# Patient Record
Sex: FEMALE | Race: WHITE | Employment: OTHER | URBAN - METROPOLITAN AREA
[De-identification: names, ages, dates, MRNs, and addresses within clinical notes are randomized per-mention and may not be internally consistent; named-entity substitution may affect disease eponyms.]

---

## 2017-01-03 ENCOUNTER — OFFICE VISIT (OUTPATIENT)
Dept: FAMILY MEDICINE CLINIC | Age: 62
End: 2017-01-03

## 2017-01-03 VITALS
WEIGHT: 155 LBS | SYSTOLIC BLOOD PRESSURE: 112 MMHG | HEART RATE: 62 BPM | DIASTOLIC BLOOD PRESSURE: 72 MMHG | BODY MASS INDEX: 26.46 KG/M2 | OXYGEN SATURATION: 96 % | HEIGHT: 64 IN

## 2017-01-03 DIAGNOSIS — D72.825 BANDEMIA: Primary | ICD-10-CM

## 2017-01-03 DIAGNOSIS — F51.01 PRIMARY INSOMNIA: ICD-10-CM

## 2017-01-03 DIAGNOSIS — M25.562 ACUTE PAIN OF LEFT KNEE: ICD-10-CM

## 2017-01-03 LAB
BASOPHILS ABSOLUTE: 0.1 K/UL (ref 0–0.2)
BASOPHILS RELATIVE PERCENT: 0.9 %
EOSINOPHILS ABSOLUTE: 0.5 K/UL (ref 0–0.6)
EOSINOPHILS RELATIVE PERCENT: 4.5 %
HCT VFR BLD CALC: 45.6 % (ref 36–48)
HEMOGLOBIN: 15.1 G/DL (ref 12–16)
LYMPHOCYTES ABSOLUTE: 4 K/UL (ref 1–5.1)
LYMPHOCYTES RELATIVE PERCENT: 37.7 %
MCH RBC QN AUTO: 32.3 PG (ref 26–34)
MCHC RBC AUTO-ENTMCNC: 33.1 G/DL (ref 31–36)
MCV RBC AUTO: 97.5 FL (ref 80–100)
MONOCYTES ABSOLUTE: 0.8 K/UL (ref 0–1.3)
MONOCYTES RELATIVE PERCENT: 7.1 %
NEUTROPHILS ABSOLUTE: 5.3 K/UL (ref 1.7–7.7)
NEUTROPHILS RELATIVE PERCENT: 49.8 %
PDW BLD-RTO: 14.9 % (ref 12.4–15.4)
PLATELET # BLD: 487 K/UL (ref 135–450)
PMV BLD AUTO: 8.1 FL (ref 5–10.5)
RBC # BLD: 4.68 M/UL (ref 4–5.2)
WBC # BLD: 10.6 K/UL (ref 4–11)

## 2017-01-03 PROCEDURE — 36415 COLL VENOUS BLD VENIPUNCTURE: CPT | Performed by: INTERNAL MEDICINE

## 2017-01-03 PROCEDURE — 99214 OFFICE O/P EST MOD 30 MIN: CPT | Performed by: INTERNAL MEDICINE

## 2017-01-03 PROCEDURE — 81002 URINALYSIS NONAUTO W/O SCOPE: CPT | Performed by: INTERNAL MEDICINE

## 2017-01-03 PROCEDURE — 90471 IMMUNIZATION ADMIN: CPT | Performed by: INTERNAL MEDICINE

## 2017-01-03 PROCEDURE — 90686 IIV4 VACC NO PRSV 0.5 ML IM: CPT | Performed by: INTERNAL MEDICINE

## 2017-01-03 ASSESSMENT — ENCOUNTER SYMPTOMS
CONSTIPATION: 0
ABDOMINAL PAIN: 0
DIARRHEA: 0

## 2017-01-04 ENCOUNTER — TELEPHONE (OUTPATIENT)
Dept: FAMILY MEDICINE CLINIC | Age: 62
End: 2017-01-04

## 2017-01-04 LAB
BILIRUBIN, POC: NEGATIVE
BLOOD URINE, POC: NEGATIVE
CLARITY, POC: NORMAL
COLOR, POC: NORMAL
GLUCOSE URINE, POC: NEGATIVE
KETONES, POC: NEGATIVE
LEUKOCYTE EST, POC: NORMAL
NITRITE, POC: NEGATIVE
PH, POC: 6
PROTEIN, POC: NEGATIVE
SPECIFIC GRAVITY, POC: <=1.005
UROBILINOGEN, POC: 0.2

## 2017-01-05 RX ORDER — ZOLPIDEM TARTRATE 5 MG/1
5 TABLET ORAL NIGHTLY PRN
Qty: 30 TABLET | Refills: 2 | Status: SHIPPED | OUTPATIENT
Start: 2017-01-05 | End: 2017-04-09 | Stop reason: SDUPTHER

## 2017-01-09 LAB
6-ACETYLMORPHINE: NOT DETECTED
7-AMINOCLONAZEPAM: NOT DETECTED
ALPHA-OH-ALPRAZOLAM: PRESENT
ALPRAZOLAM: NOT DETECTED
AMPHETAMINE: NOT DETECTED
BARBITURATES: NOT DETECTED
BENZOYLECGONINE: NOT DETECTED
BUPRENORPHINE: NOT DETECTED
CARISOPRODOL: NOT DETECTED
CLONAZEPAM: NOT DETECTED
CODEINE: NOT DETECTED
CREATININE URINE: 30.4 MG/DL (ref 20–400)
DIAZEPAM: NOT DETECTED
DRUGS EXPECTED: NORMAL
EER PAIN MGT DRUG PANEL, HIGH RES/EMIT U: NORMAL
ETHYL GLUCURONIDE: NOT DETECTED
FENTANYL: NOT DETECTED
HYDROCODONE: NOT DETECTED
HYDROMORPHONE: NOT DETECTED
LORAZEPAM: NOT DETECTED
MARIJUANA METABOLITE: NOT DETECTED
MDA: NOT DETECTED
MDEA: NOT DETECTED
MDMA URINE: NOT DETECTED
MEPERIDINE: NOT DETECTED
METHADONE: NOT DETECTED
METHAMPHETAMINE: NOT DETECTED
METHYLPHENIDATE: NOT DETECTED
MIDAZOLAM: NOT DETECTED
MORPHINE: NOT DETECTED
NORBUPRENORPHINE, FREE: NOT DETECTED
NORDIAZEPAM: NOT DETECTED
NORFENTANYL: NOT DETECTED
NORHYDROCODONE, URINE: NOT DETECTED
NOROXYCODONE: NOT DETECTED
NOROXYMORPHONE, URINE: NOT DETECTED
OXAZEPAM: NOT DETECTED
OXYCODONE: NOT DETECTED
OXYMORPHONE: NOT DETECTED
PAIN MANAGEMENT DRUG PANEL: NORMAL
PAIN MANAGEMENT DRUG PANEL: NORMAL
PCP: NOT DETECTED
PHENTERMINE: NOT DETECTED
PROPOXYPHENE: NOT DETECTED
TAPENTADOL, URINE: NOT DETECTED
TAPENTADOL-O-SULFATE, URINE: NOT DETECTED
TEMAZEPAM: NOT DETECTED
TRAMADOL: NOT DETECTED
ZOLPIDEM: NOT DETECTED

## 2017-01-10 ENCOUNTER — OFFICE VISIT (OUTPATIENT)
Dept: SOCIAL WORK | Age: 62
End: 2017-01-10

## 2017-01-10 DIAGNOSIS — F41.8 OTHER SPECIFIED ANXIETY DISORDERS: ICD-10-CM

## 2017-01-10 DIAGNOSIS — F33.0 MAJOR DEPRESSIVE DISORDER, RECURRENT EPISODE, MILD (HCC): Primary | ICD-10-CM

## 2017-01-10 PROCEDURE — 90834 PSYTX W PT 45 MINUTES: CPT | Performed by: SOCIAL WORKER

## 2017-02-02 RX ORDER — ATORVASTATIN CALCIUM 20 MG/1
TABLET, FILM COATED ORAL
Qty: 30 TABLET | Refills: 1 | Status: SHIPPED | OUTPATIENT
Start: 2017-02-02 | End: 2017-06-09 | Stop reason: SDUPTHER

## 2017-02-02 RX ORDER — SERTRALINE HYDROCHLORIDE 100 MG/1
TABLET, FILM COATED ORAL
Qty: 30 TABLET | Refills: 1 | Status: SHIPPED | OUTPATIENT
Start: 2017-02-02 | End: 2017-03-14 | Stop reason: SDUPTHER

## 2017-02-02 RX ORDER — AMITRIPTYLINE HYDROCHLORIDE 50 MG/1
TABLET, FILM COATED ORAL
Qty: 90 TABLET | Refills: 1 | Status: SHIPPED | OUTPATIENT
Start: 2017-02-02 | End: 2017-03-14 | Stop reason: SDUPTHER

## 2017-02-02 RX ORDER — ALENDRONATE SODIUM 70 MG/1
TABLET ORAL
Qty: 4 TABLET | Refills: 1 | Status: SHIPPED | OUTPATIENT
Start: 2017-02-02 | End: 2018-03-19 | Stop reason: SDUPTHER

## 2017-02-07 ENCOUNTER — OFFICE VISIT (OUTPATIENT)
Dept: SOCIAL WORK | Age: 62
End: 2017-02-07

## 2017-02-07 DIAGNOSIS — F33.0 MAJOR DEPRESSIVE DISORDER, RECURRENT EPISODE, MILD (HCC): Primary | ICD-10-CM

## 2017-02-07 DIAGNOSIS — F41.8 OTHER SPECIFIED ANXIETY DISORDERS: ICD-10-CM

## 2017-02-07 PROCEDURE — 90834 PSYTX W PT 45 MINUTES: CPT | Performed by: SOCIAL WORKER

## 2017-02-20 RX ORDER — ATENOLOL 50 MG/1
TABLET ORAL
Qty: 30 TABLET | Refills: 2 | Status: SHIPPED | OUTPATIENT
Start: 2017-02-20 | End: 2017-03-14 | Stop reason: SDUPTHER

## 2017-02-21 ENCOUNTER — OFFICE VISIT (OUTPATIENT)
Dept: SOCIAL WORK | Age: 62
End: 2017-02-21

## 2017-02-21 DIAGNOSIS — F41.8 OTHER SPECIFIED ANXIETY DISORDERS: ICD-10-CM

## 2017-02-21 DIAGNOSIS — F33.0 MAJOR DEPRESSIVE DISORDER, RECURRENT EPISODE, MILD (HCC): Primary | ICD-10-CM

## 2017-02-21 PROCEDURE — 90834 PSYTX W PT 45 MINUTES: CPT | Performed by: SOCIAL WORKER

## 2017-02-23 ENCOUNTER — TELEPHONE (OUTPATIENT)
Dept: FAMILY MEDICINE CLINIC | Age: 62
End: 2017-02-23

## 2017-02-23 DIAGNOSIS — Z13.21 ENCOUNTER FOR VITAMIN DEFICIENCY SCREENING: ICD-10-CM

## 2017-02-23 DIAGNOSIS — R53.83 FATIGUE, UNSPECIFIED TYPE: Primary | ICD-10-CM

## 2017-02-24 ENCOUNTER — NURSE ONLY (OUTPATIENT)
Dept: FAMILY MEDICINE CLINIC | Age: 62
End: 2017-02-24

## 2017-02-24 DIAGNOSIS — Z13.21 ENCOUNTER FOR VITAMIN DEFICIENCY SCREENING: ICD-10-CM

## 2017-02-24 DIAGNOSIS — R53.83 FATIGUE, UNSPECIFIED TYPE: ICD-10-CM

## 2017-02-24 LAB
BASOPHILS ABSOLUTE: 0.1 K/UL (ref 0–0.2)
BASOPHILS RELATIVE PERCENT: 1.3 %
EOSINOPHILS ABSOLUTE: 0.3 K/UL (ref 0–0.6)
EOSINOPHILS RELATIVE PERCENT: 3.3 %
HCT VFR BLD CALC: 49.2 % (ref 36–48)
HEMOGLOBIN: 15.9 G/DL (ref 12–16)
LYMPHOCYTES ABSOLUTE: 3.4 K/UL (ref 1–5.1)
LYMPHOCYTES RELATIVE PERCENT: 38 %
MCH RBC QN AUTO: 32.8 PG (ref 26–34)
MCHC RBC AUTO-ENTMCNC: 32.4 G/DL (ref 31–36)
MCV RBC AUTO: 101.2 FL (ref 80–100)
MONOCYTES ABSOLUTE: 0.7 K/UL (ref 0–1.3)
MONOCYTES RELATIVE PERCENT: 8 %
NEUTROPHILS ABSOLUTE: 4.4 K/UL (ref 1.7–7.7)
NEUTROPHILS RELATIVE PERCENT: 49.4 %
PDW BLD-RTO: 15.8 % (ref 12.4–15.4)
PLATELET # BLD: 551 K/UL (ref 135–450)
PMV BLD AUTO: 8.2 FL (ref 5–10.5)
RBC # BLD: 4.86 M/UL (ref 4–5.2)
VITAMIN D 25-HYDROXY: 15.6 NG/ML
WBC # BLD: 8.9 K/UL (ref 4–11)

## 2017-02-24 PROCEDURE — 36415 COLL VENOUS BLD VENIPUNCTURE: CPT | Performed by: INTERNAL MEDICINE

## 2017-02-25 LAB
ALBUMIN SERPL-MCNC: 4.5 G/DL (ref 3.4–5)
ANION GAP SERPL CALCULATED.3IONS-SCNC: 15 MMOL/L (ref 3–16)
BUN BLDV-MCNC: 5 MG/DL (ref 7–20)
CALCIUM SERPL-MCNC: 10.3 MG/DL (ref 8.3–10.6)
CHLORIDE BLD-SCNC: 99 MMOL/L (ref 99–110)
CO2: 23 MMOL/L (ref 21–32)
CREAT SERPL-MCNC: 0.7 MG/DL (ref 0.6–1.2)
GFR AFRICAN AMERICAN: >60
GFR NON-AFRICAN AMERICAN: >60
GLUCOSE BLD-MCNC: 103 MG/DL (ref 70–99)
PHOSPHORUS: 3.8 MG/DL (ref 2.5–4.9)
POTASSIUM SERPL-SCNC: 5.2 MMOL/L (ref 3.5–5.1)
SODIUM BLD-SCNC: 137 MMOL/L (ref 136–145)
TSH REFLEX: 1.87 UIU/ML (ref 0.27–4.2)
VITAMIN B-12: 319 PG/ML (ref 211–911)

## 2017-02-27 RX ORDER — FOLIC ACID 1 MG/1
1 TABLET ORAL DAILY
Qty: 30 TABLET | Refills: 3 | Status: SHIPPED | OUTPATIENT
Start: 2017-02-27 | End: 2017-06-13 | Stop reason: SDUPTHER

## 2017-02-28 RX ORDER — ATORVASTATIN CALCIUM 20 MG/1
TABLET, FILM COATED ORAL
Qty: 30 TABLET | Refills: 1 | Status: SHIPPED | OUTPATIENT
Start: 2017-02-28 | End: 2017-03-14 | Stop reason: SDUPTHER

## 2017-03-07 RX ORDER — ALPRAZOLAM 1 MG/1
TABLET ORAL
Qty: 45 TABLET | Refills: 0 | Status: SHIPPED | OUTPATIENT
Start: 2017-03-07 | End: 2017-04-09 | Stop reason: SDUPTHER

## 2017-03-14 ENCOUNTER — OFFICE VISIT (OUTPATIENT)
Dept: FAMILY MEDICINE CLINIC | Age: 62
End: 2017-03-14

## 2017-03-14 VITALS
BODY MASS INDEX: 26.43 KG/M2 | SYSTOLIC BLOOD PRESSURE: 94 MMHG | TEMPERATURE: 98.2 F | OXYGEN SATURATION: 98 % | HEART RATE: 68 BPM | WEIGHT: 154 LBS | DIASTOLIC BLOOD PRESSURE: 60 MMHG

## 2017-03-14 DIAGNOSIS — J30.1 NON-SEASONAL ALLERGIC RHINITIS DUE TO POLLEN: ICD-10-CM

## 2017-03-14 DIAGNOSIS — F33.1 MAJOR DEPRESSIVE DISORDER, RECURRENT EPISODE, MODERATE (HCC): ICD-10-CM

## 2017-03-14 DIAGNOSIS — G47.00 INSOMNIA, UNSPECIFIED TYPE: Primary | ICD-10-CM

## 2017-03-14 DIAGNOSIS — D86.9 SARCOIDOSIS: ICD-10-CM

## 2017-03-14 PROCEDURE — 99214 OFFICE O/P EST MOD 30 MIN: CPT | Performed by: INTERNAL MEDICINE

## 2017-03-14 RX ORDER — ATENOLOL 50 MG/1
TABLET ORAL
Qty: 30 TABLET | Refills: 0
Start: 2017-03-14 | End: 2017-06-09 | Stop reason: SDUPTHER

## 2017-03-14 RX ORDER — MONTELUKAST SODIUM 10 MG/1
10 TABLET ORAL DAILY
Qty: 30 TABLET | Refills: 3 | Status: SHIPPED | OUTPATIENT
Start: 2017-03-14 | End: 2017-06-13 | Stop reason: SDUPTHER

## 2017-03-14 ASSESSMENT — PATIENT HEALTH QUESTIONNAIRE - PHQ9
SUM OF ALL RESPONSES TO PHQ9 QUESTIONS 1 & 2: 0
1. LITTLE INTEREST OR PLEASURE IN DOING THINGS: 0
SUM OF ALL RESPONSES TO PHQ QUESTIONS 1-9: 0
2. FEELING DOWN, DEPRESSED OR HOPELESS: 0

## 2017-03-28 RX ORDER — AMITRIPTYLINE HYDROCHLORIDE 50 MG/1
TABLET, FILM COATED ORAL
Qty: 45 TABLET | Refills: 0 | Status: SHIPPED | OUTPATIENT
Start: 2017-03-28 | End: 2017-04-28 | Stop reason: SDUPTHER

## 2017-05-02 RX ORDER — AMITRIPTYLINE HYDROCHLORIDE 50 MG/1
TABLET, FILM COATED ORAL
Qty: 45 TABLET | Refills: 0 | Status: SHIPPED | OUTPATIENT
Start: 2017-05-02 | End: 2017-06-05 | Stop reason: SDUPTHER

## 2017-05-03 RX ORDER — ATORVASTATIN CALCIUM 20 MG/1
TABLET, FILM COATED ORAL
Qty: 30 TABLET | Refills: 5 | Status: SHIPPED | OUTPATIENT
Start: 2017-05-03 | End: 2017-08-14 | Stop reason: SDUPTHER

## 2017-05-23 RX ORDER — SERTRALINE HYDROCHLORIDE 100 MG/1
TABLET, FILM COATED ORAL
Qty: 30 TABLET | Refills: 1 | Status: SHIPPED | OUTPATIENT
Start: 2017-05-23 | End: 2017-06-13 | Stop reason: SDUPTHER

## 2017-05-23 RX ORDER — VALACYCLOVIR HYDROCHLORIDE 500 MG/1
TABLET, FILM COATED ORAL
Qty: 30 TABLET | Refills: 5 | Status: SHIPPED | OUTPATIENT
Start: 2017-05-23 | End: 2017-06-13 | Stop reason: SDUPTHER

## 2017-06-06 RX ORDER — AMITRIPTYLINE HYDROCHLORIDE 50 MG/1
TABLET, FILM COATED ORAL
Qty: 45 TABLET | Refills: 0 | Status: SHIPPED | OUTPATIENT
Start: 2017-06-06 | End: 2017-06-13 | Stop reason: SDUPTHER

## 2017-06-06 RX ORDER — ATENOLOL 50 MG/1
TABLET ORAL
Qty: 30 TABLET | Refills: 2 | Status: SHIPPED | OUTPATIENT
Start: 2017-06-06 | End: 2017-06-13 | Stop reason: SDUPTHER

## 2017-06-08 ENCOUNTER — TELEPHONE (OUTPATIENT)
Dept: FAMILY MEDICINE CLINIC | Age: 62
End: 2017-06-08

## 2017-06-09 ENCOUNTER — OFFICE VISIT (OUTPATIENT)
Dept: FAMILY MEDICINE CLINIC | Age: 62
End: 2017-06-09

## 2017-06-09 VITALS — DIASTOLIC BLOOD PRESSURE: 78 MMHG | HEART RATE: 74 BPM | OXYGEN SATURATION: 97 % | SYSTOLIC BLOOD PRESSURE: 122 MMHG

## 2017-06-09 DIAGNOSIS — S93.401A SPRAIN OF RIGHT ANKLE, UNSPECIFIED LIGAMENT, INITIAL ENCOUNTER: ICD-10-CM

## 2017-06-09 DIAGNOSIS — M25.571 ACUTE RIGHT ANKLE PAIN: ICD-10-CM

## 2017-06-09 PROCEDURE — 99212 OFFICE O/P EST SF 10 MIN: CPT | Performed by: NURSE PRACTITIONER

## 2017-06-13 ENCOUNTER — OFFICE VISIT (OUTPATIENT)
Dept: FAMILY MEDICINE CLINIC | Age: 62
End: 2017-06-13

## 2017-06-13 VITALS
HEART RATE: 67 BPM | BODY MASS INDEX: 26.71 KG/M2 | WEIGHT: 155.6 LBS | DIASTOLIC BLOOD PRESSURE: 60 MMHG | SYSTOLIC BLOOD PRESSURE: 90 MMHG | OXYGEN SATURATION: 98 %

## 2017-06-13 DIAGNOSIS — F33.1 MAJOR DEPRESSIVE DISORDER, RECURRENT EPISODE, MODERATE (HCC): ICD-10-CM

## 2017-06-13 DIAGNOSIS — E78.00 HYPERCHOLESTEROLEMIA: Primary | ICD-10-CM

## 2017-06-13 DIAGNOSIS — M25.552 LEFT HIP PAIN: ICD-10-CM

## 2017-06-13 DIAGNOSIS — D86.9 SARCOIDOSIS: ICD-10-CM

## 2017-06-13 LAB
CHOLESTEROL, TOTAL: 127 MG/DL (ref 0–199)
HDLC SERPL-MCNC: 40 MG/DL (ref 40–60)
LDL CHOLESTEROL CALCULATED: 65 MG/DL
TRIGL SERPL-MCNC: 109 MG/DL (ref 0–150)
VLDLC SERPL CALC-MCNC: 22 MG/DL

## 2017-06-13 PROCEDURE — 99214 OFFICE O/P EST MOD 30 MIN: CPT | Performed by: INTERNAL MEDICINE

## 2017-06-13 RX ORDER — FOLIC ACID 1 MG/1
1 TABLET ORAL DAILY
Qty: 90 TABLET | Refills: 1 | Status: SHIPPED | OUTPATIENT
Start: 2017-06-13 | End: 2017-12-19 | Stop reason: SDUPTHER

## 2017-06-13 RX ORDER — ZOLPIDEM TARTRATE 5 MG/1
TABLET ORAL
Qty: 30 TABLET | Refills: 2 | Status: CANCELLED | OUTPATIENT
Start: 2017-06-13

## 2017-06-13 RX ORDER — AMITRIPTYLINE HYDROCHLORIDE 50 MG/1
TABLET, FILM COATED ORAL
Qty: 135 TABLET | Refills: 1 | Status: SHIPPED | OUTPATIENT
Start: 2017-06-13 | End: 2017-12-19 | Stop reason: SDUPTHER

## 2017-06-13 RX ORDER — VALACYCLOVIR HYDROCHLORIDE 500 MG/1
TABLET, FILM COATED ORAL
Qty: 90 TABLET | Refills: 1 | Status: SHIPPED | OUTPATIENT
Start: 2017-06-13 | End: 2018-03-19 | Stop reason: SDUPTHER

## 2017-06-13 RX ORDER — ATENOLOL 25 MG/1
TABLET ORAL
Qty: 90 TABLET | Refills: 1 | Status: SHIPPED | OUTPATIENT
Start: 2017-06-13 | End: 2017-12-19 | Stop reason: SDUPTHER

## 2017-06-13 RX ORDER — ALPRAZOLAM 1 MG/1
TABLET ORAL
Qty: 45 TABLET | Refills: 2 | Status: CANCELLED | OUTPATIENT
Start: 2017-06-13

## 2017-06-13 RX ORDER — SERTRALINE HYDROCHLORIDE 100 MG/1
TABLET, FILM COATED ORAL
Qty: 90 TABLET | Refills: 1 | Status: SHIPPED | OUTPATIENT
Start: 2017-06-13 | End: 2017-12-19 | Stop reason: SDUPTHER

## 2017-06-13 RX ORDER — MONTELUKAST SODIUM 10 MG/1
10 TABLET ORAL DAILY
Qty: 90 TABLET | Refills: 1 | Status: SHIPPED | OUTPATIENT
Start: 2017-06-13 | End: 2017-12-19 | Stop reason: SDUPTHER

## 2017-06-18 ASSESSMENT — ENCOUNTER SYMPTOMS: COLOR CHANGE: 1

## 2017-06-21 ENCOUNTER — OFFICE VISIT (OUTPATIENT)
Dept: ORTHOPEDIC SURGERY | Age: 62
End: 2017-06-21

## 2017-06-21 VITALS
SYSTOLIC BLOOD PRESSURE: 120 MMHG | DIASTOLIC BLOOD PRESSURE: 79 MMHG | BODY MASS INDEX: 26.57 KG/M2 | HEART RATE: 75 BPM | HEIGHT: 64 IN | WEIGHT: 155.65 LBS

## 2017-06-21 DIAGNOSIS — M25.552 LEFT HIP PAIN: Primary | ICD-10-CM

## 2017-06-21 DIAGNOSIS — M70.62 TROCHANTERIC BURSITIS OF LEFT HIP: ICD-10-CM

## 2017-06-21 PROCEDURE — 20610 DRAIN/INJ JOINT/BURSA W/O US: CPT | Performed by: ORTHOPAEDIC SURGERY

## 2017-06-21 PROCEDURE — 99202 OFFICE O/P NEW SF 15 MIN: CPT | Performed by: ORTHOPAEDIC SURGERY

## 2017-06-21 PROCEDURE — 73502 X-RAY EXAM HIP UNI 2-3 VIEWS: CPT | Performed by: ORTHOPAEDIC SURGERY

## 2017-06-23 ENCOUNTER — TELEPHONE (OUTPATIENT)
Dept: ORTHOPEDIC SURGERY | Age: 62
End: 2017-06-23

## 2017-06-27 ENCOUNTER — OFFICE VISIT (OUTPATIENT)
Dept: ORTHOPEDIC SURGERY | Age: 62
End: 2017-06-27

## 2017-06-27 VITALS
HEART RATE: 69 BPM | BODY MASS INDEX: 26.57 KG/M2 | HEIGHT: 64 IN | SYSTOLIC BLOOD PRESSURE: 128 MMHG | WEIGHT: 155.65 LBS | DIASTOLIC BLOOD PRESSURE: 88 MMHG

## 2017-06-27 DIAGNOSIS — M25.571 RIGHT ANKLE PAIN, UNSPECIFIED CHRONICITY: Primary | ICD-10-CM

## 2017-06-27 DIAGNOSIS — M25.371 ANKLE INSTABILITY, RIGHT: ICD-10-CM

## 2017-06-27 PROBLEM — M25.373 ANKLE INSTABILITY: Status: ACTIVE | Noted: 2017-06-27

## 2017-06-27 PROCEDURE — L4361 PNEUMA/VAC WALK BOOT PRE OTS: HCPCS | Performed by: ORTHOPAEDIC SURGERY

## 2017-06-27 PROCEDURE — 99243 OFF/OP CNSLTJ NEW/EST LOW 30: CPT | Performed by: ORTHOPAEDIC SURGERY

## 2017-07-25 ENCOUNTER — OFFICE VISIT (OUTPATIENT)
Dept: ORTHOPEDIC SURGERY | Age: 62
End: 2017-07-25

## 2017-07-25 VITALS — WEIGHT: 155.65 LBS | HEIGHT: 64 IN | BODY MASS INDEX: 26.57 KG/M2

## 2017-07-25 DIAGNOSIS — M25.371 ANKLE INSTABILITY, RIGHT: Primary | ICD-10-CM

## 2017-07-25 PROCEDURE — L1902 AFO ANKLE GAUNTLET PRE OTS: HCPCS | Performed by: ORTHOPAEDIC SURGERY

## 2017-07-25 PROCEDURE — 73610 X-RAY EXAM OF ANKLE: CPT | Performed by: ORTHOPAEDIC SURGERY

## 2017-07-25 PROCEDURE — 99212 OFFICE O/P EST SF 10 MIN: CPT | Performed by: ORTHOPAEDIC SURGERY

## 2017-08-15 RX ORDER — ATORVASTATIN CALCIUM 20 MG/1
TABLET, FILM COATED ORAL
Qty: 30 TABLET | Refills: 0 | Status: SHIPPED | OUTPATIENT
Start: 2017-08-15 | End: 2017-08-16 | Stop reason: SDUPTHER

## 2017-08-16 ENCOUNTER — OFFICE VISIT (OUTPATIENT)
Dept: ORTHOPEDIC SURGERY | Age: 62
End: 2017-08-16

## 2017-08-16 VITALS
WEIGHT: 155.65 LBS | HEIGHT: 64 IN | BODY MASS INDEX: 26.57 KG/M2 | HEART RATE: 70 BPM | SYSTOLIC BLOOD PRESSURE: 111 MMHG | DIASTOLIC BLOOD PRESSURE: 74 MMHG

## 2017-08-16 DIAGNOSIS — M25.371 ANKLE INSTABILITY, RIGHT: ICD-10-CM

## 2017-08-16 DIAGNOSIS — M25.571 RIGHT ANKLE PAIN, UNSPECIFIED CHRONICITY: Primary | ICD-10-CM

## 2017-08-16 PROCEDURE — 99212 OFFICE O/P EST SF 10 MIN: CPT | Performed by: ORTHOPAEDIC SURGERY

## 2017-08-16 PROCEDURE — 73610 X-RAY EXAM OF ANKLE: CPT | Performed by: ORTHOPAEDIC SURGERY

## 2017-08-16 RX ORDER — ATORVASTATIN CALCIUM 20 MG/1
TABLET, FILM COATED ORAL
Qty: 90 TABLET | Refills: 0 | Status: SHIPPED | OUTPATIENT
Start: 2017-08-16 | End: 2017-12-27 | Stop reason: SDUPTHER

## 2017-09-19 ENCOUNTER — TELEPHONE (OUTPATIENT)
Dept: FAMILY MEDICINE CLINIC | Age: 62
End: 2017-09-19

## 2017-09-19 ENCOUNTER — OFFICE VISIT (OUTPATIENT)
Dept: FAMILY MEDICINE CLINIC | Age: 62
End: 2017-09-19

## 2017-09-19 VITALS
HEART RATE: 66 BPM | BODY MASS INDEX: 27.01 KG/M2 | SYSTOLIC BLOOD PRESSURE: 120 MMHG | WEIGHT: 158.2 LBS | DIASTOLIC BLOOD PRESSURE: 70 MMHG | OXYGEN SATURATION: 98 %

## 2017-09-19 DIAGNOSIS — M54.42 CHRONIC LEFT-SIDED LOW BACK PAIN WITH LEFT-SIDED SCIATICA: Primary | ICD-10-CM

## 2017-09-19 DIAGNOSIS — J06.9 VIRAL URI: ICD-10-CM

## 2017-09-19 DIAGNOSIS — G89.29 CHRONIC LEFT-SIDED LOW BACK PAIN WITH LEFT-SIDED SCIATICA: Primary | ICD-10-CM

## 2017-09-19 DIAGNOSIS — L98.9 SKIN LESION OF RIGHT ARM: Primary | ICD-10-CM

## 2017-09-19 DIAGNOSIS — F41.1 GAD (GENERALIZED ANXIETY DISORDER): ICD-10-CM

## 2017-09-19 DIAGNOSIS — G47.00 INSOMNIA, UNSPECIFIED TYPE: ICD-10-CM

## 2017-09-19 PROCEDURE — 90686 IIV4 VACC NO PRSV 0.5 ML IM: CPT | Performed by: INTERNAL MEDICINE

## 2017-09-19 PROCEDURE — 99213 OFFICE O/P EST LOW 20 MIN: CPT | Performed by: INTERNAL MEDICINE

## 2017-09-19 PROCEDURE — 90471 IMMUNIZATION ADMIN: CPT | Performed by: INTERNAL MEDICINE

## 2017-09-19 RX ORDER — CHOLECALCIFEROL (VITAMIN D3) 1250 MCG
1 CAPSULE ORAL
Qty: 12 CAPSULE | Refills: 1 | Status: SHIPPED | OUTPATIENT
Start: 2017-09-19 | End: 2018-02-24 | Stop reason: SDUPTHER

## 2017-09-22 RX ORDER — ZOLPIDEM TARTRATE 5 MG/1
TABLET ORAL
Qty: 30 TABLET | Refills: 2 | Status: SHIPPED | OUTPATIENT
Start: 2017-09-22 | End: 2017-12-21 | Stop reason: SDUPTHER

## 2017-09-22 RX ORDER — ALPRAZOLAM 1 MG/1
TABLET ORAL
Qty: 45 TABLET | Refills: 2 | Status: SHIPPED | OUTPATIENT
Start: 2017-09-22 | End: 2017-12-21 | Stop reason: SDUPTHER

## 2017-10-10 RX ORDER — ATENOLOL 50 MG/1
TABLET ORAL
Qty: 30 TABLET | Refills: 2 | Status: ON HOLD | OUTPATIENT
Start: 2017-10-10 | End: 2017-11-09 | Stop reason: SDUPTHER

## 2017-10-18 ENCOUNTER — HOSPITAL ENCOUNTER (OUTPATIENT)
Dept: ULTRASOUND IMAGING | Age: 62
Discharge: HOME | End: 2017-10-18
Attending: UROLOGY | Admitting: UROLOGY

## 2017-10-18 DIAGNOSIS — N20.0 KIDNEY STONES: ICD-10-CM

## 2017-10-18 DIAGNOSIS — N28.1 ACQUIRED CYST OF KIDNEY: ICD-10-CM

## 2017-11-13 PROBLEM — K92.2 GI BLEED: Status: ACTIVE | Noted: 2017-11-13

## 2017-11-13 PROBLEM — K52.9 COLITIS, ACUTE: Status: ACTIVE | Noted: 2017-11-13

## 2017-11-13 RX ORDER — ATENOLOL 50 MG/1
TABLET ORAL
Qty: 30 TABLET | Refills: 1 | Status: SHIPPED | OUTPATIENT
Start: 2017-11-13 | End: 2017-11-14 | Stop reason: HOSPADM

## 2017-12-11 ENCOUNTER — OFFICE VISIT (OUTPATIENT)
Dept: DERMATOLOGY | Age: 62
End: 2017-12-11

## 2017-12-11 DIAGNOSIS — D48.9 NEOPLASM OF UNCERTAIN BEHAVIOR: Primary | ICD-10-CM

## 2017-12-11 DIAGNOSIS — Z87.891 FORMER SMOKER: ICD-10-CM

## 2017-12-11 DIAGNOSIS — L57.0 ACTINIC KERATOSIS: ICD-10-CM

## 2017-12-11 PROCEDURE — 11100 PR BIOPSY OF SKIN LESION: CPT | Performed by: DERMATOLOGY

## 2017-12-11 PROCEDURE — 17110 DESTRUCTION B9 LES UP TO 14: CPT | Performed by: DERMATOLOGY

## 2017-12-11 NOTE — PATIENT INSTRUCTIONS
Biopsy Wound Care Instructions   Cleanse the wound with mild soapy water daily.  Gently dry the area.  Apply Vaseline or petroleum jelly to the wound using a cotton tipped applicator or Qtip.  Cover with a clean bandage.  Repeat this process until the biopsy site is healed.  If you had stitches placed, continue treating the site until the stitches are removed. Remember to make an appointment to return to have your stitches removed by our staff.  You may shower and bathe as usual.   ** Biopsy results generally take around 7 business days to come back. If you have not heard from us by then, please call the office at [phone] between 8AM and 4PM Monday through Friday. Cryosurgery (Freezing) Wound Care Instructions    AFTER THE PROCEDURE:    You will notice swelling and redness around the site. This is normal.    You may experience a sharp or sore feeling for the next several days. For this discomfort, you may take acetaminophen (Tylenol©).  A blister may develop at the treated area, sometimes as soon as by the end of the day. After several days, the blister will subside and a scab will form.  If the area is bumped or traumatized during the first few days following freezing, you may develop bleeding into the blister, forming a blood blister. This is nothing to be alarmed about.  If the blister is tense, uncomfortable, or much larger than the site that was frozen, you may pop the blister along its edge with a sterile needle (boiled, heated under a flame, or cleaned with alcohol) to allow the fluid to drain out. If the blister does not bother you, no treatment is needed.  Do NOT peel off the top of the blister roof. It will act as a dressing on top of your wound. WOUND CARE:    You may shower or bathe as usual, but avoid scrubbing the areas that have been frozen.  Cleanse the site twice a day with mild soapy water, and then apply a thin film of white petrolatum (Vaseline©).

## 2017-12-11 NOTE — PROGRESS NOTES
above: 1. Right upper lateral arm-0.5 x 0.5 cm well circumscribed bluish to gray to black papule      2.  ill defined irreg shaped gritty keratotic pink macule(s) located to nasal tip    Assessment and Plan     1. Neoplasm of uncertain behavior    2. Actinic keratosis    3. Former smoker        1. Neoplasm of uncertain behavior  DDX: Blue nevus rule out melanoma  -Discussed possible diagnosis. Patient agreeable to biopsy. Verbal consent obtained after risks (infection, bleeding, scar), benefits and alternatives explained. -Area(s) to be biopsied were marked with a surgical pen. Site(s) were cleansed with alcohol. Local anesthesia achieved with 1% lidocaine with epinephrine/sodium bicarbonate. Shave biopsy performed with a razor blade. Hemostasis was achieved with aluminum chloride and electrodessication. The wound(s) were dressed with petrolatum and covered with a bandage. Specimen(s) sent to pathology. Pt educated re: risk of bleeding, infection, scar and wound care instructions.    - ID BIOPSY OF SKIN LESION    2. Actinic keratosis  Actinic keratosis(es)  -Edu re: relationship with chronic cumulative sun exposure, low premalignant potential.   -1 lesion(s) treated w/ liquid nitrogen x 1 cycles, 3 seconds each. Edu re: risk of blister formation, discomfort, scar, hypopigmentation. Discussed wound care. -Reviewed sun protective behavior -- sun avoidance during the peak hours of the day, sun-protective clothing (including hat and sunglasses), sunscreen use (water resistant, broad spectrum, SPF at least 30, need for reapplication every 2 to 3 hours). -Patient to contact office if AK fails to resolve despite treatment, or if patient develops side effect from therapy, such as unbearable crusting, scabbing, redness, or tenderness.    - ID DESTRUCTION BENIGN LESIONS UP TO 14    3.  Former smoker  -continue with tobacco cessation        Return in about 1 year (around 12/11/2018) for TBSE, at patient's convenience.

## 2017-12-11 NOTE — Clinical Note
Dear Dr. Bean Sandy,  I had the pleasure of seeing your patient, Sarah Montiel, in my office recently. Thanks so much for involving me in her care. Please see my note and call me if you have any questions.   Best regards, Miranda Roldan, DO

## 2017-12-15 ENCOUNTER — TELEPHONE (OUTPATIENT)
Dept: DERMATOLOGY | Age: 62
End: 2017-12-15

## 2017-12-19 ENCOUNTER — OFFICE VISIT (OUTPATIENT)
Dept: FAMILY MEDICINE CLINIC | Age: 62
End: 2017-12-19

## 2017-12-19 VITALS
DIASTOLIC BLOOD PRESSURE: 60 MMHG | HEART RATE: 89 BPM | OXYGEN SATURATION: 93 % | BODY MASS INDEX: 26.61 KG/M2 | WEIGHT: 155 LBS | SYSTOLIC BLOOD PRESSURE: 100 MMHG

## 2017-12-19 DIAGNOSIS — F51.01 PRIMARY INSOMNIA: Primary | ICD-10-CM

## 2017-12-19 DIAGNOSIS — Z51.81 MEDICATION MONITORING ENCOUNTER: ICD-10-CM

## 2017-12-19 DIAGNOSIS — F41.9 ANXIETY: ICD-10-CM

## 2017-12-19 PROCEDURE — G8428 CUR MEDS NOT DOCUMENT: HCPCS | Performed by: INTERNAL MEDICINE

## 2017-12-19 PROCEDURE — G8484 FLU IMMUNIZE NO ADMIN: HCPCS | Performed by: INTERNAL MEDICINE

## 2017-12-19 PROCEDURE — G8417 CALC BMI ABV UP PARAM F/U: HCPCS | Performed by: INTERNAL MEDICINE

## 2017-12-19 PROCEDURE — 3014F SCREEN MAMMO DOC REV: CPT | Performed by: INTERNAL MEDICINE

## 2017-12-19 PROCEDURE — 99213 OFFICE O/P EST LOW 20 MIN: CPT | Performed by: INTERNAL MEDICINE

## 2017-12-19 PROCEDURE — 1036F TOBACCO NON-USER: CPT | Performed by: INTERNAL MEDICINE

## 2017-12-19 PROCEDURE — 3017F COLORECTAL CA SCREEN DOC REV: CPT | Performed by: INTERNAL MEDICINE

## 2017-12-19 RX ORDER — AMITRIPTYLINE HYDROCHLORIDE 50 MG/1
TABLET, FILM COATED ORAL
Qty: 135 TABLET | Refills: 1 | Status: SHIPPED | OUTPATIENT
Start: 2017-12-19 | End: 2018-03-19 | Stop reason: ALTCHOICE

## 2017-12-19 RX ORDER — MONTELUKAST SODIUM 10 MG/1
10 TABLET ORAL DAILY
Qty: 90 TABLET | Refills: 1 | Status: SHIPPED | OUTPATIENT
Start: 2017-12-19 | End: 2018-06-01 | Stop reason: SDUPTHER

## 2017-12-19 RX ORDER — SERTRALINE HYDROCHLORIDE 100 MG/1
TABLET, FILM COATED ORAL
Qty: 90 TABLET | Refills: 1 | Status: SHIPPED | OUTPATIENT
Start: 2017-12-19 | End: 2018-06-22 | Stop reason: SDUPTHER

## 2017-12-19 RX ORDER — FOLIC ACID 1 MG/1
1 TABLET ORAL DAILY
Qty: 90 TABLET | Refills: 1 | Status: SHIPPED | OUTPATIENT
Start: 2017-12-19 | End: 2021-01-18

## 2017-12-19 RX ORDER — NORTRIPTYLINE HYDROCHLORIDE 25 MG/1
50 CAPSULE ORAL NIGHTLY
Qty: 180 CAPSULE | Refills: 1 | Status: SHIPPED | OUTPATIENT
Start: 2017-12-19 | End: 2018-06-25 | Stop reason: SDUPTHER

## 2017-12-19 RX ORDER — ATENOLOL 25 MG/1
TABLET ORAL
Qty: 90 TABLET | Refills: 1 | Status: SHIPPED | OUTPATIENT
Start: 2017-12-19 | End: 2018-06-22 | Stop reason: SDUPTHER

## 2017-12-19 NOTE — PROGRESS NOTES
Subjective:      Patient ID: Cipriano Villalobos is a 58 y.o. female. HPI   Follow up insomnia and anxiety stable on current. Anxiety and insomnia controlled, needs to change from amitriptyline. Shooting for 10 pounds weight loss. Patient's medications, allergies, past medical, surgical, social and family histories were reviewed and updated as appropriate. Chief Complaint   Patient presents with    Back Pain     controlled substance monitoring    Insomnia     +hx of sarcoid, on remicade infusion, prednisone, symptoms controlled. Depression, anxiety symptoms controlled, insomnia issues- [place] helps. Scheduling with [address] for counseling, just in case. Increased congestion and PND remaining since sinus, no malaise or pain. Patient's medications, allergies, past medical, surgical, social and family histories were reviewed and updated as appropriate. Treatment: mucinex.      Current Outpatient Prescriptions:     sertraline (ZOLOFT) 100 MG tablet, TAKE 1 TABLET BY MOUTH DAILY, Disp: 90 tablet, Rfl: 1    amitriptyline (ELAVIL) 50 MG tablet, TAKE 1/2 TABLET BY MOUTH IN THE MORNING AND 1 TABLET BY MOUTH AT NIGHT, Disp: 135 tablet, Rfl: 1    atenolol (TENORMIN) 25 MG tablet, TAKE 1 TABLET EVERY DAY, Disp: 90 tablet, Rfl: 1    folic acid (FOLVITE) 1 MG tablet, Take 1 tablet by mouth daily, Disp: 90 tablet, Rfl: 1    montelukast (SINGULAIR) 10 MG tablet, Take 1 tablet by mouth daily, Disp: 90 tablet, Rfl: 1    nortriptyline (PAMELOR) 25 MG capsule, Take 2 capsules by mouth nightly, Disp: 180 capsule, Rfl: 1    valACYclovir (VALTREX) 500 MG tablet, TAKE 1 TABLET BY MOUTH DAILY, Disp: 30 tablet, Rfl: 0    pantoprazole (PROTONIX) 40 MG tablet, Take 1 tablet by mouth daily, Disp: 30 tablet, Rfl: 3    zolpidem (AMBIEN) 5 MG tablet, TAKE 1 TABLET BY MOUTH NIGHTLY AS NEEDED FOR SLEEP, Disp: 30 tablet, Rfl: 2    ALPRAZolam (XANAX) 1 MG tablet, TAKE 1 TABLET TWICE A DAY, Disp: 45 tablet, Rfl: 2    Cholecalciferol (VITAMIN D3) 15202 units CAPS, Take 1 capsule by mouth every 7 days, Disp: 12 capsule, Rfl: 1    atorvastatin (LIPITOR) 20 MG tablet, TAKE 1 TABLET DAILY, Disp: 90 tablet, Rfl: 0    valACYclovir (VALTREX) 500 MG tablet, TAKE 1 TABLET BY MOUTH DAILY, Disp: 90 tablet, Rfl: 1    GABAPENTIN PO, Take by mouth, Disp: , Rfl:     cyclobenzaprine (FLEXERIL) 10 MG tablet, Take 10 mg by mouth 3 times daily as needed for Muscle spasms, Disp: , Rfl:     methotrexate (RHEUMATREX) 2.5 MG chemo tablet, Take 5 mg by mouth every 7 days, Disp: , Rfl:     alendronate (FOSAMAX) 70 MG tablet, TAKE 1 TABLET EVERY 7 DAYS, Disp: 4 tablet, Rfl: 1    spironolactone (ALDACTONE) 50 MG tablet, TAKE 1 TABLET EVERY DAY, Disp: 30 tablet, Rfl: 1    prednisoLONE acetate (PRED FORTE) 1 % ophthalmic suspension, 1 drop 4 times daily, Disp: , Rfl:     fluocinonide (LIDEX) 0.05 % cream, Apply topically 2 times daily Apply topically 2 times daily. , Disp: , Rfl:     predniSONE (DELTASONE) 1 MG tablet, Take 10 mg by mouth daily 40 mg per ophtomologist, Disp: , Rfl:     promethazine (PHENERGAN) 12.5 MG tablet, Take 12.5 mg by mouth every 6 hours as needed for Nausea, Disp: , Rfl:     Diclofenac Sodium (PENNSAID) 1.5 % SOLN, Place 1.5 % onto the skin 4 times daily, Disp: , Rfl:     traMADol (ULTRAM) 50 MG tablet, Take 50 mg by mouth every 6 hours as needed. , Disp: , Rfl:     Misc. Devices (WALKER) MISC, 1 each by Does not apply route daily for 1 day Dispense and Fit for injury to lower extremity and weakness. Right ankle sprain, Disp: 1 each, Rfl: 0    Past medical, surgical, family and social history were reviewed and updated with the patient. Review of Systems   Constitutional: No fatigue or weight loss. Respiratory: No cough or dyspnea. Cardiovascular: No chest pain or edema. Gastrointestinal: No constipation or diarrhea. Psychiatric/Behavioral: Negative for dysphoric mood, self-injury and suicidal ideas.  The patient is not nervous/anxious. Objective:   Physical Exam   Constitutional: She is oriented to person, place, and time. She appears well-developed and well-nourished. No distress. HENT:   Right Ear: Tympanic membrane and ear canal normal.   Left Ear: Tympanic membrane, external ear and ear canal normal.   Nose: clear discharge, no tenderness  Mouth/Throat: Uvula is midline, oropharynx is clear and moist and mucous membranes are normal.   Neck: Neck supple. Cardiovascular: Normal rate, regular rhythm and normal heart sounds. No murmur heard. No edema   Pulmonary/Chest: Effort normal and breath sounds normal. She has no decreased breath sounds. She has no wheezes. She has no rhonchi. She has no rales. Abdominal: Bowel sounds are normal.   Lymphadenopathy:     She has no cervical adenopathy. Neurological: She is alert and oriented to person, place, and time. Psychiatric: She has a normal mood and affect. Her behavior is normal. Thought content normal.   Nursing note and vitals reviewed. Vitals:    12/19/17 1318   BP: 100/60   Site: Left Arm   Position: Sitting   Cuff Size: Medium Adult   Pulse: 89   SpO2: 93%   Weight: 155 lb (70.3 kg)     Assessment:      Toshia Javed was seen today for back pain and insomnia. Diagnoses and all orders for this visit:    Primary insomnia  -     Drug Panel-PM-HI Res-UR Interp-A; Future    Anxiety  -     Drug Panel-PM-HI Res-UR Interp-A; Future    Medication monitoring encounter  -     Drug Panel-PM-HI Res-UR Interp-A; Future    Other orders  -     sertraline (ZOLOFT) 100 MG tablet; TAKE 1 TABLET BY MOUTH DAILY  -     amitriptyline (ELAVIL) 50 MG tablet; TAKE 1/2 TABLET BY MOUTH IN THE MORNING AND 1 TABLET BY MOUTH AT NIGHT  -     atenolol (TENORMIN) 25 MG tablet; TAKE 1 TABLET EVERY DAY  -     folic acid (FOLVITE) 1 MG tablet; Take 1 tablet by mouth daily  -     montelukast (SINGULAIR) 10 MG tablet; Take 1 tablet by mouth daily  -     nortriptyline (PAMELOR) 25 MG capsule;  Take 2 capsules by mouth nightly            Plan:        OV in 3 months.

## 2017-12-24 LAB
6-ACETYLMORPHINE: NOT DETECTED
7-AMINOCLONAZEPAM: NOT DETECTED
ALPHA-OH-ALPRAZOLAM: PRESENT
ALPRAZOLAM: PRESENT
AMPHETAMINE: NOT DETECTED
BARBITURATES: NOT DETECTED
BENZOYLECGONINE: NOT DETECTED
BUPRENORPHINE: NOT DETECTED
CARISOPRODOL: NOT DETECTED
CLONAZEPAM: NOT DETECTED
CODEINE: NOT DETECTED
CREATININE URINE: <20 MG/DL (ref 20–400)
DIAZEPAM: NOT DETECTED
DRUGS EXPECTED: ABNORMAL
EER PAIN MGT DRUG PANEL, HIGH RES/EMIT U: ABNORMAL
ETHYL GLUCURONIDE: NOT DETECTED
FENTANYL: NOT DETECTED
HYDROCODONE: NOT DETECTED
HYDROMORPHONE: NOT DETECTED
LORAZEPAM: NOT DETECTED
MARIJUANA METABOLITE: NOT DETECTED
MDA: NOT DETECTED
MDEA: NOT DETECTED
MDMA URINE: NOT DETECTED
MEPERIDINE: NOT DETECTED
METHADONE: NOT DETECTED
METHAMPHETAMINE: NOT DETECTED
METHYLPHENIDATE: NOT DETECTED
MIDAZOLAM: NOT DETECTED
MORPHINE: NOT DETECTED
NORBUPRENORPHINE, FREE: NOT DETECTED
NORDIAZEPAM: NOT DETECTED
NORFENTANYL: NOT DETECTED
NORHYDROCODONE, URINE: NOT DETECTED
NOROXYCODONE: NOT DETECTED
NOROXYMORPHONE, URINE: NOT DETECTED
OXAZEPAM: NOT DETECTED
OXYCODONE: NOT DETECTED
OXYMORPHONE: NOT DETECTED
PAIN MANAGEMENT DRUG PANEL: ABNORMAL
PAIN MANAGEMENT DRUG PANEL: ABNORMAL
PCP: NOT DETECTED
PHENTERMINE: NOT DETECTED
PROPOXYPHENE: NOT DETECTED
TAPENTADOL, URINE: NOT DETECTED
TAPENTADOL-O-SULFATE, URINE: NOT DETECTED
TEMAZEPAM: NOT DETECTED
TRAMADOL: PRESENT
ZOLPIDEM: NOT DETECTED

## 2017-12-26 RX ORDER — ZOLPIDEM TARTRATE 5 MG/1
TABLET ORAL
Qty: 30 TABLET | Refills: 2 | Status: SHIPPED | OUTPATIENT
Start: 2017-12-26 | End: 2018-04-02 | Stop reason: SDUPTHER

## 2017-12-26 RX ORDER — ALPRAZOLAM 1 MG/1
TABLET ORAL
Qty: 45 TABLET | Refills: 2 | Status: SHIPPED | OUTPATIENT
Start: 2017-12-26 | End: 2018-04-02 | Stop reason: SDUPTHER

## 2017-12-26 NOTE — TELEPHONE ENCOUNTER
Last Seen: 12/19/2017    Last Writen: 9/22/17    Last UDS: 12/19/17    OARRS Run On: 12/19/17    Med Agreement Signed On: 1/4/17    Next Appointment: 3/19/2018    Requested Prescriptions     Pending Prescriptions Disp Refills    ALPRAZolam (XANAX) 1 MG tablet [Pharmacy Med Name: ALPRAZOLAM 1 MG TABLET] 45 tablet 2     Sig: TAKE 1 TABLET TWICE A DAY    zolpidem (AMBIEN) 5 MG tablet [Pharmacy Med Name: ZOLPIDEM TARTRATE 5 MG TABLET] 30 tablet 2     Sig: TAKE 1 TABLET BY MOUTH NIGHTLY AS NEEDED FOR SLEEP

## 2017-12-28 RX ORDER — ATORVASTATIN CALCIUM 20 MG/1
TABLET, FILM COATED ORAL
Qty: 90 TABLET | Refills: 0 | Status: SHIPPED | OUTPATIENT
Start: 2017-12-28 | End: 2018-03-19 | Stop reason: SDUPTHER

## 2018-01-09 ENCOUNTER — TELEPHONE (OUTPATIENT)
Dept: FAMILY MEDICINE CLINIC | Age: 63
End: 2018-01-09

## 2018-01-24 RX ORDER — VALACYCLOVIR HYDROCHLORIDE 500 MG/1
TABLET, FILM COATED ORAL
Qty: 30 TABLET | Refills: 1 | Status: SHIPPED | OUTPATIENT
Start: 2018-01-24 | End: 2018-04-23 | Stop reason: SDUPTHER

## 2018-02-26 ENCOUNTER — TELEPHONE (OUTPATIENT)
Dept: FAMILY MEDICINE CLINIC | Age: 63
End: 2018-02-26

## 2018-02-26 RX ORDER — METHOCARBAMOL 750 MG/1
1 TABLET ORAL
Qty: 12 CAPSULE | Refills: 1 | Status: SHIPPED | OUTPATIENT
Start: 2018-02-26 | End: 2019-01-25

## 2018-02-26 NOTE — TELEPHONE ENCOUNTER
Informed patient that her Vit D has been denied through her insurance. Told her to try and get over the counter. Patient was good with that.

## 2018-03-02 ENCOUNTER — TELEPHONE (OUTPATIENT)
Dept: FAMILY MEDICINE CLINIC | Age: 63
End: 2018-03-02

## 2018-03-12 ENCOUNTER — TELEPHONE (OUTPATIENT)
Dept: FAMILY MEDICINE CLINIC | Age: 63
End: 2018-03-12

## 2018-03-19 ENCOUNTER — OFFICE VISIT (OUTPATIENT)
Dept: FAMILY MEDICINE CLINIC | Age: 63
End: 2018-03-19

## 2018-03-19 VITALS
OXYGEN SATURATION: 97 % | DIASTOLIC BLOOD PRESSURE: 60 MMHG | SYSTOLIC BLOOD PRESSURE: 110 MMHG | HEART RATE: 85 BPM | WEIGHT: 153.6 LBS | BODY MASS INDEX: 26.37 KG/M2

## 2018-03-19 DIAGNOSIS — F33.1 MAJOR DEPRESSIVE DISORDER, RECURRENT EPISODE, MODERATE (HCC): ICD-10-CM

## 2018-03-19 DIAGNOSIS — I10 ESSENTIAL HYPERTENSION: Primary | ICD-10-CM

## 2018-03-19 DIAGNOSIS — F51.01 PRIMARY INSOMNIA: ICD-10-CM

## 2018-03-19 DIAGNOSIS — E55.9 VITAMIN D DEFICIENCY: ICD-10-CM

## 2018-03-19 DIAGNOSIS — R73.01 IMPAIRED FASTING GLUCOSE: ICD-10-CM

## 2018-03-19 DIAGNOSIS — Z11.59 NEED FOR HEPATITIS C SCREENING TEST: ICD-10-CM

## 2018-03-19 DIAGNOSIS — Z11.4 SCREENING FOR HIV (HUMAN IMMUNODEFICIENCY VIRUS): ICD-10-CM

## 2018-03-19 DIAGNOSIS — Z78.0 ASYMPTOMATIC MENOPAUSAL STATE: ICD-10-CM

## 2018-03-19 DIAGNOSIS — M85.89 OSTEOPENIA OF MULTIPLE SITES: ICD-10-CM

## 2018-03-19 LAB
HEPATITIS C ANTIBODY INTERPRETATION: NORMAL
VITAMIN D 25-HYDROXY: 77.9 NG/ML

## 2018-03-19 PROCEDURE — 99214 OFFICE O/P EST MOD 30 MIN: CPT | Performed by: INTERNAL MEDICINE

## 2018-03-19 PROCEDURE — 1036F TOBACCO NON-USER: CPT | Performed by: INTERNAL MEDICINE

## 2018-03-19 PROCEDURE — G8482 FLU IMMUNIZE ORDER/ADMIN: HCPCS | Performed by: INTERNAL MEDICINE

## 2018-03-19 PROCEDURE — G8427 DOCREV CUR MEDS BY ELIG CLIN: HCPCS | Performed by: INTERNAL MEDICINE

## 2018-03-19 PROCEDURE — 3014F SCREEN MAMMO DOC REV: CPT | Performed by: INTERNAL MEDICINE

## 2018-03-19 PROCEDURE — G8417 CALC BMI ABV UP PARAM F/U: HCPCS | Performed by: INTERNAL MEDICINE

## 2018-03-19 PROCEDURE — 3017F COLORECTAL CA SCREEN DOC REV: CPT | Performed by: INTERNAL MEDICINE

## 2018-03-19 RX ORDER — ALENDRONATE SODIUM 70 MG/1
TABLET ORAL
Qty: 12 TABLET | Refills: 1 | Status: SHIPPED | OUTPATIENT
Start: 2018-03-19 | End: 2018-12-16 | Stop reason: SDUPTHER

## 2018-03-19 RX ORDER — ATORVASTATIN CALCIUM 20 MG/1
TABLET, FILM COATED ORAL
Qty: 90 TABLET | Refills: 1 | Status: SHIPPED | OUTPATIENT
Start: 2018-03-19 | End: 2018-09-15 | Stop reason: SDUPTHER

## 2018-03-19 RX ORDER — SULFAMETHOXAZOLE AND TRIMETHOPRIM 800; 160 MG/1; MG/1
1 TABLET ORAL 2 TIMES DAILY
Qty: 20 TABLET | Refills: 0 | Status: SHIPPED | OUTPATIENT
Start: 2018-03-19 | End: 2018-03-29

## 2018-03-19 RX ORDER — PANTOPRAZOLE SODIUM 40 MG/1
40 TABLET, DELAYED RELEASE ORAL DAILY
Qty: 90 TABLET | Refills: 1 | Status: SHIPPED | OUTPATIENT
Start: 2018-03-19 | End: 2018-09-21 | Stop reason: SDUPTHER

## 2018-03-19 ASSESSMENT — PATIENT HEALTH QUESTIONNAIRE - PHQ9
1. LITTLE INTEREST OR PLEASURE IN DOING THINGS: 0
SUM OF ALL RESPONSES TO PHQ9 QUESTIONS 1 & 2: 0
2. FEELING DOWN, DEPRESSED OR HOPELESS: 0
SUM OF ALL RESPONSES TO PHQ QUESTIONS 1-9: 0

## 2018-03-19 NOTE — PROGRESS NOTES
Subjective:      Patient ID: Yonatan Nicolas is a 58 y.o. female. HPI   Follow up insomnia and anxiety stable on current. Low vit D, wonders about effect on sarcoid from specialist. We have looked into this in the past and I am available to discuss with her specialist. Osteoporosis needs DEXA. Having increased migraine lately and sinus pressure not resolving the past 2 weeks with flonase and singulair. Notes loss of short term recall, worsening for the past year. Patient's medications, allergies, past medical, surgical, social and family histories were reviewed and updated as appropriate. Chief Complaint   Patient presents with    Insomnia     +hx of sarcoid, on remicade infusion, prednisone, symptoms controlled. Depression, anxiety symptoms controlled, insomnia issues- [place] helps. Scheduling with [address]  for counseling, just in case. Increased congestion and PND remaining since sinus, no malaise or pain. Patient's medications, allergies, past medical, surgical, social and family histories were reviewed and updated as appropriate. Treatment: mucinex.      Current Outpatient Prescriptions:     D3-50 99606 units CAPS, TAKE 1 CAPSULE BY MOUTH EVERY 7 DAYS, Disp: 12 capsule, Rfl: 1    valACYclovir (VALTREX) 500 MG tablet, TAKE 1 TABLET BY MOUTH DAILY, Disp: 30 tablet, Rfl: 1    atorvastatin (LIPITOR) 20 MG tablet, TAKE 1 TABLET DAILY, Disp: 90 tablet, Rfl: 0    ALPRAZolam (XANAX) 1 MG tablet, TAKE 1 TABLET TWICE A DAY, Disp: 45 tablet, Rfl: 2    zolpidem (AMBIEN) 5 MG tablet, TAKE 1 TABLET BY MOUTH NIGHTLY AS NEEDED FOR SLEEP, Disp: 30 tablet, Rfl: 2    sertraline (ZOLOFT) 100 MG tablet, TAKE 1 TABLET BY MOUTH DAILY, Disp: 90 tablet, Rfl: 1    amitriptyline (ELAVIL) 50 MG tablet, TAKE 1/2 TABLET BY MOUTH IN THE MORNING AND 1 TABLET BY MOUTH AT NIGHT, Disp: 135 tablet, Rfl: 1    atenolol (TENORMIN) 25 MG tablet, TAKE 1 TABLET EVERY DAY, Disp: 90 tablet, Rfl: 1    folic acid (FOLVITE) 1 MG tablet, 1 tablet by mouth 2 times daily for 10 days  -     atorvastatin (LIPITOR) 20 MG tablet; TAKE 1 TABLET DAILY  -     pantoprazole (PROTONIX) 40 MG tablet; Take 1 tablet by mouth daily  -     alendronate (FOSAMAX) 70 MG tablet; TAKE 1 TABLET EVERY 7 DAYS    The current medical regimen is effective;  continue present plan and medications. Plan:        OV in 3 months.

## 2018-03-20 LAB
HIV AG/AB: NORMAL
HIV ANTIGEN: NORMAL
HIV-1 ANTIBODY: NORMAL
HIV-2 AB: NORMAL

## 2018-03-29 ENCOUNTER — TELEPHONE (OUTPATIENT)
Dept: PHARMACY | Facility: CLINIC | Age: 63
End: 2018-03-29

## 2018-03-29 ENCOUNTER — TELEPHONE (OUTPATIENT)
Dept: FAMILY MEDICINE CLINIC | Age: 63
End: 2018-03-29

## 2018-04-02 RX ORDER — ZOLPIDEM TARTRATE 5 MG/1
TABLET ORAL
Qty: 30 TABLET | Refills: 2 | Status: SHIPPED | OUTPATIENT
Start: 2018-04-02 | End: 2018-07-01 | Stop reason: SDUPTHER

## 2018-04-02 RX ORDER — ALPRAZOLAM 1 MG/1
TABLET ORAL
Qty: 45 TABLET | Refills: 2 | Status: SHIPPED | OUTPATIENT
Start: 2018-04-02 | End: 2018-07-25 | Stop reason: SDUPTHER

## 2018-04-04 ENCOUNTER — HOSPITAL ENCOUNTER (OUTPATIENT)
Dept: GENERAL RADIOLOGY | Age: 63
Discharge: HOME | End: 2018-04-04
Attending: INTERNAL MEDICINE | Admitting: INTERNAL MEDICINE

## 2018-04-04 DIAGNOSIS — Z78.0 ASYMPTOMATIC MENOPAUSAL STATE: ICD-10-CM

## 2018-04-25 RX ORDER — VALACYCLOVIR HYDROCHLORIDE 500 MG/1
TABLET, FILM COATED ORAL
Qty: 30 TABLET | Refills: 2 | Status: SHIPPED | OUTPATIENT
Start: 2018-04-25 | End: 2018-07-24 | Stop reason: SDUPTHER

## 2018-06-01 RX ORDER — MONTELUKAST SODIUM 10 MG/1
10 TABLET ORAL DAILY
Qty: 90 TABLET | Refills: 0 | Status: SHIPPED | OUTPATIENT
Start: 2018-06-01 | End: 2018-09-21 | Stop reason: SDUPTHER

## 2018-06-20 ENCOUNTER — OFFICE VISIT (OUTPATIENT)
Dept: ORTHOPEDIC SURGERY | Age: 63
End: 2018-06-20

## 2018-06-20 VITALS — WEIGHT: 153.66 LBS | BODY MASS INDEX: 26.23 KG/M2 | HEIGHT: 64 IN

## 2018-06-20 DIAGNOSIS — M77.12 LATERAL EPICONDYLITIS OF LEFT ELBOW: ICD-10-CM

## 2018-06-20 DIAGNOSIS — M25.522 ELBOW PAIN, LEFT: Primary | ICD-10-CM

## 2018-06-20 PROCEDURE — 99213 OFFICE O/P EST LOW 20 MIN: CPT | Performed by: ORTHOPAEDIC SURGERY

## 2018-06-20 PROCEDURE — 1036F TOBACCO NON-USER: CPT | Performed by: ORTHOPAEDIC SURGERY

## 2018-06-20 PROCEDURE — G8417 CALC BMI ABV UP PARAM F/U: HCPCS | Performed by: ORTHOPAEDIC SURGERY

## 2018-06-20 PROCEDURE — G8427 DOCREV CUR MEDS BY ELIG CLIN: HCPCS | Performed by: ORTHOPAEDIC SURGERY

## 2018-06-20 PROCEDURE — 3017F COLORECTAL CA SCREEN DOC REV: CPT | Performed by: ORTHOPAEDIC SURGERY

## 2018-06-22 RX ORDER — ATENOLOL 25 MG/1
TABLET ORAL
Qty: 90 TABLET | Refills: 0 | Status: SHIPPED | OUTPATIENT
Start: 2018-06-22 | End: 2018-09-21 | Stop reason: SDUPTHER

## 2018-06-22 RX ORDER — SERTRALINE HYDROCHLORIDE 100 MG/1
TABLET, FILM COATED ORAL
Qty: 90 TABLET | Refills: 0 | Status: SHIPPED | OUTPATIENT
Start: 2018-06-22 | End: 2018-09-21 | Stop reason: SDUPTHER

## 2018-06-25 RX ORDER — NORTRIPTYLINE HYDROCHLORIDE 25 MG/1
50 CAPSULE ORAL NIGHTLY
Qty: 60 CAPSULE | Refills: 0 | Status: SHIPPED | OUTPATIENT
Start: 2018-06-25 | End: 2018-07-23 | Stop reason: SDUPTHER

## 2018-06-26 RX ORDER — NORTRIPTYLINE HYDROCHLORIDE 25 MG/1
50 CAPSULE ORAL NIGHTLY
Qty: 180 CAPSULE | Refills: 1 | OUTPATIENT
Start: 2018-06-26

## 2018-06-28 ENCOUNTER — OFFICE VISIT (OUTPATIENT)
Dept: FAMILY MEDICINE CLINIC | Age: 63
End: 2018-06-28

## 2018-06-28 ENCOUNTER — TELEPHONE (OUTPATIENT)
Dept: FAMILY MEDICINE CLINIC | Age: 63
End: 2018-06-28

## 2018-06-28 VITALS
WEIGHT: 144 LBS | SYSTOLIC BLOOD PRESSURE: 118 MMHG | DIASTOLIC BLOOD PRESSURE: 78 MMHG | BODY MASS INDEX: 24.71 KG/M2 | TEMPERATURE: 98.3 F | HEART RATE: 79 BPM | OXYGEN SATURATION: 98 %

## 2018-06-28 DIAGNOSIS — G43.009 MIGRAINE WITHOUT AURA AND WITHOUT STATUS MIGRAINOSUS, NOT INTRACTABLE: ICD-10-CM

## 2018-06-28 DIAGNOSIS — N30.01 ACUTE CYSTITIS WITH HEMATURIA: ICD-10-CM

## 2018-06-28 DIAGNOSIS — F33.1 MAJOR DEPRESSIVE DISORDER, RECURRENT EPISODE, MODERATE (HCC): Primary | ICD-10-CM

## 2018-06-28 DIAGNOSIS — F51.01 PRIMARY INSOMNIA: ICD-10-CM

## 2018-06-28 LAB
AMPHETAMINE SCREEN, URINE: NORMAL
BARBITURATE SCREEN URINE: NORMAL
BENZODIAZEPINE SCREEN, URINE: NORMAL
BILIRUBIN, POC: ABNORMAL
BLOOD URINE, POC: ABNORMAL
CANNABINOID SCREEN URINE: NORMAL
CLARITY, POC: ABNORMAL
COCAINE METABOLITE SCREEN URINE: NORMAL
COLOR, POC: YELLOW
GLUCOSE URINE, POC: ABNORMAL
KETONES, POC: ABNORMAL
LEUKOCYTE EST, POC: ABNORMAL
Lab: NORMAL
METHADONE SCREEN, URINE: NORMAL
NITRITE, POC: ABNORMAL
OPIATE SCREEN URINE: NORMAL
OXYCODONE URINE: NORMAL
PH UA: 6
PH, POC: 6
PHENCYCLIDINE SCREEN URINE: NORMAL
PROPOXYPHENE SCREEN: NORMAL
PROTEIN, POC: ABNORMAL
SPECIFIC GRAVITY, POC: 1.01
UROBILINOGEN, POC: 0.2

## 2018-06-28 PROCEDURE — G8420 CALC BMI NORM PARAMETERS: HCPCS | Performed by: PHYSICIAN ASSISTANT

## 2018-06-28 PROCEDURE — G8427 DOCREV CUR MEDS BY ELIG CLIN: HCPCS | Performed by: PHYSICIAN ASSISTANT

## 2018-06-28 PROCEDURE — 1036F TOBACCO NON-USER: CPT | Performed by: PHYSICIAN ASSISTANT

## 2018-06-28 PROCEDURE — 3017F COLORECTAL CA SCREEN DOC REV: CPT | Performed by: PHYSICIAN ASSISTANT

## 2018-06-28 PROCEDURE — 81002 URINALYSIS NONAUTO W/O SCOPE: CPT | Performed by: PHYSICIAN ASSISTANT

## 2018-06-28 PROCEDURE — 99214 OFFICE O/P EST MOD 30 MIN: CPT | Performed by: PHYSICIAN ASSISTANT

## 2018-06-28 RX ORDER — HYDROCODONE BITARTRATE AND ACETAMINOPHEN 5; 325 MG/1; MG/1
1 TABLET ORAL EVERY 6 HOURS PRN
Status: ON HOLD | COMMUNITY
End: 2021-05-18 | Stop reason: HOSPADM

## 2018-06-28 RX ORDER — NITROFURANTOIN 25; 75 MG/1; MG/1
100 CAPSULE ORAL 2 TIMES DAILY
Qty: 14 CAPSULE | Refills: 0 | Status: SHIPPED | OUTPATIENT
Start: 2018-06-28 | End: 2018-07-05

## 2018-06-28 RX ORDER — RIZATRIPTAN BENZOATE 5 MG/1
5 TABLET ORAL
Qty: 30 TABLET | Refills: 3 | Status: SHIPPED | OUTPATIENT
Start: 2018-06-28 | End: 2019-01-21 | Stop reason: SDUPTHER

## 2018-06-28 ASSESSMENT — ENCOUNTER SYMPTOMS
VOMITING: 0
SORE THROAT: 0
COUGH: 0
RHINORRHEA: 0
CONSTIPATION: 0
SHORTNESS OF BREATH: 0
NAUSEA: 0
DIARRHEA: 0
ABDOMINAL PAIN: 0

## 2018-07-01 DIAGNOSIS — G47.00 INSOMNIA, UNSPECIFIED TYPE: Primary | ICD-10-CM

## 2018-07-01 LAB
ORGANISM: ABNORMAL
URINE CULTURE, ROUTINE: ABNORMAL
URINE CULTURE, ROUTINE: ABNORMAL

## 2018-07-02 NOTE — TELEPHONE ENCOUNTER
Last Seen: 6/28/2018, Sarita Bowen    Last Writen: 4/2/2018, 30 tab, 2 RF    Last UDS: 6/28/18    OARRS Run On: 7/2/2018    Med Agreement Signed On: 1/4/17    Next Appointment: Not yet scheduled. Due to return 9/28/18.     Requested Prescriptions     Pending Prescriptions Disp Refills    zolpidem (AMBIEN) 5 MG tablet [Pharmacy Med Name: ZOLPIDEM TARTRATE 5 MG TABLET] 30 tablet 2     Sig: TAKE 1 TABLET BY MOUTH NIGHTLY AS NEEDED FOR SLEEP

## 2018-07-03 RX ORDER — ZOLPIDEM TARTRATE 5 MG/1
TABLET ORAL
Qty: 30 TABLET | Refills: 0 | Status: SHIPPED | OUTPATIENT
Start: 2018-07-03 | End: 2018-08-05 | Stop reason: SDUPTHER

## 2018-07-24 RX ORDER — VALACYCLOVIR HYDROCHLORIDE 500 MG/1
TABLET, FILM COATED ORAL
Qty: 30 TABLET | Refills: 2 | Status: SHIPPED | OUTPATIENT
Start: 2018-07-24 | End: 2019-01-29 | Stop reason: SDUPTHER

## 2018-07-24 RX ORDER — NORTRIPTYLINE HYDROCHLORIDE 25 MG/1
CAPSULE ORAL
Qty: 60 CAPSULE | Refills: 4 | Status: SHIPPED | OUTPATIENT
Start: 2018-07-24 | End: 2018-11-16 | Stop reason: SDUPTHER

## 2018-07-25 DIAGNOSIS — F41.9 ANXIETY: ICD-10-CM

## 2018-07-26 PROBLEM — F41.9 ANXIETY: Status: ACTIVE | Noted: 2018-07-26

## 2018-07-26 RX ORDER — ALPRAZOLAM 1 MG/1
TABLET ORAL
Qty: 45 TABLET | Refills: 2 | Status: SHIPPED | OUTPATIENT
Start: 2018-07-26 | End: 2018-10-28 | Stop reason: SDUPTHER

## 2018-08-05 DIAGNOSIS — G47.00 INSOMNIA, UNSPECIFIED TYPE: ICD-10-CM

## 2018-08-06 RX ORDER — ZOLPIDEM TARTRATE 5 MG/1
TABLET ORAL
Qty: 30 TABLET | Refills: 1 | Status: SHIPPED | OUTPATIENT
Start: 2018-08-06 | End: 2018-10-13 | Stop reason: SDUPTHER

## 2018-08-06 NOTE — TELEPHONE ENCOUNTER
Last Seen: 6/28/2018    Last Writen: 7/30/18    Last UDS: 6/28/18    OARRS Run On: 7/2/18    Med Agreement Signed On: 1/4/17    Next Appointment: Visit date not found    Requested Prescriptions     Pending Prescriptions Disp Refills    zolpidem (AMBIEN) 5 MG tablet [Pharmacy Med Name: ZOLPIDEM TARTRATE 5 MG TABLET] 30 tablet 0     Sig: TAKE 1 TABLET BY MOUTH NIGHTLY AS NEEDED FOR SLEEP

## 2018-09-17 RX ORDER — ATORVASTATIN CALCIUM 20 MG/1
TABLET, FILM COATED ORAL
Qty: 90 TABLET | Refills: 1 | Status: SHIPPED | OUTPATIENT
Start: 2018-09-17 | End: 2018-12-28 | Stop reason: SDUPTHER

## 2018-09-17 NOTE — TELEPHONE ENCOUNTER
Last office visit 6/28/2018     Last written 03/19/2018, 90-days with 1 refill. Next office visit scheduled No future appt scheduled.     Requested Prescriptions     Pending Prescriptions Disp Refills    atorvastatin (LIPITOR) 20 MG tablet [Pharmacy Med Name: ATORVASTATIN 20 MG TABLET] 90 tablet 1     Sig: TAKE 1 TABLET DAILY

## 2018-09-21 ENCOUNTER — OFFICE VISIT (OUTPATIENT)
Dept: ORTHOPEDIC SURGERY | Age: 63
End: 2018-09-21

## 2018-09-21 VITALS
HEART RATE: 75 BPM | SYSTOLIC BLOOD PRESSURE: 115 MMHG | WEIGHT: 149 LBS | BODY MASS INDEX: 25.44 KG/M2 | HEIGHT: 64 IN | DIASTOLIC BLOOD PRESSURE: 82 MMHG

## 2018-09-21 DIAGNOSIS — M79.672 PAIN OF LEFT HEEL: Primary | ICD-10-CM

## 2018-09-21 DIAGNOSIS — M72.2 PLANTAR FASCIITIS OF LEFT FOOT: ICD-10-CM

## 2018-09-21 PROCEDURE — G8427 DOCREV CUR MEDS BY ELIG CLIN: HCPCS | Performed by: ORTHOPAEDIC SURGERY

## 2018-09-21 PROCEDURE — 1036F TOBACCO NON-USER: CPT | Performed by: ORTHOPAEDIC SURGERY

## 2018-09-21 PROCEDURE — 99213 OFFICE O/P EST LOW 20 MIN: CPT | Performed by: ORTHOPAEDIC SURGERY

## 2018-09-21 PROCEDURE — 3017F COLORECTAL CA SCREEN DOC REV: CPT | Performed by: ORTHOPAEDIC SURGERY

## 2018-09-21 PROCEDURE — G8417 CALC BMI ABV UP PARAM F/U: HCPCS | Performed by: ORTHOPAEDIC SURGERY

## 2018-09-21 RX ORDER — GABAPENTIN 400 MG/1
400 CAPSULE ORAL 3 TIMES DAILY
Qty: 90 CAPSULE | Refills: 3 | Status: SHIPPED | OUTPATIENT
Start: 2018-09-21 | End: 2018-10-25 | Stop reason: SDUPTHER

## 2018-09-21 RX ORDER — ATENOLOL 25 MG/1
TABLET ORAL
Qty: 90 TABLET | Refills: 0 | Status: SHIPPED | OUTPATIENT
Start: 2018-09-21 | End: 2018-12-10 | Stop reason: SDUPTHER

## 2018-09-21 RX ORDER — PANTOPRAZOLE SODIUM 40 MG/1
40 TABLET, DELAYED RELEASE ORAL DAILY
Qty: 90 TABLET | Refills: 1 | Status: SHIPPED | OUTPATIENT
Start: 2018-09-21 | End: 2018-12-28 | Stop reason: SDUPTHER

## 2018-09-21 RX ORDER — MONTELUKAST SODIUM 10 MG/1
TABLET ORAL
Qty: 90 TABLET | Refills: 0 | Status: SHIPPED | OUTPATIENT
Start: 2018-09-21 | End: 2018-12-10 | Stop reason: SDUPTHER

## 2018-09-21 RX ORDER — SERTRALINE HYDROCHLORIDE 100 MG/1
TABLET, FILM COATED ORAL
Qty: 90 TABLET | Refills: 0 | Status: SHIPPED | OUTPATIENT
Start: 2018-09-21 | End: 2018-11-19 | Stop reason: DRUGHIGH

## 2018-10-13 DIAGNOSIS — G47.00 INSOMNIA, UNSPECIFIED TYPE: ICD-10-CM

## 2018-10-17 ENCOUNTER — OFFICE VISIT (OUTPATIENT)
Dept: FAMILY MEDICINE CLINIC | Age: 63
End: 2018-10-17
Payer: MEDICARE

## 2018-10-17 VITALS
WEIGHT: 155.8 LBS | OXYGEN SATURATION: 96 % | HEART RATE: 71 BPM | HEIGHT: 64 IN | SYSTOLIC BLOOD PRESSURE: 124 MMHG | DIASTOLIC BLOOD PRESSURE: 70 MMHG | BODY MASS INDEX: 26.6 KG/M2

## 2018-10-17 DIAGNOSIS — F33.1 MAJOR DEPRESSIVE DISORDER, RECURRENT EPISODE, MODERATE (HCC): Primary | ICD-10-CM

## 2018-10-17 DIAGNOSIS — Z12.39 BREAST CANCER SCREENING: ICD-10-CM

## 2018-10-17 DIAGNOSIS — Z23 NEED FOR INFLUENZA VACCINATION: ICD-10-CM

## 2018-10-17 DIAGNOSIS — I10 ESSENTIAL HYPERTENSION: Chronic | ICD-10-CM

## 2018-10-17 DIAGNOSIS — J01.90 ACUTE BACTERIAL SINUSITIS: ICD-10-CM

## 2018-10-17 DIAGNOSIS — B96.89 ACUTE BACTERIAL SINUSITIS: ICD-10-CM

## 2018-10-17 DIAGNOSIS — E55.9 VITAMIN D DEFICIENCY: ICD-10-CM

## 2018-10-17 DIAGNOSIS — R73.01 IMPAIRED FASTING GLUCOSE: ICD-10-CM

## 2018-10-17 DIAGNOSIS — Z13.220 LIPID SCREENING: ICD-10-CM

## 2018-10-17 PROCEDURE — 1036F TOBACCO NON-USER: CPT | Performed by: PHYSICIAN ASSISTANT

## 2018-10-17 PROCEDURE — G8482 FLU IMMUNIZE ORDER/ADMIN: HCPCS | Performed by: PHYSICIAN ASSISTANT

## 2018-10-17 PROCEDURE — 90686 IIV4 VACC NO PRSV 0.5 ML IM: CPT | Performed by: PHYSICIAN ASSISTANT

## 2018-10-17 PROCEDURE — 3017F COLORECTAL CA SCREEN DOC REV: CPT | Performed by: PHYSICIAN ASSISTANT

## 2018-10-17 PROCEDURE — G0008 ADMIN INFLUENZA VIRUS VAC: HCPCS | Performed by: PHYSICIAN ASSISTANT

## 2018-10-17 PROCEDURE — G8427 DOCREV CUR MEDS BY ELIG CLIN: HCPCS | Performed by: PHYSICIAN ASSISTANT

## 2018-10-17 PROCEDURE — 99214 OFFICE O/P EST MOD 30 MIN: CPT | Performed by: PHYSICIAN ASSISTANT

## 2018-10-17 PROCEDURE — G8417 CALC BMI ABV UP PARAM F/U: HCPCS | Performed by: PHYSICIAN ASSISTANT

## 2018-10-17 RX ORDER — MINOCYCLINE HYDROCHLORIDE 100 MG/1
100 CAPSULE ORAL 2 TIMES DAILY
Qty: 20 CAPSULE | Refills: 0 | Status: SHIPPED | OUTPATIENT
Start: 2018-10-17 | End: 2018-12-10 | Stop reason: ALTCHOICE

## 2018-10-17 RX ORDER — ZOLPIDEM TARTRATE 5 MG/1
TABLET ORAL
Qty: 30 TABLET | Refills: 1 | Status: SHIPPED | OUTPATIENT
Start: 2018-10-17 | End: 2018-11-16

## 2018-10-17 ASSESSMENT — ENCOUNTER SYMPTOMS
RHINORRHEA: 0
COUGH: 0
SHORTNESS OF BREATH: 0
NAUSEA: 0
ABDOMINAL PAIN: 0
SORE THROAT: 0
DIARRHEA: 0
VOMITING: 0
CONSTIPATION: 0

## 2018-10-17 NOTE — PROGRESS NOTES
tablet Take 7.5 mg by mouth every 7 days       spironolactone (ALDACTONE) 50 MG tablet TAKE 1 TABLET EVERY DAY 30 tablet 1    prednisoLONE acetate (PRED FORTE) 1 % ophthalmic suspension 1 drop 4 times daily      fluocinonide (LIDEX) 0.05 % cream Apply topically 2 times daily Apply topically 2 times daily.  predniSONE (DELTASONE) 1 MG tablet Take 10 mg by mouth daily 40 mg per ophtomologist      promethazine (PHENERGAN) 12.5 MG tablet Take 12.5 mg by mouth every 6 hours as needed for Nausea      Diclofenac Sodium (PENNSAID) 1.5 % SOLN Place 1.5 % onto the skin 4 times daily      traMADol (ULTRAM) 50 MG tablet Take 50 mg by mouth every 6 hours as needed.  rizatriptan (MAXALT) 5 MG tablet Take 1 tablet by mouth once as needed for Migraine May repeat in 2 hours if needed 30 tablet 3    Misc. Devices (WALKER) MISC 1 each by Does not apply route daily for 1 day Dispense and Fit for injury to lower extremity and weakness. Right ankle sprain 1 each 0     No current facility-administered medications for this visit. Vitals:    10/17/18 1436   BP: 124/70   Site: Right Upper Arm   Position: Sitting   Cuff Size: Medium Adult   Pulse: 71   SpO2: 96%   Weight: 155 lb 12.8 oz (70.7 kg)   Height: 5' 4\" (1.626 m)     Estimated body mass index is 26.74 kg/m² as calculated from the following:    Height as of this encounter: 5' 4\" (1.626 m). Weight as of this encounter: 155 lb 12.8 oz (70.7 kg). Physical Exam   Constitutional: She is oriented to person, place, and time. She appears well-developed and well-nourished. No distress. HENT:   Head: Normocephalic and atraumatic. Eyes: Pupils are equal, round, and reactive to light. Conjunctivae and EOM are normal.   Neck: Neck supple. Cardiovascular: Normal rate, regular rhythm and normal heart sounds. No murmur heard. Pulmonary/Chest: Effort normal and breath sounds normal. She has no wheezes. Abdominal: Soft.  Bowel sounds are normal. There is no

## 2018-10-18 DIAGNOSIS — R73.01 IMPAIRED FASTING GLUCOSE: ICD-10-CM

## 2018-10-18 DIAGNOSIS — Z13.220 LIPID SCREENING: ICD-10-CM

## 2018-10-18 DIAGNOSIS — E55.9 VITAMIN D DEFICIENCY: ICD-10-CM

## 2018-10-18 DIAGNOSIS — I10 ESSENTIAL HYPERTENSION: Chronic | ICD-10-CM

## 2018-10-18 LAB
A/G RATIO: 1.9 (ref 1.1–2.2)
ALBUMIN SERPL-MCNC: 4.9 G/DL (ref 3.4–5)
ALP BLD-CCNC: 91 U/L (ref 40–129)
ALT SERPL-CCNC: 41 U/L (ref 10–40)
ANION GAP SERPL CALCULATED.3IONS-SCNC: 15 MMOL/L (ref 3–16)
AST SERPL-CCNC: 34 U/L (ref 15–37)
BASOPHILS ABSOLUTE: 0.1 K/UL (ref 0–0.2)
BASOPHILS RELATIVE PERCENT: 0.8 %
BILIRUB SERPL-MCNC: 0.4 MG/DL (ref 0–1)
BUN BLDV-MCNC: 12 MG/DL (ref 7–20)
CALCIUM SERPL-MCNC: 10.2 MG/DL (ref 8.3–10.6)
CHLORIDE BLD-SCNC: 104 MMOL/L (ref 99–110)
CHOLESTEROL, TOTAL: 171 MG/DL (ref 0–199)
CO2: 22 MMOL/L (ref 21–32)
CREAT SERPL-MCNC: 0.8 MG/DL (ref 0.6–1.2)
EOSINOPHILS ABSOLUTE: 0.5 K/UL (ref 0–0.6)
EOSINOPHILS RELATIVE PERCENT: 4.4 %
GFR AFRICAN AMERICAN: >60
GFR NON-AFRICAN AMERICAN: >60
GLOBULIN: 2.6 G/DL
GLUCOSE BLD-MCNC: 99 MG/DL (ref 70–99)
HCT VFR BLD CALC: 48.1 % (ref 36–48)
HDLC SERPL-MCNC: 45 MG/DL (ref 40–60)
HEMOGLOBIN: 15.5 G/DL (ref 12–16)
LDL CHOLESTEROL CALCULATED: 91 MG/DL
LYMPHOCYTES ABSOLUTE: 3.7 K/UL (ref 1–5.1)
LYMPHOCYTES RELATIVE PERCENT: 30.5 %
MCH RBC QN AUTO: 33.2 PG (ref 26–34)
MCHC RBC AUTO-ENTMCNC: 32.3 G/DL (ref 31–36)
MCV RBC AUTO: 102.8 FL (ref 80–100)
MONOCYTES ABSOLUTE: 1 K/UL (ref 0–1.3)
MONOCYTES RELATIVE PERCENT: 8.3 %
NEUTROPHILS ABSOLUTE: 6.9 K/UL (ref 1.7–7.7)
NEUTROPHILS RELATIVE PERCENT: 56 %
PDW BLD-RTO: 15.5 % (ref 12.4–15.4)
PLATELET # BLD: 509 K/UL (ref 135–450)
PMV BLD AUTO: 8 FL (ref 5–10.5)
POTASSIUM SERPL-SCNC: 4.6 MMOL/L (ref 3.5–5.1)
RBC # BLD: 4.68 M/UL (ref 4–5.2)
SODIUM BLD-SCNC: 141 MMOL/L (ref 136–145)
TOTAL PROTEIN: 7.5 G/DL (ref 6.4–8.2)
TRIGL SERPL-MCNC: 174 MG/DL (ref 0–150)
VITAMIN D 25-HYDROXY: 35.9 NG/ML
VLDLC SERPL CALC-MCNC: 35 MG/DL
WBC # BLD: 12.3 K/UL (ref 4–11)

## 2018-10-19 DIAGNOSIS — D75.89 MACROCYTOSIS: Primary | ICD-10-CM

## 2018-10-19 LAB
ESTIMATED AVERAGE GLUCOSE: 122.6 MG/DL
HBA1C MFR BLD: 5.9 %

## 2018-10-25 DIAGNOSIS — M72.2 PLANTAR FASCIITIS OF LEFT FOOT: ICD-10-CM

## 2018-10-25 DIAGNOSIS — D75.89 MACROCYTOSIS: ICD-10-CM

## 2018-10-25 LAB
FOLATE: >20 NG/ML (ref 4.78–24.2)
VITAMIN B-12: 528 PG/ML (ref 211–911)

## 2018-10-25 RX ORDER — GABAPENTIN 400 MG/1
400 CAPSULE ORAL 3 TIMES DAILY
Qty: 270 CAPSULE | Refills: 0 | Status: SHIPPED | OUTPATIENT
Start: 2018-10-25 | End: 2019-10-18

## 2018-10-25 NOTE — TELEPHONE ENCOUNTER
Pt is asking for a 90 day supply instead of the 30 that was originally sent. RX was sent by [facility].

## 2018-10-28 DIAGNOSIS — F41.9 ANXIETY: ICD-10-CM

## 2018-10-30 RX ORDER — ALPRAZOLAM 1 MG/1
1 TABLET ORAL 2 TIMES DAILY PRN
Qty: 45 TABLET | Refills: 2 | Status: SHIPPED | OUTPATIENT
Start: 2018-10-30 | End: 2019-02-27 | Stop reason: SDUPTHER

## 2018-11-19 ENCOUNTER — OFFICE VISIT (OUTPATIENT)
Dept: FAMILY MEDICINE CLINIC | Age: 63
End: 2018-11-19
Payer: MEDICARE

## 2018-11-19 VITALS
OXYGEN SATURATION: 98 % | DIASTOLIC BLOOD PRESSURE: 80 MMHG | WEIGHT: 157 LBS | SYSTOLIC BLOOD PRESSURE: 110 MMHG | BODY MASS INDEX: 26.8 KG/M2 | HEIGHT: 64 IN | HEART RATE: 83 BPM

## 2018-11-19 DIAGNOSIS — F41.9 ANXIETY: ICD-10-CM

## 2018-11-19 DIAGNOSIS — D75.89 MACROCYTOSIS: ICD-10-CM

## 2018-11-19 DIAGNOSIS — G47.00 INSOMNIA, UNSPECIFIED TYPE: ICD-10-CM

## 2018-11-19 DIAGNOSIS — F33.1 MAJOR DEPRESSIVE DISORDER, RECURRENT EPISODE, MODERATE (HCC): Primary | ICD-10-CM

## 2018-11-19 PROCEDURE — 99214 OFFICE O/P EST MOD 30 MIN: CPT | Performed by: PHYSICIAN ASSISTANT

## 2018-11-19 PROCEDURE — 1036F TOBACCO NON-USER: CPT | Performed by: PHYSICIAN ASSISTANT

## 2018-11-19 PROCEDURE — G8427 DOCREV CUR MEDS BY ELIG CLIN: HCPCS | Performed by: PHYSICIAN ASSISTANT

## 2018-11-19 PROCEDURE — G8482 FLU IMMUNIZE ORDER/ADMIN: HCPCS | Performed by: PHYSICIAN ASSISTANT

## 2018-11-19 PROCEDURE — 3017F COLORECTAL CA SCREEN DOC REV: CPT | Performed by: PHYSICIAN ASSISTANT

## 2018-11-19 PROCEDURE — G8417 CALC BMI ABV UP PARAM F/U: HCPCS | Performed by: PHYSICIAN ASSISTANT

## 2018-11-19 RX ORDER — SERTRALINE HYDROCHLORIDE 100 MG/1
150 TABLET, FILM COATED ORAL DAILY
Qty: 135 TABLET | Refills: 1 | Status: SHIPPED | OUTPATIENT
Start: 2018-11-19 | End: 2019-04-15 | Stop reason: SDUPTHER

## 2018-11-19 ASSESSMENT — ENCOUNTER SYMPTOMS
NAUSEA: 0
RHINORRHEA: 0
COUGH: 0
VOMITING: 0
SORE THROAT: 0
CONSTIPATION: 0
SHORTNESS OF BREATH: 0
DIARRHEA: 0
ABDOMINAL PAIN: 0

## 2018-11-19 NOTE — PROGRESS NOTES
from previous encounter. Current Outpatient Prescriptions   Medication Sig Dispense Refill    sertraline (ZOLOFT) 100 MG tablet Take 1.5 tablets by mouth daily 135 tablet 1    nortriptyline (PAMELOR) 25 MG capsule TAKE 2 CAPSULES BY MOUTH EVERY DAY AT NIGHT 60 capsule 0    ALPRAZolam (XANAX) 1 MG tablet Take 1 tablet by mouth 2 times daily as needed for Sleep for up to 90 days. . 45 tablet 2    gabapentin (NEURONTIN) 400 MG capsule Take 1 capsule by mouth 3 times daily for 90 days. . 270 capsule 0    minocycline (MINOCIN;DYNACIN) 100 MG capsule Take 1 capsule by mouth 2 times daily 20 capsule 0    atenolol (TENORMIN) 25 MG tablet TAKE 1 TABLET BY MOUTH EVERY DAY 90 tablet 0    pantoprazole (PROTONIX) 40 MG tablet TAKE 1 TABLET BY MOUTH DAILY 90 tablet 1    montelukast (SINGULAIR) 10 MG tablet TAKE 1 TABLET BY MOUTH EVERY DAY 90 tablet 0    atorvastatin (LIPITOR) 20 MG tablet TAKE 1 TABLET DAILY 90 tablet 1    valACYclovir (VALTREX) 500 MG tablet TAKE 1 TABLET BY MOUTH DAILY 30 tablet 2    fluticasone-salmeterol (ADVAIR) 500-50 MCG/DOSE diskus inhaler Inhale 1 puff into the lungs every 12 hours      Zolpidem Tartrate (AMBIEN PO) Take 1 mg by mouth      HYDROcodone-acetaminophen (NORCO) 5-325 MG per tablet Take 1 tablet by mouth every 6 hours as needed for Pain. Dillan Gails Certolizumab Pegol (CIMZIA SC) Inject into the skin      alendronate (FOSAMAX) 70 MG tablet TAKE 1 TABLET EVERY 7 DAYS 12 tablet 1    D3-50 49316 units CAPS TAKE 1 CAPSULE BY MOUTH EVERY 7 DAYS 12 capsule 1    folic acid (FOLVITE) 1 MG tablet Take 1 tablet by mouth daily 90 tablet 1    GABAPENTIN PO Take by mouth      cyclobenzaprine (FLEXERIL) 10 MG tablet Take 10 mg by mouth 3 times daily as needed for Muscle spasms      methotrexate (RHEUMATREX) 2.5 MG chemo tablet Take 7.5 mg by mouth every 7 days       spironolactone (ALDACTONE) 50 MG tablet TAKE 1 TABLET EVERY DAY 30 tablet 1    prednisoLONE acetate (PRED FORTE) 1 % Neurological: She is alert and oriented to person, place, and time. She has normal reflexes. Skin: Skin is warm and dry. No rash noted. Psychiatric: She has a normal mood and affect. ASSESSMENT and PLAN:  Samy Fay was seen today for depression. Diagnoses and all orders for this visit:    Major depressive disorder, recurrent episode, moderate (HCC)  Anxiety  Insomnia, unspecified type  - Increase    sertraline (ZOLOFT) 100 MG tablet; Take 1.5 tablets by mouth daily  - Encouraged patient to reach out to alternative therapist, psych referral placed at last appointment, Dr Anneliese Israel is known to take her insurance, information included in AVS.   - Continue ambien and nortriptyline for sleep   -OARRS reviewed, no signs of abuse. Macrocytosis  - Folate and B12 WNL, pt denies EtOH intake. Per chart review (CBC dating back to 2011, MCV has fluctuated) will recheck in 6-8 weeks, if worsened, will consider hematology referral.     Return in about 6 weeks (around 12/31/2018) for as scehduled , Anxiety.

## 2018-12-10 ENCOUNTER — OFFICE VISIT (OUTPATIENT)
Dept: FAMILY MEDICINE CLINIC | Age: 63
End: 2018-12-10
Payer: MEDICARE

## 2018-12-10 VITALS
HEIGHT: 64 IN | HEART RATE: 70 BPM | OXYGEN SATURATION: 98 % | BODY MASS INDEX: 26.98 KG/M2 | DIASTOLIC BLOOD PRESSURE: 70 MMHG | SYSTOLIC BLOOD PRESSURE: 100 MMHG | WEIGHT: 158 LBS

## 2018-12-10 DIAGNOSIS — F33.1 MAJOR DEPRESSIVE DISORDER, RECURRENT EPISODE, MODERATE (HCC): Primary | ICD-10-CM

## 2018-12-10 DIAGNOSIS — R05.3 COUGH, PERSISTENT: ICD-10-CM

## 2018-12-10 DIAGNOSIS — R73.01 IMPAIRED FASTING GLUCOSE: ICD-10-CM

## 2018-12-10 DIAGNOSIS — I10 ESSENTIAL HYPERTENSION: ICD-10-CM

## 2018-12-10 DIAGNOSIS — F41.9 ANXIETY: ICD-10-CM

## 2018-12-10 DIAGNOSIS — Z12.31 ENCOUNTER FOR SCREENING MAMMOGRAM FOR BREAST CANCER: ICD-10-CM

## 2018-12-10 DIAGNOSIS — E78.00 HYPERCHOLESTEROLEMIA: ICD-10-CM

## 2018-12-10 DIAGNOSIS — G47.00 INSOMNIA, UNSPECIFIED TYPE: ICD-10-CM

## 2018-12-10 PROCEDURE — G8427 DOCREV CUR MEDS BY ELIG CLIN: HCPCS | Performed by: INTERNAL MEDICINE

## 2018-12-10 PROCEDURE — 3017F COLORECTAL CA SCREEN DOC REV: CPT | Performed by: INTERNAL MEDICINE

## 2018-12-10 PROCEDURE — G8482 FLU IMMUNIZE ORDER/ADMIN: HCPCS | Performed by: INTERNAL MEDICINE

## 2018-12-10 PROCEDURE — 1036F TOBACCO NON-USER: CPT | Performed by: INTERNAL MEDICINE

## 2018-12-10 PROCEDURE — G8417 CALC BMI ABV UP PARAM F/U: HCPCS | Performed by: INTERNAL MEDICINE

## 2018-12-10 PROCEDURE — 99214 OFFICE O/P EST MOD 30 MIN: CPT | Performed by: INTERNAL MEDICINE

## 2018-12-10 RX ORDER — MONTELUKAST SODIUM 10 MG/1
TABLET ORAL
Qty: 90 TABLET | Refills: 1 | Status: SHIPPED | OUTPATIENT
Start: 2018-12-10 | End: 2019-04-15 | Stop reason: SDUPTHER

## 2018-12-10 RX ORDER — ATENOLOL 25 MG/1
TABLET ORAL
Qty: 90 TABLET | Refills: 1 | Status: SHIPPED | OUTPATIENT
Start: 2018-12-10 | End: 2019-04-15 | Stop reason: SDUPTHER

## 2018-12-10 RX ORDER — TRAZODONE HYDROCHLORIDE 50 MG/1
100 TABLET ORAL NIGHTLY
Qty: 180 TABLET | Refills: 0 | Status: SHIPPED | OUTPATIENT
Start: 2018-12-10 | End: 2019-03-05 | Stop reason: SDUPTHER

## 2018-12-10 RX ORDER — CEFUROXIME AXETIL 500 MG/1
500 TABLET ORAL 2 TIMES DAILY
Qty: 20 TABLET | Refills: 0 | Status: SHIPPED | OUTPATIENT
Start: 2018-12-10 | End: 2018-12-20

## 2018-12-10 ASSESSMENT — ENCOUNTER SYMPTOMS
RHINORRHEA: 0
NAUSEA: 0
CONSTIPATION: 0
SORE THROAT: 0
ABDOMINAL PAIN: 0
DIARRHEA: 0
SHORTNESS OF BREATH: 0
VOMITING: 0
COUGH: 0

## 2018-12-10 NOTE — PROGRESS NOTES
12/10/2018  Teryl Payment (: 1955)  61 y.o. HPI  Patient presents for follow up of anxiety and insomnia. Feeling better with increase of medication. Struggling with  who is drinking again. Insomnia, wants to try trazodone and get off ambien. Insurance co is no longer paying for this. URI symptoms, but dry cough persisting 2 months later. No dyspnea, phlegm or fever. Patient's medications, allergies, past medical, surgical, social and family histories were reviewed and updated as appropriate. Review of Systems   Constitutional: Negative for activity change, chills, fatigue and fever. HENT: Negative for congestion, ear pain, rhinorrhea and sore throat. Eyes: Negative for visual disturbance. Respiratory: Negative for cough and shortness of breath. Cardiovascular: Negative for chest pain, palpitations and leg swelling. Gastrointestinal: Negative for abdominal pain, constipation, diarrhea, nausea and vomiting. Genitourinary: Negative for difficulty urinating and dysuria. Musculoskeletal: Negative for arthralgias and myalgias. Skin: Negative for rash. Neurological: Negative for dizziness, weakness, numbness and headaches. Psychiatric/Behavioral: Negative for dysphoric mood and sleep disturbance. The patient is not nervous/anxious. Allergies, past medical history, family history, and social history reviewed and unchanged from previous encounter. Current Outpatient Prescriptions   Medication Sig Dispense Refill    sertraline (ZOLOFT) 100 MG tablet Take 1.5 tablets by mouth daily 135 tablet 1    nortriptyline (PAMELOR) 25 MG capsule TAKE 2 CAPSULES BY MOUTH EVERY DAY AT NIGHT 60 capsule 0    ALPRAZolam (XANAX) 1 MG tablet Take 1 tablet by mouth 2 times daily as needed for Sleep for up to 90 days. . 45 tablet 2    gabapentin (NEURONTIN) 400 MG capsule Take 1 capsule by mouth 3 times daily for 90 days. . 270 capsule 0    atenolol (TENORMIN) 25 MG tablet TAKE 1 TABLET BY MOUTH EVERY DAY 90 tablet 0    pantoprazole (PROTONIX) 40 MG tablet TAKE 1 TABLET BY MOUTH DAILY 90 tablet 1    montelukast (SINGULAIR) 10 MG tablet TAKE 1 TABLET BY MOUTH EVERY DAY 90 tablet 0    atorvastatin (LIPITOR) 20 MG tablet TAKE 1 TABLET DAILY 90 tablet 1    valACYclovir (VALTREX) 500 MG tablet TAKE 1 TABLET BY MOUTH DAILY 30 tablet 2    fluticasone-salmeterol (ADVAIR) 500-50 MCG/DOSE diskus inhaler Inhale 1 puff into the lungs every 12 hours      Zolpidem Tartrate (AMBIEN PO) Take 1 mg by mouth      HYDROcodone-acetaminophen (NORCO) 5-325 MG per tablet Take 1 tablet by mouth every 6 hours as needed for Pain. Chas Mcmillan Certolizumab Pegol (CIMZIA SC) Inject into the skin      alendronate (FOSAMAX) 70 MG tablet TAKE 1 TABLET EVERY 7 DAYS 12 tablet 1    D3-50 37752 units CAPS TAKE 1 CAPSULE BY MOUTH EVERY 7 DAYS 12 capsule 1    folic acid (FOLVITE) 1 MG tablet Take 1 tablet by mouth daily 90 tablet 1    cyclobenzaprine (FLEXERIL) 10 MG tablet Take 10 mg by mouth 3 times daily as needed for Muscle spasms      methotrexate (RHEUMATREX) 2.5 MG chemo tablet Take 7.5 mg by mouth every 7 days       spironolactone (ALDACTONE) 50 MG tablet TAKE 1 TABLET EVERY DAY 30 tablet 1    fluocinonide (LIDEX) 0.05 % cream Apply topically 2 times daily Apply topically 2 times daily.  promethazine (PHENERGAN) 12.5 MG tablet Take 12.5 mg by mouth every 6 hours as needed for Nausea      Diclofenac Sodium (PENNSAID) 1.5 % SOLN Place 1.5 % onto the skin 4 times daily      traMADol (ULTRAM) 50 MG tablet Take 50 mg by mouth every 6 hours as needed.  rizatriptan (MAXALT) 5 MG tablet Take 1 tablet by mouth once as needed for Migraine May repeat in 2 hours if needed 30 tablet 3    Misc. Devices (WALKER) MISC 1 each by Does not apply route daily for 1 day Dispense and Fit for injury to lower extremity and weakness. Right ankle sprain 1 each 0     No current facility-administered medications for this visit. and ceftin  Other orders  -     montelukast (SINGULAIR) 10 MG tablet; TAKE 1 TABLET BY MOUTH EVERY DAY  -     atenolol (TENORMIN) 25 MG tablet; TAKE 1 TABLET BY MOUTH EVERY DAY  -     traZODone (DESYREL) 50 MG tablet; Take 2 tablets by mouth nightly  -     cefUROXime (CEFTIN) 500 MG tablet; Take 1 tablet by mouth 2 times daily for 10 days        No Follow-up on file.

## 2018-12-18 RX ORDER — ALENDRONATE SODIUM 70 MG/1
TABLET ORAL
Qty: 12 TABLET | Refills: 1 | Status: SHIPPED | OUTPATIENT
Start: 2018-12-18 | End: 2018-12-28 | Stop reason: SDUPTHER

## 2018-12-20 RX ORDER — MONTELUKAST SODIUM 10 MG/1
TABLET ORAL
Qty: 90 TABLET | Refills: 0 | OUTPATIENT
Start: 2018-12-20

## 2018-12-20 RX ORDER — ATENOLOL 25 MG/1
TABLET ORAL
Qty: 90 TABLET | Refills: 0 | OUTPATIENT
Start: 2018-12-20

## 2018-12-28 DIAGNOSIS — F41.9 ANXIETY: ICD-10-CM

## 2018-12-28 RX ORDER — ALPRAZOLAM 1 MG/1
1 TABLET ORAL 2 TIMES DAILY PRN
Qty: 45 TABLET | Refills: 2 | OUTPATIENT
Start: 2018-12-28 | End: 2019-03-28

## 2018-12-28 RX ORDER — PANTOPRAZOLE SODIUM 40 MG/1
40 TABLET, DELAYED RELEASE ORAL DAILY
Qty: 90 TABLET | Refills: 1 | Status: SHIPPED | OUTPATIENT
Start: 2018-12-28 | End: 2019-04-15 | Stop reason: SDUPTHER

## 2018-12-28 RX ORDER — ATORVASTATIN CALCIUM 20 MG/1
TABLET, FILM COATED ORAL
Qty: 90 TABLET | Refills: 1 | Status: SHIPPED | OUTPATIENT
Start: 2018-12-28 | End: 2019-01-21 | Stop reason: SDUPTHER

## 2018-12-28 RX ORDER — ALENDRONATE SODIUM 70 MG/1
TABLET ORAL
Qty: 12 TABLET | Refills: 1 | Status: SHIPPED | OUTPATIENT
Start: 2018-12-28 | End: 2019-04-15 | Stop reason: SDUPTHER

## 2019-01-15 ENCOUNTER — HOSPITAL ENCOUNTER (OUTPATIENT)
Dept: MAMMOGRAPHY | Age: 64
Discharge: HOME | End: 2019-01-15
Payer: MEDICARE

## 2019-01-15 DIAGNOSIS — Z12.31 ENCOUNTER FOR SCREENING MAMMOGRAM FOR BREAST CANCER: ICD-10-CM

## 2019-01-15 PROCEDURE — 77063 BREAST TOMOSYNTHESIS BI: CPT

## 2019-01-21 DIAGNOSIS — G43.009 MIGRAINE WITHOUT AURA AND WITHOUT STATUS MIGRAINOSUS, NOT INTRACTABLE: ICD-10-CM

## 2019-01-21 RX ORDER — ATORVASTATIN CALCIUM 20 MG/1
TABLET, FILM COATED ORAL
Qty: 40 TABLET | Refills: 0 | Status: SHIPPED | OUTPATIENT
Start: 2019-01-21 | End: 2019-04-15 | Stop reason: SDUPTHER

## 2019-01-21 RX ORDER — RIZATRIPTAN BENZOATE 5 MG/1
5 TABLET ORAL
Qty: 40 TABLET | Refills: 0 | Status: SHIPPED | OUTPATIENT
Start: 2019-01-21 | End: 2019-04-15 | Stop reason: SDUPTHER

## 2019-01-21 RX ORDER — SPIRONOLACTONE 50 MG/1
TABLET, FILM COATED ORAL
Qty: 40 TABLET | Refills: 0 | Status: SHIPPED | OUTPATIENT
Start: 2019-01-21 | End: 2019-04-15 | Stop reason: SDUPTHER

## 2019-01-25 ENCOUNTER — OFFICE VISIT (OUTPATIENT)
Dept: FAMILY MEDICINE CLINIC | Age: 64
End: 2019-01-25
Payer: MEDICARE

## 2019-01-25 VITALS
SYSTOLIC BLOOD PRESSURE: 122 MMHG | OXYGEN SATURATION: 95 % | BODY MASS INDEX: 28.2 KG/M2 | WEIGHT: 165.2 LBS | HEIGHT: 64 IN | HEART RATE: 69 BPM | DIASTOLIC BLOOD PRESSURE: 72 MMHG

## 2019-01-25 DIAGNOSIS — Z01.818 PRE-OP EXAM: Primary | ICD-10-CM

## 2019-01-25 DIAGNOSIS — Q64.31 URETHRA OR BLADDER NECK ATRESIA OR STENOSIS: ICD-10-CM

## 2019-01-25 PROCEDURE — 3017F COLORECTAL CA SCREEN DOC REV: CPT | Performed by: PHYSICIAN ASSISTANT

## 2019-01-25 PROCEDURE — G8427 DOCREV CUR MEDS BY ELIG CLIN: HCPCS | Performed by: PHYSICIAN ASSISTANT

## 2019-01-25 PROCEDURE — 99213 OFFICE O/P EST LOW 20 MIN: CPT | Performed by: PHYSICIAN ASSISTANT

## 2019-01-25 PROCEDURE — 1036F TOBACCO NON-USER: CPT | Performed by: PHYSICIAN ASSISTANT

## 2019-01-25 PROCEDURE — 93000 ELECTROCARDIOGRAM COMPLETE: CPT | Performed by: PHYSICIAN ASSISTANT

## 2019-01-25 PROCEDURE — G8417 CALC BMI ABV UP PARAM F/U: HCPCS | Performed by: PHYSICIAN ASSISTANT

## 2019-01-25 PROCEDURE — G8482 FLU IMMUNIZE ORDER/ADMIN: HCPCS | Performed by: PHYSICIAN ASSISTANT

## 2019-01-28 ENCOUNTER — HOSPITAL ENCOUNTER (OUTPATIENT)
Dept: GENERAL RADIOLOGY | Age: 64
Discharge: HOME | End: 2019-01-28
Payer: MEDICARE

## 2019-01-28 ENCOUNTER — HOSPITAL ENCOUNTER (OUTPATIENT)
Age: 64
Discharge: HOME | End: 2019-01-28
Payer: MEDICARE

## 2019-01-28 DIAGNOSIS — N20.0 KIDNEY STONE: ICD-10-CM

## 2019-01-28 PROCEDURE — 74018 RADEX ABDOMEN 1 VIEW: CPT

## 2019-01-30 RX ORDER — VALACYCLOVIR HYDROCHLORIDE 500 MG/1
TABLET, FILM COATED ORAL
Qty: 90 TABLET | Refills: 0 | Status: SHIPPED | OUTPATIENT
Start: 2019-01-30 | End: 2019-04-27 | Stop reason: SDUPTHER

## 2019-02-27 DIAGNOSIS — F41.9 ANXIETY: ICD-10-CM

## 2019-03-01 RX ORDER — ALPRAZOLAM 1 MG/1
1 TABLET ORAL 2 TIMES DAILY PRN
Qty: 45 TABLET | Refills: 1 | Status: SHIPPED | OUTPATIENT
Start: 2019-03-01 | End: 2019-04-30

## 2019-03-05 RX ORDER — TRAZODONE HYDROCHLORIDE 50 MG/1
100 TABLET ORAL NIGHTLY
Qty: 180 TABLET | Refills: 0 | Status: SHIPPED | OUTPATIENT
Start: 2019-03-05 | End: 2019-04-15 | Stop reason: SDUPTHER

## 2019-04-15 ENCOUNTER — OFFICE VISIT (OUTPATIENT)
Dept: FAMILY MEDICINE CLINIC | Age: 64
End: 2019-04-15
Payer: MEDICARE

## 2019-04-15 VITALS
BODY MASS INDEX: 28.35 KG/M2 | TEMPERATURE: 98.3 F | OXYGEN SATURATION: 98 % | WEIGHT: 160 LBS | SYSTOLIC BLOOD PRESSURE: 104 MMHG | HEART RATE: 74 BPM | DIASTOLIC BLOOD PRESSURE: 66 MMHG | HEIGHT: 63 IN

## 2019-04-15 DIAGNOSIS — F33.1 MAJOR DEPRESSIVE DISORDER, RECURRENT EPISODE, MODERATE (HCC): ICD-10-CM

## 2019-04-15 DIAGNOSIS — M54.2 CHRONIC NECK PAIN: ICD-10-CM

## 2019-04-15 DIAGNOSIS — G89.29 CHRONIC NECK PAIN: ICD-10-CM

## 2019-04-15 DIAGNOSIS — R73.01 IMPAIRED FASTING GLUCOSE: Primary | ICD-10-CM

## 2019-04-15 DIAGNOSIS — G43.009 MIGRAINE WITHOUT AURA AND WITHOUT STATUS MIGRAINOSUS, NOT INTRACTABLE: ICD-10-CM

## 2019-04-15 LAB — HBA1C MFR BLD: 5.5 %

## 2019-04-15 PROCEDURE — 99214 OFFICE O/P EST MOD 30 MIN: CPT | Performed by: INTERNAL MEDICINE

## 2019-04-15 PROCEDURE — 1036F TOBACCO NON-USER: CPT | Performed by: INTERNAL MEDICINE

## 2019-04-15 PROCEDURE — 3017F COLORECTAL CA SCREEN DOC REV: CPT | Performed by: INTERNAL MEDICINE

## 2019-04-15 PROCEDURE — G8427 DOCREV CUR MEDS BY ELIG CLIN: HCPCS | Performed by: INTERNAL MEDICINE

## 2019-04-15 PROCEDURE — 83036 HEMOGLOBIN GLYCOSYLATED A1C: CPT | Performed by: INTERNAL MEDICINE

## 2019-04-15 PROCEDURE — G8417 CALC BMI ABV UP PARAM F/U: HCPCS | Performed by: INTERNAL MEDICINE

## 2019-04-15 RX ORDER — MONTELUKAST SODIUM 10 MG/1
TABLET ORAL
Qty: 90 TABLET | Refills: 1 | Status: SHIPPED | OUTPATIENT
Start: 2019-04-15 | End: 2019-10-18 | Stop reason: SDUPTHER

## 2019-04-15 RX ORDER — SERTRALINE HYDROCHLORIDE 100 MG/1
150 TABLET, FILM COATED ORAL DAILY
Qty: 135 TABLET | Refills: 1 | Status: SHIPPED | OUTPATIENT
Start: 2019-04-15 | End: 2019-10-18 | Stop reason: SDUPTHER

## 2019-04-15 RX ORDER — SPIRONOLACTONE 50 MG/1
TABLET, FILM COATED ORAL
Qty: 90 TABLET | Refills: 1 | Status: SHIPPED | OUTPATIENT
Start: 2019-04-15 | End: 2019-10-18 | Stop reason: SDUPTHER

## 2019-04-15 RX ORDER — PANTOPRAZOLE SODIUM 40 MG/1
40 TABLET, DELAYED RELEASE ORAL DAILY
Qty: 90 TABLET | Refills: 1 | Status: SHIPPED | OUTPATIENT
Start: 2019-04-15 | End: 2019-10-18 | Stop reason: SDUPTHER

## 2019-04-15 RX ORDER — ATENOLOL 25 MG/1
TABLET ORAL
Qty: 90 TABLET | Refills: 1 | Status: SHIPPED | OUTPATIENT
Start: 2019-04-15 | End: 2019-10-18 | Stop reason: SDUPTHER

## 2019-04-15 RX ORDER — TRAZODONE HYDROCHLORIDE 100 MG/1
TABLET ORAL
Qty: 180 TABLET | Refills: 1 | Status: SHIPPED | OUTPATIENT
Start: 2019-04-15 | End: 2019-10-11 | Stop reason: SDUPTHER

## 2019-04-15 RX ORDER — ATORVASTATIN CALCIUM 20 MG/1
TABLET, FILM COATED ORAL
Qty: 90 TABLET | Refills: 1 | Status: SHIPPED | OUTPATIENT
Start: 2019-04-15 | End: 2019-10-18 | Stop reason: SDUPTHER

## 2019-04-15 RX ORDER — RIZATRIPTAN BENZOATE 5 MG/1
5 TABLET ORAL
Qty: 27 TABLET | Refills: 1 | Status: SHIPPED | OUTPATIENT
Start: 2019-04-15 | End: 2019-04-16 | Stop reason: SDUPTHER

## 2019-04-15 RX ORDER — MIRTAZAPINE 15 MG/1
15 TABLET, FILM COATED ORAL NIGHTLY
Qty: 90 TABLET | Refills: 1 | Status: SHIPPED | OUTPATIENT
Start: 2019-04-15 | End: 2019-10-07 | Stop reason: SDUPTHER

## 2019-04-15 RX ORDER — ALENDRONATE SODIUM 70 MG/1
TABLET ORAL
Qty: 12 TABLET | Refills: 1 | Status: SHIPPED | OUTPATIENT
Start: 2019-04-15 | End: 2019-10-18

## 2019-04-15 RX ORDER — PREDNISONE 20 MG/1
20 TABLET ORAL DAILY
COMMUNITY
End: 2019-10-18

## 2019-04-15 RX ORDER — FOLIC ACID 1 MG/1
800 TABLET ORAL DAILY
Status: CANCELLED | OUTPATIENT
Start: 2019-04-15

## 2019-04-15 ASSESSMENT — ENCOUNTER SYMPTOMS
SHORTNESS OF BREATH: 0
COUGH: 0

## 2019-04-15 NOTE — PROGRESS NOTES
4/15/2019     Yusuf Kim (:  1955) is a 61 y.o. female, here for evaluation of the following medical concerns:    HPI      Diagnosis Orders   1. Impaired fasting glucose  POCT glycosylated hemoglobin (Hb A1C)   2. Major depressive disorder, recurrent episode, moderate (HCC)  sertraline (ZOLOFT) 100 MG tablet   3. Migraine without aura and without status migrainosus, not intractable  rizatriptan (MAXALT) 5 MG tablet   4. Chronic neck pain  OSR PT - [facility]     All stable and controlled per patient. Back on prednisone for sarcoid/fibromyalgia. Struggles with insomnia, wonders about remeron. Depression is well controlled. Neck pain is increasing and did PT years ago with relief. Patient's medications, allergies, past medical, surgical, social and family histories were reviewed and updated as appropriate. Review of Systems   Constitutional: Negative for fatigue. Respiratory: Negative for cough and shortness of breath. Cardiovascular: Negative for chest pain and leg swelling. Neurological: Negative for dizziness and headaches. Prior to Visit Medications    Medication Sig Taking? Authorizing Provider   predniSONE (DELTASONE) 20 MG tablet Take 20 mg by mouth daily Yes Historical Provider, MD   traZODone (DESYREL) 50 MG tablet Take 2 tablets by mouth nightly Yes Jaleesa Santiago MD   ALPRAZolam (XANAX) 1 MG tablet Take 1 tablet by mouth 2 times daily as needed for Sleep for up to 60 days. Faith Mcclure MD   valACYclovir (VALTREX) 500 MG tablet TAKE 1 TABLET BY MOUTH DAILY Yes Jaleesa Santiago MD   spironolactone (ALDACTONE) 50 MG tablet TAKE 1 TABLET EVERY DAY Yes FRANCISCO Meek   atorvastatin (LIPITOR) 20 MG tablet TAKE 1 TABLET DAILY Yes FRANCISCO Meek   alendronate (FOSAMAX) 70 MG tablet TAKE 1 TABLET EVERY 7 DAYS Yes Jaleesa Santiago MD   pantoprazole (PROTONIX) 40 MG tablet Take 1 tablet by mouth daily Yes Jaleesa Santiago MD   montelukast (SINGULAIR) 10 MG tablet TAKE 1 TABLET BY MOUTH EVERY DAY Yes Aaron Burks MD   atenolol (TENORMIN) 25 MG tablet TAKE 1 TABLET BY MOUTH EVERY DAY Yes Aaron Burks MD   sertraline (ZOLOFT) 100 MG tablet Take 1.5 tablets by mouth daily Yes FRANCISCO Cotto   fluticasone-salmeterol (ADVAIR) 500-50 MCG/DOSE diskus inhaler Inhale 1 puff into the lungs every 12 hours Yes Historical Provider, MD   HYDROcodone-acetaminophen (NORCO) 5-325 MG per tablet Take 1 tablet by mouth every 6 hours as needed for Pain. . Yes Historical Provider, MD   Certolizumab Pegol (CIMZIA SC) Inject into the skin Yes Historical Provider, MD   folic acid (FOLVITE) 1 MG tablet Take 1 tablet by mouth daily  Patient taking differently: Take 800 mg by mouth daily  Yes Aaron Burks MD   cyclobenzaprine (FLEXERIL) 10 MG tablet Take 10 mg by mouth 3 times daily as needed for Muscle spasms Yes Historical Provider, MD   methotrexate (RHEUMATREX) 2.5 MG chemo tablet Take 10 mg by mouth every 7 days  Yes Historical Provider, MD   fluocinonide (LIDEX) 0.05 % cream Apply topically 2 times daily Apply topically 2 times daily. Yes Historical Provider, MD   promethazine (PHENERGAN) 12.5 MG tablet Take 12.5 mg by mouth every 6 hours as needed for Nausea Yes Historical Provider, MD   traMADol (ULTRAM) 50 MG tablet Take 50 mg by mouth every 6 hours as needed. Yes Historical Provider, MD   rizatriptan (MAXALT) 5 MG tablet Take 1 tablet by mouth once as needed for Migraine May repeat in 2 hours if needed  FRANCISCO Cotto   gabapentin (NEURONTIN) 400 MG capsule Take 1 capsule by mouth 3 times daily for 90 days. Fiorella Escalera MD        Social History     Tobacco Use    Smoking status: Former Smoker     Packs/day: 1.00     Years: 16.00     Pack years: 16.00     Types: Cigarettes     Last attempt to quit: 10/23/2015     Years since quitting: 3.4    Smokeless tobacco: Never Used   Substance Use Topics    Alcohol use: No     Alcohol/week: 0.0 oz        Vitals: 04/15/19 0939   BP: 104/66   Site: Left Upper Arm   Position: Sitting   Cuff Size: Large Adult   Pulse: 74   Temp: 98.3 °F (36.8 °C)   TempSrc: Oral   SpO2: 98%  Comment: RA   Weight: 160 lb (72.6 kg)   Height: 5' 3.48\" (1.612 m)     Estimated body mass index is 27.91 kg/m² as calculated from the following:    Height as of this encounter: 5' 3.48\" (1.612 m). Weight as of this encounter: 160 lb (72.6 kg). Physical Exam   Constitutional: She is oriented to person, place, and time. HENT:   Mouth/Throat: Oropharynx is clear and moist.   Eyes: Conjunctivae are normal. No scleral icterus. Neck: No JVD present. No thyromegaly present. Cardiovascular: Normal rate, regular rhythm, normal heart sounds and intact distal pulses. Exam reveals no gallop and no friction rub. No murmur heard. Pulmonary/Chest: Effort normal and breath sounds normal. She has no wheezes. She has no rales. Abdominal: Soft. Bowel sounds are normal. She exhibits no distension and no mass. There is no hepatosplenomegaly. There is no tenderness. No hernia. Musculoskeletal: She exhibits no edema. Lymphadenopathy:     She has no cervical adenopathy. Neurological: She is alert and oriented to person, place, and time. Skin: No rash noted. Psychiatric: She has a normal mood and affect. Vitals reviewed. ASSESSMENT/PLAN:  Katarina Clarke was seen today for depression and hyperglycemia. Diagnoses and all orders for this visit:    Impaired fasting glucose  -     POCT glycosylated hemoglobin (Hb A1C)    Major depressive disorder, recurrent episode, moderate (HCC)  -     sertraline (ZOLOFT) 100 MG tablet; Take 1.5 tablets by mouth daily    Migraine without aura and without status migrainosus, not intractable  -     rizatriptan (MAXALT) 5 MG tablet;  Take 1 tablet by mouth once as needed for Migraine May repeat in 2 hours if needed    Chronic neck pain  -     OSR PT - [facility]    Other orders  -     atenolol (TENORMIN) 25 MG

## 2019-04-16 ENCOUNTER — TELEPHONE (OUTPATIENT)
Dept: FAMILY MEDICINE CLINIC | Age: 64
End: 2019-04-16

## 2019-04-16 DIAGNOSIS — G43.009 MIGRAINE WITHOUT AURA AND WITHOUT STATUS MIGRAINOSUS, NOT INTRACTABLE: ICD-10-CM

## 2019-04-16 RX ORDER — RIZATRIPTAN BENZOATE 5 MG/1
5 TABLET ORAL DAILY PRN
Qty: 27 TABLET | Refills: 1 | Status: SHIPPED | OUTPATIENT
Start: 2019-04-16 | End: 2019-10-18 | Stop reason: SDUPTHER

## 2019-04-16 NOTE — TELEPHONE ENCOUNTER
Received PA request for Rizatriptan Benzoate 5MG OR TABS, Key: [id number]. The script in the chart states rizatriptan (MAXALT) 5 MG tablet [[id number]]  ENDED. End Date: 04/15/19 after 1 doses. Please advise. Thank you.

## 2019-04-29 RX ORDER — VALACYCLOVIR HYDROCHLORIDE 500 MG/1
TABLET, FILM COATED ORAL
Qty: 90 TABLET | Refills: 0 | Status: SHIPPED | OUTPATIENT
Start: 2019-04-29 | End: 2019-09-02 | Stop reason: SDUPTHER

## 2019-04-29 NOTE — TELEPHONE ENCOUNTER
.  Last office visit 4/15/2019     Last written 1/30/19 #90 no refills    Next office visit scheduled 10/14/2019    Requested Prescriptions     Pending Prescriptions Disp Refills    valACYclovir (VALTREX) 500 MG tablet [Pharmacy Med Name: VALACYCLOVIR  MG TABLET] 90 tablet 0     Sig: TAKE 1 TABLET BY MOUTH EVERY DAY

## 2019-05-06 DIAGNOSIS — F41.9 ANXIETY: ICD-10-CM

## 2019-05-06 RX ORDER — ALPRAZOLAM 1 MG/1
1 TABLET ORAL 2 TIMES DAILY PRN
Qty: 60 TABLET | Refills: 2 | Status: SHIPPED | OUTPATIENT
Start: 2019-05-06 | End: 2019-09-13 | Stop reason: SDUPTHER

## 2019-05-28 RX ORDER — TRAZODONE HYDROCHLORIDE 50 MG/1
TABLET ORAL
Qty: 180 TABLET | Refills: 0 | Status: SHIPPED | OUTPATIENT
Start: 2019-05-28 | End: 2019-10-11 | Stop reason: SDUPTHER

## 2019-06-05 ENCOUNTER — TELEPHONE (OUTPATIENT)
Dept: FAMILY MEDICINE CLINIC | Age: 64
End: 2019-06-05

## 2019-06-05 NOTE — TELEPHONE ENCOUNTER
I called [facility] pharmacy and informed Paulo Burden a pharmacist that PCP is aware

## 2019-06-05 NOTE — TELEPHONE ENCOUNTER
Teo Prasad from [id number] calling - wanted to let you know that pt is trying to fill her Xanax and wanted to make Dr. Bernice Paez is aware pt is on Tramadol by a Dr. Janice Chong (I show this is in historical med) please let Teo Prasad know this is ok to fill.

## 2019-07-22 ENCOUNTER — TELEPHONE (OUTPATIENT)
Dept: ADMINISTRATIVE | Age: 64
End: 2019-07-22

## 2019-08-27 PROBLEM — Q64.32 CONGENITAL URETHRAL STENOSIS: Status: ACTIVE | Noted: 2019-01-21

## 2019-08-27 PROBLEM — R35.0 URINARY FREQUENCY: Status: ACTIVE | Noted: 2019-01-21

## 2019-09-03 NOTE — TELEPHONE ENCOUNTER
.  Last office visit 4/15/2019     Last written 4/29/19 #90 no refills     Next office visit scheduled 10/18/2019    Requested Prescriptions     Pending Prescriptions Disp Refills    valACYclovir (VALTREX) 500 MG tablet [Pharmacy Med Name: VALACYCLOVIR  MG TABLET] 90 tablet 0     Sig: TAKE 1 TABLET BY MOUTH EVERY DAY

## 2019-09-04 RX ORDER — VALACYCLOVIR HYDROCHLORIDE 500 MG/1
TABLET, FILM COATED ORAL
Qty: 90 TABLET | Refills: 0 | Status: SHIPPED | OUTPATIENT
Start: 2019-09-04 | End: 2019-11-29 | Stop reason: SDUPTHER

## 2019-09-13 DIAGNOSIS — F41.9 ANXIETY: ICD-10-CM

## 2019-09-25 RX ORDER — ALPRAZOLAM 1 MG/1
1 TABLET ORAL 2 TIMES DAILY PRN
Qty: 60 TABLET | Refills: 2 | Status: SHIPPED | OUTPATIENT
Start: 2019-09-25 | End: 2020-03-06

## 2019-10-07 RX ORDER — MIRTAZAPINE 15 MG/1
TABLET, FILM COATED ORAL
Qty: 90 TABLET | Refills: 1 | Status: SHIPPED | OUTPATIENT
Start: 2019-10-07 | End: 2020-03-23

## 2019-10-11 RX ORDER — TRAZODONE HYDROCHLORIDE 100 MG/1
TABLET ORAL
Qty: 180 TABLET | Refills: 1 | Status: SHIPPED | OUTPATIENT
Start: 2019-10-11 | End: 2020-03-26

## 2019-10-17 RX ORDER — GABAPENTIN 400 MG/1
400 CAPSULE ORAL 4 TIMES DAILY
Refills: 1 | COMMUNITY
Start: 2019-09-06

## 2019-10-18 ENCOUNTER — OFFICE VISIT (OUTPATIENT)
Dept: FAMILY MEDICINE CLINIC | Age: 64
End: 2019-10-18
Payer: MEDICARE

## 2019-10-18 VITALS
BODY MASS INDEX: 25.2 KG/M2 | HEART RATE: 78 BPM | OXYGEN SATURATION: 97 % | WEIGHT: 147.6 LBS | RESPIRATION RATE: 14 BRPM | DIASTOLIC BLOOD PRESSURE: 84 MMHG | SYSTOLIC BLOOD PRESSURE: 130 MMHG | TEMPERATURE: 99 F | HEIGHT: 64 IN

## 2019-10-18 DIAGNOSIS — G43.009 MIGRAINE WITHOUT AURA AND WITHOUT STATUS MIGRAINOSUS, NOT INTRACTABLE: ICD-10-CM

## 2019-10-18 DIAGNOSIS — R73.01 IMPAIRED FASTING GLUCOSE: ICD-10-CM

## 2019-10-18 DIAGNOSIS — F33.1 MAJOR DEPRESSIVE DISORDER, RECURRENT EPISODE, MODERATE (HCC): ICD-10-CM

## 2019-10-18 DIAGNOSIS — Z13.220 SCREENING FOR HYPERLIPIDEMIA: ICD-10-CM

## 2019-10-18 DIAGNOSIS — E78.00 HYPERCHOLESTEROLEMIA: Primary | ICD-10-CM

## 2019-10-18 DIAGNOSIS — D86.9 SARCOIDOSIS: ICD-10-CM

## 2019-10-18 DIAGNOSIS — Z23 NEED FOR INFLUENZA VACCINATION: ICD-10-CM

## 2019-10-18 DIAGNOSIS — Z00.00 ROUTINE GENERAL MEDICAL EXAMINATION AT A HEALTH CARE FACILITY: ICD-10-CM

## 2019-10-18 DIAGNOSIS — I10 ESSENTIAL HYPERTENSION: ICD-10-CM

## 2019-10-18 LAB
CHOLESTEROL, FASTING: 163 MG/DL (ref 0–199)
HDLC SERPL-MCNC: 41 MG/DL (ref 40–60)
LDL CHOLESTEROL CALCULATED: 83 MG/DL
TRIGLYCERIDE, FASTING: 193 MG/DL (ref 0–150)
TSH REFLEX: 1.21 UIU/ML (ref 0.27–4.2)
VLDLC SERPL CALC-MCNC: 39 MG/DL

## 2019-10-18 PROCEDURE — G8427 DOCREV CUR MEDS BY ELIG CLIN: HCPCS | Performed by: INTERNAL MEDICINE

## 2019-10-18 PROCEDURE — 3017F COLORECTAL CA SCREEN DOC REV: CPT | Performed by: INTERNAL MEDICINE

## 2019-10-18 PROCEDURE — 4004F PT TOBACCO SCREEN RCVD TLK: CPT | Performed by: INTERNAL MEDICINE

## 2019-10-18 PROCEDURE — 90686 IIV4 VACC NO PRSV 0.5 ML IM: CPT | Performed by: INTERNAL MEDICINE

## 2019-10-18 PROCEDURE — G8417 CALC BMI ABV UP PARAM F/U: HCPCS | Performed by: INTERNAL MEDICINE

## 2019-10-18 PROCEDURE — 99213 OFFICE O/P EST LOW 20 MIN: CPT | Performed by: INTERNAL MEDICINE

## 2019-10-18 PROCEDURE — G8482 FLU IMMUNIZE ORDER/ADMIN: HCPCS | Performed by: INTERNAL MEDICINE

## 2019-10-18 PROCEDURE — G0008 ADMIN INFLUENZA VIRUS VAC: HCPCS | Performed by: INTERNAL MEDICINE

## 2019-10-18 PROCEDURE — G0402 INITIAL PREVENTIVE EXAM: HCPCS | Performed by: INTERNAL MEDICINE

## 2019-10-18 RX ORDER — ATENOLOL 25 MG/1
TABLET ORAL
Qty: 90 TABLET | Refills: 1 | Status: SHIPPED | OUTPATIENT
Start: 2019-10-18 | End: 2020-07-17 | Stop reason: ALTCHOICE

## 2019-10-18 RX ORDER — ALENDRONATE SODIUM 70 MG/1
TABLET ORAL
COMMUNITY
Start: 2015-07-24 | End: 2020-04-06

## 2019-10-18 RX ORDER — DIFLUPREDNATE 0.5 MG/ML
2 EMULSION OPHTHALMIC 2 TIMES DAILY
COMMUNITY
Start: 2018-02-06

## 2019-10-18 RX ORDER — VARENICLINE TARTRATE 1 MG/1
1 TABLET, FILM COATED ORAL 2 TIMES DAILY
Qty: 60 TABLET | Refills: 1 | Status: SHIPPED | OUTPATIENT
Start: 2019-10-18 | End: 2020-01-27

## 2019-10-18 RX ORDER — VARENICLINE TARTRATE 25 MG
KIT ORAL
Qty: 1 BOX | Refills: 0 | Status: SHIPPED | OUTPATIENT
Start: 2019-10-18 | End: 2020-01-27

## 2019-10-18 RX ORDER — RIZATRIPTAN BENZOATE 5 MG/1
5 TABLET ORAL DAILY PRN
Qty: 27 TABLET | Refills: 1 | Status: SHIPPED | OUTPATIENT
Start: 2019-10-18 | End: 2020-09-10 | Stop reason: SDUPTHER

## 2019-10-18 RX ORDER — SPIRONOLACTONE 50 MG/1
TABLET, FILM COATED ORAL
Qty: 90 TABLET | Refills: 1 | Status: SHIPPED | OUTPATIENT
Start: 2019-10-18 | End: 2020-08-23

## 2019-10-18 RX ORDER — SERTRALINE HYDROCHLORIDE 100 MG/1
150 TABLET, FILM COATED ORAL DAILY
Qty: 135 TABLET | Refills: 1 | Status: SHIPPED | OUTPATIENT
Start: 2019-10-18 | End: 2020-06-03

## 2019-10-18 RX ORDER — ATORVASTATIN CALCIUM 20 MG/1
TABLET, FILM COATED ORAL
Qty: 90 TABLET | Refills: 1 | Status: SHIPPED | OUTPATIENT
Start: 2019-10-18 | End: 2020-06-03

## 2019-10-18 RX ORDER — MODAFINIL 100 MG/1
100 TABLET ORAL
COMMUNITY
Start: 2019-07-25 | End: 2020-01-21

## 2019-10-18 RX ORDER — MONTELUKAST SODIUM 10 MG/1
TABLET ORAL
Qty: 90 TABLET | Refills: 1 | Status: SHIPPED | OUTPATIENT
Start: 2019-10-18 | End: 2020-02-05

## 2019-10-18 RX ORDER — PANTOPRAZOLE SODIUM 40 MG/1
40 TABLET, DELAYED RELEASE ORAL DAILY
Qty: 90 TABLET | Refills: 1 | Status: SHIPPED | OUTPATIENT
Start: 2019-10-18 | End: 2020-03-31 | Stop reason: SDUPTHER

## 2019-10-18 ASSESSMENT — ENCOUNTER SYMPTOMS
SHORTNESS OF BREATH: 0
COUGH: 0

## 2019-10-18 ASSESSMENT — PATIENT HEALTH QUESTIONNAIRE - PHQ9
SUM OF ALL RESPONSES TO PHQ QUESTIONS 1-9: 0
SUM OF ALL RESPONSES TO PHQ QUESTIONS 1-9: 0

## 2019-10-18 ASSESSMENT — LIFESTYLE VARIABLES: HOW OFTEN DO YOU HAVE A DRINK CONTAINING ALCOHOL: 0

## 2019-10-19 LAB
ESTIMATED AVERAGE GLUCOSE: 122.6 MG/DL
HBA1C MFR BLD: 5.9 %

## 2019-12-03 RX ORDER — VALACYCLOVIR HYDROCHLORIDE 500 MG/1
TABLET, FILM COATED ORAL
Qty: 90 TABLET | Refills: 1 | Status: SHIPPED | OUTPATIENT
Start: 2019-12-03 | End: 2020-07-08 | Stop reason: SDUPTHER

## 2020-01-22 ENCOUNTER — APPOINTMENT (OUTPATIENT)
Dept: GENERAL RADIOLOGY | Age: 65
End: 2020-01-22
Payer: MEDICARE

## 2020-01-22 ENCOUNTER — HOSPITAL ENCOUNTER (EMERGENCY)
Age: 65
Discharge: HOME | End: 2020-01-22
Payer: MEDICARE

## 2020-01-22 ENCOUNTER — NURSE TRIAGE (OUTPATIENT)
Dept: OTHER | Facility: CLINIC | Age: 65
End: 2020-01-22

## 2020-01-22 VITALS
HEART RATE: 79 BPM | WEIGHT: 145 LBS | DIASTOLIC BLOOD PRESSURE: 79 MMHG | BODY MASS INDEX: 24.75 KG/M2 | RESPIRATION RATE: 16 BRPM | OXYGEN SATURATION: 100 % | SYSTOLIC BLOOD PRESSURE: 103 MMHG | TEMPERATURE: 98 F | HEIGHT: 64 IN

## 2020-01-22 PROCEDURE — 99283 EMERGENCY DEPT VISIT LOW MDM: CPT

## 2020-01-22 PROCEDURE — 6370000000 HC RX 637 (ALT 250 FOR IP): Performed by: EMERGENCY MEDICINE

## 2020-01-22 PROCEDURE — 71101 X-RAY EXAM UNILAT RIBS/CHEST: CPT

## 2020-01-22 RX ORDER — LIDOCAINE 4 G/G
1 PATCH TOPICAL ONCE
Status: DISCONTINUED | OUTPATIENT
Start: 2020-01-22 | End: 2020-01-22 | Stop reason: HOSPADM

## 2020-01-22 RX ORDER — LIDOCAINE 50 MG/G
1 PATCH TOPICAL DAILY
Qty: 30 PATCH | Refills: 0 | Status: SHIPPED | OUTPATIENT
Start: 2020-01-22 | End: 2020-02-21

## 2020-01-22 ASSESSMENT — PAIN SCALES - GENERAL: PAINLEVEL_OUTOF10: 6

## 2020-01-22 ASSESSMENT — PAIN DESCRIPTION - ORIENTATION: ORIENTATION: RIGHT

## 2020-01-22 ASSESSMENT — PAIN DESCRIPTION - LOCATION: LOCATION: RIB CAGE

## 2020-01-22 NOTE — ED PROVIDER NOTES
[address] ED  EMERGENCY DEPARTMENT ENCOUNTER        Pt Name: Ann-Marie Alamo  MRN: [id number]  [place] 1955  Date of evaluation: 1/22/2020  Provider: VERITO Metcalf CNP  PCP: Justin De Souza MD    This patient was not seen and evaluated by the attending physician. CHIEF COMPLAINT       Chief Complaint   Patient presents with    Rib Injury     Patient states that she fell a few days ago and has right rib pain. Patient states that she also hit the affected area on the wooden bed frame today, and heard a \"pop\". HISTORY OF PRESENT ILLNESS   (Location/Symptom, Timing/Onset, Context/Setting, Quality, Duration, Modifying Factors, Severity)  Note limiting factors. Ann-Marie Alamo is a 59 y.o. female who presents for evaluation of right rib pain. Patient states that last Thursday she was walking up her basement stairs, step, slipped, and fell on her right side. States she had right shoulder, right hip, right shin, and right rib pain. States that her pain was getting better, however, last night she was walking to her bed and hit her right shin on her bed and at that time she heard a \"pop \"from her right rib area. States that she is having constant, achy, intermittently sharp pain in her right rib. States \"it hurts to take a deep breath, cough, and sneeze. \"  Patient states she does have a history of chronic pain and took her Norco and muscle relaxer prior to arrival which has eased the pain. Patient denies other injury, chest pain, shortness of breath, fever, chills, nausea, vomiting, and diarrhea. Nursing Notes were all reviewed and agreed with or any disagreements were addressed in the HPI. REVIEW OF SYSTEMS    (2-9 systems for level 4, 10 or more for level 5)     Review of Systems    Positives and Pertinent negatives as per HPI. Except as noted above in the ROS, all other systems were reviewed and negative.        PAST MEDICAL HISTORY     Past Medical History: Diagnosis Date    Chronic diarrhea     Dr. Kushal Simeon    Chronic kidney disease     kidney stones    Clostridium difficile diarrhea 10/23/13    positive by PCR    Fibromyalgia     Hemorrhoid     Hyperlipidemia     Hypertension     Kidney stone     Osteoarthritis     fibromyalgia    Sarcoidosis 2003    sees Dr. Pravin Ellison     Past Surgical History:   Procedure Laterality Date    ABDOMINAL EXPLORATION SURGERY  8/19/12    REDUCTION OF VULVUS; RIGHT COLECTOMY; SMALL BOWEL RESECTION; INSERTION OF ON Q PAIN BUSTER    ANGIOPLASTY  10/2010    CYSTOSCOPY  12/2011    EYE SURGERY  5/2009    cataract, right    HYSTERECTOMY  2000    LITHOTRIPSY  1/19/2012    LITHOTRIPSY      04/2012    OVARIAN CYST REMOVAL  1975    OVARY REMOVAL  1986    right    PARTIAL HYSTERECTOMY      SPLENECTOMY  1983    SPLENECTOMY      1983    TONSILLECTOMY  1968    UPPER GASTROINTESTINAL ENDOSCOPY  2/27/13    UPPER GASTROINTESTINAL ENDOSCOPY  11/14/2017    gastritis    URETHRAL STRICTURE DILATATION  12/2011         CURRENTMEDICATIONS       Previous Medications    ALENDRONATE (FOSAMAX) 70 MG TABLET    Take 1 tablet by mouth every 7 days Kya Reynolds NP,[id number]    ATENOLOL (TENORMIN) 25 MG TABLET    TAKE 1 TABLET BY MOUTH EVERY DAY    ATORVASTATIN (LIPITOR) 20 MG TABLET    TAKE 1 TABLET DAILY    CERTOLIZUMAB PEGOL (CIMZIA SC)    Inject into the skin    CYCLOBENZAPRINE (FLEXERIL) 10 MG TABLET    Take 10 mg by mouth 3 times daily as needed for Muscle spasms    DICLOFENAC SODIUM 1 % GEL    Apply topically    DIFLUPREDNATE (DUREZOL) 0.05 % EMUL    Apply 1 drop to eye    FLUOCINONIDE (LIDEX) 0.05 % CREAM    Apply topically 2 times daily Apply topically 2 times daily.     FLUTICASONE-SALMETEROL (ADVAIR) 500-50 MCG/DOSE DISKUS INHALER    Inhale 1 puff into the lungs every 12 hours    FOLIC ACID (FOLVITE) 1 MG TABLET    Take 1 tablet by mouth daily    GABAPENTIN (NEURONTIN) 400 MG CAPSULE    TAKE ONE CAPSULE BY MOUTH TWICE A DAY    HYDROCODONE-ACETAMINOPHEN (NORCO) 5-325 MG PER TABLET    Take 1 tablet by mouth every 6 hours as needed for Pain. Hema Berkowitz HYDROCODONE-ACETAMINOPHEN 5-500 MG PO TABS, STARTER PACK,    Take 1 tablet by mouth. METHOTREXATE (RHEUMATREX) 2.5 MG CHEMO TABLET    Take 7.5 mg by mouth every 7 days     MIRTAZAPINE (REMERON) 15 MG TABLET    TAKE 1 TABLET BY MOUTH EVERY DAY AT NIGHT    MONTELUKAST (SINGULAIR) 10 MG TABLET    TAKE 1 TABLET BY MOUTH EVERY DAY    PANTOPRAZOLE (PROTONIX) 40 MG TABLET    Take 1 tablet by mouth daily    PROMETHAZINE (PHENERGAN) 12.5 MG TABLET    Take 25 mg by mouth every 6 hours as needed for Nausea     RIZATRIPTAN (MAXALT) 5 MG TABLET    Take 1 tablet by mouth daily as needed for Migraine May repeat in 2 hours if needed    SERTRALINE (ZOLOFT) 100 MG TABLET    Take 1.5 tablets by mouth daily    SPIRONOLACTONE (ALDACTONE) 50 MG TABLET    TAKE 1 TABLET EVERY DAY    TRAMADOL (ULTRAM) 50 MG TABLET    Take 50 mg by mouth every 6 hours as needed. TRAZODONE (DESYREL) 100 MG TABLET    TAKE 1 TO 2 TABS NIGHTLY AS NEEDED FOR SLEEP. VALACYCLOVIR (VALTREX) 500 MG TABLET    TAKE 1 TABLET BY MOUTH EVERY DAY    VARENICLINE (CHANTIX STARTING MONTH PAK) 0.5 MG X 11 & 1 MG X 42 TABLET    By mouth. VARENICLINE (CHANTIX) 1 MG TABLET    Take 1 tablet by mouth 2 times daily         ALLERGIES     Ultrasound cream; Infliximab; Ultrasound gel;  Amoxicillin; Codeine; and Penicillins    FAMILYHISTORY       Family History   Problem Relation Age of Onset    Heart Disease Father     Cancer Father         skin cancer- unknown    Cancer Brother         skin cancer- forehead and nose- unknown          SOCIAL HISTORY       Social History     Tobacco Use    Smoking status: Current Every Day Smoker     Packs/day: 0.50     Years: 16.00     Pack years: 8.00     Types: Cigarettes     Start date: 3/1/2019    Smokeless tobacco: Never Used   Substance Use Topics    Alcohol use: No     Alcohol/week: 0.0 standard drinks    Drug use: No       SCREENINGS             PHYSICAL EXAM    (up to 7 for level 4, 8 or more for level 5)     ED Triage Vitals [01/22/20 1322]   BP Temp Temp src Pulse Resp SpO2 Height Weight   103/79 98 °F (36.7 °C) -- 79 16 100 % 5' 4\" (1.626 m) 145 lb (65.8 kg)       Physical Exam  Vitals signs and nursing note reviewed. Constitutional:       Appearance: She is well-developed. She is not diaphoretic. HENT:      Head: Normocephalic and atraumatic. Nose: Nose normal.   Eyes:      General:         Right eye: No discharge. Left eye: No discharge. Neck:      Musculoskeletal: Normal range of motion and neck supple. Cardiovascular:      Rate and Rhythm: Normal rate and regular rhythm. Pulses:           Radial pulses are 2+ on the right side and 2+ on the left side. Dorsalis pedis pulses are 2+ on the right side and 2+ on the left side. Posterior tibial pulses are 2+ on the right side and 2+ on the left side. Heart sounds: Normal heart sounds. Pulmonary:      Effort: Pulmonary effort is normal. No respiratory distress. Breath sounds: Normal breath sounds. Chest:      Chest wall: Tenderness present. Musculoskeletal: Normal range of motion. Skin:     General: Skin is warm and dry. Neurological:      Mental Status: She is alert and oriented to person, place, and time. Psychiatric:         Behavior: Behavior normal.         DIAGNOSTIC RESULTS   LABS:    Labs Reviewed - No data to display    All other labs were within normal range or not returned as of this dictation. EKG: All EKG's are interpreted by the Emergency Department Physician in the absence of a cardiologist.  Please see their note for interpretation of EKG.       RADIOLOGY:   Non-plain film images such as CT, Ultrasound and MRI are read by the radiologist. Plain radiographic images are visualized and preliminarily interpreted by the  ED Provider with the below findings:        Interpretation per the Radiologist below, if available at the time of this note:    XR RIBS RIGHT INCLUDE CHEST (MIN 3 VIEWS)   Final Result   Asymmetric ground-glass opacification in the right lower lobe suspected to   represent atelectasis or contusion given recent history of injury. No   pneumothorax. No evidence of rib fracture. Xr Ribs Right Include Chest (min 3 Views)    Result Date: 1/22/2020  EXAMINATION: 4 XRAY VIEWS OF THE RIGHT RIBS WITH FRONTAL XRAY VIEW OF THE CHEST 1/22/2020 2:07 pm COMPARISON: 11/24/2014 radiograph HISTORY: ORDERING SYSTEM PROVIDED HISTORY: injury/pain TECHNOLOGIST PROVIDED HISTORY: Reason for exam:->injury/pain Reason for Exam: rib pain, fell today Acuity: Acute Type of Exam: Initial FINDINGS: The heart is mildly enlarged. Mediastinum and pulmonary vascular markings are normal.  Ill-defined ground-glass opacification in the right lower lobe with slight thickening of the major fissure. Remote fracture of the posterior right 2nd rib, stable. No acute rib fracture is detected. Asymmetric ground-glass opacification in the right lower lobe suspected to represent atelectasis or contusion given recent history of injury. No pneumothorax. No evidence of rib fracture. PROCEDURES   Unless otherwise noted below, none     Procedures    CRITICAL CARE TIME   N/A    CONSULTS:  None      EMERGENCY DEPARTMENT COURSE and DIFFERENTIAL DIAGNOSIS/MDM:   Vitals:    Vitals:    01/22/20 1322   BP: 103/79   Pulse: 79   Resp: 16   Temp: 98 °F (36.7 °C)   SpO2: 100%   Weight: 145 lb (65.8 kg)   Height: 5' 4\" (1.626 m)       Patient was given thefollowing medications:  Medications - No data to display    Patient is nontoxic, in no apparent distress, lying in bed. X-ray of right ribs including chest ordered.   Patient states that she took Norco and a muscle relaxer approximately half hour prior to arrival.  X-ray of right ribs positive for ground glass opacification in the right lower lobe possibly atelectasis or contusion. 1445 -patient updated at this time that her chest x-ray is negative for rib fracture. Instructed to continue taking Norco as prescribed, patient given a Lidoderm patch prior to discharge, and a prescription for Lidoderm patches. Patient given an incentive spirometer and teaching completed. We have discussed the diagnosis and risks, and we agree with discharging home to follow-up with their primary doctor. We also discussed returning to the Emergency Department immediately if new or worsening symptoms occur. We have discussed the symptoms which are most concerning (e.g., bloody sputum, fever, worsening pain or shortness of breath, vomiting) that necessitate immediate return. Differential diagnoses include but are not limited to fracture, contusion, pneumonia, bronchitis, pneumothorax, pulmonary edema, and pleural effusion. FINAL IMPRESSION      1. Rib pain on right side    2.  Fall, initial encounter          DISPOSITION/PLAN   DISPOSITION        PATIENT REFERREDTO:  Genesis Zuniga MD  [address] [address]  [phone]    Schedule an appointment as soon as possible for a visit in 1 day      [facility] ED  [address] [facility]  [phone]    If symptoms worsen      DISCHARGE MEDICATIONS:  Discharge Medication List as of 1/22/2020  3:12 PM      START taking these medications    Details   lidocaine (LIDODERM) 5 % Place 1 patch onto the skin daily 12 hours on, 12 hours off., Disp-30 patch, R-0Print             DISCONTINUED MEDICATIONS:  Discharge Medication List as of 1/22/2020  3:12 PM                 (Please note that portions of this note were completed with a voice recognition program.  Efforts were made to edit the dictations but occasionally words are mis-transcribed.)    VERITO Riley - CNP (electronically signed)            VERITO Riley - CNP  01/22/20 1823

## 2020-01-22 NOTE — TELEPHONE ENCOUNTER
Reason for Disposition   SEVERE chest pain    Protocols used: CHEST INJURY-ADULT-[place]    Received call from Toby Nugent in [facility]. Patient tripped and fell up the steps 1/16/20. She had some rib pain at the time and since then. But today she bumped her shin on the bed and fell forward and felt a pop in the ribs at the site of the injury. Her pain is 8/10. It hurts to take a breath. She is not coughing up blood. Denies bruising or swelling. She is not struggling, but can hear her try to avoid taking deep breath. She will proceed to ER.

## 2020-01-27 RX ORDER — VARENICLINE TARTRATE 1 MG/1
TABLET, FILM COATED ORAL
Qty: 60 TABLET | Refills: 1 | Status: SHIPPED | OUTPATIENT
Start: 2020-01-27 | End: 2020-07-17 | Stop reason: ALTCHOICE

## 2020-01-27 NOTE — TELEPHONE ENCOUNTER
Refill Request     Last Seen: 10/18/2019    Last Written: 10/18/19 with 1 refill    Next Appointment:   Future Appointments   Date Time Provider Annmarie Mclean   4/17/2020 10:30 AM MD PREMA Choi           Requested Prescriptions     Pending Prescriptions Disp Refills    CHANTIX 1 MG tablet [Pharmacy Med Name: CHANTIX 1 MG TABLET] 60 tablet 1     Sig: TAKE 1 TABLET BY MOUTH TWICE A DAY

## 2020-02-05 RX ORDER — MONTELUKAST SODIUM 10 MG/1
TABLET ORAL
Qty: 90 TABLET | Refills: 1 | Status: SHIPPED | OUTPATIENT
Start: 2020-02-05 | End: 2020-07-15 | Stop reason: SDUPTHER

## 2020-03-03 ENCOUNTER — OFFICE VISIT (OUTPATIENT)
Dept: FAMILY MEDICINE CLINIC | Age: 65
End: 2020-03-03
Payer: MEDICARE

## 2020-03-03 VITALS
TEMPERATURE: 98.7 F | HEART RATE: 87 BPM | SYSTOLIC BLOOD PRESSURE: 126 MMHG | BODY MASS INDEX: 24.75 KG/M2 | DIASTOLIC BLOOD PRESSURE: 84 MMHG | WEIGHT: 145 LBS | RESPIRATION RATE: 17 BRPM | HEIGHT: 64 IN | OXYGEN SATURATION: 99 %

## 2020-03-03 LAB
A/G RATIO: 1.8 (ref 1.1–2.2)
ALBUMIN SERPL-MCNC: 4.5 G/DL (ref 3.4–5)
ALP BLD-CCNC: 79 U/L (ref 40–129)
ALT SERPL-CCNC: 13 U/L (ref 10–40)
ANION GAP SERPL CALCULATED.3IONS-SCNC: 15 MMOL/L (ref 3–16)
AST SERPL-CCNC: 17 U/L (ref 15–37)
BILIRUB SERPL-MCNC: 0.3 MG/DL (ref 0–1)
BUN BLDV-MCNC: 12 MG/DL (ref 7–20)
CALCIUM SERPL-MCNC: 10.1 MG/DL (ref 8.3–10.6)
CHLORIDE BLD-SCNC: 103 MMOL/L (ref 99–110)
CO2: 23 MMOL/L (ref 21–32)
CREAT SERPL-MCNC: 0.8 MG/DL (ref 0.6–1.2)
GFR AFRICAN AMERICAN: >60
GFR NON-AFRICAN AMERICAN: >60
GLOBULIN: 2.5 G/DL
GLUCOSE BLD-MCNC: 101 MG/DL (ref 70–99)
HCT VFR BLD CALC: 43.6 % (ref 36–48)
HEMOGLOBIN: 14.6 G/DL (ref 12–16)
MCH RBC QN AUTO: 34.5 PG (ref 26–34)
MCHC RBC AUTO-ENTMCNC: 33.4 G/DL (ref 31–36)
MCV RBC AUTO: 103.3 FL (ref 80–100)
PDW BLD-RTO: 14.5 % (ref 12.4–15.4)
PLATELET # BLD: 449 K/UL (ref 135–450)
PMV BLD AUTO: 7.9 FL (ref 5–10.5)
POTASSIUM SERPL-SCNC: 5 MMOL/L (ref 3.5–5.1)
RBC # BLD: 4.22 M/UL (ref 4–5.2)
SODIUM BLD-SCNC: 141 MMOL/L (ref 136–145)
TOTAL PROTEIN: 7 G/DL (ref 6.4–8.2)
WBC # BLD: 10 K/UL (ref 4–11)

## 2020-03-03 PROCEDURE — G8482 FLU IMMUNIZE ORDER/ADMIN: HCPCS | Performed by: INTERNAL MEDICINE

## 2020-03-03 PROCEDURE — 4004F PT TOBACCO SCREEN RCVD TLK: CPT | Performed by: INTERNAL MEDICINE

## 2020-03-03 PROCEDURE — G8420 CALC BMI NORM PARAMETERS: HCPCS | Performed by: INTERNAL MEDICINE

## 2020-03-03 PROCEDURE — 3017F COLORECTAL CA SCREEN DOC REV: CPT | Performed by: INTERNAL MEDICINE

## 2020-03-03 PROCEDURE — 99213 OFFICE O/P EST LOW 20 MIN: CPT | Performed by: INTERNAL MEDICINE

## 2020-03-03 PROCEDURE — G8427 DOCREV CUR MEDS BY ELIG CLIN: HCPCS | Performed by: INTERNAL MEDICINE

## 2020-03-03 RX ORDER — ATENOLOL 25 MG/1
TABLET ORAL
Qty: 90 TABLET | Refills: 1 | Status: CANCELLED | OUTPATIENT
Start: 2020-03-03

## 2020-03-03 RX ORDER — SERTRALINE HYDROCHLORIDE 100 MG/1
150 TABLET, FILM COATED ORAL DAILY
Qty: 135 TABLET | Refills: 1 | Status: CANCELLED | OUTPATIENT
Start: 2020-03-03

## 2020-03-03 RX ORDER — MIRTAZAPINE 15 MG/1
TABLET, FILM COATED ORAL
Qty: 90 TABLET | Refills: 1 | Status: CANCELLED | OUTPATIENT
Start: 2020-03-03

## 2020-03-03 RX ORDER — PANTOPRAZOLE SODIUM 40 MG/1
40 TABLET, DELAYED RELEASE ORAL DAILY
Qty: 90 TABLET | Refills: 1 | Status: CANCELLED | OUTPATIENT
Start: 2020-03-03

## 2020-03-03 RX ORDER — SPIRONOLACTONE 50 MG/1
TABLET, FILM COATED ORAL
Qty: 90 TABLET | Refills: 1 | Status: CANCELLED | OUTPATIENT
Start: 2020-03-03

## 2020-03-03 RX ORDER — ATORVASTATIN CALCIUM 20 MG/1
TABLET, FILM COATED ORAL
Qty: 90 TABLET | Refills: 1 | Status: CANCELLED | OUTPATIENT
Start: 2020-03-03

## 2020-03-03 RX ORDER — MECLIZINE HCL 12.5 MG/1
12.5 TABLET ORAL 3 TIMES DAILY PRN
Qty: 15 TABLET | Refills: 0 | Status: SHIPPED | OUTPATIENT
Start: 2020-03-03 | End: 2020-03-13

## 2020-03-03 NOTE — PROGRESS NOTES
3/3/2020     Dmitry Blunt (:  1955) is a 59 y.o. female, here for evaluation of the following medical concerns:  Chief Complaint   Patient presents with    Abdominal Pain     Right side - last Monday - went to Urology on 2020  Urine tested and Xray [facility]       HPI    Sharp RUQ pain, no change with eating, constant since last Monday, initially thought is was a stone. Xray normal. Diarrhea with salad. More yellow stool and lighter colored stool. Feeling off balance and tripping today. Patient's medications, allergies, past medical, surgical, social and family histories were reviewed and updated as appropriate. Review of Systems   Constitutional: Negative for fatigue and fever. Respiratory: Negative for cough and shortness of breath. Cardiovascular: Negative for chest pain and leg swelling. Gastrointestinal: Positive for abdominal pain and nausea. Negative for blood in stool, constipation and diarrhea. Genitourinary: Negative for dysuria, frequency and vaginal discharge. Prior to Visit Medications    Medication Sig Taking?  Authorizing Provider   montelukast (SINGULAIR) 10 MG tablet TAKE 1 TABLET BY MOUTH EVERY DAY  FRANCISCO Montoya   CHANTIX 1 MG tablet TAKE 1 TABLET BY MOUTH TWICE A DAY  FRANCISCO Montoya   valACYclovir (VALTREX) 500 MG tablet TAKE 1 TABLET BY MOUTH EVERY DAY  Anthony Carlos MD   atorvastatin (LIPITOR) 20 MG tablet TAKE 1 TABLET DAILY  Anthony Carlos MD   pantoprazole (PROTONIX) 40 MG tablet Take 1 tablet by mouth daily  Anthony Carlos MD   rizatriptan (MAXALT) 5 MG tablet Take 1 tablet by mouth daily as needed for Migraine May repeat in 2 hours if needed  Anthony Carlos MD   sertraline (ZOLOFT) 100 MG tablet Take 1.5 tablets by mouth daily  Anthony Carlos MD   spironolactone (ALDACTONE) 50 MG tablet TAKE 1 TABLET EVERY DAY  Mili Robles MD   atenolol (TENORMIN) 25 MG tablet TAKE 1 TABLET BY MOUTH EVERY DAY  Anthony Carlos MD HYDROCODONE-ACETAMINOPHEN 5-500 MG PO TABS, STARTER PACK, Take 1 tablet by mouth. Historical Provider, MD   alendronate (FOSAMAX) 70 MG tablet Take 1 tablet by mouth every 7 days [facility] [facility]  Historical Provider, MD   diclofenac sodium 1 % GEL Apply topically  Historical Provider, MD   difluprednate (DUREZOL) 0.05 % EMUL Apply 1 drop to eye  Historical Provider, MD   gabapentin (NEURONTIN) 400 MG capsule TAKE ONE CAPSULE BY MOUTH TWICE A DAY  Historical Provider, MD   traZODone (DESYREL) 100 MG tablet TAKE 1 TO 2 TABS NIGHTLY AS NEEDED FOR SLEEP. FRANCISCO Ku   mirtazapine (REMERON) 15 MG tablet TAKE 1 TABLET BY MOUTH EVERY DAY AT NIGHT  Genesis Zuniga MD   fluticasone-salmeterol (ADVAIR) 500-50 MCG/DOSE diskus inhaler Inhale 1 puff into the lungs every 12 hours  Historical Provider, MD   HYDROcodone-acetaminophen (NORCO) 5-325 MG per tablet Take 1 tablet by mouth every 6 hours as needed for Pain. Shavon Delgadillo Historical Provider, MD   Certolizumab Pegol (CIMZIA SC) Inject into the skin  Historical Provider, MD   folic acid (FOLVITE) 1 MG tablet Take 1 tablet by mouth daily  Patient taking differently: Take 400 mg by mouth daily   Genesis Zuniga MD   cyclobenzaprine (FLEXERIL) 10 MG tablet Take 10 mg by mouth 3 times daily as needed for Muscle spasms  Historical Provider, MD   methotrexate (RHEUMATREX) 2.5 MG chemo tablet Take 7.5 mg by mouth every 7 days   Historical Provider, MD   fluocinonide (LIDEX) 0.05 % cream Apply topically 2 times daily Apply topically 2 times daily. Historical Provider, MD   promethazine (PHENERGAN) 12.5 MG tablet Take 25 mg by mouth every 6 hours as needed for Nausea   Historical Provider, MD   traMADol (ULTRAM) 50 MG tablet Take 50 mg by mouth every 6 hours as needed.   Historical Provider, MD        Social History     Tobacco Use    Smoking status: Current Every Day Smoker     Packs/day: 0.50     Years: 16.00     Pack years: 8.00     Types: Cigarettes     Start date: 3/1/2019    Smokeless tobacco: Never Used   Substance Use Topics    Alcohol use: No     Alcohol/week: 0.0 standard drinks        Vitals:    03/03/20 1454   BP: 126/84   Site: Left Upper Arm   Position: Sitting   Cuff Size: Small Adult   Pulse: 87   Resp: 17   Temp: 98.7 °F (37.1 °C)   TempSrc: Oral   SpO2: 99%   Weight: 145 lb (65.8 kg)   Height: 5' 4\" (1.626 m)     Estimated body mass index is 24.89 kg/m² as calculated from the following:    Height as of this encounter: 5' 4\" (1.626 m). Weight as of this encounter: 145 lb (65.8 kg). Physical Exam  Constitutional:       General: She is not in acute distress. Appearance: She is well-developed. Eyes:      General: No scleral icterus. Conjunctiva/sclera: Conjunctivae normal.   Neck:      Thyroid: No thyromegaly. Cardiovascular:      Rate and Rhythm: Normal rate and regular rhythm. Heart sounds: Normal heart sounds. No murmur. Pulmonary:      Effort: Pulmonary effort is normal. No respiratory distress. Breath sounds: Normal breath sounds. No wheezing. Abdominal:      General: Bowel sounds are normal. There is no distension. Palpations: Abdomen is soft. There is no mass. Tenderness: There is abdominal tenderness (RUQ). There is no guarding or rebound. Skin:     Findings: No rash. Neurological:      Mental Status: She is alert and oriented to person, place, and time. ASSESSMENT/PLAN:  Leslie Avalos was seen today for abdominal pain. Diagnoses and all orders for this visit:    RUQ pain  -     US Gallbladder Ruq; Future  -     CBC  -     COMPREHENSIVE METABOLIC PANEL    Major depressive disorder, recurrent episode, moderate (HCC)    Other orders  -     meclizine (ANTIVERT) 12.5 MG tablet; Take 1 tablet by mouth 3 times daily as needed for Dizziness    new, worsening, labs per orders, consider HIDA scan      No follow-ups on file. An electronic signature was used to authenticate this note.     --Arun Kimbrough MD on 3/3/2020 at 3:21 PM

## 2020-03-06 ENCOUNTER — HOSPITAL ENCOUNTER (OUTPATIENT)
Dept: ULTRASOUND IMAGING | Age: 65
Discharge: HOME | End: 2020-03-06
Payer: MEDICARE

## 2020-03-06 ENCOUNTER — HOSPITAL ENCOUNTER (OUTPATIENT)
Dept: MAMMOGRAPHY | Age: 65
Discharge: HOME | End: 2020-03-06
Payer: MEDICARE

## 2020-03-06 PROCEDURE — 77063 BREAST TOMOSYNTHESIS BI: CPT

## 2020-03-06 PROCEDURE — 76705 ECHO EXAM OF ABDOMEN: CPT

## 2020-03-06 RX ORDER — ALPRAZOLAM 1 MG/1
1 TABLET ORAL 2 TIMES DAILY PRN
Qty: 60 TABLET | Refills: 2 | Status: SHIPPED | OUTPATIENT
Start: 2020-03-06 | End: 2020-06-04

## 2020-03-09 ENCOUNTER — TELEPHONE (OUTPATIENT)
Dept: MAMMOGRAPHY | Age: 65
End: 2020-03-09

## 2020-03-09 ENCOUNTER — TELEPHONE (OUTPATIENT)
Dept: FAMILY MEDICINE CLINIC | Age: 65
End: 2020-03-09

## 2020-03-16 ENCOUNTER — HOSPITAL ENCOUNTER (OUTPATIENT)
Dept: NUCLEAR MEDICINE | Age: 65
Discharge: HOME | End: 2020-03-16
Payer: MEDICARE

## 2020-03-16 VITALS — WEIGHT: 145 LBS | HEIGHT: 64 IN | BODY MASS INDEX: 24.75 KG/M2

## 2020-03-16 PROCEDURE — 2580000003 HC RX 258: Performed by: INTERNAL MEDICINE

## 2020-03-16 PROCEDURE — 3430000000 HC RX DIAGNOSTIC RADIOPHARMACEUTICAL: Performed by: INTERNAL MEDICINE

## 2020-03-16 PROCEDURE — 6360000004 HC RX CONTRAST MEDICATION: Performed by: INTERNAL MEDICINE

## 2020-03-16 PROCEDURE — 78227 HEPATOBIL SYST IMAGE W/DRUG: CPT

## 2020-03-16 PROCEDURE — A9537 TC99M MEBROFENIN: HCPCS | Performed by: INTERNAL MEDICINE

## 2020-03-16 RX ADMIN — Medication 6 MILLICURIE: at 13:20

## 2020-03-16 RX ADMIN — SODIUM CHLORIDE 1.32 MCG: 9 INJECTION, SOLUTION INTRAVENOUS at 14:30

## 2020-03-17 ENCOUNTER — TELEPHONE (OUTPATIENT)
Dept: FAMILY MEDICINE CLINIC | Age: 65
End: 2020-03-17

## 2020-03-18 RX ORDER — CHOLESTYRAMINE 4 G/9G
1 POWDER, FOR SUSPENSION ORAL 3 TIMES DAILY
Qty: 90 PACKET | Refills: 1 | Status: SHIPPED | OUTPATIENT
Start: 2020-03-18 | End: 2020-04-17 | Stop reason: ALTCHOICE

## 2020-03-18 NOTE — TELEPHONE ENCOUNTER
Spoke with patient. Still with right sided abdominal pain. Not sure if affected by eating as she has had no appetite. Diarrhea is slightly worse, \"looks like bile. \" taking tylenol for pain. No fever, nausea or vomiting. Reports she was seen by urology and told no stone. Recommend trial of questran, if not improved would consider repeat labs, hepatic function, cbc, and lipase and consider CT. Pt to call with fever, chills, melena/hematochezia, nausea and vomiting. Dr. Ezekiel Gee based on your eval of the patient does this seem reasonable?

## 2020-03-20 ENCOUNTER — TELEPHONE (OUTPATIENT)
Dept: FAMILY MEDICINE CLINIC | Age: 65
End: 2020-03-20

## 2020-03-20 NOTE — TELEPHONE ENCOUNTER
Continue bland diet and gatorade. It is encouraging that she is able to tolerate oral intake. If she notices a decrease in urine output over the weekend, she should go to the ER. Otherwise I recommend she be put on the schedule for next Monday with Dr. Wesley Lefort, if she feels better over the weekend, can cancel her apt.

## 2020-03-20 NOTE — TELEPHONE ENCOUNTER
This seems reasonable to me. Thanks for your help. If this doesn't work, I'll probably just send her to the surgeon.

## 2020-03-22 ASSESSMENT — ENCOUNTER SYMPTOMS
ABDOMINAL PAIN: 1
BLOOD IN STOOL: 0
COUGH: 0
CONSTIPATION: 0
DIARRHEA: 0
NAUSEA: 1
SHORTNESS OF BREATH: 0

## 2020-03-23 ENCOUNTER — OFFICE VISIT (OUTPATIENT)
Dept: FAMILY MEDICINE CLINIC | Age: 65
End: 2020-03-23
Payer: MEDICARE

## 2020-03-23 VITALS
OXYGEN SATURATION: 93 % | TEMPERATURE: 98.4 F | DIASTOLIC BLOOD PRESSURE: 64 MMHG | HEIGHT: 64 IN | SYSTOLIC BLOOD PRESSURE: 104 MMHG | HEART RATE: 65 BPM | WEIGHT: 148 LBS | BODY MASS INDEX: 25.27 KG/M2

## 2020-03-23 PROCEDURE — 3017F COLORECTAL CA SCREEN DOC REV: CPT | Performed by: INTERNAL MEDICINE

## 2020-03-23 PROCEDURE — 4004F PT TOBACCO SCREEN RCVD TLK: CPT | Performed by: INTERNAL MEDICINE

## 2020-03-23 PROCEDURE — G8427 DOCREV CUR MEDS BY ELIG CLIN: HCPCS | Performed by: INTERNAL MEDICINE

## 2020-03-23 PROCEDURE — G8482 FLU IMMUNIZE ORDER/ADMIN: HCPCS | Performed by: INTERNAL MEDICINE

## 2020-03-23 PROCEDURE — 99214 OFFICE O/P EST MOD 30 MIN: CPT | Performed by: INTERNAL MEDICINE

## 2020-03-23 PROCEDURE — G8417 CALC BMI ABV UP PARAM F/U: HCPCS | Performed by: INTERNAL MEDICINE

## 2020-03-23 RX ORDER — MIRTAZAPINE 15 MG/1
TABLET, FILM COATED ORAL
Qty: 90 TABLET | Refills: 1 | Status: SHIPPED | OUTPATIENT
Start: 2020-03-23 | End: 2020-10-12 | Stop reason: SDUPTHER

## 2020-03-23 RX ORDER — HYOSCYAMINE SULFATE EXTENDED-RELEASE 0.38 MG/1
375 TABLET ORAL EVERY 12 HOURS PRN
Qty: 60 TABLET | Refills: 3 | Status: SHIPPED | OUTPATIENT
Start: 2020-03-23 | End: 2020-06-28

## 2020-03-23 NOTE — TELEPHONE ENCOUNTER
.  Last office visit 3/3/2020     Last written 10/7/19 #90 1 refill    Next office visit scheduled 3/23/2020    Requested Prescriptions     Pending Prescriptions Disp Refills    mirtazapine (REMERON) 15 MG tablet [Pharmacy Med Name: MIRTAZAPINE 15 MG TABLET] 90 tablet 1     Sig: TAKE 1 TABLET BY MOUTH EVERY DAY AT NIGHT

## 2020-03-26 RX ORDER — TRAZODONE HYDROCHLORIDE 100 MG/1
TABLET ORAL
Qty: 180 TABLET | Refills: 1 | Status: SHIPPED | OUTPATIENT
Start: 2020-03-26 | End: 2020-10-30 | Stop reason: SDUPTHER

## 2020-03-26 NOTE — TELEPHONE ENCOUNTER
.  Last office visit 3/23/2020     Last written 10/11/19 180 with 1      Next office visit scheduled 4/17/2020    Requested Prescriptions     Pending Prescriptions Disp Refills    traZODone (DESYREL) 100 MG tablet [Pharmacy Med Name: TRAZODONE 100 MG TABLET] 180 tablet 1     Sig: TAKE 1 TO 2 TABS NIGHTLY AS NEEDED FOR SLEEP.

## 2020-03-31 ENCOUNTER — VIRTUAL VISIT (OUTPATIENT)
Dept: GASTROENTEROLOGY | Age: 65
End: 2020-03-31
Payer: MEDICARE

## 2020-03-31 VITALS — HEIGHT: 64 IN | WEIGHT: 145 LBS | BODY MASS INDEX: 24.75 KG/M2

## 2020-03-31 PROCEDURE — 99204 OFFICE O/P NEW MOD 45 MIN: CPT | Performed by: INTERNAL MEDICINE

## 2020-03-31 PROCEDURE — G8427 DOCREV CUR MEDS BY ELIG CLIN: HCPCS | Performed by: INTERNAL MEDICINE

## 2020-03-31 PROCEDURE — 3017F COLORECTAL CA SCREEN DOC REV: CPT | Performed by: INTERNAL MEDICINE

## 2020-03-31 RX ORDER — PANTOPRAZOLE SODIUM 40 MG/1
40 TABLET, DELAYED RELEASE ORAL
Qty: 90 TABLET | Refills: 1 | Status: SHIPPED | OUTPATIENT
Start: 2020-03-31 | End: 2020-05-13 | Stop reason: SDUPTHER

## 2020-03-31 RX ORDER — LEVOFLOXACIN 500 MG/1
TABLET, FILM COATED ORAL
COMMUNITY
Start: 2020-03-05 | End: 2020-04-17 | Stop reason: ALTCHOICE

## 2020-03-31 RX ORDER — POLYETHYLENE GLYCOL 3350 17 G/17G
238 POWDER ORAL DAILY
Qty: 255 G | Refills: 0 | Status: SHIPPED | OUTPATIENT
Start: 2020-03-31 | End: 2020-04-17 | Stop reason: ALTCHOICE

## 2020-03-31 NOTE — LETTER
[address] ,  [address] [facility]  Phone: [phone] [facility] [address],  [address] [facility], [address]  Phone: [phone]   CDE:[phone]    03/31/20    Patient:Thea Santacruz  MR Number:<[id number]>  YOB: 1955  Date of Visit:3/31/20    Dear Dr. Xiao Salgado MD    Thank you for the request for consultation for Zev Bai to me for the evaluation of   Chief Complaint   Patient presents with    New Patient     LUQ pain ongoing since 2/24; heating pad does help. 1-10 pain scale can reach up to 10 but currently only at a 3.    . Below are the relevant portions of my assessment and plan of care. FINAL DIAGNOSIS/Assessment   Diagnosis Orders   1. Epigastric pain  CT ABDOMEN PELVIS W IV CONTRAST Additional Contrast? Oral    pantoprazole (PROTONIX) 40 MG tablet    EGD   2. Screening for colon cancer  COLONOSCOPY W/ OR W/O BIOPSY    bisacodyl (DULCOLAX) 5 MG EC tablet    polyethylene glycol (MIRALAX) POWD powder       VISIT ORDERS/Plan  Orders Placed This Encounter   Procedures    COLONOSCOPY W/ OR W/O BIOPSY     Scheduling Instructions:      Schedule with anesthesia provided diprivan. Please provide prep of choice instructions and prescription. General guidelines for holding blood thinners/anticoagulants around endoscopic procedure are but patients are encouraged to check with their prescribing physician. The patient may hold Plavix, Effient, Brilinta 5 days prior to the procedure unless:       A drug eluting stent has been placed within past 12 months. A nondrug eluting stent has been placed within past 1 month. Coumadin may be held 4 days prior to the procedure unless:        Mechanical mitral valve replacement (requires heparin bridge while Coumadin held and is managed by pharmacy)      Pradaxa, Xarelto, Eliquis may be held 2-3 days prior to procedure. that any therapeutic procedure where anything beyond looking is performed, carries higher risks. For this reason without overt bleeding other testing       such as cologuard may be more appropriate. High risk endoscopic procedures that require stopping antiplatelet and anticoagulation therapy include polypectomy, biliary or pancreatic sphincterotomy, pneumatic or bougie dilation, PEG placement, therapeutic balloon-assisted enteroscopy, EUS and FNA, tumor ablation by any technique,       cystogastrostomy,and treatment of varices. Order Specific Question:   Screening or Diagnostic? Answer:   Diagnostic       If you have questions, please do not hesitate to call me. I look forward to following Rajat Song along with you.     Sincerely,        Jules Ross 3/31/20 3:31 PM EDT

## 2020-03-31 NOTE — PROGRESS NOTES
[address] [facility],  [address] [place] [address] Maranda Riddle, [address] [place] [address]  Phone: [phone]    CHIEF COMPLAINT     TELEHEALTH EVALUATION -- Audio/Visual (During [id number] public health emergency)    HPI     Thank you Santosh Pollard MD for asking me to see Caryn Watkins in consultation. She is a  [2] White [1] 59 y.o. Eric Ervin female seen with her  who presents with the following GI complaints:    Caryn Watkins (:  1955) has requested an audio/video evaluation for the following concern(s):  Complains initially of right side pain with radiation to the back. Saw her urologist and states a kidney stone was ruled out. Then began having more epigastric pain under the rib cage with continued radiation to the back. Has multi organ system sarcoid including liver, eye. Has arthritis. On cimzia. Takes fosamax weekly. Has occasional bread dysphagia. Has frequent diarrhea often food dependent. Some meds will constipate her. Got recent scripts for questran and levbid. Previously took imodium. 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 Has had US and HIDA. Has some small cysts noted in the liver. Does not take asa or nsaids. Previously had a lot of n/v that is improved with protonix. Had a hiatal hernia on last CT 2.5 years ago. Has not tried it bid. Not having what she would consider heartburn. No pertinent GI family history.        Lab Results   Component Value Date    ALT 13 2020    ALT 41 (H) 10/18/2018    ALT 22 2017    AST 17 2020    AST 34 10/18/2018    AST 26 2017    ALKPHOS 79 2020    ALKPHOS 91 10/18/2018    ALKPHOS 99 2017    BILITOT 0.3 2020    BILITOT 0.4 10/18/2018    BILITOT 0.4 2017    HGB 14.6 2020    HGB 15.5 10/18/2018    HGB 13.3 2017     2020     (H) 10/18/2018     (H) 2017     Lab Results   Component Value Date     03/03/2020    K 5.0 03/03/2020     03/03/2020    CO2 23 03/03/2020    BUN 12 03/03/2020    CREATININE 0.8 03/03/2020    GLUCOSE 101 (H) 03/03/2020    CALCIUM 10.1 03/03/2020    PROT 7.0 03/03/2020    LABALBU 4.5 03/03/2020     Lab Results   Component Value Date    LIPASE 37.0 11/13/2017       HPI elements: location, severity, timing, modifying factors, quality, duration, context and associated signs/symptoms. Last Encounter Reviewed:  Pertinent PMH, FH, SH is reviewed below. Last EGD: 11/14/2017 h pylori negative gastritis, 2/27/2013 negative small bowel biopsies, chemical gastritis  Last Colonoscopy: 2013, 5/12/2010 negative biopsies for microscopic colitis    Review of available records reveals:   Wt Readings from Last 50 Encounters:   03/31/20 145 lb (65.8 kg)   03/23/20 148 lb (67.1 kg)   03/16/20 145 lb (65.8 kg)   03/03/20 145 lb (65.8 kg)   01/22/20 145 lb (65.8 kg)   10/18/19 147 lb 9.6 oz (67 kg)   04/15/19 160 lb (72.6 kg)   01/25/19 165 lb 3.2 oz (74.9 kg)   12/10/18 158 lb (71.7 kg)   11/19/18 157 lb (71.2 kg)   10/17/18 155 lb 12.8 oz (70.7 kg)   09/21/18 149 lb (67.6 kg)   06/28/18 144 lb (65.3 kg)   06/20/18 153 lb 10.6 oz (69.7 kg)   03/19/18 153 lb 9.6 oz (69.7 kg)   12/19/17 155 lb (70.3 kg)   11/13/17 158 lb 12.8 oz (72 kg)   09/19/17 158 lb 3.2 oz (71.8 kg)   08/16/17 155 lb 10.3 oz (70.6 kg)   07/25/17 155 lb 10.3 oz (70.6 kg)   06/27/17 155 lb 10.3 oz (70.6 kg)   06/21/17 155 lb 10.3 oz (70.6 kg)   06/13/17 155 lb 9.6 oz (70.6 kg)   06/03/17 157 lb (71.2 kg)   03/14/17 154 lb (69.9 kg)   01/03/17 155 lb (70.3 kg)   12/13/16 150 lb (68 kg)   09/13/16 149 lb (67.6 kg)   07/22/16 155 lb (70.3 kg)   05/24/16 150 lb (68 kg)   03/10/16 153 lb (69.4 kg)   02/22/16 156 lb (70.8 kg)   12/22/15 151 lb (68.5 kg)   11/23/15 155 lb (70.3 kg)   11/11/15 157 lb (71.2 kg)   10/23/15 159 lb (72.1 kg)   09/22/15 163 lb (73.9 kg)   08/25/15 161 lb (73 kg)   07/20/15 160 lb (72.6 kg) 06/15/15 169 lb (76.7 kg)   05/13/15 163 lb (73.9 kg)   01/07/14 148 lb (67.1 kg)   12/09/13 146 lb (66.2 kg)   10/21/13 146 lb (66.2 kg)   08/08/13 143 lb (64.9 kg)   02/27/13 138 lb (62.6 kg)   01/09/13 144 lb (65.3 kg)   06/19/12 144 lb (65.3 kg)   04/30/12 139 lb (63 kg)   03/12/12 135 lb (61.2 kg)       No components found for: HGBA1C  BP Readings from Last 3 Encounters:   03/23/20 104/64   03/03/20 126/84   01/22/20 103/79     Health Maintenance   Topic Date Due    Meningococcal (ACWY) vaccine (1 - Risk start before 7 months 4-dose series) 1955    Hib vaccine (1 of 1 - Risk 1-dose series) 09/11/1956    Meningococcal B vaccine (1 of 2 - Increased Risk Bexsero 2-dose series) 06/11/1965    Shingles Vaccine (1 of 2) 06/11/2005    Cervical cancer screen  06/15/2018    DTaP/Tdap/Td vaccine (2 - Td) 02/03/2019    A1C test (Diabetic or Prediabetic)  10/18/2020    Lipid screen  10/18/2020    Annual Wellness Visit (AWV)  10/18/2020    Potassium monitoring  03/03/2021    Creatinine monitoring  03/03/2021    Breast cancer screen  03/06/2022    Colon cancer screen colonoscopy  03/02/2025    Flu vaccine  Completed    Pneumococcal 0-64 years Vaccine  Completed    Hepatitis C screen  Completed    HIV screen  Completed    Hepatitis A vaccine  Aged Out    Hepatitis B vaccine  Aged Out       No components found for: [facility]     PAST MEDICAL HISTORY     Past Medical History:   Diagnosis Date    Chronic diarrhea     Dr. Alexus Lindquist    Chronic kidney disease     kidney stones    Clostridium difficile diarrhea 10/23/13    positive by PCR    Fibromyalgia     Hemorrhoid     Hyperlipidemia     Hypertension     Kidney stone     Osteoarthritis     fibromyalgia    Sarcoidosis 2003    sees Dr. Howell [place] History   Problem Relation Age of Onset    Heart Disease Father     Cancer Father         skin cancer- unknown    Cancer Brother         skin cancer- forehead and REMOVAL  1986    right    PARTIAL HYSTERECTOMY      SPLENECTOMY  1983    SPLENECTOMY      1983    TONSILLECTOMY  1968    UPPER GASTROINTESTINAL ENDOSCOPY  2/27/13    UPPER GASTROINTESTINAL ENDOSCOPY  11/14/2017    gastritis    URETHRAL STRICTURE DILATATION  12/2011     CURRENT MEDICATIONS   (This list may include medications prescribed during this encounter as epic can not insert only the list prior to this encounter.)  Current Outpatient Rx   Medication Sig Dispense Refill    pantoprazole (PROTONIX) 40 MG tablet Take 1 tablet by mouth 2 times daily (before meals) 90 tablet 1    bisacodyl (DULCOLAX) 5 MG EC tablet Take 4 tablets by mouth once for 1 dose Take as directed for colonoscopy. 4 tablet 0    polyethylene glycol (MIRALAX) POWD powder Take 238 g by mouth daily Take as directed for colonoscopy 255 g 0    traZODone (DESYREL) 100 MG tablet TAKE 1 TO 2 TABS NIGHTLY AS NEEDED FOR SLEEP. 180 tablet 1    mirtazapine (REMERON) 15 MG tablet TAKE 1 TABLET BY MOUTH EVERY DAY AT NIGHT 90 tablet 1    hyoscyamine (LEVBID) 375 MCG extended release tablet Take 1 tablet by mouth every 12 hours as needed for Cramping 60 tablet 3    cholestyramine (QUESTRAN) 4 g packet Take 1 packet by mouth 3 times daily 90 packet 1    ALPRAZolam (XANAX) 1 MG tablet Take 1 tablet by mouth 2 times daily as needed for Sleep for up to 90 days.  60 tablet 2    montelukast (SINGULAIR) 10 MG tablet TAKE 1 TABLET BY MOUTH EVERY DAY 90 tablet 1    CHANTIX 1 MG tablet TAKE 1 TABLET BY MOUTH TWICE A DAY 60 tablet 1    valACYclovir (VALTREX) 500 MG tablet TAKE 1 TABLET BY MOUTH EVERY DAY 90 tablet 1    atorvastatin (LIPITOR) 20 MG tablet TAKE 1 TABLET DAILY 90 tablet 1    rizatriptan (MAXALT) 5 MG tablet Take 1 tablet by mouth daily as needed for Migraine May repeat in 2 hours if needed 27 tablet 1    sertraline (ZOLOFT) 100 MG tablet Take 1.5 tablets by mouth daily 135 tablet 1    spironolactone (ALDACTONE) 50 MG tablet TAKE 1 TABLET EVERY DAY 90 tablet 1    atenolol (TENORMIN) 25 MG tablet TAKE 1 TABLET BY MOUTH EVERY DAY 90 tablet 1    alendronate (FOSAMAX) 70 MG tablet Take 1 tablet by mouth every 7 days Kya Reynolds NP,[id number]      diclofenac sodium 1 % GEL Apply topically      difluprednate (DUREZOL) 0.05 % EMUL Apply 1 drop to eye      gabapentin (NEURONTIN) 400 MG capsule TAKE ONE CAPSULE BY MOUTH TWICE A DAY  1    fluticasone-salmeterol (ADVAIR) 500-50 MCG/DOSE diskus inhaler Inhale 1 puff into the lungs every 12 hours      HYDROcodone-acetaminophen (NORCO) 5-325 MG per tablet Take 1 tablet by mouth every 6 hours as needed for Pain. Yelenabrandy Rush Certolizumab Pegol (CIMZIA SC) Inject into the skin      folic acid (FOLVITE) 1 MG tablet Take 1 tablet by mouth daily (Patient taking differently: Take 400 mg by mouth daily ) 90 tablet 1    cyclobenzaprine (FLEXERIL) 10 MG tablet Take 10 mg by mouth 3 times daily as needed for Muscle spasms      methotrexate (RHEUMATREX) 2.5 MG chemo tablet Take 7.5 mg by mouth every 7 days       fluocinonide (LIDEX) 0.05 % cream Apply topically 2 times daily Apply topically 2 times daily.  promethazine (PHENERGAN) 12.5 MG tablet Take 25 mg by mouth every 6 hours as needed for Nausea       traMADol (ULTRAM) 50 MG tablet Take 50 mg by mouth every 6 hours as needed.       levoFLOXacin (LEVAQUIN) 500 MG tablet TAKE 1 TABLET BY MOUTH EVERY DAY       ALLERGIES     Allergies   Allergen Reactions    Ultrasound Cream Rash     Reports severe rash from ultrasound gel     Infliximab      Severe drop in blood pressure    Ultrasound Gel Other (See Comments)     burn    Amoxicillin Rash    Codeine Nausea And Vomiting    Penicillins Rash     IMMUNIZATIONS     Immunization History   Administered Date(s) Administered    Influenza 10/06/2010, 10/21/2013    Influenza Virus Vaccine 10/23/2015    Influenza Whole 10/01/2012    Influenza, Quadv, IM, PF (6 mo and older Fluzone, Flulaval, Fluarix, and 3 without overt bleeding other testing       such as cologuard may be more appropriate. High risk endoscopic procedures that require stopping antiplatelet and anticoagulation therapy include polypectomy, biliary or pancreatic sphincterotomy, pneumatic or bougie dilation, PEG placement, therapeutic balloon-assisted enteroscopy, EUS and FNA, tumor ablation by any technique,       cystogastrostomy,and treatment of varices. Order Specific Question:   Screening or Diagnostic? Answer:   Diagnostic    CT ABDOMEN PELVIS W IV CONTRAST Additional Contrast? Oral     Standing Status:   Future     Standing Expiration Date:   3/31/2021     Order Specific Question:   Additional Contrast?     Answer:   Oral    EGD     Scheduling Instructions:      Schedule with anesthesia provided diprivan sedation. Please provide prep of choice instructions and prescription. General guidelines for holding blood thinners/anticoagulants around endoscopic procedure are but patients are encouraged to check with their prescribing physician. The patient may hold Plavix, Effient, Brilinta 5 days prior to the procedure unless:       A drug eluting stent has been placed within past 12 months. A nondrug eluting stent has been placed within past 1 month. Coumadin may be held 4 days prior to the procedure unless:        Mechanical mitral valve replacement (requires heparin bridge while Coumadin held and is managed by pharmacy)      Pradaxa, Xarelto, Eliquis may be held 2-3 days prior to procedure. According to pharmacokinetics of the drug, package insert, cardiology practice patterns, and T1/2 of theses drugs (12 hrs), Eliquis and Xarelto are held 48hrs prior to any procedure, including major surgical procedures w/o       increased bleeding.  That is usually the standard of care, as coagulation would/should be normalized at 48hrs.       Every attempt should be made to maintain ASA 81mg per day throughout the awa-operative period in patients with diagnosis of ASHD. These recommendations may need to be modified by the provider/ based on risk /benefit analysis of the procedure and the patients history. If anticoagulation can not be held because recent cardiac stent, elective endoscopic procedures should be delayed until they have received the minimum duration of recommended antiplatlet therapy and it can safely be held. Again if unsure, patient should discuss with prescribing physician/service. If anticoagulation can not be stopped, endoscopic procedures can still be performed either diagnostically at a somewhat higher risk. Understand that any therapeutic procedure where anything beyond looking is performed, carries higher risks. For this reason without overt bleeding other testing       such as cologuard may be more appropriate. High risk endoscopic procedures that require stopping antiplatelet and anticoagulation therapy include polypectomy, biliary or pancreatic sphincterotomy, pneumatic or bougie dilation, PEG placement, therapeutic balloon-assisted enteroscopy, EUS and FNA, tumor ablation by any technique,       cystogastrostomy,and treatment of varices. Order Specific Question:   Screening or Diagnostic? Answer:   Neil Anahi was seen today for new patient. Diagnoses and all orders for this visit:    Epigastric pain  -     CT ABDOMEN PELVIS W IV CONTRAST Additional Contrast? Oral; Future  -     pantoprazole (PROTONIX) 40 MG tablet; Take 1 tablet by mouth 2 times daily (before meals)  -     EGD    Screening for colon cancer  -     COLONOSCOPY W/ OR W/O BIOPSY  -     bisacodyl (DULCOLAX) 5 MG EC tablet; Take 4 tablets by mouth once for 1 dose Take as directed for colonoscopy. -     polyethylene glycol (MIRALAX) POWD powder;  Take 238 g by mouth daily Take as directed for colonoscopy        ORDERED FUTURE/PENDING TESTS     Lab Frequency

## 2020-03-31 NOTE — PATIENT INSTRUCTIONS
[address] ,  [address] [facility]  Phone: [phone] [facility] [address], [address] [facility], [facility]  Phone: [phone]    Sedation  Three types of sedation are used for endoscopy and colonoscopy. The standard and most common is called conscious sedation. This is administered by the gastroenterologist and is part of the standard procedure. Common medications used for this are IV forms of a benzodiazepine (most commonly Versed) and a narcotic (most commonly fentanyl). Benadryl and nausea medicines may also be used. The effect of this is to make you comfortable. Most people will actually have amnesia with this and not recall the procedure. Some individuals will have other types of anesthesia provided by an anesthesiologist or nurse anesthetist.  The reason for this is a history of poor sedation, medication use that makes one more resistant to conscious sedation, a medical condition for which conscious sedation is contraindicated or other medical unstable conditions. This usually involves a separate fee from anesthesia. The most common of these is propofol (diprivan sedation) which is a deeper sedative the conscious sedation. In some instances, general anesthesia with intubation (breathing tube) is required. If you need to cancel or reschedule, please do so at least 2 weeks before the procedure, so that we can be considerate to other patients who are waiting to be scheduled.     If you cancel or reschedule less than 7 days before your procedure, you will be placed on a lower priority list.    ENDOSCOPY OVERVIEW  An upper endoscopy, often referred to as endoscopy, EGD, or qgnvlnol-jnzfcq-uwisysctvuef, is a procedure that allows a physician to directly examine the upper part of the gastrointestinal (GI) tract, which includes the esophagus (swallowing tube), the stomach, and the duodenum (the first section of the small intestine)  The physician who performs the procedures, known as an endoscopist, has special training in using an endoscope to examine the upper GI system, looking for inflammation (redness, irritation), bleeding, ulcers, or tumors. REASONS FOR UPPER ENDOSCOPY  The most common reasons for upper endoscopy include:  Unexplained discomfort in the upper abdomen   GERD or gastroesophageal reflux disease, (often called heartburn)   Persistent nausea and vomiting   Upper GI bleeding (vomiting blood or blood found in the stool that originated from the upper part of the gastrointestinal tract). Bleeding can be treated during the endoscopy. Difficulty swallowing; food/liquids getting stuck in the esophagus during swallowing. This may be caused by a narrowing (stricture) or tumor. The stricture may be dilated with special balloons or dilation tubes during the endoscopy. Abnormal or unclear findings on an upper GI x-ray, CT scan or MRI. Removal of a foreign body (a swallowed object). To check healing or progress on previously found polyps (growths), tumors, or ulcers. ENDOSCOPY PREPARATION  You will be given specific instructions regarding how to prepare for the examination before the procedure. These instructions are designed to maximize your safety during and after the examination and to minimize possible complications. It is important to read the instructions ahead of time and follow them carefully. Do not hesitate to call the physician's office or the endoscopy unit if there are questions. Nothing to eat after midnight the day before the test. You may be asked not to eat or drink anything for up to eight hours before the test. It is important for your stomach to be empty to allow the endoscopist to visualize the entire area and to decrease the possibility of food or fluid being vomited into the lungs while under sedation (called aspiration).   You may be asked to adjust the dose of your medications or to stop specific medications (such as aspirin-like drugs) temporarily before the examination. You should discuss your medications with your physician before your appointment for the endoscopy. You should arrange for a friend or family member to escort you home after the examination. Although you will be awake by the time you are discharged, the medications used for sedation cause temporary changes in the reflexes and judgment and interfere with your ability to drive or make decisions (similar to the effects of alcohol). WHAT TO EXPECT DURING ENDOSCOPY  Prior to the endoscopy, the staff will review your medical and surgical history, including current medications. A physician will explain the procedure and ask you to sign a consent. Before signing the consent, you should understand all the benefits and risks of the procedure, and should have all of your questions answered. An intravenous line (a needle inserted into a vein in the hand or arm) will be started to deliver medications. You will be given a combination of a sedative (to help you relax), and a narcotic (to prevent discomfort). Although most patients are sedated for the examination, many tolerate the procedure well without any medication. Your vital signs (blood pressure, heart rate, and blood oxygen level) will be monitored before, during, and after the examination. The monitoring is not painful. Oxygen is often given during the procedure through a small tube that sits under the nose and is fitted around the ears. For safety reasons, dentures should be removed before the procedure. THE ENDOSCOPY PROCEDURE  The procedure typically takes between 10 and 20 minutes to complete. The endoscopy is performed while you lie on your left side. Sometimes the physician will give a medication to numb the throat (either a gargle or a spray). A plastic mouth guard is placed between the teeth to prevent damage to the teeth and scope.   The endoscope (also called a gastroscope) is a flexible tube that is about the size of a finger. The scope has a lens and a light source that allows the endoscopist to look into the scope to see the inner lining of the upper gastrointestinal tract, or to view it on a TV monitor. Most people have no difficulty swallowing the flexible gastroscope as a result of the sedating medications. Many people sleep during the test; others are very relaxed and generally not aware of the examination. An alternative procedure called transnasal endoscopy may be available in some facilities. This involves passing a very thin scope (about the size of a drinking straw) through the nose. You are not sedated but a medication is applied to the nose to prevent discomfort. A full examination can be performed with this instrument. The endoscopist may take tissue samples called biopsies (not painful), or perform specific treatments (such as dilation, removal of polyps, treatment of bleeding), depending upon what is found during the examination. Air is introduced through the scope to open the esophagus, stomach, and intestine, allowing the scope to be passed through these structures and improving the endoscopist's ability to see all of the structures. You may experience a mild discomfort as air is pushed into the intestinal tract. This is not harmful and belching may relieve the sensation. The endoscope does not interfere with breathing. Taking slow, deep breaths during the procedure may help you to relax. ENDOSCOPY RECOVERY  After the endoscopy, you will be observed for one to two hours while the sedative medication wears off. The medicines cause most people to temporarily feel tired or have difficulty concentrating and you should not drive or return to work after the procedure. The most common discomfort after the examination is a feeling of bloating as a result of the air introduced during the examination. This usually resolves quickly.  Some patients also have a healthcare provider is the best source of information for questions and concerns related to your medical problem. The following organizations also provide reliable health information. [employer] of [employer] (www.nlm.nih.gov/medlineplus/healthtopics. html)  The American Society of Gastrointestinal Endoscopy: (www.askasge. org)  [employer] of Diabetes and Digestive and Kidney Diseases (http://digestive. niddk.nih.gov/ddiseases/pubs/upperendoscopy/index. htm)  ______________________________________________________________________________________________________________________________________    COLONOSCOPY OVERVIEW  A colonoscopy is an exam of the lower part of the gastrointestinal tract, which is called the colon or large intestine (bowel). Colonoscopy is a safe procedure that provides information other tests may not be able to give. Patients who require colonoscopy often have questions and concerns about the procedure. Colonoscopy is performed by inserting a device called a colonoscope into the anus and advanced through the entire colon. The procedure generally takes between 20 minutes and one hour. Other tests that are sometimes used to screen for colon cancer, like virtual colonoscopy (also called CT colonography), are discussed separately. More detailed information about colonoscopy is available by subscription.     REASONS FOR COLONOSCOPY   The most common reasons for colonoscopy are to evaluate the following:        As a screening exam for colon cancer      Rectal bleeding      A change in bowel habits, like persistent diarrhea      Iron deficiency anemia (a decrease in blood count due to loss of iron)      A family history of colon cancer      As a follow-up test in people with colon polyps or colon cancer      Chronic, unexplained abdominal or rectal pain      An abnormal X-ray exam, like a barium enema or CT scan    COLONOSCOPY PREPARATION  Before colonoscopy, your colon must be completely clopidogrel/Plavix®. Transportation home - You will be given a sedative (a medicine to help you relax) during the colonoscopy, so you will need someone to take you home after your test. Although you will be awake by the time you go home, the sedative medicines cause changes in reflexes and judgment that can interfere with your ability to make decisions, similar to the effect of alcohol. WHAT TO EXPECT  Before the test, a doctor will review the test, including possible complications, and will ask you to sign a consent form. The nurse will start an IV line in your hand or arm. Your blood pressure and heart rate will be monitored during the test.    THE COLONOSCOPY PROCEDURE  You will be given fluid and medicines through an IV line. Many people sleep during the test, while others are very relaxed, comfortable, and generally not aware. The colonoscope is a flexible tube, approximately the size of the index finger. The scope pumps air into the colon to inflate it and allow the doctor to see the entire lining. You might feel bloating or gas cramps as the air opens the colon. Try not to be embarrassed about passing this gas, and let your doctor know if you are uncomfortable. During the procedure, the doctor might take a biopsy (small pieces of tissue) or remove polyps. Polyps are growths of tissue that can range in size from the tip of a pen to several inches. Most polyps are benign (not cancerous). However, some polyps can become cancerous if allowed to grow for a long time. Having a polyp removed does not hurt. RECOVERY FROM COLONOSCOPY  After the colonoscopy, you will be observed in a recovery area until the effects of the sedative medication wear off. The most common complaint after colonoscopy is a feeling of bloating and gas cramps. You may also feel groggy from the sedation medications. You should not return to work or drive that day.  Most people are able to eat normally after the test. Ask your doctor when it is safe to restart aspirin and other blood-thinning medications. COLONOSCOPY COMPLICATIONS  Colonoscopy is a safe procedure, and complications are rare but can occur:        Bleeding can occur from biopsies or the removal of polyps, but it is usually minimal and can be controlled. The colonoscope can cause a tear or hole (perforation) in the colon. This is a serious problem, but it does not happen commonly. It is possible to have side effects from the sedative medicines. Although colonoscopy is the best test to examine the colon, it is possible for even the most skilled doctors to miss or overlook an abnormal area in the colon. You should call your doctor immediately if you have any of the following:        Severe abdominal pain (not just gas cramps)      A firm, bloated abdomen      Vomiting      Fever      Rectal bleeding (greater than a few tablespoons)    AFTER COLONOSCOPY  Although many people worry about being uncomfortable during a colonoscopy, most people tolerate it very well and feel fine afterward. It is normal to feel tired afterward. Plan to take it easy and relax the rest of the day. Your doctor can describe the results of the colonoscopy as soon as it is over. If s/he took biopsies or polyps, you should call for results within one to two weeks. We will make every attempt to get you your results by phone, mychart or sometimes a follow up visit, however, It is your responsibility to obtain your test results. If you do not get your results within 2 weeks, please call the office. Not all test results are available during your visit. If your test results are not back during the visit and you are still having symptoms, make an appointment with your caregiver to find out the results and any next steps. Do not assume everything is normal if you have not heard from your caregiver or the medical facility. It is important for you to follow up on all of your test results. WHERE TO GET MORE INFORMATION  Your healthcare provider is the best source of information for questions and concerns related to your medical problem. This article will be updated as needed every four months on our Web site ([email or web address]). The following organizations also provide reliable health information. [employer] of [employer] (www.nlm.nih.gov/medlineplus/colonoscopy.html)  [address] for Gastrointestinal Endoscopy  (www.asge.org/PatientInfoIndex. aspx?sr=263)

## 2020-04-06 ENCOUNTER — HOSPITAL ENCOUNTER (OUTPATIENT)
Dept: CT IMAGING | Age: 65
Discharge: HOME | End: 2020-04-06
Payer: MEDICARE

## 2020-04-06 PROCEDURE — 6360000004 HC RX CONTRAST MEDICATION: Performed by: INTERNAL MEDICINE

## 2020-04-06 PROCEDURE — 74177 CT ABD & PELVIS W/CONTRAST: CPT

## 2020-04-06 RX ORDER — ALENDRONATE SODIUM 70 MG/1
TABLET ORAL
Qty: 12 TABLET | Refills: 1 | Status: SHIPPED | OUTPATIENT
Start: 2020-04-06 | End: 2020-11-23

## 2020-04-06 RX ADMIN — IOPAMIDOL 75 ML: 755 INJECTION, SOLUTION INTRAVENOUS at 07:32

## 2020-04-06 RX ADMIN — IOHEXOL 50 ML: 240 INJECTION, SOLUTION INTRATHECAL; INTRAVASCULAR; INTRAVENOUS; ORAL at 07:33

## 2020-04-13 ASSESSMENT — ENCOUNTER SYMPTOMS
BLOOD IN STOOL: 0
ABDOMINAL PAIN: 1
VOMITING: 0
DIARRHEA: 1

## 2020-04-13 NOTE — PROGRESS NOTES
3/23/2020     Walter Hilario (:  1955) is a 59 y.o. female, here for evaluation of the following medical concerns:  Chief Complaint   Patient presents with    Abdominal Pain     Left lower abd- side pain     Diarrhea     since taking cholestyramine (QUESTRAN) 4 g packet - really loose stool not watery     Nicotine Dependence    Nausea       HPI  Acute visit with LL abdominal pain sharp, intermittent and diarrhea that is gradually improving over one month. No weight loss, fever or vomiting. Review of Systems   Constitutional: Negative for fatigue and fever. Gastrointestinal: Positive for abdominal pain and diarrhea. Negative for blood in stool and vomiting. Genitourinary: Negative for dysuria and frequency. Prior to Visit Medications    Medication Sig Taking? Authorizing Provider   hyoscyamine (LEVBID) 375 MCG extended release tablet Take 1 tablet by mouth every 12 hours as needed for Cramping Yes Eunice Castillo MD   alendronate (FOSAMAX) 70 MG tablet TAKE 1 TABLET EVERY 7 DAYS  FRANCISCO Lewis   levoFLOXacin (LEVAQUIN) 500 MG tablet TAKE 1 TABLET BY MOUTH EVERY DAY  Historical Provider, MD   pantoprazole (PROTONIX) 40 MG tablet Take 1 tablet by mouth 2 times daily (before meals)  Lianne Cha MD   polyethylene glycol (MIRALAX) POWD powder Take 238 g by mouth daily Take as directed for colonoscopy  Lianne Cha MD   traZODone (DESYREL) 100 MG tablet TAKE 1 TO 2 TABS NIGHTLY AS NEEDED FOR SLEEP. FRANCISCO Lewis   mirtazapine (REMERON) 15 MG tablet TAKE 1 TABLET BY MOUTH EVERY DAY AT NIGHT  FRANCISCO Lewis   cholestyramine (QUESTRAN) 4 g packet Take 1 packet by mouth 3 times daily  FRANCISCO Lewis   ALPRAZolam Clide Pillow) 1 MG tablet Take 1 tablet by mouth 2 times daily as needed for Sleep for up to 90 days.   Euncie Castillo MD   montelukast (SINGULAIR) 10 MG tablet TAKE 1 TABLET BY MOUTH EVERY DAY  FRANCISCO Lewis   CHANTIX 1 MG tablet TAKE 1 TABLET BY MOUTH TWICE A DAY  FRANCISCO Zambrano   valACYclovir (VALTREX) 500 MG tablet TAKE 1 TABLET BY MOUTH EVERY DAY  Ashley Gilliam MD   atorvastatin (LIPITOR) 20 MG tablet TAKE 1 TABLET DAILY  Ashley Gilliam MD   rizatriptan (MAXALT) 5 MG tablet Take 1 tablet by mouth daily as needed for Migraine May repeat in 2 hours if needed  Ashley Gilliam MD   sertraline (ZOLOFT) 100 MG tablet Take 1.5 tablets by mouth daily  Ashley Gilliam MD   spironolactone (ALDACTONE) 50 MG tablet TAKE 1 TABLET EVERY DAY  Mili Robles MD   atenolol (TENORMIN) 25 MG tablet TAKE 1 TABLET BY MOUTH EVERY DAY  Mili Robles MD   diclofenac sodium 1 % GEL Apply topically  Historical Provider, MD   difluprednate (DUREZOL) 0.05 % EMUL Apply 1 drop to eye  Historical Provider, MD   gabapentin (NEURONTIN) 400 MG capsule TAKE ONE CAPSULE BY MOUTH TWICE A DAY  Historical Provider, MD   fluticasone-salmeterol (ADVAIR) 500-50 MCG/DOSE diskus inhaler Inhale 1 puff into the lungs every 12 hours  Historical Provider, MD   HYDROcodone-acetaminophen (NORCO) 5-325 MG per tablet Take 1 tablet by mouth every 6 hours as needed for Pain. Wing Ca Historical Provider, MD   Certolizumab Pegol (CIMZIA SC) Inject into the skin  Historical Provider, MD   folic acid (FOLVITE) 1 MG tablet Take 1 tablet by mouth daily  Patient taking differently: Take 400 mg by mouth daily   Ashley Gilliam MD   cyclobenzaprine (FLEXERIL) 10 MG tablet Take 10 mg by mouth 3 times daily as needed for Muscle spasms  Historical Provider, MD   methotrexate (RHEUMATREX) 2.5 MG chemo tablet Take 7.5 mg by mouth every 7 days   Historical Provider, MD   fluocinonide (LIDEX) 0.05 % cream Apply topically 2 times daily Apply topically 2 times daily. Historical Provider, MD   promethazine (PHENERGAN) 12.5 MG tablet Take 25 mg by mouth every 6 hours as needed for Nausea   Historical Provider, MD   traMADol (ULTRAM) 50 MG tablet Take 50 mg by mouth every 6 hours as needed.   Historical Provider, MD        Social History     Tobacco Use    Smoking status: Current Every Day Smoker     Packs/day: 0.50     Years: 16.00     Pack years: 8.00     Types: Cigarettes     Start date: 3/1/2019    Smokeless tobacco: Never Used   Substance Use Topics    Alcohol use: No     Alcohol/week: 0.0 standard drinks        Vitals:    03/23/20 1330   BP: 104/64   Site: Left Upper Arm   Position: Sitting   Cuff Size: Small Adult   Pulse: 65   Temp: 98.4 °F (36.9 °C)   TempSrc: Oral   SpO2: 93%   Weight: 148 lb (67.1 kg)   Height: 5' 4\" (1.626 m)     Estimated body mass index is 25.4 kg/m² as calculated from the following:    Height as of this encounter: 5' 4\" (1.626 m). Weight as of this encounter: 148 lb (67.1 kg). Physical Exam  Vitals signs reviewed. Constitutional:       General: She is not in acute distress. Appearance: She is well-developed. Eyes:      General: No scleral icterus. Conjunctiva/sclera: Conjunctivae normal.   Neck:      Thyroid: No thyromegaly. Cardiovascular:      Rate and Rhythm: Normal rate and regular rhythm. Heart sounds: Normal heart sounds. No murmur. Pulmonary:      Effort: Pulmonary effort is normal. No respiratory distress. Breath sounds: Normal breath sounds. No wheezing. Abdominal:      General: Bowel sounds are normal. There is no distension. Palpations: Abdomen is soft. There is no mass. Tenderness: There is abdominal tenderness (left upper quadrant just inferior to rib margion ). There is no guarding or rebound. Skin:     Findings: No rash. Neurological:      Mental Status: She is alert and oriented to person, place, and time. ASSESSMENT/PLAN:  Frank Benavidez was seen today for abdominal pain, diarrhea, nicotine dependence and nausea. Diagnoses and all orders for this visit:    LUQ pain  -     Claire Donovan MD, Gastroenterology, [facility]    Other orders  -     hyoscyamine (LEVBID) 375 MCG extended release tablet;  Take 1 tablet by mouth every 12 hours as needed for Cramping    Possible viral diarrhea and or IBS. If worsening pain or fever, blood in stool or vomiting, patient will follow up. No follow-ups on file. An electronic signature was used to authenticate this note.     --Ky Tomas MD on 4/13/2020 at 6:23 PM

## 2020-04-17 ENCOUNTER — VIRTUAL VISIT (OUTPATIENT)
Dept: FAMILY MEDICINE CLINIC | Age: 65
End: 2020-04-17
Payer: MEDICARE

## 2020-04-17 VITALS — TEMPERATURE: 97.2 F

## 2020-04-17 PROBLEM — R10.11 RIGHT UPPER QUADRANT PAIN: Status: ACTIVE | Noted: 2020-04-17

## 2020-04-17 PROCEDURE — 99214 OFFICE O/P EST MOD 30 MIN: CPT | Performed by: INTERNAL MEDICINE

## 2020-04-17 PROCEDURE — 3017F COLORECTAL CA SCREEN DOC REV: CPT | Performed by: INTERNAL MEDICINE

## 2020-04-17 PROCEDURE — G8427 DOCREV CUR MEDS BY ELIG CLIN: HCPCS | Performed by: INTERNAL MEDICINE

## 2020-04-17 RX ORDER — CHOLESTYRAMINE LIGHT 4 G/5.7G
4 POWDER, FOR SUSPENSION ORAL 2 TIMES DAILY
Qty: 60 PACKET | Refills: 3 | Status: SHIPPED | OUTPATIENT
Start: 2020-04-17 | End: 2020-07-17 | Stop reason: ALTCHOICE

## 2020-04-17 ASSESSMENT — ENCOUNTER SYMPTOMS
SHORTNESS OF BREATH: 0
COUGH: 0

## 2020-04-17 NOTE — PROGRESS NOTES
2020     Ruiz Anguiano (:  1955) is a 59 y.o. female, here for evaluation of the following medical concerns:     Diagnosis Orders   1. Major depressive disorder, recurrent episode, moderate ([address]     2. Anxiety     3. Essential hypertension       Insomnia better on remeron. Diarrhea improving, but comes and goes, questran too gritty. Stomach feeling better with protonix. Has seen GI and scopes are planned. Depression is well controlled. Patient's medications, allergies, past medical, surgical, social and family histories were reviewed and updated as appropriate. Review of Systems   Constitutional: Negative for fatigue. Respiratory: Negative for cough and shortness of breath. Cardiovascular: Negative for chest pain and leg swelling. Neurological: Positive for headaches. Negative for dizziness. Prior to Visit Medications    Medication Sig Taking? Authorizing Provider   alendronate (FOSAMAX) 70 MG tablet TAKE 1 TABLET EVERY 7 DAYS  FRANCISCO Alberto   levoFLOXacin (LEVAQUIN) 500 MG tablet TAKE 1 TABLET BY MOUTH EVERY DAY  Historical Provider, MD   pantoprazole (PROTONIX) 40 MG tablet Take 1 tablet by mouth 2 times daily (before meals)  Simi Wall MD   polyethylene glycol (MIRALAX) POWD powder Take 238 g by mouth daily Take as directed for colonoscopy  Simi Wall MD   traZODone (DESYREL) 100 MG tablet TAKE 1 TO 2 TABS NIGHTLY AS NEEDED FOR SLEEP. FRANCISCO Alberto   mirtazapine (REMERON) 15 MG tablet TAKE 1 TABLET BY MOUTH EVERY DAY AT NIGHT  FRANCISCO Alberto   hyoscyamine (LEVBID) 375 MCG extended release tablet Take 1 tablet by mouth every 12 hours as needed for Cramping  Shahbaz Cortez MD   cholestyramine (QUESTRAN) 4 g packet Take 1 packet by mouth 3 times daily  FRANCISCO Alberto   ALPRAZolam Jobie Shires) 1 MG tablet Take 1 tablet by mouth 2 times daily as needed for Sleep for up to 90 days.   Barbara Robles MD   montelukast (SINGULAIR) 10 MG tablet TAKE 1 TABLET tablet Take 50 mg by mouth every 6 hours as needed. Historical Provider, MD        Social History     Tobacco Use    Smoking status: Former Smoker     Packs/day: 0.50     Years: 16.00     Pack years: 8.00     Types: Cigarettes     Start date: 3/1/2019    Smokeless tobacco: Never Used    Tobacco comment: Per pt she quit  2/2020   Substance Use Topics    Alcohol use: No     Alcohol/week: 0.0 standard drinks        Vitals:    04/17/20 1017   Temp: 97.2 °F (36.2 °C)   TempSrc: Oral     Estimated body mass index is 24.89 kg/m² as calculated from the following:    Height as of 3/31/20: 5' 4\" (1.626 m). Weight as of 3/31/20: 145 lb (65.8 kg). Physical Exam  Vitals signs reviewed. Eyes:      General: No scleral icterus. Conjunctiva/sclera: Conjunctivae normal.   Neck:      Thyroid: No thyromegaly. Vascular: No JVD. Cardiovascular:      Rate and Rhythm: Normal rate and regular rhythm. Heart sounds: Normal heart sounds. No murmur. No friction rub. No gallop. Pulmonary:      Effort: Pulmonary effort is normal.      Breath sounds: Normal breath sounds. No wheezing or rales. Abdominal:      General: Bowel sounds are normal. There is no distension. Palpations: Abdomen is soft. There is no mass. Tenderness: There is no abdominal tenderness. Hernia: No hernia is present. Lymphadenopathy:      Cervical: No cervical adenopathy. Skin:     Findings: No rash. Neurological:      Mental Status: She is alert and oriented to person, place, and time. ASSESSMENT/PLAN:  Oly Basurto was seen today for gastroesophageal reflux, anxiety, nicotine dependence and hyperlipidemia.     Diagnoses and all orders for this visit:    Major depressive disorder, recurrent episode, moderate (HCC)  Controlled, continue current    Anxiety  ,apcon    Essential hypertension  Stable monitor  Chronic diarrhea  Not controlled, try questran lite  IFG (impaired fasting glucose)  -     Hemoglobin A1C; Future  Stable,

## 2020-05-13 RX ORDER — PANTOPRAZOLE SODIUM 40 MG/1
40 TABLET, DELAYED RELEASE ORAL
Qty: 90 TABLET | Refills: 3 | Status: SHIPPED | OUTPATIENT
Start: 2020-05-13 | End: 2020-12-14

## 2020-06-03 RX ORDER — SERTRALINE HYDROCHLORIDE 100 MG/1
TABLET, FILM COATED ORAL
Qty: 135 TABLET | Refills: 1 | Status: SHIPPED | OUTPATIENT
Start: 2020-06-03 | End: 2020-11-23

## 2020-06-03 RX ORDER — ATORVASTATIN CALCIUM 20 MG/1
TABLET, FILM COATED ORAL
Qty: 90 TABLET | Refills: 1 | Status: SHIPPED | OUTPATIENT
Start: 2020-06-03 | End: 2020-12-09

## 2020-06-03 NOTE — TELEPHONE ENCOUNTER
.  Last office visit 3/23/2020     Last written 10-18-19 with 1 refill      Next office visit scheduled 7/17/2020    Requested Prescriptions     Pending Prescriptions Disp Refills    sertraline (ZOLOFT) 100 MG tablet [Pharmacy Med Name: SERTRALINE  MG TABLET] 135 tablet 1     Sig: TAKE 1 AND 1/2 TABLETS BY MOUTH EVERY DAY    atorvastatin (LIPITOR) 20 MG tablet [Pharmacy Med Name: ATORVASTATIN 20 MG TABLET] 90 tablet 1     Sig: TAKE 1 TABLET BY MOUTH EVERY DAY

## 2020-06-10 ENCOUNTER — OFFICE VISIT (OUTPATIENT)
Dept: PRIMARY CARE CLINIC | Age: 65
End: 2020-06-10

## 2020-06-10 NOTE — PROGRESS NOTES
Patient is having a/an endo/colonoscopy with Dr. Naomie Menon on 6/16/2020 and was seen at clinic for pre op covid test. . Patient instructed to self quarantine until the procedure.

## 2020-06-12 LAB
SARS-COV-2: NOT DETECTED
SOURCE: NORMAL

## 2020-06-12 NOTE — RESULT ENCOUNTER NOTE
Please contact patient with their testing results: Your test for COVID-19, also known as novel coronavirus, came back negative. No virus was detected from the sample collected. Until your symptoms are fully resolved, you may still be contagious. We recommend that you remain isolated for 7 days minimum or 72 hours after your symptoms have completely resolved, whichever is longer. Continually monitor symptoms. Contact a medical provider if symptoms are worsening. If you have any additional questions, contact your PCP.     For additional information, please visit the Centers for Disease Control and Prevention   [email or web address]

## 2020-06-16 ENCOUNTER — HOSPITAL ENCOUNTER (OUTPATIENT)
Age: 65
Setting detail: OUTPATIENT SURGERY
Discharge: HOME | End: 2020-06-16
Attending: INTERNAL MEDICINE | Admitting: INTERNAL MEDICINE
Payer: MEDICARE

## 2020-06-16 ENCOUNTER — ANESTHESIA (OUTPATIENT)
Dept: ENDOSCOPY | Age: 65
End: 2020-06-16
Payer: MEDICARE

## 2020-06-16 ENCOUNTER — ANESTHESIA EVENT (OUTPATIENT)
Dept: ENDOSCOPY | Age: 65
End: 2020-06-16
Payer: MEDICARE

## 2020-06-16 VITALS
TEMPERATURE: 97.7 F | OXYGEN SATURATION: 94 % | HEIGHT: 64 IN | SYSTOLIC BLOOD PRESSURE: 124 MMHG | WEIGHT: 150 LBS | BODY MASS INDEX: 25.61 KG/M2 | HEART RATE: 64 BPM | DIASTOLIC BLOOD PRESSURE: 88 MMHG | RESPIRATION RATE: 18 BRPM

## 2020-06-16 VITALS
DIASTOLIC BLOOD PRESSURE: 80 MMHG | RESPIRATION RATE: 23 BRPM | SYSTOLIC BLOOD PRESSURE: 140 MMHG | OXYGEN SATURATION: 96 %

## 2020-06-16 PROCEDURE — 3609017100 HC EGD: Performed by: INTERNAL MEDICINE

## 2020-06-16 PROCEDURE — G0121 COLON CA SCRN NOT HI RSK IND: HCPCS | Performed by: INTERNAL MEDICINE

## 2020-06-16 PROCEDURE — 7100000011 HC PHASE II RECOVERY - ADDTL 15 MIN: Performed by: INTERNAL MEDICINE

## 2020-06-16 PROCEDURE — 3700000000 HC ANESTHESIA ATTENDED CARE: Performed by: INTERNAL MEDICINE

## 2020-06-16 PROCEDURE — 3609015300 HC ESOPHAGEAL DILATION MALONEY: Performed by: INTERNAL MEDICINE

## 2020-06-16 PROCEDURE — 3700000001 HC ADD 15 MINUTES (ANESTHESIA): Performed by: INTERNAL MEDICINE

## 2020-06-16 PROCEDURE — 2580000003 HC RX 258: Performed by: INTERNAL MEDICINE

## 2020-06-16 PROCEDURE — 2709999900 HC NON-CHARGEABLE SUPPLY: Performed by: INTERNAL MEDICINE

## 2020-06-16 PROCEDURE — 7100000010 HC PHASE II RECOVERY - FIRST 15 MIN: Performed by: INTERNAL MEDICINE

## 2020-06-16 PROCEDURE — 6360000002 HC RX W HCPCS: Performed by: NURSE ANESTHETIST, CERTIFIED REGISTERED

## 2020-06-16 PROCEDURE — 43235 EGD DIAGNOSTIC BRUSH WASH: CPT | Performed by: INTERNAL MEDICINE

## 2020-06-16 PROCEDURE — 43450 DILATE ESOPHAGUS 1/MULT PASS: CPT | Performed by: INTERNAL MEDICINE

## 2020-06-16 PROCEDURE — 3609027000 HC COLONOSCOPY: Performed by: INTERNAL MEDICINE

## 2020-06-16 RX ORDER — PROPOFOL 10 MG/ML
INJECTION, EMULSION INTRAVENOUS PRN
Status: DISCONTINUED | OUTPATIENT
Start: 2020-06-16 | End: 2020-06-16 | Stop reason: SDUPTHER

## 2020-06-16 RX ORDER — ALPRAZOLAM 1 MG/1
1 TABLET ORAL NIGHTLY PRN
COMMUNITY
End: 2020-08-24

## 2020-06-16 RX ORDER — LANOLIN ALCOHOL/MO/W.PET/CERES
400 CREAM (GRAM) TOPICAL NIGHTLY
COMMUNITY
End: 2021-05-10

## 2020-06-16 RX ORDER — SODIUM CHLORIDE, SODIUM LACTATE, POTASSIUM CHLORIDE, CALCIUM CHLORIDE 600; 310; 30; 20 MG/100ML; MG/100ML; MG/100ML; MG/100ML
INJECTION, SOLUTION INTRAVENOUS CONTINUOUS
Status: DISCONTINUED | OUTPATIENT
Start: 2020-06-16 | End: 2020-06-16 | Stop reason: HOSPADM

## 2020-06-16 RX ORDER — PREDNISONE 1 MG/1
40 TABLET ORAL DAILY
COMMUNITY
End: 2022-01-03 | Stop reason: DRUGHIGH

## 2020-06-16 RX ADMIN — PROPOFOL 50 MG: 10 INJECTION, EMULSION INTRAVENOUS at 11:29

## 2020-06-16 RX ADMIN — PROPOFOL 50 MG: 10 INJECTION, EMULSION INTRAVENOUS at 11:26

## 2020-06-16 RX ADMIN — SODIUM CHLORIDE, POTASSIUM CHLORIDE, SODIUM LACTATE AND CALCIUM CHLORIDE: 600; 310; 30; 20 INJECTION, SOLUTION INTRAVENOUS at 11:09

## 2020-06-16 RX ADMIN — PROPOFOL 50 MG: 10 INJECTION, EMULSION INTRAVENOUS at 11:23

## 2020-06-16 RX ADMIN — PROPOFOL 50 MG: 10 INJECTION, EMULSION INTRAVENOUS at 11:20

## 2020-06-16 ASSESSMENT — PAIN - FUNCTIONAL ASSESSMENT: PAIN_FUNCTIONAL_ASSESSMENT: 0-10

## 2020-06-16 ASSESSMENT — PAIN SCALES - GENERAL
PAINLEVEL_OUTOF10: 0

## 2020-06-16 NOTE — H&P
03/03/2020      03/03/2020     CO2 23 03/03/2020     BUN 12 03/03/2020     CREATININE 0.8 03/03/2020     GLUCOSE 101 (H) 03/03/2020     CALCIUM 10.1 03/03/2020     PROT 7.0 03/03/2020     LABALBU 4.5 03/03/2020            Lab Results   Component Value Date     LIPASE 37.0 11/13/2017         HPI elements: location, severity, timing, modifying factors, quality, duration, context and associated signs/symptoms.     Last Encounter Reviewed:  Pertinent PMH, FH, SH is reviewed below. Last EGD: 11/14/2017 h pylori negative gastritis, 2/27/2013 negative small bowel biopsies, chemical gastritis  Last Colonoscopy: 2013, 5/12/2010 negative biopsies for microscopic colitis     Review of available records reveals:       Wt Readings from Last 50 Encounters:   03/31/20 145 lb (65.8 kg)   03/23/20 148 lb (67.1 kg)   03/16/20 145 lb (65.8 kg)   03/03/20 145 lb (65.8 kg)   01/22/20 145 lb (65.8 kg)   10/18/19 147 lb 9.6 oz (67 kg)   04/15/19 160 lb (72.6 kg)   01/25/19 165 lb 3.2 oz (74.9 kg)   12/10/18 158 lb (71.7 kg)   11/19/18 157 lb (71.2 kg)   10/17/18 155 lb 12.8 oz (70.7 kg)   09/21/18 149 lb (67.6 kg)   06/28/18 144 lb (65.3 kg)   06/20/18 153 lb 10.6 oz (69.7 kg)   03/19/18 153 lb 9.6 oz (69.7 kg)   12/19/17 155 lb (70.3 kg)   11/13/17 158 lb 12.8 oz (72 kg)   09/19/17 158 lb 3.2 oz (71.8 kg)   08/16/17 155 lb 10.3 oz (70.6 kg)   07/25/17 155 lb 10.3 oz (70.6 kg)   06/27/17 155 lb 10.3 oz (70.6 kg)   06/21/17 155 lb 10.3 oz (70.6 kg)   06/13/17 155 lb 9.6 oz (70.6 kg)   06/03/17 157 lb (71.2 kg)   03/14/17 154 lb (69.9 kg)   01/03/17 155 lb (70.3 kg)   12/13/16 150 lb (68 kg)   09/13/16 149 lb (67.6 kg)   07/22/16 155 lb (70.3 kg)   05/24/16 150 lb (68 kg)   03/10/16 153 lb (69.4 kg)   02/22/16 156 lb (70.8 kg)   12/22/15 151 lb (68.5 kg)   11/23/15 155 lb (70.3 kg)   11/11/15 157 lb (71.2 kg)   10/23/15 159 lb (72.1 kg)   09/22/15 163 lb (73.9 kg)   08/25/15 161 lb (73 kg)   07/20/15 160 lb (72.6 kg)   06/15/15 169 lb patient should discuss with prescribing physician/service.                   If anticoagulation can not be stopped, endoscopic procedures can still be performed either diagnostically at a somewhat higher risk. Understand that any therapeutic procedure where anything beyond looking is performed, carries higher risks. For this reason without overt bleeding other testing          such as cologuard may be more appropriate.                     High risk endoscopic procedures that require stopping antiplatelet and anticoagulation therapy include polypectomy, biliary or pancreatic sphincterotomy, pneumatic or bougie dilation, PEG placement, therapeutic balloon-assisted enteroscopy, EUS and FNA, tumor ablation by any technique,          cystogastrostomy,and treatment of varices.       Order Specific Question:   Screening or Diagnostic?       Answer:   Diagnostic    CT ABDOMEN PELVIS W IV CONTRAST Additional Contrast? Oral       Standing Status:   Future       Standing Expiration Date:   3/31/2021       Order Specific Question:   Additional Contrast?       Answer:   Oral    EGD       Scheduling Instructions:         Schedule with anesthesia provided diprivan sedation.                   Please provide prep of choice instructions and prescription.                     General guidelines for holding blood thinners/anticoagulants around endoscopic procedure are but patients are encouraged to check with their prescribing physician.                   The patient may hold Plavix, Effient, Brilinta 5 days prior to the procedure unless:          A drug eluting stent has been placed within past 12 months.         A nondrug eluting stent has been placed within past 1 month.         Coumadin may be held 4 days prior to the procedure unless:           Mechanical mitral valve replacement (requires heparin bridge while Coumadin held and is managed by pharmacy)         Pradaxa, Xarelto, Eliquis may be held 2-3 days prior to procedure. According to pharmacokinetics of the drug, package insert, cardiology practice patterns, and T1/2 of theses drugs (12 hrs), Eliquis and Xarelto are held 48hrs prior to any procedure, including major surgical procedures w/o          increased bleeding.  That is usually the standard of care, as coagulation would/should be normalized at 48hrs.         Every attempt should be made to maintain ASA 81mg per day throughout the awa-operative period in patients with diagnosis of ASHD.       These recommendations may need to be modified by the provider/ based on risk /benefit analysis of the procedure and the patients history.                   If anticoagulation can not be held because recent cardiac stent, elective endoscopic procedures should be delayed until they have received the minimum duration of recommended antiplatlet therapy and it can safely be held. Again if unsure, patient should discuss with prescribing physician/service.                   If anticoagulation can not be stopped, endoscopic procedures can still be performed either diagnostically at a somewhat higher risk. Understand that any therapeutic procedure where anything beyond looking is performed, carries higher risks.   For this reason without overt bleeding other testing          such as cologuard may be more appropriate.                     High risk endoscopic procedures that require stopping antiplatelet and anticoagulation therapy include polypectomy, biliary or pancreatic sphincterotomy, pneumatic or bougie dilation, PEG placement, therapeutic balloon-assisted enteroscopy, EUS and FNA, tumor ablation by any technique,          cystogastrostomy,and treatment of varices.       Order Specific Question:   Screening or Diagnostic?       Answer:   Diagnostic      Kodi Archer was seen today for new patient.     Diagnoses and all orders for this visit:     Epigastric pain  -     CT ABDOMEN PELVIS W IV CONTRAST Additional Contrast? Oral; Future  - pantoprazole (PROTONIX) 40 MG tablet; Take 1 tablet by mouth 2 times daily (before meals)  -     EGD     Screening for colon cancer  -     COLONOSCOPY W/ OR W/O BIOPSY  -     bisacodyl (DULCOLAX) 5 MG EC tablet; Take 4 tablets by mouth once for 1 dose Take as directed for colonoscopy. -     polyethylene glycol (MIRALAX) POWD powder; Take 238 g by mouth daily Take as directed for colonoscopy           ORDERED FUTURE/PENDING TESTS      Lab Frequency Next Occurrence         FOLLOWUP   The patient was counseled at length about the risks of eloisa Covid-19 during their perioperative period and any recovery window from their procedure. The patient was made aware that eloisa Covid-19  may worsen their prognosis for recovering from their procedure  and lend to a higher morbidity and/or mortality risk. All material risks, benefits, and reasonable alternatives including postponing the procedure were discussed. The patient does wish to proceed with the procedure at this time. Preprocedural COVID-19 throat swab was negative. Return for EGD & Colonoscopy, test results per phone/jellyhart.       Jules Ross 6/16/20 11:09 AM EDT

## 2020-06-16 NOTE — ANESTHESIA PRE PROCEDURE
Department of Anesthesiology  Preprocedure Note       Name:  Nikki Yousif   Age:  72 y.o.  :  1955                                          MRN:  [id number]         Date:  2020      Surgeon: Valeria Rico):  Zoltan Ba MD    Procedure: Procedure(s):  EGD W/ANES. (11:00)  COLON W/ANES. Medications prior to admission:   Prior to Admission medications    Medication Sig Start Date End Date Taking?  Authorizing Provider   sertraline (ZOLOFT) 100 MG tablet TAKE 1 AND 1/2 TABLETS BY MOUTH EVERY DAY 6/3/20   FRANCISCO Cao   atorvastatin (LIPITOR) 20 MG tablet TAKE 1 TABLET BY MOUTH EVERY DAY 6/3/20   FRANCISCO Cao   pantoprazole (PROTONIX) 40 MG tablet Take 1 tablet by mouth 2 times daily (before meals) 20  Zoltan Ba MD   Carboxymethylcellulose Sodium (EYE DROPS OP) Apply to eye 2 times daily Alanis -    Steven Richard MD   cholestyramine light 4 g packet Take 1 packet by mouth 2 times daily 20   John Wang MD   alendronate (FOSAMAX) 70 MG tablet TAKE 1 TABLET EVERY 7 DAYS 20   FRANCISCO Cao   traZODone (DESYREL) 100 MG tablet TAKE 1 TO 2 TABS NIGHTLY AS NEEDED FOR SLEEP. 3/26/20   FRANCISCO Cao   mirtazapine (REMERON) 15 MG tablet TAKE 1 TABLET BY MOUTH EVERY DAY AT NIGHT  Patient taking differently: 15 mg nightly  3/23/20   FRANCISCO Cao   hyoscyamine (LEVBID) 375 MCG extended release tablet Take 1 tablet by mouth every 12 hours as needed for Cramping 3/23/20   Kimberly Robles MD   montelukast (SINGULAIR) 10 MG tablet TAKE 1 TABLET BY MOUTH EVERY DAY 20   FRANCISCO Cao   CHANTIX 1 MG tablet TAKE 1 TABLET BY MOUTH TWICE A DAY  Patient not taking: Reported on 2020   FRANCISCO Cao   valACYclovir (VALTREX) 500 MG tablet TAKE 1 TABLET BY MOUTH EVERY DAY 12/3/19   John Wang MD   rizatriptan (MAXALT) 5 MG tablet Take 1 tablet by mouth daily as needed for Migraine May repeat in 2 hours if needed 10/18/19   Kimberly Pacheco Lisa Mohan MD   spironolactone (ALDACTONE) 50 MG tablet TAKE 1 TABLET EVERY DAY 10/18/19   Jason Rodríguez MD   atenolol (TENORMIN) 25 MG tablet TAKE 1 TABLET BY MOUTH EVERY DAY 10/18/19   Jason Rodríguez MD   diclofenac sodium 1 % GEL Apply topically Indications: Arthisits  prescribes     Calin Sierra MD   difluprednate (DUREZOL) 0.05 % EMUL Apply 1 drop to eye 2/6/18   Historical Provider, MD   gabapentin (NEURONTIN) 400 MG capsule 400 mg 3 times daily. Indications: Prescribed by Arthritis   9/6/19   Calin Sierra MD   fluticasone-salmeterol (ADVAIR) 500-50 MCG/DOSE diskus inhaler Inhale 1 puff into the lungs every 12 hours    Historical Provider, MD   HYDROcodone-acetaminophen (NORCO) 5-325 MG per tablet Take 1 tablet by mouth every 6 hours as needed for Pain. Indications: Pulmonary Specialist        Certolizumab Pegol (CIMZIA SC) Inject into the skin Indications: 1 shot every 6 weeks     Historical Provider, MD   folic acid (FOLVITE) 1 MG tablet Take 1 tablet by mouth daily  Patient taking differently: Take 400 mg by mouth daily  12/19/17   Jason Rodríguez MD   cyclobenzaprine (FLEXERIL) 10 MG tablet Take 10 mg by mouth 3 times daily as needed for Muscle spasms    Historical Provider, MD   methotrexate (RHEUMATREX) 2.5 MG chemo tablet Take 7.5 mg by mouth every 7 days     Calin Sierra MD   fluocinonide (LIDEX) 0.05 % cream Apply topically 2 times daily Apply topically 2 times daily. Historical Provider, MD   promethazine (PHENERGAN) 12.5 MG tablet Take 25 mg by mouth every 6 hours as needed for Nausea Indications: Dr. Cardona Rash Provider, MD   traMADol (ULTRAM) 50 MG tablet Take 50 mg by mouth every 6 hours as needed.     Historical Provider, MD       Current medications:    Current Facility-Administered Medications   Medication Dose Route Frequency Provider Last Rate Last Dose    lactated ringers infusion   Intravenous Continuous Yelitza Cr MD           Allergies: Allergies   Allergen Reactions    Ultrasound Cream Rash     Reports severe rash from ultrasound gel     Infliximab      Severe drop in blood pressure    Ultrasound Gel Other (See Comments)     burn    Amoxicillin Rash    Codeine Nausea And Vomiting    Penicillins Rash       Problem List:    Patient Active Problem List   Diagnosis Code    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, 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    Essential hypertension I10    Diverticulosis K57.90    Nausea & vomiting R11.2    S/P splenectomy Z90.81    Osteopenia M85.80    Sarcoidosis D86.9    Vitamin D deficiency E55.9    Impaired fasting glucose R73.01    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    Insomnia G47.00    Hypercholesterolemia E78.00    Disturbance of skin sensation R20.9    Acute, but ill-defined, cerebrovascular disease I67.89    Sarcoid D86.9    Cerebral vasculitis I67.7    Lesion of right ulnar nerve G56.21    Major depressive disorder, recurrent episode, moderate (HCC) F33.1    Pain medication agreement signed Z02.89    Trochanteric bursitis of left hip M70.62    Ankle instability M25.373    GI bleed K92.2    Colitis, acute K52.9    Anxiety F41.9    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 Danielle Damian    Chronic kidney disease     kidney stones    Clostridium difficile diarrhea 10/23/13    positive by PCR    Fibromyalgia     Hemorrhoid     Hyperlipidemia     Hypertension     Kidney stone     Osteoarthritis     fibromyalgia    Sarcoidosis 2003    sees Dr. Inderjit Gudino       Past Surgical History:        Procedure Laterality Date    ABDOMINAL EXPLORATION SURGERY  8/19/12    REDUCTION OF VULVUS; RIGHT COLECTOMY; SMALL BOWEL RESECTION; INSERTION OF ON Q PAIN BUSTER    ANGIOPLASTY  10/2010    COLONOSCOPY  2010    normal    CYSTOSCOPY  12/2011    EYE SURGERY  5/2009    cataract, right   

## 2020-06-16 NOTE — OP NOTE
advanced under direct vision to the anastomosis. The right colon was examined twice as this increases polyp detection especially if other right colon polyps, older age, male, or carey syndrome. When segments could not be distended with CO2 or air, it was filled/distended with water. The quality of the colonic preparation was good. A careful inspection was made as the colonoscope was withdrawn, including a retroflexed view of the rectum; findings and interventions are described below. Appropriate photodocumentation Was Obtained. If photos taken, they were ordered to be scanned into the medical record. Findings:   -normal esophagus, stomach, and duodenum  -Small (< 3 cm) mixed sliding with probable small paraesophageal component hiatal hernia  -The esophagus was dilated with a 54F Belle with mild resistance and no heme.  -diverticulosis, marked in degree, involving the sigmoid with resultant stricture. Had to switch to a pediatric scope to get through and this was still difficult.  -evidence of previous surgery with ileocolonic anastomosis in the right colon  - PREP: miralax  - Overall difficulty: moderate in degree  - Abdominal pressure: no  - Change in position: no  - Anesthesia issues: no  - Medivator use: no    Specimens: Was Obtained    Complications:   None; patient tolerated the procedure well. Disposition:   PACU - hemodynamically stable. Estimated Blood loss:  none    Withdrawal Time:  NA minutes    Impression:   -See post-procedure diagnoses. Recommendations:  -Continue acid suppression twice a day. -Monitor response to esophageal dilation. Follow up with persistent or recurrent symptoms.  -Cautious use of medication for diarrhea like levbid, questran or lomotil with sigmoid stricture.   -For colon cancer screening in this average-risk patient, colonoscopy may be repeated in 10 years.  -Follow up with me with continued symptoms in the office or yearly for medication

## 2020-06-16 NOTE — PROGRESS NOTES
Patient and spouse given discharge instructions verbally and written. Verbalized understanding back. IV removed from her right forearm. Tolerated well.

## 2020-06-26 NOTE — TELEPHONE ENCOUNTER
.  Last office visit 3/23/2020     Last written 3/23/20 #60 3 refills    Next office visit scheduled 7/17/2020    Requested Prescriptions     Pending Prescriptions Disp Refills    hyoscyamine (LEVBID) 375 MCG extended release tablet [Pharmacy Med Name: HYOSCYAMINE ER 0.375 MG TAB] 180 tablet 1     Sig: TAKE 1 TABLET BY MOUTH EVERY 12 HOURS AS NEEDED FOR CRAMPING

## 2020-06-28 RX ORDER — HYOSCYAMINE SULFATE EXTENDED-RELEASE 0.38 MG/1
375 TABLET ORAL EVERY 12 HOURS PRN
Qty: 180 TABLET | Refills: 1 | Status: SHIPPED | OUTPATIENT
Start: 2020-06-28 | End: 2020-07-17 | Stop reason: ALTCHOICE

## 2020-07-08 NOTE — TELEPHONE ENCOUNTER
Last office visit 3/23/2020     Last written 12-3-2019 #90 x 1 refill    Next office visit scheduled 7/17/2020    Requested Prescriptions     Pending Prescriptions Disp Refills    valACYclovir (VALTREX) 500 MG tablet 90 tablet 1     Sig: Take One Tablet By Mouth Every Day

## 2020-07-10 RX ORDER — VALACYCLOVIR HYDROCHLORIDE 500 MG/1
TABLET, FILM COATED ORAL
Qty: 90 TABLET | Refills: 1 | Status: SHIPPED | OUTPATIENT
Start: 2020-07-10 | End: 2021-04-01 | Stop reason: SDUPTHER

## 2020-07-15 RX ORDER — LEVOFLOXACIN 500 MG/1
TABLET, FILM COATED ORAL
COMMUNITY
Start: 2020-06-03 | End: 2020-07-17 | Stop reason: ALTCHOICE

## 2020-07-15 RX ORDER — MODAFINIL 100 MG/1
100 TABLET ORAL EVERY MORNING
COMMUNITY
Start: 2020-06-04 | End: 2020-12-01

## 2020-07-17 ENCOUNTER — OFFICE VISIT (OUTPATIENT)
Dept: FAMILY MEDICINE CLINIC | Age: 65
End: 2020-07-17
Payer: MEDICARE

## 2020-07-17 VITALS
TEMPERATURE: 97.9 F | SYSTOLIC BLOOD PRESSURE: 110 MMHG | DIASTOLIC BLOOD PRESSURE: 80 MMHG | HEART RATE: 71 BPM | WEIGHT: 152 LBS | OXYGEN SATURATION: 96 % | BODY MASS INDEX: 25.95 KG/M2 | HEIGHT: 64 IN

## 2020-07-17 LAB — HBA1C MFR BLD: 5.6 %

## 2020-07-17 PROCEDURE — G8417 CALC BMI ABV UP PARAM F/U: HCPCS | Performed by: INTERNAL MEDICINE

## 2020-07-17 PROCEDURE — 1123F ACP DISCUSS/DSCN MKR DOCD: CPT | Performed by: INTERNAL MEDICINE

## 2020-07-17 PROCEDURE — G8428 CUR MEDS NOT DOCUMENT: HCPCS | Performed by: INTERNAL MEDICINE

## 2020-07-17 PROCEDURE — 4004F PT TOBACCO SCREEN RCVD TLK: CPT | Performed by: INTERNAL MEDICINE

## 2020-07-17 PROCEDURE — 1090F PRES/ABSN URINE INCON ASSESS: CPT | Performed by: INTERNAL MEDICINE

## 2020-07-17 PROCEDURE — 4040F PNEUMOC VAC/ADMIN/RCVD: CPT | Performed by: INTERNAL MEDICINE

## 2020-07-17 PROCEDURE — 3017F COLORECTAL CA SCREEN DOC REV: CPT | Performed by: INTERNAL MEDICINE

## 2020-07-17 PROCEDURE — 83036 HEMOGLOBIN GLYCOSYLATED A1C: CPT | Performed by: INTERNAL MEDICINE

## 2020-07-17 PROCEDURE — 99214 OFFICE O/P EST MOD 30 MIN: CPT | Performed by: INTERNAL MEDICINE

## 2020-07-17 PROCEDURE — G8399 PT W/DXA RESULTS DOCUMENT: HCPCS | Performed by: INTERNAL MEDICINE

## 2020-07-17 RX ORDER — MONTELUKAST SODIUM 10 MG/1
TABLET ORAL
Qty: 90 TABLET | Refills: 1 | Status: SHIPPED | OUTPATIENT
Start: 2020-07-17 | End: 2021-03-11

## 2020-07-17 ASSESSMENT — ENCOUNTER SYMPTOMS
COUGH: 0
SHORTNESS OF BREATH: 0

## 2020-07-17 NOTE — PROGRESS NOTES
2020     Geovanna Wagner (:  1955) is a 72 y.o. female, here for evaluation of the following medical concerns:     Diagnosis Orders   1. IFG (impaired fasting glucose)  POCT glycosylated hemoglobin (Hb A1C)   2. Need for Td vaccine     3. Need for pneumococcal vaccination  Pneumococcal polysaccharide vaccine 23-valent greater than or equal to 1yo subcutaneous/IM   4. Major depressive disorder, recurrent episode, moderate (HCC)     5. Hypercholesterolemia     6. Essential hypertension     7. At high risk for falls       Insomnia better on remeron. Depression is well controlled. Migraine improving with current treatment. Patient's medications, allergies, past medical, surgical, social and family histories were reviewed and updated as appropriate. Review of Systems   Constitutional: Negative for fatigue. Respiratory: Negative for cough and shortness of breath. Cardiovascular: Negative for chest pain and leg swelling. Neurological: Positive for headaches. Negative for dizziness. Prior to Visit Medications    Medication Sig Taking? Authorizing Provider   modafinil (PROVIGIL) 100 MG tablet Take 100 mg by mouth every morning. Yes Historical Provider, MD   methotrexate (RHEUMATREX) 2.5 MG chemo tablet 3 TABLETS EVERY WEEK  Historical Provider, MD   levoFLOXacin (LEVAQUIN) 500 MG tablet TAKE 1 TABLET BY MOUTH EVERY DAY  Historical Provider, MD   valACYclovir (VALTREX) 500 MG tablet Take One Tablet By Mouth Every Day  Stephanie Reaves MD   hyoscyamine (LEVBID) 375 MCG extended release tablet TAKE 1 TABLET BY MOUTH EVERY 12 HOURS AS NEEDED FOR CRAMPING  Eldonna Slight MD Travis   folic acid (FOLVITE) 607 MCG tablet Take 400 mcg by mouth nightly 2 tablets nightly  Historical Provider, MD   ALPRAZolam (XANAX) 1 MG tablet Take 1 mg by mouth nightly as needed for Sleep.  1/2 to 1 1/2 as needed  Historical Provider, MD   predniSONE (DELTASONE) 5 MG tablet Take 5 mg by mouth every other day  Historical Provider, MD   LEVOFLOXACIN PO Take 1 tablet by mouth No dose or frequency on pt list  Historical Provider, MD   sertraline (ZOLOFT) 100 MG tablet TAKE 1 AND 1/2 TABLETS BY MOUTH EVERY DAY  Patient taking differently: Take 150 mg by mouth nightly Takes 1 1/2 tablets nightly  FRANCISCO Stewart   atorvastatin (LIPITOR) 20 MG tablet TAKE 1 TABLET BY MOUTH EVERY DAY  FRANCISCO Stewart   pantoprazole (PROTONIX) 40 MG tablet Take 1 tablet by mouth 2 times daily (before meals)  Ramana Tamayo MD   Carboxymethylcellulose Sodium (EYE DROPS OP) Apply to eye 2 times daily Durazol -  Historical Provider, MD   cholestyramine light 4 g packet Take 1 packet by mouth 2 times daily  Mikayla Macdonald MD   alendronate (FOSAMAX) 70 MG tablet TAKE 1 TABLET EVERY 7 DAYS  FRANCISCO Stewart   traZODone (DESYREL) 100 MG tablet TAKE 1 TO 2 TABS NIGHTLY AS NEEDED FOR SLEEP. FRANCISCO Stewart   mirtazapine (REMERON) 15 MG tablet TAKE 1 TABLET BY MOUTH EVERY DAY AT NIGHT  Patient taking differently: 15 mg nightly   FRANCISCO Stewart   montelukast (SINGULAIR) 10 MG tablet TAKE 1 TABLET BY MOUTH EVERY DAY  FRANCISCO Stewart   CHANTIX 1 MG tablet TAKE 1 TABLET BY MOUTH TWICE A DAY  Patient not taking: Reported on 4/17/2020  FRANCISCO Stewart   rizatriptan (MAXALT) 5 MG tablet Take 1 tablet by mouth daily as needed for Migraine May repeat in 2 hours if needed  Mikayla Macdonald MD   spironolactone (ALDACTONE) 50 MG tablet TAKE 1 TABLET EVERY DAY  Mikayla Macodnald MD   atenolol (TENORMIN) 25 MG tablet TAKE 1 TABLET BY MOUTH EVERY DAY  Mili Robles MD   diclofenac sodium 1 % GEL Apply topically Indications: Arthis[facility]  prescribes   Nadia Parra MD   difluprednate (DUREZOL) 0.05 % EMUL Apply 1 drop to eye  Historical Provider, MD   gabapentin (NEURONTIN) 400 MG capsule 400 mg 2 times daily.  Indications: Prescribed by [facility] Dr. Nadia Parra MD   fluticasone-salmeterol (ADVAIR) 500-50 MCG/DOSE diskus inhaler Inhale 1 puff into the lungs every 12 hours  Historical Provider, MD   HYDROcodone-acetaminophen (NORCO) 5-325 MG per tablet Take 1 tablet by mouth every 6 hours as needed for Pain. Indications: Pulmonary Specialist 8/144DR     Certolizumab Pegol (CIMZIA SC) Inject into the skin Indications: 1 shot every 6 weeks   Historical Provider, MD   folic acid (FOLVITE) 1 MG tablet Take 1 tablet by mouth daily  Patient taking differently: Take 400 mg by mouth daily   Gage Rolle MD   cyclobenzaprine (FLEXERIL) 10 MG tablet Take 10 mg by mouth 3 times daily as needed for Muscle spasms  Historical Provider, MD   methotrexate (RHEUMATREX) 2.5 MG chemo tablet Take 7.5 mg by mouth every 7 days   Barby Wakefield MD   fluocinonide (LIDEX) 0.05 % cream Apply topically 2 times daily Apply topically 2 times daily. Historical Provider, MD   promethazine (PHENERGAN) 12.5 MG tablet Take 25 mg by mouth every 6 hours as needed for Nausea Indications: Dr. Simon López Provider, MD   traMADol (ULTRAM) 50 MG tablet Take 50 mg by mouth every 6 hours as needed. Historical Provider, MD        Social History     Tobacco Use    Smoking status: Current Some Day Smoker     Packs/day: 0.25     Years: 16.00     Pack years: 4.00     Types: Cigarettes     Start date: 3/1/2019    Smokeless tobacco: Never Used   Substance Use Topics    Alcohol use: No     Alcohol/week: 0.0 standard drinks        Vitals:    07/17/20 1144   BP: 110/80   Site: Right Upper Arm   Position: Sitting   Cuff Size: Small Adult   Pulse: 71   Temp: 97.9 °F (36.6 °C)   SpO2: 96%   Weight: 152 lb (68.9 kg)   Height: 5' 4\" (1.626 m)     Estimated body mass index is 26.09 kg/m² as calculated from the following:    Height as of this encounter: 5' 4\" (1.626 m). Weight as of this encounter: 152 lb (68.9 kg). Physical Exam  Vitals signs reviewed. Eyes:      General: No scleral icterus. Conjunctiva/sclera: Conjunctivae normal.   Neck:      Thyroid: No thyromegaly. Vascular: No JVD. Cardiovascular:      Rate and Rhythm: Normal rate and regular rhythm. Heart sounds: Normal heart sounds. No murmur. No friction rub. No gallop. Pulmonary:      Effort: Pulmonary effort is normal.      Breath sounds: Normal breath sounds. No wheezing or rales. Abdominal:      General: Bowel sounds are normal. There is no distension. Palpations: Abdomen is soft. There is no mass. Tenderness: There is no abdominal tenderness. Hernia: No hernia is present. Lymphadenopathy:      Cervical: No cervical adenopathy. Skin:     Findings: No rash. Neurological:      Mental Status: She is alert and oriented to person, place, and time. ASSESSMENT/PLAN:  Ever Louis was seen today for hypertension, insomnia and blood sugar problem. Diagnoses and all orders for this visit:    IFG (impaired fasting glucose)  -     POCT glycosylated hemoglobin (Hb A1C)    Need for Td vaccine    Need for pneumococcal vaccination  -     Pneumococcal polysaccharide vaccine 23-valent greater than or equal to 1yo subcutaneous/IM    Major depressive disorder, recurrent episode, moderate (HCC)    Hypercholesterolemia    Essential hypertension    At high risk for falls    Other orders  -     montelukast (SINGULAIR) 10 MG tablet; TAKE 1 TABLET BY MOUTH EVERY DAY    The current medical regimen is effective;  continue present plan and medications. --Petrina Severe, MD on 7/17/2020 at 12:08 PM    On the basis of positive falls risk screening, assessment and plan is as follows: in-office gait and balance testing performed using The Timed Up and Go Test was negative for increased falls risk- no further intervention is currently indicated.

## 2020-08-17 ENCOUNTER — HOSPITAL ENCOUNTER (OUTPATIENT)
Age: 65
Discharge: HOME | End: 2020-08-17
Payer: MEDICARE

## 2020-08-17 ENCOUNTER — OFFICE VISIT (OUTPATIENT)
Dept: GASTROENTEROLOGY | Age: 65
End: 2020-08-17
Payer: MEDICARE

## 2020-08-17 ENCOUNTER — HOSPITAL ENCOUNTER (OUTPATIENT)
Dept: GENERAL RADIOLOGY | Age: 65
Discharge: HOME | End: 2020-08-17
Payer: MEDICARE

## 2020-08-17 VITALS
WEIGHT: 155 LBS | BODY MASS INDEX: 26.46 KG/M2 | DIASTOLIC BLOOD PRESSURE: 60 MMHG | SYSTOLIC BLOOD PRESSURE: 108 MMHG | HEIGHT: 64 IN

## 2020-08-17 PROCEDURE — G8427 DOCREV CUR MEDS BY ELIG CLIN: HCPCS | Performed by: INTERNAL MEDICINE

## 2020-08-17 PROCEDURE — 4004F PT TOBACCO SCREEN RCVD TLK: CPT | Performed by: INTERNAL MEDICINE

## 2020-08-17 PROCEDURE — G8417 CALC BMI ABV UP PARAM F/U: HCPCS | Performed by: INTERNAL MEDICINE

## 2020-08-17 PROCEDURE — 4040F PNEUMOC VAC/ADMIN/RCVD: CPT | Performed by: INTERNAL MEDICINE

## 2020-08-17 PROCEDURE — 1123F ACP DISCUSS/DSCN MKR DOCD: CPT | Performed by: INTERNAL MEDICINE

## 2020-08-17 PROCEDURE — 74018 RADEX ABDOMEN 1 VIEW: CPT

## 2020-08-17 PROCEDURE — 99214 OFFICE O/P EST MOD 30 MIN: CPT | Performed by: INTERNAL MEDICINE

## 2020-08-17 PROCEDURE — 1090F PRES/ABSN URINE INCON ASSESS: CPT | Performed by: INTERNAL MEDICINE

## 2020-08-17 PROCEDURE — 3017F COLORECTAL CA SCREEN DOC REV: CPT | Performed by: INTERNAL MEDICINE

## 2020-08-17 PROCEDURE — G8399 PT W/DXA RESULTS DOCUMENT: HCPCS | Performed by: INTERNAL MEDICINE

## 2020-08-17 NOTE — PROGRESS NOTES
[address] [facility],  [address] [place] [address] Ave  [place], [facility]  Phone: [phone]   [facility]     Chief Complaint   Patient presents with    Follow-up     diarrhea, vomiting, constipation f/u - not feeling any better       HPI     Thank you Gage Rolle MD for asking me to see George Dolan in consultation. She is a  [2] White [1] 72 y.o. [facility] female seen independently  who presents with the following GI complaints:    Clearance Curet  Continues to have bowel irregularity. Was found to have diverticular related stricture on colonoscopy 2 months ago. She has either diarrhea or does not go. Oatmeal made her constipated. She took miralax which caused further diarrhea. She has not tried miralax daily. CT earlier this year was consistent with constipation. Wants to know what she can eat. Has associated bloating, nausea, and vomiting. Last TSH 1.21 a year ago. CT ABDOMEN PELVIS W IV CONTRAST Additional Contrast? Oral  Narrative: EXAMINATION:  CT OF THE ABDOMEN AND PELVIS WITH CONTRAST 4/6/2020 7:40 am    TECHNIQUE:  CT of the abdomen and pelvis was performed with the administration of  intravenous contrast. Multiplanar reformatted images are provided for review. Dose modulation, iterative reconstruction, and/or weight based adjustment of  the mA/kV was utilized to reduce the radiation dose to as low as reasonably  achievable. COMPARISON:  11/13/2017    HISTORY:  ORDERING SYSTEM PROVIDED HISTORY: Epigastric pain  TECHNOLOGIST PROVIDED HISTORY:  Additional Contrast?->Oral  Reason for Exam: epigastric pain    FINDINGS:  Lower Chest: Linear areas of suspected atelectasis are noted at the bilateral  lung bases, similar to prior study, but more pronounced currently. Organs: Multiple bilateral renal cortical cysts are noted. Nonobstructing  stone at the inferior pole of the left kidney measures 1.3 cm.   A prominent  left renal pelvis is noted, similar to prior Encounter Reviewed: 3/31/2020  Brice Rodríguez (:  1955) has requested an audio/video evaluation for the following concern(s):  Complains initially of right side pain with radiation to the back. Saw her urologist and states a kidney stone was ruled out. Then began having more epigastric pain under the rib cage with continued radiation to the back. Has multi organ system sarcoid including liver, eye. Has arthritis. On cimzia. Takes fosamax weekly. Has occasional bread dysphagia. Has frequent diarrhea often food dependent. Some meds will constipate her. Got recent scripts for questran and levbid. Previously took imodium. 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 Has had US and HIDA. Has some small cysts noted in the liver. Does not take asa or nsaids. Previously had a lot of n/v that is improved with protonix. Had a hiatal hernia on last CT 2.5 years ago. Has not tried it bid. Not having what she would consider heartburn.   No pertinent GI family history.               Lab Results   Component Value Date     ALT 13 2020     ALT 41 (H) 10/18/2018     ALT 22 2017     AST 17 2020     AST 34 10/18/2018     AST 26 2017     ALKPHOS 79 2020     ALKPHOS 91 10/18/2018     ALKPHOS 99 2017     BILITOT 0.3 2020     BILITOT 0.4 10/18/2018     BILITOT 0.4 2017     HGB 14.6 2020     HGB 15.5 10/18/2018     HGB 13.3 2017      2020      (H) 10/18/2018      (H) 2017           Lab Results   Component Value Date      2020     K 5.0 2020      2020     CO2 23 2020     BUN 12 2020     CREATININE 0.8 2020     GLUCOSE 101 (H) 2020     CALCIUM 10.1 2020     PROT 7.0 2020     LABALBU 4.5 2020           Lab Results   Component Value Date     LIPASE 37.0 2017     Last Encounter Reviewed:  Pertinent PMH, FH, SH is reviewed below. Last EGD: 6/16/2020 small paraesophageal hiatal hernia, dilated esophagus with 54F Ettie Piter 11/14/2017 h pylori negative gastritis, 2/27/2013 negative small bowel biopsies, chemical gastritis  Last Colonoscopy: 6/16/2020 severe left sided diverticulosis with stricture, ileocolonic anastomosis. 2013, 5/12/2010 negative biopsies for microscopic colitis    Review of available records reveals:   Wt Readings from Last 50 Encounters:   08/17/20 155 lb (70.3 kg)   07/17/20 152 lb (68.9 kg)   06/16/20 150 lb (68 kg)   03/31/20 145 lb (65.8 kg)   03/23/20 148 lb (67.1 kg)   03/16/20 145 lb (65.8 kg)   03/03/20 145 lb (65.8 kg)   01/22/20 145 lb (65.8 kg)   10/18/19 147 lb 9.6 oz (67 kg)   04/15/19 160 lb (72.6 kg)   01/25/19 165 lb 3.2 oz (74.9 kg)   12/10/18 158 lb (71.7 kg)   11/19/18 157 lb (71.2 kg)   10/17/18 155 lb 12.8 oz (70.7 kg)   09/21/18 149 lb (67.6 kg)   06/28/18 144 lb (65.3 kg)   06/20/18 153 lb 10.6 oz (69.7 kg)   03/19/18 153 lb 9.6 oz (69.7 kg)   12/19/17 155 lb (70.3 kg)   11/13/17 158 lb 12.8 oz (72 kg)   09/19/17 158 lb 3.2 oz (71.8 kg)   08/16/17 155 lb 10.3 oz (70.6 kg)   07/25/17 155 lb 10.3 oz (70.6 kg)   06/27/17 155 lb 10.3 oz (70.6 kg)   06/21/17 155 lb 10.3 oz (70.6 kg)   06/13/17 155 lb 9.6 oz (70.6 kg)   06/03/17 157 lb (71.2 kg)   03/14/17 154 lb (69.9 kg)   01/03/17 155 lb (70.3 kg)   12/13/16 150 lb (68 kg)   09/13/16 149 lb (67.6 kg)   07/22/16 155 lb (70.3 kg)   05/24/16 150 lb (68 kg)   03/10/16 153 lb (69.4 kg)   02/22/16 156 lb (70.8 kg)   12/22/15 151 lb (68.5 kg)   11/23/15 155 lb (70.3 kg)   11/11/15 157 lb (71.2 kg)   10/23/15 159 lb (72.1 kg)   09/22/15 163 lb (73.9 kg)   08/25/15 161 lb (73 kg)   07/20/15 160 lb (72.6 kg)   06/15/15 169 lb (76.7 kg)   05/13/15 163 lb (73.9 kg)   01/07/14 148 lb (67.1 kg)   12/09/13 146 lb (66.2 kg)   10/21/13 146 lb (66.2 kg)   08/08/13 143 lb (64.9 kg)   02/27/13 138 lb (62.6 kg)   01/09/13 144 lb (65.3 kg) file   Occupational History    Occupation: disabilty    Social Needs    Financial resource strain: Not on file    Food insecurity     Worry: Not on file     Inability: Not on file   [employer] needs     Medical: Not on file     Non-medical: Not on file   Tobacco Use    Smoking status: Current Some Day Smoker     Packs/day: 0.25     Years: 16.00     Pack years: 4.00     Types: Cigarettes     Start date: 3/1/2019    Smokeless tobacco: Never Used   Substance and Sexual Activity    Alcohol use: No     Alcohol/week: 0.0 standard drinks    Drug use: No    Sexual activity: Not on file   Lifestyle    Physical activity     Days per week: Not on file     Minutes per session: Not on file    Stress: Not on file   Relationships    Social connections     Talks on phone: Not on file     Gets together: Not on file     Attends Jewish service: Not on file     Active member of club or organization: Not on file     Attends meetings of clubs or organizations: Not on file     Relationship status: Not on file    Intimate partner violence     Fear of current or ex partner: Not on file     Emotionally abused: Not on file     Physically abused: Not on file     Forced sexual activity: Not on file   Other Topics Concern    Not on file   Social History Narrative    Not on file     SURGICAL HISTORY     Past Surgical History:   Procedure Laterality Date    ABDOMINAL EXPLORATION SURGERY  8/19/12    REDUCTION OF VULVUS; RIGHT COLECTOMY; SMALL BOWEL RESECTION; INSERTION OF ON Q PAIN BUSTER    ANGIOPLASTY  10/2010    COLONOSCOPY  2010    normal    COLONOSCOPY N/A 6/16/2020    COLON W/ANES.  performed by Soraya Geller MD at [facility]  12/2011    ESOPHAGEAL DILATATION  6/16/2020    ESOPHAGEAL DILATION Ardine Chin performed by Soraya Geller MD at [facility]  5/2009    cataract, right    HYSTERECTOMY  2000    LITHOTRIPSY  1/19/2012    LITHOTRIPSY      04/2012    OVARIAN CYST REMOVAL  1975    OVARY REMOVAL  1986    right    PARTIAL HYSTERECTOMY      SPLENECTOMY  1983    SPLENECTOMY      1983    TONSILLECTOMY  1968    UPPER GASTROINTESTINAL ENDOSCOPY  2/27/13    UPPER GASTROINTESTINAL ENDOSCOPY  11/14/2017    gastritis    UPPER GASTROINTESTINAL ENDOSCOPY N/A 6/16/2020    EGD W/FELICITY. (11:00) performed by Maya Cintron MD at [place] [address]  12/2011     CURRENT MEDICATIONS   (This list may include medications prescribed during this encounter as epic can not insert only the list prior to this encounter.)  Current Outpatient Rx   Medication Sig Dispense Refill    montelukast (SINGULAIR) 10 MG tablet TAKE 1 TABLET BY MOUTH EVERY DAY 90 tablet 1    methotrexate (RHEUMATREX) 2.5 MG chemo tablet 3 TABLETS EVERY WEEK      modafinil (PROVIGIL) 100 MG tablet Take 100 mg by mouth every morning.  valACYclovir (VALTREX) 500 MG tablet Take One Tablet By Mouth Every Day 90 tablet 1    folic acid (FOLVITE) 875 MCG tablet Take 400 mcg by mouth nightly 2 tablets nightly      ALPRAZolam (XANAX) 1 MG tablet Take 1 mg by mouth nightly as needed for Sleep.  1/2 to 1 1/2 as needed      predniSONE (DELTASONE) 5 MG tablet Take 5 mg by mouth every other day      sertraline (ZOLOFT) 100 MG tablet TAKE 1 AND 1/2 TABLETS BY MOUTH EVERY DAY (Patient taking differently: Take 150 mg by mouth nightly Takes 1 1/2 tablets nightly) 135 tablet 1    atorvastatin (LIPITOR) 20 MG tablet TAKE 1 TABLET BY MOUTH EVERY DAY 90 tablet 1    pantoprazole (PROTONIX) 40 MG tablet Take 1 tablet by mouth 2 times daily (before meals) 90 tablet 3    Carboxymethylcellulose Sodium (EYE DROPS OP) Apply to eye 2 times daily Durazol -      alendronate (FOSAMAX) 70 MG tablet TAKE 1 TABLET EVERY 7 DAYS 12 tablet 1    traZODone (DESYREL) 100 MG tablet TAKE 1 TO 2 TABS NIGHTLY AS NEEDED FOR SLEEP. 180 tablet 1    mirtazapine (REMERON) 15 MG tablet TAKE 1 TABLET BY MOUTH EVERY DAY AT NIGHT (Patient taking differently: 15 mg nightly ) 90 tablet 1    rizatriptan (MAXALT) 5 MG tablet Take 1 tablet by mouth daily as needed for Migraine May repeat in 2 hours if needed 27 tablet 1    spironolactone (ALDACTONE) 50 MG tablet TAKE 1 TABLET EVERY DAY 90 tablet 1    diclofenac sodium 1 % GEL Apply topically Indications: Arthisits  prescribes       difluprednate (DUREZOL) 0.05 % EMUL Apply 1 drop to eye      gabapentin (NEURONTIN) 400 MG capsule 400 mg 2 times daily. Indications: Prescribed by [facility]    3    fluticasone-salmeterol (ADVAIR) 500-50 MCG/DOSE diskus inhaler Inhale 1 puff into the lungs every 12 hours      HYDROcodone-acetaminophen (NORCO) 5-325 MG per tablet Take 1 tablet by mouth every 6 hours as needed for Pain. Indications: Pulmonary Specialist 1/996GB      Certolizumab Pegol (CIMZIA SC) Inject into the skin Indications: 1 shot every 6 weeks       folic acid (FOLVITE) 1 MG tablet Take 1 tablet by mouth daily (Patient taking differently: Take 400 mg by mouth daily ) 90 tablet 1    cyclobenzaprine (FLEXERIL) 10 MG tablet Take 10 mg by mouth 3 times daily as needed for Muscle spasms      methotrexate (RHEUMATREX) 2.5 MG chemo tablet Take 7.5 mg by mouth every 7 days       fluocinonide (LIDEX) 0.05 % cream Apply topically 2 times daily Apply topically 2 times daily.  promethazine (PHENERGAN) 12.5 MG tablet Take 25 mg by mouth every 6 hours as needed for Nausea Indications: Dr. Pierce Ambrosio       traMADol (ULTRAM) 50 MG tablet Take 50 mg by mouth every 6 hours as needed.        ALLERGIES     Allergies   Allergen Reactions    Ultrasound Cream Rash     Reports severe rash from ultrasound gel     Infliximab      Severe drop in blood pressure    Ultrasound Gel Other (See Comments)     burn    Amoxicillin Rash    Codeine Nausea And Vomiting    Penicillins Rash     IMMUNIZATIONS     Immunization History   Administered Date(s) Administered    Influenza 10/06/2010, 10/21/2013    Influenza Virus Vaccine 10/23/2015    Influenza Whole 10/01/2012    Influenza, Quadv, IM, PF (6 mo and older Fluzone, Flulaval, Fluarix, and 3 yrs and older Afluria) 01/03/2017, 09/19/2017, 10/17/2018, 10/18/2019    Pneumococcal Conjugate 13-valent ([id number]) 10/23/2015    Pneumococcal Conjugate 7-valent (Prevnar7) 10/01/2012    Pneumococcal Polysaccharide ([id number]) 02/03/2009, 10/06/2010, 10/21/2013    Tdap (Boostrix, Adacel) 02/03/2009     REVIEW OF SYSTEMS   See HPI for further details and pertinent postiives. Negative for the following:  Constitutional: Negative for weight change. Negative for appetite change and fatigue. HENT: Negative for nosebleeds, sore throat, mouth sores, and voice change. Respiratory: Negative for cough, choking and chest tightness. Cardiovascular: Negative for chest pain   Gastrointestinal: See HPI  Musculoskeletal: Negative for arthralgias. Skin: Negative for pallor. Neurological: Negative for weakness and light-headedness. Hematological: Negative for adenopathy. Does not bruise/bleed easily. Psychiatric/Behavioral: Negative for suicidal ideas. PHYSICAL EXAM   VITAL SIGNS: /60 (Site: Left Upper Arm, Position: Sitting, Cuff Size: Large Adult)   Ht 5' 4\" (1.626 m)   Wt 155 lb (70.3 kg)   BMI 26.61 kg/m²   Wt Readings from Last 3 Encounters:   08/17/20 155 lb (70.3 kg)   07/17/20 152 lb (68.9 kg)   06/16/20 150 lb (68 kg)     Constitutional: Well developed, Well nourished, No acute distress, Non-toxic appearance. HENT: Normocephalic, Atraumatic, Bilateral external ears normal, Oropharynx moist, No oral exudates, Nose normal.   Eyes: Conjunctiva normal, No discharge. Neck: Normal range of motion, No tenderness, Supple, No stridor. Lymphatic: No cervical, subclavian, or axillary lymphadenopathy. Cardiovascular: Normal heart rate, Normal rhythm, No murmurs, No rubs, No gallops.    Thorax & Lungs: Normal breath sounds, No respiratory distress, No wheezing, No chest tenderness. No gynecomastia. Abdomen: scars consistent with stated surgeries, no hernias, no HSM, soft NTND   Rectal:  Deferred. Skin: Warm, Dry, No erythema, No rash. No bruising. No spider hemangiomas. Back: No tenderness, No CVA tenderness. Lower Extremities: Intact distal pulses, No edema, No tenderness, No cyanosis, No clubbing. Neurologic: Alert & oriented x 3, Normal motor function, Normal sensory function, No focal deficits noted. No asterixis. RADIOLOGY/PROCEDURES         FINAL IMPRESSION     Orders Placed This Encounter   Procedures    XR ABDOMEN (KUB) (SINGLE AP VIEW)     Standing Status:   Future     Number of Occurrences:   1     Standing Expiration Date:   8/17/2021     Gayla Garcia was seen today for follow-up. Diagnoses and all orders for this visit:    Change in bowel habits  -     XR ABDOMEN (KUB) (SINGLE AP VIEW); Future    Bilious vomiting with nausea  -     XR ABDOMEN (KUB) (SINGLE AP VIEW); Future    suspect her sigmoid stricture is contributing to her symptoms for which I have recommended daily miralax. If not helping she will be referred for potential sigmoid resection. ORDERED FUTURE/PENDING TESTS     Lab Frequency Next Occurrence   Hemoglobin A1C Once 06/17/2020       FOLLOWUP   Return for after ordered tests.           Jules Ross 8/17/20 2:10 PM EDT    CC:  Babar Meek MD

## 2020-08-18 NOTE — RESULT ENCOUNTER NOTE
Please call patient with normal results. Remind about ability to get results, make appointments, and communicate with us through [employer] since not signed up. As per her office note, I recommend a trial of miralax daily. May need barium enema with continued symptoms.

## 2020-08-23 RX ORDER — SPIRONOLACTONE 50 MG/1
TABLET, FILM COATED ORAL
Qty: 90 TABLET | Refills: 1 | Status: SHIPPED | OUTPATIENT
Start: 2020-08-23 | End: 2021-01-18 | Stop reason: SDUPTHER

## 2020-08-24 RX ORDER — ALPRAZOLAM 1 MG/1
TABLET ORAL
Qty: 60 TABLET | Refills: 0 | Status: SHIPPED | OUTPATIENT
Start: 2020-08-24 | End: 2020-10-09

## 2020-08-24 NOTE — TELEPHONE ENCOUNTER
Refill Request     Last Seen: 07/17/2020     Last Written: 03/06/2020 #60 with 2 refills    Drug screen - 6/28/18    Medication agreement - I don't see one on file     [place]- 7/2/2018        Next Appointment:   Future Appointments   Date Time Provider Annmarie Mclean   1/18/2021  1:00 PM MD PREMA [facility]             Requested Prescriptions     Pending Prescriptions Disp Refills    ALPRAZolam (XANAX) 1 MG tablet [Pharmacy Med Name: ALPRAZOLAM 1 MG TABLET] 60 tablet      Sig: TAKE 1 TABLET BY MOUTH 2 TIMES DAILY AS NEEDED FOR SLEEP FOR UP TO 90 DAYS.

## 2020-08-24 NOTE — RESULT ENCOUNTER NOTE
Please call patient with normal results. Remind about ability to get results, make appointments, and communicate with us through [employer] since not signed up.

## 2020-08-26 RX ORDER — ATENOLOL 25 MG/1
TABLET ORAL
Qty: 90 TABLET | Refills: 1 | Status: SHIPPED | OUTPATIENT
Start: 2020-08-26 | End: 2021-04-01

## 2020-08-26 NOTE — TELEPHONE ENCOUNTER
Refill Request     Last Seen: 7/17/2020    Last Written:     Next Appointment:   Future Appointments   Date Time Provider Annmarie Mclean   1/18/2021  1:00 PM MD PREMA Gipson [facility]           Requested Prescriptions     Pending Prescriptions Disp Refills    atenolol (TENORMIN) 25 MG tablet [Pharmacy Med Name: ATENOLOL 25 MG TABLET] 90 tablet 1     Sig: TAKE 1 TABLET BY MOUTH EVERY DAY

## 2020-09-10 RX ORDER — RIZATRIPTAN BENZOATE 5 MG/1
5 TABLET ORAL DAILY PRN
Qty: 27 TABLET | Refills: 1 | Status: SHIPPED | OUTPATIENT
Start: 2020-09-10 | End: 2022-04-04

## 2020-09-10 NOTE — TELEPHONE ENCOUNTER
Last office visit 7/17/2020     Last written 10/18/2019, 27 tablets with 1 refill.       Next office visit scheduled 1/18/2021    Requested Prescriptions     Pending Prescriptions Disp Refills    rizatriptan (MAXALT) 5 MG tablet 27 tablet 1     Sig: Take 1 tablet by mouth daily as needed for Migraine May repeat in 2 hours if needed

## 2020-10-08 NOTE — TELEPHONE ENCOUNTER
Refill Request - Controlled Substance    Last Seen: 6/28/2018       Last Written: 8/24/2020    Last UDS: 6/28/2018    Med Agreement Signed On: 1/4/2017    Next Appointment: Visit date not found        Requested Prescriptions     Pending Prescriptions Disp Refills    ALPRAZolam (XANAX) 1 MG tablet [Pharmacy Med Name: ALPRAZOLAM 1 MG TABLET] 60 tablet 0     Sig: TAKE 1 TABLET BY MOUTH 2 TIMES DAILY AS NEEDED FOR SLEEP FOR UP TO 90 DAYS.

## 2020-10-09 RX ORDER — ALPRAZOLAM 1 MG/1
TABLET ORAL
Qty: 60 TABLET | Refills: 0 | Status: SHIPPED | OUTPATIENT
Start: 2020-10-09 | End: 2020-11-17

## 2020-10-12 RX ORDER — MIRTAZAPINE 15 MG/1
TABLET, FILM COATED ORAL
Qty: 90 TABLET | Refills: 1 | Status: SHIPPED | OUTPATIENT
Start: 2020-10-12 | End: 2021-04-05 | Stop reason: SDUPTHER

## 2020-10-12 NOTE — TELEPHONE ENCOUNTER
Last office visit 7/17/2020     Next office visit scheduled 1/18/2021    Requested Prescriptions     Pending Prescriptions Disp Refills    mirtazapine (REMERON) 15 MG tablet 90 tablet 1

## 2020-10-30 RX ORDER — TRAZODONE HYDROCHLORIDE 100 MG/1
TABLET ORAL
Qty: 180 TABLET | Refills: 1 | Status: SHIPPED | OUTPATIENT
Start: 2020-10-30 | End: 2021-01-18

## 2020-11-13 NOTE — TELEPHONE ENCOUNTER
S 6-28-18  1-4-17 Med contract     Last office visit 7-    Last written 10-9-2020 60 with 0      Next office visit scheduled 1/18/2021    Requested Prescriptions     Pending Prescriptions Disp Refills    ALPRAZolam (XANAX) 1 MG tablet [Pharmacy Med Name: ALPRAZOLAM 1 MG TABLET] 60 tablet 0     Sig: TAKE 1 TABLET BY MOUTH 2 TIMES DAILY AS NEEDED FOR SLEEP FOR UP TO 90 DAYS.

## 2020-11-17 RX ORDER — ALPRAZOLAM 1 MG/1
TABLET ORAL
Qty: 60 TABLET | Refills: 0 | Status: SHIPPED | OUTPATIENT
Start: 2020-11-17 | End: 2020-12-21 | Stop reason: SDUPTHER

## 2020-12-09 RX ORDER — ATORVASTATIN CALCIUM 20 MG/1
TABLET, FILM COATED ORAL
Qty: 90 TABLET | Refills: 1 | Status: SHIPPED | OUTPATIENT
Start: 2020-12-09 | End: 2021-06-25 | Stop reason: SDUPTHER

## 2020-12-09 NOTE — TELEPHONE ENCOUNTER
Refill Request     Last Seen: 7/17/2020    Last Written: 6/3/20 90 tablet 1 refill    Next Appointment: 1/18/21   Future Appointments   Date Time Provider Annmarie Mclean   1/18/2021  1:00 PM [facility]       Appointment scheduled      Requested Prescriptions     Pending Prescriptions Disp Refills    atorvastatin (LIPITOR) 20 MG tablet [Pharmacy Med Name: ATORVASTATIN 20 MG TABLET] 90 tablet 1     Sig: TAKE 1 TABLET BY MOUTH EVERY DAY

## 2020-12-14 RX ORDER — PANTOPRAZOLE SODIUM 40 MG/1
TABLET, DELAYED RELEASE ORAL
Qty: 180 TABLET | Refills: 1 | Status: SHIPPED | OUTPATIENT
Start: 2020-12-14 | End: 2021-09-17 | Stop reason: SDUPTHER

## 2020-12-21 NOTE — TELEPHONE ENCOUNTER
Last office visit 7/17/2020     Last written 11-    Next office visit scheduled 1/18/2021    Requested Prescriptions     Pending Prescriptions Disp Refills    ALPRAZolam (XANAX) 1 MG tablet 60 tablet 0     Sig: TAKE 1 TABLET BY MOUTH 2 TIMES DAILY AS NEEDED FOR SLEEP FOR UP TO 90 DAYS.

## 2020-12-22 RX ORDER — ALPRAZOLAM 1 MG/1
TABLET ORAL
Qty: 60 TABLET | Refills: 0 | Status: SHIPPED | OUTPATIENT
Start: 2020-12-22 | End: 2021-04-01 | Stop reason: SDUPTHER

## 2021-01-07 LAB
A/G RATIO: NORMAL
ALBUMIN SERPL-MCNC: 4.6 G/DL
ALBUMIN SERPL-MCNC: 4.6 G/DL
ALP BLD-CCNC: 69 U/L
ALT SERPL-CCNC: 15 U/L
AST SERPL-CCNC: 21 U/L
BILIRUB SERPL-MCNC: 0.4 MG/DL (ref 0.1–1.4)
BILIRUBIN DIRECT: NORMAL
BILIRUBIN, INDIRECT: NORMAL
BUN / CREAT RATIO: NORMAL
BUN BLDV-MCNC: 7 MG/DL
CALCIUM SERPL-MCNC: 10.2 MG/DL
CHLORIDE BLD-SCNC: 104 MMOL/L
CO2: 24 MMOL/L
CREAT SERPL-MCNC: 1.08 MG/DL
GLOBULIN: NORMAL
GLUCOSE: 116
PHOSPHORUS: NORMAL
POTASSIUM SERPL-SCNC: 4.9 MMOL/L
PROTEIN TOTAL: 7.6 G/DL
SODIUM BLD-SCNC: 136 MMOL/L

## 2021-01-18 ENCOUNTER — OFFICE VISIT (OUTPATIENT)
Dept: FAMILY MEDICINE CLINIC | Age: 66
End: 2021-01-18
Payer: MEDICARE

## 2021-01-18 VITALS
DIASTOLIC BLOOD PRESSURE: 78 MMHG | WEIGHT: 159 LBS | SYSTOLIC BLOOD PRESSURE: 112 MMHG | HEIGHT: 63 IN | OXYGEN SATURATION: 96 % | HEART RATE: 72 BPM | TEMPERATURE: 96.8 F | BODY MASS INDEX: 28.17 KG/M2

## 2021-01-18 DIAGNOSIS — M31.6 GIANT CELL ARTERITIS (HCC): ICD-10-CM

## 2021-01-18 DIAGNOSIS — G43.009 MIGRAINE WITHOUT AURA AND WITHOUT STATUS MIGRAINOSUS, NOT INTRACTABLE: Primary | ICD-10-CM

## 2021-01-18 DIAGNOSIS — Z23 NEED FOR PNEUMOCOCCAL VACCINATION: ICD-10-CM

## 2021-01-18 DIAGNOSIS — R73.01 IMPAIRED FASTING GLUCOSE: ICD-10-CM

## 2021-01-18 DIAGNOSIS — I10 ESSENTIAL HYPERTENSION: Chronic | ICD-10-CM

## 2021-01-18 DIAGNOSIS — D86.9 SARCOIDOSIS: ICD-10-CM

## 2021-01-18 DIAGNOSIS — E78.00 HYPERCHOLESTEROLEMIA: ICD-10-CM

## 2021-01-18 DIAGNOSIS — F33.1 MAJOR DEPRESSIVE DISORDER, RECURRENT EPISODE, MODERATE (HCC): ICD-10-CM

## 2021-01-18 DIAGNOSIS — Z23 NEED FOR INFLUENZA VACCINATION: ICD-10-CM

## 2021-01-18 DIAGNOSIS — F41.9 ANXIETY: ICD-10-CM

## 2021-01-18 PROCEDURE — 90732 PPSV23 VACC 2 YRS+ SUBQ/IM: CPT | Performed by: PHYSICIAN ASSISTANT

## 2021-01-18 PROCEDURE — 1036F TOBACCO NON-USER: CPT | Performed by: PHYSICIAN ASSISTANT

## 2021-01-18 PROCEDURE — 99214 OFFICE O/P EST MOD 30 MIN: CPT | Performed by: PHYSICIAN ASSISTANT

## 2021-01-18 PROCEDURE — G0009 ADMIN PNEUMOCOCCAL VACCINE: HCPCS | Performed by: PHYSICIAN ASSISTANT

## 2021-01-18 PROCEDURE — 90694 VACC AIIV4 NO PRSRV 0.5ML IM: CPT | Performed by: PHYSICIAN ASSISTANT

## 2021-01-18 PROCEDURE — G0008 ADMIN INFLUENZA VIRUS VAC: HCPCS | Performed by: PHYSICIAN ASSISTANT

## 2021-01-18 PROCEDURE — G8417 CALC BMI ABV UP PARAM F/U: HCPCS | Performed by: PHYSICIAN ASSISTANT

## 2021-01-18 PROCEDURE — G8399 PT W/DXA RESULTS DOCUMENT: HCPCS | Performed by: PHYSICIAN ASSISTANT

## 2021-01-18 PROCEDURE — 1090F PRES/ABSN URINE INCON ASSESS: CPT | Performed by: PHYSICIAN ASSISTANT

## 2021-01-18 PROCEDURE — 3017F COLORECTAL CA SCREEN DOC REV: CPT | Performed by: PHYSICIAN ASSISTANT

## 2021-01-18 PROCEDURE — G8484 FLU IMMUNIZE NO ADMIN: HCPCS | Performed by: PHYSICIAN ASSISTANT

## 2021-01-18 PROCEDURE — 1123F ACP DISCUSS/DSCN MKR DOCD: CPT | Performed by: PHYSICIAN ASSISTANT

## 2021-01-18 PROCEDURE — G8427 DOCREV CUR MEDS BY ELIG CLIN: HCPCS | Performed by: PHYSICIAN ASSISTANT

## 2021-01-18 PROCEDURE — 4040F PNEUMOC VAC/ADMIN/RCVD: CPT | Performed by: PHYSICIAN ASSISTANT

## 2021-01-18 RX ORDER — SPIRONOLACTONE 50 MG/1
TABLET, FILM COATED ORAL
Qty: 90 TABLET | Refills: 1 | Status: SHIPPED | OUTPATIENT
Start: 2021-01-18 | End: 2021-03-11

## 2021-01-18 RX ORDER — AMITRIPTYLINE HYDROCHLORIDE 25 MG/1
25 TABLET, FILM COATED ORAL NIGHTLY
Qty: 90 TABLET | Refills: 1 | Status: SHIPPED | OUTPATIENT
Start: 2021-01-18 | End: 2021-04-01

## 2021-01-18 RX ORDER — ALBUTEROL SULFATE 90 UG/1
AEROSOL, METERED RESPIRATORY (INHALATION)
COMMUNITY
Start: 2021-01-15 | End: 2022-09-29 | Stop reason: SDUPTHER

## 2021-01-18 RX ORDER — MODAFINIL 100 MG/1
100 TABLET ORAL EVERY MORNING
COMMUNITY
Start: 2021-01-07 | End: 2021-05-07

## 2021-01-18 SDOH — ECONOMIC STABILITY: TRANSPORTATION INSECURITY
IN THE PAST 12 MONTHS, HAS THE LACK OF TRANSPORTATION KEPT YOU FROM MEDICAL APPOINTMENTS OR FROM GETTING MEDICATIONS?: NO

## 2021-01-18 SDOH — ECONOMIC STABILITY: FOOD INSECURITY: WITHIN THE PAST 12 MONTHS, YOU WORRIED THAT YOUR FOOD WOULD RUN OUT BEFORE YOU GOT MONEY TO BUY MORE.: NEVER TRUE

## 2021-01-18 SDOH — ECONOMIC STABILITY: FOOD INSECURITY: WITHIN THE PAST 12 MONTHS, THE FOOD YOU BOUGHT JUST DIDN'T LAST AND YOU DIDN'T HAVE MONEY TO GET MORE.: NEVER TRUE

## 2021-01-18 ASSESSMENT — ENCOUNTER SYMPTOMS
NAUSEA: 0
RHINORRHEA: 0
CONSTIPATION: 0
COUGH: 0
VOMITING: 0
SORE THROAT: 0
SHORTNESS OF BREATH: 0
DIARRHEA: 0
ABDOMINAL PAIN: 0

## 2021-01-18 NOTE — PROGRESS NOTES
2021  Maria Guadalupe Almaguer (: 1955)  72 y.o.      HPI    HTN: on atenolol 25 mg daily and spironolactone 25 mg daily, tolerating well. Denies side effects. BP normal in office today. She does not check at home. Denies cp, soa, le swelling. HLD: on lipitor 20 mg daily. NO current diet or exercise regimen. Was doing water aerobics at the gym, but unable now 2/2 to covid. Anxiety/Depression:  Pt currently on zoloft 150mg and xanax 1 mg BID. H/o insomnia Sleeping is ok with trazodone. Denies si/hi.      Macrocytosis: patient with MCV of 102.8. Has been as high as 101.8 in the past but always returns to baseline. Folate and b12 were normal. Patient adamantly denies alcohol use. Osteoporosis: on fosamax, last dexa 2018 showed osteopenia. Due for repeat later this year. Sarcoid: continues to follow with pulm and rheum. Continues on cimzia and methotrexate. Acute complaint of daily migraines for the last couple of weeks. Rizatriptan not helping like it usually does. Unknown trigger. Review of Systems   Constitutional: Negative for activity change, chills and fever. HENT: Negative for congestion, ear pain, rhinorrhea and sore throat. Eyes: Negative for visual disturbance. Respiratory: Negative for cough and shortness of breath. Cardiovascular: Negative for chest pain and palpitations. Gastrointestinal: Negative for abdominal pain, constipation, diarrhea, nausea and vomiting. Genitourinary: Negative for difficulty urinating and dysuria. Musculoskeletal: Positive for arthralgias and myalgias. Skin: Negative for rash. Neurological: Positive for headaches. Negative for dizziness, weakness and numbness. Psychiatric/Behavioral: Positive for dysphoric mood. Negative for sleep disturbance. The patient is nervous/anxious. Allergies, past medical history, family history, and social history reviewed and unchanged from previous encounter.      Current Outpatient Medications Medication Sig Dispense Refill    fluticasone-salmeterol (ADVAIR) 250-50 MCG/DOSE AEPB Inhale into the lungs 2 times daily      albuterol sulfate  (90 Base) MCG/ACT inhaler 2 puffs q 4 prn      modafinil (PROVIGIL) 100 MG tablet Take 100 mg by mouth every morning.  amitriptyline (ELAVIL) 25 MG tablet Take 1 tablet by mouth nightly 90 tablet 1    spironolactone (ALDACTONE) 50 MG tablet TAKE 1 TABLET BY MOUTH EVERY DAY 90 tablet 1    ALPRAZolam (XANAX) 1 MG tablet TAKE 1 TABLET BY MOUTH 2 TIMES DAILY AS NEEDED FOR anxiety 60 tablet 0    pantoprazole (PROTONIX) 40 MG tablet TAKE 1 TABLET BY MOUTH TWICE A DAY BEFORE MEALS 180 tablet 1    atorvastatin (LIPITOR) 20 MG tablet TAKE 1 TABLET BY MOUTH EVERY DAY 90 tablet 1    alendronate (FOSAMAX) 70 MG tablet TAKE 1 TABLET BY MOUTH EVERY 7 DAYS 12 tablet 1    sertraline (ZOLOFT) 100 MG tablet TAKE 1.5 TABLETS BY MOUTH EVERY  tablet 0    mirtazapine (REMERON) 15 MG tablet TAKE 1 TABLET BY MOUTH EVERY DAY AT NIGHT 90 tablet 1    rizatriptan (MAXALT) 5 MG tablet Take 1 tablet by mouth daily as needed for Migraine May repeat in 2 hours if needed 27 tablet 1    atenolol (TENORMIN) 25 MG tablet TAKE 1 TABLET BY MOUTH EVERY DAY 90 tablet 1    montelukast (SINGULAIR) 10 MG tablet TAKE 1 TABLET BY MOUTH EVERY DAY 90 tablet 1    methotrexate (RHEUMATREX) 2.5 MG chemo tablet 3 TABLETS EVERY WEEK      valACYclovir (VALTREX) 500 MG tablet Take One Tablet By Mouth Every Day 90 tablet 1    folic acid (FOLVITE) 065 MCG tablet Take 400 mcg by mouth nightly 2 tablets nightly      predniSONE (DELTASONE) 5 MG tablet Take 5 mg by mouth every other day      Carboxymethylcellulose Sodium (EYE DROPS OP) Apply to eye 2 times daily Durazol -      diclofenac sodium 1 % GEL Apply topically Indications: Carmen Meza prescribes       difluprednate (DUREZOL) 0.05 % EMUL Apply 1 drop to eye      gabapentin (NEURONTIN) 400 MG capsule 400 mg 3 times daily. Indications: Prescribed by Arthritis Dr.   3    HYDROcodone-acetaminophen (NORCO) 5-325 MG per tablet Take 1 tablet by mouth every 6 hours as needed for Pain. Indications: Pulmonary Specialist 1/938GL      Certolizumab Pegol (CIMZIA SC) Inject into the skin Indications: 1 shot every 6 weeks       cyclobenzaprine (FLEXERIL) 10 MG tablet Take 10 mg by mouth 3 times daily as needed for Muscle spasms      methotrexate (RHEUMATREX) 2.5 MG chemo tablet Take 7.5 mg by mouth every 7 days       fluocinonide (LIDEX) 0.05 % cream Apply topically 2 times daily Apply topically 2 times daily.  promethazine (PHENERGAN) 12.5 MG tablet Take 25 mg by mouth every 6 hours as needed for Nausea Indications: Dr. Ervin Alcantara       traMADol (ULTRAM) 50 MG tablet Take 50 mg by mouth every 6 hours as needed. No current facility-administered medications for this visit. Vitals:    01/18/21 1247   BP: 112/78   Site: Right Upper Arm   Position: Sitting   Cuff Size: Small Adult   Pulse: 72   Temp: 96.8 °F (36 °C)   TempSrc: Temporal   SpO2: 96%  Comment: RA   Weight: 159 lb (72.1 kg)   Height: 5' 2.8\" (1.595 m)     Estimated body mass index is 28.35 kg/m² as calculated from the following:    Height as of this encounter: 5' 2.8\" (1.595 m). Weight as of this encounter: 159 lb (72.1 kg). Physical Exam  Constitutional:       General: She is not in acute distress. Appearance: She is well-developed. HENT:      Head: Normocephalic and atraumatic. Eyes:      Conjunctiva/sclera: Conjunctivae normal.      Pupils: Pupils are equal, round, and reactive to light. Neck:      Musculoskeletal: Neck supple. Cardiovascular:      Rate and Rhythm: Normal rate and regular rhythm. Heart sounds: Normal heart sounds. No murmur. Pulmonary:      Effort: Pulmonary effort is normal.      Breath sounds: Normal breath sounds. No wheezing. Abdominal:      General: Bowel sounds are normal.      Palpations: Abdomen is soft. Tenderness: There is no abdominal tenderness. Lymphadenopathy:      Cervical: No cervical adenopathy. Skin:     General: Skin is warm and dry. Findings: No rash. Neurological:      Mental Status: She is alert and oriented to person, place, and time. Deep Tendon Reflexes: Reflexes are normal and symmetric. Comments: +light sensitivity         ASSESSMENT and PLAN:  Lamont Graves was seen today for gynecologic exam.    Diagnoses and all orders for this visit:    Migraine without aura and without status migrainosus, not intractable  -     amitriptyline (ELAVIL) 25 MG tablet; Take 1 tablet by mouth nightly  - stop trazodone, start elavil. May help with sleep and migraine ppx. Hypercholesterolemia  -     LIPID PANEL; Future    Essential hypertension  - stable continue current regimen     Anxiety  - stable, continue current regimen     Major depressive disorder, recurrent episode, moderate (HCC)  - stable, continue current regimen     Sarcoidosis  - continue follow up with pulm and rheum    Impaired fasting glucose  -     Hemoglobin A1C; Future    Giant cell arteritis (HCC)    Need for influenza vaccination  -     Cancel: INFLUENZA, QUADV, 3 YRS AND OLDER, IM PF, PREFILL SYR OR SDV, 0.5ML (AFLURIA QUADV, PF)  -     INFLUENZA, QUADV, ADJUVANTED, 72 YRS =, IM, PF, PREFILL SYR, 0.5ML (FLUAD)    Need for pneumococcal vaccination  -     Pneumococcal polysaccharide vaccine 23-valent greater than or equal to 3yo subcutaneous/IM    Other orders  -     spironolactone (ALDACTONE) 50 MG tablet; TAKE 1 TABLET BY MOUTH EVERY DAY    Start elavil for migraine ppx, otherwise continue current medication regimen. Pt just had cbc, cmp, tsh done through . Due for lipid panel, will come fasting to next appt. Return in about 3 months (around 4/18/2021) for migraine.

## 2021-01-19 LAB
CHOLESTEROL, TOTAL: 192 MG/DL (ref 0–199)
ESTIMATED AVERAGE GLUCOSE: 119.8 MG/DL
HBA1C MFR BLD: 5.8 %
HDLC SERPL-MCNC: 37 MG/DL (ref 40–60)
LDL CHOLESTEROL CALCULATED: 107 MG/DL
TRIGL SERPL-MCNC: 242 MG/DL (ref 0–150)
VLDLC SERPL CALC-MCNC: 48 MG/DL

## 2021-02-24 DIAGNOSIS — F33.1 MAJOR DEPRESSIVE DISORDER, RECURRENT EPISODE, MODERATE (HCC): ICD-10-CM

## 2021-02-24 RX ORDER — SERTRALINE HYDROCHLORIDE 100 MG/1
TABLET, FILM COATED ORAL
Qty: 135 TABLET | Refills: 0 | Status: SHIPPED | OUTPATIENT
Start: 2021-02-24 | End: 2021-04-01 | Stop reason: SDUPTHER

## 2021-02-24 NOTE — TELEPHONE ENCOUNTER
Refill Request     Last Seen: 1/18/2021    Last Written: 11/23/2020    Next Appointment:   Future Appointments   Date Time Provider nAnmarie Mclean   2/26/2021  1:00 PM Sri De La Fuente MD AND YVAN WILSON   4/19/2021 11:00 AM FRANCISCO [facility]       Future appointment scheduled      Requested Prescriptions     Pending Prescriptions Disp Refills    sertraline (ZOLOFT) 100 MG tablet [Pharmacy Med Name: SERTRALINE  MG TABLET] 135 tablet 0     Sig: TAKE 1 AND 1/2 TABLET BY MOUTH DAILY

## 2021-02-26 ENCOUNTER — OFFICE VISIT (OUTPATIENT)
Dept: GASTROENTEROLOGY | Age: 66
End: 2021-02-26
Payer: MEDICARE

## 2021-02-26 VITALS
TEMPERATURE: 97 F | HEIGHT: 63 IN | DIASTOLIC BLOOD PRESSURE: 70 MMHG | WEIGHT: 160 LBS | SYSTOLIC BLOOD PRESSURE: 133 MMHG | HEART RATE: 69 BPM | BODY MASS INDEX: 28.35 KG/M2

## 2021-02-26 DIAGNOSIS — K56.699 SIGMOID STRICTURE (HCC): ICD-10-CM

## 2021-02-26 DIAGNOSIS — R10.30 LOWER ABDOMINAL PAIN: Primary | ICD-10-CM

## 2021-02-26 DIAGNOSIS — R10.11 RIGHT UPPER QUADRANT ABDOMINAL PAIN: ICD-10-CM

## 2021-02-26 DIAGNOSIS — K57.30 COLON, DIVERTICULOSIS: ICD-10-CM

## 2021-02-26 PROCEDURE — G8399 PT W/DXA RESULTS DOCUMENT: HCPCS | Performed by: INTERNAL MEDICINE

## 2021-02-26 PROCEDURE — 1090F PRES/ABSN URINE INCON ASSESS: CPT | Performed by: INTERNAL MEDICINE

## 2021-02-26 PROCEDURE — 1123F ACP DISCUSS/DSCN MKR DOCD: CPT | Performed by: INTERNAL MEDICINE

## 2021-02-26 PROCEDURE — 4040F PNEUMOC VAC/ADMIN/RCVD: CPT | Performed by: INTERNAL MEDICINE

## 2021-02-26 PROCEDURE — G8427 DOCREV CUR MEDS BY ELIG CLIN: HCPCS | Performed by: INTERNAL MEDICINE

## 2021-02-26 PROCEDURE — 1036F TOBACCO NON-USER: CPT | Performed by: INTERNAL MEDICINE

## 2021-02-26 PROCEDURE — 99214 OFFICE O/P EST MOD 30 MIN: CPT | Performed by: INTERNAL MEDICINE

## 2021-02-26 PROCEDURE — G8484 FLU IMMUNIZE NO ADMIN: HCPCS | Performed by: INTERNAL MEDICINE

## 2021-02-26 PROCEDURE — G8417 CALC BMI ABV UP PARAM F/U: HCPCS | Performed by: INTERNAL MEDICINE

## 2021-02-26 PROCEDURE — 3017F COLORECTAL CA SCREEN DOC REV: CPT | Performed by: INTERNAL MEDICINE

## 2021-02-26 NOTE — PROGRESS NOTES
[address] [place] [address]    [address] [facility] ,  [address] [facility]  Phone: [phone]   [facility]     Chief Complaint   Patient presents with    Abdominal Pain        HPI     Hubert Davis is a 72 y.o. female who presents for abdominal pain. Patient of Dr Lavonne Xavier formerly in our practice now here to establish with me. Longstanding history of GI symptoms, Dr Hannah Flores notes reviewed. She says she has continued to have significant issues with abdominal pain - severe, present over the right UQ and the lower abdominal, worsens intermittently with more intense sharp 'razor like' pains. If she eats 'anything more than rice' she has a lot more pain. She does not have blood in the stools. History of prior right colon resection for cecal volvulus. She says she gets Tramadol for joint pain and takes this. She is here with her  who was present throughout the visit. She says she has alternating constipation and diarrhea and this is a longstanding complaint. EGD and colonoscopy 6/16/2020 with Dr Lavonne Xavier normal esophagus, stomach, and duodenum  -Small (< 3 cm) mixed sliding with probable small paraesophageal component hiatal hernia. The esophagus was dilated with a 54F Belle with mild resistance and no heme. Colonoscopy - diverticulosis, marked in degree, involving the sigmoid with resultant stricture.   Had to switch to a pediatric scope to get through and this was still difficult.  -evidence of previous surgery with ileocolonic anastomosis in the right colon    Prior visit with Dr Lavonne Xavier - Has multi organ system sarcoid including liver, eye.  Has arthritis. [place] Sitter fosamax weekly.  Has occasional bread dysphagia.  Has frequent diarrhea often food dependent.  Some meds will constipate her.  Got recent scripts for questran and levbid.  Previously took imodium.  Had diarrhea even before having cecal volvulus resulting in cecal and small bowel resection in 2012Niranjan Griffith had US and HIDA.  Has some small cysts noted in the liver.  Does not take asa or nsaids.  Previously had a lot of n/v that is improved with protonix. Lex Staley a hiatal hernia on last CT 2.5 years ago. Jannette Dickson not tried it bid.  Not having what she would consider heartburn.  No pertinent GI family history.     PAST MEDICAL HISTORY     Past Medical History:   Diagnosis Date    Chronic diarrhea     Dr. Soniya Whitney    Chronic kidney disease     kidney stones    Clostridium difficile diarrhea 10/23/13    positive by PCR    Fibromyalgia     Hemorrhoid     Hyperlipidemia     Hypertension     Kidney stone     Osteoarthritis     fibromyalgia    Sarcoidosis     sees Dr. Joshua Esteban History   Problem Relation Age of Onset    Heart Disease Father     Cancer Father         skin cancer- unknown    Cancer Brother         skin cancer- forehead and nose- unknown     SOCIAL HISTORY     Social History     Socioeconomic History    Marital status:      Spouse name: Ahsan Willard Number of children: 3    Years of education: Not on file    Highest education level: Not on file   Occupational History    Occupation: disabilty    Social Needs    Financial resource strain: Not hard at all   [place] insecurity     Worry: Never true     Inability: Never true    Transportation needs     Medical: No     Non-medical: No   Tobacco Use    Smoking status: Former Smoker     Packs/day: 0.50     Years: 20.00     Pack years: 10.00     Types: Cigarettes     Start date: 3/1/2019     Quit date: 2020     Years since quittin.4    Smokeless tobacco: Never Used   Substance and Sexual Activity    Alcohol use: No     Alcohol/week: 0.0 standard drinks    Drug use: No    Sexual activity: Not on file   Lifestyle    Physical activity     Days per week: Not on file     Minutes per session: Not on file    Stress: Not on file   Relationships    Social connections     Talks on phone: Not on file Gets together: Not on file     Attends Rastafarian service: Not on file     Active member of club or organization: Not on file     Attends meetings of clubs or organizations: Not on file     Relationship status: Not on file    Intimate partner violence     Fear of current or ex partner: Not on file     Emotionally abused: Not on file     Physically abused: Not on file     Forced sexual activity: Not on file   Other Topics Concern    Not on file   Social History Narrative    Not on file     SURGICAL HISTORY     Past Surgical History:   Procedure Laterality Date    ABDOMINAL EXPLORATION SURGERY  8/19/12    REDUCTION OF VULVUS; RIGHT COLECTOMY; SMALL BOWEL RESECTION; INSERTION OF ON Q PAIN BUSTER    ANGIOPLASTY  10/2010    COLONOSCOPY  2010    normal    COLONOSCOPY N/A 6/16/2020    COLON W/ANES. performed by Tj Fisher MD at [address] [facility]  12/2011    ESOPHAGEAL DILATATION  6/16/2020    ESOPHAGEAL DILATION Donny Houston performed by Tj Fisher MD at [address] [facility]  5/2009    cataract, right    HYSTERECTOMY  2000    LITHOTRIPSY  1/19/2012    LITHOTRIPSY      04/2012    OVARIAN CYST REMOVAL  1975    OVARY REMOVAL  1986    right    PARTIAL HYSTERECTOMY      SPLENECTOMY  1983    SPLENECTOMY      1983    TONSILLECTOMY  1968    UPPER GASTROINTESTINAL ENDOSCOPY  2/27/13    UPPER GASTROINTESTINAL ENDOSCOPY  11/14/2017    gastritis    UPPER GASTROINTESTINAL ENDOSCOPY N/A 6/16/2020    EGD W/ANES.  (11:00) performed by Tj Fisher MD at [place] [address]  12/2011     CURRENT MEDICATIONS   (This list may include medications prescribed during this encounter as epic can not insert only the list prior to this encounter.)  Current Outpatient Rx   Medication Sig Dispense Refill    sertraline (ZOLOFT) 100 MG tablet TAKE 1 AND 1/2 TABLET BY MOUTH DAILY 135 tablet 0    fluticasone-salmeterol (ADVAIR) 250-50 MCG/DOSE AEPB Inhale into the lungs 2 times daily      albuterol sulfate  (90 Base) MCG/ACT inhaler 2 puffs q 4 prn      modafinil (PROVIGIL) 100 MG tablet Take 100 mg by mouth every morning.  amitriptyline (ELAVIL) 25 MG tablet Take 1 tablet by mouth nightly 90 tablet 1    spironolactone (ALDACTONE) 50 MG tablet TAKE 1 TABLET BY MOUTH EVERY DAY 90 tablet 1    pantoprazole (PROTONIX) 40 MG tablet TAKE 1 TABLET BY MOUTH TWICE A DAY BEFORE MEALS 180 tablet 1    atorvastatin (LIPITOR) 20 MG tablet TAKE 1 TABLET BY MOUTH EVERY DAY 90 tablet 1    alendronate (FOSAMAX) 70 MG tablet TAKE 1 TABLET BY MOUTH EVERY 7 DAYS 12 tablet 1    mirtazapine (REMERON) 15 MG tablet TAKE 1 TABLET BY MOUTH EVERY DAY AT NIGHT 90 tablet 1    rizatriptan (MAXALT) 5 MG tablet Take 1 tablet by mouth daily as needed for Migraine May repeat in 2 hours if needed 27 tablet 1    atenolol (TENORMIN) 25 MG tablet TAKE 1 TABLET BY MOUTH EVERY DAY 90 tablet 1    montelukast (SINGULAIR) 10 MG tablet TAKE 1 TABLET BY MOUTH EVERY DAY 90 tablet 1    methotrexate (RHEUMATREX) 2.5 MG chemo tablet 3 TABLETS EVERY WEEK      valACYclovir (VALTREX) 500 MG tablet Take One Tablet By Mouth Every Day 90 tablet 1    folic acid (FOLVITE) 361 MCG tablet Take 400 mcg by mouth nightly 2 tablets nightly      predniSONE (DELTASONE) 5 MG tablet Take 5 mg by mouth every other day      Carboxymethylcellulose Sodium (EYE DROPS OP) Apply to eye 2 times daily Durazol -      diclofenac sodium 1 % GEL Apply topically Indications: [place]  prescribes       difluprednate (DUREZOL) 0.05 % EMUL Apply 1 drop to eye      gabapentin (NEURONTIN) 400 MG capsule 400 mg 3 times daily. Indications: Prescribed by Arth[facility]    3    HYDROcodone-acetaminophen (NORCO) 5-325 MG per tablet Take 1 tablet by mouth every 6 hours as needed for Pain.  Indications: Pulmonary Specialist 1/923DM      Certolizumab Pegol (CIMZIA SC) Inject into the skin Indications: 1 shot every 6 weeks       cyclobenzaprine (FLEXERIL) 10 MG tablet Take 10 mg by mouth 3 times daily as needed for Muscle spasms      methotrexate (RHEUMATREX) 2.5 MG chemo tablet Take 7.5 mg by mouth every 7 days       fluocinonide (LIDEX) 0.05 % cream Apply topically 2 times daily Apply topically 2 times daily.  promethazine (PHENERGAN) 12.5 MG tablet Take 25 mg by mouth every 6 hours as needed for Nausea Indications: Dr. Linda Miramontes       traMADol (ULTRAM) 50 MG tablet Take 50 mg by mouth every 6 hours as needed. ALLERGIES     Allergies   Allergen Reactions    Ultrasound Cream Rash     Reports severe rash from ultrasound gel     Infliximab      Severe drop in blood pressure    Ultrasound Gel Other (See Comments)     burn    Amoxicillin Rash    Codeine Nausea And Vomiting    Penicillins Rash       IMMUNIZATIONS     Immunization History   Administered Date(s) Administered    Influenza 10/06/2010, 10/21/2013    Influenza Virus Vaccine 10/23/2015    Influenza Whole 10/01/2012    Influenza, Quadv, IM, PF (6 mo and older Fluzone, Flulaval, Fluarix, and 3 yrs and older Afluria) 01/03/2017, 09/19/2017, 10/17/2018, 10/18/2019    Influenza, Quadv, adjuvanted, 65 yrs +, IM, PF (Fluad) 01/18/2021    Pneumococcal Conjugate 13-valent ([id number]) 10/23/2015    Pneumococcal Conjugate 7-valent (Prevnar7) 10/01/2012    Pneumococcal Polysaccharide ([id number]) 02/03/2009, 10/06/2010, 10/21/2013, 01/18/2021    Tdap (Boostrix, Adacel) 02/03/2009       REVIEW OF SYSTEMS     Constitutional: denies fever and unexpected weight change. HENT: Negative for ear pain, hearing loss and nosebleeds. Eyes: Negative for pain and visual disturbance. Respiratory: Negative for cough, shortness of breath and wheezing. Cardiovascular: Negative for chest pain, palpitations and leg swelling. Gastrointestinal: see HPI for details. Endocrine: Negative for polydipsia, polyphagia and polyuria.    Genitourinary: Negative for difficulty urinating, dysuria, hematuria and urgency. Musculoskeletal: Positive for arthralgias and back pain. Skin: Negative for pallor and rash. Allergic/Immunologic: Negative for environmental allergies and immunocompromised state. Neurological: Negative for seizures, syncope. Hematological: Negative for adenopathy. Does not bruise/bleed easily. Psychiatric/Behavioral: Negative for agitation, confusion, hallucinations. PHYSICAL EXAM   VITAL SIGNS: /70 (Site: Right Wrist, Position: Sitting, Cuff Size: Medium Adult)   Pulse 69   Temp 97 °F (36.1 °C) (Temporal)   Ht 5' 3\" (1.6 m)   Wt 160 lb (72.6 kg)   BMI 28.34 kg/m²   Wt Readings from Last 3 Encounters:   02/26/21 160 lb (72.6 kg)   01/18/21 159 lb (72.1 kg)   08/17/20 155 lb (70.3 kg)     Gen: AAO3, NAD  HEENT: no pallor or icterus  Neck: supple, no adenopathy  RS: clear to auscultation bilaterally  CVS: S1S2 RRR, no murmurs  GI: soft, nontender, not distended, BS+, no hepatosplenomegaly  Ext: no edema or clubbing    XR ABDOMEN (KUB) (SINGLE AP VIEW)  Narrative: EXAMINATION:  ONE SUPINE XRAY VIEW(S) OF THE ABDOMEN    8/17/2020 3:19 pm    COMPARISON:  04/06/2020, 01/28/2019    HISTORY:  ORDERING SYSTEM PROVIDED HISTORY: Change in bowel habits    FINDINGS:  Supine views of the abdomen and pelvis were performed. There is no evidence  of free air. There is stool and gas interspersed throughout the colon, with  a normal fecal load present. There is a mild amount of gas within the  stomach. There is a generalized paucity of small bowel gas. The bowel gas  pattern is favored to be nonobstructive. No abnormal bowel wall thickening  is evident. The lung bases are grossly clear. Multiple stable left-sided  renal calculi are again noted, the largest measuring approximately 16 mm. Calcifications within the pelvis are felt to represent phleboliths. No  additional abnormal radiopaque calculi are identified.   There is surgical  suture material within the bilateral lower quadrants. There is degenerative  change within the lumbar spine. No osteolytic or osteoblastic lesion is seen. Impression: 1. Nonobstructive bowel gas pattern, without evidence of free air. 2. Normal fecal load. 3. Left nephrolithiasis. FINAL IMPRESSION     Abdominal pain - right UQ and lower abdomen with alternating constipation and diarrhea. Prior history of cecal volvulus with a right hemicolectomy  Recent colonoscopy with Dr Nubia Ray 6/2020 showing marked sigmoid diverticulosis with possible sigmoid stricture    The exact etiology of her symptoms is not clear, partial obstruction/intermittent issues related to obstruction from the sigmoid stricture is a possibility. Other issues addressed intra-abdominal adhesions may also be contributing.   Will get labs CBC, CMP  CT scan of the abdomen and pelvis with IV and oral contrast to assess for any obstruction or transition point  Consider barium enema to define the sigmoid stricture better and she may need a surgical referral if this appears to be the problem on imaging  Low residue diet, detailed handout given  Consider ED if symptoms worsen or significant vomiting occurs

## 2021-03-06 ENCOUNTER — HOSPITAL ENCOUNTER (OUTPATIENT)
Age: 66
Discharge: HOME | End: 2021-03-06
Payer: MEDICARE

## 2021-03-06 ENCOUNTER — HOSPITAL ENCOUNTER (OUTPATIENT)
Dept: CT IMAGING | Age: 66
Discharge: HOME | End: 2021-03-06
Payer: MEDICARE

## 2021-03-06 DIAGNOSIS — R10.11 RIGHT UPPER QUADRANT ABDOMINAL PAIN: ICD-10-CM

## 2021-03-06 DIAGNOSIS — K56.699 SIGMOID STRICTURE (HCC): ICD-10-CM

## 2021-03-06 DIAGNOSIS — R10.30 LOWER ABDOMINAL PAIN: ICD-10-CM

## 2021-03-06 LAB
A/G RATIO: 1.6 (ref 1.1–2.2)
ALBUMIN SERPL-MCNC: 4.5 G/DL (ref 3.4–5)
ALP BLD-CCNC: 69 U/L (ref 40–129)
ALT SERPL-CCNC: 19 U/L (ref 10–40)
ANION GAP SERPL CALCULATED.3IONS-SCNC: 6 MMOL/L (ref 3–16)
AST SERPL-CCNC: 21 U/L (ref 15–37)
BILIRUB SERPL-MCNC: 0.3 MG/DL (ref 0–1)
BUN BLDV-MCNC: 6 MG/DL (ref 7–20)
CALCIUM SERPL-MCNC: 9.9 MG/DL (ref 8.3–10.6)
CHLORIDE BLD-SCNC: 104 MMOL/L (ref 99–110)
CO2: 26 MMOL/L (ref 21–32)
CREAT SERPL-MCNC: 1 MG/DL (ref 0.6–1.2)
GFR AFRICAN AMERICAN: >60
GFR NON-AFRICAN AMERICAN: 56
GLOBULIN: 2.8 G/DL
GLUCOSE BLD-MCNC: 105 MG/DL (ref 70–99)
HCT VFR BLD CALC: 45.8 % (ref 36–48)
HEMOGLOBIN: 15.3 G/DL (ref 12–16)
LIPASE: 37 U/L (ref 13–60)
MCH RBC QN AUTO: 35 PG (ref 26–34)
MCHC RBC AUTO-ENTMCNC: 33.5 G/DL (ref 31–36)
MCV RBC AUTO: 104.5 FL (ref 80–100)
PDW BLD-RTO: 15.3 % (ref 12.4–15.4)
PLATELET # BLD: 437 K/UL (ref 135–450)
PLATELET SLIDE REVIEW: ADEQUATE
PMV BLD AUTO: 7.7 FL (ref 5–10.5)
POTASSIUM SERPL-SCNC: 5.1 MMOL/L (ref 3.5–5.1)
RBC # BLD: 4.38 M/UL (ref 4–5.2)
SLIDE REVIEW: ABNORMAL
SODIUM BLD-SCNC: 136 MMOL/L (ref 136–145)
TOTAL PROTEIN: 7.3 G/DL (ref 6.4–8.2)
WBC # BLD: 8.7 K/UL (ref 4–11)

## 2021-03-06 PROCEDURE — 80053 COMPREHEN METABOLIC PANEL: CPT

## 2021-03-06 PROCEDURE — 85027 COMPLETE CBC AUTOMATED: CPT

## 2021-03-06 PROCEDURE — 6360000004 HC RX CONTRAST MEDICATION: Performed by: INTERNAL MEDICINE

## 2021-03-06 PROCEDURE — 74177 CT ABD & PELVIS W/CONTRAST: CPT

## 2021-03-06 PROCEDURE — 83690 ASSAY OF LIPASE: CPT

## 2021-03-06 PROCEDURE — 36415 COLL VENOUS BLD VENIPUNCTURE: CPT

## 2021-03-06 RX ADMIN — IOPAMIDOL 75 ML: 755 INJECTION, SOLUTION INTRAVENOUS at 13:06

## 2021-03-06 RX ADMIN — IOHEXOL 50 ML: 240 INJECTION, SOLUTION INTRATHECAL; INTRAVASCULAR; INTRAVENOUS; ORAL at 13:06

## 2021-03-08 DIAGNOSIS — R19.4 BOWEL HABIT CHANGES: ICD-10-CM

## 2021-03-08 DIAGNOSIS — R10.84 GENERALIZED ABDOMINAL PAIN: Primary | ICD-10-CM

## 2021-03-08 DIAGNOSIS — K56.699 SIGMOID STRICTURE (HCC): ICD-10-CM

## 2021-03-11 RX ORDER — MONTELUKAST SODIUM 10 MG/1
TABLET ORAL
Qty: 90 TABLET | Refills: 1 | Status: SHIPPED | OUTPATIENT
Start: 2021-03-11 | End: 2021-09-17 | Stop reason: SDUPTHER

## 2021-03-11 RX ORDER — SPIRONOLACTONE 50 MG/1
TABLET, FILM COATED ORAL
Qty: 90 TABLET | Refills: 1 | Status: SHIPPED | OUTPATIENT
Start: 2021-03-11 | End: 2022-02-24

## 2021-03-11 NOTE — TELEPHONE ENCOUNTER
Refill Request     Last Seen: 7/17/2020    Last Written: Spironolactone was just filled on 01/18/2021 #90 with 1 refill and Montelukast 07/17/2020 #90 with 1 refill     Next Appointment:   Future Appointments   Date Time Provider Annmarie Mclean   3/15/2021  9:30 AM [facility]   4/19/2021 11:00 AM FRANCISCO [facility]       Future appointment scheduled      Requested Prescriptions     Pending Prescriptions Disp Refills    montelukast (SINGULAIR) 10 MG tablet [Pharmacy Med Name: MONTELUKAST SOD 10 MG TABLET] 90 tablet 1     Sig: TAKE 1 TABLET BY MOUTH EVERY DAY    spironolactone (ALDACTONE) 50 MG tablet [Pharmacy Med Name: SPIRONOLACTONE 50 MG TABLET] 90 tablet 1     Sig: TAKE 1 TABLET BY MOUTH EVERY DAY

## 2021-03-15 ENCOUNTER — HOSPITAL ENCOUNTER (OUTPATIENT)
Dept: GENERAL RADIOLOGY | Age: 66
Discharge: HOME | End: 2021-03-15
Payer: MEDICARE

## 2021-03-15 DIAGNOSIS — R19.4 BOWEL HABIT CHANGES: ICD-10-CM

## 2021-03-15 DIAGNOSIS — K56.699 SIGMOID STRICTURE (HCC): ICD-10-CM

## 2021-03-15 DIAGNOSIS — R10.84 GENERALIZED ABDOMINAL PAIN: ICD-10-CM

## 2021-03-15 PROCEDURE — 6360000004 HC RX CONTRAST MEDICATION: Performed by: INTERNAL MEDICINE

## 2021-03-15 PROCEDURE — 74280 X-RAY XM COLON 2CNTRST STD: CPT

## 2021-03-15 PROCEDURE — 6360000004 HC RX CONTRAST MEDICATION

## 2021-03-15 RX ADMIN — IOHEXOL 750 ML: 240 INJECTION, SOLUTION INTRATHECAL; INTRAVASCULAR; INTRAVENOUS; ORAL at 10:00

## 2021-03-15 NOTE — RESULT ENCOUNTER NOTE
Spoke with pt re: results and recommendations  Will do a fleet enema today and then start on Linzess 145mcg tomorrow-put samples up front at the [facility] office; patient will call with an update in 2 weeks, or sooner with problems.

## 2021-03-15 NOTE — RESULT ENCOUNTER NOTE
Spoke with pt re: results    Patient stated that the BE prep was not done correctly, because she did not take the laxative (patient states was not told) prior to testing. States that she did mention this to the tech, but they went ahead and proceeded. Thinks that this is the reason that there is a stool abundance.

## 2021-03-29 ENCOUNTER — TELEPHONE (OUTPATIENT)
Dept: GASTROENTEROLOGY | Age: 66
End: 2021-03-29

## 2021-03-29 NOTE — TELEPHONE ENCOUNTER
Pt called and wanted to give Dr. Guillaume Lovett and update. Pt states the linzess has worked ok if eating rice. If pt tries to add in protein she states the [address] doesn't work. Pt stated she tried to use it in soy and again it helped a little. If she uses plant based protein the lizness will work somewhat, but if she uses meat protein, it doesn't help at all.

## 2021-03-31 ENCOUNTER — HOSPITAL ENCOUNTER (OUTPATIENT)
Dept: MAMMOGRAPHY | Age: 66
Discharge: HOME | End: 2021-03-31
Payer: MEDICARE

## 2021-03-31 ENCOUNTER — TELEPHONE (OUTPATIENT)
Dept: FAMILY MEDICINE CLINIC | Age: 66
End: 2021-03-31

## 2021-03-31 DIAGNOSIS — Z12.31 SCREENING MAMMOGRAM, ENCOUNTER FOR: ICD-10-CM

## 2021-03-31 DIAGNOSIS — F33.1 MAJOR DEPRESSIVE DISORDER, RECURRENT EPISODE, MODERATE (HCC): ICD-10-CM

## 2021-03-31 PROCEDURE — 77063 BREAST TOMOSYNTHESIS BI: CPT

## 2021-03-31 NOTE — TELEPHONE ENCOUNTER
Pt called back and states \"pain goes from nothing to everything\" and she hasn't any bowel movements in 3 days.

## 2021-03-31 NOTE — TELEPHONE ENCOUNTER
Please give her samples of Linzess to try in the interim - given 12 samples of 145 mcg daily for Linzess, she can pick these up

## 2021-03-31 NOTE — TELEPHONE ENCOUNTER
Would add Miralax 17 grams once or twice daily to the [address] [place] [address] for the constipation, if this does not help after trying for couple of days can try a Fleets enema OTC.  If persistent needs an office visit to discuss

## 2021-03-31 NOTE — TELEPHONE ENCOUNTER
[address] calling with drug interactions between Zoloft and Amitriptyline. Can cause Serotonin Syndrome and increase Amitriptyline effects. ? Continue?

## 2021-04-01 ENCOUNTER — TELEPHONE (OUTPATIENT)
Dept: FAMILY MEDICINE CLINIC | Age: 66
End: 2021-04-01

## 2021-04-01 DIAGNOSIS — F41.9 ANXIETY DISORDER, UNSPECIFIED: ICD-10-CM

## 2021-04-01 RX ORDER — VALACYCLOVIR HYDROCHLORIDE 500 MG/1
TABLET, FILM COATED ORAL
Qty: 90 TABLET | Refills: 1 | Status: SHIPPED | OUTPATIENT
Start: 2021-04-01 | End: 2021-10-08

## 2021-04-01 RX ORDER — TRAZODONE HYDROCHLORIDE 100 MG/1
100 TABLET ORAL NIGHTLY PRN
Qty: 90 TABLET | Refills: 1 | Status: SHIPPED | OUTPATIENT
Start: 2021-04-01 | End: 2021-09-17 | Stop reason: SDUPTHER

## 2021-04-01 RX ORDER — ALPRAZOLAM 1 MG/1
TABLET ORAL
Qty: 60 TABLET | Refills: 0 | Status: SHIPPED | OUTPATIENT
Start: 2021-04-01 | End: 2021-05-01

## 2021-04-01 RX ORDER — SERTRALINE HYDROCHLORIDE 100 MG/1
TABLET, FILM COATED ORAL
Qty: 135 TABLET | Refills: 1 | Status: SHIPPED | OUTPATIENT
Start: 2021-04-01 | End: 2021-11-29

## 2021-04-01 RX ORDER — PROPRANOLOL HCL 60 MG
60 CAPSULE, EXTENDED RELEASE 24HR ORAL DAILY
Qty: 90 CAPSULE | Refills: 1 | Status: SHIPPED | OUTPATIENT
Start: 2021-04-01 | End: 2021-04-19

## 2021-04-01 NOTE — TELEPHONE ENCOUNTER
Pankaj Wright from pharmacy is calling because of potential drug interaction between amitriptyline and sertraline. Please advise.

## 2021-04-01 NOTE — TELEPHONE ENCOUNTER
Spoke with patient. Amitriptyline improved migraines but patient is having difficulty sleeping. Would like to go back on her trazodone. Stop amitriptyline and restart trazodone  Continue zoloft   Will send in propranolol for migraine. Stop atenolol. Monitor BP will see her on the 17th in office.

## 2021-04-01 NOTE — TELEPHONE ENCOUNTER
Refill Request     Last Seen: 7/17/2020    Last Written: 7/10/2020    Next Appointment:   Future Appointments   Date Time Provider Annmarie Mclean   4/19/2021 11:00 AM FRANCISCO Mcelroy [facility]   4/21/2021  2:45 PM Jesús Cherry MD AND [facility]       Future appointment scheduled      Requested Prescriptions     Pending Prescriptions Disp Refills    valACYclovir (VALTREX) 500 MG tablet 90 tablet 1     Sig: Take One Tablet By Mouth Every Day

## 2021-04-01 NOTE — TELEPHONE ENCOUNTER
Pt calling to have her Xanax and Valtrex to the [facility] in [facility]

## 2021-04-02 ENCOUNTER — TELEPHONE (OUTPATIENT)
Dept: FAMILY MEDICINE CLINIC | Age: 66
End: 2021-04-02

## 2021-04-02 ENCOUNTER — TELEPHONE (OUTPATIENT)
Dept: MAMMOGRAPHY | Age: 66
End: 2021-04-02

## 2021-04-02 DIAGNOSIS — R92.8 ABNORMAL MAMMOGRAM: Primary | ICD-10-CM

## 2021-04-02 NOTE — TELEPHONE ENCOUNTER
Please let patient know that there is a spot in the right breast that they want to get a closer look at. I have placed the orders, please have her call to schedule.

## 2021-04-05 RX ORDER — MIRTAZAPINE 15 MG/1
TABLET, FILM COATED ORAL
Qty: 90 TABLET | Refills: 1 | Status: SHIPPED | OUTPATIENT
Start: 2021-04-05 | End: 2021-10-07

## 2021-04-05 NOTE — TELEPHONE ENCOUNTER
Called and the  Viet Preciado answered (not on HIPAA) I told him to have patient call us back when she is available

## 2021-04-05 NOTE — TELEPHONE ENCOUNTER
Last office visit 1/18/2021     Last written 10-1-2020 x 1 refills    Next office visit scheduled 4/19/2021    Requested Prescriptions     Pending Prescriptions Disp Refills    mirtazapine (REMERON) 15 MG tablet 90 tablet 1     Sig: TAKE 1 TABLET BY MOUTH EVERY DAY AT NIGHT

## 2021-04-13 ENCOUNTER — HOSPITAL ENCOUNTER (OUTPATIENT)
Dept: WOMENS IMAGING | Age: 66
Discharge: HOME | End: 2021-04-13
Payer: MEDICARE

## 2021-04-13 DIAGNOSIS — R92.8 ABNORMAL MAMMOGRAM: ICD-10-CM

## 2021-04-13 PROCEDURE — 77065 DX MAMMO INCL CAD UNI: CPT

## 2021-04-13 PROCEDURE — 76642 ULTRASOUND BREAST LIMITED: CPT

## 2021-04-13 NOTE — PROGRESS NOTES
[address]   [address] [place] [address], [address] [place] [address]  [address]   Phone: [phone]         ULTRASOUND BIOPSY EDUCATION    NAME:  Lajune Hashimoto OF BIRTH:  1955   MEDICAL RECORD NUMBER:  [id number]   TODAY'S DATE:  4/13/2021    Referring Physician: Dr. Pool Jacobsen    Procedure: Ultrasound biopsy    Right breast    Date of biopsy: 4/15/21    Patient taking blood thinners: no    Medicine allergies: yes - see chart    Special Instructions: n/a    Biopsy order form faxed to referring MD.          What is an Ultrasound Guided Breast Biopsy? Ultrasound guided breast biopsy is a test that uses ultrasound to find an area of your breast where a tissue sample will be taken. The sample is then looked at under a microscope to check for signs of breast cancer. Why is it done? An Ultrasound biopsy is usually done to check for cancer in a lump or cyst found during a mammogram or ultrasound. Preparing for the test?     * Take your medications as prescribed    You may eat and drink fluids before the test    Take a shower the evening or morning before the biopsy. What happens before the test?  Images are taken to find the exact site to be biopsied.   Your skin is washed with an alcohol prep.   You will be given an injection of medication to numb your breast.   What happens during the test?     Once your breast is numb, a small cut (incision) is made.   Using the imaging, the doctor will guide the needle into the biopsy area.   A sample of breast tissue is taken through the needle.   A small \"Clip\" or Marker is inserted into your breast to santos the biopsy site.   The needle is removed and pressure put on the needle site to stop any bleeding.   A bandage will be placed over the site.   A post mammogram picture will be taken to document the clip placement. How long does the test take? Approximately 60 minutes.   Most of the time is spent finding the area for the biopsy. What are the risks?   Bleeding: You may have some bleeding which can cause bruising, swelling,    or a bleeding under your skin.   Infection:  Signs of infection are redness, swelling, heat, or increasing pain    at the biopsy Site.   Sample size not adequate: This would require repeating the biopsy. What happens after the test?    The nurse will review your post biopsy instructions which include:         Placing an ice pack in your bra.    Wearing a firm fitting bra.    You may use Tylenol (Acetaminiphen) for discomfort. Lab results take about 2-3 business days. The Nurse Navigator or your doctor will call you with the results. Ultrasound Breast Biopsy:     (Please arrive 15 minutes early)                                                 [x] Blood thinner history reviewed with patient    [x] Take all your other medications on your normal routine schedule. [x] You may eat and drink as normal before your biopsy. [x] You may drive yourself. [x] No heavy lifting or exercise for 48 hours after the biopsy. [x] Printed Pre Ultrasound Biopsy Instructions were provided, reviewed with the patient and all questions were answered. The Breast Center's Information:   Should you experience any significant changes in your health or have questions about your care, please contact:  Josefa Viveros or Beverly Zambrano, Breast Navigator's at [facility], [facility]:  [phone]  Monday-Friday. If you need help with your care outside these hours and cannot wait until we are again available, contact your Physician or go to the hospital emergency room. [x] Patient/POA/Caregiver verbalized understanding of instructions.        Electronically signed by Josefa Viveros RN on 4/13/2021 at 2:29 PM

## 2021-04-14 ENCOUNTER — TELEPHONE (OUTPATIENT)
Dept: FAMILY MEDICINE CLINIC | Age: 66
End: 2021-04-14

## 2021-04-14 DIAGNOSIS — R92.8 ABNORMAL MAMMOGRAM: Primary | ICD-10-CM

## 2021-04-14 DIAGNOSIS — N63.10 BREAST MASS, RIGHT: ICD-10-CM

## 2021-04-14 NOTE — TELEPHONE ENCOUNTER
[facility] calling asking if an order can be put in for pts apt for tomorrow.  Right Breast US Guided Biopsy ( [id number] [id number]) pt has an apt tomorrow morning at 1130am. Please Advise

## 2021-04-15 ENCOUNTER — HOSPITAL ENCOUNTER (OUTPATIENT)
Dept: WOMENS IMAGING | Age: 66
Discharge: HOME | End: 2021-04-15
Payer: MEDICARE

## 2021-04-15 DIAGNOSIS — R92.8 ABNORMAL MAMMOGRAM: ICD-10-CM

## 2021-04-15 DIAGNOSIS — N63.10 BREAST MASS, RIGHT: ICD-10-CM

## 2021-04-15 PROCEDURE — 88342 IMHCHEM/IMCYTCHM 1ST ANTB: CPT

## 2021-04-15 PROCEDURE — 88341 IMHCHEM/IMCYTCHM EA ADD ANTB: CPT

## 2021-04-15 PROCEDURE — G0279 TOMOSYNTHESIS, MAMMO: HCPCS

## 2021-04-15 PROCEDURE — 2709999900 US BREAST BIOPSY W LOC DEVICE 1ST LESION RIGHT

## 2021-04-15 PROCEDURE — 88360 TUMOR IMMUNOHISTOCHEM/MANUAL: CPT

## 2021-04-15 PROCEDURE — 88305 TISSUE EXAM BY PATHOLOGIST: CPT

## 2021-04-15 NOTE — PROGRESS NOTES
Your referring physician or the Nurse Navigator will call you with results. The Breast Center Information:   Should you experience any significant changes in your health or have questions about your care, please contact:  Heidi Lutz or Kendy Trevino, Breast Navigators with [facility]  [phone]  Monday-Friday. If you need help with your care outside these hours and cannot wait until we are again available, contact your Physician or go to the hospital emergency room.         Electronically signed by Heidi Lutz RN on 4/15/2021 at 12:37 PM

## 2021-04-19 ENCOUNTER — TELEPHONE (OUTPATIENT)
Dept: WOMENS IMAGING | Age: 66
End: 2021-04-19

## 2021-04-19 ENCOUNTER — OFFICE VISIT (OUTPATIENT)
Dept: FAMILY MEDICINE CLINIC | Age: 66
End: 2021-04-19
Payer: MEDICARE

## 2021-04-19 VITALS
DIASTOLIC BLOOD PRESSURE: 70 MMHG | HEIGHT: 64 IN | BODY MASS INDEX: 25.61 KG/M2 | TEMPERATURE: 98.6 F | OXYGEN SATURATION: 93 % | WEIGHT: 150 LBS | HEART RATE: 92 BPM | SYSTOLIC BLOOD PRESSURE: 90 MMHG

## 2021-04-19 DIAGNOSIS — I10 ESSENTIAL HYPERTENSION: Chronic | ICD-10-CM

## 2021-04-19 DIAGNOSIS — C50.311 MALIGNANT NEOPLASM OF LOWER-INNER QUADRANT OF RIGHT BREAST OF FEMALE, ESTROGEN RECEPTOR POSITIVE (HCC): Primary | ICD-10-CM

## 2021-04-19 DIAGNOSIS — G43.109 MIGRAINE WITH AURA AND WITHOUT STATUS MIGRAINOSUS, NOT INTRACTABLE: ICD-10-CM

## 2021-04-19 DIAGNOSIS — Z17.0 MALIGNANT NEOPLASM OF LOWER-INNER QUADRANT OF RIGHT BREAST OF FEMALE, ESTROGEN RECEPTOR POSITIVE (HCC): Primary | ICD-10-CM

## 2021-04-19 PROCEDURE — 1090F PRES/ABSN URINE INCON ASSESS: CPT | Performed by: PHYSICIAN ASSISTANT

## 2021-04-19 PROCEDURE — 4040F PNEUMOC VAC/ADMIN/RCVD: CPT | Performed by: PHYSICIAN ASSISTANT

## 2021-04-19 PROCEDURE — 1036F TOBACCO NON-USER: CPT | Performed by: PHYSICIAN ASSISTANT

## 2021-04-19 PROCEDURE — 99213 OFFICE O/P EST LOW 20 MIN: CPT | Performed by: PHYSICIAN ASSISTANT

## 2021-04-19 PROCEDURE — 3017F COLORECTAL CA SCREEN DOC REV: CPT | Performed by: PHYSICIAN ASSISTANT

## 2021-04-19 PROCEDURE — 1123F ACP DISCUSS/DSCN MKR DOCD: CPT | Performed by: PHYSICIAN ASSISTANT

## 2021-04-19 PROCEDURE — G8399 PT W/DXA RESULTS DOCUMENT: HCPCS | Performed by: PHYSICIAN ASSISTANT

## 2021-04-19 PROCEDURE — G8427 DOCREV CUR MEDS BY ELIG CLIN: HCPCS | Performed by: PHYSICIAN ASSISTANT

## 2021-04-19 PROCEDURE — G8417 CALC BMI ABV UP PARAM F/U: HCPCS | Performed by: PHYSICIAN ASSISTANT

## 2021-04-19 RX ORDER — KETOROLAC TROMETHAMINE 5 MG/ML
1 SOLUTION OPHTHALMIC 2 TIMES DAILY
COMMUNITY
Start: 2021-04-08

## 2021-04-19 RX ORDER — BENZONATATE 100 MG/1
100 CAPSULE ORAL 3 TIMES DAILY PRN
Qty: 30 CAPSULE | Refills: 0 | Status: SHIPPED | OUTPATIENT
Start: 2021-04-19 | End: 2021-04-26

## 2021-04-19 RX ORDER — PROPRANOLOL HYDROCHLORIDE 40 MG/1
40 TABLET ORAL DAILY
Qty: 90 TABLET | Refills: 1 | Status: SHIPPED | OUTPATIENT
Start: 2021-04-19 | End: 2021-05-07 | Stop reason: SINTOL

## 2021-04-19 NOTE — TELEPHONE ENCOUNTER
Pathology for breast biopsy complete, radiologist confirms concordance. Reviewed breast biopsy results with patient and answered all questions. The radiologist is recommending that the patient see a breast surgeon regarding biopsy results. Per patient request, referral made to [facility] Surgeon, Dr. Bryant Duggan. Path report faxed to Liset Sparrow MD.  Breast Navigator will remain available for any questions. Pt verbalized understanding.       Mei Novak RN

## 2021-04-19 NOTE — PROGRESS NOTES
Take 1 tablet by mouth every 6 hours as needed for Pain. Indications: Pulmonary Specialist 1/984HK      Certolizumab Pegol (CIMZIA SC) Inject into the skin Indications: 1 shot every 6 weeks       cyclobenzaprine (FLEXERIL) 10 MG tablet Take 10 mg by mouth 3 times daily as needed for Muscle spasms      methotrexate (RHEUMATREX) 2.5 MG chemo tablet Take 7.5 mg by mouth every 7 days       fluocinonide (LIDEX) 0.05 % cream Apply topically 2 times daily Apply topically 2 times daily.  promethazine (PHENERGAN) 12.5 MG tablet Take 25 mg by mouth every 6 hours as needed for Nausea Indications: Dr. Nina Wills       traMADol (ULTRAM) 50 MG tablet Take 50 mg by mouth every 6 hours as needed.  linaclotide (LINZESS) 290 MCG CAPS capsule Take 1 capsule by mouth every morning (before breakfast) 30 capsule 1     No current facility-administered medications for this visit. Vitals:    04/19/21 1057   BP: 90/70   Site: Right Upper Arm   Position: Sitting   Cuff Size: Small Adult   Pulse: 92   Temp: 98.6 °F (37 °C)   TempSrc: Oral   SpO2: 93%  Comment: RA   Weight: 150 lb (68 kg)  Comment: Pt would not stand on scale, reported this wt from this am   Height: 5' 3.5\" (1.613 m)     Estimated body mass index is 26.15 kg/m² as calculated from the following:    Height as of this encounter: 5' 3.5\" (1.613 m). Weight as of this encounter: 150 lb (68 kg). Physical Exam  Constitutional:       General: She is not in acute distress. Appearance: She is well-developed. HENT:      Head: Normocephalic and atraumatic. Eyes:      Conjunctiva/sclera: Conjunctivae normal.      Pupils: Pupils are equal, round, and reactive to light. Neck:      Musculoskeletal: Neck supple. Cardiovascular:      Rate and Rhythm: Normal rate and regular rhythm. Heart sounds: Normal heart sounds. No murmur. Pulmonary:      Effort: Pulmonary effort is normal.      Breath sounds: Normal breath sounds. No wheezing.    Abdominal: General: Bowel sounds are normal.      Palpations: Abdomen is soft. Tenderness: There is no abdominal tenderness. Lymphadenopathy:      Cervical: No cervical adenopathy. Skin:     General: Skin is warm and dry. Findings: No rash. Neurological:      Mental Status: She is alert and oriented to person, place, and time. Deep Tendon Reflexes: Reflexes are normal and symmetric. ASSESSMENT and PLAN:  Scott Al was seen today for hypertension and anxiety. Diagnoses and all orders for this visit:    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 breast of female, estrogen receptor positive ([facility] [address]  - reviewed imaging with patient, she has received referral for onc and breast, has upcoming appt scheduled. Essential hypertension  - low in office today. - stop atenolol, trial low dose propranolol for migraine ppx and htn. Migraine with aura and without status migrainosus, not intractable  -     propranolol (INDERAL) 40 MG tablet; Take 1 tablet by mouth daily    Other orders  -     benzonatate (TESSALON) 100 MG capsule; Take 1 capsule by mouth 3 times daily as needed for Cough      Return in about 3 months (around 7/19/2021) for HTN.

## 2021-04-21 ENCOUNTER — OFFICE VISIT (OUTPATIENT)
Dept: GASTROENTEROLOGY | Age: 66
End: 2021-04-21
Payer: MEDICARE

## 2021-04-21 VITALS
HEIGHT: 64 IN | DIASTOLIC BLOOD PRESSURE: 95 MMHG | BODY MASS INDEX: 26.15 KG/M2 | HEART RATE: 100 BPM | SYSTOLIC BLOOD PRESSURE: 132 MMHG

## 2021-04-21 DIAGNOSIS — K58.1 IRRITABLE BOWEL SYNDROME WITH CONSTIPATION: ICD-10-CM

## 2021-04-21 DIAGNOSIS — R10.9 RIGHT SIDED ABDOMINAL PAIN: Primary | ICD-10-CM

## 2021-04-21 PROCEDURE — 1090F PRES/ABSN URINE INCON ASSESS: CPT | Performed by: INTERNAL MEDICINE

## 2021-04-21 PROCEDURE — G8427 DOCREV CUR MEDS BY ELIG CLIN: HCPCS | Performed by: INTERNAL MEDICINE

## 2021-04-21 PROCEDURE — 3017F COLORECTAL CA SCREEN DOC REV: CPT | Performed by: INTERNAL MEDICINE

## 2021-04-21 PROCEDURE — 99213 OFFICE O/P EST LOW 20 MIN: CPT | Performed by: INTERNAL MEDICINE

## 2021-04-21 PROCEDURE — G8399 PT W/DXA RESULTS DOCUMENT: HCPCS | Performed by: INTERNAL MEDICINE

## 2021-04-21 PROCEDURE — 1036F TOBACCO NON-USER: CPT | Performed by: INTERNAL MEDICINE

## 2021-04-21 PROCEDURE — 4040F PNEUMOC VAC/ADMIN/RCVD: CPT | Performed by: INTERNAL MEDICINE

## 2021-04-21 PROCEDURE — 1123F ACP DISCUSS/DSCN MKR DOCD: CPT | Performed by: INTERNAL MEDICINE

## 2021-04-21 PROCEDURE — G8417 CALC BMI ABV UP PARAM F/U: HCPCS | Performed by: INTERNAL MEDICINE

## 2021-04-21 NOTE — PROGRESS NOTES
Gynecologic History  Menarche at age 14    She delivered her first child at age 25, and did breastfeed  Postmenopausal at age 39  Hysterectomy with BSO- Left ovary removed in , right ovary removed at time of histerectomy  Oral contraceptive use: yes for 10 years and is not currently taking  Hormone use: yes for 8 years and stopped greater than 5 years ago    Bra Size: 36 DD

## 2021-04-21 NOTE — PROGRESS NOTES
[address] ,  [address] [facility]  Phone: [phone]    CHIEF COMPLAINT     Chief Complaint   Patient presents with    Constipation        HPI     Deb Martin is a 72 y.o. female who presents for followup for constipation. Former patient of Dr Ariane Vega, seen once before by me. Linzess helped her constipation while she was on this. Was having bms almost every day with it on but finished the samples given 4 days ago and has not had a BM since then. She has bms every 3-4 days without taking Linzess and when constipated has a lot of more right sided abdominal pain and bloating. Pain is persistent and constant and mostly over the entire right side, constipation makes it worse but pain does not resolve with bms but may get better. No blood in the stools. No emesis. Has a new dx of right breast cancer for which she will be seeing a surgeon and Oncology now. Since last visit a CT was done 3/6/21  Nonobstructing left renal calculus.       Bilateral renal cyst.       Status post splenectomy surgery of the small bowel.  No acute abnormality   noted.       The colon appears grossly unremarkable in appearance with evidence of   diverticulosis and no evidence of diverticulitis.  No definite persistent   obstruction is identified.  Relative decompression of the descending colon   limits evaluation for stricture.             Barium enema done 3/15/21 to rule out sigmoid stricture  Hypaque was infused into the rectum in retrograde fashion via gravity.  There   are a few scattered diverticula within the sigmoid region. Micah Duster is no   evidence of stricture. Micah Duster is extensive retained stool throughout the   entirety of the colon precluding assessment of polyps.  Free reflux of   contrast is demonstrated through the ileocecal valve. Prior visit:  Deb Martin is a 72 y.o. female who presents for abdominal pain.  Patient of Dr Ariane Vega formerly in our practice now here to establish with me. Longstanding history of GI symptoms, Dr Yaneli Blanco notes reviewed. She says she has continued to have significant issues with abdominal pain - severe, present over the right UQ and the lower abdominal, worsens intermittently with more intense sharp 'razor like' pains. If she eats 'anything more than rice' she has a lot more pain. She does not have blood in the stools. History of prior right colon resection for cecal volvulus. She says she gets Tramadol for joint pain and takes this. She is here with her  who was present throughout the visit. She says she has alternating constipation and diarrhea and this is a longstanding complaint.     EGD and colonoscopy 6/16/2020 with Dr Elizabeth Rodriges normal esophagus, stomach, and duodenum  -Small (< 3 cm) mixed sliding with probable small paraesophageal component hiatal hernia. The esophagus was dilated with a 54F [place] NORTH with mild resistance and no heme.   Colonoscopy - diverticulosis, marked in degree, involving the sigmoid with resultant stricture.  Had to switch to a pediatric scope to get through and this was still difficult.  -evidence of previous surgery with ileocolonic anastomosis in the right colon     Prior visit with Dr Elizabeth Rodriges - Has multi organ system sarcoid including liver, eye.  Has arthritis. Allyne [place] fosamax weekly.  Has occasional bread dysphagia.  Has frequent diarrhea often food dependent.  Some meds will constipate her.  Got recent scripts for questran and levbid.  Previously took imodium.  Had diarrhea even before having cecal volvulus resulting in cecal and small bowel resection in 8/2012.  Has had US and HIDA.  Has some small cysts noted in the liver.  Does not take asa or nsaids.  Previously had a lot of n/v that is improved with protonix. Antoine Esau a hiatal hernia on last CT 2.5 years ago. Sherrine Meigs not tried it bid.  Not having what she would consider heartburn.  No pertinent GI family history.     PAST MEDICAL HISTORY     Past Medical History:   Diagnosis Date    Chronic diarrhea     Dr. Rosemary Grande    Chronic kidney disease     kidney stones    Clostridium difficile diarrhea 10/23/13    positive by PCR    Fibromyalgia     Hemorrhoid     Hyperlipidemia     Hypertension     Kidney stone     Osteoarthritis     fibromyalgia    Sarcoidosis 2003    sees Dr. Jackie Carvalho History   Problem Relation Age of Onset    Heart Disease Father     Cancer Father         skin cancer- unknown    Cancer Brother         skin cancer- forehead and nose- unknown     SOCIAL HISTORY     Social History     Socioeconomic History    Marital status:      Spouse name: Lynda Sanz Number of children: 3    Years of education: Not on file    Highest education level: Not on file   Occupational History    Occupation: disabilty    Social Needs    Financial resource strain: Not hard at all   [employer] insecurity     Worry: Never true     Inability: Never true   [employer] needs     Medical: No     Non-medical: No   Tobacco Use    Smoking status: Former Smoker     Packs/day: 0.50     Years: 20.00     Pack years: 10.00     Types: Cigarettes     Start date: 3/1/2019     Quit date: 2020     Years since quittin.6    Smokeless tobacco: Never Used   Substance and Sexual Activity    Alcohol use: No     Alcohol/week: 0.0 standard drinks    Drug use: No    Sexual activity: Not on file   Lifestyle    Physical activity     Days per week: Not on file     Minutes per session: Not on file    Stress: Not on file   Relationships    Social connections     Talks on phone: Not on file     Gets together: Not on file     Attends Congregation service: Not on file     Active member of club or organization: Not on file     Attends meetings of clubs or organizations: Not on file     Relationship status: Not on file    Intimate partner violence     Fear of current or ex partner: Not on file     Emotionally abused: Not on file     Physically abused: Not on file     Forced sexual activity: Not on file   Other Topics Concern    Not on file   Social History Narrative    Not on file     SURGICAL HISTORY     Past Surgical History:   Procedure Laterality Date    ABDOMINAL EXPLORATION SURGERY  8/19/12    REDUCTION OF VULVUS; RIGHT COLECTOMY; SMALL BOWEL RESECTION; INSERTION OF ON Q PAIN BUSTER    ANGIOPLASTY  10/2010    BREAST BIOPSY      COLONOSCOPY  2010    normal    COLONOSCOPY N/A 6/16/2020    COLON W/ANES. performed by Kaleb Espino MD at [address]  12/2011    ESOPHAGEAL DILATATION  6/16/2020    ESOPHAGEAL DILATION Oliver Chew performed by Kaleb Espino MD at [facility] [place]  5/2009    cataract, right    HYSTERECTOMY  2000    LITHOTRIPSY  1/19/2012    LITHOTRIPSY      04/2012    OVARIAN CYST REMOVAL  1975    OVARY REMOVAL  1986    right    PARTIAL HYSTERECTOMY      SPLENECTOMY  1983    SPLENECTOMY      1983    TONSILLECTOMY  1968    UPPER GASTROINTESTINAL ENDOSCOPY  2/27/13    UPPER GASTROINTESTINAL ENDOSCOPY  11/14/2017    gastritis    UPPER GASTROINTESTINAL ENDOSCOPY N/A 6/16/2020    EGD W/ANES.  (11:00) performed by Kaleb Espino MD at [place] [address]  12/2011    US BREAST NEEDLE BIOPSY RIGHT Right 4/15/2021    US BREAST NEEDLE BIOPSY RIGHT 4/15/2021 Tabatha Loza MD [facility] [address]   (This list may include medications prescribed during this encounter as epic can not insert only the list prior to this encounter.)  Current Outpatient Rx   Medication Sig Dispense Refill    ketorolac (ACULAR) 0.5 % ophthalmic solution       fluticasone-salmeterol (WIXELA INHUB) 250-50 MCG/DOSE AEPB Inhale 1 puff into the lungs      propranolol (INDERAL) 40 MG tablet Take 1 tablet by mouth daily 90 tablet 1    benzonatate (TESSALON) 100 MG capsule Take 1 capsule by mouth 3 times daily as needed for Cough 30 capsule 0    mirtazapine (REMERON) 15 MG tablet TAKE 1 TABLET BY MOUTH EVERY DAY AT NIGHT 90 tablet 1    valACYclovir (VALTREX) 500 MG tablet Take One Tablet By Mouth Every Day 90 tablet 1    ALPRAZolam (XANAX) 1 MG tablet TAKE 1 TABLET BY MOUTH 2 TIMES DAILY AS NEEDED FOR anxiety 60 tablet 0    traZODone (DESYREL) 100 MG tablet Take 1 tablet by mouth nightly as needed for Sleep 90 tablet 1    sertraline (ZOLOFT) 100 MG tablet TAKE 1 AND 1/2 TABLET BY MOUTH DAILY 135 tablet 1    montelukast (SINGULAIR) 10 MG tablet TAKE 1 TABLET BY MOUTH EVERY DAY 90 tablet 1    spironolactone (ALDACTONE) 50 MG tablet TAKE 1 TABLET BY MOUTH EVERY DAY 90 tablet 1    fluticasone-salmeterol (ADVAIR) 250-50 MCG/DOSE AEPB Inhale into the lungs 2 times daily      albuterol sulfate  (90 Base) MCG/ACT inhaler 2 puffs q 4 prn      modafinil (PROVIGIL) 100 MG tablet Take 100 mg by mouth every morning.  pantoprazole (PROTONIX) 40 MG tablet TAKE 1 TABLET BY MOUTH TWICE A DAY BEFORE MEALS 180 tablet 1    atorvastatin (LIPITOR) 20 MG tablet TAKE 1 TABLET BY MOUTH EVERY DAY 90 tablet 1    alendronate (FOSAMAX) 70 MG tablet TAKE 1 TABLET BY MOUTH EVERY 7 DAYS 12 tablet 1    rizatriptan (MAXALT) 5 MG tablet Take 1 tablet by mouth daily as needed for Migraine May repeat in 2 hours if needed 27 tablet 1    methotrexate (RHEUMATREX) 2.5 MG chemo tablet 3 TABLETS EVERY WEEK      folic acid (FOLVITE) 109 MCG tablet Take 400 mcg by mouth nightly 2 tablets nightly      predniSONE (DELTASONE) 5 MG tablet Take 5 mg by mouth every other day      Carboxymethylcellulose Sodium (EYE DROPS OP) Apply to eye 2 times daily Durazol -      diclofenac sodium 1 % GEL Apply topically Indications: [facility]  prescribes       difluprednate (DUREZOL) 0.05 % EMUL Apply 1 drop to eye      gabapentin (NEURONTIN) 400 MG capsule 400 mg 3 times daily.  Indications: Prescribed by Arthritis    3    HYDROcodone-acetaminophen (Randine Ped) 5-325 MG per tablet Take 1 tablet by mouth every 6 hours as needed for Pain. Indications: Pulmonary Specialist 5/961PC      Certolizumab Pegol (CIMZIA SC) Inject into the skin Indications: 1 shot every 6 weeks       cyclobenzaprine (FLEXERIL) 10 MG tablet Take 10 mg by mouth 3 times daily as needed for Muscle spasms      methotrexate (RHEUMATREX) 2.5 MG chemo tablet Take 7.5 mg by mouth every 7 days       fluocinonide (LIDEX) 0.05 % cream Apply topically 2 times daily Apply topically 2 times daily.  promethazine (PHENERGAN) 12.5 MG tablet Take 25 mg by mouth every 6 hours as needed for Nausea Indications: Dr. Rylee Eller       traMADol (ULTRAM) 50 MG tablet Take 50 mg by mouth every 6 hours as needed. ALLERGIES     Allergies   Allergen Reactions    Ultrasound Cream Rash     Reports severe rash from ultrasound gel     Infliximab      Severe drop in blood pressure    Ultrasound Gel Other (See Comments)     burn    Amoxicillin Rash    Codeine Nausea And Vomiting    Penicillins Rash       IMMUNIZATIONS     Immunization History   Administered Date(s) Administered    Influenza 10/06/2010, 10/21/2013    Influenza Virus Vaccine 10/23/2015    Influenza Whole 10/01/2012    Influenza, Quadv, IM, PF (6 mo and older Fluzone, Flulaval, Fluarix, and 3 yrs and older Afluria) 01/03/2017, 09/19/2017, 10/17/2018, 10/18/2019    Influenza, Quadv, adjuvanted, 65 yrs +, IM, PF (Fluad) 01/18/2021    Pneumococcal Conjugate 13-valent (Rhodyyf18) 10/23/2015    Pneumococcal Conjugate 7-valent (Prevnar7) 10/01/2012    Pneumococcal Polysaccharide ([id number]) 02/03/2009, 10/06/2010, 10/21/2013, 01/18/2021    Tdap (Boostrix, Adacel) 02/03/2009       REVIEW OF SYSTEMS     Constitutional: denies fever, weight change if present has been documented in HPI   HENT: Negative for ear pain, hearing loss and nosebleeds. Eyes: Negative for pain and visual disturbance. Respiratory: Negative for cough, shortness of breath and wheezing. Cardiovascular: Negative for chest pain, palpitations and leg swelling. Gastrointestinal: see HPI for details. Musculoskeletal: Positive for arthralgias and back pain. Skin: Negative for pallor and rash. Hematological: Negative for adenopathy. Does not bruise/bleed easily. Psychiatric/Behavioral: Negative for agitation, confusion, hallucinations. PHYSICAL EXAM   VITAL SIGNS: BP (!) 132/95 (Site: Left Wrist, Position: Sitting, Cuff Size: Medium Adult)   Pulse 100   Ht 5' 3.5\" (1.613 m)   BMI 26.15 kg/m²   Wt Readings from Last 3 Encounters:   04/19/21 150 lb (68 kg)   02/26/21 160 lb (72.6 kg)   01/18/21 159 lb (72.1 kg)     Gen: AAO3, NAD  HEENT: no pallor or icterus  RS: clear to auscultation bilaterally  CVS: S1S2 RRR, no murmurs  GI: ttp right side, no rebound, BS+, no hepatosplenomegaly  Ext: no edema or clubbing       FINAL IMPRESSION     1. Right sided abdominal pain, chronic, etiology not clear, no clear explanation on CT. Has had negative US and HIDA scan last year. Colonoscopy with Dr Floyd Lilly suggested some sigmoid narrowing but barium enema and CT do not see obstruction. Barium enema showed large amount of stool throughout the colon so perhaps some contribution from the constipation.   Has IBS-constipation - Linzess helped, will write a script 290 mcg daily  Can try IB Guard over the counter  Follow back 2-3 months

## 2021-04-22 ENCOUNTER — OFFICE VISIT (OUTPATIENT)
Dept: SURGERY | Age: 66
End: 2021-04-22
Payer: MEDICARE

## 2021-04-22 VITALS
HEART RATE: 71 BPM | RESPIRATION RATE: 18 BRPM | DIASTOLIC BLOOD PRESSURE: 70 MMHG | BODY MASS INDEX: 26.63 KG/M2 | HEIGHT: 64 IN | OXYGEN SATURATION: 97 % | SYSTOLIC BLOOD PRESSURE: 94 MMHG | WEIGHT: 156 LBS

## 2021-04-22 DIAGNOSIS — Z17.0 MALIGNANT NEOPLASM OF LOWER-INNER QUADRANT OF RIGHT BREAST OF FEMALE, ESTROGEN RECEPTOR POSITIVE (HCC): Primary | ICD-10-CM

## 2021-04-22 DIAGNOSIS — C50.311 MALIGNANT NEOPLASM OF LOWER-INNER QUADRANT OF RIGHT BREAST OF FEMALE, ESTROGEN RECEPTOR POSITIVE (HCC): Primary | ICD-10-CM

## 2021-04-22 PROCEDURE — G8417 CALC BMI ABV UP PARAM F/U: HCPCS | Performed by: SURGERY

## 2021-04-22 PROCEDURE — 99205 OFFICE O/P NEW HI 60 MIN: CPT | Performed by: SURGERY

## 2021-04-22 PROCEDURE — 1123F ACP DISCUSS/DSCN MKR DOCD: CPT | Performed by: SURGERY

## 2021-04-22 PROCEDURE — G8427 DOCREV CUR MEDS BY ELIG CLIN: HCPCS | Performed by: SURGERY

## 2021-04-22 PROCEDURE — 4040F PNEUMOC VAC/ADMIN/RCVD: CPT | Performed by: SURGERY

## 2021-04-22 PROCEDURE — G8399 PT W/DXA RESULTS DOCUMENT: HCPCS | Performed by: SURGERY

## 2021-04-22 PROCEDURE — 1036F TOBACCO NON-USER: CPT | Performed by: SURGERY

## 2021-04-22 PROCEDURE — 1090F PRES/ABSN URINE INCON ASSESS: CPT | Performed by: SURGERY

## 2021-04-22 PROCEDURE — 3017F COLORECTAL CA SCREEN DOC REV: CPT | Performed by: SURGERY

## 2021-04-22 ASSESSMENT — ENCOUNTER SYMPTOMS
COUGH: 0
ABDOMINAL PAIN: 1
SHORTNESS OF BREATH: 1

## 2021-04-22 NOTE — Clinical Note
I spoke with patient last night and she is expecting a call from our office today. Please call patient and review pre-op instructions, schedule covid test, and postop visit.

## 2021-04-22 NOTE — LETTER
Surgery Scheduling Form:    DEMOGRAPHICS:                                                                                                         .  Patient Name:  Abdi Dia  Patient :  1955   Patient SS#:  [id number]    Patient Phone:  [phone] (home)  Alt. Patient Phone:                     Patient Address:  [facility] [employer]  [address] [address]    PCP:  Kristyn Shah MD  Insurance:  Payor: MEDICARE / Plan: MEDICARE PART A AND B / Product Type: *No Product type* /   Insurance ID Number:    Payor/Plan Subscr  Sex Relation Sub. Ins. ID Effective Group Num   1. MEDICARE - MEVelva Sox A 1955 Female Self [id number] 1/1/15 [id number]                                   [address] 1955 Female Self [id number] 1/1/15 [id number]                                   [address] [address]     Interpretor Needed:  no         LATEX ALLERGY:  no  Defibulator or Pacemaker:  no    DIAGNOSIS & PROCEDURE:                                                                                       .  Diagnosis Description:   right breast cancer Diagnosis Code:  C50.311  Operation Description:  bilateral simple mastectomy, right sentinel lymph node biopsy  Location: [address] [place], [place] [id number]  Surgeon:  Mk Leal MD    [facility] INFORMATION:                                                                                    .  Surgeon's Scheduling Instruction:  elective  Requested Date: 2021   OR Time: 0730  Patient Arrival Time: 0600  OR Time Required: 4 hours Anesthesia:  general  Equipment:  Neoprobe         SA Required:  YES    Status:  Outpatient      Standard C-Arm:  No  PAT Required: YES    Pt. Requested to see PCP for Pre-op H & P: Yes  Cardiac Clearance Requested: Per PCP  Special Comments:   Does not need to go to [facility]. and Please notify Nuclear Medicine to be available for injection in the OR.             Zahra Romero MD    [birth year]   PRE-CERTIFICATION INFORMATION:                                                                           .  Procedure:       CPT Code Modifier  bilateral simple mastectomy, right sentinel lymph node biopsy [id number], [id number]

## 2021-04-22 NOTE — PROGRESS NOTES
Review of Systems   Constitutional: Negative for unexpected weight change. Eyes: Negative for visual disturbance. Respiratory: Positive for shortness of breath (hx of chronic bronchitis). Negative for cough. Cardiovascular: Negative for chest pain and palpitations. Gastrointestinal: Positive for abdominal pain (hx of colon resection). Musculoskeletal: Negative for arthralgias and myalgias. Neurological: Positive for headaches (hx of migraines). Hematological: Negative for adenopathy. Does not bruise/bleed easily. Psychiatric/Behavioral: Negative for dysphoric mood. The patient is nervous/anxious (hx of anxiety).

## 2021-04-22 NOTE — PROGRESS NOTES
21    CHIEF COMPLAINT:  New diagnosis of right breast cancer. HISTORY OF PRESENT ILLNESS:  Mariel Gustafson is a 72 y.o. woman who requested that I evaluate her for her new diagnosis of right breast cancer. The patient states that she was undergoing her routine mammogram on 3/31/2021 when she was alerted to an abnormality in the right breast.  She underwent additional imaging and ultimately a core biopsy, which confirmed an invasive breast cancer. She presents today to discuss further management. The patient states that she herself has not noticed any abnormal masses in either breast.  She denies any change in the appearance of her breasts or the skin of her breasts. She denies bilateral nipple discharge. She has no other systemic complaints and otherwise feels well today. Her primary care office recently changed her blood pressure medicine as she has had low blood pressure recently. She follows with pulmonology and rheumatology for sarcoidosis.     Pertinent Family History: No family history of breast or ovarian cancer    GYNECOLOGIC HISTORY:  Menarche at age 14    She delivered her first child at age 25, and did breastfeed  Postmenopausal at age 39  Hysterectomy with BSO- Left ovary removed in , right ovary removed at time of histerectomy  Oral contraceptive use: yes for 10 years and is not currently taking  Hormone use: yes for 8 years and stopped greater than 5 years ago    Past Medical History:   Diagnosis Date    Chronic diarrhea     Dr. Lynette Quezada    Chronic kidney disease     kidney stones    Clostridium difficile diarrhea 10/23/13    positive by PCR    Fibromyalgia     Hemorrhoid     Hyperlipidemia     Hypertension     Kidney stone     Osteoarthritis     fibromyalgia    Sarcoidosis     sees Dr. Natalya Zamarripa     Past Surgical History:   Procedure Laterality Date    ABDOMINAL EXPLORATION SURGERY  12    REDUCTION OF VULVUS; RIGHT COLECTOMY; SMALL BOWEL RESECTION; INSERTION OF ON Q PAIN BUSTER    ANGIOPLASTY  10/2010    BREAST BIOPSY      COLONOSCOPY  2010    normal    COLONOSCOPY N/A 6/16/2020    COLON W/ANES. performed by Elaine Bain MD at [address]  12/2011    ESOPHAGEAL DILATATION  6/16/2020    ESOPHAGEAL DILATION Padmini Gutiérrez performed by Elaine Bain MD at [address]  5/2009    cataract, right    HYSTERECTOMY  2000    LITHOTRIPSY  1/19/2012    LITHOTRIPSY      04/2012    OVARIAN CYST REMOVAL  1975    OVARY REMOVAL  1986    right    PARTIAL HYSTERECTOMY      SPLENECTOMY  1983    SPLENECTOMY      1983    TONSILLECTOMY  1968    UPPER GASTROINTESTINAL ENDOSCOPY  2/27/13    UPPER GASTROINTESTINAL ENDOSCOPY  11/14/2017    gastritis    UPPER GASTROINTESTINAL ENDOSCOPY N/A 6/16/2020    EGD W/ANES.  (11:00) performed by Elaine Bain MD at [place] [address]  12/2011    US BREAST NEEDLE BIOPSY RIGHT Right 4/15/2021     BREAST NEEDLE BIOPSY RIGHT 4/15/2021 Johnny Spivey MD [facility]     Current Outpatient Medications   Medication Sig Dispense Refill    linaclotide (LINZESS) 290 MCG CAPS capsule Take 1 capsule by mouth every morning (before breakfast) 30 capsule 1    ketorolac (ACULAR) 0.5 % ophthalmic solution       fluticasone-salmeterol (WIXELA INHUB) 250-50 MCG/DOSE AEPB Inhale 1 puff into the lungs      propranolol (INDERAL) 40 MG tablet Take 1 tablet by mouth daily 90 tablet 1    benzonatate (TESSALON) 100 MG capsule Take 1 capsule by mouth 3 times daily as needed for Cough 30 capsule 0    mirtazapine (REMERON) 15 MG tablet TAKE 1 TABLET BY MOUTH EVERY DAY AT NIGHT 90 tablet 1    valACYclovir (VALTREX) 500 MG tablet Take One Tablet By Mouth Every Day 90 tablet 1    ALPRAZolam (XANAX) 1 MG tablet TAKE 1 TABLET BY MOUTH 2 TIMES DAILY AS NEEDED FOR anxiety 60 tablet 0    traZODone (DESYREL) 100 MG tablet Take 1 tablet by mouth nightly as needed for Sleep 90 tablet 1    sertraline (ZOLOFT) 100 MG tablet TAKE 1 AND 1/2 TABLET BY MOUTH DAILY 135 tablet 1    montelukast (SINGULAIR) 10 MG tablet TAKE 1 TABLET BY MOUTH EVERY DAY 90 tablet 1    spironolactone (ALDACTONE) 50 MG tablet TAKE 1 TABLET BY MOUTH EVERY DAY 90 tablet 1    fluticasone-salmeterol (ADVAIR) 250-50 MCG/DOSE AEPB Inhale into the lungs 2 times daily      albuterol sulfate  (90 Base) MCG/ACT inhaler 2 puffs q 4 prn      modafinil (PROVIGIL) 100 MG tablet Take 100 mg by mouth every morning.  pantoprazole (PROTONIX) 40 MG tablet TAKE 1 TABLET BY MOUTH TWICE A DAY BEFORE MEALS 180 tablet 1    atorvastatin (LIPITOR) 20 MG tablet TAKE 1 TABLET BY MOUTH EVERY DAY 90 tablet 1    alendronate (FOSAMAX) 70 MG tablet TAKE 1 TABLET BY MOUTH EVERY 7 DAYS 12 tablet 1    rizatriptan (MAXALT) 5 MG tablet Take 1 tablet by mouth daily as needed for Migraine May repeat in 2 hours if needed 27 tablet 1    methotrexate (RHEUMATREX) 2.5 MG chemo tablet 3 TABLETS EVERY WEEK      folic acid (FOLVITE) 927 MCG tablet Take 400 mcg by mouth nightly 2 tablets nightly      predniSONE (DELTASONE) 5 MG tablet Take 5 mg by mouth every other day      Carboxymethylcellulose Sodium (EYE DROPS OP) Apply to eye 2 times daily Durazol -      diclofenac sodium 1 % GEL Apply topically Indications: [facility]  prescribes       difluprednate (DUREZOL) 0.05 % EMUL Apply 1 drop to eye      gabapentin (NEURONTIN) 400 MG capsule 400 mg 3 times daily. Indications: Prescribed by [facility]    3    HYDROcodone-acetaminophen (NORCO) 5-325 MG per tablet Take 1 tablet by mouth every 6 hours as needed for Pain.  Indications: Pulmonary Specialist [id number]/235VW      Certolizumab Pegol (CIMZIA SC) Inject into the skin Indications: 1 shot every 6 weeks       cyclobenzaprine (FLEXERIL) 10 MG tablet Take 10 mg by mouth 3 times daily as needed for Muscle spasms      methotrexate (RHEUMATREX) 2.5 MG chemo tablet Take 7.5 mg by mouth Fear of current or ex partner: Not on file     Emotionally abused: Not on file     Physically abused: Not on file     Forced sexual activity: Not on file   Other Topics Concern    Not on file   Social History Narrative    Not on file     Family History   Problem Relation Age of Onset    Heart Disease Father     Cancer Father         skin cancer- unknown    Cancer Brother         skin cancer- forehead and nose- unknown     REVIEW OF SYSTEMS:   Constitutional: Negative for unexpected weight change. Eyes: Negative for visual disturbance. Respiratory: Positive for shortness of breath (hx of chronic bronchitis). Negative for cough. Cardiovascular: Negative for chest pain and palpitations. Gastrointestinal: Positive for abdominal pain (hx of colon resection). Musculoskeletal: Negative for arthralgias and myalgias. Neurological: Positive for headaches (hx of migraines). Hematological: Negative for adenopathy. Does not bruise/bleed easily. Psychiatric/Behavioral: Negative for dysphoric mood. The patient is nervous/anxious (hx of anxiety). I personally reviewed and agree with the above ROS as documented by my medical assistant. PHYSICAL EXAMINATION:   Vitals: BP 94/70 (Site: Right Upper Arm, Position: Sitting, Cuff Size: Medium Adult)   Pulse 71   Resp 18   Ht 5' 3.5\" (1.613 m)   Wt 156 lb (70.8 kg)   SpO2 97%   BMI 27.20 kg/m²   General: Well-developed, well-nourished, in no apparent distress. Eyes:  Conjunctivae appear normal. Pupils are equal and reactive. Extraocular movements are intact. The sclerae are not injected and show no jaundice. Nose, Mouth and Throat:  Patient is wearing a mask. Neck: Supple, without thyromegaly or adenopathy. Respiratory: Normal respiratory effort. Cardiovascular: Regular rate and rhythm. No lower extremity edema. Gastrointestinal: Soft, nontender, nondistended, without obvious masses or hernias.   Musculoskeletal: Normal gait and range of motion in all 4 extremities. Psychiatric: Alert and oriented x 3. Appropriate affect and behavior for today's visit. Skin: No concerning rashes, lesions, nodules or other skin changes. Lymphatic System:  No concerning cervical, supraclavicular or axillary lymphadenopathy. Breast Exam:  The breasts are normal in contour. Bra size 36DD. Right Breast: Examination of the right breast in the upright and supine positions reveal no obvious masses, skin changes, dimpling, or retraction. The nipple and areola are without erosion, edema or ulceration. There is no obvious nipple discharge. There is no concerning axillary adenopathy. Left Breast: Examination of the left breast in the upright and supine positions reveal no obvious masses, skin changes, dimpling, or retraction. The nipple and areola are without erosion, edema or ulceration. There is no obvious nipple discharge. There is no concerning axillary adenopathy. IMAGING: I personally reviewed the breast imaging performed from March and April 2021 and discussed this with the patient. There is an asymmetry within the lower inner right breast at mid depth on mammography. Right breast ultrasound demonstrates a 7 mm mass at 5:00 4 CFN. She was given a BI-RADS 5. Breast density is described as fibroglandular densities. PATHOLOGY:  I reviewed the right breast biopsy pathology from 4/15/2021 with her today as well. This demonstrated an invasive carcinoma with ductal and lobular features, grade 2, ER positive, UT positive, HER2 negative.   There is also associated DCIS and LCIS    IMPRESSION/RECOMMENDATION:  Cancer Staging  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 breast of female, estrogen receptor positive ([facility] [address]  Staging form: Breast, AJCC 8th Edition  - Clinical: Stage IA (cT1b, cN0, cM0, G2, ER+, UT+, HER2-) - Signed by Asim Carroll MD on 4/22/2021    Sharmaine Kumar is a 72 y.o. woman who presents today for a consultation regarding her new diagnosis perioperative period and any recovery window from their procedure. The patient was made aware that eloisa Covid-19  may worsen their prognosis for recovering from their procedure  and lend to a higher morbidity and/or mortality risk. All material risks, benefits, and reasonable alternatives including postponing the procedure were discussed. The patient does wish to proceed with the procedure at this time. We also discussed adjuvant radiation therapy. I explained to her that radiation therapy is recommended in patients undergoing breast conservation surgery and at times following mastectomy to reduce the risk of local recurrence. I will make arrangements for her to meet with a radiation oncologist post-operatively. Systemic therapy including chemotherapy and endocrine therapy were reviewed. She will certainly be a candidate for endocrine therapy. Finally, we also discussed her personal and family history, the risk of a hereditary cancer syndrome, and the role of genetic counseling and testing. Based on her risk, we elected not to perform genetic testing at this time. I answered all of her and her family's questions thoroughly, and they do seem pleased with this plan of approach. I encouraged her to contact me in the interim if any new questions or concerns arise. Summary:  - referral to medical oncology   - patient to follow up with pulmonology and rheumatology  - patient is in close contact with her primary care office regarding recent blood pressure medication. She was instructed that she will need a pre-operative physical within 30 days of surgery  - patient was instructed on Covid testing preoperatively  - I will also present her case at our next multi-disciplinary tumor board    Nori Vaughan MD     Addendum:    Patient met with medical oncology and pulmonology and tells me that she prefers bilateral mastectomy over breast conservation.   She prefers to avoid radiation and imaging with mammography. I also expressed my concerns about her recovery from a larger surgery (total mastectomy) compared to partial mastectomy. She is at a higher risk of wound complications with a mastectomy compared to a partial mastectomy, espcially given her chronic steroid use. The ACS risk calculator estimates this risk to be a 3 fold increase (12% vs 4% for any complication). This is of course even higher with bilateral mastectomy compared with unilateral mastectomy. We have discussed all of this in the office and over the phone on two occasions. She understands all of the above and wishes to proceed with bilateral mastectomy without reconstruction. I will therefore make arrangements to schedule this in the operating room as soon as possible. Planned surgery date is 5/18/2020.        Tom Wilkes MD 5/4/2021 8:59 AM

## 2021-05-01 ASSESSMENT — ENCOUNTER SYMPTOMS
VOMITING: 0
CONSTIPATION: 0
ABDOMINAL PAIN: 0
COUGH: 0
NAUSEA: 0
RHINORRHEA: 0
SORE THROAT: 0
DIARRHEA: 0
SHORTNESS OF BREATH: 0

## 2021-05-04 ENCOUNTER — TELEPHONE (OUTPATIENT)
Dept: SURGERY | Age: 66
End: 2021-05-04

## 2021-05-04 NOTE — PROGRESS NOTES
Paul Eben    Age 72 y.o.    female    1955    MRN [id number]    5/18/2021  Arrival Time_____________  OR Time____________265 Min     Procedure(s):  BILATERAL SIMPLE MASTECTOMY, RIGHT SENTINEL LYMPH NODE BIOPSY                      Monitor Anesthesia Care     Surgeon(s):  Kanwal Fernández, MD      DAY ADMIT ___  SDS/OP ___  OUTPT IN BED ___         Phone [phone] (home)    PCP _____________________ Phone_________________ Epic ( ) Epic CE ( ) Appt ________    ADDITIONAL INFO __________________________________ Cardio/Consult _____________    NOTES _____________________________________________________________________    ____________________________________________________________________________    PAT APPT DATE:________ TIME: ________  FAXED QAD: _______  (__) H&P w/ hospitalist  ____________________________________________________________________________    COVID TEST: Date/Location______________        NURSING HISTORY COMPLETE: _______  (__) CBC       (__) W/ DIFF ___________  (__)  ECHO    __________  (__) Hgb A1C    ___________  (__) CHEST X RAY   __________  (__) LIPID PROFILE  ___________  (__) EKG   __________  (__) PT/PTT   ___________  (__) PFT's   __________  (__) BMP   ___________  (__) CAROTIDS  __________  (__) CMP   ___________  (__) VEIN MAPPING  __________  (__) U/A   ___________  (__) HISTORY & PHYSICAL __________  (__) URINE C & S  ___________  (__) CARDIAC CLEARANCE __________  (__) U/A W/ FLEX  ___________  (__) PULM.  CLEARANCE __________  (__) SERUM PREGNANCY ___________  (__) Check Epic DOS orders __________  (__) TYPE & SCREEN ________ repeat ( ) (__)  __________________ __________  (__) ALBUMIN   ___________  (__)  __________________ __________  (__) TRANSFERRIN  ___________  (__)  __________________ __________  (__) LIVER PROFILE  ___________  (__)  __________________ __________  (__) CARBOXY HGB  ___________  (__) URINE PREG DOS __________  (__) NICOTINE & MET.  ___________  (__) BLOOD SUGAR DOS __________  (__) PREALBUMIN  ___________    (__) MRSA NASAL SWAB ___________  (__) BLOOD THINNERS __________  (__) ACE/ ARBS: _____________________    (__) BETABLOCKERS ___________________

## 2021-05-04 NOTE — TELEPHONE ENCOUNTER
Spoke with pt and  regarding surgery date and time. Also scheduled Covid testing and post op visits. Pt. And  voiced understanding.

## 2021-05-06 ENCOUNTER — PREP FOR PROCEDURE (OUTPATIENT)
Dept: SURGERY | Age: 66
End: 2021-05-06

## 2021-05-06 RX ORDER — SODIUM CHLORIDE 9 MG/ML
25 INJECTION, SOLUTION INTRAVENOUS PRN
Status: CANCELLED | OUTPATIENT
Start: 2021-05-06

## 2021-05-06 RX ORDER — SODIUM CHLORIDE 0.9 % (FLUSH) 0.9 %
10 SYRINGE (ML) INJECTION EVERY 12 HOURS SCHEDULED
Status: CANCELLED | OUTPATIENT
Start: 2021-05-06

## 2021-05-06 RX ORDER — SODIUM CHLORIDE 0.9 % (FLUSH) 0.9 %
10 SYRINGE (ML) INJECTION PRN
Status: CANCELLED | OUTPATIENT
Start: 2021-05-06

## 2021-05-07 ENCOUNTER — OFFICE VISIT (OUTPATIENT)
Dept: FAMILY MEDICINE CLINIC | Age: 66
End: 2021-05-07
Payer: MEDICARE

## 2021-05-07 VITALS
TEMPERATURE: 98.2 F | BODY MASS INDEX: 27.11 KG/M2 | HEIGHT: 63 IN | SYSTOLIC BLOOD PRESSURE: 88 MMHG | HEART RATE: 94 BPM | OXYGEN SATURATION: 97 % | DIASTOLIC BLOOD PRESSURE: 72 MMHG | WEIGHT: 153 LBS

## 2021-05-07 DIAGNOSIS — Z01.818 PRE-OP EXAM: ICD-10-CM

## 2021-05-07 DIAGNOSIS — C50.311 MALIGNANT NEOPLASM OF LOWER-INNER QUADRANT OF RIGHT BREAST OF FEMALE, ESTROGEN RECEPTOR POSITIVE (HCC): ICD-10-CM

## 2021-05-07 DIAGNOSIS — Z17.0 MALIGNANT NEOPLASM OF LOWER-INNER QUADRANT OF RIGHT BREAST OF FEMALE, ESTROGEN RECEPTOR POSITIVE (HCC): ICD-10-CM

## 2021-05-07 DIAGNOSIS — F41.9 ANXIETY: ICD-10-CM

## 2021-05-07 DIAGNOSIS — Z01.818 PRE-OP EXAM: Primary | ICD-10-CM

## 2021-05-07 PROCEDURE — G8427 DOCREV CUR MEDS BY ELIG CLIN: HCPCS | Performed by: PHYSICIAN ASSISTANT

## 2021-05-07 PROCEDURE — 93000 ELECTROCARDIOGRAM COMPLETE: CPT | Performed by: PHYSICIAN ASSISTANT

## 2021-05-07 PROCEDURE — 99214 OFFICE O/P EST MOD 30 MIN: CPT | Performed by: PHYSICIAN ASSISTANT

## 2021-05-07 PROCEDURE — 1090F PRES/ABSN URINE INCON ASSESS: CPT | Performed by: PHYSICIAN ASSISTANT

## 2021-05-07 PROCEDURE — 1123F ACP DISCUSS/DSCN MKR DOCD: CPT | Performed by: PHYSICIAN ASSISTANT

## 2021-05-07 PROCEDURE — 1036F TOBACCO NON-USER: CPT | Performed by: PHYSICIAN ASSISTANT

## 2021-05-07 PROCEDURE — 3017F COLORECTAL CA SCREEN DOC REV: CPT | Performed by: PHYSICIAN ASSISTANT

## 2021-05-07 PROCEDURE — G8417 CALC BMI ABV UP PARAM F/U: HCPCS | Performed by: PHYSICIAN ASSISTANT

## 2021-05-07 PROCEDURE — 4040F PNEUMOC VAC/ADMIN/RCVD: CPT | Performed by: PHYSICIAN ASSISTANT

## 2021-05-07 PROCEDURE — G8399 PT W/DXA RESULTS DOCUMENT: HCPCS | Performed by: PHYSICIAN ASSISTANT

## 2021-05-07 RX ORDER — ALPRAZOLAM 1 MG/1
1 TABLET ORAL 2 TIMES DAILY PRN
Qty: 60 TABLET | Refills: 2 | Status: SHIPPED | OUTPATIENT
Start: 2021-05-07 | End: 2021-08-30

## 2021-05-07 NOTE — PROGRESS NOTES
Preoperative Consultation      Castillo Dixon  YOB: 1955    Date of Service:  5/7/2021    Vitals:    05/07/21 1427 05/07/21 1434   BP: 82/72 88/72   Site: Right Upper Arm Left Upper Arm   Position: Sitting Sitting   Cuff Size: Small Adult Small Adult   Pulse: 94    Temp: 98.2 °F (36.8 °C)    TempSrc: Oral    SpO2: 97%    Weight: 153 lb (69.4 kg)    Height: 5' 2.6\" (1.59 m)       Wt Readings from Last 2 Encounters:   05/07/21 153 lb (69.4 kg)   04/22/21 156 lb (70.8 kg)     BP Readings from Last 3 Encounters:   05/07/21 88/72   04/22/21 94/70   04/21/21 (!) 132/95        Chief Complaint   Patient presents with   [facility] Pre-op Exam     Pre-Op for bilateral simple mastectomy on 5/18/21 with Dr. Salter [facility]   Allergen Reactions    Ultrasound Cream Rash     Reports severe rash from ultrasound gel     Infliximab      Severe drop in blood pressure    Ultrasound Gel Other (See Comments)     burn    Amoxicillin Rash    Codeine Nausea And Vomiting    Penicillins Rash     Outpatient Medications Marked as Taking for the 5/7/21 encounter (Office Visit) with FRANCISCO Arguello   Medication Sig Dispense Refill    linaclotide (LINZESS) 290 MCG CAPS capsule Take 1 capsule by mouth every morning (before breakfast) 30 capsule 1    ketorolac (ACULAR) 0.5 % ophthalmic solution       fluticasone-salmeterol (WIXELA INHUB) 250-50 MCG/DOSE AEPB Inhale 1 puff into the lungs      mirtazapine (REMERON) 15 MG tablet TAKE 1 TABLET BY MOUTH EVERY DAY AT NIGHT 90 tablet 1    valACYclovir (VALTREX) 500 MG tablet Take One Tablet By Mouth Every Day 90 tablet 1    traZODone (DESYREL) 100 MG tablet Take 1 tablet by mouth nightly as needed for Sleep 90 tablet 1    sertraline (ZOLOFT) 100 MG tablet TAKE 1 AND 1/2 TABLET BY MOUTH DAILY 135 tablet 1    montelukast (SINGULAIR) 10 MG tablet TAKE 1 TABLET BY MOUTH EVERY DAY 90 tablet 1    spironolactone (ALDACTONE) 50 MG tablet TAKE 1 TABLET BY MOUTH EVERY DAY 90 tablet 1    albuterol sulfate  (90 Base) MCG/ACT inhaler 2 puffs q 4 prn      modafinil (PROVIGIL) 100 MG tablet Take 100 mg by mouth every morning.  pantoprazole (PROTONIX) 40 MG tablet TAKE 1 TABLET BY MOUTH TWICE A DAY BEFORE MEALS 180 tablet 1    atorvastatin (LIPITOR) 20 MG tablet TAKE 1 TABLET BY MOUTH EVERY DAY 90 tablet 1    alendronate (FOSAMAX) 70 MG tablet TAKE 1 TABLET BY MOUTH EVERY 7 DAYS 12 tablet 1    rizatriptan (MAXALT) 5 MG tablet Take 1 tablet by mouth daily as needed for Migraine May repeat in 2 hours if needed 27 tablet 1    methotrexate (RHEUMATREX) 2.5 MG chemo tablet 3 TABLETS EVERY WEEK      folic acid (FOLVITE) 148 MCG tablet Take 400 mcg by mouth nightly 2 tablets nightly      predniSONE (DELTASONE) 5 MG tablet Take 5 mg by mouth every other day      Carboxymethylcellulose Sodium (EYE DROPS OP) Apply to eye 2 times daily Durazol -      diclofenac sodium 1 % GEL Apply topically Indications: [facility]  prescribes       difluprednate (DUREZOL) 0.05 % EMUL Apply 1 drop to eye      gabapentin (NEURONTIN) 400 MG capsule 400 mg 3 times daily. Indications: Prescribed by [facility]    3    HYDROcodone-acetaminophen (NORCO) 5-325 MG per tablet Take 1 tablet by mouth every 6 hours as needed for Pain. Indications: Pulmonary Specialist 5/500mg      cyclobenzaprine (FLEXERIL) 10 MG tablet Take 10 mg by mouth 3 times daily as needed for Muscle spasms      methotrexate (RHEUMATREX) 2.5 MG chemo tablet Take 7.5 mg by mouth every 7 days       fluocinonide (LIDEX) 0.05 % cream Apply topically 2 times daily Apply topically 2 times daily.  promethazine (PHENERGAN) 12.5 MG tablet Take 25 mg by mouth every 6 hours as needed for Nausea Indications: Dr. Garay Heading       traMADol (ULTRAM) 50 MG tablet Take 50 mg by mouth every 6 hours as needed.          This patient presents to the office today for a preoperative consultation at the request of surgeon, Dr. Mikki Cool, who plans on performing Bilateral simple mastectomy on May 17 at [facility].  The current problem began 2 months ago, and symptoms have been unchanged with time.   Conservative therapy: No.    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, both eyes    Essential hypertension    Diverticulosis    Nausea & vomiting    S/P splenectomy    Osteopenia    Sarcoidosis    Vitamin D deficiency    Impaired fasting glucose    Cecal volvulus (HCC)    Insomnia    Hypercholesterolemia    Disturbance of skin sensation    Acute, but ill-defined, cerebrovascular disease    Sarcoid    Cerebral vasculitis    Lesion of right ulnar nerve    Major depressive disorder, recurrent episode, moderate (HCC)    Pain medication agreement signed    Trochanteric bursitis of left hip    Ankle instability    GI bleed    Colitis, acute    Anxiety    Congenital urethral stenosis    Urinary frequency    Right upper quadrant pain    Epigastric pain    Esophageal dysphagia    Colon, diverticulosis    Giant cell arteritis ([address]    Lower abdominal pain    Right upper quadrant abdominal pain    Sigmoid stricture ([address]    Right sided abdominal pain    Irritable bowel syndrome with constipation    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 breast of female, estrogen receptor positive ([address]       Past Medical History:   Diagnosis Date    Chronic diarrhea     Dr. Madie Brand    Chronic kidney disease     kidney stones    Clostridium difficile diarrhea 10/23/13    positive by PCR    Fibromyalgia     Hemorrhoid     Hyperlipidemia     Hypertension     Kidney stone     Osteoarthritis     fibromyalgia    Sarcoidosis 2003    sees Dr. Aundrea Epps     Past Surgical History:   Procedure Laterality Date    ABDOMINAL EXPLORATION SURGERY  8/19/12    REDUCTION OF VULVUS; RIGHT COLECTOMY; SMALL BOWEL RESECTION; INSERTION OF ON Q PAIN BUSTER    ANGIOPLASTY  10/2010    BREAST BIOPSY      COLONOSCOPY  2010    normal    COLONOSCOPY N/A 6/16/2020    COLON W/ANES. performed by Bessie Moreno MD at [facility]  12/2011    ESOPHAGEAL DILATATION  6/16/2020    ESOPHAGEAL DILATION Hugo Moya performed by Bessie Moreno MD at [facility]  5/2009    cataract, right    HYSTERECTOMY  2000    LITHOTRIPSY  1/19/2012    LITHOTRIPSY      04/2012    OVARIAN CYST REMOVAL  1975    OVARY REMOVAL  1986    right    PARTIAL HYSTERECTOMY      SPLENECTOMY  1983    SPLENECTOMY      1983    TONSILLECTOMY  1968    UPPER GASTROINTESTINAL ENDOSCOPY  2/27/13    UPPER GASTROINTESTINAL ENDOSCOPY  11/14/2017    gastritis    UPPER GASTROINTESTINAL ENDOSCOPY N/A 6/16/2020    EGD W/ANES.  (11:00) performed by Bessie Moreno MD at [place] [id number]  12/2011    US BREAST NEEDLE BIOPSY RIGHT Right 4/15/2021    US BREAST NEEDLE BIOPSY RIGHT 4/15/2021 Steve Brand MD [facility]     Family History   Problem Relation Age of Onset    Heart Disease Father     Cancer Father         skin cancer- unknown    Cancer Brother         skin cancer- forehead and nose- unknown     Social History     Socioeconomic History    Marital status:      Spouse name: Neo Carlson Number of children: 3    Years of education: Not on file    Highest education level: Not on file   Occupational History    Occupation: disabilty    Social Needs    Financial resource strain: Not hard at all   Maryanne-Ed insecurity     Worry: Never true     Inability: Never true   [employer] needs     Medical: No     Non-medical: No   Tobacco Use    Smoking status: Former Smoker     Packs/day: 0.50     Years: 20.00     Pack years: 10.00 Types: Cigarettes     Start date: 3/1/2019     Quit date: 2020     Years since quittin.6    Smokeless tobacco: Never Used   Substance and Sexual Activity    Alcohol use: No     Alcohol/week: 0.0 standard drinks    Drug use: No    Sexual activity: Not on file   Lifestyle    Physical activity     Days per week: Not on file     Minutes per session: Not on file    Stress: Not on file   Relationships    Social connections     Talks on phone: Not on file     Gets together: Not on file     Attends Che[employer] service: Not on file     Active member of club or organization: Not on file     Attends meetings of clubs or organizations: Not on file     Relationship status: Not on file    Intimate partner violence     Fear of current or ex partner: Not on file     Emotionally abused: Not on file     Physically abused: Not on file     Forced sexual activity: Not on file   Other Topics Concern    Not on file   Social History Narrative    Not on file       Review of Systems  A comprehensive review of systems was negative except for what was noted in the HPI. Physical Exam   Constitutional: She is oriented to person, place, and time. She appears well-developed and well-nourished. No distress. HENT:   Head: Normocephalic and atraumatic. Mouth/Throat: Uvula is midline, oropharynx is clear and moist and mucous membranes are normal.   Eyes: Conjunctivae and EOM are normal. Pupils are equal, round, and reactive to light. Neck: Trachea normal and normal range of motion. Neck supple. No JVD present. Carotid bruit is not present. No mass and no thyromegaly present. Cardiovascular: Normal rate, regular rhythm, normal heart sounds and intact distal pulses. Exam reveals no gallop and no friction rub. No murmur heard. Pulmonary/Chest: Effort normal and breath sounds normal. No respiratory distress. She has no wheezes. She has no rales. Abdominal: Soft.  Normal aorta and bowel sounds are normal. She exhibits no distension and no mass. There is no hepatosplenomegaly. No tenderness. Musculoskeletal: She exhibits no edema and no tenderness. Neurological: She is alert and oriented to person, place, and time. She has normal strength. No cranial nerve deficit or sensory deficit. Coordination and gait normal.   Skin: Skin is warm and dry. No rash noted. No erythema. Psychiatric: She has a normal mood and affect. Her behavior is normal.     EKG Interpretation:  normal EKG, normal sinus rhythm, unchanged from previous tracings.     Lab Review   Hospital Outpatient Visit on 03/06/2021   Component Date Value    Lipase 03/06/2021 37.0     Sodium 03/06/2021 136     Potassium 03/06/2021 5.1     Chloride 03/06/2021 104     CO2 03/06/2021 26     Anion Gap 03/06/2021 6     Glucose 03/06/2021 105*    BUN 03/06/2021 6*    CREATININE 03/06/2021 1.0     GFR Non- 03/06/2021 56*    GFR  03/06/2021 >60     Calcium 03/06/2021 9.9     Total Protein 03/06/2021 7.3     Albumin 03/06/2021 4.5     Albumin/Globulin Ratio 03/06/2021 1.6     Total Bilirubin 03/06/2021 0.3     Alkaline Phosphatase 03/06/2021 69     ALT 03/06/2021 19     AST 03/06/2021 21     Globulin 03/06/2021 2.8     WBC 03/06/2021 8.7     RBC 03/06/2021 4.38     Hemoglobin 03/06/2021 15.3     Hematocrit 03/06/2021 45.8     MCV 03/06/2021 104.5*    MCH 03/06/2021 35.0*    MCHC 03/06/2021 33.5     RDW 03/06/2021 15.3     Platelets 75/52/1148 437     MPV 03/06/2021 7.7     PLATELET SLIDE REVIEW 03/06/2021 Adequate     SLIDE REVIEW 03/06/2021 see below    Abstract on 01/21/2021   Component Date Value    AST 01/07/2021 21     ALT 01/07/2021 15     Albumin 01/07/2021 4.6     Total Protein 01/07/2021 7.6     Total Bilirubin 01/07/2021 0.4     Alkaline Phosphatase 01/07/2021 69     Glucose 01/07/2021 116     BUN 01/07/2021 7     CREATININE 01/07/2021 1.08     Sodium 01/07/2021 136     Potassium 01/07/2021 4.9     Chloride 01/07/2021 104     CO2 01/07/2021 24     Calcium 01/07/2021 10.2     Albumin 01/07/2021 4.6    Orders Only on 01/18/2021   Component Date Value    Hemoglobin A1C 01/18/2021 5.8     eAG 01/18/2021 119.8     Cholesterol, Total 01/18/2021 192     Triglycerides 01/18/2021 242*    HDL 01/18/2021 37*    LDL Calculated 01/18/2021 107*    VLDL Cholesterol Calcula* 01/18/2021 48            Assessment:       72 y.o. patient with planned surgery as above. Known risk factors for perioperative complications: None  Current medications which may produce withdrawal symptoms if withheld perioperatively: alprazolam      Borderline hypotension- recommend d/c propranolol. Pt to taper over the next week and monitor BP. If elevated, will restart atenolol. No cardiac history that would warrant BB. Plan:     1. Preoperative workup as follows: hemoglobin, hematocrit, electrolytes, creatinine, glucose  2. Change in medication regimen before surgery: Take alprazolam on morning of surgery with sip of water, and hold all other medications until after surgery  3. Prophylaxis for cardiac events with perioperative beta-blockers: Currently taking  propranolol  ACC/AHA indications for pre-operative beta-blocker use:    · Vascular surgery with history of postitive stress test  · Intermediate or high risk surgery with history of CAD   · Intermediate or high risk surgery with multiple clinical predictors of CAD- 2 of the following: history of compensated or prior heart failure, history of cerebrovascular disease, DM, or renal insufficiency    Routine administration of higher-dose, long-acting metoprolol in beta-blockernaïve patients on the day of surgery, and in the absence of dose titration is associated with an overall increase in mortality. Beta-blockers should be started days to weeks prior to surgery and titrated to pulse < 70.  4. Deep vein thrombosis prophylaxis: regimen to be chosen by surgical team  5.  No contraindications to planned surgery

## 2021-05-08 LAB
ANION GAP SERPL CALCULATED.3IONS-SCNC: 12 MMOL/L (ref 3–16)
BUN BLDV-MCNC: 6 MG/DL (ref 7–20)
CALCIUM SERPL-MCNC: 9.5 MG/DL (ref 8.3–10.6)
CHLORIDE BLD-SCNC: 101 MMOL/L (ref 99–110)
CO2: 25 MMOL/L (ref 21–32)
CREAT SERPL-MCNC: 0.9 MG/DL (ref 0.6–1.2)
GFR AFRICAN AMERICAN: >60
GFR NON-AFRICAN AMERICAN: >60
GLUCOSE BLD-MCNC: 83 MG/DL (ref 70–99)
HCT VFR BLD CALC: 41.4 % (ref 36–48)
HEMOGLOBIN: 14 G/DL (ref 12–16)
MCH RBC QN AUTO: 35.1 PG (ref 26–34)
MCHC RBC AUTO-ENTMCNC: 33.8 G/DL (ref 31–36)
MCV RBC AUTO: 104 FL (ref 80–100)
PDW BLD-RTO: 15.9 % (ref 12.4–15.4)
PLATELET # BLD: 429 K/UL (ref 135–450)
PMV BLD AUTO: 7.6 FL (ref 5–10.5)
POTASSIUM SERPL-SCNC: 4.9 MMOL/L (ref 3.5–5.1)
RBC # BLD: 3.99 M/UL (ref 4–5.2)
SODIUM BLD-SCNC: 138 MMOL/L (ref 136–145)
WBC # BLD: 11 K/UL (ref 4–11)

## 2021-05-12 DIAGNOSIS — Z17.0 MALIGNANT NEOPLASM OF LOWER-INNER QUADRANT OF RIGHT BREAST OF FEMALE, ESTROGEN RECEPTOR POSITIVE (HCC): Primary | ICD-10-CM

## 2021-05-12 DIAGNOSIS — C50.311 MALIGNANT NEOPLASM OF LOWER-INNER QUADRANT OF RIGHT BREAST OF FEMALE, ESTROGEN RECEPTOR POSITIVE (HCC): Primary | ICD-10-CM

## 2021-05-13 ENCOUNTER — OFFICE VISIT (OUTPATIENT)
Dept: PRIMARY CARE CLINIC | Age: 66
End: 2021-05-13
Payer: MEDICARE

## 2021-05-13 DIAGNOSIS — Z01.818 PREOP TESTING: Primary | ICD-10-CM

## 2021-05-13 PROCEDURE — G8417 CALC BMI ABV UP PARAM F/U: HCPCS | Performed by: NURSE PRACTITIONER

## 2021-05-13 PROCEDURE — G8428 CUR MEDS NOT DOCUMENT: HCPCS | Performed by: NURSE PRACTITIONER

## 2021-05-13 PROCEDURE — 99211 OFF/OP EST MAY X REQ PHY/QHP: CPT | Performed by: NURSE PRACTITIONER

## 2021-05-13 NOTE — PROGRESS NOTES
Allegra Session received a viral test for COVID-19. They were educated on isolation and quarantine as appropriate. For any symptoms, they were directed to seek care from their PCP, given contact information to establish with a doctor, directed to an urgent care or the emergency room.

## 2021-05-14 ENCOUNTER — ANESTHESIA EVENT (OUTPATIENT)
Dept: OPERATING ROOM | Age: 66
End: 2021-05-14
Payer: MEDICARE

## 2021-05-14 LAB — SARS-COV-2: NOT DETECTED

## 2021-05-17 ENCOUNTER — TELEPHONE (OUTPATIENT)
Dept: FAMILY MEDICINE CLINIC | Age: 66
End: 2021-05-17

## 2021-05-18 ENCOUNTER — HOSPITAL ENCOUNTER (OUTPATIENT)
Dept: NUCLEAR MEDICINE | Age: 66
Discharge: HOME | End: 2021-05-18
Attending: SURGERY
Payer: MEDICARE

## 2021-05-18 ENCOUNTER — ANESTHESIA (OUTPATIENT)
Dept: OPERATING ROOM | Age: 66
End: 2021-05-18
Payer: MEDICARE

## 2021-05-18 ENCOUNTER — HOSPITAL ENCOUNTER (OUTPATIENT)
Age: 66
Setting detail: OUTPATIENT SURGERY
Discharge: HOME | End: 2021-05-18
Attending: SURGERY | Admitting: SURGERY
Payer: MEDICARE

## 2021-05-18 VITALS
RESPIRATION RATE: 3 BRPM | TEMPERATURE: 98.2 F | SYSTOLIC BLOOD PRESSURE: 95 MMHG | OXYGEN SATURATION: 93 % | DIASTOLIC BLOOD PRESSURE: 59 MMHG

## 2021-05-18 VITALS
OXYGEN SATURATION: 96 % | WEIGHT: 154 LBS | DIASTOLIC BLOOD PRESSURE: 93 MMHG | TEMPERATURE: 97.7 F | HEART RATE: 102 BPM | HEIGHT: 62 IN | SYSTOLIC BLOOD PRESSURE: 136 MMHG | RESPIRATION RATE: 16 BRPM | BODY MASS INDEX: 28.34 KG/M2

## 2021-05-18 DIAGNOSIS — C50.311 MALIGNANT NEOPLASM OF LOWER-INNER QUADRANT OF RIGHT BREAST OF FEMALE, ESTROGEN RECEPTOR POSITIVE (HCC): Primary | ICD-10-CM

## 2021-05-18 DIAGNOSIS — Z17.0 MALIGNANT NEOPLASM OF LOWER-INNER QUADRANT OF RIGHT BREAST OF FEMALE, ESTROGEN RECEPTOR POSITIVE (HCC): Primary | ICD-10-CM

## 2021-05-18 DIAGNOSIS — Z17.0 MALIGNANT NEOPLASM OF LOWER-INNER QUADRANT OF RIGHT BREAST OF FEMALE, ESTROGEN RECEPTOR POSITIVE (HCC): ICD-10-CM

## 2021-05-18 DIAGNOSIS — C50.311 MALIGNANT NEOPLASM OF LOWER-INNER QUADRANT OF RIGHT BREAST OF FEMALE, ESTROGEN RECEPTOR POSITIVE (HCC): ICD-10-CM

## 2021-05-18 DIAGNOSIS — C50.311 MALIGNANT NEOPLASM OF LOWER-INNER QUADRANT OF RIGHT FEMALE BREAST, UNSPECIFIED ESTROGEN RECEPTOR STATUS (HCC): ICD-10-CM

## 2021-05-18 PROCEDURE — C9290 INJ, BUPIVACAINE LIPOSOME: HCPCS | Performed by: SURGERY

## 2021-05-18 PROCEDURE — 2500000003 HC RX 250 WO HCPCS: Performed by: SURGERY

## 2021-05-18 PROCEDURE — 6360000002 HC RX W HCPCS: Performed by: ANESTHESIOLOGY

## 2021-05-18 PROCEDURE — 3600000004 HC SURGERY LEVEL 4 BASE: Performed by: SURGERY

## 2021-05-18 PROCEDURE — 6360000002 HC RX W HCPCS: Performed by: NURSE ANESTHETIST, CERTIFIED REGISTERED

## 2021-05-18 PROCEDURE — 88309 TISSUE EXAM BY PATHOLOGIST: CPT

## 2021-05-18 PROCEDURE — 2500000003 HC RX 250 WO HCPCS: Performed by: NURSE ANESTHETIST, CERTIFIED REGISTERED

## 2021-05-18 PROCEDURE — 2580000003 HC RX 258: Performed by: SURGERY

## 2021-05-18 PROCEDURE — 2500000003 HC RX 250 WO HCPCS: Performed by: ANESTHESIOLOGY

## 2021-05-18 PROCEDURE — 3430000000 HC RX DIAGNOSTIC RADIOPHARMACEUTICAL: Performed by: SURGERY

## 2021-05-18 PROCEDURE — 3600000014 HC SURGERY LEVEL 4 ADDTL 15MIN: Performed by: SURGERY

## 2021-05-18 PROCEDURE — 38900 IO MAP OF SENT LYMPH NODE: CPT | Performed by: SURGERY

## 2021-05-18 PROCEDURE — 88341 IMHCHEM/IMCYTCHM EA ADD ANTB: CPT

## 2021-05-18 PROCEDURE — 3700000001 HC ADD 15 MINUTES (ANESTHESIA): Performed by: SURGERY

## 2021-05-18 PROCEDURE — 2709999900 HC NON-CHARGEABLE SUPPLY: Performed by: SURGERY

## 2021-05-18 PROCEDURE — 88342 IMHCHEM/IMCYTCHM 1ST ANTB: CPT

## 2021-05-18 PROCEDURE — 6360000002 HC RX W HCPCS: Performed by: SURGERY

## 2021-05-18 PROCEDURE — 7100000010 HC PHASE II RECOVERY - FIRST 15 MIN: Performed by: SURGERY

## 2021-05-18 PROCEDURE — 7100000001 HC PACU RECOVERY - ADDTL 15 MIN: Performed by: SURGERY

## 2021-05-18 PROCEDURE — 7100000011 HC PHASE II RECOVERY - ADDTL 15 MIN: Performed by: SURGERY

## 2021-05-18 PROCEDURE — 38792 RA TRACER ID OF SENTINL NODE: CPT

## 2021-05-18 PROCEDURE — A9541 TC99M SULFUR COLLOID: HCPCS | Performed by: SURGERY

## 2021-05-18 PROCEDURE — 7100000000 HC PACU RECOVERY - FIRST 15 MIN: Performed by: SURGERY

## 2021-05-18 PROCEDURE — 3700000000 HC ANESTHESIA ATTENDED CARE: Performed by: SURGERY

## 2021-05-18 PROCEDURE — 88307 TISSUE EXAM BY PATHOLOGIST: CPT

## 2021-05-18 PROCEDURE — 38525 BIOPSY/REMOVAL LYMPH NODES: CPT | Performed by: SURGERY

## 2021-05-18 PROCEDURE — 2580000003 HC RX 258: Performed by: NURSE ANESTHETIST, CERTIFIED REGISTERED

## 2021-05-18 PROCEDURE — 19303 MAST SIMPLE COMPLETE: CPT | Performed by: SURGERY

## 2021-05-18 PROCEDURE — 14001 TIS TRNFR TRUNK 10.1-30SQCM: CPT | Performed by: SURGERY

## 2021-05-18 RX ORDER — OXYCODONE HYDROCHLORIDE AND ACETAMINOPHEN 5; 325 MG/1; MG/1
2 TABLET ORAL PRN
Status: DISCONTINUED | OUTPATIENT
Start: 2021-05-18 | End: 2021-05-18 | Stop reason: HOSPADM

## 2021-05-18 RX ORDER — ISOSULFAN BLUE 50 MG/5ML
INJECTION, SOLUTION SUBCUTANEOUS PRN
Status: DISCONTINUED | OUTPATIENT
Start: 2021-05-18 | End: 2021-05-18 | Stop reason: ALTCHOICE

## 2021-05-18 RX ORDER — MIDAZOLAM HYDROCHLORIDE 1 MG/ML
INJECTION INTRAMUSCULAR; INTRAVENOUS PRN
Status: DISCONTINUED | OUTPATIENT
Start: 2021-05-18 | End: 2021-05-18 | Stop reason: SDUPTHER

## 2021-05-18 RX ORDER — SODIUM CHLORIDE, SODIUM LACTATE, POTASSIUM CHLORIDE, CALCIUM CHLORIDE 600; 310; 30; 20 MG/100ML; MG/100ML; MG/100ML; MG/100ML
INJECTION, SOLUTION INTRAVENOUS CONTINUOUS PRN
Status: DISCONTINUED | OUTPATIENT
Start: 2021-05-18 | End: 2021-05-18 | Stop reason: SDUPTHER

## 2021-05-18 RX ORDER — ONDANSETRON 2 MG/ML
4 INJECTION INTRAMUSCULAR; INTRAVENOUS
Status: DISCONTINUED | OUTPATIENT
Start: 2021-05-18 | End: 2021-05-18 | Stop reason: HOSPADM

## 2021-05-18 RX ORDER — MORPHINE SULFATE 2 MG/ML
2 INJECTION, SOLUTION INTRAMUSCULAR; INTRAVENOUS EVERY 5 MIN PRN
Status: DISCONTINUED | OUTPATIENT
Start: 2021-05-18 | End: 2021-05-18 | Stop reason: HOSPADM

## 2021-05-18 RX ORDER — ONDANSETRON 2 MG/ML
INJECTION INTRAMUSCULAR; INTRAVENOUS PRN
Status: DISCONTINUED | OUTPATIENT
Start: 2021-05-18 | End: 2021-05-18 | Stop reason: SDUPTHER

## 2021-05-18 RX ORDER — LIDOCAINE HYDROCHLORIDE 10 MG/ML
1 INJECTION, SOLUTION EPIDURAL; INFILTRATION; INTRACAUDAL; PERINEURAL
Status: DISCONTINUED | OUTPATIENT
Start: 2021-05-18 | End: 2021-05-18 | Stop reason: HOSPADM

## 2021-05-18 RX ORDER — DEXAMETHASONE SODIUM PHOSPHATE 4 MG/ML
8 INJECTION, SOLUTION INTRA-ARTICULAR; INTRALESIONAL; INTRAMUSCULAR; INTRAVENOUS; SOFT TISSUE ONCE
Status: DISCONTINUED | OUTPATIENT
Start: 2021-05-18 | End: 2021-05-18 | Stop reason: HOSPADM

## 2021-05-18 RX ORDER — SODIUM CHLORIDE 0.9 % (FLUSH) 0.9 %
10 SYRINGE (ML) INJECTION PRN
Status: DISCONTINUED | OUTPATIENT
Start: 2021-05-18 | End: 2021-05-18 | Stop reason: HOSPADM

## 2021-05-18 RX ORDER — MORPHINE SULFATE 2 MG/ML
1 INJECTION, SOLUTION INTRAMUSCULAR; INTRAVENOUS EVERY 5 MIN PRN
Status: DISCONTINUED | OUTPATIENT
Start: 2021-05-18 | End: 2021-05-18 | Stop reason: HOSPADM

## 2021-05-18 RX ORDER — OXYCODONE HYDROCHLORIDE 5 MG/1
5 TABLET ORAL EVERY 6 HOURS PRN
Qty: 20 TABLET | Refills: 0 | Status: SHIPPED | OUTPATIENT
Start: 2021-05-18 | End: 2021-05-23

## 2021-05-18 RX ORDER — SODIUM CHLORIDE 9 MG/ML
25 INJECTION, SOLUTION INTRAVENOUS PRN
Status: DISCONTINUED | OUTPATIENT
Start: 2021-05-18 | End: 2021-05-18 | Stop reason: HOSPADM

## 2021-05-18 RX ORDER — MEPERIDINE HYDROCHLORIDE 50 MG/ML
12.5 INJECTION INTRAMUSCULAR; INTRAVENOUS; SUBCUTANEOUS EVERY 5 MIN PRN
Status: DISCONTINUED | OUTPATIENT
Start: 2021-05-18 | End: 2021-05-18 | Stop reason: HOSPADM

## 2021-05-18 RX ORDER — FENTANYL CITRATE 50 UG/ML
INJECTION, SOLUTION INTRAMUSCULAR; INTRAVENOUS PRN
Status: DISCONTINUED | OUTPATIENT
Start: 2021-05-18 | End: 2021-05-18 | Stop reason: SDUPTHER

## 2021-05-18 RX ORDER — SODIUM CHLORIDE 0.9 % (FLUSH) 0.9 %
10 SYRINGE (ML) INJECTION EVERY 12 HOURS SCHEDULED
Status: DISCONTINUED | OUTPATIENT
Start: 2021-05-18 | End: 2021-05-18 | Stop reason: HOSPADM

## 2021-05-18 RX ORDER — MAGNESIUM HYDROXIDE 1200 MG/15ML
LIQUID ORAL CONTINUOUS PRN
Status: COMPLETED | OUTPATIENT
Start: 2021-05-18 | End: 2021-05-18

## 2021-05-18 RX ORDER — PROPOFOL 10 MG/ML
INJECTION, EMULSION INTRAVENOUS PRN
Status: DISCONTINUED | OUTPATIENT
Start: 2021-05-18 | End: 2021-05-18 | Stop reason: SDUPTHER

## 2021-05-18 RX ORDER — LIDOCAINE HYDROCHLORIDE 20 MG/ML
INJECTION, SOLUTION INFILTRATION; PERINEURAL PRN
Status: DISCONTINUED | OUTPATIENT
Start: 2021-05-18 | End: 2021-05-18 | Stop reason: SDUPTHER

## 2021-05-18 RX ORDER — ROCURONIUM BROMIDE 10 MG/ML
INJECTION, SOLUTION INTRAVENOUS PRN
Status: DISCONTINUED | OUTPATIENT
Start: 2021-05-18 | End: 2021-05-18 | Stop reason: SDUPTHER

## 2021-05-18 RX ORDER — OXYCODONE HYDROCHLORIDE AND ACETAMINOPHEN 5; 325 MG/1; MG/1
1 TABLET ORAL PRN
Status: DISCONTINUED | OUTPATIENT
Start: 2021-05-18 | End: 2021-05-18 | Stop reason: HOSPADM

## 2021-05-18 RX ORDER — SODIUM CHLORIDE, SODIUM LACTATE, POTASSIUM CHLORIDE, CALCIUM CHLORIDE 600; 310; 30; 20 MG/100ML; MG/100ML; MG/100ML; MG/100ML
INJECTION, SOLUTION INTRAVENOUS CONTINUOUS
Status: DISCONTINUED | OUTPATIENT
Start: 2021-05-18 | End: 2021-05-18 | Stop reason: HOSPADM

## 2021-05-18 RX ORDER — DEXAMETHASONE SODIUM PHOSPHATE 4 MG/ML
INJECTION, SOLUTION INTRA-ARTICULAR; INTRALESIONAL; INTRAMUSCULAR; INTRAVENOUS; SOFT TISSUE PRN
Status: DISCONTINUED | OUTPATIENT
Start: 2021-05-18 | End: 2021-05-18 | Stop reason: SDUPTHER

## 2021-05-18 RX ORDER — APREPITANT 40 MG/1
40 CAPSULE ORAL ONCE
Status: COMPLETED | OUTPATIENT
Start: 2021-05-18 | End: 2021-05-18

## 2021-05-18 RX ADMIN — HYDROMORPHONE HYDROCHLORIDE 0.5 MG: 1 INJECTION, SOLUTION INTRAMUSCULAR; INTRAVENOUS; SUBCUTANEOUS at 11:54

## 2021-05-18 RX ADMIN — SUGAMMADEX 200 MG: 100 INJECTION, SOLUTION INTRAVENOUS at 11:21

## 2021-05-18 RX ADMIN — ONDANSETRON 4 MG: 2 INJECTION INTRAMUSCULAR; INTRAVENOUS at 11:15

## 2021-05-18 RX ADMIN — HYDROMORPHONE HYDROCHLORIDE 0.5 MG: 1 INJECTION, SOLUTION INTRAMUSCULAR; INTRAVENOUS; SUBCUTANEOUS at 13:04

## 2021-05-18 RX ADMIN — MIDAZOLAM HYDROCHLORIDE 2 MG: 2 INJECTION, SOLUTION INTRAMUSCULAR; INTRAVENOUS at 07:30

## 2021-05-18 RX ADMIN — APREPITANT 40 MG: 40 CAPSULE ORAL at 07:14

## 2021-05-18 RX ADMIN — ROCURONIUM BROMIDE 40 MG: 10 SOLUTION INTRAVENOUS at 07:35

## 2021-05-18 RX ADMIN — PROPOFOL 100 MG: 10 INJECTION, EMULSION INTRAVENOUS at 07:35

## 2021-05-18 RX ADMIN — FENTANYL CITRATE 50 MCG: 50 INJECTION, SOLUTION INTRAMUSCULAR; INTRAVENOUS at 09:58

## 2021-05-18 RX ADMIN — Medication 1136 MICRO CURIE: at 08:04

## 2021-05-18 RX ADMIN — SODIUM CHLORIDE, SODIUM LACTATE, POTASSIUM CHLORIDE, AND CALCIUM CHLORIDE: .6; .31; .03; .02 INJECTION, SOLUTION INTRAVENOUS at 07:30

## 2021-05-18 RX ADMIN — SODIUM CHLORIDE, SODIUM LACTATE, POTASSIUM CHLORIDE, AND CALCIUM CHLORIDE: .6; .31; .03; .02 INJECTION, SOLUTION INTRAVENOUS at 08:27

## 2021-05-18 RX ADMIN — FAMOTIDINE 20 MG: 10 INJECTION, SOLUTION INTRAVENOUS at 07:14

## 2021-05-18 RX ADMIN — FENTANYL CITRATE 50 MCG: 50 INJECTION, SOLUTION INTRAMUSCULAR; INTRAVENOUS at 09:18

## 2021-05-18 RX ADMIN — LIDOCAINE HYDROCHLORIDE 60 MG: 20 INJECTION, SOLUTION INFILTRATION; PERINEURAL at 07:35

## 2021-05-18 RX ADMIN — PROPOFOL 25 MG: 10 INJECTION, EMULSION INTRAVENOUS at 11:21

## 2021-05-18 RX ADMIN — PROPOFOL 25 MG: 10 INJECTION, EMULSION INTRAVENOUS at 11:18

## 2021-05-18 RX ADMIN — FENTANYL CITRATE 50 MCG: 50 INJECTION, SOLUTION INTRAMUSCULAR; INTRAVENOUS at 07:35

## 2021-05-18 RX ADMIN — Medication 900 MG: at 07:30

## 2021-05-18 RX ADMIN — HYDROCORTISONE SODIUM SUCCINATE 100 MG: 100 INJECTION, POWDER, FOR SOLUTION INTRAMUSCULAR; INTRAVENOUS at 07:13

## 2021-05-18 RX ADMIN — DEXAMETHASONE SODIUM PHOSPHATE 8 MG: 4 INJECTION, SOLUTION INTRAMUSCULAR; INTRAVENOUS at 08:00

## 2021-05-18 ASSESSMENT — PULMONARY FUNCTION TESTS
PIF_VALUE: 16
PIF_VALUE: 21
PIF_VALUE: 17
PIF_VALUE: 20
PIF_VALUE: 21
PIF_VALUE: 19
PIF_VALUE: 21
PIF_VALUE: 17
PIF_VALUE: 19
PIF_VALUE: 19
PIF_VALUE: 22
PIF_VALUE: 20
PIF_VALUE: 21
PIF_VALUE: 19
PIF_VALUE: 20
PIF_VALUE: 22
PIF_VALUE: 2
PIF_VALUE: 21
PIF_VALUE: 21
PIF_VALUE: 20
PIF_VALUE: 21
PIF_VALUE: 20
PIF_VALUE: 10
PIF_VALUE: 20
PIF_VALUE: 10
PIF_VALUE: 21
PIF_VALUE: 19
PIF_VALUE: 21
PIF_VALUE: 15
PIF_VALUE: 20
PIF_VALUE: 19
PIF_VALUE: 22
PIF_VALUE: 19
PIF_VALUE: 20
PIF_VALUE: 19
PIF_VALUE: 18
PIF_VALUE: 20
PIF_VALUE: 19
PIF_VALUE: 20
PIF_VALUE: 20
PIF_VALUE: 19
PIF_VALUE: 20
PIF_VALUE: 20
PIF_VALUE: 19
PIF_VALUE: 20
PIF_VALUE: 20
PIF_VALUE: 19
PIF_VALUE: 20
PIF_VALUE: 22
PIF_VALUE: 19
PIF_VALUE: 22
PIF_VALUE: 19
PIF_VALUE: 19
PIF_VALUE: 20
PIF_VALUE: 20
PIF_VALUE: 19
PIF_VALUE: 21
PIF_VALUE: 21
PIF_VALUE: 19
PIF_VALUE: 22
PIF_VALUE: 20
PIF_VALUE: 21
PIF_VALUE: 15
PIF_VALUE: 20
PIF_VALUE: 21
PIF_VALUE: 20
PIF_VALUE: 21
PIF_VALUE: 20
PIF_VALUE: 21
PIF_VALUE: 20
PIF_VALUE: 20
PIF_VALUE: 19
PIF_VALUE: 21
PIF_VALUE: 20
PIF_VALUE: 17
PIF_VALUE: 20
PIF_VALUE: 22
PIF_VALUE: 22
PIF_VALUE: 21
PIF_VALUE: 20
PIF_VALUE: 22
PIF_VALUE: 22
PIF_VALUE: 20
PIF_VALUE: 20
PIF_VALUE: 21
PIF_VALUE: 20
PIF_VALUE: 22
PIF_VALUE: 21
PIF_VALUE: 21
PIF_VALUE: 19
PIF_VALUE: 21
PIF_VALUE: 21
PIF_VALUE: 1
PIF_VALUE: 21
PIF_VALUE: 21
PIF_VALUE: 20
PIF_VALUE: 21
PIF_VALUE: 21
PIF_VALUE: 19
PIF_VALUE: 21
PIF_VALUE: 15
PIF_VALUE: 17
PIF_VALUE: 22
PIF_VALUE: 20
PIF_VALUE: 19

## 2021-05-18 ASSESSMENT — PAIN SCALES - GENERAL
PAINLEVEL_OUTOF10: 0
PAINLEVEL_OUTOF10: 0
PAINLEVEL_OUTOF10: 7
PAINLEVEL_OUTOF10: 2
PAINLEVEL_OUTOF10: 7

## 2021-05-18 ASSESSMENT — PAIN DESCRIPTION - FREQUENCY
FREQUENCY: CONTINUOUS
FREQUENCY: CONTINUOUS

## 2021-05-18 ASSESSMENT — PAIN - FUNCTIONAL ASSESSMENT
PAIN_FUNCTIONAL_ASSESSMENT: ACTIVITIES ARE NOT PREVENTED
PAIN_FUNCTIONAL_ASSESSMENT: PREVENTS OR INTERFERES SOME ACTIVE ACTIVITIES AND ADLS

## 2021-05-18 ASSESSMENT — PAIN DESCRIPTION - LOCATION
LOCATION: BREAST
LOCATION: BREAST

## 2021-05-18 ASSESSMENT — PAIN DESCRIPTION - PAIN TYPE
TYPE: SURGICAL PAIN
TYPE: SURGICAL PAIN

## 2021-05-18 ASSESSMENT — PAIN DESCRIPTION - ORIENTATION
ORIENTATION: RIGHT;LEFT
ORIENTATION: RIGHT;LEFT

## 2021-05-18 ASSESSMENT — ENCOUNTER SYMPTOMS: SHORTNESS OF BREATH: 0

## 2021-05-18 ASSESSMENT — LIFESTYLE VARIABLES: SMOKING_STATUS: 0

## 2021-05-18 ASSESSMENT — PAIN DESCRIPTION - DESCRIPTORS
DESCRIPTORS: DULL
DESCRIPTORS: ACHING

## 2021-05-18 ASSESSMENT — PAIN DESCRIPTION - ONSET: ONSET: ON-GOING

## 2021-05-18 NOTE — ANESTHESIA PRE PROCEDURE
Department of Anesthesiology  Preprocedure Note       Name:  Darnell Sam   Age:  72 y.o.  :  1955                                          MRN:  [id number]         Date:  2021      Surgeon: Prosper Thibodeaux):  Phil Gupta MD    Procedure: Procedure(s):  BILATERAL SIMPLE MASTECTOMY, RIGHT SENTINEL LYMPH NODE BIOPSY    Medications prior to admission:   Prior to Admission medications    Medication Sig Start Date End Date Taking? Authorizing Provider   ALPRAZolam Aretta Herd) 1 MG tablet Take 1 tablet by mouth 2 times daily as needed for Sleep for up to 30 days.  21  FRANCISCO Green   linaclotide [facility]) 290 MCG CAPS capsule Take 1 capsule by mouth every morning (before breakfast) 21   Harmony Shah MD   ketorolac (ACULAR) 0.5 % ophthalmic solution Place 1 drop into the left eye 2 times daily  21   Historical Provider, MD   mirtazapine (REMERON) 15 MG tablet TAKE 1 TABLET BY MOUTH EVERY DAY AT NIGHT 21   Bhavik Cartagena DO   valACYclovir (VALTREX) 500 MG tablet Take One Tablet By Mouth Every Day 21   VERITO Hroowitz CNP   traZODone (DESYREL) 100 MG tablet Take 1 tablet by mouth nightly as needed for Sleep 21   FRANCISCO Green   sertraline (ZOLOFT) 100 MG tablet TAKE 1 AND 1/2 TABLET BY MOUTH DAILY 21   FRANCISCO Green   montelukast (SINGULAIR) 10 MG tablet TAKE 1 TABLET BY MOUTH EVERY DAY  Patient taking differently: Take 10 mg by mouth nightly TAKE 1 TABLET BY MOUTH EVERY DAY 3/11/21   Bhavik Cartagena DO   spironolactone (ALDACTONE) 50 MG tablet TAKE 1 TABLET BY MOUTH EVERY DAY 3/11/21   Bhavik Cartagena DO   fluticasone-salmeterol (ADVAIR) 250-50 MCG/DOSE AEPB Inhale into the lungs 2 times daily    Historical Provider, MD   albuterol sulfate  (90 Base) MCG/ACT inhaler 2 puffs q 4 prn 1/15/21   Historical Provider, MD   pantoprazole (PROTONIX) 40 MG tablet TAKE 1 TABLET BY MOUTH TWICE A DAY BEFORE MEALS 20   Mandie Gold MD atorvastatin (LIPITOR) 20 MG tablet TAKE 1 TABLET BY MOUTH EVERY DAY  Patient taking differently: Take 20 mg by mouth nightly TAKE 1 TABLET BY MOUTH EVERY DAY 12/9/20   FRANCISCO Rudd   alendronate (FOSAMAX) 70 MG tablet TAKE 1 TABLET BY MOUTH EVERY 7 DAYS 11/23/20   Lucius Lira MD   rizatriptan (MAXALT) 5 MG tablet Take 1 tablet by mouth daily as needed for Migraine May repeat in 2 hours if needed 9/10/20   FRANCISCO Rudd   predniSONE (DELTASONE) 5 MG tablet Take 40 mg by mouth daily     Historical Provider, MD   diclofenac sodium 1 % GEL Apply topically Indications: [place]  prescribes     Martha Blanc MD   difluprednate (DUREZOL) 0.05 % EMUL Apply 2 drops to eye 2 times daily BOTH EYES 2/6/18   Historical Provider, MD   gabapentin (NEURONTIN) 400 MG capsule 400 mg 3 times daily. Indications: Prescribed by Arthritis   9/6/19   Martha Blanc MD   HYDROcodone-acetaminophen (NORCO) 5-325 MG per tablet Take 1 tablet by mouth every 6 hours as needed for Pain. Indications: Pulmonary Specialist 5/500mg       cyclobenzaprine (FLEXERIL) 10 MG tablet Take 10 mg by mouth 3 times daily as needed for Muscle spasms    Historical Provider, MD   methotrexate (RHEUMATREX) 2.5 MG chemo tablet Take 7.5 mg by mouth every 7 days     Martha Blanc MD   fluocinonide (LIDEX) 0.05 % cream Apply topically 2 times daily Apply topically 2 times daily. Historical Provider, MD   promethazine (PHENERGAN) 12.5 MG tablet Take 25 mg by mouth every 6 hours as needed for Nausea Indications: Dr. Andreea Miller Provider, MD   traMADol (ULTRAM) 50 MG tablet Take 50 mg by mouth every 6 hours as needed.     Historical Provider, MD       Current medications:    Current Facility-Administered Medications   Medication Dose Route Frequency Provider Last Rate Last Admin    lactated ringers infusion   Intravenous Continuous Farheen Walker MD        sodium chloride flush 0.9 % injection 10 mL  10 mL Intravenous 2 times per day Faraz Grande MD        sodium chloride flush 0.9 % injection 10 mL  10 mL Intravenous PRN Faraz Grande MD        0.9 % sodium chloride infusion  25 mL Intravenous PRN Faraz Grande MD        lidocaine PF 1 % injection 1 mL  1 mL Intradermal Once PRN Faraz Grande MD        aprepitant (EMEND) capsule 40 mg  40 mg Oral Once Faraz Grande MD        0.9 % sodium chloride infusion  25 mL Intravenous PRN Brock Chandler MD        clindamycin (CLEOCIN) 900 mg in dextrose 5% 50 mL IVPB  900 mg Intravenous On Call to 1709 MD Jammie Lockhart Dexamethasone Sodium Phosphate injection 8 mg  8 mg Intravenous Once Brock Chandler MD        sodium chloride flush 0.9 % injection 10 mL  10 mL Intravenous 2 times per day Brock Chandler MD        sodium chloride flush 0.9 % injection 10 mL  10 mL Intravenous PRN Brock Chandler MD           Allergies:     Allergies   Allergen Reactions    Infliximab      Severe drop in blood pressure    Ultrasound Gel Other (See Comments)     burn    Codeine Nausea And Vomiting    Penicillins Rash       Problem List:    Patient Active Problem List   Diagnosis Code    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, 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    Essential hypertension I10    Diverticulosis K57.90    Nausea & vomiting R11.2    S/P splenectomy Z90.81    Osteopenia M85.80    Sarcoidosis D86.9    Vitamin D deficiency E55.9    Impaired fasting glucose R73.01    Cecal volvulus ([address] K56.2    Insomnia G47.00    Hypercholesterolemia E78.00    Disturbance of skin sensation R20.9    Acute, but ill-defined, cerebrovascular disease I67.89    Sarcoid D86.9    Cerebral vasculitis I67.7    Lesion of right ulnar nerve G56.21    Major depressive disorder, recurrent episode, moderate (HCC) F33.1    Pain medication agreement signed Z02.89    Trochanteric bursitis of left hip M70.62    Ankle instability M25.373    GI bleed K92.2    Colitis, acute K52.9    Anxiety F41.9    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    Urinary frequency R35.0    Right upper quadrant pain R10.11    Epigastric pain R10.13    Esophageal dysphagia R13.10    Colon, diverticulosis K57.30    Giant cell arteritis (HCC) M31.6    Lower abdominal pain R10.30    Right upper quadrant abdominal pain R10.11    Sigmoid stricture ([facility] [address] K56.699    Right sided abdominal pain R10.9    Irritable bowel syndrome with constipation K58.1    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 breast of female, estrogen receptor positive ([facility] [address] C50.311, Z17.0       Past Medical History:        Diagnosis Date    Asthma     CAD (coronary artery disease)     Cancer (HCC)     RIGHT BREAST    Chronic diarrhea     Dr. Los Castellano    Chronic kidney disease     kidney stones    Clostridium difficile diarrhea 10/23/13    positive by PCR    Fibromyalgia     Hemorrhoid     Hyperlipidemia     Hypertension     Kidney stone     Osteoarthritis     fibromyalgia    Sarcoidosis 2003    sees Dr. Courtney Merritt       Past Surgical History:        Procedure Laterality Date    ABDOMINAL EXPLORATION SURGERY  8/19/12    REDUCTION OF VULVUS; RIGHT COLECTOMY; SMALL BOWEL RESECTION; INSERTION OF ON Q PAIN BUSTER    BREAST BIOPSY      CARDIAC CATHETERIZATION  2010    CLEAN    COLONOSCOPY  2010    normal    COLONOSCOPY N/A 6/16/2020    COLON W/ANES.  performed by Gonzalo Rodriguez MD at [facility]  12/2011    ESOPHAGEAL DILATATION  6/16/2020    ESOPHAGEAL DILATION Geovany Flowers performed by Gonzalo Rodriguez MD at [address] [facility]  5/2009    cataract, right    HYSTERECTOMY  2000    LITHOTRIPSY  1/19/2012    LITHOTRIPSY      04/2012    OVARIAN CYST REMOVAL  1975    OVARY REMOVAL  1986    right    PARTIAL HYSTERECTOMY      SPLENECTOMY  1983    TONSILLECTOMY  1968    UPPER GASTROINTESTINAL ENDOSCOPY  2/27/13    UPPER GASTROINTESTINAL ENDOSCOPY  11/14/2017    gastritis  UPPER GASTROINTESTINAL ENDOSCOPY N/A 2020    EGD W/ANES. (11:00) performed by Zahra Chowdhury MD at [place] [address]  2011    US BREAST NEEDLE BIOPSY RIGHT Right 4/15/2021    US BREAST NEEDLE BIOPSY RIGHT 4/15/2021 Tristen Delgadillo MD [facility]       Social History:    Social History     Tobacco Use    Smoking status: Former Smoker     Packs/day: 0.50     Years: 20.00     Pack years: 10.00     Types: Cigarettes     Start date: 3/1/2019     Quit date: 2020     Years since quittin.7    Smokeless tobacco: Never Used   Substance Use Topics    Alcohol use: No     Alcohol/week: 0.0 standard drinks                                Counseling given: Not Answered      Vital Signs (Current):   Vitals:    05/10/21 1520   Weight: 152 lb (68.9 kg)   Height: 5' 2.25\" (1.581 m)                                              BP Readings from Last 3 Encounters:   21 88/72   21 94/70   21 (!) 132/95       NPO Status:  mn+, see mar for am meds                                                                               BMI:   Wt Readings from Last 3 Encounters:   05/10/21 152 lb (68.9 kg)   21 153 lb (69.4 kg)   21 156 lb (70.8 kg)     Body mass index is 27.58 kg/m².     CBC:   Lab Results   Component Value Date    WBC 11.0 2021    RBC 3.99 2021    HGB 14.0 2021    HCT 41.4 2021    .0 2021    RDW 15.9 2021     2021       CMP:   Lab Results   Component Value Date     2021    K 4.9 2021     2021    CO2 25 2021    BUN 6 2021    CREATININE 0.9 2021    GFRAA >60 2021    GFRAA >60 2013    AGRATIO 1.6 2021    LABGLOM >60 2021    GLUCOSE 83 2021    GLUCOSE 116 2021    PROT 7.3 2021    PROT 7.6 2021    CALCIUM 9.5 2021    BILITOT 0.3 2021    ALKPHOS 69 2021    AST 21 03/06/2021    ALT 19 03/06/2021       POC Tests: No results for input(s): POCGLU, POCNA, POCK, POCCL, POCBUN, POCHEMO, POCHCT in the last 72 hours. Coags:   Lab Results   Component Value Date    PROTIME 12.3 11/14/2017    INR 1.09 11/14/2017    APTT 30.0 11/14/2017       HCG (If Applicable): No results found for: PREGTESTUR, PREGSERUM, HCG, HCGQUANT     ABGs: No results found for: PHART, PO2ART, ROH6WSK, SLV9ISS, BEART, W2UIUOFL     Type & Screen (If Applicable):  No results found for: LABABO, LABRH    Drug/Infectious Status (If Applicable):  No results found for: HIV, HEPCAB    COVID-19 Screening (If Applicable):   Lab Results   Component Value Date    COVID19 Not Detected 05/13/2021           Anesthesia Evaluation  Patient summary reviewed no history of anesthetic complications:   Airway: Mallampati: II  TM distance: >3 FB   Neck ROM: full  Mouth opening: > = 3 FB Dental:          Pulmonary: breath sounds clear to auscultation  (+) asthma (prn inhaler / no o2 req. / (sarcoid)):     (-) COPD, shortness of breath, recent URI, sleep apnea and not a current smoker          Patient did not smoke on day of surgery. Cardiovascular:    (+) hypertension:,     (-) pacemaker, past MI, CAD, CABG/stent, dysrhythmias,  angina and  CHF    ECG reviewed  Rhythm: regular  Rate: normal           Beta Blocker:  Not on Beta Blocker         Neuro/Psych:   (+) neuromuscular disease (fibromy / lue neuropathy(sarcoid)):, depression/anxiety  (hx dep.)   (-) seizures, TIA and CVA           GI/Hepatic/Renal:   (+) GERD: well controlled, liver disease (sarcoid):, renal disease: kidney stones,      (-) bowel prep and no morbid obesity       Endo/Other:    (+) : arthritis:., malignancy/cancer (r breast). (-) diabetes mellitus, hypothyroidism, hyperthyroidism, blood dyscrasia               Abdominal:           Vascular: negative vascular ROS.                                        Anesthesia Plan      general     ASA 3 (Pt req. To wear sunglasses into OR, SARCOID IN NERVOUS SYSTEM / pt also req no O2 mask against face as induction)  Induction: intravenous. MIPS: Postoperative opioids intended and Prophylactic antiemetics administered. Anesthetic plan and risks discussed with patient. Plan discussed with CRNA. This pre-anesthesia assessment may be used as a history and physical.    DOS STAFF ADDENDUM:    Pt seen and examined, chart reviewed (including anesthesia, drug and allergy history). No interval changes to history and physical examination. Anesthetic plan, risks, benefits, alternatives, and personnel involved discussed with patient. Patient verbalized an understanding and agrees to proceed.       Raeann Andino MD  May 18, 2021  6:51 AM      Raeann Andino MD   5/18/2021

## 2021-05-18 NOTE — BRIEF OP NOTE
Brief Postoperative Note      Patient: Brittany Dixon  YOB: 1955  MRN: [id number]    Date of Procedure: 5/18/2021    Pre-Op Diagnosis: RIGHT BREAST CANCER  C50.311    Post-Op Diagnosis: Same       Procedure(s):  BILATERAL SIMPLE MASTECTOMY, RIGHT SENTINEL LYMPH NODE BIOPSY    Surgeon(s):  Ronaldo Mansfield MD    Assistant:  Surgical Assistant: Pool Melton    Anesthesia: Monitor Anesthesia Care    Estimated Blood Loss (mL): 731 ml    Complications: None    Specimens:   ID Type Source Tests Collected by Time Destination   A : RIGHT BREAST TISSUE LONG STITCH LATERAL, SHORT STITCH SUPERIOR Tissue Breast SURGICAL PATHOLOGY Ronaldo Mansfield MD 5/18/2021 9362    B : RIGHT AXILLARY SENTINEL LYMPH NODE Tissue Breast SURGICAL PATHOLOGY Ronaldo Mansfield MD 5/18/2021 0909    C : LEFT BREAST TISSUE, SHORT STITCH SUPERIOR, LONG STITCH LATERAL Tissue Breast SURGICAL PATHOLOGY Ronaldo Mansfield MD 5/18/2021 1022        Implants:  * No implants in log *      Drains:   Closed/Suction Drain Right Breast Bulb 15 [place] (Active)   Dressing Status Clean;Dry; Intact 05/18/21 1052       Closed/Suction Drain Left Breast Bulb 15 [place] (Active)   Dressing Status Clean;Dry; Intact 05/18/21 1053       Findings: both breasts removed without difficulty and grossly normal. Lymph nodes in right axilla grossly normal.    Electronically signed by Crispin Campos MD on 5/18/2021 at 11:28 AM

## 2021-05-18 NOTE — H&P
21    CHIEF COMPLAINT:  New diagnosis of right breast cancer. HISTORY OF PRESENT ILLNESS:  Koko Khan is a 72 y.o. woman who requested that I evaluate her for her new diagnosis of right breast cancer. The patient states that she was undergoing her routine mammogram on 3/31/2021 when she was alerted to an abnormality in the right breast.  She underwent additional imaging and ultimately a core biopsy, which confirmed an invasive breast cancer. The patient states that she herself has not noticed any abnormal masses in either breast.  She denies any change in the appearance of her breasts or the skin of her breasts. She denies bilateral nipple discharge. She has no other systemic complaints and otherwise feels well today. She follows with pulmonology and rheumatology for sarcoidosis.     Pertinent Family History: No family history of breast or ovarian cancer    GYNECOLOGIC HISTORY:  Menarche at age 14    She delivered her first child at age 25, and did breastfeed  Postmenopausal at age 39  Hysterectomy with BSO- Left ovary removed in , right ovary removed at time of histerectomy  Oral contraceptive use: yes for 10 years and is not currently taking  Hormone use: yes for 8 years and stopped greater than 5 years ago    Past Medical History:   Diagnosis Date    Asthma     CAD (coronary artery disease)     Cancer ([facility] [address]     RIGHT BREAST    Chronic diarrhea     Dr. Jahaira Lai Chronic kidney disease     kidney stones    Clostridium difficile diarrhea 10/23/13    positive by PCR    Fibromyalgia     Hemorrhoid     Hyperlipidemia     Hypertension     Kidney stone     Osteoarthritis     fibromyalgia    Sarcoidosis     sees Dr. Chaz Clarke     Past Surgical History:   Procedure Laterality Date    ABDOMINAL EXPLORATION SURGERY  12    REDUCTION OF VULVUS; RIGHT COLECTOMY; SMALL BOWEL RESECTION; INSERTION OF ON Q PAIN BUSTER    BREAST BIOPSY      CARDIAC CATHETERIZATION      CLEAN  COLONOSCOPY  2010    normal    COLONOSCOPY N/A 6/16/2020    COLON W/ANES. performed by Juanita Agosto MD at [facility]  12/2011    ESOPHAGEAL DILATATION  6/16/2020    ESOPHAGEAL DILATION Vinnie Dose performed by Juanita Agosto MD at [facility]  5/2009    cataract, right    HYSTERECTOMY  2000    LITHOTRIPSY  1/19/2012    LITHOTRIPSY      04/2012    OVARIAN CYST REMOVAL  1975    OVARY REMOVAL  1986    right    PARTIAL HYSTERECTOMY      SPLENECTOMY  1983    TONSILLECTOMY  1968    UPPER GASTROINTESTINAL ENDOSCOPY  2/27/13    UPPER GASTROINTESTINAL ENDOSCOPY  11/14/2017    gastritis    UPPER GASTROINTESTINAL ENDOSCOPY N/A 6/16/2020    EGD W/ANES.  (11:00) performed by Juanita Agosto MD at [place] [address]  12/2011    US BREAST NEEDLE BIOPSY RIGHT Right 4/15/2021    US BREAST NEEDLE BIOPSY RIGHT 4/15/2021 Leonor Barnes MD [facility]     Current Facility-Administered Medications   Medication Dose Route Frequency Provider Last Rate Last Admin    lactated ringers infusion   Intravenous Continuous Rebeca Samayoa MD        sodium chloride flush 0.9 % injection 10 mL  10 mL Intravenous 2 times per day Rebeca Samayoa MD        sodium chloride flush 0.9 % injection 10 mL  10 mL Intravenous PRN Rebeca Samayoa MD        0.9 % sodium chloride infusion  25 mL Intravenous PRN Rebeca Samayoa MD        lidocaine PF 1 % injection 1 mL  1 mL Intradermal Once PRN Rebeca Saamyoa MD        aprepitant (EMEND) capsule 40 mg  40 mg Oral Once Rebeca Samayoa MD        0.9 % sodium chloride infusion  25 mL Intravenous PRN Kanwal Fernández MD        clindamycin (CLEOCIN) 900 mg in dextrose 5% 50 mL IVPB  900 mg Intravenous On Call to 1709 Blake Valladares MD        Dexamethasone Sodium Phosphate injection 8 mg  8 mg Intravenous Once Kanwal Fernández MD        sodium chloride flush 0.9 % injection 10 mL  10 mL Intravenous 2 times per day Jose Melchor MD        sodium chloride flush 0.9 % injection 10 mL  10 mL Intravenous PRN Jose Melchor MD        famotidine (PEPCID) injection 20 mg  20 mg Intravenous Once Devorah Santiago MD        hydrocortisone sodium succinate PF (SOLU-CORTEF) injection 100 mg  100 mg Intravenous Once Devorah Santiago MD        meperidine (DEMEROL) injection 12.5 mg  12.5 mg Intravenous Q5 Min PRN Devorah Santiago MD        HYDROmorphone (DILAUDID) injection 0.25 mg  0.25 mg Intravenous Q5 Min PRN Devorah Santiago MD        HYDROmorphone (DILAUDID) injection 0.5 mg  0.5 mg Intravenous Q5 Min PRN Devorah Santiago MD        morphine (PF) injection 1 mg  1 mg Intravenous Q5 Min PRN Devorah Santiago MD        morphine (PF) injection 2 mg  2 mg Intravenous Q5 Min PRN Devorah Santiago MD        oxyCODONE-acetaminophen (PERCOCET) 5-325 MG per tablet 1 tablet  1 tablet Oral PRN Devorah Santiago MD        Or    oxyCODONE-acetaminophen (PERCOCET) 5-325 MG per tablet 2 tablet  2 tablet Oral PRN Devorah Santiago MD        ondansetron [facility]) injection 4 mg  4 mg Intravenous Once PRN Devorah Santiago MD         Allergies   Allergen Reactions    Infliximab      Severe drop in blood pressure    Ultrasound Gel Other (See Comments)     burn    Codeine Nausea And Vomiting    Penicillins Rash     Social History     Socioeconomic History    Marital status:      Spouse name: Sunny Pa Number of children: 3    Years of education: Not on file    Highest education level: Not on file   Occupational History    Occupation: disabilty    Tobacco Use    Smoking status: Former Smoker     Packs/day: 0.50     Years: 20.00     Pack years: 10.00     Types: Cigarettes     Start date: 3/1/2019     Quit date: 2020     Years since quittin.7    Smokeless tobacco: Never Used   Vaping Use    Vaping Use: Never used   Substance and Sexual Activity    Alcohol use: No     Alcohol/week: 0.0 standard drinks    Drug use: No    Sexual activity: Not on file   Other Topics Concern    Not on file   Social History Narrative    Not on file     Social Determinants of Health     Financial Resource Strain: Low Risk     Difficulty of Paying Living Expenses: Not hard at all   Food Insecurity: No Food Insecurity    Worried About Running Out of Food in the Last Year: Never true    Juan Jose of Food in the Last Year: Never true   Transportation Needs: No Transportation Needs    Lack of Transportation (Medical): No    Lack of Transportation (Non-Medical): No   Physical Activity:     Days of Exercise per Week:     Minutes of Exercise per Session:    Stress:     Feeling of Stress :    Social Connections:     Frequency of Communication with Friends and Family:     Frequency of Social Gatherings with Friends and Family:     Attends Faith Services:     Active Member of Clubs or Organizations:     Attends Club or Organization Meetings:     Marital Status:    Intimate Partner Violence:     Fear of Current or Ex-Partner:     Emotionally Abused:     Physically Abused:     Sexually Abused:      Family History   Problem Relation Age of Onset    Heart Disease Father     Cancer Father         skin cancer- unknown    Cancer Brother         skin cancer- forehead and nose- unknown     REVIEW OF SYSTEMS:   Constitutional: Negative for unexpected weight change. Eyes: Negative for visual disturbance. Respiratory: Positive for shortness of breath (hx of chronic bronchitis). Negative for cough. Cardiovascular: Negative for chest pain and palpitations. Gastrointestinal: Positive for abdominal pain (hx of colon resection). Musculoskeletal: Negative for arthralgias and myalgias. Neurological: Positive for headaches (hx of migraines). Hematological: Negative for adenopathy. Does not bruise/bleed easily. Psychiatric/Behavioral: Negative for dysphoric mood. The patient is nervous/anxious (hx of anxiety). PHYSICAL EXAMINATION:   Vitals: /84   Pulse 74   Temp 97.5 °F (36.4 °C) (Temporal)   Resp 16   Ht 5' 2\" (1.575 m)   Wt 154 lb (69.9 kg)   SpO2 97%   BMI 28.17 kg/m²   General: Well-developed, well-nourished, in no apparent distress. Eyes:  Conjunctivae appear normal. Pupils are equal and reactive. Extraocular movements are intact. The sclerae are not injected and show no jaundice. Nose, Mouth and Throat:  Patient is wearing a mask. Neck: Supple, without thyromegaly or adenopathy. Respiratory: Normal respiratory effort. Cardiovascular: Regular rate and rhythm. No lower extremity edema. Gastrointestinal: Soft, nontender, nondistended, without obvious masses or hernias. Musculoskeletal: Normal gait and range of motion in all 4 extremities. Psychiatric: Alert and oriented x 3. Appropriate affect and behavior for today's visit. Skin: No concerning rashes, lesions, nodules or other skin changes. Lymphatic System:  No concerning cervical, supraclavicular or axillary lymphadenopathy. Breast Exam:  The breasts are normal in contour. Bra size 36DD. Right Breast: Examination of the right breast in the upright and supine positions reveal no obvious masses, skin changes, dimpling, or retraction. The nipple and areola are without erosion, edema or ulceration. There is no obvious nipple discharge. There is no concerning axillary adenopathy. Left Breast: Examination of the left breast in the upright and supine positions reveal no obvious masses, skin changes, dimpling, or retraction. The nipple and areola are without erosion, edema or ulceration. There is no obvious nipple discharge. There is no concerning axillary adenopathy. IMAGING: I personally reviewed the breast imaging performed from March and April 2021 and discussed this with the patient.   There is an asymmetry within the lower inner right breast at mid depth on mammography. Right breast ultrasound demonstrates a 7 mm mass at 5:00 4 CFN. She was given a BI-RADS 5. Breast density is described as fibroglandular densities. PATHOLOGY:  I reviewed the right breast biopsy pathology from 4/15/2021 with her today as well. This demonstrated an invasive carcinoma with ductal and lobular features, grade 2, ER positive, [place] positive, HER2 negative. There is also associated DCIS and LCIS    IMPRESSION/RECOMMENDATION:  Cancer Staging  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 breast of female, estrogen receptor positive ([facility] [address]  Staging form: Breast, AJCC 8th Edition  - Clinical: Stage IA (cT1b, cN0, cM0, G2, ER+, NJ+, HER2-) - Signed by Steve Vázquez MD on 4/22/2021    - bilateral simple mastectomy, right sentinel lymph node biopsy    The patient was counseled at length about the risks of eloisa Covid-19 during their perioperative period and any recovery window from their procedure. The patient was made aware that eloisa Covid-19  may worsen their prognosis for recovering from their procedure  and lend to a higher morbidity and/or mortality risk. All material risks, benefits, and reasonable alternatives including postponing the procedure were discussed. The patient does wish to proceed with the procedure at this time.     Brandie Rodriguez MD 5/18/2021 7:03 AM

## 2021-05-18 NOTE — OP NOTE
Operative Note    Estela Kebede  YOB: 1955  MRN: [id number]    PREOPERATIVE DIAGNOSIS  right breast cancer     POSTOPERATIVE DIAGNOSIS  right breast cancer    PROCEDURE  1. Injection of isosulfan blue dye to the right breast.  2. Right sentinel lymph node biopsy. 3. Right simple mastectomy and right tissue rearrangement procedure for an area of 16 square centimeters  4. Left simple prophylactic mastectomy and left tissue rearrangement procedure for an area of 16 square centimeters    ANESTHESIA  General anesthesia. SURGEON  Flora Fuller MD     ASSISTANT  Rod Moss    ESTIMATED BLOOD LOSS  776 ml    COMPLICATIONS  None apparent by completion of procedure    DRAINS  15F ASHLEY drain under each mastectomy flap (2 total)    SPECIMENS REMOVED  1. The right breast which was marked with a short stitch superiorly and a long stitch laterally  2. The right sentinel lymph nodes. 3. The left breast which was marked with a short stitch superiorly and a long stitch laterally    FINDINGS  The right sentinel lymph node procedure identified at least 3 lymph nodes which were grossly normal.  The bilateral mastectomies were performed without difficulty. All of the tissue appeared grossly normal.      POST-OP CONDITION  Stable    DISPOSITION  To recovery room    INDICATION FOR PROCEDURE  Estela Kebede is a 72 y.o. woman who was found to have a right breast cancer. Due to her desire to avoid radiation and have the lowest possible risk of recurrence, she presents to have a bilateral simple mastectomy. I did recommend a right sentinel lymph node biopsy for axillary staging. She presents today for that purpose.  The indications for the planned procedure, along with the potential benefits and risks which include but are not limited to the risk of anesthesia, bleeding, infection, seroma formation, skin necrosis, possible need for additional surgery pending final pathologic assessment, lymphedema, sensation changes, scars, and unappealing cosmetics were reviewed. All questions were answered and she agrees to proceed. DESCRIPTION OF PROCEDURE   Milad Decker was brought to the operating room and placed supine on the operating room table with her arms extended on boards. She was appropriately positioned and padded. Bilateral sequential compression devices were applied to both lower extremities and a single dose of antibiotics was administered intravenously within 60 minutes prior to the incision time. Breast imaging was available in the room. After induction of anesthesia, a timeout procedure was performed. Radioactive colloid was injected into the right breast by the nuclear medicine technician. I then injected 5 ml of isosulfan blue dye in the right retroareolar and upper outer breast and a 5 minute massage was performed. The patient was prepped and draped in the standard surgical fashion. We directed our attention to the right breast.  An elliptical incision was made with a scalpel and carried down through the dermis with electrocautery. A superior flap was created up to the level of the clavicle and was taken down onto the chest wall. An inferior flap was created down to the inframammary fold which was taken down onto the rectus abdominis and serratus anterior muscles. Care was taken not to enter the fascia of either of these two muscles. A medial flap was created down to the sternal border and a lateral flap was created down to the latissimus dorsi. Once all four flaps were created, the breast was removed from the posterior attachments using electrocautery. Care was taken to include the pectoralis major muscle fascia en bloc with the breast.  The breast was then marked with a short stitch superiorly and a long stitch laterally and was passed off the field. Next, the clavipectoral fascia was then incised along the pectoralis muscle.  Upon entering the axilla, the gamma probe and blue dye were utilized to guide localization of the sentinel nodes. 3 blue and radioactive lymph nodes were identified and excised. Ex vivo counts were 5683, 1963, and 543. These were then passed off the field. The residual gamma probe activity within the axillary region was minimal.  The axilla was then assessed for other blue channels/nodes and palpably abnormal lymph nodes. All blue nodes, non colored nodes extending from colored lymphatics, radioactive and palpably suspicious nodes were removed. Attention was then turned to the wound. Aggressive hemostasis was obtained with electrocautery. The wound was irrigated with copious amounts of sterile saline. In order to obtain the best cosmesis and reduce the amount of redundant skin laterally while closing the surgical cavity, I performed a tissue rearrangement. A separate tissue incision was made superiorly and laterally and a flap was advanced for a total area of 16 square cm. A 15 Italian round Tom-Vicente drain was inserted under the flap and sutured into place. Hemostasis was again confirmed. The deep dermal layer was then reapproximated with a running 2-0 barbed Stratfix suture which nicely reapproximated the skin. Surgical glue dressing was applied. We then directed our attention to the left breast. An elliptical incision was made with a scalpel and carried down through the dermis with electrocautery. A superior flap was created up to the level of the clavicle and was taken down onto the chest wall. An inferior flap was created down to the inframammary fold which was taken down onto the rectus abdominis and serratus anterior muscles. Care was taken not to enter the fascia of either of these two muscles. A medial flap was created down to the sternal border and a lateral flap was created down to the latissimus dorsi. Once all four flaps were created, the breast was removed from the posterior attachments using electrocautery.   Care was taken to include the

## 2021-05-18 NOTE — ANESTHESIA POSTPROCEDURE EVALUATION
Department of Anesthesiology  Postprocedure Note    Patient: Anjum Lopez  MRN: [id number]  YOB: 1955  Date of evaluation: 5/18/2021  Time:  12:45 PM     Procedure Summary     Date: 05/18/21 Room / Location: [address] [place] [address]  / [place]    Anesthesia Start: 0730 Anesthesia Stop: 1314    Procedure: BILATERAL SIMPLE MASTECTOMY, RIGHT SENTINEL LYMPH NODE BIOPSY (Bilateral ) Diagnosis:       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 female breast, unspecified estrogen receptor status ([facility] [address]      (RIGHT BREAST CANCER  C50.311)    Surgeons: Alexa Bailey MD Responsible Provider: Marino Bravo MD    Anesthesia Type: general ASA Status: 3          Anesthesia Type: general    Nav Phase I: Nav Score: 10    Nav Phase II:      Last vitals: Reviewed and per EMR flowsheets.    Vitals:    05/10/21 1520 05/18/21 0630 05/18/21 1133   BP:  124/84 (!) 148/92   Pulse:  74 88   Resp:  16 13   Temp:  97.5 °F (36.4 °C) 97.6 °F (36.4 °C)   TempSrc:  Temporal Temporal   SpO2:  97% 94%   Weight: 152 lb (68.9 kg) 154 lb (69.9 kg)    Height: 5' 2.25\" (1.581 m) 5' 2\" (1.575 m)        Anesthesia Post Evaluation    Patient location during evaluation: bedside  Patient participation: complete - patient participated  Level of consciousness: awake and alert  Airway patency: patent  Nausea & Vomiting: no nausea  Complications: no  Cardiovascular status: hemodynamically stable  Respiratory status: acceptable  Hydration status: euvolemic

## 2021-05-18 NOTE — PROGRESS NOTES
Pt tolerating room air. Transferred to [facility] for d/c. Report given to [facility].

## 2021-05-18 NOTE — PROGRESS NOTES
D: denies nausea after eating crackers ans drinking coffee, ambulated to the bathroom and voided without difficulty.

## 2021-05-19 ENCOUNTER — TELEPHONE (OUTPATIENT)
Dept: SURGERY | Age: 66
End: 2021-05-19

## 2021-05-19 NOTE — TELEPHONE ENCOUNTER
Called patient to check in POD1 from bilateral simple mastectomy, right sentinel lymph node biopsy. Patient doing well. Pain well controlled (3/10, has taken 1 oxycodone and 2 tylenol since last night). Managing drains well on own. No concerns for hematoma. Patient knows to call with any questions or concerns.

## 2021-05-27 ENCOUNTER — OFFICE VISIT (OUTPATIENT)
Dept: SURGERY | Age: 66
End: 2021-05-27

## 2021-05-27 VITALS — HEIGHT: 62 IN | BODY MASS INDEX: 28.34 KG/M2 | WEIGHT: 154 LBS

## 2021-05-27 DIAGNOSIS — Z17.0 MALIGNANT NEOPLASM OF LOWER-INNER QUADRANT OF RIGHT BREAST OF FEMALE, ESTROGEN RECEPTOR POSITIVE (HCC): Primary | ICD-10-CM

## 2021-05-27 DIAGNOSIS — C50.311 MALIGNANT NEOPLASM OF LOWER-INNER QUADRANT OF RIGHT BREAST OF FEMALE, ESTROGEN RECEPTOR POSITIVE (HCC): Primary | ICD-10-CM

## 2021-05-27 PROCEDURE — 99024 POSTOP FOLLOW-UP VISIT: CPT | Performed by: SURGERY

## 2021-05-27 NOTE — PROGRESS NOTES
05/27/21    CHIEF COMPLAINT:  Post-operative visit . HISTORY OF PRESENT ILLNESS:  John Benavides is a 72 y.o. woman who initially presented to my office for evaluation of her new diagnosis of right breast cancer. The patient was undergoing her routine mammogram on 3/31/2021 when she was alerted to an abnormality. She underwent additional imaging and ultimately a core biopsy, which confirmed an invasive breast cancer. We discussed all of her options and she was quite motivated for bilateral mastectomy, including a left prophylactic. I also recommended a right sentinel lymph node biopsy for axillary staging. She underwent these procedures on 5/18/2021 and returns today for her post-operative visit. The patient states that she has done well post-operatively. She denies any problems with the incisions. She denies any redness around the incisions or drainage from the wounds. Both Tom-Vicente drains are producing less than 30 ml over the last 2-3 days. She has no other systemic complaints and has not had any fevers. PHYSICAL EXAMINATION:    BREAST EXAMINATION:  On examination, the incisions are clean, dry, and intact and healing well. There is no sign of any infection or drainage from the wounds. The mastectomy flaps are viable and there is absolutely no skin loss. The drains have a small amount of serous fluid within them.          PATHOLOGY:    5/18/2021 right simple mastectomy and sentinel lymph node biopsy: invasive carcinoma with mixed ductal and lobular features and associated DCIS and LCIS, 14 mm, grade 2, negative margins, ER positive, [place] positive, HER2 negative, 0/6 lymph nodes with metastatic involvement      5/18/2021 left simple mastectomy: Pleomorphic lobular carcinoma in situ and background breast tissue with focal atypical lobular hyperplasia     IMPRESSION/RECOMMENDATION:    Cancer Staging  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 breast of female, estrogen receptor positive [facility])  Staging form: Breast, AJCC 8th Edition  - Clinical: Stage IA (cT1b, cN0, cM0, G2, ER+, NH+, HER2-) - Signed by Alexandra Schneider MD on 4/22/2021  - Pathologic stage from 5/27/2021: Stage IA (pT1c, pN0(sn), cM0, G2, ER+, NH+, HER2-) - Signed by Alexandra Schneider MD on 5/27/2021    S/p bilateral simple mastectomy and right sentinel lymph node biopsy    Stacey Stephenson is a 72 y.o. woman who is now status-post bilateral simple mastectomies and right sentinel lymph node biopsy for a right-sided 1.4 cm invasive mammary cancer. I explained that I do consider her surgical therapy to be complete, as we were able to achieve widely free margins and her sentinel lymph nodes were negative. She was given a copy of her pathology report. Both surgical drains were removed in the office today. Bilateral drain sutures were clipped and each drain removed. A dry sterile dressing was applied. Instructed to continue this daily until opening closes over. She has a post mastectomy garment/sportsbra that she will wear with soft form for slight pressure to mastectomy site. She was instructed as to signs/symptoms of seroma formation. Instructed to call office if her mastectomy site begins to enlarge or she feels like fluid is accumulating. Verbalizes understanding. Questions answered. We also discussed adjuvant therapy. We reviewed that radiation therapy is not indicated at this time. Systemic therapy including chemotherapy and endocrine therapy were reviewed. She will certainly be a candidate for endocrine therapy. She will follow up with medical oncology consultation to discuss this further. I would like to see her back in six months, to repeat her clinical examination. In the interim, I encouraged her to resume self examinations on a monthly basis and to alert me of any changes. I answered all of her questions thoroughly, and she does seem pleased with this plan of approach.   I encouraged her to contact me in the interim if any new questions or concerns arise.     Summary:  - she will schedule follow up with medical oncology and genetics  - f/u in 6 months for clinical examination    Zahra Bruno MD, MD

## 2021-05-27 NOTE — Clinical Note
Kade Veloz is recovering well after bilateral mastectomies. She will be seeing medical oncology at [facility] for endocrine therapy.     Marcia

## 2021-06-03 ENCOUNTER — PROCEDURE VISIT (OUTPATIENT)
Dept: SURGERY | Age: 66
End: 2021-06-03

## 2021-06-03 VITALS — WEIGHT: 154 LBS | BODY MASS INDEX: 28.34 KG/M2 | HEIGHT: 62 IN

## 2021-06-03 DIAGNOSIS — Z17.0 MALIGNANT NEOPLASM OF LOWER-INNER QUADRANT OF RIGHT BREAST OF FEMALE, ESTROGEN RECEPTOR POSITIVE (HCC): Primary | ICD-10-CM

## 2021-06-03 DIAGNOSIS — C50.311 MALIGNANT NEOPLASM OF LOWER-INNER QUADRANT OF RIGHT BREAST OF FEMALE, ESTROGEN RECEPTOR POSITIVE (HCC): Primary | ICD-10-CM

## 2021-06-03 PROCEDURE — 99024 POSTOP FOLLOW-UP VISIT: CPT | Performed by: SURGERY

## 2021-06-03 NOTE — PROGRESS NOTES
06/03/21    CHIEF COMPLAINT:  Post-operative swelling     HISTORY OF PRESENT ILLNESS:  Sharmaine Kumar is a 72 y.o. woman who initially presented to my office for evaluation of her new diagnosis of right breast cancer. The patient was undergoing her routine mammogram on 3/31/2021 when she was alerted to an abnormality. She underwent additional imaging and ultimately a core biopsy, which confirmed an invasive breast cancer. We discussed all of her options and she was quite motivated for bilateral mastectomy, including a left prophylactic. I also recommended a right sentinel lymph node biopsy for axillary staging. She underwent these procedures on 5/18/2021 and has done well post-operatively. Her bilateral ASHLEY drains were removed on 5/27/2021. She reports noticing increased swelling over the last couple of days and wanted this evaluated in case a seroma was developing. She denies any problems with the incisions. She denies any redness around the incisions or drainage from the wounds. She has no other systemic complaints and has not had any fevers. PHYSICAL EXAMINATION:    BREAST EXAMINATION:  On examination, the incisions are clean, dry, and intact and healing well. There is no sign of any infection or drainage from the wounds. The mastectomy flaps are viable and there is absolutely no skin loss.          PATHOLOGY:    5/18/2021 right simple mastectomy and sentinel lymph node biopsy: invasive carcinoma with mixed ductal and lobular features and associated DCIS and LCIS, 14 mm, grade 2, negative margins, ER positive, WA positive, HER2 negative, 0/6 lymph nodes with metastatic involvement      5/18/2021 left simple mastectomy: Pleomorphic lobular carcinoma in situ and background breast tissue with focal atypical lobular hyperplasia     IMPRESSION/RECOMMENDATION:    Cancer Staging  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 breast of female, estrogen receptor positive ([facility] [address]  Staging form: Breast, AJCC

## 2021-06-25 DIAGNOSIS — R10.13 EPIGASTRIC PAIN: ICD-10-CM

## 2021-06-25 RX ORDER — PANTOPRAZOLE SODIUM 40 MG/1
TABLET, DELAYED RELEASE ORAL
Qty: 60 TABLET | Refills: 0 | OUTPATIENT
Start: 2021-06-25

## 2021-06-25 RX ORDER — ATORVASTATIN CALCIUM 20 MG/1
20 TABLET, FILM COATED ORAL NIGHTLY
Qty: 90 TABLET | Refills: 1 | Status: SHIPPED | OUTPATIENT
Start: 2021-06-25 | End: 2022-07-12 | Stop reason: SDUPTHER

## 2021-06-25 NOTE — TELEPHONE ENCOUNTER
Last office visit 5/7/2021     Last written 12-9-2020 x refill    Next office visit scheduled Not scheduled    Requested Prescriptions     Pending Prescriptions Disp Refills    atorvastatin (LIPITOR) 20 MG tablet 90 tablet 1     Sig: Take 1 tablet by mouth nightly TAKE 1 TABLET BY MOUTH EVERY DAY

## 2021-08-12 ENCOUNTER — HOSPITAL ENCOUNTER (OUTPATIENT)
Dept: GENERAL RADIOLOGY | Age: 66
Discharge: HOME | End: 2021-08-12
Payer: MEDICARE

## 2021-08-12 DIAGNOSIS — Z78.0 POSTMENOPAUSAL STATUS (AGE-RELATED) (NATURAL): ICD-10-CM

## 2021-08-12 PROCEDURE — 77080 DXA BONE DENSITY AXIAL: CPT

## 2021-09-17 ENCOUNTER — OFFICE VISIT (OUTPATIENT)
Dept: FAMILY MEDICINE CLINIC | Age: 66
End: 2021-09-17
Payer: MEDICARE

## 2021-09-17 VITALS
OXYGEN SATURATION: 98 % | HEART RATE: 60 BPM | HEIGHT: 62 IN | WEIGHT: 155 LBS | SYSTOLIC BLOOD PRESSURE: 120 MMHG | BODY MASS INDEX: 28.52 KG/M2 | DIASTOLIC BLOOD PRESSURE: 80 MMHG | RESPIRATION RATE: 16 BRPM

## 2021-09-17 DIAGNOSIS — R10.13 EPIGASTRIC PAIN: Primary | ICD-10-CM

## 2021-09-17 DIAGNOSIS — F41.9 ANXIETY: ICD-10-CM

## 2021-09-17 DIAGNOSIS — F33.1 MAJOR DEPRESSIVE DISORDER, RECURRENT EPISODE, MODERATE (HCC): ICD-10-CM

## 2021-09-17 DIAGNOSIS — I10 ESSENTIAL HYPERTENSION: ICD-10-CM

## 2021-09-17 PROCEDURE — 99213 OFFICE O/P EST LOW 20 MIN: CPT | Performed by: NURSE PRACTITIONER

## 2021-09-17 RX ORDER — PANTOPRAZOLE SODIUM 40 MG/1
TABLET, DELAYED RELEASE ORAL
Qty: 180 TABLET | Refills: 1 | Status: SHIPPED | OUTPATIENT
Start: 2021-09-17 | End: 2022-03-31

## 2021-09-17 RX ORDER — ALENDRONATE SODIUM 70 MG/1
TABLET ORAL
Qty: 12 TABLET | Refills: 1 | Status: SHIPPED | OUTPATIENT
Start: 2021-09-17 | End: 2022-02-25

## 2021-09-17 RX ORDER — TRAZODONE HYDROCHLORIDE 100 MG/1
100 TABLET ORAL NIGHTLY PRN
Qty: 90 TABLET | Refills: 1 | Status: SHIPPED | OUTPATIENT
Start: 2021-09-17 | End: 2022-04-04

## 2021-09-17 RX ORDER — MONTELUKAST SODIUM 10 MG/1
10 TABLET ORAL NIGHTLY
Qty: 90 TABLET | Refills: 1 | Status: SHIPPED | OUTPATIENT
Start: 2021-09-17 | End: 2022-03-31

## 2021-09-17 NOTE — PROGRESS NOTES
2021  Sam Guerin (: 1955)  77 y.o.    ASSESSMENT and PLAN:  Natalie Covarrubias was seen today for gi problem and referral - general.    Diagnoses and all orders for this visit:    Epigastric pain  -Stable, controlled with current regimen    Essential hypertension  Controlled with Propranolol 60 mg daily which helps with Migraine control as well. Also taking spironolactone 50 mg daily  -Recent blood-work done at [facility] on 21-hepatic panel WNL, Platelets elevated, consistent with previous results. Bun 9, Cr 1.0, K 5.0; Repeat bloodwork at next visit. Anxiety  -Stable, controlled   -Xanax as needed    Major depressive disorder, recurrent episode, moderate (HCC)  -Stable, controlled on Zoloft 150 mg daily, Xanax as needed. Trazodone nightly. Return in about 3 months (around 2021) for With Dr. Brendan Gallagher. HPI  Breast Cancer: Follows with Dr. Corina Meeks at [facility] -Had double mastectomy May, 2021. Is feeling good since procedure. States incisions have healed nicely. Was Newly diagnosed with breast cancer . Diag ely with 1.7 mm right breast mass 21, biopsy with invasive carcinoma - Clinical: Stage IA (cT1b, cN0, cM0, G2, ER+, PA+, HER2-). HTN: on Propranolol 60  mg daily and spironolactone 50 mg daily, tolerating well. Denies side effects. She does not check at home. Denies dizziness, light headedness,  cp, soa, le swelling. Denies high or low bps. Migraines: Propranolol-well controlled     Depression/anxiety under control. No si, hi. Appetite is good, and has strong support system. Zoloft 150 mg daily. Xanax - sleeping poorly but at baseline. Review of Systems   Constitutional: Negative for activity change, appetite change, fatigue, fever and unexpected weight change. Respiratory: Negative for cough, chest tightness, shortness of breath and wheezing. Cardiovascular: Negative for chest pain, palpitations and leg swelling.    Gastrointestinal: Negative for constipation, diarrhea, nausea Normal range of motion and neck supple. Skin:     General: Skin is warm and dry. Capillary Refill: Capillary refill takes less than 2 seconds. Neurological:      General: No focal deficit present. Mental Status: She is alert and oriented to person, place, and time. Mental status is at baseline. Psychiatric:         Mood and Affect: Mood normal.         Behavior: Behavior normal.         Thought Content:  Thought content normal.         Judgment: Judgment normal.

## 2021-09-18 ASSESSMENT — ENCOUNTER SYMPTOMS
CHEST TIGHTNESS: 0
VOMITING: 0
DIARRHEA: 0
NAUSEA: 0
COUGH: 0
SHORTNESS OF BREATH: 0
WHEEZING: 0
CONSTIPATION: 0

## 2021-09-20 ENCOUNTER — OFFICE VISIT (OUTPATIENT)
Dept: ORTHOPEDIC SURGERY | Age: 66
End: 2021-09-20
Payer: MEDICARE

## 2021-09-20 VITALS — WEIGHT: 155 LBS | HEIGHT: 64 IN | BODY MASS INDEX: 26.46 KG/M2

## 2021-09-20 DIAGNOSIS — M25.531 WRIST PAIN, ACUTE, RIGHT: ICD-10-CM

## 2021-09-20 DIAGNOSIS — M25.532 LEFT WRIST PAIN: ICD-10-CM

## 2021-09-20 DIAGNOSIS — M65.9 SYNOVITIS OF WRIST: Primary | ICD-10-CM

## 2021-09-20 PROCEDURE — G8427 DOCREV CUR MEDS BY ELIG CLIN: HCPCS | Performed by: ORTHOPAEDIC SURGERY

## 2021-09-20 PROCEDURE — L3908 WHO COCK-UP NONMOLDE PRE OTS: HCPCS | Performed by: ORTHOPAEDIC SURGERY

## 2021-09-20 PROCEDURE — 1036F TOBACCO NON-USER: CPT | Performed by: ORTHOPAEDIC SURGERY

## 2021-09-20 PROCEDURE — G8417 CALC BMI ABV UP PARAM F/U: HCPCS | Performed by: ORTHOPAEDIC SURGERY

## 2021-09-20 PROCEDURE — 4040F PNEUMOC VAC/ADMIN/RCVD: CPT | Performed by: ORTHOPAEDIC SURGERY

## 2021-09-20 PROCEDURE — G8399 PT W/DXA RESULTS DOCUMENT: HCPCS | Performed by: ORTHOPAEDIC SURGERY

## 2021-09-20 PROCEDURE — 1123F ACP DISCUSS/DSCN MKR DOCD: CPT | Performed by: ORTHOPAEDIC SURGERY

## 2021-09-20 PROCEDURE — 1090F PRES/ABSN URINE INCON ASSESS: CPT | Performed by: ORTHOPAEDIC SURGERY

## 2021-09-20 PROCEDURE — 3017F COLORECTAL CA SCREEN DOC REV: CPT | Performed by: ORTHOPAEDIC SURGERY

## 2021-09-20 PROCEDURE — 99203 OFFICE O/P NEW LOW 30 MIN: CPT | Performed by: ORTHOPAEDIC SURGERY

## 2021-09-20 NOTE — PROGRESS NOTES
ORTHOPAEDIC SURGERY H&P / CONSULTATION NOTE    Chief complaint:   Chief Complaint   Patient presents with    Wrist Pain     right wrist pain        History of present illness: The patient is a 77 y.o. female right hand dominant with subjective symptoms of left wrist pain. The chief complaint is located at global wrist pain. Duration of symptoms has been for 1 day. The severity of symptoms is rated at 5/10 pain on intake form. Patient states that she fell this morning and caught herself having lost her balance and used her hand to brace herself against a wall. She has sharp pain and discomfort along the ulna. She has pain with pronation and supination. She has been taking narcotics per her report. She denies therapy. She denies brace use, she denies anti-inflammatories. She takes prednisone for baseline rheumatologic condition. She also takes Neurontin. She denies significant swelling. Dull throbbing achy pain, occasional sharp with attempted wrist extension flexion and rotations. She is accompanied by her     The patient has tried the below listed items prior to today's consultation for above listed chief complaint.     -   Over-the-counter anti-inflammatories/prescription medication anti-inflammatory.      -   Physical therapy / guided home exercise program -     -   Previous corticosteroid injections    Past medical history:    Past Medical History:   Diagnosis Date    Asthma     CAD (coronary artery disease)     Cancer (HCC)     RIGHT BREAST    Chronic diarrhea     Dr. Kerry Hall    Chronic kidney disease     kidney stones    Clostridium difficile diarrhea 10/23/13    positive by PCR    Fibromyalgia     Hemorrhoid     Hyperlipidemia     Hypertension     Kidney stone     Osteoarthritis     fibromyalgia    Sarcoidosis 2003    sees Dr. Jose Snow        Past surgical history:    Past Surgical History:   Procedure Laterality Date    ABDOMINAL EXPLORATION SURGERY  8/19/12    REDUCTION OF VULVUS; RIGHT COLECTOMY; SMALL BOWEL RESECTION; INSERTION OF ON Q PAIN BUSTER    BREAST BIOPSY      CARDIAC CATHETERIZATION  2010    CLEAN    COLONOSCOPY  2010    normal    COLONOSCOPY N/A 6/16/2020    COLON W/ANES. performed by Michael Brar MD at [address]  12/2011    ESOPHAGEAL DILATATION  6/16/2020    ESOPHAGEAL DILATION Louis Dasen performed by Michael Brar MD at [address]  5/2009    cataract, right    HYSTERECTOMY  2000    LITHOTRIPSY  1/19/2012    LITHOTRIPSY      04/2012    MASTECTOMY Bilateral 5/18/2021    BILATERAL SIMPLE MASTECTOMY, RIGHT SENTINEL LYMPH NODE BIOPSY performed by Benjamin Keys MD at [facility]    right    PARTIAL HYSTERECTOMY     [address]    UPPER GASTROINTESTINAL ENDOSCOPY  2/27/13    UPPER GASTROINTESTINAL ENDOSCOPY  11/14/2017    gastritis    UPPER GASTROINTESTINAL ENDOSCOPY N/A 6/16/2020    EGD W/ANES. (11:00) performed by Michael Brar MD at [place] [address]  12/2011    US BREAST NEEDLE BIOPSY RIGHT Right 4/15/2021    US BREAST NEEDLE BIOPSY RIGHT 4/15/2021 Radha Allred MD [address] [facility]        Allergies:     Allergies   Allergen Reactions    Infliximab      Severe drop in blood pressure    Ultrasound Gel Other (See Comments)     burn    Codeine Nausea And Vomiting    Penicillins Rash         Medications:   Current Outpatient Medications:     pantoprazole (PROTONIX) 40 MG tablet, TAKE 1 TABLET BY MOUTH TWICE A DAY BEFORE MEALS, Disp: 180 tablet, Rfl: 1    alendronate (FOSAMAX) 70 MG tablet, TAKE 1 TABLET BY MOUTH EVERY 7 DAYS, Disp: 12 tablet, Rfl: 1    montelukast (SINGULAIR) 10 MG tablet, Take 1 tablet by mouth nightly TAKE 1 TABLET BY MOUTH EVERY DAY, Disp: 90 tablet, Rfl: 1    traZODone (DESYREL) 100 MG tablet, Take 1 tablet by mouth nightly as needed for Sleep, Abused:     Physically Abused:     Sexually Abused: Tobacco use. Social History     Tobacco Use   Smoking Status Former Smoker    Packs/day: 0.50    Years: 20.00    Pack years: 10.00    Types: Cigarettes    Start date: 3/1/2019   [employer] Quit date: 2020    Years since quittin.0   Smokeless Tobacco Never Used     [place]    [place]    Review of systems: Patient denies any fevers chills chest pain shortness of breath nausea vomiting significant weight loss any change in voiding or bowel movements. Patient denies any significant numbness or tingling at baseline as it relates to this presenting symptom/chief complaint. The patient denies any significant problems with skin or any significant allergies. Physical examination:  Body mass index is 26.61 kg/m². AAOx3, NCAT  EOMI  MMM  RR  Unlabored breathing, no wheezing  Skin intact BUE and BLE, warm and moist  Left wrist:No erythema. Positive tenderness to palpation globally about the wrist.  No gross swelling. She has pain both along the radial and ulnar aspect of the wrist.  She has pain with attempted active range of motion where she is able to do 60 degrees of flexion extension. Prone of supination she has pain with attempts beyond 20 to 30 degrees each way. No significant pain or tenderness along the snuffbox. No significant pain or tenderness along the TFCC/ulnar styloid  Skin intact throughout  5/5 D B T G IO EPL  SILT Ax, R, U, M  +2 radial pulse    Diagnostic imaging:  MY READ:  3V L wrist 21: Negative fracture. Very slight radiocarpal arthrosis albeit limited. No gross fracture of the ulnar styloid. Slight CMC arthrosis    Pertinent lab work:  None       Diagnosis Orders   1. Synovitis of wrist     2. Left wrist pain     3.  Wrist pain, acute, right  Marnie Parikh Titan Wrist Short Brace       Assessment and plan: 77 y.o. female with current subjective symptoms and physical exam findings with diagnostic imaging correlating to right wrist sprain/synovitis. -Time of 16 minutes was spent coordinating and discussing the clinical findings, reviewing diagnostic imaging as indicated, coordinating care with prior notes review and current clinical encounter documentation as it pertains to the patient's presenting subjective symptoms and diagnoses. -I reviewed with the patient the imaging findings as well as clinical exam and  how it correlates to subjective symptoms.  -I had a pleasant discussion with the patient and her . I reviewed with her that currently her clinical examination and radiographs are well-appearing with regard to overall presentation. She does have pain with pro no supination. My suspicion is this is a subtle sprain to the wrist given she caught her body weight against a wall however this was not full on ground-level fall with catching her body weight on the ground. This is encouraging. This did just happen earlier this morning.  -I recommended she continue over-the-counter Tylenol per bottle parent discomfort. Given she is on baseline prednisone I Did ask for her to follow-up with her primary care doctor who is managing this to see if she can tolerate or recommend for OTC Aleve per bottle parent discomfort given gastric mucosal and long-term use of prednisone/steroid. This is for her rheumatologic condition. In the setting of rheumatologic condition, she could also have inflamed the wrist with regard to baseline synovitis.  -We will provide her a cock-up wrist brace to be utilized for the next 5 days or so. From then she will start to wean out of it with ADLs but utilize it at nighttime for another week or so and hopefully she weans out of this over the next 2 to 3 weeks.   With attempts to return to ADLs should she have any significant discomfort or pain or significant pain with continued prone of supination, then we will look to obtain a MRI to review and look at the TFCC to see if that is localized to where her pain is.  -Continue activity modification to include low impact at this time.  -Should she wish to attend occupational hand therapy with attempts at weaning out of brace but with still some discomfort, then she will contact the office and we will place a referral for this. Otherwise as listed per the above should she be unable to return to ADLs and with continued pain with associated prone of supination then consideration for MRI per the above. She will contact the office at that time as needed and a authorization will be submitted to insurance for approval.  -All questions answered to the patient's satisfaction and the patient expressed understanding and agreement with the above listed treatment plan  -Follow up 3 to 4 weeks as needed. -Thank you for the clinical consultation and allowing me to participate in the patient's care. Electronically signed by Georgia Hernández MD on 9/20/21 at 1:06 PM DILLAN Hernández MD       Orthopaedic Surgery-Sports Medicine        Disclaimer: This note was dictated with voice recognition software. Though review and correction are routinely performed, please contact the office/medical records for any errors requiring correction.

## 2021-10-07 RX ORDER — MIRTAZAPINE 15 MG/1
TABLET, FILM COATED ORAL
Qty: 90 TABLET | Refills: 1 | Status: SHIPPED | OUTPATIENT
Start: 2021-10-07 | End: 2022-04-04 | Stop reason: SDUPTHER

## 2021-10-07 NOTE — TELEPHONE ENCOUNTER
Refill Request     Last Seen: Last Seen Department: 9/17/2021  Last Seen by PCP: Visit date not found    Last Written: 4/5/2021    Next Appointment:   Future Appointments   Date Time Provider Annmarie Mclean   11/19/2021  1:45 PM Amy Champion MD [facility]   1/3/2022  1:00 PM Brock Barthel, MD [facility] [place] [facility]       Future appointment scheduled      Requested Prescriptions     Pending Prescriptions Disp Refills    mirtazapine (REMERON) 15 MG tablet [Pharmacy Med Name: Mirtazapine 15 MG Oral Tablet] 90 tablet 1     Sig: TAKE 1 TABLET BY MOUTH ONCE DAILY AT NIGHT

## 2021-10-08 RX ORDER — VALACYCLOVIR HYDROCHLORIDE 500 MG/1
TABLET, FILM COATED ORAL
Qty: 90 TABLET | Refills: 0 | Status: SHIPPED | OUTPATIENT
Start: 2021-10-08 | End: 2022-01-28

## 2021-10-08 NOTE — TELEPHONE ENCOUNTER
.  Refill Request     Last Seen: Last Seen Department: 9/17/2021  Last Seen by PCP: Visit date not found    Last Written: 4/1/21 90 with 1     Next Appointment:   Future Appointments   Date Time Provider Annmarie Caridad   11/19/2021  1:45 PM Erick Jeffers MD [facility]   1/3/2022  1:00 PM David Tapia MD [facility] [place] [facility]         Requested Prescriptions     Pending Prescriptions Disp Refills    valACYclovir (VALTREX) 500 MG tablet [Pharmacy Med Name: valACYclovir HCl 500 MG Oral Tablet] 90 tablet 0     Sig: Take 1 tablet by mouth once daily

## 2021-10-28 ENCOUNTER — OFFICE VISIT (OUTPATIENT)
Dept: SURGERY | Age: 66
End: 2021-10-28
Payer: MEDICARE

## 2021-10-28 VITALS
TEMPERATURE: 97.8 F | RESPIRATION RATE: 16 BRPM | HEIGHT: 64 IN | SYSTOLIC BLOOD PRESSURE: 111 MMHG | OXYGEN SATURATION: 96 % | BODY MASS INDEX: 26.46 KG/M2 | HEART RATE: 101 BPM | DIASTOLIC BLOOD PRESSURE: 87 MMHG | WEIGHT: 155 LBS

## 2021-10-28 DIAGNOSIS — Z85.3 ENCOUNTER FOR FOLLOW-UP SURVEILLANCE OF BREAST CANCER: ICD-10-CM

## 2021-10-28 DIAGNOSIS — Z85.3 PERSONAL HISTORY OF BREAST CANCER: ICD-10-CM

## 2021-10-28 DIAGNOSIS — G89.18 POST-MASTECTOMY PAIN: Primary | ICD-10-CM

## 2021-10-28 DIAGNOSIS — Z08 ENCOUNTER FOR FOLLOW-UP SURVEILLANCE OF BREAST CANCER: ICD-10-CM

## 2021-10-28 PROCEDURE — 1090F PRES/ABSN URINE INCON ASSESS: CPT | Performed by: SURGERY

## 2021-10-28 PROCEDURE — 1123F ACP DISCUSS/DSCN MKR DOCD: CPT | Performed by: SURGERY

## 2021-10-28 PROCEDURE — 99213 OFFICE O/P EST LOW 20 MIN: CPT | Performed by: SURGERY

## 2021-10-28 PROCEDURE — G8484 FLU IMMUNIZE NO ADMIN: HCPCS | Performed by: SURGERY

## 2021-10-28 PROCEDURE — 1036F TOBACCO NON-USER: CPT | Performed by: SURGERY

## 2021-10-28 PROCEDURE — G8417 CALC BMI ABV UP PARAM F/U: HCPCS | Performed by: SURGERY

## 2021-10-28 PROCEDURE — G8427 DOCREV CUR MEDS BY ELIG CLIN: HCPCS | Performed by: SURGERY

## 2021-10-28 PROCEDURE — 3017F COLORECTAL CA SCREEN DOC REV: CPT | Performed by: SURGERY

## 2021-10-28 PROCEDURE — 4040F PNEUMOC VAC/ADMIN/RCVD: CPT | Performed by: SURGERY

## 2021-10-28 PROCEDURE — G8399 PT W/DXA RESULTS DOCUMENT: HCPCS | Performed by: SURGERY

## 2021-10-28 RX ORDER — LETROZOLE 2.5 MG/1
2.5 TABLET, FILM COATED ORAL DAILY
COMMUNITY
Start: 2021-08-03

## 2021-10-28 ASSESSMENT — ENCOUNTER SYMPTOMS
SHORTNESS OF BREATH: 0
ABDOMINAL PAIN: 1
COUGH: 0

## 2021-10-28 NOTE — PROGRESS NOTES
CHIEF COMPLAINT:  Follow-up for right breast cancer    PCP: Diaz Romero MD  Radiation Oncology: Ericka Rowley MD  Medical Oncology: Lili Langley MD    Breast Cancer Treatment Summary:  Pathology: 1.4 cm right grade 2, ER+ ME+ HER2- invasive carcinoma with mixed ductal and lobular features and associated DCIS and LCIS, 0 of 6 lymph nodes with evidence of metastasis (Left breast with pleomorphic LCIS and ALH)  Surgery: bilateral simple mastectomy and right sentinel lymph node biopsy on 5/18/2021  Radiation: N/A  Systemic Therapy: Letrozole initiated in August 2021 (plan to continue for a total of 5 years)    Genetic Testing:  Not indicated    Pertinent Family History: No family history of breast or ovarian cancer    HISTORY OF PRESENT ILLNESS:  Griselda Sara is a 77 y.o. woman who is now 6 months status-post bilateral simple mastectomy and right sentinel lymph node biopsy for a right breast cancer. Following her surgical therapy, she was treated with adjuvant endocrine therapy with Letrozole which she initiated in August 2021. She seems to be tolerating endocrine therapy well. She returns today for a follow up visit because she has began experiencing sensitivity of the right chest and arm over the last week. She also reports 1 week of swelling of the right chest, axilla, and upper arm. She is concerned that she may be developing lymphedema. She denies feelings of tightness and heaviness of the right arm. She denies any masses along either chest wall. She denies any masses in either axilla. She denies unintentional weight loss, bone pain, headaches and has no other systemic complaints. Please note that her past medical history, surgical history, medications, allergies, social history, and family history were all reviewed with her again today. REVIEW OF SYSTEMS:   Constitutional: Negative for unexpected weight change. Eyes: Negative for visual disturbance.    Respiratory: Negative for cough and (cT1b, cN0, cM0, G2, ER+, HI+, HER2-) - Signed by Clay Godfrey MD on 4/22/2021  - Pathologic stage from 5/27/2021: Stage IA (pT1c, pN0(sn), cM0, G2, ER+, HI+, HER2-) - Signed by Clay Godfrey MD on 5/27/2021    She also had lobular carcinoma in situ and atypical lobular hyperplasia    Edmundo Bain is a 77 y.o. woman who is now 6 months status-post bilateral simple mastectomy and right sentinel lymph node biopsy for a right breast cancer. Following her surgical therapy, she was treated with adjuvant endocrine therapy with Letrozole which she initiated in August 2021. I reassured her that based on her clinical examination that there is no evidence of any recurrent disease or new abnormalities. We reviewed the importance of continued endocrine therapy. Signs and symptoms of recurrence were reviewed. She verbalizes understanding that she should notify our office if she identifies any abnormalities on self examination as it may require further workup. The patient is experiencing new onset hypersensitivity of the lower aspect of the chest wall, worse on the right. Her limb measurements are symmetric bilaterally. We discussed referring her to physical therapy due to the sensitivity she is felling and for more in depth lymphedema education, which she would like. I would like to see her back in 6 months at which time we will repeat her clinical exam.  She no longer requires routine mammograms. . In the interim, I encouraged her to resume self examinations on a monthly basis and to alert me of any changes. I also encouraged her to eat healthy, stay active, and limit alcohol intake for risk reduction purposes. I answered all of her questions thoroughly, and she does seem pleased with this plan of approach. I encouraged her to contact me in the interim if any new questions or concerns arise.     Summary:  - f/u in 6 months for clinical exam  - continue endocrine therapy per medical oncology recommendations  - referral to PT/lymphedema therapy and post-mastectomy pain    Rashida Marina MD

## 2021-11-05 NOTE — TELEPHONE ENCOUNTER
Refill Request     Last Seen: Last Seen Department: 9/17/2021  Last Seen by PCP: 5/7/2021    Last Written: 4/19/2021    Next Appointment:   Future Appointments   Date Time Provider Annmarie Zhangi   1/3/2022  1:00 PM MD PREMA Prieto  [place] [facility]   4/28/2022  1:30 PM Edie Chamberlain MD [facility]       Future appointment scheduled      Requested Prescriptions     Pending Prescriptions Disp Refills    propranolol (INDERAL LA) 60 MG extended release capsule [Pharmacy Med Name: Propranolol HCl ER 60 MG Oral Capsule Extended Release 24 Hour] 90 capsule 0     Sig: Take 1 capsule by mouth once daily

## 2021-11-08 RX ORDER — PROPRANOLOL HCL 60 MG
CAPSULE, EXTENDED RELEASE 24HR ORAL
Qty: 90 CAPSULE | Refills: 0 | Status: SHIPPED | OUTPATIENT
Start: 2021-11-08 | End: 2022-05-17

## 2021-11-11 ENCOUNTER — HOSPITAL ENCOUNTER (OUTPATIENT)
Dept: PHYSICAL THERAPY | Age: 66
Setting detail: THERAPIES SERIES
Discharge: HOME | End: 2021-11-11
Payer: MEDICARE

## 2021-11-11 PROCEDURE — 97161 PT EVAL LOW COMPLEX 20 MIN: CPT

## 2021-11-11 PROCEDURE — 97110 THERAPEUTIC EXERCISES: CPT

## 2021-11-11 NOTE — PROGRESS NOTES
tor    The following patient has been evaluated for physical therapy services. Please review the attached evaluation and/or summary of the patient's plan of care, and verify that you agree by co-signing. Thank you for this referral.    Physician signature_______________________ Date________________    Fax to:   [facility] phone: [phone] Fax: [phone]      LYMPHEDEMA EVALUATION  Evaluation Date:  11/11/2021         Patient Name:  Zakia Smith       YOB: 1955       Medical Diagnosis:  Post mastectomy pain         ICD 10:  G89.18     Treatment Diagnosis: Right breast pain       Onset Date: 5/2021     Referral Date:  10/28/2021     Referring Physician: Pat David MD        Visits Allowed/Insurance/Certification Information: [employer] no authorization needed. Restrictions/Precautions: Pt had shingles after surgery    Pt Occupation/Job Duties:  Pt did not indicate    Social support/Environment:  Pt did not indicate    Health History reviewed with pt:   []   Yes   []   No      HTN under control with meds   SUBJECTIVE FINDINGS        History of Present Illness:   Pt had bilateral mastectomy 5/18/2021. Pt reports 3 falls in last 2-3 months. Pt reports that she had shingles 2-3 months after. Pain       Patient describes pain to be right nerve; and bilateral fluid retention. Patient reports pain is  4/10 pain at present and  8/10 pain at its worst.  Worsened by touching it leaning on it. Improved by heat. Current Functional Limitations:   [x]   Yes   []   No  Functional Complaints: sleeping, overhead movemens     PLOF:   []   No functional limitations   []   Pt with previous functional limitations  Comment:  Pt's sleep is affected?    [x]   Yes   []   No    Chemotherapy:  [] Yes [x] No [] Current  [] Completed Date:_________    Radiation:  [] Yes [x] No [] Current  [] Completed Date:_________        Patient goal for therapy:  \"help relieve excess UEFI  17/80    ASSESSMENT  Pt does not demonstrate lymphedema in UE. However, pt demonstrates tenderness with palpation in right chest region as well as c/o fluid in axillary region. Pt demonstrates discomfort with end range of right shoulder motion. Some tightness in tissue right compared to left chest region. Possible nerve irritation from prior shingles. Problems          []   Decreased cervical ROM  []   Decreased UE/LE ROM  []   Increased pain  [x]   Decreased flexibility  []   Abnormality of gait  []   Increased swelling  []   Poor posture/alignment  []   Decreased functional status     Rehabilitation Potential:  Good for goals listed below. Strengths for achieving goals include:  [x]   Pt motivated   []   PLOF   []   Family support   Limitations for achieving goals include:  []   None   [x]   Severity of condition     []   Cognitive   []   Lack of family support   []   Lack of resources  []   Co-morbidities     []   Other:         Prognosis:   [x]   Good   [x]   Fair   []   Poor    GOALS    Short Term Goals:  2   weeks Long Term Goals: 4    weeks   1). Establish HEP 1). Pt independent with HEP   2). Pain  5/10 or less at worst 2). Pain  2-3/10 or less at worst   3). 3). Pt states minimal to no noted fluid in axillary region. 4). 4). Pt able to perform right UE shoulder AROM with no noted discomfort. 5). 5).   6). 6). PLAN OF CARE     To see patient 1-2  x/week for 3-4  weeks for the following treatment interventions:    [x]   Therapeutic Exercise  []   Modalities of Choice:   []   Vasopneumatic pump    [] Lymphatouch     [x]   Manual Therapy MLD to axillary region and tissue mobilization to chest region  []   Neuromuscular Re-Education  []   Gait Training   []   Therapeutic Activity  [x]   Lymphedema precautions and prevention  []   Use of compression garment  []   Other     Treatment Performed this visit :   0 minutes    Education on lymphatic system, lymphedema and treatment.  Education

## 2021-11-19 ENCOUNTER — TELEPHONE (OUTPATIENT)
Dept: FAMILY MEDICINE CLINIC | Age: 66
End: 2021-11-19

## 2021-11-19 NOTE — TELEPHONE ENCOUNTER
Spoke with pt and she stated that she is going to pass on having this completed at this time advised to call the office if she changes her mind

## 2021-11-23 ENCOUNTER — HOSPITAL ENCOUNTER (OUTPATIENT)
Dept: PHYSICAL THERAPY | Age: 66
Setting detail: THERAPIES SERIES
Discharge: HOME | End: 2021-11-23
Payer: MEDICARE

## 2021-11-23 PROCEDURE — 97140 MANUAL THERAPY 1/> REGIONS: CPT

## 2021-11-23 PROCEDURE — 97110 THERAPEUTIC EXERCISES: CPT

## 2021-11-23 NOTE — FLOWSHEET NOTE
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, Right                     Insurance/Certification information:  [employer] no authorization      Physician Information:  Jorge Villanueva MD     Plan of care signed :  []  Yes  [x] No  [x]  Cosign []  Fax    Date of Patient follow up with Physician:     Is this a Progress Report:     []  Yes  [x]  No      If Yes:  Date Range for reporting period:  Beginning 2021  Ending    Progress report will be due (10 Rx or 30 days whichever is less): 3581      Recertification will be due (POC Duration  / 90 days whichever is less):       Visit # Insurance Allowable Auth Required   2/3-8   []  Yes [x]  No        Functional Scale: UEFI      Date 2021  Score 17/80    Latex Allergy:  [x]NO      []YES  Preferred Language for Healthcare:   [x]English       []other:    RESTRICTIONS/PRECAUTIONS:      SUBJECTIVE:  Pt reports that she feels that it is doing better. Pain level: 1-2/10 mostly right side    Plan Moving Forward/ For next visit:   · Tissue mobilization  · exercise    OBJECTIVE: See eval    Exercises/Interventions:     Exercises in bold performed in department today. Items not bolded are carried forward from prior visits for continuity of the record. Exercise/Equipment Resistance/Repetitions HEP Other comments   finger flexion/extension, wrist flexion/extension, ceiling punches and chicken wing.       1x10 []     prayer stretch   1x10 []    Cane flexion   1x10 []        []        []        []        []        []        []          []         []        []        []        []        []        []        []        []      Therapeutic Exercise/Home Exercise Program:  17  minutes  See above   Access code [email or web address]  PROM bilateral UE    Therapeutic Activity:  0 minutes     Gait: 0 minutes    Neuromuscular Re-Education:  0 minutes      Canalith Repositioning Procedure:      Manual Therapy: 25 minutes  Tissue mobilization bilateral thoracic intercostals, pec sweep and traction of skin in bilateral axillary region. Glenohumeral joint mobilization. Modalities: 0 minutes    ASSESSMENT:  Pt demonstrates increased flexibility compared to first visit. Goals:   GOALS    Short Term Goals:  2   weeks Long Term Goals: 4    weeks   1). Establish HEP 1). Pt independent with HEP   2). Pain  5/10 or less at worst 2). Pain  2-3/10 or less at worst   3). 3). Pt states minimal to no noted fluid in axillary region. 4). 4). Pt able to perform right UE shoulder AROM with no noted discomfort. 5). 5).   6). 6). Overall Progression Towards Functional goals/ Treatment Progress Update:  [] Patient is progressing as expected towards functional goals listed. [] Progression is slowed due to complexities/Impairments listed. [] Progression has been slowed due to co-morbidities.   [x] Plan just implemented, too soon to assess goals progression <30days   [] Goals require adjustment due to lack of progress  [] Patient is not progressing as expected and requires additional follow up with physician  [] Other    Prognosis for POC: [x] Good [] Fair  [] Poor    Patient requires continued skilled intervention: [x] Yes  [] No    Treatment/Activity Tolerance:  [x] Patient able to complete treatment  [] Patient limited by fatigue  [] Patient limited by pain    [] Patient limited by other medical complications  [] Other:       PLAN: See eval  [x] Continue per plan of care [] Alter current plan (see comments above)  [] Plan of care initiated [] Hold pending MD visit [] Discharge    Therapeutic Exercise and NMR EXR  [] (66551) Provided verbal/tactile cueing for activities related to strengthening, flexibility, endurance, ROM  for improvements in proximal strength and core control with self care, mobility, lifting and ambulation.  [] (66505) Provided verbal/tactile cueing for activities related to improving balance, coordination, kinesthetic sense, posture, motor skill, proprioception  to assist with core control in self care, mobility, lifting, and ambulation. Therapeutic Activities and Gait:    [] (34055 or 22431) Provided verbal/tactile cueing for activities related to improving balance, coordination, kinesthetic sense, posture, motor skill, proprioception and motor activation to allow for proper function  with self care and ADLs  [] (88780) Provided training and instruction to the patient for proper core and proximal hip recruitment and positioning with ambulation re-education     Home Exercise Program:    [x] (84449) Reviewed/Progressed HEP activities related to strengthening, flexibility, endurance, ROM of core, proximal hip and LE for functional self-care, mobility, lifting and ambulation   [] (80793) Reviewed/Progressed HEP activities related to improving balance, coordination, kinesthetic sense, posture, motor skill, proprioception of core, proximal hip and LE for self care, mobility, lifting, and ambulation      Manual Treatments:  PROM / STM / Oscillations-Mobs:  G-I, II, III, IV (PA's, Inf., Post.)  [x] (17701) Provided manual therapy to mobilize proximal hip and LS spine soft tissue/joints for the purpose of modulating pain, promoting relaxation,  increasing ROM, reducing/eliminating soft tissue swelling/inflammation/restriction, improving soft tissue extensibility and allowing for proper ROM for normal function with self care, mobility, lifting and ambulation.      []CRP:  Canalith Repositioning procedure for the assessment, treatment and education of BPPV    Modalities:       Charges:  Timed Code Treatment Minutes: 42   Total Treatment Minutes: 42     Medicare Cap total YTD:        [x]N/A  Workers Comp Time Stamp  (Per CPT and Total Treatment) [x]N/A   Time In:   Time Out:       [] EVAL    [] Dry Needling  [x] UC(70806)   x  1   [] EStim Unattended 43956  [] NMR (60869)  x     [] Estim Attended  43075  [x] Manual (19747)  x  2    [] Mechanical Txn 08332  [] TA    x     [] Ultrasound  [] Gait   x  [] Vaso  [] CRP    [] Ionto           [] Other:        Electronically signed by: Duyen Hendrickson, PT, [id number]      Note: If patient does not return for scheduled/ recommended follow up visits, this note will serve as a discharge from care along with most recent update on progress.

## 2021-11-26 DIAGNOSIS — F33.1 MAJOR DEPRESSIVE DISORDER, RECURRENT EPISODE, MODERATE (HCC): ICD-10-CM

## 2021-11-28 NOTE — TELEPHONE ENCOUNTER
.  Refill Request     Last Seen: Last Seen Department: 9/17/2021  Last Seen by PCP: 5/7/2021    Last Written: 4/1/21 135 with 1     Next Appointment:   Future Appointments   Date Time Provider Annmarie Caridad   11/30/2021 12:00 PM [facility]   12/2/2021 12:00 PM Diya Cage, PT [facility]   12/7/2021  1:30 PM Diya Cage, PT [facility] [place] [facility]   12/9/2021  1:30 PM Nayely Mosley, PT [facility]   1/3/2022  1:00 PM Diaz Romero MD [facility] [place] [facility]   4/28/2022  1:30 PM Raymond Pena MD [facility]         Requested Prescriptions     Pending Prescriptions Disp Refills    sertraline (ZOLOFT) 100 MG tablet [Pharmacy Med Name: Sertraline HCl 100 MG Oral Tablet] 135 tablet 0     Sig: TAKE 1 & 1/2 (ONE & ONE-HALF) TABLETS BY MOUTH ONCE DAILY

## 2021-11-29 RX ORDER — SERTRALINE HYDROCHLORIDE 100 MG/1
TABLET, FILM COATED ORAL
Qty: 135 TABLET | Refills: 0 | Status: SHIPPED | OUTPATIENT
Start: 2021-11-29 | End: 2022-02-24

## 2021-11-30 ENCOUNTER — HOSPITAL ENCOUNTER (OUTPATIENT)
Dept: PHYSICAL THERAPY | Age: 66
Setting detail: THERAPIES SERIES
Discharge: HOME | End: 2021-11-30
Payer: MEDICARE

## 2021-11-30 PROCEDURE — 97110 THERAPEUTIC EXERCISES: CPT

## 2021-11-30 PROCEDURE — 97140 MANUAL THERAPY 1/> REGIONS: CPT

## 2021-11-30 NOTE — FLOWSHEET NOTE
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, Right                     Insurance/Certification information:  Humana no authorization      Physician Information:  Mac Ruiz MD     Plan of care signed :  []  Yes  [x] No  [x]  Cosign []  Fax    Date of Patient follow up with Physician:     Is this a Progress Report:     []  Yes  [x]  No      If Yes:  Date Range for reporting period:  Beginning 2021  Ending    Progress report will be due (10 Rx or 30 days whichever is less):       Recertification will be due (POC Duration  / 90 days whichever is less):       Visit # Insurance Allowable Auth Required   3/3-8   []  Yes [x]  No        Functional Scale: UEFI      Date 2021  Score 17/80    Latex Allergy:  [x]NO      []YES  Preferred Language for Healthcare:   [x]English       []other:    RESTRICTIONS/PRECAUTIONS:      SUBJECTIVE:  Pt reports with Thanksgiving 2-3/10 most of time some 10. Pt reports that her sensitivity is not as severe. Pain level: 2-3/10 mostly right side    Plan Moving Forward/ For next visit:   · Tissue mobilization  · exercise    OBJECTIVE: See eval    Exercises/Interventions:     Exercises in bold performed in department today. Items not bolded are carried forward from prior visits for continuity of the record. Exercise/Equipment Resistance/Repetitions HEP Other comments   finger flexion/extension, wrist flexion/extension, ceiling punches and chicken wing.       1x10 []     prayer stretch   1x10 []    Cane flexion   1x10 hold 5 -10 seconds []        []        []        []        []        []        []          []         []        []        []        []        []        []        []        []      Therapeutic Exercise/Home Exercise Program:  15 minutes  See above   Access code [email or web address]  PROM bilateral UE  Educated pt on Desensitization techniques to bilateral breast region. Therapeutic Activity:  0 minutes     Gait: 0 minutes    Neuromuscular Re-Education:  0 minutes      Canalith Repositioning Procedure:      Manual Therapy: 22 minutes  Tissue mobilization bilateral thoracic intercostals, pec sweep and traction of skin in bilateral axillary region. Glenohumeral joint mobilization. Modalities: 0 minutes    ASSESSMENT:  Pt demonstrates increase in AROM bilateral with less irritation. Goals:   GOALS    Short Term Goals:  2   weeks Long Term Goals: 4    weeks   1). Establish HEP 1). Pt independent with HEP   2). Pain  5/10 or less at worst 2). Pain  2-3/10 or less at worst   3). 3). Pt states minimal to no noted fluid in axillary region. 4). 4). Pt able to perform right UE shoulder AROM with no noted discomfort. 5). 5).   6). 6). Overall Progression Towards Functional goals/ Treatment Progress Update:  [] Patient is progressing as expected towards functional goals listed. [] Progression is slowed due to complexities/Impairments listed. [] Progression has been slowed due to co-morbidities.   [x] Plan just implemented, too soon to assess goals progression <30days   [] Goals require adjustment due to lack of progress  [] Patient is not progressing as expected and requires additional follow up with physician  [] Other    Prognosis for POC: [x] Good [] Fair  [] Poor    Patient requires continued skilled intervention: [x] Yes  [] No    Treatment/Activity Tolerance:  [x] Patient able to complete treatment  [] Patient limited by fatigue  [] Patient limited by pain    [] Patient limited by other medical complications  [] Other:       PLAN: See eval  [x] Continue per plan of care [] Alter current plan (see comments above)  [] Plan of care initiated [] Hold pending MD visit [] Discharge    Therapeutic Exercise and NMR EXR  [x] (80282) Provided verbal/tactile cueing for activities related to strengthening, flexibility, endurance, ROM  for improvements in proximal strength and core control with self care, mobility, lifting and ambulation.  [] (06555) Provided verbal/tactile cueing for activities related to improving balance, coordination, kinesthetic sense, posture, motor skill, proprioception  to assist with core control in self care, mobility, lifting, and ambulation. Therapeutic Activities and Gait:    [] (24785 or 41797) Provided verbal/tactile cueing for activities related to improving balance, coordination, kinesthetic sense, posture, motor skill, proprioception and motor activation to allow for proper function  with self care and ADLs  [] (54847) Provided training and instruction to the patient for proper core and proximal hip recruitment and positioning with ambulation re-education     Home Exercise Program:    [x] (41895) Reviewed/Progressed HEP activities related to strengthening, flexibility, endurance, ROM of core, proximal hip and LE for functional self-care, mobility, lifting and ambulation   [] (82462) Reviewed/Progressed HEP activities related to improving balance, coordination, kinesthetic sense, posture, motor skill, proprioception of core, proximal hip and LE for self care, mobility, lifting, and ambulation      Manual Treatments:  PROM / STM / Oscillations-Mobs:  G-I, II, III, IV (PA's, Inf., Post.)  [x] (71265) Provided manual therapy to mobilize proximal hip and LS spine soft tissue/joints for the purpose of modulating pain, promoting relaxation,  increasing ROM, reducing/eliminating soft tissue swelling/inflammation/restriction, improving soft tissue extensibility and allowing for proper ROM for normal function with self care, mobility, lifting and ambulation.      []CRP:  Canalith Repositioning procedure for the assessment, treatment and education of BPPV    Modalities:       Charges:  Timed Code Treatment Minutes: 37   Total Treatment Minutes: 37     Medicare Cap total YTD:        [x]N/A  Workers Comp Time Stamp  (Per CPT and Total Treatment) [x]N/A   Time In:   Time Out:       [] EVAL    [] Dry Needling  [x] LC(74108)   x  1   [] EStim Unattended 45017  [] NMR (66145)  x     [] Estim Attended  76441  [x] Manual (14641)  x  1    [] Mechanical Txn 33944  [] TA    x     [] Ultrasound  [] Gait   x  [] Vaso  [] CRP    [] Ionto           [] Other:        Electronically signed by: Mahesh Menchaca PT, [id number]      Note: If patient does not return for scheduled/ recommended follow up visits, this note will serve as a discharge from care along with most recent update on progress.

## 2021-12-02 ENCOUNTER — HOSPITAL ENCOUNTER (OUTPATIENT)
Dept: PHYSICAL THERAPY | Age: 66
Setting detail: THERAPIES SERIES
Discharge: HOME | End: 2021-12-02
Payer: MEDICARE

## 2021-12-02 PROCEDURE — 97110 THERAPEUTIC EXERCISES: CPT

## 2021-12-02 PROCEDURE — 97140 MANUAL THERAPY 1/> REGIONS: CPT

## 2021-12-07 ENCOUNTER — HOSPITAL ENCOUNTER (OUTPATIENT)
Dept: PHYSICAL THERAPY | Age: 66
Setting detail: THERAPIES SERIES
Discharge: HOME | End: 2021-12-07
Payer: MEDICARE

## 2021-12-07 PROCEDURE — 97140 MANUAL THERAPY 1/> REGIONS: CPT

## 2021-12-07 PROCEDURE — 97110 THERAPEUTIC EXERCISES: CPT

## 2021-12-07 NOTE — FLOWSHEET NOTE
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, Right                     Insurance/Certification information:  Humana no authorization      Physician Information:  Mallory Zafar MD     Plan of care signed :  []  Yes  [x] No  [x]  Cosign []  Fax    Date of Patient follow up with Physician:     Is this a Progress Report:     [x]  Yes  []  No      If Yes:  Date Range for reporting period:  Beginning 2021  Ending 2021    Progress report will be due (10 Rx or 30 days whichever is less):      Recertification will be due (POC Duration  / 90 days whichever is less):       Visit # Insurance Allowable Auth Required   5/3-   []  Yes [x]  No        Functional Scale: UEFI      Date 2021  Score 63/80    Latex Allergy:  [x]NO      []YES  Preferred Language for Healthcare:   [x]English       []other:    RESTRICTIONS/PRECAUTIONS:      SUBJECTIVE: Pt reports that her Sarcoidosis is flaring up today. Pt reports that she did a lot over the weekend. Pt reports that she feels more fullness on left side. Pain level: 0/10 B; worst 3/10    Plan Moving Forward/ For next visit:   · Tissue mobilization  · exercise    OBJECTIVE: See eval  O2 Sat 95%, Pulse 83, /86     Exercises/Interventions:     Exercises in bold performed in department today. Items not bolded are carried forward from prior visits for continuity of the record. Exercise/Equipment Resistance/Repetitions HEP Other comments   finger flexion/extension, wrist flexion/extension, ceiling punches and chicken wing.       1x10 []     prayer stretch   1x10 []    Cane flexion   1x10 hold 5 -10 seconds []        []        []        []        []        []        []          []         []        []        []        [] []        []        []        []      Therapeutic Exercise/Home Exercise Program:10   minutes  See above   Access code [email or web address]  PROM bilateral UE     Therapeutic Activity:  0 minutes     Gait: 0 minutes    Neuromuscular Re-Education:  0 minutes      Canalith Repositioning Procedure:      Manual Therapy: 28 minutes  Tissue mobilization bilateral thoracic intercostals, pec sweep and traction of skin in bilateral axillary region. Clearing nodes and MLD     Modalities: 0 minutes    ASSESSMENT:  Pt demonstrates increase in AROM bilateral with less irritation. Pt tolerated MLD with no noted difficulty. Goals:   GOALS    Short Term Goals:  2   weeks Long Term Goals: 4    weeks   1). Establish HEP-MET 1). Pt independent with HEP   2). Pain  5/10 or less at worst-MET 2). Pain  2-3/10 or less at worst   3). 3). Pt states minimal to no noted fluid in axillary region. -MET   4). 4). Pt able to perform right UE shoulder AROM with no noted discomfort. 5). 5).   6). 6). Overall Progression Towards Functional goals/ Treatment Progress Update:  [x] Patient is progressing as expected towards functional goals listed. [] Progression is slowed due to complexities/Impairments listed. [] Progression has been slowed due to co-morbidities.   [] Plan just implemented, too soon to assess goals progression <30days   [] Goals require adjustment due to lack of progress  [] Patient is not progressing as expected and requires additional follow up with physician  [] Other    Prognosis for POC: [x] Good [] Fair  [] Poor    Patient requires continued skilled intervention: [x] Yes  [] No    Treatment/Activity Tolerance:  [x] Patient able to complete treatment  [] Patient limited by fatigue  [] Patient limited by pain    [] Patient limited by other medical complications  [] Other:       PLAN: See eval  [x] Continue per plan of care [] Alter current plan (see comments above)  [] Plan of care initiated [] Hold pending MD visit [] Discharge  Increase tissue mobilization in left chest region. Will continue 1 more treatment next week and reassess. Therapeutic Exercise and NMR EXR  [x] (63383) Provided verbal/tactile cueing for activities related to strengthening, flexibility, endurance, ROM  for improvements in proximal strength and core control with self care, mobility, lifting and ambulation.  [] (85015) Provided verbal/tactile cueing for activities related to improving balance, coordination, kinesthetic sense, posture, motor skill, proprioception  to assist with core control in self care, mobility, lifting, and ambulation.      Therapeutic Activities and Gait:    [] (90430 or 59760) Provided verbal/tactile cueing for activities related to improving balance, coordination, kinesthetic sense, posture, motor skill, proprioception and motor activation to allow for proper function  with self care and ADLs  [] (90940) Provided training and instruction to the patient for proper core and proximal hip recruitment and positioning with ambulation re-education     Home Exercise Program:    [x] (24962) Reviewed/Progressed HEP activities related to strengthening, flexibility, endurance, ROM of core, proximal hip and LE for functional self-care, mobility, lifting and ambulation   [] (98811) Reviewed/Progressed HEP activities related to improving balance, coordination, kinesthetic sense, posture, motor skill, proprioception of core, proximal hip and LE for self care, mobility, lifting, and ambulation      Manual Treatments:  PROM / STM / Oscillations-Mobs:  G-I, II, III, IV (PA's, Inf., Post.)  [x] (89716) Provided manual therapy to mobilize proximal hip and LS spine soft tissue/joints for the purpose of modulating pain, promoting relaxation,  increasing ROM, reducing/eliminating soft tissue swelling/inflammation/restriction, improving soft tissue extensibility and allowing for proper ROM for normal function with self care, mobility, lifting and ambulation. []CRP:  Canalith Repositioning procedure for the assessment, treatment and education of BPPV    Modalities:       Charges:  Timed Code Treatment Minutes: 38   Total Treatment Minutes: 38     Medicare Cap total YTD:        [x]N/A  Workers Comp Time Stamp  (Per CPT and Total Treatment) [x]N/A   Time In:   Time Out:       [] EVAL    [] Dry Needling  [x] BC(82066)   x  1   [] EStim Unattended 23820  [] NMR (29378)  x     [] Estim Attended  64741  [x] Manual (30698)  x  2    [] Mechanical Txn 75730  [] TA    x     [] Ultrasound  [] Gait   x  [] Vaso  [] CRP    [] Ionto           [] Other:        Electronically signed by: Trisha Salazar PT, [id number]      Note: If patient does not return for scheduled/ recommended follow up visits, this note will serve as a discharge from care along with most recent update on progress.

## 2021-12-09 ENCOUNTER — HOSPITAL ENCOUNTER (OUTPATIENT)
Dept: PHYSICAL THERAPY | Age: 66
Setting detail: THERAPIES SERIES
Discharge: HOME | End: 2021-12-09
Payer: MEDICARE

## 2021-12-09 DIAGNOSIS — F41.9 ANXIETY: ICD-10-CM

## 2021-12-09 PROCEDURE — 97110 THERAPEUTIC EXERCISES: CPT

## 2021-12-09 PROCEDURE — 97140 MANUAL THERAPY 1/> REGIONS: CPT

## 2021-12-09 NOTE — FLOWSHEET NOTE
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, Right                     Insurance/Certification information:  [employer] no authorization      Physician Information:  Mac Ruiz MD     Plan of care signed :  []  Yes  [x] No  [x]  Cosign []  Fax    Date of Patient follow up with Physician:     Is this a Progress Report:     [x]  Yes  []  No      If Yes:  Date Range for reporting period:  Beginning 2021  Ending 2021    Progress report will be due (10 Rx or 30 days whichever is less):      Recertification will be due (POC Duration  / 90 days whichever is less):       Visit # Insurance Allowable Auth Required   6/3-   []  Yes [x]  No        Functional Scale: UEFI      Date 2021  Score 63/80    Latex Allergy:  [x]NO      []YES  Preferred Language for Healthcare:   [x]English       []other:    RESTRICTIONS/PRECAUTIONS:      SUBJECTIVE: Pt reports that she is between 75-80% back to normal. Pt reports that she is having some difficulty with reaching behind secondary to wrist.      Pain level: 0/10 B; 0/10 E; worst 0/10    Plan Moving Forward/ For next visit:   · Tissue mobilization  · exercise    OBJECTIVE:   O2 Sat 95%, Pulse 78, /80     Exercises/Interventions:     Exercises in bold performed in department today. Items not bolded are carried forward from prior visits for continuity of the record. Exercise/Equipment Resistance/Repetitions HEP Other comments   finger flexion/extension, wrist flexion/extension, ceiling punches and chicken wing.       1x10 []     prayer stretch   1x10 []    Cane flexion   1x10 hold 5 -10 seconds []        []        []        []        []        []        []          []         []        []        []        []        [] Discharge  Increase tissue mobilization in left chest region as well as increased AROM. Discussed with pt to see 1 time per week for 2 more weeks and then discharge to [facility]. Therapeutic Exercise and NMR EXR  [x] (44075) Provided verbal/tactile cueing for activities related to strengthening, flexibility, endurance, ROM  for improvements in proximal strength and core control with self care, mobility, lifting and ambulation.  [] (78990) Provided verbal/tactile cueing for activities related to improving balance, coordination, kinesthetic sense, posture, motor skill, proprioception  to assist with core control in self care, mobility, lifting, and ambulation.      Therapeutic Activities and Gait:    [] (87610 or 73547) Provided verbal/tactile cueing for activities related to improving balance, coordination, kinesthetic sense, posture, motor skill, proprioception and motor activation to allow for proper function  with self care and ADLs  [] (45269) Provided training and instruction to the patient for proper core and proximal hip recruitment and positioning with ambulation re-education     Home Exercise Program:    [x] (30542) Reviewed/Progressed HEP activities related to strengthening, flexibility, endurance, ROM of core, proximal hip and LE for functional self-care, mobility, lifting and ambulation   [] (24753) Reviewed/Progressed HEP activities related to improving balance, coordination, kinesthetic sense, posture, motor skill, proprioception of core, proximal hip and LE for self care, mobility, lifting, and ambulation      Manual Treatments:  PROM / STM / Oscillations-Mobs:  G-I, II, III, IV (PA's, Inf., Post.)  [x] (08830) Provided manual therapy to mobilize proximal hip and LS spine soft tissue/joints for the purpose of modulating pain, promoting relaxation,  increasing ROM, reducing/eliminating soft tissue swelling/inflammation/restriction, improving soft tissue extensibility and allowing for proper ROM for normal function with self care, mobility, lifting and ambulation. []CRP:  Canalith Repositioning procedure for the assessment, treatment and education of BPPV    Modalities:       Charges:  Timed Code Treatment Minutes: 37   Total Treatment Minutes: 37     Medicare Cap total YTD:        [x]N/A  Workers Comp Time Stamp  (Per CPT and Total Treatment) [x]N/A   Time In:   Time Out:       [] EVAL    [] Dry Needling  [x] EG(29896)   x  1   [] EStim Unattended 47176  [] NMR (73329)  x     [] Estim Attended  21373  [x] Manual (48659)  x  1    [] Mechanical Txn 19064  [] TA    x     [] Ultrasound  [] Gait   x  [] Vaso  [] CRP    [] Ionto           [] Other:        Electronically signed by: Billy Tinsley PT, [id number]      Note: If patient does not return for scheduled/ recommended follow up visits, this note will serve as a discharge from care along with most recent update on progress.

## 2021-12-09 NOTE — TELEPHONE ENCOUNTER
Refill Request - Controlled Substance    Last Seen Department: 9/17/2021  Last Seen by PCP: 7/17/2020    Last Written: 8/30/2021    Last UDS: 6/28/2018    Med Agreement Signed On: 1/4/2017    Next Appointment: 1/3/2022    Future appointment scheduled    Requested Prescriptions     Pending Prescriptions Disp Refills    ALPRAZolam (XANAX) 1 MG tablet [Pharmacy Med Name: ALPRAZolam 1 MG Oral Tablet] 60 tablet 2     Sig: TAKE 1 TABLET BY MOUTH TWICE DAILY AS NEEDED FOR SLEEP OR ANXIETY

## 2021-12-14 RX ORDER — ALPRAZOLAM 1 MG/1
TABLET ORAL
Qty: 60 TABLET | Refills: 2 | Status: SHIPPED | OUTPATIENT
Start: 2021-12-14 | End: 2022-03-14

## 2021-12-16 ENCOUNTER — HOSPITAL ENCOUNTER (OUTPATIENT)
Dept: PHYSICAL THERAPY | Age: 66
Setting detail: THERAPIES SERIES
Discharge: HOME | End: 2021-12-16
Payer: MEDICARE

## 2021-12-16 PROCEDURE — 97110 THERAPEUTIC EXERCISES: CPT

## 2021-12-16 PROCEDURE — 97140 MANUAL THERAPY 1/> REGIONS: CPT

## 2021-12-16 NOTE — FLOWSHEET NOTE
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, Right                     Insurance/Certification information:  Hum[employer] no authorization      Physician Information:  Raleigh Cortez MD     Plan of care signed :  []  Yes  [x] No  [x]  Cosign []  Fax    Date of Patient follow up with Physician:     Is this a Progress Report:     [x]  Yes  []  No      If Yes:  Date Range for reporting period:  Beginning 2021  Ending 2021    Progress report will be due (10 Rx or 30 days whichever is less):   2021 [place] secondary to last on POC  Recertification will be due (POC Duration  / 90 days whichever is less):       Visit # Insurance Allowable Auth Required   7/3-   []  Yes [x]  No        Functional Scale: UEFI      Date 2021  Score 63/80    Latex Allergy:  [x]NO      []YES  Preferred Language for Healthcare:   [x]English       []other:    RESTRICTIONS/PRECAUTIONS:      SUBJECTIVE: Pt reports that she is between 95% back to normal. 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 Pain level: 0/10 B; 0/10 E; worst 0/10; B hands 4/10    Plan Moving Forward/ For next visit:   · Tissue mobilization  · exercise    OBJECTIVE:   O2 Sat 95%, Pulse 78, /80     Exercises/Interventions:     Exercises in bold performed in department today. Items not bolded are carried forward from prior visits for continuity of the record. Exercise/Equipment Resistance/Repetitions HEP Other comments   finger flexion/extension, wrist flexion/extension, ceiling punches and chicken wing.       1x10 []     prayer stretch   1x10 []    Cane flexion   1x10 hold 5 -10 seconds []        []        []        []        []        []        []          []         []        [] []        []        []        []        []        []      Therapeutic Exercise/Home Exercise Program: 10 minutes  See above   Access code [email or web address]  PROM bilateral UE     Therapeutic Activity:  0 minutes     Gait: 0 minutes    Neuromuscular Re-Education:  0 minutes      Canalith Repositioning Procedure:      Manual Therapy: 16 minutes  Tissue mobilization bilateral thoracic intercostals, pec sweep and traction of skin in bilateral axillary region. Modalities: 0 minutes    ASSESSMENT:  Pt demonstrates increase in AROM bilateral with less irritation. Pt tolerated MLD with no noted difficulty. Goals:   GOALS    Short Term Goals:  2   weeks Long Term Goals: 4    weeks   1). Establish HEP-MET 1). Pt independent with HEP   2). Pain  5/10 or less at worst-MET 2). Pain  2-3/10 or less at worst   3). 3). Pt states minimal to no noted fluid in axillary region. -MET   4). 4). Pt able to perform right UE shoulder AROM with no noted discomfort. 5). 5).   6). 6). Overall Progression Towards Functional goals/ Treatment Progress Update:  [x] Patient is progressing as expected towards functional goals listed. [] Progression is slowed due to complexities/Impairments listed. [] Progression has been slowed due to co-morbidities.   [] Plan just implemented, too soon to assess goals progression <30days   [] Goals require adjustment due to lack of progress  [] Patient is not progressing as expected and requires additional follow up with physician  [] Other    Prognosis for POC: [x] Good [] Fair  [] Poor    Patient requires continued skilled intervention: [x] Yes  [] No    Treatment/Activity Tolerance:  [x] Patient able to complete treatment  [] Patient limited by fatigue  [] Patient limited by pain    [] Patient limited by other medical complications  [] Other:       PLAN: See eval  [x] Continue per plan of care [] Alter current plan (see comments above)  [] Plan of care initiated [] Hold pending MD visit [] Discharge  Will see pt for one more visit and re-assess. Therapeutic Exercise and NMR EXR  [x] (27808) Provided verbal/tactile cueing for activities related to strengthening, flexibility, endurance, ROM  for improvements in proximal strength and core control with self care, mobility, lifting and ambulation.  [] (26780) Provided verbal/tactile cueing for activities related to improving balance, coordination, kinesthetic sense, posture, motor skill, proprioception  to assist with core control in self care, mobility, lifting, and ambulation. Therapeutic Activities and Gait:    [] (79024 or 85587) Provided verbal/tactile cueing for activities related to improving balance, coordination, kinesthetic sense, posture, motor skill, proprioception and motor activation to allow for proper function  with self care and ADLs  [] (71866) Provided training and instruction to the patient for proper core and proximal hip recruitment and positioning with ambulation re-education     Home Exercise Program:    [x] (15422) Reviewed/Progressed HEP activities related to strengthening, flexibility, endurance, ROM of core, proximal hip and LE for functional self-care, mobility, lifting and ambulation   [] (94716) Reviewed/Progressed HEP activities related to improving balance, coordination, kinesthetic sense, posture, motor skill, proprioception of core, proximal hip and LE for self care, mobility, lifting, and ambulation      Manual Treatments:  PROM / STM / Oscillations-Mobs:  G-I, II, III, IV (PA's, Inf., Post.)  [x] (84384) Provided manual therapy to mobilize proximal hip and LS spine soft tissue/joints for the purpose of modulating pain, promoting relaxation,  increasing ROM, reducing/eliminating soft tissue swelling/inflammation/restriction, improving soft tissue extensibility and allowing for proper ROM for normal function with self care, mobility, lifting and ambulation.      []CRP:  Canalith Repositioning procedure for the assessment, treatment and education of BPPV    Modalities:       Charges:  Timed Code Treatment Minutes: 26   Total Treatment Minutes: 26     Medicare Cap total YTD:        [x]N/A  Workers Comp Time Stamp  (Per CPT and Total Treatment) [x]N/A   Time In:   Time Out:       [] EVAL    [] Dry Needling  [x] IK(50574)   x  1   [] EStim Unattended 27711  [] NMR (84897)  x     [] Estim Attended  69440  [x] Manual (37624)  x  1    [] Mechanical Txn 31538  [] TA    x     [] Ultrasound  [] Gait   x  [] Vaso  [] CRP    [] Ionto           [] Other:        Electronically signed by: Karo Odell, PT, [id number]      Note: If patient does not return for scheduled/ recommended follow up visits, this note will serve as a discharge from care along with most recent update on progress.

## 2021-12-23 ENCOUNTER — HOSPITAL ENCOUNTER (OUTPATIENT)
Dept: PHYSICAL THERAPY | Age: 66
Setting detail: THERAPIES SERIES
Discharge: HOME | End: 2021-12-23
Payer: MEDICARE

## 2021-12-23 PROCEDURE — 97110 THERAPEUTIC EXERCISES: CPT

## 2021-12-23 PROCEDURE — 97140 MANUAL THERAPY 1/> REGIONS: CPT

## 2021-12-23 NOTE — PROGRESS NOTES
Dr. Brittney Man,   Pt has been seen for 8 visits. Pt is doing well overall. Recommend to see pt in 2 weeks for 1-2 visits to assess the effectiveness of HEP. Thank you for your referral.    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, Right                     Insurance/Certification information:  [employer] no authorization      Physician Information:  Erick Jeffers MD     Plan of care signed :  []  Yes  [x] No  [x]  Cosign []  Fax    Date of Patient follow up with Physician:     Is this a Progress Report:     [x]  Yes  []  No      If Yes:  Date Range for reporting period:  Beginning 2021  Ending 2021    Progress report will be due (10 Rx or 30 days whichever is less):    Recertification will be due (POC Duration  / 90 days whichever is less):       Visit # Insurance Allowable Auth Required   8/3-   []  Yes [x]  No        Functional Scale: UEFI      Date 2021  Score 61/80 the difficulty is due to hand and not shoulders     Latex Allergy:  [x]NO      []YES  Preferred Language for Healthcare:   [x]English       []other:    RESTRICTIONS/PRECAUTIONS:      SUBJECTIVE: Pt reports went to oncologist on Tuesday. Pt stated that MD thought pt doing better. Pt reports that fullness in gone in chest and axillary region. Pain level: 0/10 B shoulder, neck 2/10 slept wrong; 0/10 E; worst 0/10; B hands 4/10    Plan Moving Forward/ For next visit:   · Tissue mobilization  · exercise    OBJECTIVE:      Exercises/Interventions:     Exercises in bold performed in department today. Items not bolded are carried forward from prior visits for continuity of the record.   Exercise/Equipment Resistance/Repetitions HEP Other comments   finger flexion/extension, wrist flexion/extension, ceiling punches and chicken wing. 1x10 []     prayer stretch   1x10 []    Cane flexion   1x10 hold 5 -10 seconds []        []        []        []        []        []        []          []         []        []        []        []        []        []        []      UE bike  Seat 8 L1 3 min forward and 3 min backward [] Last 3 visits     Therapeutic Exercise/Home Exercise Program: 30 minutes  See above   Access code [email or web address]  PROM bilateral UE  AROM: flexR 170, L 178; abd B 180; IR B scapular angle; ER B T4  PROM: flex R179, L175; abd R 179, L179;ER R 90 @90, L 85@ 90; IR R 90 @85, L 85@ 85  Strength is 4+ to 5/5 overall UE B    Therapeutic Activity:  0 minutes     Gait: 0 minutes    Neuromuscular Re-Education:  0 minutes      Canalith Repositioning Procedure:      Manual Therapy: 18 minutes  Tissue mobilization bilateral thoracic intercostals, pec sweep and traction of skin in bilateral axillary region. Modalities: 0 minutes    ASSESSMENT:  Pt demonstrates increase in AROM bilateral with less irritation. Pt tolerated MLD with no noted difficulty. Goals:   GOALS    Short Term Goals:  2   weeks Long Term Goals: 4    weeks   1). Establish HEP-MET 1). Pt independent with HEP   2). Pain  5/10 or less at worst-MET 2). Pain  2-3/10 or less at worst   3). 3). Pt states minimal to no noted fluid in axillary region. -MET   4). 4). Pt able to perform right UE shoulder AROM with no noted discomfort. 5). 5).   6). 6). Overall Progression Towards Functional goals/ Treatment Progress Update:  [x] Patient is progressing as expected towards functional goals listed. [] Progression is slowed due to complexities/Impairments listed. [] Progression has been slowed due to co-morbidities.   [] Plan just implemented, too soon to assess goals progression <30days   [] Goals require adjustment due to lack of progress  [] Patient is not progressing as expected and requires additional follow up with physician  [] Other    Prognosis for POC: [x] Good [] Fair  [] Poor    Patient requires continued skilled intervention: [x] Yes  [] No    Treatment/Activity Tolerance:  [x] Patient able to complete treatment  [] Patient limited by fatigue  [] Patient limited by pain    [] Patient limited by other medical complications  [] Other:       PLAN: See eval  [x] Continue per plan of care [] Alter current plan (see comments above)  [] Plan of care initiated [] Hold pending MD visit [] Discharge  Will see pt for one to two more visits to assess the effectiveness of HEP. Therapeutic Exercise and NMR EXR  [x] (84452) Provided verbal/tactile cueing for activities related to strengthening, flexibility, endurance, ROM  for improvements in proximal strength and core control with self care, mobility, lifting and ambulation.  [] (12053) Provided verbal/tactile cueing for activities related to improving balance, coordination, kinesthetic sense, posture, motor skill, proprioception  to assist with core control in self care, mobility, lifting, and ambulation.      Therapeutic Activities and Gait:    [] (99721 or 59049) Provided verbal/tactile cueing for activities related to improving balance, coordination, kinesthetic sense, posture, motor skill, proprioception and motor activation to allow for proper function  with self care and ADLs  [] (80606) Provided training and instruction to the patient for proper core and proximal hip recruitment and positioning with ambulation re-education     Home Exercise Program:    [x] (99043) Reviewed/Progressed HEP activities related to strengthening, flexibility, endurance, ROM of core, proximal hip and LE for functional self-care, mobility, lifting and ambulation   [] (61761) Reviewed/Progressed HEP activities related to improving balance, coordination, kinesthetic sense, posture, motor skill, proprioception of core, proximal hip and LE for self care, mobility, lifting, and ambulation      Manual Treatments:  PROM / STM / Oscillations-Mobs:  G-I, II, III, IV (PA's, Inf., Post.)  [x] (84590) Provided manual therapy to mobilize proximal hip and LS spine soft tissue/joints for the purpose of modulating pain, promoting relaxation,  increasing ROM, reducing/eliminating soft tissue swelling/inflammation/restriction, improving soft tissue extensibility and allowing for proper ROM for normal function with self care, mobility, lifting and ambulation. []CRP:  Canalith Repositioning procedure for the assessment, treatment and education of BPPV    Modalities:       Charges:  Timed Code Treatment Minutes: 48   Total Treatment Minutes: 47     Medicare Cap total YTD:        [x]N/A  Workers Comp Time Stamp  (Per CPT and Total Treatment) [x]N/A   Time In:   Time Out:       [] EVAL    [] Dry Needling  [x] HH(43456)   x  2  [] EStim Unattended 93417  [] NMR (47145)  x     [] Estim Attended  83968  [x] Manual (38709)  x  1    [] Mechanical Txn 62750  [] TA    x     [] Ultrasound  [] Gait   x  [] Vaso  [] CRP    [] Ionto           [] Other:        Electronically signed by: Becky Snyder, PT, [id number]      Note: If patient does not return for scheduled/ recommended follow up visits, this note will serve as a discharge from care along with most recent update on progress.

## 2022-01-03 ENCOUNTER — OFFICE VISIT (OUTPATIENT)
Dept: FAMILY MEDICINE CLINIC | Age: 67
End: 2022-01-03
Payer: MEDICARE

## 2022-01-03 VITALS
HEIGHT: 64 IN | BODY MASS INDEX: 27.49 KG/M2 | OXYGEN SATURATION: 100 % | DIASTOLIC BLOOD PRESSURE: 84 MMHG | HEART RATE: 84 BPM | SYSTOLIC BLOOD PRESSURE: 130 MMHG | WEIGHT: 161 LBS

## 2022-01-03 DIAGNOSIS — M31.6 GIANT CELL ARTERITIS (HCC): ICD-10-CM

## 2022-01-03 DIAGNOSIS — Z17.0 MALIGNANT NEOPLASM OF LOWER-INNER QUADRANT OF RIGHT BREAST OF FEMALE, ESTROGEN RECEPTOR POSITIVE (HCC): ICD-10-CM

## 2022-01-03 DIAGNOSIS — R42 DIZZINESS: ICD-10-CM

## 2022-01-03 DIAGNOSIS — G62.9 NEUROPATHY: ICD-10-CM

## 2022-01-03 DIAGNOSIS — R26.89 BALANCE DISORDER: ICD-10-CM

## 2022-01-03 DIAGNOSIS — Z00.00 ROUTINE GENERAL MEDICAL EXAMINATION AT A HEALTH CARE FACILITY: Primary | ICD-10-CM

## 2022-01-03 DIAGNOSIS — C50.311 MALIGNANT NEOPLASM OF LOWER-INNER QUADRANT OF RIGHT BREAST OF FEMALE, ESTROGEN RECEPTOR POSITIVE (HCC): ICD-10-CM

## 2022-01-03 DIAGNOSIS — F33.1 MAJOR DEPRESSIVE DISORDER, RECURRENT EPISODE, MODERATE (HCC): ICD-10-CM

## 2022-01-03 PROCEDURE — 1036F TOBACCO NON-USER: CPT | Performed by: INTERNAL MEDICINE

## 2022-01-03 PROCEDURE — 99213 OFFICE O/P EST LOW 20 MIN: CPT | Performed by: INTERNAL MEDICINE

## 2022-01-03 PROCEDURE — 90471 IMMUNIZATION ADMIN: CPT | Performed by: INTERNAL MEDICINE

## 2022-01-03 PROCEDURE — 1090F PRES/ABSN URINE INCON ASSESS: CPT | Performed by: INTERNAL MEDICINE

## 2022-01-03 PROCEDURE — G8417 CALC BMI ABV UP PARAM F/U: HCPCS | Performed by: INTERNAL MEDICINE

## 2022-01-03 PROCEDURE — G0438 PPPS, INITIAL VISIT: HCPCS | Performed by: INTERNAL MEDICINE

## 2022-01-03 PROCEDURE — 1123F ACP DISCUSS/DSCN MKR DOCD: CPT | Performed by: INTERNAL MEDICINE

## 2022-01-03 PROCEDURE — 4040F PNEUMOC VAC/ADMIN/RCVD: CPT | Performed by: INTERNAL MEDICINE

## 2022-01-03 PROCEDURE — G8427 DOCREV CUR MEDS BY ELIG CLIN: HCPCS | Performed by: INTERNAL MEDICINE

## 2022-01-03 PROCEDURE — G8399 PT W/DXA RESULTS DOCUMENT: HCPCS | Performed by: INTERNAL MEDICINE

## 2022-01-03 PROCEDURE — G8484 FLU IMMUNIZE NO ADMIN: HCPCS | Performed by: INTERNAL MEDICINE

## 2022-01-03 PROCEDURE — 90734 MENACWYD/MENACWYCRM VACC IM: CPT | Performed by: INTERNAL MEDICINE

## 2022-01-03 PROCEDURE — 3017F COLORECTAL CA SCREEN DOC REV: CPT | Performed by: INTERNAL MEDICINE

## 2022-01-03 RX ORDER — PREDNISONE 1 MG/1
5 TABLET ORAL EVERY OTHER DAY
Qty: 1 TABLET | Refills: 0 | Status: SHIPPED
Start: 2022-01-03 | End: 2022-04-04

## 2022-01-03 ASSESSMENT — PATIENT HEALTH QUESTIONNAIRE - PHQ9
SUM OF ALL RESPONSES TO PHQ QUESTIONS 1-9: 0
2. FEELING DOWN, DEPRESSED OR HOPELESS: 0
1. LITTLE INTEREST OR PLEASURE IN DOING THINGS: 0
SUM OF ALL RESPONSES TO PHQ QUESTIONS 1-9: 0
SUM OF ALL RESPONSES TO PHQ9 QUESTIONS 1 & 2: 0

## 2022-01-03 ASSESSMENT — LIFESTYLE VARIABLES
HOW OFTEN DO YOU HAVE A DRINK CONTAINING ALCOHOL: 0
AUDIT-C TOTAL SCORE: INCOMPLETE
HOW OFTEN DO YOU HAVE A DRINK CONTAINING ALCOHOL: NEVER
AUDIT TOTAL SCORE: INCOMPLETE

## 2022-01-03 NOTE — PATIENT INSTRUCTIONS
Personalized Preventive Plan for Nelida Arita - 1/3/2022  Medicare offers a range of preventive health benefits. Some of the tests and screenings are paid in full while other may be subject to a deductible, co-insurance, and/or copay. Some of these benefits include a comprehensive review of your medical history including lifestyle, illnesses that may run in your family, and various assessments and screenings as appropriate. After reviewing your medical record and screening and assessments performed today your provider may have ordered immunizations, labs, imaging, and/or referrals for you. A list of these orders (if applicable) as well as your Preventive Care list are included within your After Visit Summary for your review. Other Preventive Recommendations:    · A preventive eye exam performed by an eye specialist is recommended every 1-2 years to screen for glaucoma; cataracts, macular degeneration, and other eye disorders. · A preventive dental visit is recommended every 6 months. · Try to get at least 150 minutes of exercise per week or 10,000 steps per day on a pedometer . · Order or download the FREE \"Exercise & Physical Activity: Your Everyday Guide\" from [employer]. Call 8-596.519.2994 or search [employer] online. · You need 1936-2454 mg of calcium and 8078-7231 IU of vitamin D per day. It is possible to meet your calcium requirement with diet alone, but a vitamin D supplement is usually necessary to meet this goal.  · When exposed to the sun, use a sunscreen that protects against both UVA and UVB radiation with an SPF of 30 or greater. Reapply every 2 to 3 hours or after sweating, drying off with a towel, or swimming. · Always wear a seat belt when traveling in a car. Always wear a helmet when riding a bicycle or motorcycle.

## 2022-01-03 NOTE — PROGRESS NOTES
Medicare Annual Wellness Visit  Name: Nanda Yousif Date: 1/3/2022   MRN: <[id number]> Sex: Female   Age: 77 y.o. Ethnicity: Non- / Non    : 1955 Race: White (non-)      Sharmaine Kumar is here for Medicare AWV    Screenings for behavioral, psychosocial and functional/safety risks, and cognitive dysfunction are all negative except as indicated below. These results, as well as other patient data from the [address] form, are documented in Flowsheets linked to this Encounter. Allergies   Allergen Reactions    Infliximab      Severe drop in blood pressure    Ultrasound Gel Other (See Comments)     burn    Codeine Nausea And Vomiting    Penicillins Rash       Prior to Visit Medications    Medication Sig Taking?  Authorizing Provider   ALPRAZolam (XANAX) 1 MG tablet TAKE 1 TABLET BY MOUTH TWICE DAILY AS NEEDED FOR SLEEP OR ANXIETY Yes Brooklynn Min MD   sertraline (ZOLOFT) 100 MG tablet TAKE 1 & 1/2 (ONE & ONE-HALF) TABLETS BY MOUTH ONCE DAILY Yes VERITO Barnes CNP   propranolol (INDERAL LA) 60 MG extended release capsule Take 1 capsule by mouth once daily Yes Brooklynn Min MD   letrozole ([address] [facility]) 2.5 MG tablet TAKE 1 TABLET BY MOUTH ONCE DAILY Yes Historical Provider, MD   valACYclovir (VALTREX) 500 MG tablet Take 1 tablet by mouth once daily Yes Brooklynn Min MD   mirtazapine (REMERON) 15 MG tablet TAKE 1 TABLET BY MOUTH ONCE DAILY AT NIGHT Yes VERITO Adam CNP   pantoprazole (PROTONIX) 40 MG tablet TAKE 1 TABLET BY MOUTH TWICE A DAY BEFORE MEALS Yes VERITO Barnes CNP   alendronate (FOSAMAX) 70 MG tablet TAKE 1 TABLET BY MOUTH EVERY 7 DAYS Yes VERITO Bartholomew CNP   montelukast (SINGULAIR) 10 MG tablet Take 1 tablet by mouth nightly TAKE 1 TABLET BY MOUTH EVERY DAY Yes VERITO Barnes CNP   traZODone (DESYREL) 100 MG tablet Take 1 tablet by mouth nightly as needed for Sleep Yes Corey Haynes VIRGINIE CuellarN - CNP   atorvastatin (LIPITOR) 20 MG tablet Take 1 tablet by mouth nightly TAKE 1 TABLET BY MOUTH EVERY DAY Yes Luis Alberto Hernandez MD   linaclotide (LINZESS) 290 MCG CAPS capsule Take 1 capsule by mouth every morning (before breakfast) Yes Ryland Zhao MD   ketorolac (ACULAR) 0.5 % ophthalmic solution Place 1 drop into the left eye 2 times daily  Yes Historical Provider, MD   spironolactone (ALDACTONE) 50 MG tablet TAKE 1 TABLET BY MOUTH EVERY DAY Yes Bhavik Cartagena DO   fluticasone-salmeterol (ADVAIR) 250-50 MCG/DOSE AEPB Inhale into the lungs 2 times daily Yes Historical Provider, MD   albuterol sulfate  (90 Base) MCG/ACT inhaler 2 puffs q 4 prn Yes Historical Provider, MD   rizatriptan (MAXALT) 5 MG tablet Take 1 tablet by mouth daily as needed for Migraine May repeat in 2 hours if needed Yes FRANCISCO Herring   predniSONE (DELTASONE) 5 MG tablet Take 40 mg by mouth daily  Yes Historical Provider, MD   diclofenac sodium 1 % GEL Apply topically Indications: [place]  prescribes  Yes Idania Rivera MD   difluprednate (DUREZOL) 0.05 % EMUL Apply 2 drops to eye 2 times daily BOTH EYES Yes Historical Provider, MD   gabapentin (NEURONTIN) 400 MG capsule 400 mg 4 times daily. Indications: Prescribed by [facility] Dr. Mayco Alvarado MD   cyclobenzaprine (FLEXERIL) 10 MG tablet Take 10 mg by mouth 3 times daily as needed for Muscle spasms Yes Historical Provider, MD   methotrexate (RHEUMATREX) 2.5 MG chemo tablet Take 7.5 mg by mouth every 7 days  Yes Idania Rivera MD   fluocinonide (LIDEX) 0.05 % cream Apply topically 2 times daily Apply topically 2 times daily.  Yes Historical Provider, MD   promethazine (PHENERGAN) 12.5 MG tablet Take 25 mg by mouth every 6 hours as needed for Nausea Indications: Dr. Ryland Horvath  Yes Historical Provider, MD       Past Medical History:   Diagnosis Date    Asthma     CAD (coronary artery disease)     Cancer ([facility] [address]     RIGHT BREAST    Chronic diarrhea     Dr. Binta King Chronic kidney disease     kidney stones    Clostridium difficile diarrhea 10/23/13    positive by PCR    Fibromyalgia     Hemorrhoid     Hyperlipidemia     Hypertension     Kidney stone     Osteoarthritis     fibromyalgia    Sarcoidosis 2003    sees Dr. Austen Dale       Past Surgical History:   Procedure Laterality Date    ABDOMINAL EXPLORATION SURGERY  8/19/12    REDUCTION OF VULVUS; RIGHT COLECTOMY; SMALL BOWEL RESECTION; INSERTION OF ON Q PAIN BUSTER    BREAST BIOPSY      CARDIAC CATHETERIZATION  2010    CLEAN    COLONOSCOPY  2010    normal    COLONOSCOPY N/A 6/16/2020    COLON W/ANES. performed by Yris Araiza MD at [address]  12/2011    ESOPHAGEAL DILATATION  6/16/2020    ESOPHAGEAL DILATION [place] performed by Yris Araiza MD at [address] [facility]  5/2009    cataract, right    HYSTERECTOMY  2000    LITHOTRIPSY  1/19/2012    LITHOTRIPSY      04/2012    MASTECTOMY Bilateral 5/18/2021    BILATERAL SIMPLE MASTECTOMY, RIGHT SENTINEL LYMPH NODE BIOPSY performed by Isma Elder MD at [address] [facility]    right    PARTIAL HYSTERECTOMY     [address]    UPPER GASTROINTESTINAL ENDOSCOPY  2/27/13    UPPER GASTROINTESTINAL ENDOSCOPY  11/14/2017    gastritis    UPPER GASTROINTESTINAL ENDOSCOPY N/A 6/16/2020    EGD W/ANES.  (11:00) performed by Yris Araiza MD at [place] [address]  12/2011    US BREAST NEEDLE BIOPSY RIGHT Right 4/15/2021    US BREAST NEEDLE BIOPSY RIGHT 4/15/2021 Matteo Smith MD [address] [facility]       Family History   Problem Relation Age of Onset    Heart Disease Father     Cancer Father         skin cancer- unknown    Cancer Brother         skin cancer- forehead and nose- unknown       CareTeam (Including outside providers/suppliers regularly involved in providing care):   Patient Care Team:  Adelina Sotelo MD as PCP - Riya Saenz MD as PCP - [facility]  Veronica Ledezma MD as Consulting Physician (Orthopedic Surgery)  Chuy Garces MD as Consulting Physician (Ophthalmology)  Lila Beard MD (Ophthalmology)  Heri Ahmadi MD as Consulting Physician (Colon and Rectal Surgery)  Kitty Sierra as Consulting Physician (Urology)  Amber Cadena MD as Consulting Physician (Rheumatology)  Zahra Chowdhury MD as Consulting Physician (Gastroenterology)    Wt Readings from Last 3 Encounters:   01/03/22 161 lb (73 kg)   10/28/21 155 lb (70.3 kg)   09/20/21 155 lb (70.3 kg)     Vitals:    01/03/22 1255   BP: 130/84   Site: Right Upper Arm   Position: Sitting   Cuff Size: Medium Adult   Pulse: 84   SpO2: 100%   Weight: 161 lb (73 kg)   Height: 5' 4\" (1.626 m)     Body mass index is 27.64 kg/m². Based upon direct observation of the patient, evaluation of cognition reveals recent and remote memory intact.     General Appearance: alert and oriented to person, place and time, well developed and well- nourished, in no acute distress  Skin: warm and dry, no rash or erythema  Head: normocephalic and atraumatic  Eyes: pupils equal, round, and reactive to light, extraocular eye movements intact, conjunctivae normal  ENT: tympanic membrane, external ear and ear canal normal bilaterally, nose without deformity, nasal mucosa and turbinates normal without polyps  Neck: supple and non-tender without mass, no thyromegaly or thyroid nodules, no cervical lymphadenopathy  Pulmonary/Chest: clear to auscultation bilaterally- no wheezes, rales or rhonchi, normal air movement, no respiratory distress  Cardiovascular: normal rate, regular rhythm, normal S1 and S2, no murmurs, rubs, clicks, or gallops, distal pulses intact, no carotid bruits  Abdomen: soft, non-tender, non-distended, normal bowel sounds, no masses or organomegaly  Extremities: no cyanosis, clubbing or edema  Musculoskeletal: normal range of motion, no joint swelling, deformity or tenderness  Neurologic: reflexes normal and symmetric, no cranial nerve deficit, gait, coordination and speech normal    Patient's complete Health Risk Assessment and screening values have been reviewed and are found in Flowsheets. The following problems were reviewed today and where indicated follow up appointments were made and/or referrals ordered. Positive Risk Factor Screenings with Interventions:     Fall Risk:  2 or more falls in past year?: (!) yes  Fall with injury in past year?: (!) yes  Fall Risk Interventions:    · Home safety tips provided          General Health and ACP:  General  In general, how would you say your health is?: Good  In the past 7 days, have you experienced any of the following?  New or Increased Pain, New or Increased Fatigue, Loneliness, Social Isolation, Stress or Anger?: None of These  Do you get the social and emotional support that you need?: Yes  Do you have a Living Will?: (!) No  Advance Directives     Power of [address] [place] [address] Will ACP-Advance Directive ACP-Power of     Not on File Not on File Not on File Not on File      General Health Risk Interventions:  · No Living Will: Patient declines ACP discussion/assistance    Health Habits/Nutrition:  Health Habits/Nutrition  Do you exercise for at least 20 minutes 2-3 times per week?: (!) No  Have you lost any weight without trying in the past 3 months?: No  Do you eat only one meal per day?: No  Have you seen the dentist within the past year?: Yes  Body mass index: (!) 27.63  Health Habits/Nutrition Interventions:  · Inadequate physical activity:  patient is not ready to increase his/her physical activity level at this time         Personalized Preventive Plan   Current Health Maintenance Status  Immunization History   Administered Date(s) Administered    COVID-19, Lexis Gill, Primary or Immunocompromised, PF, 100mcg/0.5mL 04/16/2021, 06/15/2021    Influenza 10/06/2010, 10/21/2013    Influenza Virus Vaccine 10/23/2015    Influenza Whole 10/01/2012    Influenza, High-dose, Quadv, 65 yrs +, IM (Fluzone) 09/17/2021    Influenza, Quadv, IM, PF (6 mo and older Fluzone, Flulaval, Fluarix, and 3 yrs and older Afluria) 01/03/2017, 09/19/2017, 10/17/2018, 10/18/2019    Influenza, Quadv, adjuvanted, 65 yrs +, IM, PF (Fluad) 01/18/2021    Pneumococcal Conjugate 13-valent ([id number]) 10/23/2015    Pneumococcal Conjugate 7-valent (Prevnar7) 10/01/2012    Pneumococcal Polysaccharide ([id number]) 02/03/2009, 10/06/2010, 10/21/2013, 01/18/2021    Tdap (Boostrix, Adacel) 02/03/2009, 09/17/2021        Health Maintenance   Topic Date Due    Meningococcal (ACWY) vaccine (1 - Risk start 2-23 months series) Never done    Hib vaccine (1 of 1 - Risk 1-dose series) Never done    Meningococcal B vaccine (1 of 4 - Increased Risk Bexsero 2-dose series) Never done    Depression Monitoring  Never done    Shingles Vaccine (1 of 2) Never done   [employer] Visit (AWV)  10/18/2020    COVID-19 Vaccine (3 - Booster for Moderna series) 12/15/2021    A1C test (Diabetic or Prediabetic)  01/18/2022    Lipid screen  01/18/2022    Potassium monitoring  05/07/2022    Creatinine monitoring  05/07/2022    Colon cancer screen colonoscopy  06/16/2030    DTaP/Tdap/Td vaccine (3 - Td or Tdap) 09/17/2031    DEXA (modify frequency per FRAX score)  Completed    Flu vaccine  Completed    Pneumococcal 65+ yrs at Risk Vaccine  Completed    Hepatitis C screen  Completed    Hepatitis A vaccine  Aged Out    Hepatitis B vaccine  Aged Out     Recommendations for [address] Due: see orders and patient instructions/AVS.  . Recommended screening schedule for the next 5-10 years is provided to the patient in written form: see Patient Instructions/AVS.    Westley Cordova was seen today for medicare awv.     Diagnoses and all orders for this visit:    Routine general medical examination at a health care facility    [address] [place] [address], Constantine Alejandra DO, Otolaryngology, [facility]    Balance disorder  -     [address], Constantine Alejandra DO, Otolaryngology, [facility]  Dizziness, off balance and falling, recommended to see ENT by her oncologist.   Giant cell arteritis ([address]    Major depressive disorder, recurrent episode, moderate ([address]  Controlled, continue current    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 breast of female, estrogen receptor positive ([address]  Stable, continue current  Neuropathy  -     AFL - Traci Valentin DO, Neurology, [facility]    2 to 3 weeks arm numbness and hand pain. Had EMG in the past and had nerve damage at elbow. Wants to see neurology.

## 2022-01-04 ENCOUNTER — HOSPITAL ENCOUNTER (OUTPATIENT)
Dept: PHYSICAL THERAPY | Age: 67
Setting detail: THERAPIES SERIES
Discharge: HOME | End: 2022-01-04
Payer: MEDICARE

## 2022-01-04 NOTE — PROGRESS NOTES
Physical Therapy  Called and left a voicemail to cancel todays appointment due to therapist being ill reminded of next appointment.

## 2022-01-05 NOTE — PROGRESS NOTES
Physical Therapy  Called and spoke with patients spouse and informed them that due to therapist being ill we will be cancelling appointment for 1/6/22, confirmed next appointment.

## 2022-01-06 ENCOUNTER — HOSPITAL ENCOUNTER (OUTPATIENT)
Dept: PHYSICAL THERAPY | Age: 67
Setting detail: THERAPIES SERIES
Discharge: HOME | End: 2022-01-06
Payer: MEDICARE

## 2022-01-11 ENCOUNTER — HOSPITAL ENCOUNTER (OUTPATIENT)
Dept: PHYSICAL THERAPY | Age: 67
Setting detail: THERAPIES SERIES
Discharge: HOME | End: 2022-01-11
Payer: MEDICARE

## 2022-01-11 PROCEDURE — 97110 THERAPEUTIC EXERCISES: CPT

## 2022-01-11 NOTE — PROGRESS NOTES
Dr. Khushboo Pineda,   Pt has been seen for 9 visits. Pt is doing well overall. Recommend discharge to [facility]. Thank you for your referral.    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, Right                     Insurance/Certification information:  [employer] no authorization      Physician Information:  Mk Leal MD     Plan of care signed :  [x]  Yes  [] No  [x]  Cosign []  Fax    Date of Patient follow up with Physician:     Is this a Progress Report:     [x]  Yes  []  No      If Yes:  Date Range for reporting period:  Beginning 2021  Ending 2022    Progress report will be due (10 Rx or 30 days whichever is less):  Discharge   Recertification will be due (POC Duration  / 90 days whichever is less):       Visit # Insurance Allowable Auth Required      []  Yes [x]  No        Functional Scale: UEFI      Date 2022  Score 78/80     Latex Allergy:  [x]NO      []YES  Preferred Language for Healthcare:   [x]English       []other:    RESTRICTIONS/PRECAUTIONS:      SUBJECTIVE: Pt reports that she is not having any problems at this point. Pain level: 0/10 B shoulder, neck 0/10; 0/10 E; worst 0/10; B hands neuropathy 4/10    Plan Moving Forward/ For next visit:   · Tissue mobilization  · exercise    OBJECTIVE:      Exercises/Interventions:     Exercises in bold performed in department today. Items not bolded are carried forward from prior visits for continuity of the record. Exercise/Equipment Resistance/Repetitions HEP Other comments   finger flexion/extension, wrist flexion/extension, ceiling punches and chicken wing.       1x10 []     prayer stretch   1x10 []    Cane flexion   1x10 hold 5 -10 seconds []        []        []        []        []        []        [] []         []        []        []        []        []        []        []      UE bike  Seat 8 L1 3 min forward and 3 min backward []      Therapeutic Exercise/Home Exercise Program: 30 minutes  See above   Access code [email or web address]  PROM bilateral UE  AROM: flex R 175, L 178; abd B 180; IR B scapular angle; ER B T4  PROM: flex R 180, L175; abd R 180, L178;ER R 90 @90, L 90@ 90; IR R 90 @85, L 85@ 85  Strength is 5/5 overall UE B; ER 4+/5 on R. Therapeutic Activity:  0 minutes     Gait: 0 minutes    Neuromuscular Re-Education:  0 minutes      Canalith Repositioning Procedure:      Manual Therapy: 0 minutes      Modalities: 0 minutes    ASSESSMENT:  Pt demonstrates increase in AROM bilateral with less irritation. Pt demonstrates no noted functional difficulty. Goals:   GOALS    Short Term Goals:  2   weeks Long Term Goals: 4    weeks   1). Establish HEP-MET 1). Pt independent with HEP-MET   2). Pain  5/10 or less at worst-MET 2). Pain  2-3/10 or less at worst- MET   3). 3). Pt states minimal to no noted fluid in axillary region. -MET   4). 4). Pt able to perform right UE shoulder AROM with no noted discomfort.- MET   5). 5).   6). 6). Overall Progression Towards Functional goals/ Treatment Progress Update:  [x] Patient is progressing as expected towards functional goals listed. [] Progression is slowed due to complexities/Impairments listed. [] Progression has been slowed due to co-morbidities.   [] Plan just implemented, too soon to assess goals progression <30days   [] Goals require adjustment due to lack of progress  [] Patient is not progressing as expected and requires additional follow up with physician  [] Other    Prognosis for POC: [x] Good [] Fair  [] Poor    Patient requires continued skilled intervention: [x] Yes  [] No    Treatment/Activity Tolerance:  [x] Patient able to complete treatment  [] Patient limited by fatigue  [] Patient limited by pain    [] Patient limited by other medical complications  [] Other:       PLAN: See eval  [] Continue per plan of care [] Alter current plan (see comments above)  [] Plan of care initiated [x] Hold pending MD visit [x] Discharge  Discharged to [facility]. Therapeutic Exercise and NMR EXR  [x] (47578) Provided verbal/tactile cueing for activities related to strengthening, flexibility, endurance, ROM  for improvements in proximal strength and core control with self care, mobility, lifting and ambulation.  [] (97154) Provided verbal/tactile cueing for activities related to improving balance, coordination, kinesthetic sense, posture, motor skill, proprioception  to assist with core control in self care, mobility, lifting, and ambulation.      Therapeutic Activities and Gait:    [] (00943 or 96614) Provided verbal/tactile cueing for activities related to improving balance, coordination, kinesthetic sense, posture, motor skill, proprioception and motor activation to allow for proper function  with self care and ADLs  [] (29016) Provided training and instruction to the patient for proper core and proximal hip recruitment and positioning with ambulation re-education     Home Exercise Program:    [x] (93676) Reviewed/Progressed HEP activities related to strengthening, flexibility, endurance, ROM of core, proximal hip and LE for functional self-care, mobility, lifting and ambulation   [] (09105) Reviewed/Progressed HEP activities related to improving balance, coordination, kinesthetic sense, posture, motor skill, proprioception of core, proximal hip and LE for self care, mobility, lifting, and ambulation      Manual Treatments:  PROM / STM / Oscillations-Mobs:  G-I, II, III, IV (PA's, Inf., Post.)  [] (11011) Provided manual therapy to mobilize proximal hip and LS spine soft tissue/joints for the purpose of modulating pain, promoting relaxation,  increasing ROM, reducing/eliminating soft tissue swelling/inflammation/restriction, improving soft tissue extensibility and allowing for proper ROM for normal function with self care, mobility, lifting and ambulation. []CRP:  Canalith Repositioning procedure for the assessment, treatment and education of BPPV    Modalities:       Charges:  Timed Code Treatment Minutes: 30   Total Treatment Minutes: 36     Medicare Cap total YTD:        [x]N/A  Workers Comp Time Stamp  (Per CPT and Total Treatment) [x]N/A   Time In:   Time Out:       [] EVAL    [] Dry Needling  [x] GC(60813)   x  2  [] EStim Unattended 06027  [] NMR (30540)  x     [] Estim Attended  78761  [] Manual (46387)  x     [place] [address]  [] TA    x     [] Ultrasound  [] Gait   x  [] Vaso  [] CRP    [] Ionto           [] Other:        Electronically signed by: Nusrat Rodríguez PT, [id number]      Note: If patient does not return for scheduled/ recommended follow up visits, this note will serve as a discharge from care along with most recent update on progress.

## 2022-01-13 ENCOUNTER — APPOINTMENT (OUTPATIENT)
Dept: PHYSICAL THERAPY | Age: 67
End: 2022-01-13
Payer: MEDICARE

## 2022-01-17 NOTE — PROGRESS NOTES
Nelida Arita   1955, 77 y.o. female   <[id number]>       Referring Provider: Cristian Tejeda DO  Referral Type: In an order in [place]    Reason for Visit: Evaluation of the cause of disorders of hearing, tinnitus, or balance. ADULT AUDIOLOGIC EVALUATION      Nelida Arita is a 77 y.o. female seen today, 1/20/2022 , for an initial audiologic evaluation. Patient was seen by Cristian Tejeda DO following today's evaluation. Patient was alone. AUDIOLOGIC AND OTHER PERTINENT MEDICAL HISTORY:      Nelida Arita noted imbalance and history of falls. Patient reports constantly falling and tripping since she was diagnosed with sarcoiditis. She has not been referred for vestibular therapy. No other significant medical history was reported. Nelida Arita denied otalgia, aural fullness, tinnitus, history of occupational/recreational noise exposure, history of head trauma, history of ear surgery and family history of hearing loss. Date: 1/20/2022     IMPRESSIONS:      Normal middle ear pressure and compliance, bilaterally. Abnormal asymmetric hearing sensitivity, right worse than left, which can affect every day listening needs. Word understanding was excellent presented at elevated sensation levels, bilaterally. Follow medical recommendations of Cristian Tejeda DO.     ASSESSMENT AND FINDINGS:     Otoscopy revealed: Clear ear canals bilaterally    RIGHT EAR:  Hearing Sensitivity:Mild sloping to a moderately severe sensorineural hearing loss from 250-8000 Hz  Speech Recognition Threshold: 30 dB HL  Word Recognition:Excellent (100%), based on NU-6 25-word list at 70 dBHL using recorded speech stimuli. Tympanometry: Normal peak pressure and compliance, Type A tympanogram, consistent with normal middle ear function.       LEFT EAR:  Hearing Sensitivity:Normal hearing sensitivity from 250-1500 Hz sloping to a mild to moderate sensorineural hearing loss from 2660-2398 Hz  Speech Recognition Threshold: 20 dB HL  Word Recognition: Excellent (92%), based on NU-6 25-word list at 60 dBHL using recorded speech stimuli. Tympanometry: Normal peak pressure and compliance, Type A tympanogram, consistent with normal middle ear function. Reliability: Good   Transducer: Inserts (rechecked with supraural headphones)    See scanned audiogram dated 1/20/2022  for results. PATIENT EDUCATION:       The following items were discussed with the patient:   - Good Communication Strategies  - Hearing Loss and Hearing Aids  - Dizziness    Educational information was shared in the After Visit Summary. RECOMMENDATIONS:                                                                                                                                                                                                                                                            The following items are recommended based on patient report and results from today's appointment:   - Continue medical follow-up with Jes Delgado DO.   - Retest hearing as medically indicated and/or sooner if a change in hearing is noted. - If desired, schedule a Hearing Aid Evaluation (HAE) appointment to discuss hearing aid options after medical clearance is given. - Utilize \"Good Communication Strategies\" as discussed to assist in speech understanding with communication partners.        Marvel Ames  Audiologist    Chart CC'd to: Jes Delgado DO      Degree of   Hearing Sensitivity dB Range   Within Normal Limits (WNL) 0 - 20   Mild 20 - 40   Moderate 40 - 55   Moderately-Severe 55 - 70   Severe 70 - 90   Profound 90 +

## 2022-01-20 ENCOUNTER — PROCEDURE VISIT (OUTPATIENT)
Dept: AUDIOLOGY | Age: 67
End: 2022-01-20
Payer: MEDICARE

## 2022-01-20 ENCOUNTER — OFFICE VISIT (OUTPATIENT)
Dept: ENT CLINIC | Age: 67
End: 2022-01-20
Payer: MEDICARE

## 2022-01-20 VITALS
TEMPERATURE: 97.7 F | DIASTOLIC BLOOD PRESSURE: 76 MMHG | HEIGHT: 64 IN | SYSTOLIC BLOOD PRESSURE: 109 MMHG | HEART RATE: 106 BPM | BODY MASS INDEX: 28.41 KG/M2 | WEIGHT: 166.4 LBS

## 2022-01-20 DIAGNOSIS — R42 DIZZINESS AND GIDDINESS: ICD-10-CM

## 2022-01-20 DIAGNOSIS — H90.3 SENSORINEURAL HEARING LOSS (SNHL) OF BOTH EARS: ICD-10-CM

## 2022-01-20 DIAGNOSIS — R26.89 IMBALANCE: Primary | ICD-10-CM

## 2022-01-20 DIAGNOSIS — H90.3 SENSORINEURAL HEARING LOSS, BILATERAL: Primary | ICD-10-CM

## 2022-01-20 PROCEDURE — 3017F COLORECTAL CA SCREEN DOC REV: CPT | Performed by: OTOLARYNGOLOGY

## 2022-01-20 PROCEDURE — 1036F TOBACCO NON-USER: CPT | Performed by: OTOLARYNGOLOGY

## 2022-01-20 PROCEDURE — G8484 FLU IMMUNIZE NO ADMIN: HCPCS | Performed by: OTOLARYNGOLOGY

## 2022-01-20 PROCEDURE — G8399 PT W/DXA RESULTS DOCUMENT: HCPCS | Performed by: OTOLARYNGOLOGY

## 2022-01-20 PROCEDURE — 1090F PRES/ABSN URINE INCON ASSESS: CPT | Performed by: OTOLARYNGOLOGY

## 2022-01-20 PROCEDURE — G8417 CALC BMI ABV UP PARAM F/U: HCPCS | Performed by: OTOLARYNGOLOGY

## 2022-01-20 PROCEDURE — G8427 DOCREV CUR MEDS BY ELIG CLIN: HCPCS | Performed by: OTOLARYNGOLOGY

## 2022-01-20 PROCEDURE — 92567 TYMPANOMETRY: CPT | Performed by: AUDIOLOGIST

## 2022-01-20 PROCEDURE — 4040F PNEUMOC VAC/ADMIN/RCVD: CPT | Performed by: OTOLARYNGOLOGY

## 2022-01-20 PROCEDURE — 92557 COMPREHENSIVE HEARING TEST: CPT | Performed by: AUDIOLOGIST

## 2022-01-20 PROCEDURE — 99203 OFFICE O/P NEW LOW 30 MIN: CPT | Performed by: OTOLARYNGOLOGY

## 2022-01-20 PROCEDURE — 1123F ACP DISCUSS/DSCN MKR DOCD: CPT | Performed by: OTOLARYNGOLOGY

## 2022-01-20 ASSESSMENT — ENCOUNTER SYMPTOMS
SORE THROAT: 0
FACIAL SWELLING: 0
COUGH: 0
APNEA: 0
SINUS PRESSURE: 0
SHORTNESS OF BREATH: 0
EYE ITCHING: 0
TROUBLE SWALLOWING: 0
VOICE CHANGE: 0

## 2022-01-20 NOTE — PATIENT INSTRUCTIONS
Good Communication Strategies    Communication can be challenging for anyone, but can be especially difficult for those with some degree of hearing loss. While we may not be able to control every factor that may lead to difficulty with communication, there are Good Communication Strategies that we can all use in our day-to-day lives. Communication takes both parties working together for it to be successful. Tips as a Listener:   1. Control your environment. It is important to limit the amount of background noise in the room when possible. You should also consider having a good light source in the room to best see the other person. 2. Ask for clarification. Instead of saying \"What?\", you can use parts of what you heard to make a new question. For example, if you heard the word \"Thursday\" but not the rest of the week, you may ask \"What was that about Thursday? \" or \"What did you want to do Thursday? \". This shows the person talking that you are listening and will help them better explain what they are saying. 3. Be an advocate for yourself. If you are hearing but not understanding, tell the other person \"I can hear you, but I need you to slow down when you speak. \"  Or if someone is facing the other direction, say \"I cannot hear you when you are not looking at me when we talk. \"       Tips as a Talker:   - Sit or stand 3 to 6 feet away to maximize audibility         -- It is unrealistic to believe someone else will fully hear your message if you are speaking from across the room or in a different room in the house   - Stay at eye level to help with visual cues   - Make sure you have the persons attention before speaking   - Use facial expressions and gestures to accentuate your message   - Raise your voice slightly (do not scream)   - Speak slowly and distinctly   - Use short, simple sentences   - Rephrase your words if the person is having a hard time understanding you    - To avoid distortion, dont speak directly into a persons ear      Some additional items that may be helpful:   - Remain patient - this is important for both parties   - Write down items that still cannot be heard/understood. You may write with pen/paper or consider typing/texting on a cell phone or smart device. - If background noise is unavoidable, try to keep yourself in a good position in the room. By sitting at a pulido on the side of the restaurant (preferably a corner), it will be easier to communicate than if you were sitting at a table in the middle with background noise surrounding you. Keep yourself positioned away from music speakers or heavy foot traffic. Hearing Loss: Care Instructions  Your Care Instructions      Hearing loss is a sudden or slow decrease in how well you hear. It can range from mild to profound. Permanent hearing loss can occur with aging, and it can happen when you are exposed long-term to loud noise. Examples include listening to loud music, riding motorcycles, or being around other loud machines. Hearing loss can affect your work and home life. It can make you feel lonely or depressed. You may feel that you have lost your independence. But hearing aids and other devices can help you hear better and feel connected to others. Follow-up care is a key part of your treatment and safety. Be sure to make and go to all appointments, and call your doctor if you are having problems. It's also a good idea to know your test results and keep a list of the medicines you take. How can you care for yourself at home? · Avoid loud noises whenever possible. This helps keep your hearing from getting worse. Always wear hearing protection around loud noises. · If appropriate, wear hearing aid(s) as directed. It is recommended that hearing aids are worn during all waking hours to keep your brain active and give it access to the sounds it is missing.       · If you are beginning your process with hearing aid(s), schedule a \"Hearing Aid Evaluation\" with an audiologist to discuss your lifestyle, features of hearing aid technology, and styles of hearing aids available. It is recommended that you contact your insurance company to determine if you have a hearing aid benefit, as this may dictate who you can see for these services. · Have hearing tests as your doctor suggests. They can show whether your hearing has changed. Your hearing aid may need to be adjusted. · Use other assistive devices as needed. These may include:  ? Telephone amplifiers and hearing aids that can connect to a television, stereo, radio, or microphone. ? Devices that use lights or vibrations. These alert you to the doorbell, a ringing telephone, or a baby monitor. ? Television closed-captioning. This shows the words at the bottom of the screen. Most new TVs can do this. ? TTY (text telephone). This lets you type messages back and forth on the telephone instead of talking or listening. These devices are also called TDD. When messages are typed on the keyboard, they are sent over the phone line to a receiving TTY. The message is shown on a monitor. · Use pagers, fax machines, text, and email if it is hard for you to communicate by telephone. · Try to learn a listening technique called speech-reading. It is not lip-reading. You pay attention to people's gestures, expressions, posture, and tone of voice. These clues can help you understand what a person is saying. Face the person you are talking to, and have him or her face you. Make sure the lighting is good. You need to see the other person's face clearly. · Think about counseling if you need help to adjust to your hearing loss. When should you call for help? Watch closely for changes in your health, and be sure to contact your doctor if:    · You think your hearing is getting worse. · You have new symptoms, such as dizziness or nausea.            Dizziness: Care Instructions  Your Care Instructions  Dizziness is the feeling of unsteadiness or fuzziness in your head. It is different than having vertigo, which is a feeling that the room is spinning or that you are moving or falling. It is also different from lightheadedness, which is the feeling that you are about to faint. It can be hard to know what causes dizziness. Some people feel dizzy when they have migraine headaches. Sometimes bouts of flu can make you feel dizzy. Some medical conditions, such as heart problems or high blood pressure, can make you feel dizzy. Many medicines can cause dizziness, including medicines for high blood pressure, pain, or anxiety. If a medicine causes your symptoms, your doctor may recommend that you stop or change the medicine. If it is a problem with your heart, you may need medicine to help your heart work better. If there is no clear reason for your symptoms, your doctor may suggest watching and waiting for a while to see if the dizziness goes away on its own. Follow-up care is a key part of your treatment and safety. Be sure to make and go to all appointments, and call your doctor if you are having problems. It's also a good idea to know your test results and keep a list of the medicines you take. How can you care for yourself at home? · If your doctor recommends or prescribes medicine, take it exactly as directed. Call your doctor if you think you are having a problem with your medicine. · Do not drive while you feel dizzy. · Try to prevent falls. Steps you can take include:  ? Using nonskid mats, adding grab bars near the tub, and using night-lights. ? Clearing your home so that walkways are free of anything you might trip on.  ? Letting family and friends know that you have been feeling dizzy. This will help them know how to help you. When should you call for help? Call 911 anytime you think you may need emergency care.  For example, call if:    · You passed out (lost consciousness). · You have dizziness along with symptoms of a heart attack. These may include:  ? Chest pain or pressure, or a strange feeling in the chest.  ? Sweating. ? Shortness of breath. ? Nausea or vomiting. ? Pain, pressure, or a strange feeling in the back, neck, jaw, or upper belly or in one or both shoulders or arms. ? Lightheadedness or sudden weakness. ? A fast or irregular heartbeat. · You have symptoms of a stroke. These may include:  ? Sudden numbness, tingling, weakness, or loss of movement in your face, arm, or leg, especially on only one side of your body. ? Sudden vision changes. ? Sudden trouble speaking. ? Sudden confusion or trouble understanding simple statements. ? Sudden problems with walking or balance. ? A sudden, severe headache that is different from past headaches. Call your doctor now or seek immediate medical care if:    · You feel dizzy and have a fever, headache, or ringing in your ears. · You have new or increased nausea and vomiting. · Your dizziness does not go away or comes back. Watch closely for changes in your health, and be sure to contact your doctor if:    · You do not get better as expected. Where can you learn more? Go to https://[employer]. org and sign in to your [email or web address] account. Enter G535 in the Linio box to learn more about \"Dizziness: Care Instructions. \"     If you do not have an account, please click on the \"Sign Up Now\" link. Current as of: September 23, 2018  Content Version: 11.9  © 3379-7638 [employer]. Care instructions adapted under license by [facility] ([facility]). If you have questions about a medical condition or this instruction, always ask your healthcare professional. [place] [address] any warranty or liability for your use of this information.

## 2022-01-20 NOTE — PROGRESS NOTES
[facility], [address], [address], [address] [place] [address]  P: [phone]       Patient     Konrad Marino  1955    ChiefComplaint     Chief Complaint   Patient presents with    Dizziness     Got worse, then got better, now is getting worse again. Used a cane at one point. Has been going on over all for 15 years. History of Present Illness     Westley Cordova is a 68-year-old female here today for evaluation of chronic imbalance. 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 Since then symptoms have fluctuated with worsening in the last several months. Does use cane occasionally for balance. Has had multiple falls. Denies otalgia, otorrhea, tinnitus. Did have one episode of vertigo several years ago but no other room spinning. Symptoms present only with motion. Is currently finding a new neurologist but does believe she does have sarcoidosis lesions in the brain. Past Medical History     Past Medical History:   Diagnosis Date    Asthma     CAD (coronary artery disease)     Cancer (HCC)     RIGHT BREAST    Chronic diarrhea     Dr. Gogo Miller Chronic kidney disease     kidney stones    Clostridium difficile diarrhea 10/23/13    positive by PCR    Fibromyalgia     Hemorrhoid     Hyperlipidemia     Hypertension     Kidney stone     Osteoarthritis     fibromyalgia    Sarcoidosis 2003    sees Dr. Justo Cross       Past Surgical History     Past Surgical History:   Procedure Laterality Date    ABDOMINAL EXPLORATION SURGERY  8/19/12    REDUCTION OF VULVUS; RIGHT COLECTOMY; SMALL BOWEL RESECTION; INSERTION OF ON Q PAIN BUSTER    BREAST BIOPSY      CARDIAC CATHETERIZATION  2010    CLEAN    COLONOSCOPY  2010    normal    COLONOSCOPY N/A 6/16/2020    COLON W/ANES.  performed by Dominic Bajwa MD at [facility]  12/2011    ESOPHAGEAL DILATATION  2020    ESOPHAGEAL DILATION PERDOMO performed by Alon Fan MD at [facility]  2009    cataract, right    HYSTERECTOMY  2000    LITHOTRIPSY  2012    LITHOTRIPSY      2012    MASTECTOMY Bilateral 2021    BILATERAL SIMPLE MASTECTOMY, RIGHT SENTINEL LYMPH NODE BIOPSY performed by Asim Carroll MD at [facility]    right    PARTIAL HYSTERECTOMY     [address]    UPPER GASTROINTESTINAL ENDOSCOPY  13    UPPER GASTROINTESTINAL ENDOSCOPY  2017    gastritis    UPPER GASTROINTESTINAL ENDOSCOPY N/A 2020    EGD W/ANES.  (11:00) performed by [facility]on Fan [facility] at [facility] 20  2011    US BREAST NEEDLE BIOPSY RIGHT Right 4/15/2021    US BREAST NEEDLE BIOPSY RIGHT 4/15/2021 Heide Garza MD [facility]       Family History     Family History   Problem Relation Age of Onset    Heart Disease Father     Cancer Father         skin cancer- unknown    Cancer Brother         skin cancer- forehead and nose- unknown       Social History     Social History     Tobacco Use    Smoking status: Former Smoker     Packs/day: 0.50     Years: 20.00     Pack years: 10.00     Types: Cigarettes     Start date: 3/1/2019     Quit date: 2020     Years since quittin.3    Smokeless tobacco: Never Used   Vaping Use    Vaping Use: Never used   Substance Use Topics    Alcohol use: No     Alcohol/week: 0.0 standard drinks    Drug use: No        Allergies     Allergies   Allergen Reactions    Infliximab      Severe drop in blood pressure    Ultrasound Gel Other (See Comments)     burn    Codeine Nausea And Vomiting    Penicillins Rash       Medications     Current Outpatient Medications   Medication Sig Dispense Refill    predniSONE (DELTASONE) 5 MG tablet Take 1 tablet by mouth every other day (Patient taking differently: Take 5 mg by mouth daily ) 1 tablet 0    ALPRAZolam (XANAX) 1 MG tablet TAKE 1 TABLET BY MOUTH TWICE DAILY AS NEEDED FOR SLEEP OR ANXIETY 60 tablet 2    sertraline (ZOLOFT) 100 MG tablet TAKE 1 & 1/2 (ONE & ONE-HALF) TABLETS BY MOUTH ONCE DAILY 135 tablet 0    propranolol (INDERAL LA) 60 MG extended release capsule Take 1 capsule by mouth once daily 90 capsule 0    letrozole (FEMARA) 2.5 MG tablet TAKE 1 TABLET BY MOUTH ONCE DAILY      valACYclovir (VALTREX) 500 MG tablet Take 1 tablet by mouth once daily 90 tablet 0    mirtazapine (REMERON) 15 MG tablet TAKE 1 TABLET BY MOUTH ONCE DAILY AT NIGHT 90 tablet 1    pantoprazole (PROTONIX) 40 MG tablet TAKE 1 TABLET BY MOUTH TWICE A DAY BEFORE MEALS 180 tablet 1    alendronate (FOSAMAX) 70 MG tablet TAKE 1 TABLET BY MOUTH EVERY 7 DAYS 12 tablet 1    montelukast (SINGULAIR) 10 MG tablet Take 1 tablet by mouth nightly TAKE 1 TABLET BY MOUTH EVERY DAY 90 tablet 1    traZODone (DESYREL) 100 MG tablet Take 1 tablet by mouth nightly as needed for Sleep 90 tablet 1    atorvastatin (LIPITOR) 20 MG tablet Take 1 tablet by mouth nightly TAKE 1 TABLET BY MOUTH EVERY DAY 90 tablet 1    linaclotide (LINZESS) 290 MCG CAPS capsule Take 1 capsule by mouth every morning (before breakfast) 30 capsule 1    ketorolac (ACULAR) 0.5 % ophthalmic solution Place 1 drop into the left eye 2 times daily       spironolactone (ALDACTONE) 50 MG tablet TAKE 1 TABLET BY MOUTH EVERY DAY 90 tablet 1    fluticasone-salmeterol (ADVAIR) 250-50 MCG/DOSE AEPB Inhale into the lungs 2 times daily      albuterol sulfate  (90 Base) MCG/ACT inhaler 2 puffs q 4 prn      rizatriptan (MAXALT) 5 MG tablet Take 1 tablet by mouth daily as needed for Migraine May repeat in 2 hours if needed 27 tablet 1    diclofenac sodium 1 % GEL Apply topically Indications: Carmen Meza prescribes       difluprednate (DUREZOL) 0.05 % EMUL Apply 2 drops to eye 2 times daily BOTH EYES      gabapentin (NEURONTIN) 400 MG capsule 400 mg 4 times daily. Indications: Prescribed by [facility] Dr. Brady Martinez    cyclobenzaprine (FLEXERIL) 10 MG tablet Take 10 mg by mouth 3 times daily as needed for Muscle spasms      methotrexate (RHEUMATREX) 2.5 MG chemo tablet Take 7.5 mg by mouth every 7 days       fluocinonide (LIDEX) 0.05 % cream Apply topically 2 times daily Apply topically 2 times daily.  promethazine (PHENERGAN) 12.5 MG tablet Take 25 mg by mouth every 6 hours as needed for Nausea Indications: Dr. Teddy Meade        No current facility-administered medications for this visit. Review of Systems     Review of Systems   Constitutional: Negative for appetite change, chills, fatigue, fever and unexpected weight change. HENT: Negative for congestion, ear discharge, ear pain, facial swelling, hearing loss, nosebleeds, postnasal drip, sinus pressure, sneezing, sore throat, tinnitus, trouble swallowing and voice change. Eyes: Positive for visual disturbance. Negative for itching. Respiratory: Negative for apnea, cough and shortness of breath. Endocrine: Negative for cold intolerance and heat intolerance. Musculoskeletal: Negative for myalgias and neck pain. Skin: Negative for rash. Allergic/Immunologic: Negative for environmental allergies. Neurological: Negative for dizziness and headaches.        +imbalance   Psychiatric/Behavioral: Negative for confusion, decreased concentration and sleep disturbance. PhysicalExam     Vitals:    01/20/22 1332   BP: 109/76   Site: Left Upper Arm   Position: Sitting   Cuff Size: Medium Adult   Pulse: 106   Temp: 97.7 °F (36.5 °C)   TempSrc: Infrared   Weight: 166 lb 6.4 oz (75.5 kg)   Height: 5' 4\" (1.626 m)       Physical Exam  Constitutional:       General: She is not in acute distress. Appearance: She is well-developed. HENT:      Head: Normocephalic and atraumatic.       Right Ear: Tympanic membrane, ear canal and external ear normal. No drainage. No middle ear effusion. Tympanic membrane is not bulging. Tympanic membrane has normal mobility. Left Ear: Tympanic membrane, ear canal and external ear normal. No drainage. No middle ear effusion. Tympanic membrane is not bulging. Tympanic membrane has normal mobility. Nose: No mucosal edema or rhinorrhea. Mouth/Throat:      Lips: Pink. Mouth: Mucous membranes are moist.      Tongue: No lesions. Palate: No mass. Pharynx: Uvula midline. Eyes:      Pupils: Pupils are equal, round, and reactive to light. Neck:      Thyroid: No thyroid mass or thyromegaly. Trachea: Trachea and phonation normal.   Cardiovascular:      Pulses: Normal pulses. Pulmonary:      Effort: Pulmonary effort is normal. No accessory muscle usage or respiratory distress. Breath sounds: No stridor. Musculoskeletal:      Cervical back: Full passive range of motion without pain. Lymphadenopathy:      Head:      Right side of head: No submental or submandibular adenopathy. Left side of head: No submental or submandibular adenopathy. Cervical: No cervical adenopathy. Right cervical: No superficial, deep or posterior cervical adenopathy. Left cervical: No superficial, deep or posterior cervical adenopathy. Skin:     General: Skin is warm and dry. Neurological:      Mental Status: She is alert and oriented to person, place, and time. Cranial Nerves: No cranial nerve deficit. Coordination: Coordination normal.      Gait: Gait normal.   Psychiatric:         Thought Content: Thought content normal.               Assessment and Plan     1. 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

## 2022-01-28 RX ORDER — VALACYCLOVIR HYDROCHLORIDE 500 MG/1
TABLET, FILM COATED ORAL
Qty: 90 TABLET | Refills: 0 | Status: SHIPPED | OUTPATIENT
Start: 2022-01-28 | End: 2022-05-24

## 2022-02-24 DIAGNOSIS — F33.1 MAJOR DEPRESSIVE DISORDER, RECURRENT EPISODE, MODERATE (HCC): ICD-10-CM

## 2022-02-24 RX ORDER — SERTRALINE HYDROCHLORIDE 100 MG/1
TABLET, FILM COATED ORAL
Qty: 135 TABLET | Refills: 0 | Status: SHIPPED | OUTPATIENT
Start: 2022-02-24 | End: 2022-05-31

## 2022-02-24 RX ORDER — SPIRONOLACTONE 50 MG/1
TABLET, FILM COATED ORAL
Qty: 90 TABLET | Refills: 0 | Status: SHIPPED | OUTPATIENT
Start: 2022-02-24 | End: 2022-07-05

## 2022-02-24 NOTE — TELEPHONE ENCOUNTER
.  Refill Request     Last Seen: Last Seen Department: 1/3/2022  Last Seen by PCP: 5/7/2021    Last Written: 3-11-21 90 with 1     Next Appointment:   Future Appointments   Date Time Provider Annmarie Caridad   4/4/2022  1:00 PM [facility] [place]   4/28/2022  1:30 PM Isma Elder MD [facility]   7/11/2022  1:00 PM [facility]gan [facility]   1/9/2023  3:30 PM [facility]       Future appointment scheduled      Requested Prescriptions     Pending Prescriptions Disp Refills    spironolactone (ALDACTONE) 50 MG tablet [Pharmacy Med Name: Spironolactone 50 MG Oral Tablet] 90 tablet 0     Sig: Take 1 tablet by mouth once daily

## 2022-02-24 NOTE — TELEPHONE ENCOUNTER
.  Refill Request     Last Seen: Last Seen Department: 1/3/2022  Last Seen by PCP: 9/17/2021    Last Written: 11-29-21 135 with 0     Next Appointment:   Future Appointments   Date Time Provider Annmarie Caridad   4/4/2022  1:00 PM FRANCISCO Pat  [facility]   4/28/2022  1:30 PM Yunior Figueroa MD [facility]   7/11/2022  1:00 PM Raine Hernandez MD [facility] - [facility]   1/9/2023  3:30 PM Raine Hernandez MD [facility]       Future appointment scheduled      Requested Prescriptions     Pending Prescriptions Disp Refills    sertraline (ZOLOFT) 100 MG tablet [Pharmacy Med Name: Sertraline HCl 100 MG Oral Tablet] 135 tablet 0     Sig: TAKE 1 & 1/2 (ONE & ONE-HALF) TABLETS BY MOUTH ONCE DAILY

## 2022-02-25 RX ORDER — ALENDRONATE SODIUM 70 MG/1
TABLET ORAL
Qty: 12 TABLET | Refills: 0 | Status: SHIPPED | OUTPATIENT
Start: 2022-02-25 | End: 2022-05-24

## 2022-02-25 NOTE — TELEPHONE ENCOUNTER
Refill Request     Last Seen: Last Seen Department: 1/3/2022  Last Seen by PCP: 1/3/22     Last Written: 9/17/21 12 tablet 1 refill     Next Appointment: 4/4/22     Future Appointments   Date Time Provider Annmarie Caridad   4/4/2022  1:00 PM FRANCISCO Paz  [facility] [place]   4/28/2022  1:30 PM Comfort Ayers MD [facility]   7/11/2022  1:00 PM MD PREMA Bagley  [facility] [place]   1/9/2023  3:30 [facility] [place]       Future appointment scheduled      Requested Prescriptions     Pending Prescriptions Disp Refills    alendronate (FOSAMAX) 70 MG tablet [Pharmacy Med Name: Alendronate Sodium 70 MG Oral Tablet] 12 tablet 0     Sig: Take 1 tablet by mouth once a week

## 2022-03-29 ENCOUNTER — INITIAL CONSULT (OUTPATIENT)
Dept: SURGERY | Age: 67
End: 2022-03-29
Payer: MEDICARE

## 2022-03-29 VITALS
DIASTOLIC BLOOD PRESSURE: 88 MMHG | WEIGHT: 169.4 LBS | TEMPERATURE: 97.7 F | HEIGHT: 64 IN | BODY MASS INDEX: 28.92 KG/M2 | HEART RATE: 89 BPM | SYSTOLIC BLOOD PRESSURE: 120 MMHG

## 2022-03-29 DIAGNOSIS — K56.699: Primary | ICD-10-CM

## 2022-03-29 DIAGNOSIS — Z01.812 PRE-PROCEDURE LAB EXAM: ICD-10-CM

## 2022-03-29 PROCEDURE — G8417 CALC BMI ABV UP PARAM F/U: HCPCS | Performed by: SURGERY

## 2022-03-29 PROCEDURE — G8427 DOCREV CUR MEDS BY ELIG CLIN: HCPCS | Performed by: SURGERY

## 2022-03-29 PROCEDURE — G8484 FLU IMMUNIZE NO ADMIN: HCPCS | Performed by: SURGERY

## 2022-03-29 PROCEDURE — 99214 OFFICE O/P EST MOD 30 MIN: CPT | Performed by: SURGERY

## 2022-03-29 PROCEDURE — 1090F PRES/ABSN URINE INCON ASSESS: CPT | Performed by: SURGERY

## 2022-03-29 NOTE — PROGRESS NOTES
Department of General Surgery Consult    PATIENT NAME: Gianfranco Escobedo OF BIRTH: 1955       TODAY'S DATE: 3/29/2022    Reason for Consult:  Narrowing of descending colon    Chief Complaint: Right lower quadrant pain, chronic loose stool and watery diarrhea    Requesting Physician:  Dr. Sagrario Yu:  Merna Favre 77 y.o.  female with past medical history of sarcoidosis and volvulus about 10 years ago presents to the office complaining of right lower quadrant pain, chronic loose stool, watery diarrhea and nausea. 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 She also had a splenectomy in 1983. Patient also had bilateral mastectomies in 2021. Patient symptoms has been worsening in the last 3 months. Colonoscopy on 3/2022 showed tight smooth narrowing in the descending colon could possibly due to fibrosis to previous diverticulitis. Patient denies any fever or chills or other review of systems symptoms. Past Medical History:        Diagnosis Date    Asthma     CAD (coronary artery disease)     Cancer ([facility] [address]     RIGHT BREAST    Chronic diarrhea     Dr. Emmy Salazar Chronic kidney disease     kidney stones    Clostridium difficile diarrhea 10/23/13    positive by PCR    Fibromyalgia     Hemorrhoid     Hyperlipidemia     Hypertension     Kidney stone     Osteoarthritis     fibromyalgia    Sarcoidosis 2003    sees Dr. Roselyn Fuchs       Past Surgical History:        Procedure Laterality Date    ABDOMINAL EXPLORATION SURGERY  8/19/12    REDUCTION OF VULVUS; RIGHT COLECTOMY; SMALL BOWEL RESECTION; INSERTION OF ON Q PAIN BUSTER    BREAST BIOPSY      CARDIAC CATHETERIZATION  2010    CLEAN    COLONOSCOPY  2010    normal    COLONOSCOPY N/A 6/16/2020    COLON W/ANES.  performed by Lisbet Merlos MD at [address]  12/2011    ESOPHAGEAL DILATATION  6/16/2020    ESOPHAGEAL DILATION [place] performed by Jose Barajas MD Christine at [address] [facility] [place]  5/2009    cataract, right    HYSTERECTOMY  2000    LITHOTRIPSY  1/19/2012    LITHOTRIPSY      04/2012    MASTECTOMY Bilateral 5/18/2021    BILATERAL SIMPLE MASTECTOMY, RIGHT SENTINEL LYMPH NODE BIOPSY performed by Niurka Blanchard MD at [address] [facility]    right    PARTIAL HYSTERECTOMY      [address]    UPPER GASTROINTESTINAL ENDOSCOPY  2/27/13    UPPER GASTROINTESTINAL ENDOSCOPY  11/14/2017    gastritis    UPPER GASTROINTESTINAL ENDOSCOPY N/A 6/16/2020    EGD W/ANES. (11:00) performed by Jermain Black MD at [place] [address]  12/2011    US BREAST NEEDLE BIOPSY RIGHT Right 4/15/2021    US BREAST NEEDLE BIOPSY RIGHT 4/15/2021 Anand Baltazar MD [address] [facility]       Current Medications:   No current facility-administered medications for this visit. Prior to Admission medications    Medication Sig Start Date End Date Taking?  Authorizing Provider   alendronate (FOSAMAX) 70 MG tablet Take 1 tablet by mouth once a week 2/25/22  Yes VERITO Gutierrez CNP   spironolactone (ALDACTONE) 50 MG tablet Take 1 tablet by mouth once daily 2/24/22  Yes VERITO Gutierrez CNP   sertraline (ZOLOFT) 100 MG tablet TAKE 1 & 1/2 (ONE & ONE-HALF) TABLETS BY MOUTH ONCE DAILY 2/24/22  Yes VERITO Gutierrez CNP   valACYclovir (VALTREX) 500 MG tablet Take 1 tablet by mouth once daily 1/28/22  Yes VERITO Fernandes CNP   predniSONE (DELTASONE) 5 MG tablet Take 1 tablet by mouth every other day  Patient taking differently: Take 5 mg by mouth daily  1/3/22  Yes Vicki Pantoja MD   propranolol (INDERAL LA) 60 MG extended release capsule Take 1 capsule by mouth once daily 11/8/21  Yes Vicki Pantoja MD   mirtazapine (REMERON) 15 MG tablet TAKE 1 TABLET BY MOUTH ONCE DAILY AT NIGHT 10/7/21  Yes VERITO Gutierrez CNP   pantoprazole (PROTONIX) 40 MG tablet TAKE 1 TABLET BY MOUTH TWICE A DAY BEFORE MEALS 9/17/21  Yes VERITO Conklin CNP   montelukast (SINGULAIR) 10 MG tablet Take 1 tablet by mouth nightly TAKE 1 TABLET BY MOUTH EVERY DAY 9/17/21  Yes VERITO Conklin CNP   traZODone (DESYREL) 100 MG tablet Take 1 tablet by mouth nightly as needed for Sleep 9/17/21  Yes VERITO Conklin CNP   atorvastatin (LIPITOR) 20 MG tablet Take 1 tablet by mouth nightly TAKE 1 TABLET BY MOUTH EVERY DAY 6/25/21  Yes Ilan Francis MD   ketorolac (ACULAR) 0.5 % ophthalmic solution Place 1 drop into the left eye 2 times daily  4/8/21  Yes Historical Provider, MD   fluticasone-salmeterol (ADVAIR) 250-50 MCG/DOSE AEPB Inhale into the lungs 2 times daily   Yes Historical Provider, MD   albuterol sulfate  (90 Base) MCG/ACT inhaler 2 puffs q 4 prn 1/15/21  Yes Historical Provider, MD   diclofenac sodium 1 % GEL Apply topically Indications: [place]  prescribes    Yes Samina Elliott MD   difluprednate (DUREZOL) 0.05 % EMUL Apply 2 drops to eye 2 times daily BOTH EYES 2/6/18  Yes Historical Provider, MD   gabapentin (NEURONTIN) 400 MG capsule 400 mg 4 times daily. Indications: Prescribed by Arthritis   9/6/19  Yes Samina Elliott MD   cyclobenzaprine (FLEXERIL) 10 MG tablet Take 10 mg by mouth 3 times daily as needed for Muscle spasms   Yes Historical Provider, MD   methotrexate (RHEUMATREX) 2.5 MG chemo tablet Take 7.5 mg by mouth every 7 days    Yes Samina Elliott MD   fluocinonide (LIDEX) 0.05 % cream Apply topically 2 times daily Apply topically 2 times daily.    Yes Historical Provider, MD   promethazine (PHENERGAN) 12.5 MG tablet Take 25 mg by mouth every 6 hours as needed for Nausea Indications: Dr. Beryle Model    Yes Historical Provider, MD   letrozole [facility]) 2.5 MG tablet TAKE 1 TABLET BY MOUTH ONCE DAILY  Patient not taking: Reported on 3/29/2022 8/3/21   Historical Provider, MD   linaclmarino Sotelo) 290 MCG CAPS capsule Take 1 capsule by mouth every morning (before breakfast)  Patient not taking: Reported on 3/29/2022 4/21/21   Kelli Whitney MD   rizatriptan (MAXALT) 5 MG tablet Take 1 tablet by mouth daily as needed for Migraine May repeat in 2 hours if needed  Patient not taking: Reported on 3/29/2022 9/10/20   FRANCISCO Singh        Allergies:  Infliximab, Ultrasound gel, Codeine, and Penicillins    Social History:   TOBACCO:  Former smoker   ETOH: denies alcohol use   DRUGS:  Denies drug use   Patient currently lives with family     Family History:        Problem Relation Age of Onset    Heart Disease Father     Cancer Father         skin cancer- unknown    Cancer Brother         skin cancer- forehead and nose- unknown       REVIEW OF SYSTEMS:  Review of Systems   Constitutional: Negative for activity change, appetite change, chills, fatigue and fever. HENT: Negative for congestion, drooling, ear pain, hearing loss, sinus pressure, sore throat and trouble swallowing. Eyes: Negative for pain, discharge, redness, itching and visual disturbance. Respiratory: Negative for apnea, choking, chest tightness, shortness of breath and wheezing. Cardiovascular: Negative for chest pain, palpitations and leg swelling. Gastrointestinal: + for RLQ pain, nausea, chronic nonbloody diarrhea. Negative for abdominal distention,, blood in stool, constipation,and vomiting. Endocrine: Negative for cold intolerance, heat intolerance and polydipsia. Genitourinary: Negative for dysuria, flank pain, frequency, genital sores, hematuria. Musculoskeletal: Negative for back pain, gait problem, joint swelling, neck pain and neck stiffness. Skin: Negative for color change, rash and wound. Neurological: Negative for dizziness, syncope, light-headedness, numbness and headaches.            PHYSICAL EXAM:  VITALS:  /88 (Site: Left Wrist, Position: Sitting, Cuff Size: Medium Adult)   Pulse 89   Temp 97.7 °F (36.5 °C)   Ht 5' 4.02\" (1.626 m)   Wt 169 lb 6.4 oz (76.8 kg)   BMI 29.06 kg/m²     Constitutional:       General:  Not in acute distress. Appearance: Not ill-appearing, toxic-appearing or diaphoretic. HENT:      Head: Normocephalic and atraumatic. Right Ear: Tympanic membrane, ear canal and external ear normal. There is no impacted cerumen. Left Ear: Tympanic membrane, ear canal and external ear normal. There is no impacted cerumen. Nose: Nose normal. No congestion or rhinorrhea. Mouth/Throat:      Mouth: Mucous membranes are moist.      Pharynx: Oropharynx is clear. No oropharyngeal exudate or posterior oropharyngeal erythema. Eyes:      General: No scleral icterus. Right eye: No discharge. Left eye: No discharge. Extraocular Movements: Extraocular movements intact. Conjunctiva/sclera: Conjunctivae normal.      Pupils: Pupils are equal, round, and reactive to light. Neck:      Vascular: No carotid bruit. Cardiovascular:      Rate and Rhythm: Normal rate and regular rhythm. Pulses: Normal pulses. Heart sounds: Normal heart sounds. No murmur heard. No friction rub. No gallop. Pulmonary:      Effort: Pulmonary effort is normal. No respiratory distress. Breath sounds: No stridor. No wheezing, rhonchi or rales. Chest:      Chest wall: No tenderness. Abdominal:   Inspection: 1 midlines vertical scar present from previous surgeries. General: Abdomen is distended. Bowel sounds are normal..      Palpations: Abdomen is soft. Tenderness: There is no abdominal tenderness. There is no right CVA tenderness, left CVA tenderness, guarding or rebound. Hernia: No hernia is present. Musculoskeletal:         General: No swelling, tenderness, deformity or signs of injury. Normal range of motion. Cervical back: Normal range of motion. No rigidity or tenderness. Left lower leg: No edema.    Lymphadenopathy: Cervical: No cervical adenopathy. Skin:     General: Skin is warm. Coloration: Skin is not jaundiced or pale. Findings: No bruising, erythema, lesion or rash. DATA:    CBC: No results for input(s): WBC, HGB, HCT, PLT in the last 72 hours. BMP:  No results for input(s): NA, K, CL, CO2, BUN, CREATININE, GLUCOSE in the last 72 hours. Hepatic: No results for input(s): AST, ALT, ALB, BILITOT, ALKPHOS in the last 72 hours. Mag:    No results for input(s): MG in the last 72 hours. Phos:   No results for input(s): PHOS in the last 72 hours. INR: No results for input(s): INR in the last 72 hours. Radiology Review: Images personally reviewed by me. Reviewed colonoscopy report from GI note      IMPRESSION/RECOMMENDATIONS:  Nelida Arita 77 y.o.  female with past medical history of sarcoidosis and volvulus about 10 years ago presents to the office complaining of right lower quadrant pain, chronic loose stool, watery diarrhea and nausea. Stricture of descending colon  - Will order CT abdomen scan for further evaluation.  - Will consider referral to GI again for colonoscopy to tattoo stricture site if not shown on CT abdomen.   - Patient would benefit from partial resection of descending colon with possible colostomy bag insertion.  - Discussed benefits and risks of procedure and patient had the opportunity to ask questions. - Consider scheduling appointment with colostomy nursing care prior to procedure. - Advised patient to proceed to ED if patient experience complete obstruction. Electronically signed by Yehuda Yuen DO   3/29/2022    [address] [facility]gery Staff    I have examined this patient, and read and agree with the note by Yehuda Yuen DO    from today; more than half of the total time was spent by me on the encounter.      I suspect she has an increasingly high-grade left colonic stricture (appears benign on endoscopy), which only allowing gas and liquid stool to pass at this point. Increasing abdominal bloating/distention and R-sided pain would suggest the proximal colon is becoming more back-filled with stool. As listed above, will get CT to better assess for site of stricture and degree of proximal distention. Anticipate proceeding with surgical partial left colectomy to resect the strictured area. Unfortunately, with unprepped proximal colon, will likely not be safe to perform an immediate anastomosis. Anticipate will need temporary colostomy. Discussed these issues in detail with patient and her . Technical aspects, risks and complications of colon surgery were reviewed. They appear to understand, ask appropriate questions, and agree with the plan.     Lelia Marin MD

## 2022-03-31 ENCOUNTER — TELEPHONE (OUTPATIENT)
Dept: SURGERY | Age: 67
End: 2022-03-31

## 2022-03-31 DIAGNOSIS — R10.13 EPIGASTRIC PAIN: ICD-10-CM

## 2022-03-31 RX ORDER — MONTELUKAST SODIUM 10 MG/1
TABLET ORAL
Qty: 90 TABLET | Refills: 0 | Status: SHIPPED | OUTPATIENT
Start: 2022-03-31

## 2022-03-31 RX ORDER — PANTOPRAZOLE SODIUM 40 MG/1
TABLET, DELAYED RELEASE ORAL
Qty: 180 TABLET | Refills: 0 | Status: SHIPPED | OUTPATIENT
Start: 2022-03-31 | End: 2022-06-28

## 2022-03-31 RX ORDER — MIRTAZAPINE 15 MG/1
TABLET, FILM COATED ORAL
Qty: 90 TABLET | Refills: 0 | OUTPATIENT
Start: 2022-03-31

## 2022-03-31 NOTE — TELEPHONE ENCOUNTER
Refill Request     Last Seen: Last Seen Department: 1/3/2022  Last Seen by PCP: Visit date not found    Last Written: 10/07/2021 90 Tablet 1 Refill     Next Appointment:   Future Appointments   Date Time Provider Annmarie Mclean   4/4/2022  1:00 PM FRANCISCO James [facility] [place]   7/11/2022  1:00 PM MD PREMA Joshua [facility] JAS   1/9/2023  3:30 PM Bradley Arango MD [facility]       Future appointment scheduled      Requested Prescriptions     Pending Prescriptions Disp Refills    mirtazapine (REMERON) 15 MG tablet [Pharmacy Med Name: Mirtazapine 15 MG Oral Tablet] 90 tablet 0     Sig: TAKE 1 TABLET BY MOUTH ONCE DAILY AT NIGHT

## 2022-03-31 NOTE — TELEPHONE ENCOUNTER
Can you please have patient schedule an appointment to discuss her anxiety/insomnia? She is taking trazodone, mirtazipine, xanax, and zoloft which have significant interactions including elevating serotonin too much and sedation. It is likely that the benefit she is getting from all of this could be found in just one or two of the medications. Thank you!

## 2022-03-31 NOTE — TELEPHONE ENCOUNTER
Refill Request     Last Seen: Last Seen Department: 1/3/2022  Last Seen by PCP: 9/17/2021    Last Written:   Montelukast: 09/17/2021 90 Tablet 1 Refill  Pantoprazole: 09/17/2021 180 Tablet 1 Refill    Next Appointment:   Future Appointments   Date Time Provider Annmarie Caridad   4/4/2022  1:00 PM [facility]   7/11/2022  1:00 PM [facility]   1/9/2023  3:30 PM [facility]       Future appointment scheduled      Requested Prescriptions     Pending Prescriptions Disp Refills    montelukast (SINGULAIR) 10 MG tablet [Pharmacy Med Name: Montelukast Sodium 10 MG Oral Tablet] 90 tablet 0     Sig: TAKE 1 TABLET BY MOUTH ONCE DAILY NIGHTLY    pantoprazole (PROTONIX) 40 MG tablet [Pharmacy Med Name: Pantoprazole Sodium 40 MG Oral Tablet Delayed Release] 180 tablet 0     Sig: TAKE 1 TABLET BY MOUTH TWICE DAILY BEFORE MEAL(S)

## 2022-03-31 NOTE — TELEPHONE ENCOUNTER
Pt saw Dr Ankit Glynn in the office 3/29/22 and was given surgery instructions for a Robotic, Possible Open Left Colectomy (General Anes) scheduled 4/27/22 @ 9:45am arrival 7:45am [facility] - Pt given bowel prep instructions at [address] - Dr Yared Schuster to complete pre-op H&P - Pt will also need to be marked for surgery w/ Colostomy Nurse Hardin Brittle (spoke w/ Hardin Brittle on 3/31/22 @ 11:29am and she will call pt to set up appt for marking) - Pt understood and agreed w/ above noted

## 2022-03-31 NOTE — PROGRESS NOTES
Josi Peaks    Age 77 y.o.    female    1955    MRN [id number]    4/27/2022  Arrival Time_____________  OR Time____________205 Min     Procedure(s):  ROBOTIC, POSSIBLE OPEN LEFT COLECTOMY                      General     Surgeon(s):  Gustavo Au, MD      DAY ADMIT ___  SDS/OP ___  OUTPT IN BED ___      Phone [phone] (home)     PCP _____________________ Phone_________________ Epic ( ) Epic CE ( ) Appt ________    ADDITIONAL INFO __________________________________ Cardio/Consult _____________    NOTES _____________________________________________________________________    ____________________________________________________________________________    PAT APPT DATE:________ TIME: ________  FAXED QAD: _______  (__) H&P w/ Hospitalist  ____________________________________________________________________________  NURSING HISTORY COMPLETE: ___________  (__) CBC       (__) W/ DIFF ___________  (__) ECHO   __________     (__) Hgb A1C    ___________  (__) CHEST X RAY   __________  (__) LIPID PROFILE  ___________  (__) EKG   __________  (__) PT/PTT   ___________  (__) PFT's   __________  (__) BMP   ___________  (__) CAROTIDS  __________  (__) CMP   ___________  (__) VEIN MAPPING  __________  (__) U/A   ___________  (__) HISTORY & PHYSICAL __________  (__) URINE C & S  ___________  (__) CARDIAC CLEARANCE __________  (__) U/A W/ FLEX  ___________  (__) PULM.  CLEARANCE __________  (__) SERUM PREGNANCY ___________  (__) Check Epic DOS orders __________  (__) TYPE & SCREEN ________ repeat ( ) (__)  __________________ __________  (__) ALBUMIN Simeon Dura ___________  (__)  __________________ __________  (__) TRANSFERRIN  ___________  (__)  __________________ __________  (__) LIVER PROFILE  ___________  (__)  __________________ __________  (__) MRSA NASAL SWAB ___________  (__) URINE PREG DOS __________  (__) SED RATE  ___________  (__) BLOOD SUGAR DOS __________  (__) C-REACTIVE PROTEIN ___________    (__) VITAMIN D HYDROXY ___________  (__) BLOOD THINNERS __________    (__) ACE/ ARBS: _____________________    (__) BETABLOCKERS ___________________

## 2022-04-01 NOTE — PROGRESS NOTES
[facility] Jaleel    Age 77 y.o.    female    1955    MRN [id number]    4/27/2022  Arrival Time_____________  OR Time____________205 Min     Procedure(s):  ROBOTIC, POSSIBLE OPEN LEFT COLECTOMY                      General     Surgeon(s):  Johanne Taylorris, MD      DAY ADMIT ___  SDS/OP ___  OUTPT IN BED ___      Phone [phone] (home)     PCP _____________________ Phone_________________ Epic ( ) Epic CE ( ) Appt ________    ADDITIONAL INFO __________________________________ Cardio/Consult _____________    NOTES _____________________________________________________________________    ____________________________________________________________________________    PAT APPT DATE:________ TIME: ________  FAXED QAD: _______  (__) H&P w/ Hospitalist  ____________________________________________________________________________  Preop nurse phone screen complete: ________  (__) CBC     (__) W/ DIFF ___________  (__) ECHO   __________    (__) Hgb A1C    ___________  (__) CHEST X RAY   __________  (__) LIPID PROFILE  ___________  (__) EKG   __________  (__) PT/PTT   ___________  (__) PFT's   __________  (__) BMP   ___________  (__) CAROTIDS  __________  (__) CMP   ___________  (__) VEIN MAPPING  __________  (__) U/A   ___________  (__) HISTORY & PHYSICAL __________  (__) URINE C & S  ___________  (__) CARDIAC CLEARANCE __________  (__) U/A W/ FLEX  ___________  (__) PULM.  CLEARANCE __________  (__) SERUM PREGNANCY ___________  (__) Check Epic DOS orders __________  (__) TYPE & SCREEN ________ repeat ( ) (__)  __________________ __________  (__) ALBUMIN Becca Flash ___________  (__)  __________________ __________  (__) TRANSFERRIN  ___________  (__)  __________________ __________  (__) LIVER PROFILE  ___________  (__)  __________________ __________  (__) MRSA NASAL SWAB ___________  (__) URINE PREG DOS __________  (__) SED RATE  ___________  (__) BLOOD SUGAR DOS __________  (__) C-REACTIVE PROTEIN ___________    (__) VITAMIN D HYDROXY ___________  (__) BLOOD THINNERS __________  (__) ACE/ ARBS: _____________________             (__) BETABLOCKERS _______________

## 2022-04-04 ENCOUNTER — TELEPHONE (OUTPATIENT)
Dept: SURGERY | Age: 67
End: 2022-04-04

## 2022-04-04 ENCOUNTER — OFFICE VISIT (OUTPATIENT)
Dept: FAMILY MEDICINE CLINIC | Age: 67
End: 2022-04-04
Payer: MEDICARE

## 2022-04-04 VITALS
OXYGEN SATURATION: 94 % | TEMPERATURE: 98.2 F | HEART RATE: 103 BPM | HEIGHT: 63 IN | BODY MASS INDEX: 30.12 KG/M2 | DIASTOLIC BLOOD PRESSURE: 86 MMHG | WEIGHT: 170 LBS | SYSTOLIC BLOOD PRESSURE: 124 MMHG

## 2022-04-04 DIAGNOSIS — D09.8 CARCINOMA IN SITU OF OTHER SPECIFIED SITES: ICD-10-CM

## 2022-04-04 DIAGNOSIS — E55.9 VITAMIN D DEFICIENCY: ICD-10-CM

## 2022-04-04 DIAGNOSIS — Z01.811 PRE-OP CHEST EXAM: ICD-10-CM

## 2022-04-04 DIAGNOSIS — R53.83 FATIGUE, UNSPECIFIED TYPE: ICD-10-CM

## 2022-04-04 DIAGNOSIS — Z91.81 AT HIGH RISK FOR FALLS: ICD-10-CM

## 2022-04-04 DIAGNOSIS — Z01.811 PRE-OP CHEST EXAM: Primary | ICD-10-CM

## 2022-04-04 DIAGNOSIS — K56.699: ICD-10-CM

## 2022-04-04 PROCEDURE — G8399 PT W/DXA RESULTS DOCUMENT: HCPCS | Performed by: PHYSICIAN ASSISTANT

## 2022-04-04 PROCEDURE — 1036F TOBACCO NON-USER: CPT | Performed by: PHYSICIAN ASSISTANT

## 2022-04-04 PROCEDURE — 1090F PRES/ABSN URINE INCON ASSESS: CPT | Performed by: PHYSICIAN ASSISTANT

## 2022-04-04 PROCEDURE — 1123F ACP DISCUSS/DSCN MKR DOCD: CPT | Performed by: PHYSICIAN ASSISTANT

## 2022-04-04 PROCEDURE — G8417 CALC BMI ABV UP PARAM F/U: HCPCS | Performed by: PHYSICIAN ASSISTANT

## 2022-04-04 PROCEDURE — G8427 DOCREV CUR MEDS BY ELIG CLIN: HCPCS | Performed by: PHYSICIAN ASSISTANT

## 2022-04-04 PROCEDURE — 4040F PNEUMOC VAC/ADMIN/RCVD: CPT | Performed by: PHYSICIAN ASSISTANT

## 2022-04-04 PROCEDURE — 3017F COLORECTAL CA SCREEN DOC REV: CPT | Performed by: PHYSICIAN ASSISTANT

## 2022-04-04 PROCEDURE — 93000 ELECTROCARDIOGRAM COMPLETE: CPT | Performed by: PHYSICIAN ASSISTANT

## 2022-04-04 PROCEDURE — 99214 OFFICE O/P EST MOD 30 MIN: CPT | Performed by: PHYSICIAN ASSISTANT

## 2022-04-04 RX ORDER — MIRTAZAPINE 15 MG/1
TABLET, FILM COATED ORAL
Qty: 90 TABLET | Refills: 1 | Status: SHIPPED | OUTPATIENT
Start: 2022-04-04 | End: 2022-09-30

## 2022-04-04 SDOH — ECONOMIC STABILITY: INCOME INSECURITY: IN THE LAST 12 MONTHS, WAS THERE A TIME WHEN YOU WERE NOT ABLE TO PAY THE MORTGAGE OR RENT ON TIME?: NO

## 2022-04-04 SDOH — ECONOMIC STABILITY: FOOD INSECURITY: WITHIN THE PAST 12 MONTHS, THE FOOD YOU BOUGHT JUST DIDN'T LAST AND YOU DIDN'T HAVE MONEY TO GET MORE.: NEVER TRUE

## 2022-04-04 SDOH — ECONOMIC STABILITY: FOOD INSECURITY: WITHIN THE PAST 12 MONTHS, YOU WORRIED THAT YOUR FOOD WOULD RUN OUT BEFORE YOU GOT MONEY TO BUY MORE.: NEVER TRUE

## 2022-04-04 SDOH — ECONOMIC STABILITY: HOUSING INSECURITY: IN THE LAST 12 MONTHS, HOW MANY PLACES HAVE YOU LIVED?: 1

## 2022-04-04 SDOH — ECONOMIC STABILITY: TRANSPORTATION INSECURITY
IN THE PAST 12 MONTHS, HAS LACK OF TRANSPORTATION KEPT YOU FROM MEETINGS, WORK, OR FROM GETTING THINGS NEEDED FOR DAILY LIVING?: NO

## 2022-04-04 SDOH — ECONOMIC STABILITY: HOUSING INSECURITY
IN THE LAST 12 MONTHS, WAS THERE A TIME WHEN YOU DID NOT HAVE A STEADY PLACE TO SLEEP OR SLEPT IN A SHELTER (INCLUDING NOW)?: NO

## 2022-04-04 ASSESSMENT — SOCIAL DETERMINANTS OF HEALTH (SDOH): HOW HARD IS IT FOR YOU TO PAY FOR THE VERY BASICS LIKE FOOD, HOUSING, MEDICAL CARE, AND HEATING?: NOT HARD AT ALL

## 2022-04-04 NOTE — TELEPHONE ENCOUNTER
Pt called from PCP's office - she is there currently getting her pre-op physical and wanted to know if Dr Juan Rogers needed certain lab work - I explained this is up to the [address] () relayed mess to pt

## 2022-04-04 NOTE — PROGRESS NOTES
Preoperative Consultation      Kavita Hernandez  YOB: 1955    Date of Service:  4/4/2022    Vitals:    04/04/22 1303   BP: 124/86   Site: Left Upper Arm   Position: Sitting   Cuff Size: Medium Adult   Pulse: 103   Temp: 98.2 °F (36.8 °C)   TempSrc: Oral   SpO2: 94%   Weight: 170 lb (77.1 kg)   Height: 5' 3\" (1.6 m)      Wt Readings from Last 2 Encounters:   04/04/22 170 lb (77.1 kg)   03/29/22 169 lb 6.4 oz (76.8 kg)     BP Readings from Last 3 Encounters:   04/04/22 124/86   03/29/22 120/88   01/20/22 109/76        Chief Complaint   Patient presents with    Discuss Medications    Pre-op Exam     DV vs Open L Colectomy - Dr. Richelle Alfaro - DEEP 4/27/2022     Allergies   Allergen Reactions    Infliximab      Severe drop in blood pressure    Ultrasound Gel Other (See Comments)     burn    Codeine Nausea And Vomiting    Penicillins Rash     Outpatient Medications Marked as Taking for the 4/4/22 encounter (Office Visit) with FRANCISCO Heaton   Medication Sig Dispense Refill    mirtazapine (REMERON) 15 MG tablet TAKE 1 TABLET BY MOUTH ONCE DAILY AT NIGHT 90 tablet 1    montelukast (SINGULAIR) 10 MG tablet TAKE 1 TABLET BY MOUTH ONCE DAILY NIGHTLY 90 tablet 0    pantoprazole (PROTONIX) 40 MG tablet TAKE 1 TABLET BY MOUTH TWICE DAILY BEFORE MEAL(S) 180 tablet 0    alendronate (FOSAMAX) 70 MG tablet Take 1 tablet by mouth once a week 12 tablet 0    spironolactone (ALDACTONE) 50 MG tablet Take 1 tablet by mouth once daily 90 tablet 0    sertraline (ZOLOFT) 100 MG tablet TAKE 1 & 1/2 (ONE & ONE-HALF) TABLETS BY MOUTH ONCE DAILY 135 tablet 0    valACYclovir (VALTREX) 500 MG tablet Take 1 tablet by mouth once daily 90 tablet 0    propranolol (INDERAL LA) 60 MG extended release capsule Take 1 capsule by mouth once daily 90 capsule 0    letrozole (FEMARA) 2.5 MG tablet TAKE 1 TABLET BY MOUTH ONCE DAILY      atorvastatin (LIPITOR) 20 MG tablet Take 1 tablet by mouth nightly TAKE 1 TABLET BY MOUTH EVERY DAY 90 tablet 1    ketorolac (ACULAR) 0.5 % ophthalmic solution Place 1 drop into the left eye 2 times daily       fluticasone-salmeterol (ADVAIR) 250-50 MCG/DOSE AEPB Inhale into the lungs 2 times daily      albuterol sulfate  (90 Base) MCG/ACT inhaler 2 puffs q 4 prn      diclofenac sodium 1 % GEL Apply topically Indications: [facility]  prescribes       difluprednate (DUREZOL) 0.05 % EMUL Apply 2 drops to eye 2 times daily BOTH EYES      gabapentin (NEURONTIN) 400 MG capsule 400 mg 4 times daily. Indications: Prescribed by [facility]    3    cyclobenzaprine (FLEXERIL) 10 MG tablet Take 10 mg by mouth 3 times daily as needed for Muscle spasms      methotrexate (RHEUMATREX) 2.5 MG chemo tablet Take 7.5 mg by mouth every 7 days       fluocinonide (LIDEX) 0.05 % cream Apply topically 2 times daily Apply topically 2 times daily.  promethazine (PHENERGAN) 12.5 MG tablet Take 25 mg by mouth every 6 hours as needed for Nausea Indications: Dr. Venessa Baltazar          This patient presents to the office today for a preoperative consultation at the request of surgeon, Dr. Herb Short, who plans on performing Robotic, Possible Open Left Colectomy  on April 27 at [address] [place] [address].  The current problem began 2 years ago, and symptoms have been worsening with time. Conservative therapy: Yes: oral medication, which has been not very effective. .    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, both eyes    Essential hypertension    Diverticulosis    Nausea & vomiting    S/P splenectomy    Osteopenia    Sarcoidosis    Vitamin D deficiency    Impaired fasting glucose    Cecal volvulus (HCC)    Insomnia    Hypercholesterolemia    Disturbance of skin sensation    Acute, but ill-defined, cerebrovascular disease    Sarcoid    Cerebral vasculitis    Lesion of right ulnar nerve    Major depressive disorder, recurrent episode, moderate (HCC)    Pain medication agreement signed    Trochanteric bursitis of left hip    Ankle instability    GI bleed    Colitis, acute    Anxiety    Congenital urethral stenosis    Urinary frequency    Right upper quadrant pain    Epigastric pain    Esophageal dysphagia    Colon, diverticulosis    Giant cell arteritis ([address]    Lower abdominal pain    Right upper quadrant abdominal pain    Sigmoid stricture ([address]    Right sided abdominal pain    Irritable bowel syndrome with constipation    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 breast of female, estrogen receptor positive ([address]       Past Medical History:   Diagnosis Date    Asthma     CAD (coronary artery disease)     Cancer ([address]     RIGHT BREAST    Chronic diarrhea     Dr. Severo Loser    Chronic kidney disease     kidney stones    Clostridium difficile diarrhea 10/23/13    positive by PCR    Fibromyalgia     Hemorrhoid     Hyperlipidemia     Hypertension     Kidney stone     Osteoarthritis     fibromyalgia    Sarcoidosis 2003    sees Dr. Violetta French     Past Surgical History:   Procedure Laterality Date    ABDOMINAL EXPLORATION SURGERY  8/19/12    REDUCTION OF VULVUS; RIGHT COLECTOMY; SMALL BOWEL RESECTION; INSERTION OF ON Q PAIN BUSTER    BREAST BIOPSY      CARDIAC CATHETERIZATION  2010    CLEAN    COLONOSCOPY  2010    normal    COLONOSCOPY N/A 6/16/2020    COLON W/ANES.  performed by Juanita Agosto MD at [facility]  12/2011    ESOPHAGEAL DILATATION  6/16/2020    ESOPHAGEAL DILATION Vinnie Dose performed by Juanita Agosto MD at [facility]  5/2009    cataract, right    HYSTERECTOMY  2000    LITHOTRIPSY  1/19/2012    LITHOTRIPSY      04/2012    MASTECTOMY Bilateral Transportation Needs    Lack of Transportation (Medical): No    Lack of Transportation (Non-Medical): No   Physical Activity:     Days of Exercise per Week: Not on file    Minutes of Exercise per Session: Not on file   Stress:     Feeling of Stress : Not on file   Social Connections:     Frequency of Communication with Friends and Family: Not on file    Frequency of Social Gatherings with Friends and Family: Not on file    Attends [employer] Services: Not on file    Active Member of [address] [place] [address] or Organizations: Not on file    Attends Club or Organization Meetings: Not on file    Marital Status: Not on file   Intimate Partner Violence:     Fear of Current or Ex-Partner: Not on file    Emotionally Abused: Not on file    Physically Abused: Not on file    Sexually Abused: Not on file   Housing Stability: [address] Unable to Pay for Housing in the Last Year: No    Number of [place] in the Last Year: 1    Unstable Housing in the Last Year: No       Review of Systems  A comprehensive review of systems was negative except for what was noted in the HPI. Physical Exam   Constitutional: She is oriented to person, place, and time. She appears well-developed and well-nourished. No distress. HENT:   Head: Normocephalic and atraumatic. Mouth/Throat: Uvula is midline, oropharynx is clear and moist and mucous membranes are normal.   Eyes: Conjunctivae and EOM are normal. Pupils are equal, round, and reactive to light. Neck: Trachea normal and normal range of motion. Neck supple. No JVD present. Carotid bruit is not present. No mass and no thyromegaly present. Cardiovascular: Normal rate, regular rhythm, normal heart sounds and intact distal pulses. Exam reveals no gallop and no friction rub. No murmur heard. Pulmonary/Chest: Effort normal and breath sounds normal. No respiratory distress. She has no wheezes. She has no rales. Abdominal: Soft.  Normal aorta and bowel sounds are normal. She exhibits no distension and no mass. There is no hepatosplenomegaly. No tenderness. Musculoskeletal: She exhibits no edema and no tenderness. Neurological: She is alert and oriented to person, place, and time. She has normal strength. No cranial nerve deficit or sensory deficit. Coordination and gait normal.   Skin: Skin is warm and dry. No rash noted. No erythema. Psychiatric: She has a normal mood and affect. Her behavior is normal.     EKG Interpretation:  Sinus  Rhythm -  Negative T-waves  -May be normal -possible Anteroseptal ischemia. BORDERLINE, unchanged from previous tracings. Lab Review   No visits with results within 6 Month(s) from this visit. Latest known visit with results is:   Office Visit on 05/13/2021   Component Date Value    SARS-CoV-2 05/13/2021 Not Detected            Assessment:       77 y.o. patient with planned surgery as above. Known risk factors for perioperative complications: Hypertension, sarcoidosis, breast cancer  Current medications which may produce withdrawal symptoms if withheld perioperatively: propranolol        Plan:     1. Preoperative workup as follows: ECG, hemoglobin, hematocrit, electrolytes, creatinine, glucose  2. Change in medication regimen before surgery: Take propranolol on morning of surgery with sip of water, and hold all other medications until after surgery  3.  Prophylaxis for cardiac events with perioperative beta-blockers: Currently taking  propranolol  ACC/AHA indications for pre-operative beta-blocker use:    · Vascular surgery with history of postitive stress test  · Intermediate or high risk surgery with history of CAD   · Intermediate or high risk surgery with multiple clinical predictors of CAD- 2 of the following: history of compensated or prior heart failure, history of cerebrovascular disease, DM, or renal insufficiency    Routine administration of higher-dose, long-acting metoprolol in beta-blocker-naïve patients on the day of surgery, and in the absence of dose titration is associated with an overall increase in mortality. Beta-blockers should be started days to weeks prior to surgery and titrated to pulse < 70.  4. Deep vein thrombosis prophylaxis: regimen to be chosen by surgical team  5. Unable to clear for surgery until patient repeats K level, patient has been notified. On the basis of positive falls risk screening, assessment and plan is as follows: home safety tips provided.

## 2022-04-05 LAB
A/G RATIO: 1.5 (ref 1.1–2.2)
ALBUMIN SERPL-MCNC: 4.4 G/DL (ref 3.4–5)
ALP BLD-CCNC: 100 U/L (ref 40–129)
ALT SERPL-CCNC: 23 U/L (ref 10–40)
ANION GAP SERPL CALCULATED.3IONS-SCNC: 16 MMOL/L (ref 3–16)
AST SERPL-CCNC: 24 U/L (ref 15–37)
BASOPHILS ABSOLUTE: 0.1 K/UL (ref 0–0.2)
BASOPHILS RELATIVE PERCENT: 1.1 %
BILIRUB SERPL-MCNC: <0.2 MG/DL (ref 0–1)
BUN BLDV-MCNC: 7 MG/DL (ref 7–20)
CALCIUM SERPL-MCNC: 10.3 MG/DL (ref 8.3–10.6)
CHLORIDE BLD-SCNC: 105 MMOL/L (ref 99–110)
CO2: 19 MMOL/L (ref 21–32)
CREAT SERPL-MCNC: 0.9 MG/DL (ref 0.6–1.2)
EOSINOPHILS ABSOLUTE: 0.4 K/UL (ref 0–0.6)
EOSINOPHILS RELATIVE PERCENT: 3.7 %
GFR AFRICAN AMERICAN: >60
GFR NON-AFRICAN AMERICAN: >60
GLUCOSE BLD-MCNC: 109 MG/DL (ref 70–99)
HCT VFR BLD CALC: 42.8 % (ref 36–48)
HEMOGLOBIN: 14.2 G/DL (ref 12–16)
LYMPHOCYTES ABSOLUTE: 3.8 K/UL (ref 1–5.1)
LYMPHOCYTES RELATIVE PERCENT: 37.3 %
MCH RBC QN AUTO: 32.6 PG (ref 26–34)
MCHC RBC AUTO-ENTMCNC: 33.2 G/DL (ref 31–36)
MCV RBC AUTO: 98.3 FL (ref 80–100)
MONOCYTES ABSOLUTE: 0.9 K/UL (ref 0–1.3)
MONOCYTES RELATIVE PERCENT: 8.6 %
NEUTROPHILS ABSOLUTE: 5 K/UL (ref 1.7–7.7)
NEUTROPHILS RELATIVE PERCENT: 49.3 %
PDW BLD-RTO: 18.1 % (ref 12.4–15.4)
PLATELET # BLD: 579 K/UL (ref 135–450)
PMV BLD AUTO: 7.4 FL (ref 5–10.5)
POTASSIUM SERPL-SCNC: 5.5 MMOL/L (ref 3.5–5.1)
RBC # BLD: 4.36 M/UL (ref 4–5.2)
SODIUM BLD-SCNC: 140 MMOL/L (ref 136–145)
TOTAL PROTEIN: 7.3 G/DL (ref 6.4–8.2)
TSH REFLEX: 1.79 UIU/ML (ref 0.27–4.2)
VITAMIN B-12: 346 PG/ML (ref 211–911)
VITAMIN D 25-HYDROXY: 12.7 NG/ML
WBC # BLD: 10.2 K/UL (ref 4–11)

## 2022-04-06 ENCOUNTER — HOSPITAL ENCOUNTER (OUTPATIENT)
Dept: CT IMAGING | Age: 67
Discharge: HOME | End: 2022-04-06
Payer: MEDICARE

## 2022-04-06 DIAGNOSIS — E87.5 HYPERKALEMIA: Primary | ICD-10-CM

## 2022-04-06 DIAGNOSIS — E55.9 VITAMIN D DEFICIENCY: ICD-10-CM

## 2022-04-06 DIAGNOSIS — D75.839 THROMBOCYTOSIS: ICD-10-CM

## 2022-04-06 DIAGNOSIS — K56.699: ICD-10-CM

## 2022-04-06 PROCEDURE — 74177 CT ABD & PELVIS W/CONTRAST: CPT

## 2022-04-06 PROCEDURE — 6360000004 HC RX CONTRAST MEDICATION: Performed by: SURGERY

## 2022-04-06 RX ORDER — ERGOCALCIFEROL 1.25 MG/1
50000 CAPSULE ORAL WEEKLY
Qty: 12 CAPSULE | Refills: 1 | Status: SHIPPED | OUTPATIENT
Start: 2022-04-06 | End: 2022-10-11

## 2022-04-06 RX ADMIN — IOPAMIDOL 75 ML: 755 INJECTION, SOLUTION INTRAVENOUS at 07:09

## 2022-04-12 ENCOUNTER — TELEPHONE (OUTPATIENT)
Dept: SURGERY | Age: 67
End: 2022-04-12

## 2022-04-13 NOTE — TELEPHONE ENCOUNTER
Called and spoke w/ pts  who wanted to know if wife will need another colonoscopy prior to her planned surgery scheduled 4/27/22  Will ask Dr Nayely Pradhan today and will call pt back

## 2022-04-13 NOTE — TELEPHONE ENCOUNTER
Called and spoke w/ pt - informed that Dr Adam Shah did not see any sign of a stricture on her CT and that he needed to discuss more w/ pt.    Will sched phone visit w/ Dr Adam Shah next [facility]

## 2022-04-19 ENCOUNTER — SCHEDULED TELEPHONE ENCOUNTER (OUTPATIENT)
Dept: SURGERY | Age: 67
End: 2022-04-19
Payer: MEDICARE

## 2022-04-19 DIAGNOSIS — K56.699: Primary | ICD-10-CM

## 2022-04-19 PROCEDURE — 99212 OFFICE O/P EST SF 10 MIN: CPT | Performed by: SURGERY

## 2022-04-19 NOTE — PATIENT INSTRUCTIONS
[address] [address]  Phone: [phone]  Fax: [phone] will be scheduled for surgery with Dr. Juan Rogers. · The office will call you with the date and time that surgery is scheduled. · Please take note of these instructions for surgery:  · You should have nothing by mouth after midnight the night before your surgery - this includes no food or water. · Your surgery will be cancelled if you have taken anything by mouth after midnight, NO exceptions. · You will need to have a history and physical prior to your surgery. This will need to be completed up to 30 days before your surgery. This H/P can be completed by your family doctor or the hospital.   · IF you take coumadin (warfarin), please stop taking this medication 5 days prior to your surgery. · IF you take plavix, please stop taking this 7 days prior to your surgery. · Please contact our office if you have a pacemaker or defibrillator. · IF you are allergic to latex, please tell our office prior to your surgery. This is important in know before scheduling your surgery. · IF you are having an out patient surgery, you will need someone available to drive you home after your surgery, and to also stay with you for the rest of the day. · IF you are having a surgery requiring an inpatient stay in the hospital, you will need someone to drive you home upon discharge from the hospital.  · Please contact Dr. Burr Cogan assistant Garth Go if you have any questions or concerns. · Please call the office with any changes in your symptoms or further questions/concerns.  [phone]

## 2022-04-19 NOTE — PROGRESS NOTES
2022    TELEHEALTH EVALUATION -- Audio/Visual (During MLWSX-03 public health emergency)    HPI:    Michael Buckley (:  1955) has requested an audio/video evaluation for the following concern(s):    She called to discuss her upcoming colon surgery for chronic left-sided stricture. Since last visit, she had a CT which did not identify a clear transition point to localize the stricture, nor did it show significant proximal colonic distention. However she continues to note loose/liquid stools only, and still w/ right-sided abdominal pains. Denies bloating, nausea. Had flex sig today to provide tattoo to the site of the stricture. Review of Systems    Prior to Visit Medications    Medication Sig Taking?  Authorizing Provider   vitamin D (ERGOCALCIFEROL) 1.25 MG (84087 UT) CAPS capsule Take 1 capsule by mouth once a week Yes FRANCISCO Pizarro   mirtazapine (REMERON) 15 MG tablet TAKE 1 TABLET BY MOUTH ONCE DAILY AT NIGHT Yes FRANCISCO Pizarro   montelukast (SINGULAIR) 10 MG tablet TAKE 1 TABLET BY MOUTH ONCE DAILY NIGHTLY Yes Dalila AcheVERITO - CNP   pantoprazole (PROTONIX) 40 MG tablet TAKE 1 TABLET BY MOUTH TWICE DAILY BEFORE MEAL(S) Yes Leannedia AcheVERITO CNP   alendronate (FOSAMAX) 70 MG tablet Take 1 tablet by mouth once a week Yes VERITO Jenkins CNP   spironolactone (ALDACTONE) 50 MG tablet Take 1 tablet by mouth once daily Yes VERITO Jenkins CNP   sertraline (ZOLOFT) 100 MG tablet TAKE 1 & 1/2 (ONE & ONE-HALF) TABLETS BY MOUTH ONCE DAILY Yes VERITO Jenkins CNP   valACYclovir (VALTREX) 500 MG tablet Take 1 tablet by mouth once daily Yes Leannediwilmar AcheVERITO CNP   propranolol (INDERAL LA) 60 MG extended release capsule Take 1 capsule by mouth once daily Yes Ashely Bobby MD   letrozole ([address] [facility]) 2.5 MG tablet TAKE 1 TABLET BY MOUTH ONCE DAILY Yes Historical Provider, MD   atorvastatin (LIPITOR) 20 MG tablet Take 1 tablet by mouth nightly TAKE 1 TABLET BY MOUTH EVERY DAY Yes John Péerz MD   ketorolac (ACULAR) 0.5 % ophthalmic solution Place 1 drop into the left eye 2 times daily  Yes Historical Provider, MD   fluticasone-salmeterol (ADVAIR) 250-50 MCG/DOSE AEPB Inhale into the lungs 2 times daily Yes Historical Provider, MD   albuterol sulfate  (90 Base) MCG/ACT inhaler 2 puffs q 4 prn Yes Historical Provider, MD   diclofenac sodium 1 % GEL Apply topically Indications: [facility]  prescribes  Yes Jevon Stockton MD   difluprednate (DUREZOL) 0.05 % EMUL Apply 2 drops to eye 2 times daily BOTH EYES Yes Historical Provider, MD   gabapentin (NEURONTIN) 400 MG capsule 400 mg 4 times daily. Indications: Prescribed by [facility] Dr. Greg Grigsby MD   cyclobenzaprine (FLEXERIL) 10 MG tablet Take 10 mg by mouth 3 times daily as needed for Muscle spasms Yes Historical Provider, MD   methotrexate (RHEUMATREX) 2.5 MG chemo tablet Take 7.5 mg by mouth every 7 days  Yes Jevon Stockton MD   fluocinonide (LIDEX) 0.05 % cream Apply topically 2 times daily Apply topically 2 times daily.  Yes Historical Provider, MD   promethazine (PHENERGAN) 12.5 MG tablet Take 25 mg by mouth every 6 hours as needed for Nausea Indications: Dr. Zulma Crump  Yes Historical Provider, MD       Social History     Tobacco Use    Smoking status: Former Smoker     Packs/day: 0.50     Years: 20.00     Pack years: 10.00     Types: Cigarettes     Start date: 3/1/2019     Quit date: 2020     Years since quittin.6    Smokeless tobacco: Never Used   Vaping Use    Vaping Use: Never used   Substance Use Topics    Alcohol use: No     Alcohol/week: 0.0 standard drinks    Drug use: No        Past Surgical History:   Procedure Laterality Date    ABDOMINAL EXPLORATION SURGERY  12    REDUCTION OF VULVUS; RIGHT COLECTOMY; SMALL BOWEL RESECTION; INSERTION OF ON Q PAIN BUSTER    BREAST BIOPSY      CARDIAC CATHETERIZATION      CLEAN    COLONOSCOPY      normal    COLONOSCOPY N/A 6/16/2020    COLON W/ANES. performed by Miracle Shields MD at [address]  12/2011    ESOPHAGEAL DILATATION  6/16/2020    ESOPHAGEAL DILATION [place] performed by Miracle Shields MD at [facility]  5/2009    cataract, right    HYSTERECTOMY  2000    LITHOTRIPSY  1/19/2012    LITHOTRIPSY      04/2012    MASTECTOMY Bilateral 5/18/2021    BILATERAL SIMPLE MASTECTOMY, RIGHT SENTINEL LYMPH NODE BIOPSY performed by Kassandra Cheung MD at [address]    right    PARTIAL HYSTERECTOMY     [address]    UPPER GASTROINTESTINAL ENDOSCOPY  2/27/13    UPPER GASTROINTESTINAL ENDOSCOPY  11/14/2017    gastritis    UPPER GASTROINTESTINAL ENDOSCOPY N/A 6/16/2020    EGD W/ANES. (11:00) performed by Miracle Shields MD at [facility]  12/2011    US BREAST NEEDLE BIOPSY RIGHT Right 4/15/2021    US BREAST NEEDLE BIOPSY RIGHT 4/15/2021 Alise Byrnes MD [address] [facility]              ASSESSMENT/PLAN:  Stricture of descending colon    We discussed the details of the surgical plan - ideally a minimally-invasive approach to identify the stricture site and perform resection/anastomosis. If anastomosis is not feasible, then she is prepared to have a temporary colostomy. If unable to accomplish a minimally-invasive approach, then converting to open may be necessary. Patient and  asked appropriate questions, appear to understand and agree to proceed. Return if symptoms worsen or fail to improve. Omar Beckham, was evaluated through a synchronous (real-time) audio-video encounter. The patient (or guardian if applicable) is aware that this is a billable service, which includes applicable co-pays. This Virtual Visit was conducted with patient's (and/or legal guardian's) consent.  The visit was conducted pursuant to the emergency declaration under the [facility], [facility] waiver authority and the [employer] General Act. Patient identification was verified, and a caregiver was present when appropriate. The patient was located at home in a state where the provider was licensed to provide care. Total time spent on this encounter: Not billed by time    --Vida Coronado MD on 4/19/2022 at 7:22 PM    An electronic signature was used to authenticate this note.

## 2022-04-21 ENCOUNTER — HOSPITAL ENCOUNTER (OUTPATIENT)
Dept: PREADMISSION TESTING | Age: 67
Discharge: HOME | End: 2022-04-25
Payer: MEDICARE

## 2022-04-21 DIAGNOSIS — E55.9 VITAMIN D DEFICIENCY: ICD-10-CM

## 2022-04-21 DIAGNOSIS — E87.5 HYPERKALEMIA: ICD-10-CM

## 2022-04-21 DIAGNOSIS — D75.839 THROMBOCYTOSIS: ICD-10-CM

## 2022-04-21 LAB
ABO/RH: NORMAL
ANION GAP SERPL CALCULATED.3IONS-SCNC: 12 MMOL/L (ref 3–16)
ANTIBODY SCREEN: NORMAL
BASOPHILS ABSOLUTE: 0.1 K/UL (ref 0–0.2)
BASOPHILS RELATIVE PERCENT: 0.8 %
BUN BLDV-MCNC: 5 MG/DL (ref 7–20)
CALCIUM SERPL-MCNC: 9.5 MG/DL (ref 8.3–10.6)
CHLORIDE BLD-SCNC: 104 MMOL/L (ref 99–110)
CO2: 23 MMOL/L (ref 21–32)
CREAT SERPL-MCNC: 0.8 MG/DL (ref 0.6–1.2)
EOSINOPHILS ABSOLUTE: 0.4 K/UL (ref 0–0.6)
EOSINOPHILS RELATIVE PERCENT: 3.8 %
GFR AFRICAN AMERICAN: >60
GFR NON-AFRICAN AMERICAN: >60
GLUCOSE BLD-MCNC: 95 MG/DL (ref 70–99)
HCT VFR BLD CALC: 42.4 % (ref 36–48)
HEMOGLOBIN: 13.8 G/DL (ref 12–16)
LYMPHOCYTES ABSOLUTE: 4.3 K/UL (ref 1–5.1)
LYMPHOCYTES RELATIVE PERCENT: 44.8 %
MCH RBC QN AUTO: 32.1 PG (ref 26–34)
MCHC RBC AUTO-ENTMCNC: 32.6 G/DL (ref 31–36)
MCV RBC AUTO: 98.5 FL (ref 80–100)
MONOCYTES ABSOLUTE: 0.8 K/UL (ref 0–1.3)
MONOCYTES RELATIVE PERCENT: 8.9 %
NEUTROPHILS ABSOLUTE: 4 K/UL (ref 1.7–7.7)
NEUTROPHILS RELATIVE PERCENT: 41.7 %
PDW BLD-RTO: 18.7 % (ref 12.4–15.4)
PLATELET # BLD: 571 K/UL (ref 135–450)
PMV BLD AUTO: 7.6 FL (ref 5–10.5)
POTASSIUM SERPL-SCNC: 5 MMOL/L (ref 3.5–5.1)
RBC # BLD: 4.31 M/UL (ref 4–5.2)
SODIUM BLD-SCNC: 139 MMOL/L (ref 136–145)
VITAMIN D 25-HYDROXY: 19.9 NG/ML
WBC # BLD: 9.5 K/UL (ref 4–11)

## 2022-04-21 PROCEDURE — 80048 BASIC METABOLIC PNL TOTAL CA: CPT

## 2022-04-21 PROCEDURE — 82306 VITAMIN D 25 HYDROXY: CPT

## 2022-04-21 PROCEDURE — 36415 COLL VENOUS BLD VENIPUNCTURE: CPT

## 2022-04-21 PROCEDURE — 86901 BLOOD TYPING SEROLOGIC RH(D): CPT

## 2022-04-21 PROCEDURE — 86900 BLOOD TYPING SEROLOGIC ABO: CPT

## 2022-04-21 PROCEDURE — 85025 COMPLETE CBC W/AUTO DIFF WBC: CPT

## 2022-04-21 PROCEDURE — 86850 RBC ANTIBODY SCREEN: CPT

## 2022-04-21 RX ORDER — HYDROCODONE BITARTRATE AND ACETAMINOPHEN 5; 300 MG/1; MG/1
1 TABLET ORAL EVERY 4 HOURS PRN
Status: ON HOLD | COMMUNITY
End: 2022-05-02 | Stop reason: HOSPADM

## 2022-04-21 RX ORDER — PREDNISONE 1 MG/1
5 TABLET ORAL DAILY
Status: ON HOLD | COMMUNITY
End: 2022-05-02 | Stop reason: HOSPADM

## 2022-04-21 RX ORDER — TRAZODONE HYDROCHLORIDE 100 MG/1
100 TABLET ORAL NIGHTLY
COMMUNITY

## 2022-04-21 RX ORDER — CYANOCOBALAMIN (VITAMIN B-12) 500 MCG
1 TABLET ORAL DAILY
COMMUNITY

## 2022-04-21 RX ORDER — ALPRAZOLAM 1 MG/1
1 TABLET ORAL 2 TIMES DAILY PRN
COMMUNITY
End: 2022-05-10

## 2022-04-21 RX ORDER — TRAMADOL HYDROCHLORIDE 50 MG/1
50 TABLET ORAL 2 TIMES DAILY PRN
COMMUNITY

## 2022-04-21 NOTE — PROGRESS NOTES
1. Do not eat or drink anything after 12 midnight prior to surgery. This includes no water, chewing gum mints, or ice chips. You may brush your teeth and gargle the day of surgery but DO NOT SWALLOW THE WATER. 2. Please see your family doctor/pediatrician for a history and physical and/or concerning medications. Bring any test results/reports from your physician's office. If you are under the care of a heart doctor or specialist please be aware that you may be asked to see him or her for clearance. 3. You may be asked to stop blood thinners such as Coumadin, Plavix, Fragmin, and Lovenox or Anti-inflammatories such as Aspirin, Ibuprofen, Advil, and Naproxen prior to your surgery. Please check with your doctor before stopping these or any other medications. 4. Do not smoke, and do not drink any alcoholic beverages 24 hours prior to surgery. 5. You MUST make arrangements for a responsible adult to take you home after your surgery. For your safety, you will not be allowed to leave alone or drive yourself home. Your surgery will be cancelled if you do not have a ride home. Also for your safety, it is strongly suggested someone stay with you the first 24 hrs after your surgery. 6. A parent/legal guardian must accompany a child scheduled for surgery and plan to stay at the hospital until the child is discharged. Please do not bring other children with you. 7. For your comfort,please wear simple, loose fitting clothing to the hospital.  Please do not bring valuables (money, credit cards, checkbooks, etc.) Do not wear any makeup (including no eye makeup) or nail polish on your fingers or toes. 8. For your safety, please DO NOT wear any jewelry or piercings on day of surgery. All body piercing jewelry must be removed. 9. If you have dentures, they will be removed before going to the OR; for your convenience we will provide you with a container.   If you wear contact lenses or glasses, they will be removed, they will be removed, please bring a case for them. 10. If appicable,Please see your family doctor/pediatrician for a history & physical and/or concerning medications. Bring any test results/reports from your physician's office. 11. Remember to bring Blood Bank bracelet to the hospital on the day of surgery. 12. If you have a Living Will and Durable Power of  for Healthcare, please bring in a copy. 15. Notify your Surgeon if you develop any illness between now and surgery  time, cough, cold, fever, sore throat, nausea, vomiting, etc.  Please notify your surgeon if you experience dizziness, shortness of breath or blurred vision between now & the time of your surgery   14. DO NOT shave your operative site 96 hours prior to surgery. For face & neck surgery, men may use an electric razor 48 hours prior to surgery. 15. Shower the night before surgery with _x__Antibacterial soap _x__Hibiclens   16. To provide excellent care visitors will be limited to one in the room at any given time. 17.  Please bring picture ID and insurance card. 18.  Visit our web site for additional information:  [facility]/surgery.        Instructed to take Propranolol DOS per anesthesia request  Clear liquids only 4/26/22 and follow bowel preop instructions

## 2022-04-21 NOTE — PROGRESS NOTES
Colon SSI Bundle - Pre-Admission Testing Pathway Completed:    Smoking Cessation Education - N/A    Chlorhexidine scrub/wash Education    Mobility instructions for DOS/first post-op day    Blood Glucose Monitoring Education    Initiate Lab Orders for preop

## 2022-04-21 NOTE — CONSULTS
Consult for Colostomy marking  Consult from Dr Colletta Clap for chronic left-sided colon stricture. Scheduled for  minimally-invasive colon resection with possible open and  temporary colostomy. Surgery scheduled for 4/27/22. Met with patient and spouse Christinacallie Mckenna). Reviewed and instructed on basic GI anatomy and physiology. Reviewed pouching system and basic maintenance of colostomy. Patient and spouse demonstrated understanding by opening and closing pouch. \"Understanding you Colostomy\" and booklet given as well as DVD regarding colostomy care. Abdomin assessed supine, sitting, bending and standing. Large creases noted at level of umbilicus. Patient reports abdomin is currently tender and she has pain on right lower quadrant. Her stools have been liquid and she has not been able to eat much lately. Abdomin obese. Skin folds at level of umbilicus and lower abdomin near groins. Rectus muscle identified by having patient cough and slight sit up when lying flat. Patient wears pants mid abdomin at level of umbilicus. Two ostomy marks placed by permanent marker on left lower and mid quadrant. These sites were examined thoroughly for placement of colostomy bag and wafer and will not interfere with clothing. Patient is able to see marks on abdomin. Permanent marker left with patient and instructed to not wash these areas off. Questions answered. Verbalized understanding of today's session. Plan to follow post op if new colostomy created.     Sanjay Ibrahim RN, MSN, CCRN CWOCN

## 2022-04-22 ENCOUNTER — ANESTHESIA EVENT (OUTPATIENT)
Dept: OPERATING ROOM | Age: 67
DRG: 329 | End: 2022-04-22
Payer: MEDICARE

## 2022-04-27 ENCOUNTER — APPOINTMENT (OUTPATIENT)
Dept: GENERAL RADIOLOGY | Age: 67
DRG: 329 | End: 2022-04-27
Attending: SURGERY
Payer: MEDICARE

## 2022-04-27 ENCOUNTER — ANESTHESIA (OUTPATIENT)
Dept: OPERATING ROOM | Age: 67
DRG: 329 | End: 2022-04-27
Payer: MEDICARE

## 2022-04-27 ENCOUNTER — HOSPITAL ENCOUNTER (INPATIENT)
Age: 67
LOS: 5 days | Discharge: HOME | DRG: 329 | End: 2022-05-02
Attending: SURGERY | Admitting: SURGERY
Payer: MEDICARE

## 2022-04-27 VITALS
RESPIRATION RATE: 15 BRPM | TEMPERATURE: 99 F | SYSTOLIC BLOOD PRESSURE: 108 MMHG | OXYGEN SATURATION: 95 % | DIASTOLIC BLOOD PRESSURE: 65 MMHG

## 2022-04-27 DIAGNOSIS — G89.18 POSTOPERATIVE PAIN: Primary | ICD-10-CM

## 2022-04-27 DIAGNOSIS — K56.699 COLONIC STRICTURE (HCC): ICD-10-CM

## 2022-04-27 PROBLEM — Z90.49 S/P LAPAROSCOPIC-ASSISTED SIGMOIDECTOMY: Status: ACTIVE | Noted: 2022-04-27

## 2022-04-27 LAB
ABO/RH: NORMAL
ANION GAP SERPL CALCULATED.3IONS-SCNC: 15 MMOL/L (ref 3–16)
ANTIBODY SCREEN: NORMAL
BUN BLDV-MCNC: 13 MG/DL (ref 7–20)
CALCIUM SERPL-MCNC: 10.3 MG/DL (ref 8.3–10.6)
CHLORIDE BLD-SCNC: 102 MMOL/L (ref 99–110)
CO2: 20 MMOL/L (ref 21–32)
CREAT SERPL-MCNC: 1.3 MG/DL (ref 0.6–1.2)
GFR AFRICAN AMERICAN: 49
GFR NON-AFRICAN AMERICAN: 41
GLUCOSE BLD-MCNC: 114 MG/DL (ref 70–99)
GLUCOSE BLD-MCNC: 119 MG/DL (ref 70–99)
GLUCOSE BLD-MCNC: 132 MG/DL (ref 70–99)
GLUCOSE BLD-MCNC: 133 MG/DL (ref 70–99)
GLUCOSE BLD-MCNC: 141 MG/DL (ref 70–99)
GLUCOSE BLD-MCNC: 152 MG/DL (ref 70–99)
GLUCOSE BLD-MCNC: 152 MG/DL (ref 70–99)
GLUCOSE BLD-MCNC: 157 MG/DL (ref 70–99)
PERFORMED ON: ABNORMAL
POTASSIUM REFLEX MAGNESIUM: 4 MMOL/L (ref 3.5–5.1)
SODIUM BLD-SCNC: 137 MMOL/L (ref 136–145)

## 2022-04-27 PROCEDURE — 7100000001 HC PACU RECOVERY - ADDTL 15 MIN: Performed by: SURGERY

## 2022-04-27 PROCEDURE — 74018 RADEX ABDOMEN 1 VIEW: CPT

## 2022-04-27 PROCEDURE — 2720000010 HC SURG SUPPLY STERILE: Performed by: SURGERY

## 2022-04-27 PROCEDURE — 6360000002 HC RX W HCPCS: Performed by: NURSE ANESTHETIST, CERTIFIED REGISTERED

## 2022-04-27 PROCEDURE — 3600000019 HC SURGERY ROBOT ADDTL 15MIN: Performed by: SURGERY

## 2022-04-27 PROCEDURE — 2500000003 HC RX 250 WO HCPCS: Performed by: NURSE ANESTHETIST, CERTIFIED REGISTERED

## 2022-04-27 PROCEDURE — 0D1N4ZP BYPASS SIGMOID COLON TO RECTUM, PERCUTANEOUS ENDOSCOPIC APPROACH: ICD-10-PCS | Performed by: SURGERY

## 2022-04-27 PROCEDURE — 44207 L COLECTOMY/COLOPROCTOSTOMY: CPT | Performed by: SURGERY

## 2022-04-27 PROCEDURE — C1758 CATHETER, URETERAL: HCPCS | Performed by: SURGERY

## 2022-04-27 PROCEDURE — 2500000003 HC RX 250 WO HCPCS: Performed by: SURGERY

## 2022-04-27 PROCEDURE — S2900 ROBOTIC SURGICAL SYSTEM: HCPCS | Performed by: SURGERY

## 2022-04-27 PROCEDURE — 6370000000 HC RX 637 (ALT 250 FOR IP): Performed by: SURGERY

## 2022-04-27 PROCEDURE — 2580000003 HC RX 258: Performed by: NURSE ANESTHETIST, CERTIFIED REGISTERED

## 2022-04-27 PROCEDURE — 86901 BLOOD TYPING SEROLOGIC RH(D): CPT

## 2022-04-27 PROCEDURE — 88307 TISSUE EXAM BY PATHOLOGIST: CPT

## 2022-04-27 PROCEDURE — 6360000002 HC RX W HCPCS: Performed by: SURGERY

## 2022-04-27 PROCEDURE — 2500000003 HC RX 250 WO HCPCS: Performed by: ANESTHESIOLOGY

## 2022-04-27 PROCEDURE — 6370000000 HC RX 637 (ALT 250 FOR IP): Performed by: NURSE PRACTITIONER

## 2022-04-27 PROCEDURE — 2580000003 HC RX 258: Performed by: SURGERY

## 2022-04-27 PROCEDURE — 3700000000 HC ANESTHESIA ATTENDED CARE: Performed by: SURGERY

## 2022-04-27 PROCEDURE — 1200000000 HC SEMI PRIVATE

## 2022-04-27 PROCEDURE — 6370000000 HC RX 637 (ALT 250 FOR IP): Performed by: ANESTHESIOLOGY

## 2022-04-27 PROCEDURE — 3700000001 HC ADD 15 MINUTES (ANESTHESIA): Performed by: SURGERY

## 2022-04-27 PROCEDURE — 3209999900 FLUORO FOR SURGICAL PROCEDURES

## 2022-04-27 PROCEDURE — 8E0W4CZ ROBOTIC ASSISTED PROCEDURE OF TRUNK REGION, PERCUTANEOUS ENDOSCOPIC APPROACH: ICD-10-PCS | Performed by: SURGERY

## 2022-04-27 PROCEDURE — 86850 RBC ANTIBODY SCREEN: CPT

## 2022-04-27 PROCEDURE — 3600000009 HC SURGERY ROBOT BASE: Performed by: SURGERY

## 2022-04-27 PROCEDURE — 80048 BASIC METABOLIC PNL TOTAL CA: CPT

## 2022-04-27 PROCEDURE — C1769 GUIDE WIRE: HCPCS | Performed by: SURGERY

## 2022-04-27 PROCEDURE — 86900 BLOOD TYPING SEROLOGIC ABO: CPT

## 2022-04-27 PROCEDURE — 36415 COLL VENOUS BLD VENIPUNCTURE: CPT

## 2022-04-27 PROCEDURE — 6360000004 HC RX CONTRAST MEDICATION: Performed by: SURGERY

## 2022-04-27 PROCEDURE — 2709999900 HC NON-CHARGEABLE SUPPLY: Performed by: SURGERY

## 2022-04-27 PROCEDURE — BT141ZZ FLUOROSCOPY OF KIDNEYS, URETERS AND BLADDER USING LOW OSMOLAR CONTRAST: ICD-10-PCS | Performed by: SURGERY

## 2022-04-27 PROCEDURE — 7100000000 HC PACU RECOVERY - FIRST 15 MIN: Performed by: SURGERY

## 2022-04-27 PROCEDURE — 6370000000 HC RX 637 (ALT 250 FOR IP): Performed by: NURSE ANESTHETIST, CERTIFIED REGISTERED

## 2022-04-27 PROCEDURE — 6360000002 HC RX W HCPCS: Performed by: ANESTHESIOLOGY

## 2022-04-27 PROCEDURE — 0DTN4ZZ RESECTION OF SIGMOID COLON, PERCUTANEOUS ENDOSCOPIC APPROACH: ICD-10-PCS | Performed by: SURGERY

## 2022-04-27 RX ORDER — SODIUM CHLORIDE, SODIUM LACTATE, POTASSIUM CHLORIDE, CALCIUM CHLORIDE 600; 310; 30; 20 MG/100ML; MG/100ML; MG/100ML; MG/100ML
INJECTION, SOLUTION INTRAVENOUS CONTINUOUS PRN
Status: DISCONTINUED | OUTPATIENT
Start: 2022-04-27 | End: 2022-04-27 | Stop reason: SDUPTHER

## 2022-04-27 RX ORDER — GABAPENTIN 400 MG/1
400 CAPSULE ORAL 4 TIMES DAILY
Status: DISCONTINUED | OUTPATIENT
Start: 2022-04-27 | End: 2022-05-02 | Stop reason: HOSPADM

## 2022-04-27 RX ORDER — SODIUM CHLORIDE 0.9 % (FLUSH) 0.9 %
5-40 SYRINGE (ML) INJECTION EVERY 12 HOURS SCHEDULED
Status: DISCONTINUED | OUTPATIENT
Start: 2022-04-27 | End: 2022-05-02 | Stop reason: HOSPADM

## 2022-04-27 RX ORDER — SODIUM CHLORIDE 9 MG/ML
INJECTION, SOLUTION INTRAVENOUS PRN
Status: DISCONTINUED | OUTPATIENT
Start: 2022-04-27 | End: 2022-05-02 | Stop reason: HOSPADM

## 2022-04-27 RX ORDER — PROPRANOLOL HCL 60 MG
60 CAPSULE, EXTENDED RELEASE 24HR ORAL DAILY
Status: DISCONTINUED | OUTPATIENT
Start: 2022-04-28 | End: 2022-05-02 | Stop reason: HOSPADM

## 2022-04-27 RX ORDER — INDOCYANINE GREEN AND WATER 25 MG
KIT INJECTION PRN
Status: DISCONTINUED | OUTPATIENT
Start: 2022-04-27 | End: 2022-04-27 | Stop reason: SDUPTHER

## 2022-04-27 RX ORDER — KETOROLAC TROMETHAMINE 5 MG/ML
1 SOLUTION OPHTHALMIC 2 TIMES DAILY
Status: DISCONTINUED | OUTPATIENT
Start: 2022-04-27 | End: 2022-04-29

## 2022-04-27 RX ORDER — ONDANSETRON 2 MG/ML
4 INJECTION INTRAMUSCULAR; INTRAVENOUS EVERY 6 HOURS PRN
Status: DISCONTINUED | OUTPATIENT
Start: 2022-04-27 | End: 2022-05-02 | Stop reason: HOSPADM

## 2022-04-27 RX ORDER — SODIUM CHLORIDE 0.9 % (FLUSH) 0.9 %
5-40 SYRINGE (ML) INJECTION EVERY 12 HOURS SCHEDULED
Status: DISCONTINUED | OUTPATIENT
Start: 2022-04-27 | End: 2022-04-27 | Stop reason: HOSPADM

## 2022-04-27 RX ORDER — ENOXAPARIN SODIUM 100 MG/ML
40 INJECTION SUBCUTANEOUS ONCE
Status: COMPLETED | OUTPATIENT
Start: 2022-04-27 | End: 2022-04-27

## 2022-04-27 RX ORDER — OXYCODONE HYDROCHLORIDE 5 MG/1
10 TABLET ORAL PRN
Status: COMPLETED | OUTPATIENT
Start: 2022-04-27 | End: 2022-04-27

## 2022-04-27 RX ORDER — ACETAMINOPHEN 325 MG/1
650 TABLET ORAL EVERY 4 HOURS PRN
Status: DISCONTINUED | OUTPATIENT
Start: 2022-04-27 | End: 2022-05-02 | Stop reason: HOSPADM

## 2022-04-27 RX ORDER — LETROZOLE 2.5 MG/1
2.5 TABLET, FILM COATED ORAL DAILY
Status: DISCONTINUED | OUTPATIENT
Start: 2022-04-28 | End: 2022-05-02 | Stop reason: HOSPADM

## 2022-04-27 RX ORDER — PROPOFOL 10 MG/ML
INJECTION, EMULSION INTRAVENOUS PRN
Status: DISCONTINUED | OUTPATIENT
Start: 2022-04-27 | End: 2022-04-27 | Stop reason: SDUPTHER

## 2022-04-27 RX ORDER — SODIUM CHLORIDE 0.9 % (FLUSH) 0.9 %
5-40 SYRINGE (ML) INJECTION PRN
Status: DISCONTINUED | OUTPATIENT
Start: 2022-04-27 | End: 2022-04-27 | Stop reason: HOSPADM

## 2022-04-27 RX ORDER — HYDROMORPHONE HCL 110MG/55ML
0.5 PATIENT CONTROLLED ANALGESIA SYRINGE INTRAVENOUS
Status: DISCONTINUED | OUTPATIENT
Start: 2022-04-27 | End: 2022-05-02 | Stop reason: HOSPADM

## 2022-04-27 RX ORDER — SODIUM CHLORIDE 0.9 % (FLUSH) 0.9 %
5-40 SYRINGE (ML) INJECTION PRN
Status: DISCONTINUED | OUTPATIENT
Start: 2022-04-27 | End: 2022-05-02 | Stop reason: HOSPADM

## 2022-04-27 RX ORDER — FAMOTIDINE 10 MG/ML
20 INJECTION, SOLUTION INTRAVENOUS ONCE
Status: COMPLETED | OUTPATIENT
Start: 2022-04-27 | End: 2022-04-27

## 2022-04-27 RX ORDER — MEPERIDINE HYDROCHLORIDE 50 MG/ML
12.5 INJECTION INTRAMUSCULAR; INTRAVENOUS; SUBCUTANEOUS EVERY 5 MIN PRN
Status: DISCONTINUED | OUTPATIENT
Start: 2022-04-27 | End: 2022-04-27 | Stop reason: HOSPADM

## 2022-04-27 RX ORDER — DEXAMETHASONE SODIUM PHOSPHATE 4 MG/ML
INJECTION, SOLUTION INTRA-ARTICULAR; INTRALESIONAL; INTRAMUSCULAR; INTRAVENOUS; SOFT TISSUE PRN
Status: DISCONTINUED | OUTPATIENT
Start: 2022-04-27 | End: 2022-04-27 | Stop reason: SDUPTHER

## 2022-04-27 RX ORDER — ONDANSETRON 4 MG/1
4 TABLET, ORALLY DISINTEGRATING ORAL EVERY 8 HOURS PRN
Status: DISCONTINUED | OUTPATIENT
Start: 2022-04-27 | End: 2022-05-02 | Stop reason: HOSPADM

## 2022-04-27 RX ORDER — SODIUM CHLORIDE 9 MG/ML
INJECTION, SOLUTION INTRAVENOUS PRN
Status: DISCONTINUED | OUTPATIENT
Start: 2022-04-27 | End: 2022-04-27 | Stop reason: HOSPADM

## 2022-04-27 RX ORDER — FENTANYL CITRATE 50 UG/ML
INJECTION, SOLUTION INTRAMUSCULAR; INTRAVENOUS PRN
Status: DISCONTINUED | OUTPATIENT
Start: 2022-04-27 | End: 2022-04-27 | Stop reason: SDUPTHER

## 2022-04-27 RX ORDER — BUPIVACAINE HYDROCHLORIDE 5 MG/ML
INJECTION, SOLUTION EPIDURAL; INTRACAUDAL PRN
Status: DISCONTINUED | OUTPATIENT
Start: 2022-04-27 | End: 2022-04-27 | Stop reason: HOSPADM

## 2022-04-27 RX ORDER — SODIUM CHLORIDE 9 MG/ML
25 INJECTION, SOLUTION INTRAVENOUS PRN
Status: DISCONTINUED | OUTPATIENT
Start: 2022-04-27 | End: 2022-04-27 | Stop reason: HOSPADM

## 2022-04-27 RX ORDER — DIFLUPREDNATE 0.5 MG/ML
2 EMULSION OPHTHALMIC 2 TIMES DAILY
Status: DISCONTINUED | OUTPATIENT
Start: 2022-04-27 | End: 2022-04-29

## 2022-04-27 RX ORDER — ENOXAPARIN SODIUM 100 MG/ML
40 INJECTION SUBCUTANEOUS DAILY
Status: DISCONTINUED | OUTPATIENT
Start: 2022-04-28 | End: 2022-05-02 | Stop reason: HOSPADM

## 2022-04-27 RX ORDER — SODIUM CHLORIDE 9 MG/ML
INJECTION, SOLUTION INTRAVENOUS CONTINUOUS
Status: DISCONTINUED | OUTPATIENT
Start: 2022-04-27 | End: 2022-05-01

## 2022-04-27 RX ORDER — PHENYLEPHRINE HCL IN 0.9% NACL 1 MG/10 ML
SYRINGE (ML) INTRAVENOUS PRN
Status: DISCONTINUED | OUTPATIENT
Start: 2022-04-27 | End: 2022-04-27 | Stop reason: SDUPTHER

## 2022-04-27 RX ORDER — OXYCODONE HYDROCHLORIDE 5 MG/1
5 TABLET ORAL PRN
Status: COMPLETED | OUTPATIENT
Start: 2022-04-27 | End: 2022-04-27

## 2022-04-27 RX ORDER — MIDAZOLAM HYDROCHLORIDE 1 MG/ML
INJECTION INTRAMUSCULAR; INTRAVENOUS PRN
Status: DISCONTINUED | OUTPATIENT
Start: 2022-04-27 | End: 2022-04-27 | Stop reason: SDUPTHER

## 2022-04-27 RX ORDER — PANTOPRAZOLE SODIUM 40 MG/1
40 TABLET, DELAYED RELEASE ORAL
Status: DISCONTINUED | OUTPATIENT
Start: 2022-04-28 | End: 2022-05-02 | Stop reason: HOSPADM

## 2022-04-27 RX ORDER — MIRTAZAPINE 15 MG/1
15 TABLET, FILM COATED ORAL NIGHTLY
Status: DISCONTINUED | OUTPATIENT
Start: 2022-04-27 | End: 2022-05-02 | Stop reason: HOSPADM

## 2022-04-27 RX ORDER — SODIUM CHLORIDE, SODIUM LACTATE, POTASSIUM CHLORIDE, CALCIUM CHLORIDE 600; 310; 30; 20 MG/100ML; MG/100ML; MG/100ML; MG/100ML
INJECTION, SOLUTION INTRAVENOUS CONTINUOUS
Status: DISCONTINUED | OUTPATIENT
Start: 2022-04-27 | End: 2022-04-27 | Stop reason: HOSPADM

## 2022-04-27 RX ORDER — ALBUTEROL SULFATE 90 UG/1
2 AEROSOL, METERED RESPIRATORY (INHALATION) EVERY 6 HOURS PRN
Status: DISCONTINUED | OUTPATIENT
Start: 2022-04-27 | End: 2022-04-28

## 2022-04-27 RX ORDER — ONDANSETRON 2 MG/ML
INJECTION INTRAMUSCULAR; INTRAVENOUS PRN
Status: DISCONTINUED | OUTPATIENT
Start: 2022-04-27 | End: 2022-04-27 | Stop reason: SDUPTHER

## 2022-04-27 RX ORDER — ATORVASTATIN CALCIUM 10 MG/1
20 TABLET, FILM COATED ORAL NIGHTLY
Status: DISCONTINUED | OUTPATIENT
Start: 2022-04-27 | End: 2022-05-02 | Stop reason: HOSPADM

## 2022-04-27 RX ORDER — ALPRAZOLAM 1 MG/1
1 TABLET ORAL 2 TIMES DAILY PRN
Status: DISCONTINUED | OUTPATIENT
Start: 2022-04-27 | End: 2022-05-02 | Stop reason: HOSPADM

## 2022-04-27 RX ORDER — SODIUM CHLORIDE, SODIUM LACTATE, POTASSIUM CHLORIDE, AND CALCIUM CHLORIDE .6; .31; .03; .02 G/100ML; G/100ML; G/100ML; G/100ML
IRRIGANT IRRIGATION PRN
Status: DISCONTINUED | OUTPATIENT
Start: 2022-04-27 | End: 2022-04-27 | Stop reason: HOSPADM

## 2022-04-27 RX ORDER — ONDANSETRON 2 MG/ML
4 INJECTION INTRAMUSCULAR; INTRAVENOUS
Status: DISCONTINUED | OUTPATIENT
Start: 2022-04-27 | End: 2022-04-27 | Stop reason: HOSPADM

## 2022-04-27 RX ORDER — ROCURONIUM BROMIDE 10 MG/ML
INJECTION, SOLUTION INTRAVENOUS PRN
Status: DISCONTINUED | OUTPATIENT
Start: 2022-04-27 | End: 2022-04-27 | Stop reason: SDUPTHER

## 2022-04-27 RX ORDER — TRAMADOL HYDROCHLORIDE 50 MG/1
50 TABLET ORAL 2 TIMES DAILY PRN
Status: DISCONTINUED | OUTPATIENT
Start: 2022-04-27 | End: 2022-05-02 | Stop reason: HOSPADM

## 2022-04-27 RX ORDER — HYDROMORPHONE HCL 110MG/55ML
1 PATIENT CONTROLLED ANALGESIA SYRINGE INTRAVENOUS
Status: DISCONTINUED | OUTPATIENT
Start: 2022-04-27 | End: 2022-05-02 | Stop reason: HOSPADM

## 2022-04-27 RX ORDER — MONTELUKAST SODIUM 10 MG/1
10 TABLET ORAL NIGHTLY
Status: DISCONTINUED | OUTPATIENT
Start: 2022-04-27 | End: 2022-05-02 | Stop reason: HOSPADM

## 2022-04-27 RX ORDER — METRONIDAZOLE 500 MG/100ML
500 INJECTION, SOLUTION INTRAVENOUS
Status: COMPLETED | OUTPATIENT
Start: 2022-04-27 | End: 2022-04-27

## 2022-04-27 RX ADMIN — INDOCYANINE GREEN AND WATER 5 MG: KIT at 15:34

## 2022-04-27 RX ADMIN — FENTANYL CITRATE 50 MCG: 50 INJECTION, SOLUTION INTRAMUSCULAR; INTRAVENOUS at 14:29

## 2022-04-27 RX ADMIN — MIRTAZAPINE 15 MG: 15 TABLET, FILM COATED ORAL at 22:36

## 2022-04-27 RX ADMIN — FENTANYL CITRATE 50 MCG: 50 INJECTION, SOLUTION INTRAMUSCULAR; INTRAVENOUS at 12:34

## 2022-04-27 RX ADMIN — ONDANSETRON 4 MG: 2 INJECTION INTRAMUSCULAR; INTRAVENOUS at 11:22

## 2022-04-27 RX ADMIN — ENOXAPARIN SODIUM 40 MG: 40 INJECTION SUBCUTANEOUS at 10:21

## 2022-04-27 RX ADMIN — DEXAMETHASONE SODIUM PHOSPHATE 4 MG: 4 INJECTION, SOLUTION INTRAMUSCULAR; INTRAVENOUS at 11:22

## 2022-04-27 RX ADMIN — MONTELUKAST SODIUM 10 MG: 10 TABLET ORAL at 22:34

## 2022-04-27 RX ADMIN — HYDROMORPHONE HYDROCHLORIDE 0.5 MG: 1 INJECTION, SOLUTION INTRAMUSCULAR; INTRAVENOUS; SUBCUTANEOUS at 18:30

## 2022-04-27 RX ADMIN — Medication 100 MCG: at 11:00

## 2022-04-27 RX ADMIN — ROCURONIUM BROMIDE 10 MG: 10 SOLUTION INTRAVENOUS at 15:01

## 2022-04-27 RX ADMIN — ATORVASTATIN CALCIUM 20 MG: 10 TABLET, FILM COATED ORAL at 22:34

## 2022-04-27 RX ADMIN — SUGAMMADEX 200 MG: 100 INJECTION, SOLUTION INTRAVENOUS at 17:24

## 2022-04-27 RX ADMIN — INSULIN HUMAN 2 UNITS: 100 INJECTION, SOLUTION PARENTERAL at 13:07

## 2022-04-27 RX ADMIN — METRONIDAZOLE 500 MG: 500 INJECTION, SOLUTION INTRAVENOUS at 10:59

## 2022-04-27 RX ADMIN — FENTANYL CITRATE 50 MCG: 50 INJECTION, SOLUTION INTRAMUSCULAR; INTRAVENOUS at 15:43

## 2022-04-27 RX ADMIN — HYDROMORPHONE HYDROCHLORIDE 0.5 MG: 1 INJECTION, SOLUTION INTRAMUSCULAR; INTRAVENOUS; SUBCUTANEOUS at 16:30

## 2022-04-27 RX ADMIN — CEFAZOLIN 2 G: 10 INJECTION, POWDER, FOR SOLUTION INTRAVENOUS at 14:44

## 2022-04-27 RX ADMIN — ROCURONIUM BROMIDE 20 MG: 10 SOLUTION INTRAVENOUS at 14:01

## 2022-04-27 RX ADMIN — MIDAZOLAM HYDROCHLORIDE 2 MG: 2 INJECTION, SOLUTION INTRAMUSCULAR; INTRAVENOUS at 10:46

## 2022-04-27 RX ADMIN — INDOCYANINE GREEN AND WATER 5 MG: KIT at 13:17

## 2022-04-27 RX ADMIN — HYDROMORPHONE HYDROCHLORIDE 0.5 MG: 1 INJECTION, SOLUTION INTRAMUSCULAR; INTRAVENOUS; SUBCUTANEOUS at 18:25

## 2022-04-27 RX ADMIN — Medication 100 MCG: at 16:33

## 2022-04-27 RX ADMIN — GABAPENTIN 400 MG: 400 CAPSULE ORAL at 22:35

## 2022-04-27 RX ADMIN — SODIUM CHLORIDE, SODIUM LACTATE, POTASSIUM CHLORIDE, AND CALCIUM CHLORIDE: .6; .31; .03; .02 INJECTION, SOLUTION INTRAVENOUS at 10:48

## 2022-04-27 RX ADMIN — CEFAZOLIN 2 G: 10 INJECTION, POWDER, FOR SOLUTION INTRAVENOUS at 10:47

## 2022-04-27 RX ADMIN — ROCURONIUM BROMIDE 10 MG: 10 SOLUTION INTRAVENOUS at 16:33

## 2022-04-27 RX ADMIN — HYDROMORPHONE HYDROCHLORIDE 0.5 MG: 1 INJECTION, SOLUTION INTRAMUSCULAR; INTRAVENOUS; SUBCUTANEOUS at 17:59

## 2022-04-27 RX ADMIN — ROCURONIUM BROMIDE 20 MG: 10 SOLUTION INTRAVENOUS at 15:44

## 2022-04-27 RX ADMIN — SERTRALINE 50 MG: 50 TABLET, FILM COATED ORAL at 22:35

## 2022-04-27 RX ADMIN — FENTANYL CITRATE 50 MCG: 50 INJECTION, SOLUTION INTRAMUSCULAR; INTRAVENOUS at 12:20

## 2022-04-27 RX ADMIN — FAMOTIDINE 20 MG: 10 INJECTION, SOLUTION INTRAVENOUS at 08:56

## 2022-04-27 RX ADMIN — SODIUM CHLORIDE, PRESERVATIVE FREE 10 ML: 5 INJECTION INTRAVENOUS at 21:43

## 2022-04-27 RX ADMIN — FENTANYL CITRATE 50 MCG: 50 INJECTION, SOLUTION INTRAMUSCULAR; INTRAVENOUS at 10:53

## 2022-04-27 RX ADMIN — SODIUM CHLORIDE: 9 INJECTION, SOLUTION INTRAVENOUS at 21:43

## 2022-04-27 RX ADMIN — OXYCODONE 10 MG: 5 TABLET ORAL at 20:25

## 2022-04-27 RX ADMIN — PROPOFOL 150 MG: 10 INJECTION, EMULSION INTRAVENOUS at 10:53

## 2022-04-27 RX ADMIN — ROCURONIUM BROMIDE 20 MG: 10 SOLUTION INTRAVENOUS at 12:19

## 2022-04-27 RX ADMIN — SODIUM CHLORIDE 1 UNITS/HR: 9 INJECTION, SOLUTION INTRAVENOUS at 13:08

## 2022-04-27 RX ADMIN — Medication 100 MCG: at 11:28

## 2022-04-27 RX ADMIN — Medication 100 MCG: at 16:51

## 2022-04-27 RX ADMIN — SODIUM CHLORIDE, SODIUM LACTATE, POTASSIUM CHLORIDE, AND CALCIUM CHLORIDE: .6; .31; .03; .02 INJECTION, SOLUTION INTRAVENOUS at 13:21

## 2022-04-27 RX ADMIN — INDOCYANINE GREEN AND WATER 5 MG: KIT at 13:21

## 2022-04-27 RX ADMIN — ROCURONIUM BROMIDE 50 MG: 10 SOLUTION INTRAVENOUS at 10:53

## 2022-04-27 ASSESSMENT — PULMONARY FUNCTION TESTS
PIF_VALUE: 22
PIF_VALUE: 20
PIF_VALUE: 33
PIF_VALUE: 32
PIF_VALUE: 32
PIF_VALUE: 34
PIF_VALUE: 32
PIF_VALUE: 28
PIF_VALUE: 32
PIF_VALUE: 28
PIF_VALUE: 20
PIF_VALUE: 31
PIF_VALUE: 32
PIF_VALUE: 20
PIF_VALUE: 21
PIF_VALUE: 20
PIF_VALUE: 21
PIF_VALUE: 22
PIF_VALUE: 21
PIF_VALUE: 27
PIF_VALUE: 31
PIF_VALUE: 23
PIF_VALUE: 27
PIF_VALUE: 5
PIF_VALUE: 26
PIF_VALUE: 20
PIF_VALUE: 25
PIF_VALUE: 30
PIF_VALUE: 33
PIF_VALUE: 32
PIF_VALUE: 32
PIF_VALUE: 33
PIF_VALUE: 24
PIF_VALUE: 27
PIF_VALUE: 32
PIF_VALUE: 20
PIF_VALUE: 27
PIF_VALUE: 22
PIF_VALUE: 33
PIF_VALUE: 33
PIF_VALUE: 27
PIF_VALUE: 21
PIF_VALUE: 3
PIF_VALUE: 32
PIF_VALUE: 20
PIF_VALUE: 23
PIF_VALUE: 32
PIF_VALUE: 20
PIF_VALUE: 21
PIF_VALUE: 29
PIF_VALUE: 32
PIF_VALUE: 32
PIF_VALUE: 2
PIF_VALUE: 32
PIF_VALUE: 4
PIF_VALUE: 20
PIF_VALUE: 29
PIF_VALUE: 20
PIF_VALUE: 27
PIF_VALUE: 32
PIF_VALUE: 32
PIF_VALUE: 33
PIF_VALUE: 21
PIF_VALUE: 20
PIF_VALUE: 20
PIF_VALUE: 32
PIF_VALUE: 20
PIF_VALUE: 21
PIF_VALUE: 32
PIF_VALUE: 28
PIF_VALUE: 32
PIF_VALUE: 22
PIF_VALUE: 22
PIF_VALUE: 27
PIF_VALUE: 31
PIF_VALUE: 26
PIF_VALUE: 30
PIF_VALUE: 32
PIF_VALUE: 26
PIF_VALUE: 24
PIF_VALUE: 32
PIF_VALUE: 20
PIF_VALUE: 18
PIF_VALUE: 30
PIF_VALUE: 20
PIF_VALUE: 22
PIF_VALUE: 14
PIF_VALUE: 29
PIF_VALUE: 20
PIF_VALUE: 23
PIF_VALUE: 20
PIF_VALUE: 29
PIF_VALUE: 33
PIF_VALUE: 32
PIF_VALUE: 30
PIF_VALUE: 23
PIF_VALUE: 32
PIF_VALUE: 19
PIF_VALUE: 21
PIF_VALUE: 31
PIF_VALUE: 29
PIF_VALUE: 27
PIF_VALUE: 32
PIF_VALUE: 32
PIF_VALUE: 26
PIF_VALUE: 30
PIF_VALUE: 22
PIF_VALUE: 31
PIF_VALUE: 32
PIF_VALUE: 33
PIF_VALUE: 24
PIF_VALUE: 28
PIF_VALUE: 0
PIF_VALUE: 32
PIF_VALUE: 32
PIF_VALUE: 34
PIF_VALUE: 31
PIF_VALUE: 21
PIF_VALUE: 15
PIF_VALUE: 32
PIF_VALUE: 33
PIF_VALUE: 26
PIF_VALUE: 34
PIF_VALUE: 33
PIF_VALUE: 20
PIF_VALUE: 32
PIF_VALUE: 27
PIF_VALUE: 31
PIF_VALUE: 33
PIF_VALUE: 24
PIF_VALUE: 22
PIF_VALUE: 34
PIF_VALUE: 33
PIF_VALUE: 20
PIF_VALUE: 21
PIF_VALUE: 20
PIF_VALUE: 28
PIF_VALUE: 27
PIF_VALUE: 32
PIF_VALUE: 34
PIF_VALUE: 21
PIF_VALUE: 21
PIF_VALUE: 31
PIF_VALUE: 14
PIF_VALUE: 31
PIF_VALUE: 21
PIF_VALUE: 22
PIF_VALUE: 35
PIF_VALUE: 33
PIF_VALUE: 22
PIF_VALUE: 20
PIF_VALUE: 33
PIF_VALUE: 32
PIF_VALUE: 31
PIF_VALUE: 35
PIF_VALUE: 21
PIF_VALUE: 15
PIF_VALUE: 32
PIF_VALUE: 21
PIF_VALUE: 33
PIF_VALUE: 32
PIF_VALUE: 20
PIF_VALUE: 32
PIF_VALUE: 32
PIF_VALUE: 30
PIF_VALUE: 30
PIF_VALUE: 21
PIF_VALUE: 21
PIF_VALUE: 30
PIF_VALUE: 20
PIF_VALUE: 30
PIF_VALUE: 30
PIF_VALUE: 22
PIF_VALUE: 33
PIF_VALUE: 20
PIF_VALUE: 23
PIF_VALUE: 20
PIF_VALUE: 22
PIF_VALUE: 30
PIF_VALUE: 20
PIF_VALUE: 20
PIF_VALUE: 33
PIF_VALUE: 32
PIF_VALUE: 22
PIF_VALUE: 22
PIF_VALUE: 32
PIF_VALUE: 26
PIF_VALUE: 21
PIF_VALUE: 32
PIF_VALUE: 32
PIF_VALUE: 26
PIF_VALUE: 20
PIF_VALUE: 20
PIF_VALUE: 35
PIF_VALUE: 9
PIF_VALUE: 20
PIF_VALUE: 32
PIF_VALUE: 20
PIF_VALUE: 29
PIF_VALUE: 22
PIF_VALUE: 31
PIF_VALUE: 18
PIF_VALUE: 28
PIF_VALUE: 21
PIF_VALUE: 20
PIF_VALUE: 33
PIF_VALUE: 20
PIF_VALUE: 29
PIF_VALUE: 19
PIF_VALUE: 20
PIF_VALUE: 29
PIF_VALUE: 1
PIF_VALUE: 21
PIF_VALUE: 2
PIF_VALUE: 20
PIF_VALUE: 20
PIF_VALUE: 33
PIF_VALUE: 26
PIF_VALUE: 20
PIF_VALUE: 21
PIF_VALUE: 34
PIF_VALUE: 0
PIF_VALUE: 32
PIF_VALUE: 33
PIF_VALUE: 2
PIF_VALUE: 31
PIF_VALUE: 32
PIF_VALUE: 20
PIF_VALUE: 34
PIF_VALUE: 22
PIF_VALUE: 34
PIF_VALUE: 32
PIF_VALUE: 20
PIF_VALUE: 23
PIF_VALUE: 28
PIF_VALUE: 20
PIF_VALUE: 32
PIF_VALUE: 18
PIF_VALUE: 31
PIF_VALUE: 30
PIF_VALUE: 20
PIF_VALUE: 20
PIF_VALUE: 22
PIF_VALUE: 21
PIF_VALUE: 22
PIF_VALUE: 28
PIF_VALUE: 20
PIF_VALUE: 33
PIF_VALUE: 25
PIF_VALUE: 20
PIF_VALUE: 29
PIF_VALUE: 23
PIF_VALUE: 21
PIF_VALUE: 21
PIF_VALUE: 32
PIF_VALUE: 34
PIF_VALUE: 28
PIF_VALUE: 20
PIF_VALUE: 29
PIF_VALUE: 26
PIF_VALUE: 34
PIF_VALUE: 20
PIF_VALUE: 34
PIF_VALUE: 30
PIF_VALUE: 24
PIF_VALUE: 30
PIF_VALUE: 33
PIF_VALUE: 23
PIF_VALUE: 32
PIF_VALUE: 26
PIF_VALUE: 32
PIF_VALUE: 25
PIF_VALUE: 26
PIF_VALUE: 32
PIF_VALUE: 30
PIF_VALUE: 32
PIF_VALUE: 31
PIF_VALUE: 20
PIF_VALUE: 20
PIF_VALUE: 34
PIF_VALUE: 28
PIF_VALUE: 32
PIF_VALUE: 17
PIF_VALUE: 34
PIF_VALUE: 32
PIF_VALUE: 20
PIF_VALUE: 32
PIF_VALUE: 31
PIF_VALUE: 30
PIF_VALUE: 27
PIF_VALUE: 34
PIF_VALUE: 20
PIF_VALUE: 21
PIF_VALUE: 33
PIF_VALUE: 20
PIF_VALUE: 20
PIF_VALUE: 30
PIF_VALUE: 27
PIF_VALUE: 19
PIF_VALUE: 30
PIF_VALUE: 18
PIF_VALUE: 21
PIF_VALUE: 29
PIF_VALUE: 33
PIF_VALUE: 15
PIF_VALUE: 27
PIF_VALUE: 26
PIF_VALUE: 21
PIF_VALUE: 31
PIF_VALUE: 29
PIF_VALUE: 1
PIF_VALUE: 32
PIF_VALUE: 30
PIF_VALUE: 30
PIF_VALUE: 27
PIF_VALUE: 21
PIF_VALUE: 20
PIF_VALUE: 32
PIF_VALUE: 34
PIF_VALUE: 31
PIF_VALUE: 20
PIF_VALUE: 32
PIF_VALUE: 20
PIF_VALUE: 32
PIF_VALUE: 32
PIF_VALUE: 21
PIF_VALUE: 32
PIF_VALUE: 22
PIF_VALUE: 33
PIF_VALUE: 34
PIF_VALUE: 33
PIF_VALUE: 32
PIF_VALUE: 28
PIF_VALUE: 29
PIF_VALUE: 32
PIF_VALUE: 32
PIF_VALUE: 21
PIF_VALUE: 20
PIF_VALUE: 32
PIF_VALUE: 33
PIF_VALUE: 20
PIF_VALUE: 32
PIF_VALUE: 28
PIF_VALUE: 23
PIF_VALUE: 20
PIF_VALUE: 21
PIF_VALUE: 20
PIF_VALUE: 20
PIF_VALUE: 32
PIF_VALUE: 28
PIF_VALUE: 21
PIF_VALUE: 32
PIF_VALUE: 20
PIF_VALUE: 1
PIF_VALUE: 33
PIF_VALUE: 31
PIF_VALUE: 34
PIF_VALUE: 32
PIF_VALUE: 17
PIF_VALUE: 20
PIF_VALUE: 34
PIF_VALUE: 20
PIF_VALUE: 31
PIF_VALUE: 32
PIF_VALUE: 32
PIF_VALUE: 20
PIF_VALUE: 32
PIF_VALUE: 31
PIF_VALUE: 32
PIF_VALUE: 32
PIF_VALUE: 28
PIF_VALUE: 22
PIF_VALUE: 30
PIF_VALUE: 20
PIF_VALUE: 20
PIF_VALUE: 30
PIF_VALUE: 18
PIF_VALUE: 20
PIF_VALUE: 35
PIF_VALUE: 32
PIF_VALUE: 20
PIF_VALUE: 29
PIF_VALUE: 0
PIF_VALUE: 20
PIF_VALUE: 23
PIF_VALUE: 29
PIF_VALUE: 28
PIF_VALUE: 26
PIF_VALUE: 32
PIF_VALUE: 21
PIF_VALUE: 21
PIF_VALUE: 32
PIF_VALUE: 18
PIF_VALUE: 31
PIF_VALUE: 2
PIF_VALUE: 28
PIF_VALUE: 29
PIF_VALUE: 33
PIF_VALUE: 28
PIF_VALUE: 25
PIF_VALUE: 21
PIF_VALUE: 20
PIF_VALUE: 29
PIF_VALUE: 18
PIF_VALUE: 20
PIF_VALUE: 33
PIF_VALUE: 32
PIF_VALUE: 30
PIF_VALUE: 20
PIF_VALUE: 20

## 2022-04-27 ASSESSMENT — PAIN SCALES - GENERAL
PAINLEVEL_OUTOF10: 8
PAINLEVEL_OUTOF10: 9
PAINLEVEL_OUTOF10: 6

## 2022-04-27 ASSESSMENT — ENCOUNTER SYMPTOMS: SHORTNESS OF BREATH: 0

## 2022-04-27 ASSESSMENT — PAIN DESCRIPTION - LOCATION: LOCATION: ABDOMEN

## 2022-04-27 ASSESSMENT — PAIN - FUNCTIONAL ASSESSMENT
PAIN_FUNCTIONAL_ASSESSMENT: 0-10
PAIN_FUNCTIONAL_ASSESSMENT: PREVENTS OR INTERFERES WITH MANY ACTIVE NOT PASSIVE ACTIVITIES

## 2022-04-27 ASSESSMENT — PAIN DESCRIPTION - DESCRIPTORS
DESCRIPTORS: CRAMPING
DESCRIPTORS: ACHING

## 2022-04-27 ASSESSMENT — PAIN DESCRIPTION - ORIENTATION: ORIENTATION: MID

## 2022-04-27 ASSESSMENT — LIFESTYLE VARIABLES: SMOKING_STATUS: 0

## 2022-04-27 NOTE — BRIEF OP NOTE
Brief Postoperative Note      Patient: Estela Kebede  YOB: 1955  MRN: [id number]    Date of Procedure: 4/27/2022    Pre-Op Diagnosis: SIGMOID STRICTURE    Post-Op Diagnosis: Same       Procedure(s):  ROBOTIC, SIGMOIDCOLECTOMY WITH REVISION OF COLORECTAL ANASTOMOSIS  CYSTOSCOPY, BILATERAL RETROGRADE PYELOGRAM    Surgeon(s):  MD Arabella Rodriguez MD Genice Lai, MD Lyell Pate, MD    Assistant:  Surgical Assistant: Heraclio Zuleta RN; Beatrice Shannon; Ole Pito    Anesthesia: General    Estimated Blood Loss (mL): Minimal    Complications: None    Specimens:   ID Type Source Tests Collected by Time Destination   A : COLON AND ANASTOMOSIS RINGS Tissue Colon SURGICAL PATHOLOGY Emeli Hernández MD 4/27/2022 1459        Implants:  * No implants in log *      Drains:   [REMOVED] Closed/Suction Drain Right Breast Bulb 13 Macedonian (Removed)       [REMOVED] Closed/Suction Drain Left Breast Bulb 13 Macedonian (Removed)       Findings: cystoscopy and b/l RGP done - no evidence of left ureteral injury, no contrast extravasation noted, no evidence of right ureteral injury, mild erythema at posterior bladder from kasper but no evidence of bladder injury - decision made to forego stent placement, kasper replaced     Electronically signed by Mayra Monet MD on 4/27/2022 at 5:22 PM

## 2022-04-27 NOTE — BRIEF OP NOTE
Brief Postoperative Note      Patient: Ai De Souza  YOB: 1955  MRN: [id number]    Date of Procedure: 4/27/2022    Pre-Op Diagnosis: SIGMOID STRICTURE    Post-Op Diagnosis: Same       Procedure(s):  ROBOTIC, SIGMOIDCOLECTOMY WITH REVISION OF COLORECTAL ANASTOMOSIS  CYSTOSCOPY, BILATERAL RETROGRADE PYELOGRAM    Surgeon(s):  MD Harleen Velázquez MD Althia Grams, MD Althia Grams, MD    Assistant:  Surgical Assistant: Chas Javier RN; [employer]; [employer]    Anesthesia: General    Estimated Blood Loss (mL): less than 599     Complications: Other: iatrogenic injury to colorectal anastomosis requiring revision, new colorectal anastomosis    Specimens:   ID Type Source Tests Collected by Time Destination   A : COLON AND ANASTOMOSIS RINGS Tissue Colon SURGICAL PATHOLOGY Harleen Moyer MD 4/27/2022 1459        Implants:  * No implants in log *      Drains:   Urinary Catheter (Active)       [REMOVED] Closed/Suction Drain Right Breast Bulb 13 Iranian (Removed)       [REMOVED] Closed/Suction Drain Left Breast Bulb 13 Iranian (Removed)       Findings: 1) densely adherent mid-sigmoid inflammatory process at the pelvic inlet, with tethering of the left ureter into the area of chronic inflammation, no overt ureteral injury noted during dissection       2) intracorporeal sigmoid resection w/ end-end proximal sigmoid-proximal rectal stapled 29mm EEA anastomosis       3) inadvertent extraction of the new anastomosis through suprapubic specimen extraction site causing injury to anastomosis        4) combined open/robotic resection of anastomosis w/ creation of new 29mm EEA colorectal anastomosis, no leak w/ insufflation.            5) cystogram to ensure no L ureteral injury - to be dictated by Urology    Job#: [id number]    Electronically signed by Nnamdi Song MD on 4/28/2022 at 11:32 PM

## 2022-04-27 NOTE — H&P
I have reviewed the history and physical by Ramses Rodriguez, Arin Peter Low and examined the patient. I find no relevant changes. I have reviewed with the patient and/or family members, during the preoperative office visit the risks, benefits, and alternatives to the procedure.     Dash Lanier MD

## 2022-04-27 NOTE — ANESTHESIA PRE PROCEDURE
Department of Anesthesiology  Preprocedure Note       Name:  Krystina Flowers   Age:  77 y.o.  :  1955                                          MRN:  [id number]         Date:  2022      Surgeon: Len Luis):  Deisi Renteria MD    Procedure: Procedure(s):  ROBOTIC, POSSIBLE OPEN LEFT COLECTOMY    Medications prior to admission:   Prior to Admission medications    Medication Sig Start Date End Date Taking? Authorizing Provider   ALPRAZolam Aldon Nones) 1 MG tablet Take 1 mg by mouth 2 times daily as needed for Anxiety. Yes Historical Provider, MD   HYDROcodone-acetaminophen 5-300 MG TABS Take 1 tablet by mouth every 4 hours as needed for Pain. Yes Historical Provider, MD   Folic Acid 0.8 MG CAPS Take 1 capsule by mouth daily   Yes Historical Provider, MD   traZODone (DESYREL) 100 MG tablet Take 100 mg by mouth nightly   Yes Historical Provider, MD   traMADol (ULTRAM) 50 MG tablet Take 50 mg by mouth 2 times daily as needed for Pain.    Yes Historical Provider, MD   predniSONE (DELTASONE) 5 MG tablet Take 5 mg by mouth daily     Historical Provider, MD   vitamin D (ERGOCALCIFEROL) 1.25 MG (12995 UT) CAPS capsule Take 1 capsule by mouth once a week 22   FRANCISCO Hooks   mirtazapine (REMERON) 15 MG tablet TAKE 1 TABLET BY MOUTH ONCE DAILY AT NIGHT 22   FRANCISCO Hooks   montelukast (SINGULAIR) 10 MG tablet TAKE 1 TABLET BY MOUTH ONCE DAILY NIGHTLY 3/31/22   VERITO Quintana CNP   pantoprazole (PROTONIX) 40 MG tablet TAKE 1 TABLET BY MOUTH TWICE DAILY BEFORE MEAL(S) 3/31/22   VERITO Quintana CNP   alendronate (FOSAMAX) 70 MG tablet Take 1 tablet by mouth once a week  Patient taking differently: Take 70 mg by mouth every 7 days Take 1 tablet by mouth once a week 22   VERITO Brewer CNP   spironolactone (ALDACTONE) 50 MG tablet Take 1 tablet by mouth once daily  Patient taking differently: Take 50 mg by mouth daily Take 1 tablet by mouth once daily 22   Daryl Tejada APRN - CNP   sertraline (ZOLOFT) 100 MG tablet TAKE 1 & 1/2 (ONE & ONE-HALF) TABLETS BY MOUTH ONCE DAILY  Patient taking differently: Take 50 mg by mouth at bedtime TAKE 1 & 1/2 (ONE & ONE-HALF) TABLETS BY MOUTH ONCE DAILY 2/24/22   Basilio Sanchez, APRN - CNP   valACYclovir (VALTREX) 500 MG tablet Take 1 tablet by mouth once daily  Patient taking differently: Take 500 mg by mouth daily Take 1 tablet by mouth once daily 1/28/22   Johnnie Spine, APRN - CNP   propranolol (INDERAL LA) 60 MG extended release capsule Take 1 capsule by mouth once daily  Patient taking differently: Take 60 mg by mouth daily  11/8/21   Cora Perez MD   letrozole [facility]) 2.5 MG tablet Take 2.5 mg by mouth daily  8/3/21   Historical Provider, MD   atorvastatin (LIPITOR) 20 MG tablet Take 1 tablet by mouth nightly TAKE 1 TABLET BY MOUTH EVERY DAY 6/25/21   Cora Perez MD   ketorolac (ACULAR) 0.5 % ophthalmic solution Place 1 drop into both eyes 2 times daily  4/8/21   Historical Provider, MD   fluticasone-salmeterol (ADVAIR) 250-50 MCG/DOSE AEPB Inhale into the lungs 2 times daily [facility] Provider, MD   albuterol sulfate  (90 Base) MCG/ACT inhaler 2 puffs q 4 prn 1/15/21   Historical Provider, MD   diclofenac sodium 1 % GEL Apply topically 4 times daily Indications: [place]  prescribes     Marquis Logan MD   difluprednate (DUREZOL) 0.05 % EMUL Apply 2 drops to eye 2 times daily BOTH EYES 2/6/18   Historical Provider, MD   gabapentin (NEURONTIN) 400 MG capsule 400 mg 4 times daily. Indications: Prescribed by Arthritis   9/6/19   Marquis Logan MD   cyclobenzaprine (FLEXERIL) 10 MG tablet Take 10 mg by mouth 3 times daily as needed for Muscle spasms    Historical Provider, MD   methotrexate (RHEUMATREX) 2.5 MG chemo tablet Take 7.5 mg by mouth every 7 days     Marquis Logan MD   fluocinonide (LIDEX) 0.05 % cream Apply topically 2 times daily Apply topically 2 times daily.     Historical Provider, MD promethazine (PHENERGAN) 12.5 MG tablet Take 25 mg by mouth every 6 hours as needed for Nausea Indications: Dr. Pedro Richard MD       Current medications:    Current Facility-Administered Medications   Medication Dose Route Frequency Provider Last Rate Last Admin    famotidine (PEPCID) injection 20 mg  20 mg IntraVENous Once Steve Dai MD        lactated ringers infusion   IntraVENous Continuous Steve Dai MD        sodium chloride flush 0.9 % injection 5-40 mL  5-40 mL IntraVENous 2 times per day Steve Dai MD        sodium chloride flush 0.9 % injection 5-40 mL  5-40 mL IntraVENous PRN Steve Dai MD        0.9 % sodium chloride infusion   IntraVENous PRN Steve Dai MD           Allergies:     Allergies   Allergen Reactions    Infliximab      Severe drop in blood pressure    Ultrasound Gel Other (See Comments)     burn    Codeine Nausea And Vomiting    Penicillins Rash       Problem List:    Patient Active Problem List   Diagnosis Code    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, 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    Essential hypertension I10    Diverticulosis K57.90    Nausea & vomiting R11.2    S/P splenectomy Z90.81    Osteopenia M85.80    Sarcoidosis D86.9    Vitamin D deficiency E55.9    Impaired fasting glucose R73.01    Cecal volvulus ([address] K56.2    Insomnia G47.00    Hypercholesterolemia E78.00    Disturbance of skin sensation R20.9    Acute, but ill-defined, cerebrovascular disease I67.89    Sarcoid D86.9    Cerebral vasculitis I67.7    Lesion of right ulnar nerve G56.21    Major depressive disorder, recurrent episode, moderate (HCC) F33.1    Pain medication agreement signed Z02.89    Trochanteric bursitis of left hip M70.62    Ankle instability M25.373    GI bleed K92.2    Colitis, acute K52.9    Anxiety F41.9    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    Urinary frequency R35.0    Right upper quadrant pain R10.11    Epigastric pain R10.13    Esophageal dysphagia R13.19    Colon, diverticulosis K57.30    Giant cell arteritis (HCC) M31.6    Lower abdominal pain R10.30    Right upper quadrant abdominal pain R10.11    Sigmoid stricture ([address] K56.699    Right sided abdominal pain R10.9    Irritable bowel syndrome with constipation K58.1    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 breast of female, estrogen receptor positive ([address] C50.311, Z17.0       Past Medical History:        Diagnosis Date    Asthma     CAD (coronary artery disease)     Cancer (HCC)     RIGHT BREAST    Chronic diarrhea     Dr. Gemma Escalera    Chronic kidney disease     kidney stones    Clostridium difficile diarrhea 10/23/13    positive by PCR    Fibromyalgia     Hemorrhoid     Hyperlipidemia     Hypertension     Kidney stone     Migraines     Osteoarthritis     fibromyalgia    Sarcoidosis 2003    sees Dr. Candy Jones       Past Surgical History:        Procedure Laterality Date    ABDOMINAL EXPLORATION SURGERY  8/19/12    REDUCTION OF VULVUS; RIGHT COLECTOMY; SMALL BOWEL RESECTION; INSERTION OF ON Q PAIN BUSTER    BREAST BIOPSY      CARDIAC CATHETERIZATION  2010    CLEAN    COLONOSCOPY  2010    normal    COLONOSCOPY N/A 6/16/2020    COLON W/ANES.  performed by [place] MD at [facility]  12/2011    ESOPHAGEAL DILATATION  6/16/2020    ESOPHAGEAL DILATION [place] performed by [place] MD at [address] [facility]  5/2009    cataract, right    HYSTERECTOMY  2000    LITHOTRIPSY  1/19/2012    LITHOTRIPSY      04/2012    MASTECTOMY Bilateral 5/18/2021    BILATERAL SIMPLE MASTECTOMY, RIGHT SENTINEL LYMPH NODE BIOPSY performed by Derrick Toscano MD at [address]    right   [facility]    UPPER GASTROINTESTINAL ENDOSCOPY  2/27/13    UPPER GASTROINTESTINAL ENDOSCOPY  2017    gastritis    UPPER GASTROINTESTINAL ENDOSCOPY N/A 2020    EGD W/ANES. (11:00) performed by Светлана Jara MD at [place] [address]  2011    US BREAST NEEDLE BIOPSY RIGHT Right 4/15/2021    US BREAST NEEDLE BIOPSY RIGHT 4/15/2021 Ranjit Velazquez MD [facility]       Social History:    Social History     Tobacco Use    Smoking status: Former Smoker     Packs/day: 0.50     Years: 20.00     Pack years: 10.00     Types: Cigarettes     Start date: 3/1/2019     Quit date: 2020     Years since quittin.6    Smokeless tobacco: Never Used   Substance Use Topics    Alcohol use: No     Alcohol/week: 0.0 standard drinks                                Counseling given: Not Answered      Vital Signs (Current):   Vitals:    22 1309 22 0808   BP:  114/74   Pulse:  98   Resp:  16   Temp:  98.5 °F (36.9 °C)   TempSrc:  Temporal   SpO2:  94%   Weight: 170 lb (77.1 kg) 169 lb 7 oz (76.9 kg)   Height: 5' 3\" (1.6 m)                                               BP Readings from Last 3 Encounters:   22 114/74   22 124/86   22 120/88       NPO Status: Time of last liquid consumption:                         Time of last solid consumption:                         Date of last liquid consumption: 22                        Date of last solid food consumption: 22    BMI:   Wt Readings from Last 3 Encounters:   22 169 lb 7 oz (76.9 kg)   22 170 lb (77.1 kg)   22 169 lb 6.4 oz (76.8 kg)     Body mass index is 30.01 kg/m².     CBC:   Lab Results   Component Value Date    WBC 9.5 2022    RBC 4.31 2022    HGB 13.8 2022    HCT 42.4 2022    MCV 98.5 2022    RDW 18.7 2022     2022       CMP:   Lab Results   Component Value Date     2022    K 5.0 2022     2022    CO2 23 2022    BUN 5 2022    CREATININE 0.8 04/21/2022    GFRAA >60 04/21/2022    GFRAA >60 02/12/2013    AGRATIO 1.5 04/04/2022    LABGLOM >60 04/21/2022    GLUCOSE 95 04/21/2022    GLUCOSE 116 01/07/2021    PROT 7.3 04/04/2022    PROT 7.6 01/07/2021    CALCIUM 9.5 04/21/2022    BILITOT <0.2 04/04/2022    ALKPHOS 100 04/04/2022    AST 24 04/04/2022    ALT 23 04/04/2022       POC Tests: No results for input(s): POCGLU, POCNA, POCK, POCCL, POCBUN, POCHEMO, POCHCT in the last 72 hours. Coags:   Lab Results   Component Value Date    PROTIME 12.3 11/14/2017    INR 1.09 11/14/2017    APTT 30.0 11/14/2017       HCG (If Applicable): No results found for: PREGTESTUR, PREGSERUM, HCG, HCGQUANT     ABGs: No results found for: PHART, PO2ART, GTK6MEY, GJA6WVX, BEART, Z0XEBACH     Type & Screen (If Applicable):  No results found for: LABABO, LABRH    Drug/Infectious Status (If Applicable):  No results found for: HIV, HEPCAB    COVID-19 Screening (If Applicable):   Lab Results   Component Value Date    COVID19 Not Detected 05/13/2021           Anesthesia Evaluation  Patient summary reviewed no history of anesthetic complications:   Airway: Mallampati: II  TM distance: >3 FB   Neck ROM: full  Mouth opening: > = 3 FB Dental:          Pulmonary: breath sounds clear to auscultation  (+) asthma (DAILY AND PRN INHALER, NO O2 REQ. , STABLE):     (-) COPD, shortness of breath, recent URI, sleep apnea and not a current smoker          Patient did not smoke on day of surgery.                  Cardiovascular:    (+) hypertension:, hyperlipidemia    (-) pacemaker, valvular problems/murmurs, past MI, CAD, CABG/stent, dysrhythmias,  angina and  CHF    ECG reviewed  Rhythm: regular  Rate: normal           Beta Blocker:  Dose within 24 Hrs         Neuro/Psych:   (+) neuromuscular disease (FIBROMY. / NEUROPATHY FROM SARCOID, SENSITIVE TO LIGHT, SARCOID):, headaches: migraine headaches, depression/anxiety  (HX DEP.)   (-) seizures, TIA and CVA           GI/Hepatic/Renal:   (+) GERD: well controlled, liver disease (SARCOID):, renal disease: kidney stones, bowel prep,      (-) no morbid obesity       Endo/Other:    (+) : arthritis:., malignancy/cancer (BREAST,R,  S/P B SIMPLE MASTECTOMY, R LN RESECT). (-) diabetes mellitus, hypothyroidism, hyperthyroidism, blood dyscrasia               Abdominal:       Abdomen: soft. Vascular: negative vascular ROS. Other Findings:             Anesthesia Plan      general     ASA 3     (2 PIV, BADILLO)  Induction: intravenous. MIPS: Postoperative opioids intended and Prophylactic antiemetics administered. Anesthetic plan and risks discussed with patient. Use of blood products discussed with patient whom consented to blood products. Plan discussed with CRNA. This pre-anesthesia assessment may be used as a history and physical.    DOS STAFF ADDENDUM:    Pt seen and examined, chart reviewed (including anesthesia, drug and allergy history). No interval changes to history and physical examination. Anesthetic plan, risks, benefits, alternatives, and personnel involved discussed with patient. Patient verbalized an understanding and agrees to proceed.       Tommy Jackman MD  April 27, 2022  8:44 AM      Tommy Jackman MD   4/27/2022

## 2022-04-27 NOTE — PROGRESS NOTES
Pt brought to PACU. Report obtained from OR RN and anesthesia.  Pt placed on monitor and O2 at 6L/mn NC.

## 2022-04-28 LAB
ANION GAP SERPL CALCULATED.3IONS-SCNC: 12 MMOL/L (ref 3–16)
ANION GAP SERPL CALCULATED.3IONS-SCNC: 12 MMOL/L (ref 3–16)
BANDED NEUTROPHILS RELATIVE PERCENT: 30 % (ref 0–7)
BASOPHILS ABSOLUTE: 0 K/UL (ref 0–0.2)
BASOPHILS ABSOLUTE: 0 K/UL (ref 0–0.2)
BASOPHILS RELATIVE PERCENT: 0 %
BASOPHILS RELATIVE PERCENT: 0.2 %
BUN BLDV-MCNC: 19 MG/DL (ref 7–20)
BUN BLDV-MCNC: 20 MG/DL (ref 7–20)
CALCIUM SERPL-MCNC: 8.7 MG/DL (ref 8.3–10.6)
CALCIUM SERPL-MCNC: 9.3 MG/DL (ref 8.3–10.6)
CHLORIDE BLD-SCNC: 106 MMOL/L (ref 99–110)
CHLORIDE BLD-SCNC: 107 MMOL/L (ref 99–110)
CO2: 21 MMOL/L (ref 21–32)
CO2: 22 MMOL/L (ref 21–32)
CREAT SERPL-MCNC: 1 MG/DL (ref 0.6–1.2)
CREAT SERPL-MCNC: 1.5 MG/DL (ref 0.6–1.2)
EOSINOPHILS ABSOLUTE: 0 K/UL (ref 0–0.6)
EOSINOPHILS ABSOLUTE: 0 K/UL (ref 0–0.6)
EOSINOPHILS RELATIVE PERCENT: 0 %
EOSINOPHILS RELATIVE PERCENT: 0 %
GFR AFRICAN AMERICAN: 42
GFR AFRICAN AMERICAN: >60
GFR NON-AFRICAN AMERICAN: 35
GFR NON-AFRICAN AMERICAN: 55
GLUCOSE BLD-MCNC: 141 MG/DL (ref 70–99)
GLUCOSE BLD-MCNC: 154 MG/DL (ref 70–99)
HCT VFR BLD CALC: 34.5 % (ref 36–48)
HCT VFR BLD CALC: 35.5 % (ref 36–48)
HEMOGLOBIN: 11 G/DL (ref 12–16)
HEMOGLOBIN: 11.6 G/DL (ref 12–16)
LACTIC ACID: 2.1 MMOL/L (ref 0.4–2)
LYMPHOCYTES ABSOLUTE: 1.1 K/UL (ref 1–5.1)
LYMPHOCYTES ABSOLUTE: 2.5 K/UL (ref 1–5.1)
LYMPHOCYTES RELATIVE PERCENT: 12.4 %
LYMPHOCYTES RELATIVE PERCENT: 5 %
MCH RBC QN AUTO: 31.3 PG (ref 26–34)
MCH RBC QN AUTO: 31.8 PG (ref 26–34)
MCHC RBC AUTO-ENTMCNC: 31.9 G/DL (ref 31–36)
MCHC RBC AUTO-ENTMCNC: 32.8 G/DL (ref 31–36)
MCV RBC AUTO: 97 FL (ref 80–100)
MCV RBC AUTO: 98 FL (ref 80–100)
MONOCYTES ABSOLUTE: 1.8 K/UL (ref 0–1.3)
MONOCYTES ABSOLUTE: 2 K/UL (ref 0–1.3)
MONOCYTES RELATIVE PERCENT: 10 %
MONOCYTES RELATIVE PERCENT: 8 %
NEUTROPHILS ABSOLUTE: 15.7 K/UL (ref 1.7–7.7)
NEUTROPHILS ABSOLUTE: 19.7 K/UL (ref 1.7–7.7)
NEUTROPHILS RELATIVE PERCENT: 57 %
NEUTROPHILS RELATIVE PERCENT: 77.4 %
PDW BLD-RTO: 17.7 % (ref 12.4–15.4)
PDW BLD-RTO: 18.3 % (ref 12.4–15.4)
PLATELET # BLD: 396 K/UL (ref 135–450)
PLATELET # BLD: 428 K/UL (ref 135–450)
PLATELET SLIDE REVIEW: ADEQUATE
PMV BLD AUTO: 7.1 FL (ref 5–10.5)
PMV BLD AUTO: 7.3 FL (ref 5–10.5)
POLYCHROMASIA: ABNORMAL
POTASSIUM REFLEX MAGNESIUM: 4.4 MMOL/L (ref 3.5–5.1)
POTASSIUM REFLEX MAGNESIUM: 5.2 MMOL/L (ref 3.5–5.1)
RBC # BLD: 3.52 M/UL (ref 4–5.2)
RBC # BLD: 3.66 M/UL (ref 4–5.2)
SLIDE REVIEW: ABNORMAL
SODIUM BLD-SCNC: 139 MMOL/L (ref 136–145)
SODIUM BLD-SCNC: 141 MMOL/L (ref 136–145)
TEAR DROP CELLS: ABNORMAL
WBC # BLD: 20.3 K/UL (ref 4–11)
WBC # BLD: 22.6 K/UL (ref 4–11)

## 2022-04-28 PROCEDURE — A4216 STERILE WATER/SALINE, 10 ML: HCPCS | Performed by: SURGERY

## 2022-04-28 PROCEDURE — 80048 BASIC METABOLIC PNL TOTAL CA: CPT

## 2022-04-28 PROCEDURE — 94640 AIRWAY INHALATION TREATMENT: CPT

## 2022-04-28 PROCEDURE — 99024 POSTOP FOLLOW-UP VISIT: CPT | Performed by: SURGERY

## 2022-04-28 PROCEDURE — 1200000000 HC SEMI PRIVATE

## 2022-04-28 PROCEDURE — 85025 COMPLETE CBC W/AUTO DIFF WBC: CPT

## 2022-04-28 PROCEDURE — 6370000000 HC RX 637 (ALT 250 FOR IP): Performed by: SURGERY

## 2022-04-28 PROCEDURE — 36415 COLL VENOUS BLD VENIPUNCTURE: CPT

## 2022-04-28 PROCEDURE — 2700000000 HC OXYGEN THERAPY PER DAY

## 2022-04-28 PROCEDURE — 2580000003 HC RX 258: Performed by: SURGERY

## 2022-04-28 PROCEDURE — 94761 N-INVAS EAR/PLS OXIMETRY MLT: CPT

## 2022-04-28 PROCEDURE — C9113 INJ PANTOPRAZOLE SODIUM, VIA: HCPCS | Performed by: SURGERY

## 2022-04-28 PROCEDURE — 6360000002 HC RX W HCPCS: Performed by: SURGERY

## 2022-04-28 PROCEDURE — 6370000000 HC RX 637 (ALT 250 FOR IP): Performed by: NURSE PRACTITIONER

## 2022-04-28 PROCEDURE — 83605 ASSAY OF LACTIC ACID: CPT

## 2022-04-28 PROCEDURE — APPSS45 APP SPLIT SHARED TIME 31-45 MINUTES: Performed by: CLINICAL NURSE SPECIALIST

## 2022-04-28 RX ORDER — SPIRONOLACTONE 25 MG/1
50 TABLET ORAL DAILY
Status: DISCONTINUED | OUTPATIENT
Start: 2022-04-29 | End: 2022-04-28

## 2022-04-28 RX ORDER — SPIRONOLACTONE 25 MG/1
50 TABLET ORAL DAILY
Status: DISCONTINUED | OUTPATIENT
Start: 2022-04-28 | End: 2022-04-28

## 2022-04-28 RX ORDER — SPIRONOLACTONE 25 MG/1
50 TABLET ORAL DAILY
Status: DISCONTINUED | OUTPATIENT
Start: 2022-04-30 | End: 2022-05-02 | Stop reason: HOSPADM

## 2022-04-28 RX ORDER — ALBUTEROL SULFATE 90 UG/1
2 AEROSOL, METERED RESPIRATORY (INHALATION) 2 TIMES DAILY
Status: DISCONTINUED | OUTPATIENT
Start: 2022-04-29 | End: 2022-05-02 | Stop reason: HOSPADM

## 2022-04-28 RX ORDER — METOPROLOL TARTRATE 5 MG/5ML
5 INJECTION INTRAVENOUS EVERY 6 HOURS
Status: DISCONTINUED | OUTPATIENT
Start: 2022-04-28 | End: 2022-04-28

## 2022-04-28 RX ORDER — METOPROLOL TARTRATE 5 MG/5ML
5 INJECTION INTRAVENOUS EVERY 6 HOURS
Status: COMPLETED | OUTPATIENT
Start: 2022-04-29 | End: 2022-04-29

## 2022-04-28 RX ADMIN — Medication 2 PUFF: at 08:40

## 2022-04-28 RX ADMIN — SODIUM CHLORIDE, PRESERVATIVE FREE 10 ML: 5 INJECTION INTRAVENOUS at 08:20

## 2022-04-28 RX ADMIN — HYDROMORPHONE HYDROCHLORIDE 1 MG: 2 INJECTION, SOLUTION INTRAMUSCULAR; INTRAVENOUS; SUBCUTANEOUS at 08:29

## 2022-04-28 RX ADMIN — MONTELUKAST SODIUM 10 MG: 10 TABLET ORAL at 22:54

## 2022-04-28 RX ADMIN — ONDANSETRON 4 MG: 2 INJECTION INTRAMUSCULAR; INTRAVENOUS at 22:48

## 2022-04-28 RX ADMIN — PANTOPRAZOLE SODIUM 40 MG: 40 TABLET, DELAYED RELEASE ORAL at 05:11

## 2022-04-28 RX ADMIN — HYDROMORPHONE HYDROCHLORIDE 0.5 MG: 2 INJECTION, SOLUTION INTRAMUSCULAR; INTRAVENOUS; SUBCUTANEOUS at 22:49

## 2022-04-28 RX ADMIN — GABAPENTIN 400 MG: 400 CAPSULE ORAL at 22:54

## 2022-04-28 RX ADMIN — LETROZOLE 2.5 MG: 2.5 TABLET ORAL at 08:19

## 2022-04-28 RX ADMIN — Medication 2 DROP: at 22:55

## 2022-04-28 RX ADMIN — KETOROLAC TROMETHAMINE 1 DROP: 5 SOLUTION/ DROPS OPHTHALMIC at 22:54

## 2022-04-28 RX ADMIN — SODIUM CHLORIDE: 9 INJECTION, SOLUTION INTRAVENOUS at 22:30

## 2022-04-28 RX ADMIN — ONDANSETRON 4 MG: 2 INJECTION INTRAMUSCULAR; INTRAVENOUS at 08:18

## 2022-04-28 RX ADMIN — Medication 2 PUFF: at 20:29

## 2022-04-28 RX ADMIN — HYDROMORPHONE HYDROCHLORIDE 1 MG: 2 INJECTION, SOLUTION INTRAMUSCULAR; INTRAVENOUS; SUBCUTANEOUS at 00:06

## 2022-04-28 RX ADMIN — SODIUM CHLORIDE, PRESERVATIVE FREE 40 MG: 5 INJECTION INTRAVENOUS at 08:19

## 2022-04-28 RX ADMIN — Medication 2 PUFF: at 08:39

## 2022-04-28 RX ADMIN — MIRTAZAPINE 15 MG: 15 TABLET, FILM COATED ORAL at 22:53

## 2022-04-28 RX ADMIN — PROPRANOLOL HYDROCHLORIDE 60 MG: 60 CAPSULE, EXTENDED RELEASE ORAL at 08:19

## 2022-04-28 RX ADMIN — SERTRALINE 50 MG: 50 TABLET, FILM COATED ORAL at 22:53

## 2022-04-28 RX ADMIN — SODIUM CHLORIDE: 9 INJECTION, SOLUTION INTRAVENOUS at 07:16

## 2022-04-28 RX ADMIN — ENOXAPARIN SODIUM 40 MG: 100 INJECTION SUBCUTANEOUS at 08:19

## 2022-04-28 RX ADMIN — ONDANSETRON 4 MG: 2 INJECTION INTRAMUSCULAR; INTRAVENOUS at 00:14

## 2022-04-28 RX ADMIN — Medication 2 PUFF: at 20:27

## 2022-04-28 RX ADMIN — GABAPENTIN 400 MG: 400 CAPSULE ORAL at 08:19

## 2022-04-28 RX ADMIN — HYDROMORPHONE HYDROCHLORIDE 1 MG: 2 INJECTION, SOLUTION INTRAMUSCULAR; INTRAVENOUS; SUBCUTANEOUS at 14:12

## 2022-04-28 RX ADMIN — SODIUM CHLORIDE, PRESERVATIVE FREE 10 ML: 5 INJECTION INTRAVENOUS at 22:30

## 2022-04-28 ASSESSMENT — PAIN DESCRIPTION - LOCATION
LOCATION: ABDOMEN

## 2022-04-28 ASSESSMENT — PAIN DESCRIPTION - ORIENTATION
ORIENTATION: MID

## 2022-04-28 ASSESSMENT — PAIN SCALES - GENERAL
PAINLEVEL_OUTOF10: 8
PAINLEVEL_OUTOF10: 5
PAINLEVEL_OUTOF10: 0
PAINLEVEL_OUTOF10: 5
PAINLEVEL_OUTOF10: 0

## 2022-04-28 ASSESSMENT — PAIN DESCRIPTION - DESCRIPTORS
DESCRIPTORS: SHARP

## 2022-04-28 ASSESSMENT — PAIN - FUNCTIONAL ASSESSMENT: PAIN_FUNCTIONAL_ASSESSMENT: PREVENTS OR INTERFERES WITH MANY ACTIVE NOT PASSIVE ACTIVITIES

## 2022-04-28 ASSESSMENT — PAIN DESCRIPTION - PAIN TYPE
TYPE: ACUTE PAIN;SURGICAL PAIN
TYPE: ACUTE PAIN

## 2022-04-28 NOTE — PROGRESS NOTES
04/27/22 2159   RT Protocol   History Pulmonary Disease 1   Respiratory pattern 0   Breath sounds 2   Cough 0   Bronchodilator Assessment Score 3

## 2022-04-28 NOTE — ANESTHESIA POSTPROCEDURE EVALUATION
Department of Anesthesiology  Postprocedure Note    Patient: Gloria Krishnan  MRN: [id number]  YOB: 1955  Date of evaluation: 4/27/2022  Time:  9:06 PM     Procedure Summary     Date: 04/27/22 Room / Location: [address] [place] [address]    Anesthesia Start: 7252 Anesthesia Stop: 4542    Procedures:       ROBOTIC, SIGMOIDCOLECTOMY WITH REVISION OF COLORECTAL ANASTOMOSIS (Left Abdomen)      CYSTOSCOPY, BILATERAL RETROGRADE PYELOGRAM (N/A Bladder) Diagnosis:       Colonic stricture ([address]      (SIGMOID STRICTURE)    Surgeons: Kamlesh Stephen MD Responsible Provider: Shannan Mcintosh MD    Anesthesia Type: general ASA Status: 3          Anesthesia Type: general    Nav Phase I: Nav Score: 10    Nav Phase II:      Last vitals: Reviewed and per EMR flowsheets.        Anesthesia Post Evaluation    Comments: Postoperative Anesthesia Note    Name:    Gloria Krishnan  MRN:      [id number]    Patient Vitals in the past 12 hrs:  04/27/22 2100, BP:108/74, Temp:98.4 °F (36.9 °C), Temp src:Oral, Pulse:91, Resp:14, SpO2:91 %  04/27/22 2030, BP:(!) 90/49, Pulse:91, Resp:13, SpO2:92 %  04/27/22 2025, BP:87/78, Pulse:92, Resp:13, SpO2:93 %  04/27/22 2020, BP:93/77, Pulse:92, Resp:15, SpO2:94 %  04/27/22 2015, BP:(!) 104/92, Pulse:92, Resp:13, SpO2:91 %  04/27/22 2010, BP:90/70, Pulse:93, Resp:20, SpO2:94 %  04/27/22 2005, BP:102/82, Pulse:93, Resp:13, SpO2:94 %  04/27/22 2000, BP:105/84, Pulse:91, Resp:10, SpO2:93 %  04/1955, BP:104/82, Pulse:92, Resp:11, SpO2:92 %  04/27/22 1950, BP:89/78, Pulse:92, Resp:16, SpO2:94 %  04/27/22 1945, BP:131/86, Pulse:92, Resp:16, SpO2:94 %  04/27/22 1940, BP:99/81, Pulse:90, Resp:19, SpO2:92 %  04/27/22 1935, BP:104/86, Pulse:86, Resp:(!) 7, SpO2:91 %  04/27/22 1930, BP:107/89, Pulse:86, Resp:(!) 7, SpO2:93 %  04/27/22 1925, BP:100/88, Pulse:85, Resp:8, SpO2:92 %  04/27/22 1920, BP:(!) 116/99, Pulse:85, Resp:(!) 7, SpO2:93 %  04/27/22 1915, BP:100/75, Pulse:85, Resp:(!) 7, SpO2:92 %  04/27/22 1910, Pulse:85, Resp:(!) 7, SpO2:92 %  04/27/22 1905, Pulse:85, Resp:(!) 7, SpO2:92 %  04/27/22 1900, BP:115/87, Pulse:85, Resp:8, SpO2:92 %  04/27/22 1855, BP:106/86, Pulse:85, Resp:(!) 7, SpO2:92 %  04/27/22 1850, Pulse:84, Resp:(!) 7, SpO2:92 %  04/27/22 1845, BP:113/89, Pulse:83, Resp:(!) 7, SpO2:93 %  04/27/22 1840, BP:(!) 109/90, Pulse:82, Resp:(!) 7, SpO2:92 %  04/27/22 1835, BP:(!) 136/123, Pulse:81, Resp:8, SpO2:93 %  04/27/22 1830, BP:(!) 126/97, Pulse:81, Resp:10, SpO2:92 %  04/27/22 1825, Pulse:80, Resp:19, SpO2:95 %  04/27/22 1820, BP:129/84, Pulse:79, Resp:16, SpO2:95 %  04/27/22 1815, BP:103/75, Pulse:77, Resp:14, SpO2:96 %  04/27/22 1810, BP:100/67, Pulse:73, Resp:18, SpO2:94 %  04/27/22 1805, BP:94/65, Pulse:74, Resp:(!) 35, SpO2:92 %  04/27/22 1800, BP:88/67, Pulse:76, Resp:26, SpO2:94 %  04/27/22 1755, BP:(!) 89/55, Pulse:75, Resp:22, SpO2:94 %  04/27/22 1750, BP:106/61, Temp:97.2 °F (36.2 °C), Temp src:Tympanic, Pulse:78, Resp:28, SpO2:93 %  04/27/22 1745, Pulse:78, Resp:16, SpO2:(!) 88 %     LABS:    CBC  Lab Results       Component                Value               Date/Time                  WBC                      9.5                 04/21/2022 01:53 PM        HGB                      13.8                04/21/2022 01:53 PM        HCT                      42.4                04/21/2022 01:53 PM        PLT                      571 (H)             04/21/2022 01:53 PM   RENAL  Lab Results       Component                Value               Date/Time                  NA                       137                 04/27/2022 08:45 AM        K                        4.0                 04/27/2022 08:45 AM        CL                       102                 04/27/2022 08:45 AM        CO2                      20 (L)              04/27/2022 08:45 AM        BUN                      13                  04/27/2022 08:45 AM        CREATININE               1.3 (H) 04/27/2022 08:45 AM        GLUCOSE                  119 (H)             04/27/2022 08:45 AM        GLUCOSE                  116                 01/07/2021 12:00 AM   COAGS  Lab Results       Component                Value               Date/Time                  PROTIME                  12.3                11/14/2017 05:14 AM        INR                      1.09                11/14/2017 05:14 AM        APTT                     30.0                11/14/2017 05:14 AM     Intake & Output:  @35GMJD@    Nausea & Vomiting:  No    Level of Consciousness:  Awake    Pain Assessment:  Adequate analgesia    Anesthesia Complications:  No apparent anesthetic complications    SUMMARY      Vital signs stable  OK to discharge from Stage I post anesthesia care.   Care transferred from Anesthesiology department on discharge from perioperative area

## 2022-04-28 NOTE — CARE COORDINATION
CASE MANAGEMENT INITIAL ASSESSMENT      Reviewed chart and completed assessment with patient:bedside  Family present: none  Explained Case Management role/services. Primary contact information:Carlo    Health Care Decision Maker :   Primary Decision Maker: Richmond Arenas - [phone]    Primary Decision Maker: Hema Callaway - Child - [phone]    Secondary Decision Maker: Georges Altman Child - [phone]    Secondary Decision Maker: Juliocesar Kirkland - Brother/Sister - [phone]          Can this person be reached and be able to respond quickly, such as within a few minutes or hours? Yes      Admit date/status:4/27/22  Diagnosis:colonic stricture   Is this a Readmission?:  No      Insurance:medicare   Precert required for SNF: No       3 night stay required: No    Living arrangements, Adls, care needs, prior to admission:lives in 2 story home with  upstairs bedroom. Tolerates stairs ok. No DME use    Durable Medical Equipment at home:  Walker__Cane__RTS__ BSC__Shower Chair__  02__ HHN__ CPAP__  BiPap__  Hospital Bed__ W/C___ Other_____    Services in the home and/or outpatient, prior to admission:none    Current PCP:Travis                                Medications Prescription coverage? yes   Will pt require financial assistance with medications no     Transportation needs: none     PT/OT recs:none    Hospital Exemption Notification (HEN):needed for SNF    Barriers to discharge:none    Plan/comments:spoke with patient. Plans on returning home at discharge. Reported from home with spouse. Will be able to get to any follow up appointments denied any DCP needs. POD 1 status post ROBOTIC, SIGMOIDCOLECTOMY WITH REVISION OF COLORECTAL ANASTOMOSIS  CYSTOSCOPY, BILATERAL RETROGRADE PYELOGRAM. Continue NPO for now. Follow post op course.  Dayana Piña RN    Dayana Piña RN       [facility] on chart for MD signature

## 2022-04-28 NOTE — PROGRESS NOTES
Hospitalist Progress Note    CC: Colonic stricture [facility])    Hospital course:  79HO WF, s/p robotic, sigmoid colectomy with revision of colorectal anastomosis, cystoscopy, bilateral retrograde pyelogram, who we are asked to see/evaluate by surgery, Dr. Tracey Angel,  for medical management of hypertension. The patient had elective colon surgery d/t chronic left sided strictures. Currently abd distention and nausea - has some bowel sounds. BP stable - converting to IV for now    Admit date: 4/27/2022  Days in hospital:  1    24 Hour Events: pt with a lot of bloating and burping    Subjective: has some bowel sounds, abd distended but soft, a lot of burping    ROS:   A comprehensive review of systems was negative except for: nausea and burping . Objective:    /64   Pulse 100   Temp 98.8 °F (37.1 °C) (Oral)   Resp 18   Ht 5' 3\" (1.6 m)   Wt 169 lb (76.7 kg)   SpO2 92%   Breastfeeding No   BMI 29.94 kg/m²     Gen: uncomfortable appearing  HEENT: NC/AT, moist mucous membranes, no oropharyngeal erythema or exudate  Neck: supple, trachea midline, no anterior cervical or SC LAD  Heart:  Normal s1/s2, RRR, no murmurs, gallops, or rubs. no leg edema  Lungs:  diminished bilaterally, no wheeze, no rales, no rhonchi, no crackles, no use of accessory muscles  Abd:some bowel sounds present, soft, mildly tender, very distended, no masses  Extrem:  No clubbing, cyanosis,  no edema  Skin: no rashes or lesions  Psych: A & O x3  Neuro: grossly intact, moves all four extremities. Assessment:    Principal Problem:    Colonic stricture (HCC)  Active Problems:    S/P laparoscopic-assisted sigmoidectomy    Essential hypertension  Resolved Problems:    * No resolved hospital problems. *      Plan:  1. HTN- change to IV metoprolol until able to tolerate PO  2.   Nausea and abd distention - change protonix to PO, holding lipitor and femara for now    Prognosis:  Good    Code status:  Full code    DVT prophylaxis: Lovenox  GI prophylaxis: Proton Pump Inhibitor  Antibiotic prophylaxis indicated:   no  Diet:  Diet NPO Exceptions are: Sips of Water with Meds, Sips of Clear Liquids, Popsicles, Ice Chips    Disposition:  Home with home health    Medications:  Scheduled Meds:   [START ON 4/30/2022] spironolactone  50 mg Oral Daily    [START ON 4/29/2022] metoprolol  5 mg IntraVENous Q6H    ketorolac  1 drop Both Eyes BID    letrozole  2.5 mg Oral Daily    montelukast  10 mg Oral Nightly    [Held by provider] propranolol  60 mg Oral Daily    mometasone-formoterol  2 puff Inhalation BID    difluprednate  2 drop Both Eyes BID    sodium chloride flush  5-40 mL IntraVENous 2 times per day    enoxaparin  40 mg SubCUTAneous Daily    pantoprazole (PROTONIX) 40 mg injection  40 mg IntraVENous Daily    [Held by provider] atorvastatin  20 mg Oral Nightly    gabapentin  400 mg Oral 4x Daily    mirtazapine  15 mg Oral Nightly    [Held by provider] pantoprazole  40 mg Oral BID AC    sertraline  50 mg Oral Nightly       PRN Meds:  albuterol sulfate HFA, sodium chloride flush, sodium chloride, ondansetron **OR** ondansetron, acetaminophen, HYDROmorphone **OR** HYDROmorphone, ALPRAZolam, traMADol    IV:   sodium chloride      sodium chloride 100 mL/hr at 04/28/22 0716         Intake/Output Summary (Last 24 hours) at 4/28/2022 1310  Last data filed at 4/28/2022 0515  Gross per 24 hour   Intake 4267 ml   Output 950 ml   Net 3317 ml       Results:  CBC:   Recent Labs     04/28/22  0548   WBC 22.6*   HGB 11.6*   HCT 35.5*   MCV 97.0        BMP:   Recent Labs     04/27/22  0845 04/28/22  0548    141   K 4.0 5.2*    107   CO2 20* 22   BUN 13 20   CREATININE 1.3* 1.5*     Mag: No results for input(s): MAG in the last 72 hours.   Phos:   Lab Results   Component Value Date    PHOS 3.8 02/24/2017     No results found for: GLU    LIVER PROFILE: No results for input(s): AST, ALT, LIPASE, BILIDIR, BILITOT, ALKPHOS in the last 72 hours. Invalid input(s): AMYLASE,  ALB  PT/INR: No results for input(s): PROTIME, INR in the last 72 hours. APTT: No results for input(s): APTT in the last 72 hours. UA:No results for input(s): NITRITE, COLORU, PHUR, LABCAST, WBCUA, RBCUA, MUCUS, TRICHOMONAS, YEAST, BACTERIA, CLARITYU, SPECGRAV, LEUKOCYTESUR, UROBILINOGEN, BILIRUBINUR, BLOODU, GLUCOSEU, AMORPHOUS in the last 72 hours.     Invalid input(s): [facility] input(s): ABG  Lab Results   Component Value Date    CALCIUM 9.3 04/28/2022    PHOS 3.8 02/24/2017               Electronically signed by Tulio Harrison MD on 4/28/2022 at 1:10 PM

## 2022-04-28 NOTE — PROGRESS NOTES
[facility] GENERAL SURGERY    Surgery Progress Note           POD # 1    PATIENT NAME: Linsey Duque     TODAY'S DATE: 4/28/2022    INTERVAL HISTORY:    Pt  Abdominal pain and nausea. OBJECTIVE:   VITALS:  /70   Pulse 98   Temp 97.7 °F (36.5 °C) (Oral)   Resp 22   Ht 5' 3\" (1.6 m)   Wt 169 lb (76.7 kg)   SpO2 (!) 89%   Breastfeeding No   BMI 29.94 kg/m²     INTAKE/OUTPUT:    I/O last 3 completed shifts: In: 6493 [I.V.:4267]  Out: 950 [Urine:750; Blood:200]  No intake/output data recorded. CONSTITUTIONAL:  awake and alert  LUNGS:  no crackles or wheezing  ABDOMEN:    soft, mildly distended, tenderness noted diffusely   INCISION: clean, dry    Data:  CBC: Recent Labs     04/28/22  0548   WBC 22.6*   HGB 11.6*   HCT 35.5*        Bandemia 30%    BMP:    Recent Labs     04/27/22  0845 04/28/22  0548    141   K 4.0 5.2*    107   CO2 20* 22   BUN 13 20   CREATININE 1.3* 1.5*   GLUCOSE 119* 154*       ASSESSMENT AND PLAN:  77 y.o. female status post ROBOTIC, SIGMOIDCOLECTOMY WITH REVISION OF COLORECTAL ANASTOMOSIS  CYSTOSCOPY, BILATERAL RETROGRADE PYELOGRAM  GI: continue with NPO for now  : continue with kasper to monitor I/O, monitor renal function, fluid bolus if needed. Activity: OOB to chair. Pain: continue with current orders. Prophy: lovenox, protonix    Monitor closely for signs of ischemia given redo of anastomosis during time of surgery. Discussed at length with the pt. If there is concern, they she would require taking back to the OR with possibility of colostomy. Discussed with nursing as well. Electronically signed by VERITO Camejo - ELISEO     Surgery Staff    I have examined this patient, and read and agree with the note by Ap Felix CNP from today; more than half of the total time was spent by me on the encounter. Currently hemodynamically stable with adequate urine output.   Will ensure sufficient fluid resuscitation and monitor closely. If any signs of ischemia develop/worsen, then may need to consider, at minimum, an operative assessment of the colon for viability. Discussed these issues in detail w/ patient; she appears to understand, asks appropriate questions, and agrees with the plan.     Clive Tabares MD

## 2022-04-28 NOTE — CONSULTS
Consult placed    [facility] knabb  Date:4/27/2022,  Time:9:27 PM        Electronically signed by Jose Britt on 4/27/2022 at 9:27 PM

## 2022-04-28 NOTE — PROGRESS NOTES
Pt axo. Assessment completed as charted. Pt with some belching and nausea with PO pills. PRN antiemetics and PRN pain medications given per orders. All incisions c/d/i. Pt denies additional needs. Will continue to monitor. Call light in reach.

## 2022-04-28 NOTE — PLAN OF CARE
Problem: Discharge Planning  Goal: Discharge to home or other facility with appropriate resources  4/28/2022 0123 by Be Olivo RN  Outcome: Progressing  4/27/2022 2118 by Genevieve Bello RN  Outcome: Progressing     Problem: Pain  Goal: Verbalizes/displays adequate comfort level or baseline comfort level  4/28/2022 0123 by Be Olivo RN  Outcome: Progressing  4/27/2022 2118 by Genevieve Bello RN  Outcome: Progressing     Problem: Safety - Adult  Goal: Free from fall injury  4/28/2022 0123 by Be Olivo RN  Outcome: Progressing  4/27/2022 2118 by Genevieve Bello RN  Outcome: Progressing     Problem: ABCDS Injury Assessment  Goal: Absence of physical injury  Outcome: Progressing

## 2022-04-28 NOTE — PROGRESS NOTES
4 Eyes Skin Assessment     The patient is being assess for  Admission    I agree that 2 RN's have performed a thorough Head to Toe Skin Assessment on the patient. ALL assessment sites listed below have been assessed. Areas assessed by both nurses:   [x]   Head, Face, and Ears   [x]   Shoulders, Back, and Chest  [x]   Arms, Elbows, and Hands   [x]   Coccyx, Sacrum, and IschIum  [x]   Legs, Feet, and Heels        Does the Patient have Skin Breakdown?   No         Femi Prevention initiated:  No   Wound Care Orders initiated:  No      [facility] nurse consulted for Pressure Injury (Stage 3,4, Unstageable, DTI, NWPT, and Complex wounds), New and Established Ostomies:  No      Nurse 1 eSignature: Electronically signed by Radha Low RN on 4/28/22 at 3:53 AM EDT    **SHARE this note so that the co-signing nurse is able to place an eSignature**    Nurse 2 eSignature: Electronically signed by Stu Kelsey RN on 4/28/22 at 3:55 AM EDT

## 2022-04-28 NOTE — PROGRESS NOTES
Patient:  Angela Kaminski  YOB: 1955   Date of Service:  04/28/22      Urology Attending Daily Progress Note    Chief complaint: \"feeling ok\"    Interval HPI:    WBC 22  CR 1.5     She feels ok, in chair       Objective:    Patient Vitals for the past 8 hrs:   BP Temp Temp src Pulse Resp SpO2   04/28/22 1221 101/64 98.8 °F (37.1 °C) Oral 100 18 92 %   04/28/22 0840 -- -- -- -- 22 (!) 89 %   04/28/22 0811 106/70 97.7 °F (36.5 °C) Oral 98 18 91 %     I/O last 3 completed shifts: In: 4267 [I.V.:4267]  Out: 950 [Urine:750; Blood:200]     Physical Exam:   Constitutional: comfortable in hospital bed, no acute distress  Abdomen: soft, appropriately tender, no guarding, some distension, incisions c/d/i  Genitourinary: urethal kasper draining light pink urine  Psych: normal mood and affect, appropriately answers questions   Skin, extremities: stable, exposed skin intact, no digital cyanosis     Labs:     No results found for: PSA  Lab Results   Component Value Date    CREATININE 1.5 (H) 04/28/2022    CREATININE 1.3 (H) 04/27/2022    CREATININE 0.8 04/21/2022     Lab Results   Component Value Date    COLORU yellow 06/28/2018    COLORU Yellow 11/24/2014    NITRU Negative 11/24/2014    GLUCOSEU neg 06/28/2018    GLUCOSEU Negative 11/24/2014    KETUA neg 06/28/2018    KETUA Negative 11/24/2014    UROBILINOGEN 0.2 11/24/2014    BILIRUBINUR neg 06/28/2018     Lab Results   Component Value Date    WBC 22.6 (H) 04/28/2022    HGB 11.6 (L) 04/28/2022    HCT 35.5 (L) 04/28/2022    MCV 97.0 04/28/2022     04/28/2022       Radiology:  \"Imaging was independently reviewed by myself and I agree with the radiology interpretation    Assessment:  66yoF s/p robotic, sigmoid colectomy with revision of colorectal anastomosis, cystoscopy, bilateral retrograde pyelogram on 4/27    Plan:  -- discussed with pt our role in case yesterday - discussed that bladder and ureters looked fine, elected to not place a stent   -- I discussed that she has two large stone in left kidney - 1.8cm and 1.5cm - see Dr Abel Foreman or Dr Azeem Horn as outpatient in a few months once recovered to discuss PCNL - has had ESWL previously, stone burden likely too great now to repeat this - nothing pressing in this regard    Signing off for now - call with questions     Luisa Santos MD  [facility]

## 2022-04-28 NOTE — CONSULTS
Hospital Medicine  Consult History & Physical        Chief Complaint:  Sigmoid stricture    Date of Service: Pt seen/examined in consultation on 4/27/2022    History Of Present Illness:      77 y.o. female, s/p robotic, sigmoid colectomy with revision of colorectal anastomosis, cystoscopy, bilateral retrograde pyelogram, who we are asked to see/evaluate by Taisha Lopez MD for medical management of hypertension. The patient had elective colon surgery d/t chronic left sided strictures. She is doing well post operatively. She is resting comfortably and quietly in bed. The patient stated her pain is well controlled and she is taking liquid po well. The patients blood pressure is well controlled at this time. Past Medical History:        Diagnosis Date    Asthma     CAD (coronary artery disease)     Cancer ([address]     RIGHT BREAST    Chronic diarrhea     Dr. Srinath Dai Chronic kidney disease     kidney stones    Clostridium difficile diarrhea 10/23/13    positive by PCR    Fibromyalgia     Hemorrhoid     Hyperlipidemia     Hypertension     Kidney stone     Migraines     Osteoarthritis     fibromyalgia    Sarcoidosis 2003    sees Dr. Flowers Notice     Past Surgical History:        Procedure Laterality Date    ABDOMINAL EXPLORATION SURGERY  8/19/12    REDUCTION OF VULVUS; RIGHT COLECTOMY; SMALL BOWEL RESECTION; INSERTION OF ON Q PAIN BUSTER    BREAST BIOPSY      CARDIAC CATHETERIZATION  2010    CLEAN    COLONOSCOPY  2010    normal    COLONOSCOPY N/A 6/16/2020    COLON W/ANES.  performed by Violetta Bullard MD at [facility]  12/2011    ESOPHAGEAL DILATATION  6/16/2020    ESOPHAGEAL DILATION Ernestina Moreno performed by Violetta Bullard MD at [facility]  5/2009    cataract, right    HYSTERECTOMY  2000    LITHOTRIPSY  1/19/2012    LITHOTRIPSY      04/2012    MASTECTOMY Bilateral 5/18/2021    BILATERAL SIMPLE MASTECTOMY, RIGHT SENTINEL LYMPH NODE BIOPSY performed by Herlinda Quick MD at [address]    right    PARTIAL HYSTERECTOMY     [address]    UPPER GASTROINTESTINAL ENDOSCOPY  2/27/13    UPPER GASTROINTESTINAL ENDOSCOPY  11/14/2017    gastritis    UPPER GASTROINTESTINAL ENDOSCOPY N/A 6/16/2020    EGD W/FELICITY. (11:00) performed by Demario Fisher MD at [place] [address]  12/2011    US BREAST NEEDLE BIOPSY RIGHT Right 4/15/2021    US BREAST NEEDLE BIOPSY RIGHT 4/15/2021 Fiorella Silva MD [address] [place] [address]     Medications Prior to Admission:    Prior to Admission medications    Medication Sig Start Date End Date Taking? Authorizing Provider   ALPCLARIBELZojennifer Cruz) 1 MG tablet Take 1 mg by mouth 2 times daily as needed for Anxiety. Yes Historical Provider, MD   HYDROcodone-acetaminophen 5-300 MG TABS Take 1 tablet by mouth every 4 hours as needed for Pain. Yes Historical Provider, MD   Folic Acid 0.8 MG CAPS Take 1 capsule by mouth daily   Yes Historical Provider, MD   traZODone (DESYREL) 100 MG tablet Take 100 mg by mouth nightly   Yes Historical Provider, MD   traMADol (ULTRAM) 50 MG tablet Take 50 mg by mouth 2 times daily as needed for Pain.    Yes Historical Provider, MD   predniSONE (DELTASONE) 5 MG tablet Take 5 mg by mouth daily     Historical Provider, MD   vitamin D (ERGOCALCIFEROL) 1.25 MG (54856 UT) CAPS capsule Take 1 capsule by mouth once a week 4/6/22   FRANCISCO Hedrick   mirtazapine (REMERON) 15 MG tablet TAKE 1 TABLET BY MOUTH ONCE DAILY AT NIGHT 4/4/22   FRANCISCO Hedrick   montelukast (SINGULAIR) 10 MG tablet TAKE 1 TABLET BY MOUTH ONCE DAILY NIGHTLY 3/31/22   VERITO Rivero CNP   pantoprazole (PROTONIX) 40 MG tablet TAKE 1 TABLET BY MOUTH TWICE DAILY BEFORE MEAL(S) 3/31/22   VERITO Rivero CNP   alendronate (FOSAMAX) 70 MG tablet Take 1 tablet by mouth once a week  Patient taking differently: Take 70 mg by mouth every 7 days Take 1 tablet by mouth once a week 2/25/22   VERITO Haskins CNP   spironolactone (ALDACTONE) 50 MG tablet Take 1 tablet by mouth once daily  Patient taking differently: Take 50 mg by mouth daily Take 1 tablet by mouth once daily 2/24/22   VERITO Haskins CNP   sertraline (ZOLOFT) 100 MG tablet TAKE 1 & 1/2 (ONE & ONE-HALF) TABLETS BY MOUTH ONCE DAILY  Patient taking differently: Take 50 mg by mouth at bedtime TAKE 1 & 1/2 (ONE & ONE-HALF) TABLETS BY MOUTH ONCE DAILY 2/24/22   VERITO Haskins - CNP   valACYclovir (VALTREX) 500 MG tablet Take 1 tablet by mouth once daily  Patient taking differently: Take 500 mg by mouth daily Take 1 tablet by mouth once daily 1/28/22   VERITO Hansen CNP   propranolol (INDERAL LA) 60 MG extended release capsule Take 1 capsule by mouth once daily  Patient taking differently: Take 60 mg by mouth daily  11/8/21   Elizabeth Richards MD   letrozole [facility]) 2.5 MG tablet Take 2.5 mg by mouth daily  8/3/21   Historical Provider, MD   atorvastatin (LIPITOR) 20 MG tablet Take 1 tablet by mouth nightly TAKE 1 TABLET BY MOUTH EVERY DAY 6/25/21   Elizabeth Richards MD   ketorolac (ACULAR) 0.5 % ophthalmic solution Place 1 drop into both eyes 2 times daily  4/8/21   Historical Provider, MD   fluticasone-salmeterol (ADVAIR) 250-50 MCG/DOSE AEPB Inhale into the lungs 2 times daily [facility] Provider, MD   albuterol sulfate  (90 Base) MCG/ACT inhaler 2 puffs q 4 prn 1/15/21   Historical Provider, MD   diclofenac sodium 1 % GEL Apply topically 4 times daily Indications: [place]  prescribes     Amor Ling MD   difluprednate (DUREZOL) 0.05 % EMUL Apply 2 drops to eye 2 times daily BOTH EYES 2/6/18   Historical Provider, MD   gabapentin (NEURONTIN) 400 MG capsule 400 mg 4 times daily.  Indications: Prescribed by Arthritis   9/6/19   Amor Ling MD   cyclobenzaprine (FLEXERIL) 10 MG tablet Take 10 mg by mouth 3 times daily as needed for Muscle spasms    Historical Provider, MD   methotrexate (RHEUMATREX) 2.5 MG chemo tablet Take 7.5 mg by mouth every 7 days     Marquis Logan MD   fluocinonide (LIDEX) 0.05 % cream Apply topically 2 times daily Apply topically 2 times daily. Historical Provider, MD   promethazine (PHENERGAN) 12.5 MG tablet Take 25 mg by mouth every 6 hours as needed for Nausea Indications: Dr. Bates Kingdom Provider, MD     Allergies:  Infliximab, Ultrasound gel, Codeine, and Penicillins    Social History:      The patient currently lives at home    TOBACCO:   reports that she quit smoking about 19 months ago. Her smoking use included cigarettes. She started smoking about 3 years ago. She has a 10.00 pack-year smoking history. She has never used smokeless tobacco.  ETOH:   reports no history of alcohol use. Family History:     Reviewed in detail. Positive as follows:        Problem Relation Age of Onset    Heart Disease Father     Cancer Father         skin cancer- unknown    Cancer Brother         skin cancer- forehead and nose- unknown     REVIEW OF SYSTEMS COMPLETED:   Pertinent positives as noted in the HPI. All other systems reviewed and negative. PHYSICAL EXAM PERFORMED:    /74   Pulse 91   Temp 98.4 °F (36.9 °C) (Oral)   Resp 14   Ht 5' 3\" (1.6 m)   Wt 169 lb (76.7 kg)   SpO2 91%   Breastfeeding No   BMI 29.94 kg/m²      General appearance: Pleasant, obese female in no apparent distress, appears stated age and cooperative. HEENT: Pupils equal, round, and reactive to light. Wears sunglasses  Extra ocular muscles intact. Conjunctivae/corneas clear. Neck: Supple, with full range of motion. No jugular venous distention. Trachea midline. Respiratory:  Normal respiratory effort. Clear to auscultation, bilaterally without Rales/Wheezes/Rhonchi. Cardiovascular: Regular rate and rhythm with normal S1/S2 without murmurs, rubs or gallops.   Abdomen: Soft, obese, round non-tender, non-distended with normal bowel sounds. Musculoskeletal: No clubbing, cyanosis or edema bilaterally. Skin: Skin color, texture, turgor normal. OR dressing c,d,i, not taken down upper incisions glued and KIANNA  Neurologic:  Neurovascularly intact. Cranial nerves: II-XII intact, grossly non-focal.  Psychiatric: Alert and oriented, thought content appropriate, normal insight  Capillary Refill: Brisk,3 seconds, normal   Peripheral Pulses: +2 palpable, equal bilaterally     Labs:     Recent Labs     04/27/22  0845      K 4.0      CO2 20*   BUN 13   CREATININE 1.3*   CALCIUM 10.3     Urinalysis:    Lab Results   Component Value Date    NITRU Negative 11/24/2014    WBCUA rare 11/24/2014    BACTERIA 1+ 08/18/2012    RBCUA None seen 11/24/2014    BLOODU SMALL 06/28/2018    BLOODU Negative 11/24/2014    SPECGRAV 1.010 06/28/2018    SPECGRAV 1.010 11/24/2014    GLUCOSEU neg 06/28/2018    GLUCOSEU Negative 11/24/2014     Radiology: I have reviewed the radiology reports with the following interpretation:     XR ABDOMEN (KUB) (SINGLE AP VIEW)   Final Result   Intraprocedural fluoroscopic spot images as above. See separate procedure   note for more information. FLUORO FOR SURGICAL PROCEDURES   Final Result        ASSESSMENT:    Active Hospital Problems    Diagnosis Date Noted    S/P laparoscopic-assisted sigmoidectomy [Z90.49] 04/27/2022     Priority: Medium    Colonic stricture [facility]) [K56.699] 02/26/2021    Essential hypertension [I10] 04/08/2010     PLAN:    Sigmoid stricture  -s/p robotic, sigmoid colectomy with revision of colorectal anastomosis, cystoscopy, bilateral retrograde pyelogram  -MIVF  -NPO x medications  -prn pain medication  -cbc in am    Essential HTN in setting of known CAD, controlled  -continue home medications    HLD   -continue atorvastatin    Obesity  With Body mass index is 30.0 kg/m². Complicating assessment and treatment.  Placing patient at risk for multiple co-morbidities as well as early death and contributing to the patient's presentation. Counseled on weight loss    Asthma, stable  -does not appear to be in acute exacerbation at this time  -continue home inhalers    DVT Prophylaxis: Lovenox    Diet: Diet NPO Exceptions are: Sips of Water with Meds, Sips of Clear Liquids, Popsicles, Ice Chips     Code Status: Full Code    PT/OT Eval Status: Active and ongoing    Dispo - per primary team     Thank you for the consultation, will follow up as needed    Electronically signed by VERITO Hernandez CNP on 4/27/22 at 9:36 PM EDT   --------------------------Anticipated DR. Magdaleno melgar-----------------------------------

## 2022-04-28 NOTE — PROGRESS NOTES
Pt transported from PACU to unit w/ all belongings. Shift assessment completed and charted. VSS. A/o x4. Standard safety measures in place. Stat lock placed on pt. Valdez drained 300 ml clear/yellow urine. Pt given meds for nausea and pain - see MAR. Pt stood up at side of bed; tolerated well. Pt denied further needs and was stable when writer left room.

## 2022-04-28 NOTE — OP NOTE
Operative Note      Patient: Chasity Ornelas  YOB: 1955  MRN: [id number]    Date of Procedure: 4/27/2022    Pre-Op Diagnosis: SIGMOID STRICTURE    Post-Op Diagnosis: Same       Procedure(s):  Cystoscopy  Bilateral retrograde pyelograms with fluoroscopic interpretation less than 1 hour    Surgeon(s):  MD Jania Gonzalez MD Trudell Reasoner, MD    Assistant:   Surgical Assistant: Jermaine Bonds RN; Quiana Colmenares; Horace Iraheta    Anesthesia: General    Estimated Blood Loss (mL): Minimal    Complications: None    Specimens:   ID Type Source Tests Collected by Time Destination   A : COLON AND ANASTOMOSIS RINGS Tissue Colon SURGICAL PATHOLOGY Jania Dhillon MD 4/27/2022 1459        Implants:  * No implants in log *      Drains:   Urinary Catheter (Active)   Catheter Indications Perioperative use for selected surgical procedures 04/28/22 0811   Site Assessment Pink 04/28/22 0811   Urine Color Matilde 04/28/22 0811   Urine Appearance Clear 04/28/22 0811   Output (mL) 150 mL 04/28/22 0515       [REMOVED] Closed/Suction Drain Right Breast Bulb 15 Indonesian (Removed)       [REMOVED] Closed/Suction Drain Left Breast Bulb 15 Indonesian (Removed)       Findings: cystoscopy and b/l RGP done - no evidence of left ureteral injury, no contrast extravasation noted, no evidence of right ureteral injury, mild erythema at posterior bladder from kasper but no evidence of bladder injury - decision made to forego stent placement, kasper replaced     Detailed Description of Procedure:   I was called to the operating room for an intraoperative consultation. There was concern that during resection of the sigmoid that the left ureter was densely adherent to this and we wanted to rule out ureteral injury. The patient was already prepped and draped in the dorsolithotomy position. I removed the existing Kasper catheter. Of note he had clear yellow urine.   I then inserted a 21 Indonesian rigid cystoscope per urethra. The urethra appeared normal.  The bladder was then surveyed and no evidence of any lacerations in the bladder were noted. There was some mild erythema at the posterior bladder from the Valdez catheter. Started on the left side and inserted a 5 [place] open-ended into the left ureteral orifice. A retrograde pyelogram was shot and this did not demonstrate any evidence of urinary extravasation or hydronephrosis. Drainage films were also obtained and showed good efflux of urine. I hence elected not to place a stent on the side. The same procedure was performed the right side and once again no evidence of contrast extravasation or hydronephrosis was appreciated.   The bladder was then emptied and the Valdez was replaced and the case was turned back over to the general surgery to        Electronically signed by Clyde Martins MD on 4/28/2022 at 2:33 PM

## 2022-04-28 NOTE — PROGRESS NOTES
Valdez catheter to be left in at this time. Pt is followed by surgery and urology and is at risk for additional surgical interventions.

## 2022-04-29 ENCOUNTER — APPOINTMENT (OUTPATIENT)
Dept: GENERAL RADIOLOGY | Age: 67
DRG: 329 | End: 2022-04-29
Attending: SURGERY
Payer: MEDICARE

## 2022-04-29 LAB
ANION GAP SERPL CALCULATED.3IONS-SCNC: 11 MMOL/L (ref 3–16)
BASOPHILS ABSOLUTE: 0 K/UL (ref 0–0.2)
BASOPHILS RELATIVE PERCENT: 0.2 %
BUN BLDV-MCNC: 15 MG/DL (ref 7–20)
CALCIUM SERPL-MCNC: 9.8 MG/DL (ref 8.3–10.6)
CHLORIDE BLD-SCNC: 105 MMOL/L (ref 99–110)
CO2: 23 MMOL/L (ref 21–32)
CREAT SERPL-MCNC: 0.8 MG/DL (ref 0.6–1.2)
EOSINOPHILS ABSOLUTE: 0 K/UL (ref 0–0.6)
EOSINOPHILS RELATIVE PERCENT: 0.2 %
GFR AFRICAN AMERICAN: >60
GFR NON-AFRICAN AMERICAN: >60
GLUCOSE BLD-MCNC: 147 MG/DL (ref 70–99)
HCT VFR BLD CALC: 33.1 % (ref 36–48)
HEMOGLOBIN: 10.7 G/DL (ref 12–16)
LYMPHOCYTES ABSOLUTE: 3.7 K/UL (ref 1–5.1)
LYMPHOCYTES RELATIVE PERCENT: 17.9 %
MAGNESIUM: 1.9 MG/DL (ref 1.8–2.4)
MCH RBC QN AUTO: 31.5 PG (ref 26–34)
MCHC RBC AUTO-ENTMCNC: 32.3 G/DL (ref 31–36)
MCV RBC AUTO: 97.4 FL (ref 80–100)
MONOCYTES ABSOLUTE: 1.5 K/UL (ref 0–1.3)
MONOCYTES RELATIVE PERCENT: 7.3 %
NEUTROPHILS ABSOLUTE: 15.5 K/UL (ref 1.7–7.7)
NEUTROPHILS RELATIVE PERCENT: 74.4 %
PDW BLD-RTO: 18 % (ref 12.4–15.4)
PLATELET # BLD: 376 K/UL (ref 135–450)
PMV BLD AUTO: 7.6 FL (ref 5–10.5)
POTASSIUM SERPL-SCNC: 4.2 MMOL/L (ref 3.5–5.1)
RBC # BLD: 3.4 M/UL (ref 4–5.2)
SODIUM BLD-SCNC: 139 MMOL/L (ref 136–145)
WBC # BLD: 20.8 K/UL (ref 4–11)

## 2022-04-29 PROCEDURE — C9113 INJ PANTOPRAZOLE SODIUM, VIA: HCPCS | Performed by: SURGERY

## 2022-04-29 PROCEDURE — 2700000000 HC OXYGEN THERAPY PER DAY

## 2022-04-29 PROCEDURE — 71046 X-RAY EXAM CHEST 2 VIEWS: CPT

## 2022-04-29 PROCEDURE — 99024 POSTOP FOLLOW-UP VISIT: CPT | Performed by: SURGERY

## 2022-04-29 PROCEDURE — 36415 COLL VENOUS BLD VENIPUNCTURE: CPT

## 2022-04-29 PROCEDURE — 85025 COMPLETE CBC W/AUTO DIFF WBC: CPT

## 2022-04-29 PROCEDURE — 2580000003 HC RX 258: Performed by: SURGERY

## 2022-04-29 PROCEDURE — 94640 AIRWAY INHALATION TREATMENT: CPT

## 2022-04-29 PROCEDURE — 2500000003 HC RX 250 WO HCPCS: Performed by: INTERNAL MEDICINE

## 2022-04-29 PROCEDURE — 94761 N-INVAS EAR/PLS OXIMETRY MLT: CPT

## 2022-04-29 PROCEDURE — 6370000000 HC RX 637 (ALT 250 FOR IP): Performed by: NURSE PRACTITIONER

## 2022-04-29 PROCEDURE — 6370000000 HC RX 637 (ALT 250 FOR IP): Performed by: SURGERY

## 2022-04-29 PROCEDURE — A4216 STERILE WATER/SALINE, 10 ML: HCPCS | Performed by: SURGERY

## 2022-04-29 PROCEDURE — 6360000002 HC RX W HCPCS: Performed by: SURGERY

## 2022-04-29 PROCEDURE — 80048 BASIC METABOLIC PNL TOTAL CA: CPT

## 2022-04-29 PROCEDURE — 6360000002 HC RX W HCPCS: Performed by: NURSE PRACTITIONER

## 2022-04-29 PROCEDURE — 83735 ASSAY OF MAGNESIUM: CPT

## 2022-04-29 PROCEDURE — 1200000000 HC SEMI PRIVATE

## 2022-04-29 RX ORDER — PROCHLORPERAZINE EDISYLATE 5 MG/ML
10 INJECTION INTRAMUSCULAR; INTRAVENOUS EVERY 6 HOURS PRN
Status: DISCONTINUED | OUTPATIENT
Start: 2022-04-29 | End: 2022-05-02 | Stop reason: HOSPADM

## 2022-04-29 RX ADMIN — SODIUM CHLORIDE: 9 INJECTION, SOLUTION INTRAVENOUS at 08:57

## 2022-04-29 RX ADMIN — METOPROLOL TARTRATE 5 MG: 5 INJECTION INTRAVENOUS at 17:41

## 2022-04-29 RX ADMIN — MONTELUKAST SODIUM 10 MG: 10 TABLET ORAL at 21:29

## 2022-04-29 RX ADMIN — METOPROLOL TARTRATE 5 MG: 5 INJECTION INTRAVENOUS at 23:17

## 2022-04-29 RX ADMIN — SODIUM CHLORIDE, PRESERVATIVE FREE 40 MG: 5 INJECTION INTRAVENOUS at 09:01

## 2022-04-29 RX ADMIN — ENOXAPARIN SODIUM 40 MG: 100 INJECTION SUBCUTANEOUS at 09:01

## 2022-04-29 RX ADMIN — METOPROLOL TARTRATE 5 MG: 5 INJECTION INTRAVENOUS at 05:40

## 2022-04-29 RX ADMIN — METOPROLOL TARTRATE 5 MG: 5 INJECTION INTRAVENOUS at 00:05

## 2022-04-29 RX ADMIN — MIRTAZAPINE 15 MG: 15 TABLET, FILM COATED ORAL at 21:29

## 2022-04-29 RX ADMIN — GABAPENTIN 400 MG: 400 CAPSULE ORAL at 21:29

## 2022-04-29 RX ADMIN — PROCHLORPERAZINE EDISYLATE 10 MG: 5 INJECTION INTRAMUSCULAR; INTRAVENOUS at 03:10

## 2022-04-29 RX ADMIN — Medication 2 PUFF: at 19:59

## 2022-04-29 RX ADMIN — GABAPENTIN 400 MG: 400 CAPSULE ORAL at 09:01

## 2022-04-29 RX ADMIN — METOPROLOL TARTRATE 5 MG: 5 INJECTION INTRAVENOUS at 13:37

## 2022-04-29 RX ADMIN — SODIUM CHLORIDE, PRESERVATIVE FREE 10 ML: 5 INJECTION INTRAVENOUS at 09:01

## 2022-04-29 RX ADMIN — SERTRALINE 50 MG: 50 TABLET, FILM COATED ORAL at 21:29

## 2022-04-29 RX ADMIN — Medication 2 PUFF: at 08:53

## 2022-04-29 NOTE — CARE COORDINATION
Chart reviewed day 2. Care managed per IM and surgery. Currently with O2 at [facility]. Diet advanced to cl liquids. No therapy input noted. Ambulatory in room.  Mikhail Gil RN

## 2022-04-29 NOTE — PROGRESS NOTES
Pt axo. Assessment completed as charted. Pt denies nausea or need for pain interventions at this time. Incisions c/d/i. F/c draining clear, dark yellow urine. Pt denies additional needs at this time. Pt with congested sounding cough and rhonchi on L side. MD updated and aware. See new orders for chest xray. Will continue to monitor. Call light in reach.

## 2022-04-29 NOTE — PROGRESS NOTES
Hospitalist Progress Note    CC: Colonic stricture [facility])    Hospital course:  84FD WF, s/p robotic, sigmoid colectomy with revision of colorectal anastomosis, cystoscopy, bilateral retrograde pyelogram, who we are asked to see/evaluate by surgery, Dr. Cecille Olivarez,  for medical management of hypertension. The patient had elective colon surgery d/t chronic left sided strictures. Currently abd distention and nausea - has some bowel sounds. BP stable - converting to IV for now    Admit date: 4/27/2022  Days in hospital:  2    24 Hour Events: pt passing some gas    Subjective: has some bowel sounds, abd distended but soft, less distended than yesterday - sounds fluid overloaded although her CXR is clear - she is almost 4L positive    ROS:   A comprehensive review of systems was negative except for: nausea and burping . Objective:    /86   Pulse 110   Temp 99.3 °F (37.4 °C) (Oral)   Resp 24   Ht 5' 3\" (1.6 m)   Wt 169 lb (76.7 kg)   SpO2 93%   Breastfeeding No   BMI 29.94 kg/m²     Gen: uncomfortable appearing  HEENT: NC/AT, moist mucous membranes, no oropharyngeal erythema or exudate  Neck: supple, trachea midline, no anterior cervical or SC LAD  Heart:  Normal s1/s2, RRR, no murmurs, gallops, or rubs. no leg edema  Lungs: very diminished bilaterally, no wheeze, no rales, no rhonchi, no crackles, no use of accessory muscles  Abd:some bowel sounds present, soft, mildly tender, very distended, no masses  Extrem:  No clubbing, cyanosis,  no edema  Skin: no rashes or lesions  Psych: A & O x3  Neuro: grossly intact, moves all four extremities. Assessment:    Principal Problem:    Colonic stricture (HCC)  Active Problems:    S/P laparoscopic-assisted sigmoidectomy    Essential hypertension  Resolved Problems:    * No resolved hospital problems. *      Plan:  1. HTN- change to IV metoprolol until able to tolerate PO  2.   Nausea and abd distention - change protonix to PO, holding lipitor and femara for now  3. Early fluid overload - would try to decrease IVF especially if pt able to tolerate liquids - consider lasix  4. Leukocytosis - persistent - recommend recheck of lactic acid - discussed with surgery    Prognosis:  Good    Code status:  Full code    DVT prophylaxis: Lovenox  GI prophylaxis: Proton Pump Inhibitor  Antibiotic prophylaxis indicated:   no  Diet:  ADULT DIET;  Clear Liquid    Disposition:  Home with home health    Medications:  Scheduled Meds:   [START ON 4/30/2022] spironolactone  50 mg Oral Daily    metoprolol  5 mg IntraVENous Q6H    albuterol sulfate HFA  2 puff Inhalation BID    ketorolac  1 drop Both Eyes BID    [Held by provider] letrozole  2.5 mg Oral Daily    montelukast  10 mg Oral Nightly    [Held by provider] propranolol  60 mg Oral Daily    mometasone-formoterol  2 puff Inhalation BID    difluprednate  2 drop Both Eyes BID    sodium chloride flush  5-40 mL IntraVENous 2 times per day    enoxaparin  40 mg SubCUTAneous Daily    pantoprazole (PROTONIX) 40 mg injection  40 mg IntraVENous Daily    [Held by provider] atorvastatin  20 mg Oral Nightly    gabapentin  400 mg Oral 4x Daily    mirtazapine  15 mg Oral Nightly    [Held by provider] pantoprazole  40 mg Oral BID AC    sertraline  50 mg Oral Nightly       PRN Meds:  prochlorperazine, sodium chloride flush, sodium chloride, ondansetron **OR** ondansetron, acetaminophen, HYDROmorphone **OR** HYDROmorphone, ALPRAZolam, traMADol    IV:   sodium chloride      sodium chloride 100 mL/hr at 04/29/22 0857         Intake/Output Summary (Last 24 hours) at 4/29/2022 1315  Last data filed at 4/29/2022 0537  Gross per 24 hour   Intake 1889.65 ml   Output 1450 ml   Net 439.65 ml       Results:  CBC:   Recent Labs     04/28/22  0548 04/28/22  1439 04/29/22  0601   WBC 22.6* 20.3* 20.8*   HGB 11.6* 11.0* 10.7*   HCT 35.5* 34.5* 33.1*   MCV 97.0 98.0 97.4    396 376     BMP: Recent Labs     04/28/22  0548 04/28/22  1439 04/29/22  0601    139 139   K 5.2* 4.4 4.2    106 105   CO2 22 21 23   BUN 20 19 15   CREATININE 1.5* 1.0 0.8     Mag: No results for input(s): MAG in the last 72 hours. Phos:   Lab Results   Component Value Date    PHOS 3.8 02/24/2017     No results found for: GLU    LIVER PROFILE: No results for input(s): AST, ALT, LIPASE, BILIDIR, BILITOT, ALKPHOS in the last 72 hours. Invalid input(s): AMYLASE,  ALB  PT/INR: No results for input(s): PROTIME, INR in the last 72 hours. APTT: No results for input(s): APTT in the last 72 hours. UA:No results for input(s): NITRITE, COLORU, PHUR, LABCAST, WBCUA, RBCUA, MUCUS, TRICHOMONAS, YEAST, BACTERIA, CLARITYU, SPECGRAV, LEUKOCYTESUR, UROBILINOGEN, BILIRUBINUR, BLOODU, GLUCOSEU, AMORPHOUS in the last 72 hours.     Invalid input(s): Gerson Page input(s): ABG  Lab Results   Component Value Date    CALCIUM 9.8 04/29/2022    PHOS 3.8 02/24/2017               Electronically signed by Yelitza Galicia MD on 4/29/2022 at 1:15 PM

## 2022-04-29 NOTE — PROGRESS NOTES
[facility] GENERAL SURGERY    Surgery Progress Note           POD # 2    PATIENT NAME: Domi Phipps     TODAY'S DATE: 4/29/2022    INTERVAL HISTORY:    Pt  Resting comfortably, reports stable abdominal pain, occasional mild nausea, no emesis. No flatus overnight. Having frequent productive cough. Steady urine output. Starting to feel hungry     OBJECTIVE:   VITALS:  BP (!) 151/94   Pulse 107   Temp 97.9 °F (36.6 °C) (Oral)   Resp 18   Ht 5' 3\" (1.6 m)   Wt 169 lb (76.7 kg)   SpO2 92%   Breastfeeding No   BMI 29.94 kg/m²     INTAKE/OUTPUT:    I/O last 3 completed shifts: In: 4156.7 [I.V.:4156.7]  Out: 1900 [Urine:1900]  No intake/output data recorded. CONSTITUTIONAL:  awake and alert  LUNGS:     ABDOMEN:   hypoactive bowel sounds, soft, non-distended, tenderness noted in the left lower quadrant   INCISION: clean, dry, no drainage    Data:  CBC: Recent Labs     04/28/22  0548 04/28/22  1439 04/29/22  0601   WBC 22.6* 20.3* 20.8*   HGB 11.6* 11.0* 10.7*   HCT 35.5* 34.5* 33.1*    396 376     BMP:    Recent Labs     04/28/22  0548 04/28/22  1439 04/29/22  0601    139 139   K 5.2* 4.4 4.2    106 105   CO2 22 21 23   BUN 20 19 15   CREATININE 1.5* 1.0 0.8   GLUCOSE 154* 141* 147*     Hepatic: No results for input(s): AST, ALT, ALB, BILITOT, ALKPHOS in the last 72 hours. Mag:      Recent Labs     04/29/22  0601   MG 1.90      Phos:   No results for input(s): PHOS in the last 72 hours. INR: No results for input(s): INR in the last 72 hours.       Radiology Review:       ASSESSMENT AND PLAN:  77 y.o. female status post ROBOTIC, SIGMOIDCOLECTOMY WITH REVISION OF COLORECTAL ANASTOMOSIS   - Pulm - check CXR, appreciate Int Med support   - GI - trial clear liquids   -  - cont w/ Valdez for accurate I/O's    - monitor WBC        Electronically signed by Comfort Glover MD

## 2022-04-29 NOTE — OP NOTE
[address], [place] [address]                                OPERATIVE REPORT    PATIENT NAME: Avelino Arndt                         :        1955  MED REC NO:   [id number]                          ROOM:       0345  ACCOUNT NO:   [de-identified]                           ADMIT DATE: 2022  PROVIDER:     Terrence Goodwin MD    DATE OF PROCEDURE:  2022    PREOPERATIVE DIAGNOSIS:  Sigmoid colonic stricture. POSTOPERATIVE DIAGNOSIS:  Sigmoid colonic stricture. PROCEDURE PERFORMED:  Robotic sigmoid colectomy with anastomosis, with  revision of colorectal anastomosis, and cystoscopy with bilateral  retrograde pyelogram.    SURGEON:  Terrence Goodwin MD    ASSISTANT:  Dr. Obrien Laureano:  General.    ESTIMATED BLOOD LOSS:  Less than 100 mL. SPECIMENS:  Sigmoid colon with two sets of anastomotic stapler donuts. COUNT:  Sponge and needle count correct. INDICATIONS:  The patient is a 60-year-old female who developed a  partial left-sided colonic obstruction due to a visualized stricture  seen on endoscopy. The exact etiology of this stricture is unknown,  although the biopsies of this stricture were benign. The patient does  report prior history of episodes of diverticulitis. Imaging did not  clarify the location of the stricture and on endoscopy, it was measured  at 40 cm from the anus. The endoscopist placed a tattoo ink into the  colon at the site of the stricture to aid in localization. We planned  to attempt a minimally invasive resection if possible, but the patient  was also counseled for potential need for a resection and diversion if  it was not deemed safe to perform an anastomosis. FINDINGS:  1. Densely adherent mid-sigmoid inflammatory process at the pelvic  inlet with tethering of the left ureter into the area of the chronic  inflammation, but no overt ureteral injury noted during dissection.   2. Intracorporeal sigmoid resection with end-to-end proximal sigmoid to  proximal rectal stapled 29-mm EEA anastomosis. 3.  Inadvertant extraction of the new anastomosis through a suprapubic  specimen extraction site causing injury to the anastomosis. 4.  Combined open/robotic resection of the anastomosis with creation of  a new 29-mm EEA colorectal anastomosis, with no leak on insufflation. 5.  Cystogram to ensure no left ureteral injury, to be dictated by  Urology. DESCRIPTION OF PROCEDURE:  After informed consent was obtained, the  patient was taken to the operating room, placed in the supine position. Preoperative antibiotics have been initiated in the holding area. Athrombic pumps were placed on the legs. General anesthesia was  administered without difficulty. The patient was now placed in a  low-lithotomy fashion in [address] [place] [address] with appropriate padding to all  pressure points. A Valdez catheter was placed to gravity in sterile  fashion. The abdomen and perineum were now prepped and draped in a  sterile fashion. I began with a trocar incision high in the right upper  quadrant just below the costal margin after infiltrating with local  anesthesia. The trocar was guided into the abdominal cavity and  insufflation was initiated. A total of four robotic trocars were placed  across the abdomen from the right lower quadrant to the left upper  quadrant. The right lower quadrant trocar was eventually up sized to a  12 mm size. We first explored the abdomen. The omentum was swept  upwards to expose the small bowel and the lower portion of the abdomen  and pelvis. I could see the left colon running down the side and then  into the pelvis and there was no immediately visible stained colon to  identify the site of the stricture.   However, the colon entering the  pelvic inlet was densely scarred in place with adhesions and I suspected  that the colon was coiled down in the pelvis due to chronic inflammation  wherein this will be where the stricture would be located. We now proceeded to dock the robot. I first began to gently mobilize  the sigmoid colon out of the pelvis. Attachments at various spots  around the sigmoid colon region were carefully incised to release the  colon. I gradually and carefully was able to lift the sigmoid colon up  out of the pelvis and recognized that it was chronically inflamed  consistent with prior diverticulitis. In the mid-sigmoid region, the  inflammation was at its greatest concentration and the colon was densely  adherent to the left sidewall of the pelvis. I started doing some of  this dissection off of the sidewall, but then I stopped and moved  proximal to this area and dissected down into the retroperitoneum to  identify the left ureter. The ureter was seen and I tracked it down  towards the pelvis, where I did see that it was being tented away from  the sidewall and gone up into the inflammation around the sigmoid colon. I dissected near to it, but I could not see any signs that I had directly  dissected into the ureter. I then proceeded to carefully finish   the ureter away from the sigmoid colon. we now continued releasing the colon by pulling it out of the pelvis, so  I eventually could straighten it out enough to see the colorectal  junction. The rectum was soft and uninvolved with the dense  inflammation. I cleared off the spot at the junction of the colon and  rectum where transection would occur. I then continued mobilizing the  rectum out of the pelvis by releasing the lateral peritoneum on either  side and carrying it around anteriorly near the peritoneal reflection to  further aid in mobilizing the rectum for the eventual anastomosis. I  then proceeded to transect the colon at the junction with the rectum  using a robotic linear stapler.   A vessel sealing device was then used  to begin transecting the mesentery directly on the posterior surface of  the colon and I worked proximally until I identified a spot that I felt  represented a healthy non-inflamed proximal sigmoid colon. At this point, I was prepared to place a stapler anvil into the  abdominal cavity to prepare the proximal colon for the   anastomosis. The assistant gently dilated the 12 mm trocar site in the  right lower quadrant to allow a 29 mm EEA stapler anvil to be positioned  into the abdominal cavity. Once this was in place, I was then able to  grasp the anvil and bring it towards the colon. I now used fluorescent  imaging on the robot to identify the demarcation between the  vascularized and devascularized colon. I then made a colotomy into the  devascularized colon in a longitudinal fashion. Several centimeters  proximal to the vascular margin, I made a small pinpoint colotomy. I  then inserted the anvil into the larger colotomy and I passed it  retrograde up to the small pin hole colotomy on the vascularized colon  and then pushed the stem of the anvil through this hole. We now used  another firing of the linear stapler to transect across the colon just  proximal to the vascularized margin and fully detach the specimen that  we removed later. I then placed a 3-0 pursestring suture around the  base of the stem of the anvil to secure the colon snugly around the  anvil. This proximal segment of the colon laid easily down into the  pelvis adjacent to the stapled end of the rectum. The assistant now went below and gently dilated the anal sphincter. The  stapler passed readily up to the end of the rectum. We positioned the  stapler just anterior to the staple line on the rectum and opened the  stapler through the anterior rectal wall. The proximal colon was  attached to the stapler and it was closed down and fired creating a  side-to-side stapled anastomosis. The stapler was removed and two  intact healthy donuts were seen of tissue.   I could then perform a  standard leak test of the anastomosis with a proctoscope placed  transanally and filling the pelvis with saline. This was repeated twice  and no leaks were noted on both occasions. I was now prepared to extract our sigmoid specimen to complete the  operation. I placed the specimen of colon in the lower abdomen just  underneath her existing Pfannenstiel incision. I then moved to the  bedside and reopened her prior Pfannenstiel incision for approximately 5  cm in length. I dissected down to the midline fascia and opened the  fascia vertically into the peritoneal cavity. A wound protector was  placed. I then reached in and grasped the bowel that was resting  immediately underneath my incision and pulled it up into my wound. It  easily came up through the abdominal wall wound and I saw a transverse  staple line on this segment of bowel that I brought up which I felt was  consistent with my stapled specimen of colon. However, this bowel  clearly would not come out of the abdominal cavity. I spent several  minutes trying to gently extract this segment of colon that I felt  represented the specimen that must be caught or stuck in the abdominal  cavity in some fashion. However, after these minutes of pulling out, I  ultimately recognized that I had in fact grasped and pulled up the  freshly created colorectal anastomosis into the abdominal wound. The  anastomosis was clearly now damaged and partially disrupted. At this point, I knew I would have to resect this anastomosis and create  a new stapled anastomosis. Within the abdominal wound that I had  already created, I widened the incision somewhat to provide more room  for working. With the anastomosis already up in the wound, I proceeded  to cut sharply through the colon just proximal to the anastomosis. The  proximal end of the colon was then prepared for a new anastomosis.   I  placed a pursestring device across the end of the proximal colon and  then inserted a new 29 mm anvil into the proximal colon and secured the  pursestring around the base of it. I then dropped the proximal colon  back into the abdominal cavity. We sealed this wound to allow resuming  of robotic approach. Then working from the robot, I examined the  rectum and found the spot distal to where I had damaged the anastomosis  and transected it with another firing of the robotic stapler. The  anastomosis that had been damaged was then moved out of the pelvis. We  now repeated the process of creating a stapled end-to-end colorectal  anastomosis. I did have to mobilize the lateral attachments of the  descending colon somewhat to allow this to come down with a minimal  tension. Once again the assistant passed the stapler transanally up to  the end of the rectum and it was opened to the rectal wall. The two pieces of colon were brought together and stapler closed and  fired. Once again, two intact stapler donuts of tissue were removed and  visualized. Another leak test was done and once again no leak was  noted. Of note, prior to firing the stapler a second time, I repeated  the immunofluorescent imaging and I noted there appeared to be adequate  perfusion in both the proximal colon and the rectum. At this point, we finished with our portion of the surgery. We  proceeded to close the Pfannenstiel incision in layers. I used a 0 PDS  suture to close the peritoneal layer and then another looped 0 PDS  suture to close the anterior fascia. The wound was copiously irrigated. The skin was closed at this incision site with staples. The 12 mm  trocar site that had been widened slightly to pass the anvil was closed  at the fascial level with a running 0 Vicryl suture. All the remaining  skin incisions of the trocar sites were closed with 3-0 Vicryl  subcutaneous sutures and Dermabond.   The Urology service then came in  and performed the cystogram and pyelogram for which they will dictate  their portion of the case.         Chato Mcclure MD    D: 04/28/2022 23:31:51       T: 04/29/2022 1:42:54     [id number]  Job#: [id number]     Doc#: [id number]    CC:

## 2022-04-29 NOTE — PLAN OF CARE
Verbalizes adequate pain relief with dilaudid as ordered. Bed locked and in low position, bedside table and call light within reach, nonskid socks and bed alarm on.   Problem: Pain  Goal: Verbalizes/displays adequate comfort level or baseline comfort level  Outcome: Progressing     Problem: Safety - Adult  Goal: Free from fall injury  Outcome: Progressing

## 2022-04-30 LAB
ANISOCYTOSIS: ABNORMAL
BANDED NEUTROPHILS RELATIVE PERCENT: 26 % (ref 0–7)
BASOPHILS ABSOLUTE: 0 K/UL (ref 0–0.2)
BASOPHILS RELATIVE PERCENT: 0 %
EOSINOPHILS ABSOLUTE: 0 K/UL (ref 0–0.6)
EOSINOPHILS RELATIVE PERCENT: 0 %
HCT VFR BLD CALC: 29.7 % (ref 36–48)
HEMOGLOBIN: 9.6 G/DL (ref 12–16)
LYMPHOCYTES ABSOLUTE: 1.2 K/UL (ref 1–5.1)
LYMPHOCYTES RELATIVE PERCENT: 6 %
MACROCYTES: ABNORMAL
MCH RBC QN AUTO: 31.4 PG (ref 26–34)
MCHC RBC AUTO-ENTMCNC: 32.4 G/DL (ref 31–36)
MCV RBC AUTO: 97 FL (ref 80–100)
MICROCYTES: ABNORMAL
MONOCYTES ABSOLUTE: 0.6 K/UL (ref 0–1.3)
MONOCYTES RELATIVE PERCENT: 3 %
NEUTROPHILS ABSOLUTE: 17.7 K/UL (ref 1.7–7.7)
NEUTROPHILS RELATIVE PERCENT: 65 %
PDW BLD-RTO: 17.8 % (ref 12.4–15.4)
PLATELET # BLD: 372 K/UL (ref 135–450)
PMV BLD AUTO: 7.5 FL (ref 5–10.5)
POIKILOCYTES: ABNORMAL
POLYCHROMASIA: ABNORMAL
RBC # BLD: 3.06 M/UL (ref 4–5.2)
SCHISTOCYTES: ABNORMAL
TARGET CELLS: ABNORMAL
WBC # BLD: 19.4 K/UL (ref 4–11)

## 2022-04-30 PROCEDURE — 2580000003 HC RX 258: Performed by: SURGERY

## 2022-04-30 PROCEDURE — 94761 N-INVAS EAR/PLS OXIMETRY MLT: CPT

## 2022-04-30 PROCEDURE — 99024 POSTOP FOLLOW-UP VISIT: CPT | Performed by: SURGERY

## 2022-04-30 PROCEDURE — 6360000002 HC RX W HCPCS: Performed by: NURSE PRACTITIONER

## 2022-04-30 PROCEDURE — 94640 AIRWAY INHALATION TREATMENT: CPT

## 2022-04-30 PROCEDURE — 6370000000 HC RX 637 (ALT 250 FOR IP): Performed by: INTERNAL MEDICINE

## 2022-04-30 PROCEDURE — 6370000000 HC RX 637 (ALT 250 FOR IP): Performed by: SURGERY

## 2022-04-30 PROCEDURE — 36415 COLL VENOUS BLD VENIPUNCTURE: CPT

## 2022-04-30 PROCEDURE — 6370000000 HC RX 637 (ALT 250 FOR IP): Performed by: NURSE PRACTITIONER

## 2022-04-30 PROCEDURE — 1200000000 HC SEMI PRIVATE

## 2022-04-30 PROCEDURE — 2700000000 HC OXYGEN THERAPY PER DAY

## 2022-04-30 PROCEDURE — 85025 COMPLETE CBC W/AUTO DIFF WBC: CPT

## 2022-04-30 PROCEDURE — A4216 STERILE WATER/SALINE, 10 ML: HCPCS | Performed by: SURGERY

## 2022-04-30 PROCEDURE — 6360000002 HC RX W HCPCS: Performed by: SURGERY

## 2022-04-30 PROCEDURE — C9113 INJ PANTOPRAZOLE SODIUM, VIA: HCPCS | Performed by: SURGERY

## 2022-04-30 RX ADMIN — ONDANSETRON 4 MG: 2 INJECTION INTRAMUSCULAR; INTRAVENOUS at 10:30

## 2022-04-30 RX ADMIN — GABAPENTIN 400 MG: 400 CAPSULE ORAL at 20:49

## 2022-04-30 RX ADMIN — SODIUM CHLORIDE, PRESERVATIVE FREE 40 MG: 5 INJECTION INTRAVENOUS at 08:45

## 2022-04-30 RX ADMIN — MONTELUKAST SODIUM 10 MG: 10 TABLET ORAL at 20:49

## 2022-04-30 RX ADMIN — SODIUM CHLORIDE, PRESERVATIVE FREE 10 ML: 5 INJECTION INTRAVENOUS at 08:47

## 2022-04-30 RX ADMIN — ENOXAPARIN SODIUM 40 MG: 100 INJECTION SUBCUTANEOUS at 08:46

## 2022-04-30 RX ADMIN — SODIUM CHLORIDE: 9 INJECTION, SOLUTION INTRAVENOUS at 15:45

## 2022-04-30 RX ADMIN — PROCHLORPERAZINE EDISYLATE 10 MG: 5 INJECTION INTRAMUSCULAR; INTRAVENOUS at 08:44

## 2022-04-30 RX ADMIN — SODIUM CHLORIDE, PRESERVATIVE FREE 10 ML: 5 INJECTION INTRAVENOUS at 20:49

## 2022-04-30 RX ADMIN — SERTRALINE 50 MG: 50 TABLET, FILM COATED ORAL at 20:49

## 2022-04-30 RX ADMIN — Medication 2 PUFF: at 21:00

## 2022-04-30 RX ADMIN — MIRTAZAPINE 15 MG: 15 TABLET, FILM COATED ORAL at 20:49

## 2022-04-30 RX ADMIN — SODIUM CHLORIDE: 9 INJECTION, SOLUTION INTRAVENOUS at 05:19

## 2022-04-30 ASSESSMENT — PAIN SCALES - GENERAL: PAINLEVEL_OUTOF10: 0

## 2022-04-30 NOTE — PLAN OF CARE
Denies pain. Bed locked and in low position, bedside table and call light within reach, nonskid socks and bed alarm on.   Problem: Pain  Goal: Verbalizes/displays adequate comfort level or baseline comfort level  Outcome: Progressing     Problem: Safety - Adult  Goal: Free from fall injury  Outcome: Progressing

## 2022-04-30 NOTE — PROGRESS NOTES
Hospitalist Progress Note      PCP: Hua Doty MD    Date of Admission: 4/27/2022    Chief Complaint: Colonic stricture [facility])    Hospital Course:  44UF WF, s/p robotic, sigmoid colectomy with revision of colorectal anastomosis, cystoscopy, bilateral retrograde pyelogram, who we are asked to see/evaluate by surgery, Dr. Adam Shah,  for medical management of hypertension. The patient had elective colon surgery d/t chronic left sided strictures. Currently abd distention and nausea - has some bowel sounds. BP stable - converting to IV for now. Subjective: Patient sitting in a chair on 2 L nasal cannula oxygen complains of a nausea positive flatus denies any BM also Valdez in place.       Medications:  Reviewed    Infusion Medications    sodium chloride      sodium chloride 100 mL/hr at 04/30/22 4106     Scheduled Medications    spironolactone  50 mg Oral Daily    albuterol sulfate HFA  2 puff Inhalation BID    [Held by provider] letrozole  2.5 mg Oral Daily    montelukast  10 mg Oral Nightly    propranolol  60 mg Oral Daily    mometasone-formoterol  2 puff Inhalation BID    sodium chloride flush  5-40 mL IntraVENous 2 times per day    enoxaparin  40 mg SubCUTAneous Daily    pantoprazole (PROTONIX) 40 mg injection  40 mg IntraVENous Daily    [Held by provider] atorvastatin  20 mg Oral Nightly    gabapentin  400 mg Oral 4x Daily    mirtazapine  15 mg Oral Nightly    [Held by provider] pantoprazole  40 mg Oral BID AC    sertraline  50 mg Oral Nightly     PRN Meds: prochlorperazine, sodium chloride flush, sodium chloride, ondansetron **OR** ondansetron, acetaminophen, HYDROmorphone **OR** HYDROmorphone, ALPRAZolam, traMADol      Intake/Output Summary (Last 24 hours) at 4/30/2022 0915  Last data filed at 4/30/2022 6735  Gross per 24 hour   Intake 1050.79 ml   Output 1925 ml   Net -874.21 ml       Physical Exam Performed:    BP (!) 154/88   Pulse 97   Temp 98 °F (36.7 °C) (Oral)   Resp 18   Ht 5' 3\" (1.6 m)   Wt 169 lb (76.7 kg)   SpO2 91%   Breastfeeding No   BMI 29.94 kg/m²     General appearance: No apparent distress, appears stated age and cooperative. HEENT: Pupils equal, round, and reactive to light. Conjunctivae/corneas clear. Neck: Supple, with full range of motion. No jugular venous distention. Trachea midline. Respiratory:  Normal respiratory effort. Clear to auscultation, bilaterally without Rales/Wheezes/Rhonchi. Cardiovascular: Regular rate and rhythm with normal S1/S2 without murmurs, rubs or gallops. Abdomen: Soft, non-tender, -distended with normal bowel sounds. Musculoskeletal: No clubbing, cyanosis or edema bilaterally. Full range of motion without deformity. Skin: Skin color, texture, turgor normal.  No rashes or lesions. Neurologic:  Neurovascularly intact without any focal sensory/motor deficits. Cranial nerves: II-XII intact, grossly non-focal.  Psychiatric: Alert and oriented, thought content appropriate, normal insight  Capillary Refill: Brisk,3 seconds, normal   Peripheral Pulses: +2 palpable, equal bilaterally       Labs:   Recent Labs     04/28/22  1439 04/29/22  0601 04/30/22  0840   WBC 20.3* 20.8* 19.4*   HGB 11.0* 10.7* 9.6*   HCT 34.5* 33.1* 29.7*    376 372     Recent Labs     04/28/22  0548 04/28/22  1439 04/29/22  0601    139 139   K 5.2* 4.4 4.2    106 105   CO2 22 21 23   BUN 20 19 15   CREATININE 1.5* 1.0 0.8   CALCIUM 9.3 8.7 9.8     No results for input(s): AST, ALT, BILIDIR, BILITOT, ALKPHOS in the last 72 hours. No results for input(s): INR in the last 72 hours. No results for input(s): Julio Cesar Vinton in the last 72 hours.     Urinalysis:      Lab Results   Component Value Date    NITRU Negative 11/24/2014    WBCUA rare 11/24/2014    BACTERIA 1+ 08/18/2012    RBCUA None seen 11/24/2014    BLOODU SMALL 06/28/2018    BLOODU Negative 11/24/2014    SPECGRAV 1.010 06/28/2018    SPECGRAV 1.010 11/24/2014    GLUCOSEU neg 06/28/2018 GLUCOSEU Negative 11/24/2014       Radiology:  XR CHEST (2 VW)   Final Result   No acute finding in the chest.         XR ABDOMEN (KUB) (SINGLE AP VIEW)   Final Result   Intraprocedural fluoroscopic spot images as above. See separate procedure   note for more information. FLUORO FOR SURGICAL PROCEDURES   Final Result              Assessment/Plan:    Active Hospital Problems    Diagnosis     S/P laparoscopic-assisted sigmoidectomy [Z90.49]      Priority: Medium    Colonic stricture ([address] [Y27.414]     Essential hypertension [I10]      1. This is a 51-year-old female status post robotic sigmoid colectomy with revision of colorectal anastomosis postop care per surgery continue with her Valdez. 2.  Hypertension currently on IV metoprolol changed to p.o. when able to take p.o. well. 3.  Nausea and abdominal distention encourage ambulation. 4.  Persistent leukocytosis monitor closely. 5.  Acute respiratory failure with hypoxia encourage incentive spirometer, inhalers, supplemental oxygen try to wean as tolerated. DVT Prophylaxis: Lovenox subcu  Diet: ADULT DIET;  Clear Liquid  Code Status: Full Code    PT/OT Eval Status: Melody Lund MD

## 2022-04-30 NOTE — PLAN OF CARE
Problem: Pain  Goal: Verbalizes/displays adequate comfort level or baseline comfort level  4/30/2022 1102 by Liberty Stiles RN  Outcome: Progressing  Pt reporting mild pain, no pain medication given per [facility]. Instructed pt to call if pain level increases. Will continue to monitor pain level.

## 2022-04-30 NOTE — PROGRESS NOTES
[facility] GENERAL SURGERY    Surgery Progress Note           POD # 3    PATIENT NAME: Bobby Tovar     TODAY'S DATE: 4/30/2022    INTERVAL HISTORY:    Pt  Feeling better, up in chair, minimal pain, passing frequent flatus but no BM yet. C/O nausea w/ most clear liquids but hungry for other foods. OBJECTIVE:   VITALS:  BP (!) 154/88   Pulse 97   Temp 98 °F (36.7 °C) (Oral)   Resp 18   Ht 5' 3\" (1.6 m)   Wt 169 lb (76.7 kg)   SpO2 91%   Breastfeeding No   BMI 29.94 kg/m²     INTAKE/OUTPUT:    I/O last 3 completed shifts: In: 2940.4 [P.O.:675; I.V.:2265.4]  Out: 2825 [Urine:2825]  No intake/output data recorded. CONSTITUTIONAL:  awake and alert  LUNGS:  clear to auscultation  ABDOMEN:   hypoactive bowel sounds, soft, non-distended, tenderness noted at incision   INCISION: clean, dry, no drainage, healing, no erythema    Data:  CBC: Recent Labs     04/28/22  1439 04/29/22  0601 04/30/22  0840   WBC 20.3* 20.8* 19.4*   HGB 11.0* 10.7* 9.6*   HCT 34.5* 33.1* 29.7*    376 372     BMP:    Recent Labs     04/28/22  0548 04/28/22  1439 04/29/22  0601    139 139   K 5.2* 4.4 4.2    106 105   CO2 22 21 23   BUN 20 19 15   CREATININE 1.5* 1.0 0.8   GLUCOSE 154* 141* 147*     Hepatic: No results for input(s): AST, ALT, ALB, BILITOT, ALKPHOS in the last 72 hours. Mag:      Recent Labs     04/29/22  0601   MG 1.90      Phos:   No results for input(s): PHOS in the last 72 hours. INR: No results for input(s): INR in the last 72 hours.       Radiology Review:       ASSESSMENT AND PLAN:  77 y.o. female status post ROBOTIC, SIGMOIDCOLECTOMY WITH REVISION OF COLORECTAL ANASTOMOSIS   - seems to be steadily improving overall, leukocytosis slowly decreasing   - decrease IVF   - d/c Valdez   - PT/OT eval   - Full liquids           Electronically signed by Bárbara Thapa MD

## 2022-04-30 NOTE — PROGRESS NOTES
Shift assessment completed. Pt A&O x4, VSS. x5 lap incisions all C/D/I, transverse incision C/D/I with foam dressing. Valdez catheter intact draining clear, yellow urine. Denies any needs at this time. Bed locked and in lowest position. Call light and bedside table within reach. Will continue to monitor.

## 2022-05-01 PROCEDURE — 1200000000 HC SEMI PRIVATE

## 2022-05-01 PROCEDURE — 2580000003 HC RX 258: Performed by: SURGERY

## 2022-05-01 PROCEDURE — 97162 PT EVAL MOD COMPLEX 30 MIN: CPT

## 2022-05-01 PROCEDURE — 97165 OT EVAL LOW COMPLEX 30 MIN: CPT

## 2022-05-01 PROCEDURE — 6360000002 HC RX W HCPCS: Performed by: SURGERY

## 2022-05-01 PROCEDURE — 99024 POSTOP FOLLOW-UP VISIT: CPT | Performed by: SURGERY

## 2022-05-01 PROCEDURE — 2700000000 HC OXYGEN THERAPY PER DAY

## 2022-05-01 PROCEDURE — 94761 N-INVAS EAR/PLS OXIMETRY MLT: CPT

## 2022-05-01 PROCEDURE — 6370000000 HC RX 637 (ALT 250 FOR IP): Performed by: INTERNAL MEDICINE

## 2022-05-01 PROCEDURE — 6370000000 HC RX 637 (ALT 250 FOR IP): Performed by: SURGERY

## 2022-05-01 PROCEDURE — 97535 SELF CARE MNGMENT TRAINING: CPT

## 2022-05-01 PROCEDURE — 94640 AIRWAY INHALATION TREATMENT: CPT

## 2022-05-01 PROCEDURE — 6370000000 HC RX 637 (ALT 250 FOR IP): Performed by: NURSE PRACTITIONER

## 2022-05-01 PROCEDURE — 97116 GAIT TRAINING THERAPY: CPT

## 2022-05-01 PROCEDURE — C9113 INJ PANTOPRAZOLE SODIUM, VIA: HCPCS | Performed by: SURGERY

## 2022-05-01 RX ADMIN — GABAPENTIN 400 MG: 400 CAPSULE ORAL at 14:33

## 2022-05-01 RX ADMIN — SODIUM CHLORIDE, PRESERVATIVE FREE 40 MG: 5 INJECTION INTRAVENOUS at 09:18

## 2022-05-01 RX ADMIN — ENOXAPARIN SODIUM 40 MG: 100 INJECTION SUBCUTANEOUS at 09:18

## 2022-05-01 RX ADMIN — SODIUM CHLORIDE, PRESERVATIVE FREE 10 ML: 5 INJECTION INTRAVENOUS at 19:39

## 2022-05-01 RX ADMIN — GABAPENTIN 400 MG: 400 CAPSULE ORAL at 09:18

## 2022-05-01 RX ADMIN — GABAPENTIN 400 MG: 400 CAPSULE ORAL at 19:39

## 2022-05-01 RX ADMIN — Medication 2 PUFF: at 08:12

## 2022-05-01 RX ADMIN — SPIRONOLACTONE 50 MG: 25 TABLET ORAL at 09:18

## 2022-05-01 RX ADMIN — MONTELUKAST SODIUM 10 MG: 10 TABLET ORAL at 19:40

## 2022-05-01 RX ADMIN — TRAMADOL HYDROCHLORIDE 50 MG: 50 TABLET, COATED ORAL at 14:33

## 2022-05-01 RX ADMIN — GABAPENTIN 400 MG: 400 CAPSULE ORAL at 17:49

## 2022-05-01 RX ADMIN — PROPRANOLOL HYDROCHLORIDE 60 MG: 60 CAPSULE, EXTENDED RELEASE ORAL at 09:18

## 2022-05-01 RX ADMIN — SERTRALINE 50 MG: 50 TABLET, FILM COATED ORAL at 19:40

## 2022-05-01 RX ADMIN — MIRTAZAPINE 15 MG: 15 TABLET, FILM COATED ORAL at 19:40

## 2022-05-01 RX ADMIN — ATORVASTATIN CALCIUM 20 MG: 10 TABLET, FILM COATED ORAL at 19:39

## 2022-05-01 RX ADMIN — SODIUM CHLORIDE: 9 INJECTION, SOLUTION INTRAVENOUS at 03:04

## 2022-05-01 ASSESSMENT — PAIN DESCRIPTION - ORIENTATION: ORIENTATION: MID

## 2022-05-01 ASSESSMENT — PAIN SCALES - GENERAL
PAINLEVEL_OUTOF10: 4
PAINLEVEL_OUTOF10: 0

## 2022-05-01 ASSESSMENT — PAIN DESCRIPTION - LOCATION: LOCATION: ABDOMEN

## 2022-05-01 ASSESSMENT — PAIN DESCRIPTION - DESCRIPTORS: DESCRIPTORS: SHARP

## 2022-05-01 NOTE — PROGRESS NOTES
Physical Therapy  Facility/Department: [facility] C3 TELE/MED SURG/ONC  Physical Therapy Initial Assessment    Name: Chasity Ornelas  : 1955  MRN: [id number]  Date of Service: 2022    Discharge Recommendations:  Home with assist PRN   PT Equipment Recommendations  Equipment Needed: No      Patient Diagnosis(es): The encounter diagnosis was Colonic stricture ([address] Past Medical History:  has a past medical history of Asthma, CAD (coronary artery disease), Cancer ([address] Chronic diarrhea, Chronic kidney disease, Clostridium difficile diarrhea, Fibromyalgia, Hemorrhoid, Hyperlipidemia, Hypertension, Kidney stone, Migraines, Osteoarthritis, and Sarcoidosis. Past Surgical History:  has a past surgical history that includes Splenectomy (); Tonsillectomy (); Hysterectomy (); eye surgery (2009); Urethra dilation (2011); ovarian cyst removal (); Ovary removal (); Cystoscopy (2011); Lithotripsy (2012); Lithotripsy; Abdominal exploration surgery (12); Upper gastrointestinal endoscopy (13); partial hysterectomy (cervix not removed); Upper gastrointestinal endoscopy (2017); Colonoscopy (); Upper gastrointestinal endoscopy (N/A, 2020); Colonoscopy (N/A, 2020); Esophagus dilation (2020); Breast biopsy; US BREAST BIOPSY W LOC DEVICE 1ST LESION RIGHT (Right, 4/15/2021); Cardiac catheterization (); Mastectomy (Bilateral, 2021); Sigmoid Colectomy (Left, 2022); and Cystoscopy (N/A, 2022). Assessment   Body Structures, Functions, Activity Limitations Requiring Skilled Therapeutic Intervention: Decreased functional mobility ; Decreased endurance;Decreased balance;Decreased strength  Assessment: Pt is a 77 y.o. female admitted to [facility] secondary to sigmoid strictures s/p colectomy and revision of colorectal anastamosis. Pt lives with  in two story home and is typically I with functional mobility and gait without an AD.  Pt is currently functioning slightly below her baseline requiring Donnell for bed mobility, SBA for t/f and CGA to SBA for gait without AD. Pt completes stairs with CGA to SBA. Pt is limited by decreased activity tolerance. Pt will benefit from continued skilled PT in acute care setting to address above deficits. Recommend pt d/c home with assist PRN. Treatment Diagnosis: Impaired balance and gait  Specific Instructions for Next Treatment: Progress mobility as tolerated  Therapy Prognosis: Good  Decision Making: Medium Complexity  Barriers to Learning: none  Requires PT Follow-Up: Yes  Activity Tolerance  Activity Tolerance: Patient tolerated evaluation without incident;Patient limited by endurance     Plan   Plan  Plan: 3-5 times per week  Specific Instructions for Next Treatment: Progress mobility as tolerated  Current Treatment Recommendations: Strengthening,Equipment evaluation, education, & procurement,Balance training,Gait training,Functional mobility training,Stair training,Return to work related activity,Home exercise program,Neuromuscular re-education,Transfer training,Safety education & training,Therapeutic activities,Patient/Caregiver education & training,Endurance training  Safety Devices  Type of Devices:  All jaun prominences offloaded,Call light within reach,Chair alarm in place,Left in chair,Nurse notified,Gait belt     Restrictions  Restrictions/Precautions  Restrictions/Precautions: Fall Risk,General Precautions,Up as Tolerated  Position Activity Restriction  Other position/activity restrictions: IV, 1 L O 2     Subjective   General  Chart Reviewed: Yes  Patient assessed for rehabilitation services?: Yes  Response To Previous Treatment: Not applicable  Family / Caregiver Present: No  Referring Practitioner: Gustavo Au MD  Referral Date : 05/01/22  Diagnosis: Sigmoid stricture s/p sigmoid colectomy and revision of colorectal anastamosis  Follows Commands: Within Functional Limits  General Comment  Comments: RN cleared pt for session  Subjective  Subjective: Pt resting in bed on approach, pleasant and agreeable to PT evaluation         Social/Functional History  Social/Functional History  Lives With: Spouse  Type of Home: House  Home Layout: Two level,Bed/Bath upstairs,1/2 bath on main level  Home Access: Stairs to enter with rails  Entrance Stairs - Number of Steps: 2, full flight to second floor with HR  Entrance Stairs - Rails: Both  Bathroom Shower/Tub: Tub/Shower unit  Bathroom Toilet: Standard  Bathroom Equipment: Shower chair  Home Equipment:  (No DME)  Has the patient had two or more falls in the past year or any fall with injury in the past year?: No (Pt reports falls in past year but no major injury)  ADL Assistance: Independent  Homemaking Assistance: Independent  Ambulation Assistance: Independent (Without AD)  Transfer Assistance: Independent  Active :  Yes  Additional Comments: Pt reports  can provide 24/7 S and physical assistance  Vision/Hearing  Hearing: Within functional limits    Cognition   Orientation  Overall Orientation Status: Within Functional Limits     Objective   Pulse: 85  Heart Rate Source: Monitor  BP: (!) 145/88  BP Location: Left upper arm  Patient Position: Semi fowlers  MAP (Calculated): 107  Resp: 16  SpO2: 95 %  O2 Device: Nasal cannula     Observation/Palpation  Posture: Fair  Gross Assessment  AROM: Within functional limits  PROM: Within functional limits  Strength: Generally decreased, functional  Tone: Normal  Sensation:  (Reports baseline N/T to hands)                    Bed mobility  Supine to Sit: Minimal assistance (HOB elevated, use of BR)  Sit to Supine: Unable to assess (Pt seated in chair at end of session)     Transfers  Sit to Stand: Stand by assistance  Stand to sit: Stand by assistance  Comment: EOB and commode without AD     Ambulation  Surface: level tile  Device: No Device  Other Apparatus: O2 (1L O2 NC)  Assistance: Contact guard assistance;Stand by assistance  Quality of Gait: Slow reciprocal pattern, B decreased toe clearance and heel strike, increased B sway. Pt mildly unsteady no overt LOB  Gait Deviations: Slow Riana; Increased JOCE; Decreased step length;Decreased step height;Decreased arm swing  Distance: 200' + 15'  Comments: Distances limited by fatigue and SOB.  CGA progressing to SBA with progression  Stairs/Curb  Stairs?: Yes  Stairs  # Steps : 4  Stairs Height: 6\"  Rails: Bilateral  Device: No Device  Assistance: Stand by assistance;Contact guard assistance  Comment: Pt ascends/descends 4 steps with BHR and CGA to SBA using step to pattern, no overt LOB, increased time to complete        Balance  Posture: Good  Sitting - Static: Good  Sitting - Dynamic: Good  Standing - Static: Good;-  Standing - Dynamic: Fair;+  Comments: SBA without AD while completing awa-care, clothing management and ADL's at sink, steady without overt LOB while reaching within and outside JOCE             AM-PAC Score     AM-PAC Inpatient Mobility without Stair Climbing Raw Score : 16 (05/01/22 1324)  AM-PAC Inpatient without Stair Climbing T-Scale Score : 45.54 (05/01/22 1324)  Mobility Inpatient CMS 0-100% Score: 40.64 (05/01/22 1324)  Mobility Inpatient without Stair CMS G-Code Modifier : CK (05/01/22 1324)       Goals  Short Term Goals  Time Frame for Short term goals: 1 week 5/08/22 (unless otherwise specified)  Short term goal 1: Pt will complete supine to/from sit with I  Short term goal 2: Pt will complete sit to/from stand with I  Short term goal 3: Pt will ambulate >150' without AD with I without LOB  Short term goal 4: Pt will ascend/descend 12 steps with HR with S without LOB  Short term goal 5: 5/05/22: Pt will participate in 12-15 reps BLE exercises to increase strength and increase I with functional mobility  Patient Goals   Patient goals : \"go home\"       Therapy Time   Individual Concurrent Group Co-treatment   Time In 1042         Time Out 1107         Minutes 25         Timed Code Treatment Minutes: 15 Minutes (10 minutes for eval)       If pt is unable to be seen after this session, please let this note serve as discharge summary. Please see case management note for discharge disposition. Thank you.     Ana Portillo, PT, DPT

## 2022-05-01 NOTE — PROGRESS NOTES
Hospitalist Progress Note      PCP: Bradley Arango MD    Date of Admission: 4/27/2022    Chief Complaint: Colonic stricture [facility])    Hospital Course:  89AH WF, s/p robotic, sigmoid colectomy with revision of colorectal anastomosis, cystoscopy, bilateral retrograde pyelogram, who we are asked to see/evaluate by surgery, Dr. Greg Johnson,  for medical management of hypertension. The patient had elective colon surgery d/t chronic left sided strictures. Currently abd distention and nausea - has some bowel sounds. BP stable - converting to IV for now. Subjective: Patient reports passing flatus/+BM.  Feeling better      Medications:  Reviewed    Infusion Medications    sodium chloride       Scheduled Medications    spironolactone  50 mg Oral Daily    albuterol sulfate HFA  2 puff Inhalation BID    [Held by provider] letrozole  2.5 mg Oral Daily    montelukast  10 mg Oral Nightly    propranolol  60 mg Oral Daily    mometasone-formoterol  2 puff Inhalation BID    sodium chloride flush  5-40 mL IntraVENous 2 times per day    enoxaparin  40 mg SubCUTAneous Daily    pantoprazole (PROTONIX) 40 mg injection  40 mg IntraVENous Daily    [Held by provider] atorvastatin  20 mg Oral Nightly    gabapentin  400 mg Oral 4x Daily    mirtazapine  15 mg Oral Nightly    [Held by provider] pantoprazole  40 mg Oral BID AC    sertraline  50 mg Oral Nightly     PRN Meds: prochlorperazine, sodium chloride flush, sodium chloride, ondansetron **OR** ondansetron, acetaminophen, HYDROmorphone **OR** HYDROmorphone, ALPRAZolam, traMADol      Intake/Output Summary (Last 24 hours) at 5/1/2022 1750  Last data filed at 5/1/2022 1508  Gross per 24 hour   Intake 320 ml   Output 300 ml   Net 20 ml       Physical Exam Performed:    BP (!) 148/88   Pulse 83   Temp 97.6 °F (36.4 °C) (Oral)   Resp 18   Ht 5' 3\" (1.6 m)   Wt 169 lb (76.7 kg)   SpO2 90%   Breastfeeding No   BMI 29.94 kg/m²     General appearance: No apparent distress, appears stated age and cooperative. HEENT: Pupils equal, round, and reactive to light. Conjunctivae/corneas clear. Neck: Supple, with full range of motion. No jugular venous distention. Trachea midline. Respiratory:  Normal respiratory effort. Clear to auscultation, bilaterally without Rales/Wheezes/Rhonchi. Cardiovascular: Regular rate and rhythm with normal S1/S2 without murmurs, rubs or gallops. Abdomen: Soft, non-tender, +distended with normal bowel sounds. Musculoskeletal: No clubbing, cyanosis or edema bilaterally. Full range of motion without deformity. Skin: Skin color, texture, turgor normal.  No rashes or lesions. Neurologic:  Neurovascularly intact without any focal sensory/motor deficits. Cranial nerves: II-XII intact, grossly non-focal.  Psychiatric: Alert and oriented, thought content appropriate, normal insight  Capillary Refill: Brisk,3 seconds, normal   Peripheral Pulses: +2 palpable, equal bilaterally       Labs:   Recent Labs     04/29/22  0601 04/30/22  0840   WBC 20.8* 19.4*   HGB 10.7* 9.6*   HCT 33.1* 29.7*    372     Recent Labs     04/29/22  0601      K 4.2      CO2 23   BUN 15   CREATININE 0.8   CALCIUM 9.8     No results for input(s): AST, ALT, BILIDIR, BILITOT, ALKPHOS in the last 72 hours. No results for input(s): INR in the last 72 hours. No results for input(s): Zettie Binet in the last 72 hours. Urinalysis:      Lab Results   Component Value Date    NITRU Negative 11/24/2014    WBCUA rare 11/24/2014    BACTERIA 1+ 08/18/2012    RBCUA None seen 11/24/2014    BLOODU SMALL 06/28/2018    BLOODU Negative 11/24/2014    SPECGRAV 1.010 06/28/2018    SPECGRAV 1.010 11/24/2014    GLUCOSEU neg 06/28/2018    GLUCOSEU Negative 11/24/2014       Radiology:  XR CHEST (2 VW)   Final Result   No acute finding in the chest.         XR ABDOMEN (KUB) (SINGLE AP VIEW)   Final Result   Intraprocedural fluoroscopic spot images as above.   See separate procedure   note for more information. FLUORO FOR SURGICAL PROCEDURES   Final Result              Assessment/Plan:    Active Hospital Problems    Diagnosis     S/P laparoscopic-assisted sigmoidectomy [Z90.49]      Priority: Medium    Colonic stricture ([facility] [address] [B47.201]     Essential hypertension [I8      -59-year-old female status post robotic sigmoid colectomy with revision of colorectal anastomosis postop care per surgery continue with her Valdez, persistent leukocytosis sl improvement, plan to advance diet    Hypertension- Inderal/aldactone    HLD-statin     Migraines-Inderal    Hx of Breast Cancer-double mastectomy May, 2021, on methotrexate/letrozole held    [address] currently held    [address]    Acute respiratory failure with hypoxia encourage incentive spirometer, inhalers, singulair,  supplemental oxygen try to wean as tolerated. DVT Prophylaxis: Lovenox  Diet: ADULT DIET;  Regular; Low Fiber  Code Status: Full Code    PT/OT Eval Status: Consulted    Dispo -1-2 days    VERITO Kirk - CNP

## 2022-05-01 NOTE — PLAN OF CARE
Problem: Safety - Adult  Goal: Free from fall injury  5/1/2022 1005 by Valeen Boeck, RN  Outcome: Progressing    Pt remains free from falls. Non skid socks on. Bed alarm active for safety. Bed locked and in lowest position. Pt calls out appropriately for assistance. Call light and bedside table within reach. Will continue to monitor.

## 2022-05-01 NOTE — PROGRESS NOTES
Pt AOx4, VSS, shift assessment completed and charted. Pt denying pain and nausea. Pt agreed to take PO medications and educated pt to call this RN if nausea increases. Pt agreeable. Socks on and kasper cath out, pt has only voided once since cath was taken out, will continue to monitor. Transverse and 5x lap incisions c/d/i and KIANNA. Pt ambulating independently per their baseline with socks on and c/o appropriately. Pt denying further needs. Bed is low, locked, and call light/bedside table within reach. All care per orders, will continue to monitor throughout this shift.

## 2022-05-01 NOTE — PROGRESS NOTES
Occupational Therapy  Facility/Department: [facility] C3 TELE/MED SURG/ONC  Occupational Therapy Initial/discharge Assessment  1 x only      Name: Krystina Flowers  : 1955  MRN: [id number]  Date of Service: 2022    Discharge Recommendations:  24 hour supervision or assist (initially)          Patient Diagnosis(es): The encounter diagnosis was Colonic stricture ([facility] [address] Past Medical History:  has a past medical history of Asthma, CAD (coronary artery disease), Cancer ([facility] [address] Chronic diarrhea, Chronic kidney disease, Clostridium difficile diarrhea, Fibromyalgia, Hemorrhoid, Hyperlipidemia, Hypertension, Kidney stone, Migraines, Osteoarthritis, and Sarcoidosis. Past Surgical History:  has a past surgical history that includes Splenectomy (); Tonsillectomy (); Hysterectomy (); eye surgery (2009); Urethra dilation (2011); ovarian cyst removal (); Ovary removal (); Cystoscopy (2011); Lithotripsy (2012); Lithotripsy; Abdominal exploration surgery (12); Upper gastrointestinal endoscopy (13); partial hysterectomy (cervix not removed); Upper gastrointestinal endoscopy (2017); Colonoscopy (); Upper gastrointestinal endoscopy (N/A, 2020); Colonoscopy (N/A, 2020); Esophagus dilation (2020); Breast biopsy; US BREAST BIOPSY W LOC DEVICE 1ST LESION RIGHT (Right, 4/15/2021); Cardiac catheterization (); Mastectomy (Bilateral, 2021); Sigmoid Colectomy (Left, 2022); and Cystoscopy (N/A, 2022).            Assessment      REQUIRES OT FOLLOW-UP: No  Activity Tolerance  Activity Tolerance: Patient Tolerated treatment well        Plan   Plan  Plan Comment: OT eval only     Restrictions  Restrictions/Precautions  Restrictions/Precautions: Fall Risk,General Precautions,Up as Tolerated  Position Activity Restriction  Other position/activity restrictions: IV, 1 L O 2    Subjective   General  Chart Reviewed: Yes  Patient assessed for rehabilitation services?: Yes  Family / Caregiver Present: No  Referring Practitioner: Dr. Aleyda Zimmerman  Diagnosis: sigmoid stricture; s/p Left sigmoid colectomy 4-27-22  General Comment  Comments: RN cleared pt for OT eval; pt awake in bed, agreeable to therapy     Social/Functional History  Social/Functional History  Lives With: Spouse  Type of Home: House  Home Layout: Two level,Bed/Bath upstairs,1/2 bath on main level  Home Access: Stairs to enter with rails  Entrance Stairs - Number of Steps: 2, full flight to second floor with HR  Entrance Stairs - Rails: Both  Bathroom Shower/Tub: Tub/Shower unit  Bathroom Toilet: Standard  Bathroom Equipment: Shower chair  Home Equipment:  (No DME)  Has the patient had two or more falls in the past year or any fall with injury in the past year?: No (Pt reports falls in past year but no major injury)  ADL Assistance: Independent  Homemaking Assistance: Independent  Ambulation Assistance: Independent (Without AD)  Transfer Assistance: Independent  Active : Yes  Additional Comments: Pt reports  can provide 24/7 S and physical assistance       Objective   Pulse: 85  Heart Rate Source: Monitor  BP: (!) 145/88  BP Location: Left upper arm  Patient Position: Semi fowlers  MAP (Calculated): 107  Resp: 16  SpO2: 95 %  O2 Device: Nasal cannula             Safety Devices  Type of Devices: All jaun prominences offloaded;Call light within reach; Chair alarm in place; Left in chair;Nurse notified     Toilet Transfers  Toilet - Technique: Ambulating (supervision due to IV pole & portable O 2 tank)  Equipment Used: Grab bars  Toilet Transfer: Modified independent  AROM: Within functional limits  Strength:  Within functional limits  Coordination: Within functional limits  Tone: Normal  ADL  Feeding: Independent  Grooming: Supervision (standing in bathroom to wash hands after toileting, brushing hair)  LE Dressing: Independent  Toileting: Independent        Bed mobility  Supine to Sit: Minimal assistance (log roll technique to Left)  Sit to Supine: Unable to assess (Left up in chair upon exiting,  pt agreeable)  Transfers  Sit to stand: Supervision  Stand to sit: Supervision     Cognition  Overall Cognitive Status: [facility]                  Education Given To: Patient  Education Provided: Role of Therapy;Plan of Care;Precautions; Energy Conservation;Transfer Training (log roll technique)  Education Provided Comments: Disease specific: importance of OOB to chair for meals/cardiopulmonary benefits; use of RED/nurse call light for assist with transfers  Education Method: Verbal  Barriers to Learning: None  Education Outcome: Verbalized understanding              AM-PAC Score        AM-[facility] Inpatient Daily Activity Raw Score: 23 (05/01/22 1254)  AM-PAC Inpatient ADL T-Scale Score : 51.12 (05/01/22 1254)  ADL Inpatient CMS 0-100% Score: 15.86 (05/01/22 1254)  ADL Inpatient CMS G-Code Modifier : CI (05/01/22 1254)    Goals  Patient Goals   Patient goals : \"go home Tuesday\"       Therapy Time   Individual Concurrent Group Co-treatment   Time In [address] [facility]         Time Out 1109         Minutes [address] [facility] [place], [place]

## 2022-05-01 NOTE — PROGRESS NOTES
[facility] GENERAL SURGERY    Surgery Progress Note           POD # 4    PATIENT NAME: Edyta Marmolejo     TODAY'S DATE: 5/1/2022    INTERVAL HISTORY:    Pt  Continues to feel better, minimal pain, no nausea, taking full liquids well, is hungry for solid food. Having flatus and loose stools, no hematochezia/melena. OBJECTIVE:   VITALS:  BP (!) 145/88   Pulse 85   Temp 98.6 °F (37 °C) (Oral)   Resp 16   Ht 5' 3\" (1.6 m)   Wt 169 lb (76.7 kg)   SpO2 95%   Breastfeeding No   BMI 29.94 kg/m²     INTAKE/OUTPUT:    I/O last 3 completed shifts: In: 2408.4 [P.O.:320; I.V.:2088.4]  Out: 1850 [Urine:1850]  No intake/output data recorded. CONSTITUTIONAL:  awake and alert  LUNGS:  clear to auscultation  ABDOMEN:  , soft, non-distended, non-tender   INCISION: clean, dry, no drainage, healing, staples intact    Data:  CBC: Recent Labs     04/28/22  1439 04/29/22  0601 04/30/22  0840   WBC 20.3* 20.8* 19.4*   HGB 11.0* 10.7* 9.6*   HCT 34.5* 33.1* 29.7*    376 372     BMP:    Recent Labs     04/28/22  1439 04/29/22  0601    139   K 4.4 4.2    105   CO2 21 23   BUN 19 15   CREATININE 1.0 0.8   GLUCOSE 141* 147*     Hepatic: No results for input(s): AST, ALT, ALB, BILITOT, ALKPHOS in the last 72 hours. Mag:      Recent Labs     04/29/22  0601   MG 1.90      Phos:   No results for input(s): PHOS in the last 72 hours. INR: No results for input(s): INR in the last 72 hours.       Radiology Review:       ASSESSMENT AND PLAN:  77 y.o. female status post ROBOTIC, SIGMOIDCOLECTOMY WITH REVISION OF COLORECTAL ANASTOMOSIS   - low fiber diet   - HLIV   - cont up [facility] home tomorrow/Tues           Electronically signed by [address], MD

## 2022-05-01 NOTE — PROGRESS NOTES
Shift assessment completed. Pt A&O x4, VSS. x5 lap incisions all C/D/I. Transverse incision KIANNA with staples, C/D/I. Pt reports passing gas, bowel sounds active. Non skid socks on. Denies any needs at this time. Bed locked and in lowest position. Call light and bedside table within reach. Will continue to monitor.

## 2022-05-01 NOTE — PLAN OF CARE
Problem: Pain  Goal: Verbalizes/displays adequate comfort level or baseline comfort level  4/30/2022 2129 by Olivia Serrato RN  Outcome: Progressing  Flowsheets (Taken 4/30/2022 2129)  Verbalizes/displays adequate comfort level or baseline comfort level:   Encourage patient to monitor pain and request assistance   Assess pain using appropriate pain scale   Implement non-pharmacological measures as appropriate and evaluate response   Administer analgesics based on type and severity of pain and evaluate response   Consider cultural and social influences on pain and pain management   Notify Licensed Independent Practitioner if interventions unsuccessful or patient reports new pain  Note: Pt denying pain currently, will continue to monitor throughout this shift. Problem: Safety - Adult  Goal: Free from fall injury  Outcome: Progressing  Flowsheets (Taken 4/30/2022 1924)  Free From Fall Injury:   Instruct family/caregiver on patient safety   Based on caregiver fall risk screen, instruct family/caregiver to ask for assistance with transferring infant if caregiver noted to have fall risk factors  Note: Pt c/o appropriately and socks on. Bed alarm on. Problem: ABCDS Injury Assessment  Goal: Absence of physical injury  Outcome: Progressing  Note: Pt c/o appropriately and socks on. Bed alarm on. Problem: Skin/Tissue Integrity  Goal: Absence of new skin breakdown  Description: 1. Monitor for areas of redness and/or skin breakdown  2. Assess vascular access sites hourly  3. Every 4-6 hours minimum:  Change oxygen saturation probe site  4. Every 4-6 hours:  If on nasal continuous positive airway pressure, respiratory therapy assess nares and determine need for appliance change or resting period. Outcome: Progressing  Note: Pt turns self and is continent. Transverse and 5x lap incisions c/d/i and KINANA.

## 2022-05-02 VITALS
TEMPERATURE: 97.9 F | WEIGHT: 169 LBS | OXYGEN SATURATION: 91 % | DIASTOLIC BLOOD PRESSURE: 89 MMHG | HEART RATE: 92 BPM | RESPIRATION RATE: 18 BRPM | SYSTOLIC BLOOD PRESSURE: 153 MMHG | BODY MASS INDEX: 29.95 KG/M2 | HEIGHT: 63 IN

## 2022-05-02 LAB
BASOPHILS ABSOLUTE: 0 K/UL (ref 0–0.2)
BASOPHILS RELATIVE PERCENT: 0 %
EOSINOPHILS ABSOLUTE: 1 K/UL (ref 0–0.6)
EOSINOPHILS RELATIVE PERCENT: 6 %
HCT VFR BLD CALC: 28.4 % (ref 36–48)
HEMOGLOBIN: 9.4 G/DL (ref 12–16)
LYMPHOCYTES ABSOLUTE: 3.3 K/UL (ref 1–5.1)
LYMPHOCYTES RELATIVE PERCENT: 21 %
MCH RBC QN AUTO: 32 PG (ref 26–34)
MCHC RBC AUTO-ENTMCNC: 33.2 G/DL (ref 31–36)
MCV RBC AUTO: 96.5 FL (ref 80–100)
MONOCYTES ABSOLUTE: 1.6 K/UL (ref 0–1.3)
MONOCYTES RELATIVE PERCENT: 10 %
MYELOCYTE PERCENT: 1 %
NEUTROPHILS ABSOLUTE: 10 K/UL (ref 1.7–7.7)
NEUTROPHILS RELATIVE PERCENT: 62 %
PDW BLD-RTO: 18 % (ref 12.4–15.4)
PLATELET # BLD: 408 K/UL (ref 135–450)
PMV BLD AUTO: 7.8 FL (ref 5–10.5)
RBC # BLD: 2.95 M/UL (ref 4–5.2)
WBC # BLD: 15.9 K/UL (ref 4–11)

## 2022-05-02 PROCEDURE — 6370000000 HC RX 637 (ALT 250 FOR IP): Performed by: SURGERY

## 2022-05-02 PROCEDURE — C9113 INJ PANTOPRAZOLE SODIUM, VIA: HCPCS | Performed by: SURGERY

## 2022-05-02 PROCEDURE — 85025 COMPLETE CBC W/AUTO DIFF WBC: CPT

## 2022-05-02 PROCEDURE — 94640 AIRWAY INHALATION TREATMENT: CPT

## 2022-05-02 PROCEDURE — 6360000002 HC RX W HCPCS: Performed by: SURGERY

## 2022-05-02 PROCEDURE — 36415 COLL VENOUS BLD VENIPUNCTURE: CPT

## 2022-05-02 PROCEDURE — 2580000003 HC RX 258: Performed by: SURGERY

## 2022-05-02 PROCEDURE — 99024 POSTOP FOLLOW-UP VISIT: CPT | Performed by: SURGERY

## 2022-05-02 PROCEDURE — 6370000000 HC RX 637 (ALT 250 FOR IP): Performed by: NURSE PRACTITIONER

## 2022-05-02 PROCEDURE — 6370000000 HC RX 637 (ALT 250 FOR IP): Performed by: INTERNAL MEDICINE

## 2022-05-02 RX ORDER — HYDROCODONE BITARTRATE AND ACETAMINOPHEN 5; 325 MG/1; MG/1
1-2 TABLET ORAL EVERY 6 HOURS PRN
Qty: 20 TABLET | Refills: 0 | Status: SHIPPED | OUTPATIENT
Start: 2022-05-02 | End: 2022-05-07

## 2022-05-02 RX ADMIN — SODIUM CHLORIDE, PRESERVATIVE FREE 10 ML: 5 INJECTION INTRAVENOUS at 08:24

## 2022-05-02 RX ADMIN — GABAPENTIN 400 MG: 400 CAPSULE ORAL at 08:24

## 2022-05-02 RX ADMIN — ENOXAPARIN SODIUM 40 MG: 100 INJECTION SUBCUTANEOUS at 08:23

## 2022-05-02 RX ADMIN — SPIRONOLACTONE 50 MG: 25 TABLET ORAL at 08:23

## 2022-05-02 RX ADMIN — SODIUM CHLORIDE, PRESERVATIVE FREE 40 MG: 5 INJECTION INTRAVENOUS at 08:23

## 2022-05-02 RX ADMIN — Medication 2 PUFF: at 08:48

## 2022-05-02 RX ADMIN — PROPRANOLOL HYDROCHLORIDE 60 MG: 60 CAPSULE, EXTENDED RELEASE ORAL at 08:25

## 2022-05-02 NOTE — PROGRESS NOTES
Pt's verbal and written discharge instructions given, all questions answered. IV removed. Follow up appointments made and discussed. Pt instructed to follow up with Dr. David Smith in two weeks. New medications reviewed and sent to [facility] for pt to pickup. Pt left in stable condition via wheelchair to private car with family.

## 2022-05-02 NOTE — PROGRESS NOTES
[facility] GENERAL SURGERY    Surgery Progress Note           POD # 5    PATIENT NAME: Estela Kebede     TODAY'S DATE: 5/2/2022    INTERVAL HISTORY:    Pt without much pain, no nausea, having bms     OBJECTIVE:   VITALS:  BP (!) 153/89   Pulse 92   Temp 97.9 °F (36.6 °C) (Oral)   Resp 18   Ht 5' 3\" (1.6 m)   Wt 169 lb (76.7 kg)   SpO2 91%   Breastfeeding No   BMI 29.94 kg/m²     INTAKE/OUTPUT:    I/O last 3 completed shifts: In: 920 [P.O.:920]  Out: -   No intake/output data recorded. CONSTITUTIONAL:  awake and alert  LUNGS:  clear to auscultation  ABDOMEN:  , soft, non-distended, non-tender   INCISION: clean, dry, no drainage, healing, staples intact, no erythema    Data:  CBC:   Recent Labs     04/30/22  0840 05/02/22  0618   WBC 19.4* 15.9*   HGB 9.6* 9.4*   HCT 29.7* 28.4*    408     BMP:    No results for input(s): NA, K, CL, CO2, BUN, CREATININE, GLUCOSE in the last 72 hours. Hepatic: No results for input(s): AST, ALT, ALB, BILITOT, ALKPHOS in the last 72 hours. Mag:      No results for input(s): MG in the last 72 hours. Phos:   No results for input(s): PHOS in the last 72 hours. INR: No results for input(s): INR in the last 72 hours.       Radiology Review:       ASSESSMENT AND PLAN:  77 y.o. female status post ROBOTIC, SIGMOIDCOLECTOMY WITH REVISION OF COLORECTAL ANASTOMOSIS   - low fiber diet   - HLIV   - cont up OOB   - WBC improved   - d/c today      Electronically signed by Arabella Marx MD

## 2022-05-02 NOTE — PROGRESS NOTES
Hospitalist Progress Note      PCP: Amanda Capellan MD    Date of Admission: 4/27/2022    Chief Complaint: Colonic stricture [facility])    Hospital Course:  03LA WF, s/p robotic, sigmoid colectomy with revision of colorectal anastomosis, cystoscopy, bilateral retrograde pyelogram, who we are asked to see/evaluate by surgery, Dr. Vianey Olsen,  for medical management of hypertension. The patient had elective colon surgery d/t chronic left sided strictures. Subjective:     Patient doing well today and denies any complaint. Plans for discharge today by primary team. Sating well on RA. Medications:  Reviewed    Infusion Medications    sodium chloride       Scheduled Medications    spironolactone  50 mg Oral Daily    albuterol sulfate HFA  2 puff Inhalation BID    [Held by provider] letrozole  2.5 mg Oral Daily    montelukast  10 mg Oral Nightly    propranolol  60 mg Oral Daily    mometasone-formoterol  2 puff Inhalation BID    sodium chloride flush  5-40 mL IntraVENous 2 times per day    enoxaparin  40 mg SubCUTAneous Daily    pantoprazole (PROTONIX) 40 mg injection  40 mg IntraVENous Daily    atorvastatin  20 mg Oral Nightly    gabapentin  400 mg Oral 4x Daily    mirtazapine  15 mg Oral Nightly    [Held by provider] pantoprazole  40 mg Oral BID AC    sertraline  50 mg Oral Nightly     PRN Meds: prochlorperazine, sodium chloride flush, sodium chloride, ondansetron **OR** ondansetron, acetaminophen, HYDROmorphone **OR** HYDROmorphone, ALPRAZolam, traMADol      Intake/Output Summary (Last 24 hours) at 5/2/2022 1112  Last data filed at 5/1/2022 2327  Gross per 24 hour   Intake 840 ml   Output --   Net 840 ml       Physical Exam Performed:    BP (!) 153/89   Pulse 92   Temp 97.9 °F (36.6 °C) (Oral)   Resp 18   Ht 5' 3\" (1.6 m)   Wt 169 lb (76.7 kg)   SpO2 91%   Breastfeeding No   BMI 29.94 kg/m²     General appearance: No apparent distress, appears stated age and cooperative.   HEENT: Pupils equal, round. Conjunctivae/corneas clear. Neck: Supple, with full range of motion. No jugular venous distention. Trachea midline. Respiratory:  Normal respiratory effort. Clear to auscultation, bilaterally without Rales/Wheezes/Rhonchi. Cardiovascular: Regular rate and rhythm with normal S1/S2 without murmurs, rubs or gallops. Abdomen: Soft, non-tender, +distended with normal bowel sounds. Musculoskeletal: No clubbing, cyanosis or edema bilaterally. Skin: Warm and dry   neurologic: Alert, speech clear with no overt facial droop  Psychiatric: Alert and oriented, not anxious appearing      Labs:   Recent Labs     04/30/22  0840 05/02/22  0618   WBC 19.4* 15.9*   HGB 9.6* 9.4*   HCT 29.7* 28.4*    408     No results for input(s): NA, K, CL, CO2, BUN, CREATININE, CALCIUM, PHOS in the last 72 hours. Invalid input(s): MAGNES  No results for input(s): AST, ALT, BILIDIR, BILITOT, ALKPHOS in the last 72 hours. No results for input(s): INR in the last 72 hours. No results for input(s): Verlena Agustin in the last 72 hours. Urinalysis:      Lab Results   Component Value Date    NITRU Negative 11/24/2014    WBCUA rare 11/24/2014    BACTERIA 1+ 08/18/2012    RBCUA None seen 11/24/2014    BLOODU SMALL 06/28/2018    BLOODU Negative 11/24/2014    SPECGRAV 1.010 06/28/2018    SPECGRAV 1.010 11/24/2014    GLUCOSEU neg 06/28/2018    GLUCOSEU Negative 11/24/2014       Radiology:  XR CHEST (2 VW)   Final Result   No acute finding in the chest.         XR ABDOMEN (KUB) (SINGLE AP VIEW)   Final Result   Intraprocedural fluoroscopic spot images as above. See separate procedure   note for more information.          FLUORO FOR SURGICAL PROCEDURES   Final Result              Assessment/Plan:    Active Hospital Problems    Diagnosis     Postoperative pain [G89.18]      Priority: Medium    S/P laparoscopic-assisted sigmoidectomy [Z90.49]      Priority: Medium    Colonic stricture ([facility] [address] [K56.699]     Essential hypertension [I10]       S/p lap  sigmoidectomy : post op management per primary team     Hypertension- fairly controlled. Continue meds as ordered     HLD-on statin     Migraines- on Inderal    Hx of Breast Cancer-s/p double mastectomy. on letrozole held inpatient which can be resumed at d/c. F/u with her oncologist     Sarcoidosis- on methotrexate weekly    Depression-Zoloft        DVT Prophylaxis: Lovenox  Diet: ADULT DIET; Regular; Low Fiber  Code Status: Full Code        Dispo - ok for d/c from medical standpoint. Discussed with RN and pt.      Deng Garcia MD

## 2022-05-02 NOTE — PROGRESS NOTES
Shift assessment completed. Pt A&O x4, VSS. Pt reports feeling better this morning and ready to go home. Non skid socks on, pt independent with ambulation. x5 lap incisions all with surgical glue, KIANNA. Transverse incision KIANNA with staples, C/D/I. Denies any needs at this time. Bed locked and in lowest position. Call light and bedside table within reach. Will continue to monitor.

## 2022-05-02 NOTE — PLAN OF CARE
Problem: Discharge Planning  Goal: Discharge to home or other facility with appropriate resources  Outcome: Progressing     Problem: Pain  Goal: Verbalizes/displays adequate comfort level or baseline comfort level  Outcome: Progressing  Flowsheets (Taken 4/30/2022 2129)  Verbalizes/displays adequate comfort level or baseline comfort level:   Encourage patient to monitor pain and request assistance   Assess pain using appropriate pain scale   Implement non-pharmacological measures as appropriate and evaluate response   Administer analgesics based on type and severity of pain and evaluate response   Consider cultural and social influences on pain and pain management   Notify Licensed Independent Practitioner if interventions unsuccessful or patient reports new pain  Note: Pt denying pain currently, will continue to monitor throughout this shift. Problem: Safety - Adult  Goal: Free from fall injury  5/1/2022 2020 by Julissa Cheung RN  Outcome: Progressing  Flowsheets (Taken 4/30/2022 1924)  Free From Fall Injury:   Instruct family/caregiver on patient safety   Based on caregiver fall risk screen, instruct family/caregiver to ask for assistance with transferring infant if caregiver noted to have fall risk factors  Note: Socks on and pt ambulating independently per baseline. Pt c/o appropriately. Problem: ABCDS Injury Assessment  Goal: Absence of physical injury  Outcome: Progressing  Note: Socks on and pt ambulating independently per baseline. Pt c/o appropriately. Problem: Skin/Tissue Integrity  Goal: Absence of new skin breakdown  Description: 1. Monitor for areas of redness and/or skin breakdown  2. Assess vascular access sites hourly  3. Every 4-6 hours minimum:  Change oxygen saturation probe site  4. Every 4-6 hours:  If on nasal continuous positive airway pressure, respiratory therapy assess nares and determine need for appliance change or resting period.   Outcome: Progressing  Note: Pt continent and turns self. Wounds KIANNA and c/d/i.

## 2022-05-02 NOTE — CARE COORDINATION
Chart reviewed day 5. POD 5, ROBOTIC, SIGMOIDCOLECTOMY WITH REVISION OF COLORECTAL ANASTOMOSIS. Low fiber diet. Therapy with recs for home with assist. From home with spouse. IPTA. No DCP needs identified. Oskar Nunez RN        D/c order noted. Spoke with patient. No concerns for d/c will have ride. Very thankful for care provided while she was here.  Oskar Nunez RN

## 2022-05-02 NOTE — PROGRESS NOTES
Pt AOx4, VSS, shift assessment completed and charted. Pt denying pain or nausea/vomiting. Pt now on RA and lungs sounding much clearer - no rhonchi, just end expiratory wheezes. Pt educated once more on the use of the incentive spirometer. Pt agreeable. Pt ambulating independently per baseline with socks on. Pt denying further needs. Bed is low, locked, and call light/bedside table within reach. All care per orders, will continue to monitor throughout this shift.

## 2022-05-03 ENCOUNTER — TELEPHONE (OUTPATIENT)
Dept: FAMILY MEDICINE CLINIC | Age: 67
End: 2022-05-03

## 2022-05-03 ENCOUNTER — CARE COORDINATION (OUTPATIENT)
Dept: CASE MANAGEMENT | Age: 67
End: 2022-05-03

## 2022-05-03 ENCOUNTER — TELEPHONE (OUTPATIENT)
Dept: SURGERY | Age: 67
End: 2022-05-03

## 2022-05-03 NOTE — TELEPHONE ENCOUNTER
Pts  called saying that pt isn't able to make it to the restroom and is having bowel accidents - wants to know what to do and if she can take Immodium - (pt is s/p Left Colectomy on 4/27) - I explained that her surgery wasn't even a week ago and that her body is still trying to adjust - recommended she increase fluid intake and see how that goes but to not take Immodium right now but to CB if symptoms worsen - He understood and agreed

## 2022-05-03 NOTE — CARE COORDINATION
Jem[facility] Transitions Initial Follow Up Call    Call within 2 business days of discharge: Yes    Patient: Mila West Patient : 1955   MRN: [id number]  Reason for Admission: post op pain s/p colonic stricture  Discharge Date: 22 RARS: Readmission Risk Score: 14.6 ( )      Last Discharge [facility]       Complaint Diagnosis Description Type Department Provider    22  Postoperative pain . .. Admission (Discharged) Dulce Maria Zepeda MD           Spoke with: tim    Attempted to reach patient via phone for initial post hospital transition call. No option to leave message.  \"voice mailbox has not been set up yet\"  No alternate number listed in system to attempt        Care Transitions 24 Hour Call    Care Transitions Interventions         Follow Up  Future Appointments   Date Time Provider Annmarie Mclean   2022  1:00 PM VERITO Carney - CNP [facility] [place] - [place]   2022  1:00 PM [facility] [place]   2023  3:30 PM Kolby Addison MD [address] [place] [address] Deann Church RN

## 2022-05-04 ENCOUNTER — CARE COORDINATION (OUTPATIENT)
Dept: CASE MANAGEMENT | Age: 67
End: 2022-05-04

## 2022-05-04 ENCOUNTER — TELEPHONE (OUTPATIENT)
Dept: SURGERY | Age: 67
End: 2022-05-04

## 2022-05-04 NOTE — CARE COORDINATION
[facility] Transitions Initial Follow Up Call    Call within 2 business days of discharge: Yes    Patient: Salina Downing Patient : 1955   MRN: [id number]  Reason for Admission: colonic stricture  Discharge Date: 22 RARS: Readmission Risk Score: 14.6 ( )      Last Discharge  [place] [id number]       Complaint Diagnosis Description Type Department Provider    22  Postoperative pain . .. Admission (Discharged) Mago Villegas MD           Spoke with: Salina Downing and     Facility: [facility]    Non-face-to-face services provided:  Obtained and reviewed discharge summary and/or continuity of care documents       Challenges to be reviewed by the provider   Additional needs identified to be addressed with provider No  none       Method of communication with provider : none    Advance Care Planning:   Does patient have an Advance Directive:  not on file. Was this a readmission? No  Patient stated reason for admission:   Patients top risk factors for readmission:   [place]OB[place], [address] Transition Nurse (CTN) contacted the patient by telephone to perform post hospital discharge assessment. Verified name and  with patient as identifiers. Provided introduction to self, and explanation of the CTN role. CTN reviewed discharge instructions, medical action plan and red flags with patient who verbalized understanding. Patient given an opportunity to ask questions and does not have any further questions or concerns at this time. Were discharge instructions available to patient? Yes. Reviewed appropriate site of care based on symptoms and resources available to patient including: PCP  Specialist. The patient agrees to contact the PCP office for questions related to their healthcare. Medication reconciliation was performed with patient, who verbalizes understanding of administration of home medications.      COVID Risk Education    Educated patient about risk for severe COVID-19 due to risk factors according to CDC guidelines. Education provided on COVID-19 vaccination as appropriate. Discussed exposure protocols and quarantine with CDC Guidelines. Patient was given an opportunity to verbalize any questions and concerns and agrees to contact CTN or health care provider for questions related to their healthcare. Was patient discharged with a pulse oximeter? No Discussed and confirmed pulse oximeter discharge instructions and when to notify provider or seek emergency care.  answered call and placed call on speaker. Spoke to  as well with patient's consent. Patient is doing well. Taking all medication as directed. Reviewed new and changed medications, but declined full med requisition. Patient has strong support from . Working on pain management and getting good relief with prescription medication. Denied any acute needs at present time. Agreeable to f/u calls. Educated on the use of urgent care or physicians 24 hr access line if assistance is needed after hours. CTN provided contact information for future needs. Plan for follow-up call in 7-14 days based on severity of symptoms and risk factors.   Plan for next call: follow up appointment-Dr Cesar scheduled 5/10/22      Care Transitions 24 Hour Call    Do you have a copy of your discharge instructions?: Yes  Do you have all of your prescriptions and are they filled?: Yes  Have you been contacted by a [address] [place] [address] Avenue?: No  Have you scheduled your follow up appointment?: Yes  How are you going to get to your appointment?: Car - family or friend to transport  Do you feel like you have everything you need to keep you well at home?: Yes  Care Transitions Interventions         Follow Up  Future Appointments   Date Time Provider Annmarie Mclean   5/10/2022 11:00 AM Jeannette Moreno MD [address]   7/6/2022  1:00 PM VERITO Gallegos - ELISEO [facility] Surinder - JAS   7/11/2022  1:00 PM MD PREMA Rae   1/9/2023  3:30 PM MD PREMA Rae  Surinder Gusman RN

## 2022-05-04 NOTE — TELEPHONE ENCOUNTER
Pts  called saying that Pt got one of the staples caught on her garment and it pulled out and is bleeding a little bit.  said they wiped down the area with alcohol pads and put gauze over incision. I advised not using anymore alcohol pads as that could cause stinging, dry out area and cause irritation. I suggested they just leave gauze over area until bleeding stops - Pts  said there wasn't a lot of blood but he was concerned about a possible infection and wanted to send me pics - I asked him to send to me via Guvera to which he agreed    Pts  called back a few hours later saying he was having a hard time sending the photos via my-chart - Gave him my work email - Once received, I reviewed photos w/ Nikki Nguyen, [address] via email of the following to pt:  Darrian La,  I took a look at [place] incision. The incision itself looks good. Dont wipe it down with anymore alcohol pads. Have Thea wash area with antibacterial soap and dry with a clean, dry towel. Then, santos the red area with a sharpie marker (draw a [place] around the red area). If the redness goes outside of the marked area or if Jose Garcia develops a fever or severe pain, she will need to be seen. If it is after hours then she should probably go to the ER. Just please keep an eye on it and report any signs of infection right away. I will give you a call tomorrow to see how things are going. Let me know if you have any other questions or concerns. I leave here today at 4:30pm. Otherwise, I will touch base with you tomorrow.   Livan Reina

## 2022-05-05 ENCOUNTER — TELEPHONE (OUTPATIENT)
Dept: SURGERY | Age: 67
End: 2022-05-05

## 2022-05-05 NOTE — TELEPHONE ENCOUNTER
Called pt to see how she was doing from yesterday and to see if the redness had gone outside of the line - Pt said she is doing much better and redness has gone down  Informed we will see pt for her post op appt next Tues 5/10/22 and to call back if anything changes  Pt understood and agreed

## 2022-05-06 NOTE — DISCHARGE SUMMARY
Surgery Discharge Summary    Patient Identification  Sri Thibodeaux is a 77 y.o. female. :  1955  Admit Date:  2022    Discharge date:   2022 12:30 PM                                   Disposition: home    Discharge Diagnoses:   Principal Problem:    Colonic stricture ([address]  Active Problems:    S/P laparoscopic-assisted sigmoidectomy    Postoperative pain    Essential hypertension  Resolved Problems:    * No resolved hospital problems. *      Discharge condition: good    Discharge Medications:     Discharge Medication List as of 2022 10:09 AM      CONTINUE these medications which have NOT CHANGED    Details   ALPRAZolam (XANAX) 1 MG tablet Take 1 mg by mouth 2 times daily as needed for Anxiety. Historical Med      Folic Acid 0.8 MG CAPS Take 1 capsule by mouth dailyHistorical Med      traZODone (DESYREL) 100 MG tablet Take 100 mg by mouth nightlyHistorical Med      traMADol (ULTRAM) 50 MG tablet Take 50 mg by mouth 2 times daily as needed for Pain. Historical Med      vitamin D (ERGOCALCIFEROL) 1.25 MG (65791 UT) CAPS capsule Take 1 capsule by mouth once a week, Disp-12 capsule, R-1Normal      mirtazapine (REMERON) 15 MG tablet TAKE 1 TABLET BY MOUTH ONCE DAILY AT NIGHT, Disp-90 tablet, R-1Normal      montelukast (SINGULAIR) 10 MG tablet TAKE 1 TABLET BY MOUTH ONCE DAILY NIGHTLY, Disp-90 tablet, R-0Normal      pantoprazole (PROTONIX) 40 MG tablet TAKE 1 TABLET BY MOUTH TWICE DAILY BEFORE MEAL(S), Disp-180 tablet, R-0Normal      alendronate (FOSAMAX) 70 MG tablet Take 1 tablet by mouth once a week, Disp-12 tablet, R-0Normal      spironolactone (ALDACTONE) 50 MG tablet Take 1 tablet by mouth once daily, Disp-90 tablet, R-0Normal      sertraline (ZOLOFT) 100 MG tablet TAKE 1 & 1/2 (ONE & ONE-HALF) TABLETS BY MOUTH ONCE DAILY, Disp-135 tablet, R-0Normal      valACYclovir (VALTREX) 500 MG tablet Take 1 tablet by mouth once daily, Disp-90 tablet, R-0Normal      propranolol (INDERAL LA) 60 MG extended release capsule Take 1 capsule by mouth once daily, Disp-90 capsule, R-0Normal      letrozole (FEMARA) 2.5 MG tablet Take 2.5 mg by mouth daily Historical Med      atorvastatin (LIPITOR) 20 MG tablet Take 1 tablet by mouth nightly TAKE 1 TABLET BY MOUTH EVERY DAY, Disp-90 tablet, R-1Normal      ketorolac (ACULAR) 0.5 % ophthalmic solution Place 1 drop into both eyes 2 times daily Historical Med      fluticasone-salmeterol (ADVAIR) 250-50 MCG/DOSE AEPB Inhale into the lungs 2 times daily WixelaHistorical Med      albuterol sulfate  (90 Base) MCG/ACT inhaler 2 puffs q 4 prnHistorical Med      diclofenac sodium 1 % GEL Apply topically 4 times daily Indications: Arthisits  prescribes , Topical, 4 TIMES DAILY, Historical Med      difluprednate (DUREZOL) 0.05 % EMUL Apply 2 drops to eye 2 times daily BOTH EYESHistorical Med      gabapentin (NEURONTIN) 400 MG capsule 400 mg 4 times daily. Indications: Prescribed by Ar[facility]ritis  , R-1Historical Med      cyclobenzaprine (FLEXERIL) 10 MG tablet Take 10 mg by mouth 3 times daily as needed for Muscle spasmsHistorical Med      methotrexate (RHEUMATREX) 2.5 MG chemo tablet Take 7.5 mg by mouth every 7 days Historical Med      fluocinonide (LIDEX) 0.05 % cream Apply topically 2 times daily Apply topically 2 times daily. , Topical, Historical Med      promethazine (PHENERGAN) 12.5 MG tablet Take 25 mg by mouth every 6 hours as needed for Nausea Indications: Dr. Miguel Tomlin                Discharge Medication List as of 5/2/2022 10:09 AM      START taking these medications    Details   HYDROcodone-acetaminophen (NORCO) 5-325 MG per tablet Take 1-2 tablets by mouth every 6 hours as needed for Pain for up to 5 days. Intended supply: 5 days. Take lowest dose possible to manage pain, Disp-20 tablet, R-0Normal               Most Recent Labs:    CBC: No results for input(s): WBC, HGB, HCT, PLT in the last 72 hours.   BMP:  No results for input(s): NA, K, CL, CO2, BUN, CREATININE, GLUCOSE in the last 72 hours. Hepatic: No results for input(s): AST, ALT, ALB, BILITOT, ALKPHOS in the last 72 hours. PT/INR:  No results for input(s): INR in the last 72 hours. Consults: none    Surgery: robotic sigmoid colectomy    Patient Instructions: Activity: no heavy lifting, pushing, pulling for 4 weeks, no driving for 1 weeks or while on analgesics  Diet: low fiber  Follow-up with Cesar in 2 weeks. The patient and/or family/patient representatives, were provided education regarding discharge instructions, ongoing treatment and follow-up. Details of information given to the patient may be found in the discharge instructions located in the EMR. HPI and Hospital Course:   Patient admitted after above operation. Postop as GI function returned had diet advanced. Pain controlled and ambulating well prior to discharge.       Julia Avila MD    [facility] Surgery

## 2022-05-09 DIAGNOSIS — G47.00 INSOMNIA, UNSPECIFIED TYPE: Primary | ICD-10-CM

## 2022-05-10 ENCOUNTER — OFFICE VISIT (OUTPATIENT)
Dept: SURGERY | Age: 67
End: 2022-05-10

## 2022-05-10 VITALS
WEIGHT: 162 LBS | DIASTOLIC BLOOD PRESSURE: 84 MMHG | TEMPERATURE: 97.2 F | BODY MASS INDEX: 28.7 KG/M2 | HEIGHT: 63 IN | HEART RATE: 95 BPM | SYSTOLIC BLOOD PRESSURE: 133 MMHG

## 2022-05-10 DIAGNOSIS — Z09 POSTOP CHECK: Primary | ICD-10-CM

## 2022-05-10 PROCEDURE — 99024 POSTOP FOLLOW-UP VISIT: CPT | Performed by: SURGERY

## 2022-05-10 RX ORDER — ALPRAZOLAM 1 MG/1
TABLET ORAL
Qty: 60 TABLET | Refills: 0 | Status: SHIPPED | OUTPATIENT
Start: 2022-05-10 | End: 2022-06-14

## 2022-05-10 NOTE — PROGRESS NOTES
Surgery Post-op Progress Note    HPI:  Notes reviewed, and agree with documentation in pt's chart. Postoperative Follow-up: Patient presents for 2 week follow-up status post sigmoidectomy for chronic severe diverticulitis. Has been slowly feeling better, getting stronger. Diet/appetite slowly improving. Bowels functioning, but loose, erratic still. No nausea, bloating, fever, chills. C/O \"knot\" around her RLQ trocar site. ROS:    · 10 point review of systems performed; please refer to HPI with pertinent positives, all other ROS are negative    A review of the patient's record including allergies, medication list, tobacco history, family history, problem list, medical history and social history has been completed and updates made to the patient's EMR where indicated. PE:   CONSTITUTIONAL:  awake and alert    ABDOMEN: soft, non-distended, non-tender     INCISION: clean, dry, no drainage, staples intact to suprapubic incision w/ mild hyperemia at staples; firm induration ~3x3cm around the RLQ trocar incision      ASSESSMENT:   Diagnosis Orders   1. Postop check     · RLQ trocar site was used to introduce stapler anvil, so not surprised she has a hematoma/seroma at this site.   This will resolve spontaneously  · Otherwise slowly improving      PLAN:    Continue with routine wound care as discussed  Gradually increase activities as tolerated  Follow-up in 1 week or as needed; please call with questions or concerns  · Staples removed without difficulty

## 2022-05-10 NOTE — PATIENT INSTRUCTIONS
· Continue with routine wound care as discussed  · Gradually increase activities as tolerated  · Follow-up in 1 week or as needed; please call with questions or concerns

## 2022-05-10 NOTE — TELEPHONE ENCOUNTER
Refill Request - Controlled Substance    CONFIRM preferrred pharmacy with the patient.      Last Seen Department: 4/4/2022  Last Seen by PCP: 1/3/2022    Last Written: 12/14/2021 60 Tablet 2 Refills    Last UDS: 12/19/2017    Med Agreement Signed On: 01/04/2017    Next Appointment: 7/6/2022    Future appointment scheduled    Requested Prescriptions     Pending Prescriptions Disp Refills    ALPRAZolam (XANAX) 1 MG tablet [Pharmacy Med Name: ALPRAZolam 1 MG Oral Tablet] 60 tablet 0     Sig: TAKE 1 TABLET BY MOUTH TWICE DAILY AS NEEDED FOR SLEEP OR  ANXIETY

## 2022-05-11 ENCOUNTER — CARE COORDINATION (OUTPATIENT)
Dept: CASE MANAGEMENT | Age: 67
End: 2022-05-11

## 2022-05-11 NOTE — CARE COORDINATION
[facility] Transitions Follow Up Call    2022    Patient: Chasity Ornelas  Patient : 1955   MRN: [id number]  Reason for Admission: s/p colectomy  Discharge Date: 22 RARS: Readmission Risk Score: 14.6 ( )         Spoke with: , Karly Olmedo, HIPAA verified. Care Transitions Follow Up Call    Needs to be reviewed by the provider   Additional needs identified to be addressed with provider: No  none             Method of communication with provider : phone      Care Transition Nurse (CTN) contacted the family by telephone to follow up after admission. Verified name and  with family as identifiers. Addressed changes since last contact: none  Discussed follow-up appointments. If no appointment was previously scheduled, appointment scheduling offered: Yes. Is follow up appointment scheduled within 7 days of discharge? Yes. Advance Care Planning:   Does patient have an Advance Directive: not on file. Advance Care Planning   Healthcare Decision Maker:    Primary Decision Maker: Lizabeth Reno - [phone]    Primary Decision Maker: Roxanne Guardado - Child - [phone]    Secondary Decision Maker: Santos Mancuso Child - [phone]    Secondary Decision Maker: Dontae Osorio - Brother/Sister - [phone]    CTN reviewed discharge instructions, medical action plan and red flags with family and discussed any barriers to care and/or understanding of plan of care after discharge. Discussed appropriate site of care based on symptoms and resources available to patient including: PCP  Specialist. The family agrees to contact the PCP office for questions related to their healthcare. Patients top risk factors for readmission: medical condition-s/p colectomy    Spoke with , Karly Olmedo, HIPAA verified.  verified patient  and was pleasant and agreeable to transition calls. States patient is doing better. Sutures have been removed & incision healing well.  Post op went well. Ambulating better. Some pain, but is manageable at home. Appetite good. Denies problems with bowels or bladder. Denies medication changes. Reviewed upcoming appointments. Denies any acute needs at present time. Agreeable to f/u calls. Educated on the use of urgent care or physicians 24 hr access line if assistance is needed after hours. CTN provided contact information for future needs. Plan for follow-up call in 7-10 days based on severity of symptoms and risk factors. Matilde Ramirez LPN [address] [place] [address]  Care Transitions  [phone]    Care Transitions Subsequent and Final Call    Subsequent and Final Calls  Do you have any ongoing symptoms?: No  Have your medications changed?: No  Do you have any questions related to your medications?: No  Do you currently have any active services?: No  Do you have any needs or concerns that I can assist you with?: No  Identified Barriers: None  Care Transitions Interventions  Other Interventions:            Follow Up  Future Appointments   Date Time Provider Annmarie Mclean   5/17/2022  1:00 PM Olena Disla MD [facility]   7/6/2022  1:00 PM VERITO Russell - CNP [facility] [place] [facility]   7/11/2022  1:00 PM Kizzy Garcia MD [facility] [place] - [facility]   1/9/2023  3:30 PM Kizzy Garcia MD [facility] [place] - [facility]       Mary Abad LPN

## 2022-05-17 ENCOUNTER — OFFICE VISIT (OUTPATIENT)
Dept: SURGERY | Age: 67
End: 2022-05-17

## 2022-05-17 VITALS
BODY MASS INDEX: 29.06 KG/M2 | DIASTOLIC BLOOD PRESSURE: 68 MMHG | HEART RATE: 81 BPM | SYSTOLIC BLOOD PRESSURE: 126 MMHG | TEMPERATURE: 97.2 F | WEIGHT: 164 LBS | HEIGHT: 63 IN

## 2022-05-17 DIAGNOSIS — Z09 POSTOP CHECK: Primary | ICD-10-CM

## 2022-05-17 PROCEDURE — 99024 POSTOP FOLLOW-UP VISIT: CPT | Performed by: SURGERY

## 2022-05-17 RX ORDER — PROPRANOLOL HCL 60 MG
CAPSULE, EXTENDED RELEASE 24HR ORAL
Qty: 90 CAPSULE | Refills: 0 | Status: SHIPPED | OUTPATIENT
Start: 2022-05-17 | End: 2022-07-21

## 2022-05-17 NOTE — PROGRESS NOTES
Surgery Post-op Progress Note    HPI:  Notes reviewed, and agree with documentation in pt's chart. Postoperative Follow-up: Patient presents for 3 week follow-up status post difficult sigmoidectomy for severe chronic diverticulitis w/ stricture . Eating a regular diet without difficulty. Bowel movements are Abnormal   - still loose but no blood, no difficulty/straining to pass. The patient is not having any pain. Pt noted to have seroma/hematoma around the RLQ trocar site at last visit - she notes this area is improving, getting smaller/less noticeable. Her suprapubic incision has had mild amount of non-purulent drainage from the central portion. .     ROS:    · 10 point review of systems performed; please refer to HPI with pertinent positives, all other ROS are negative    A review of the patient's record including allergies, medication list, tobacco history, family history, problem list, medical history and social history has been completed and updates made to the patient's EMR where indicated. PE:   CONSTITUTIONAL:  awake and alert    ABDOMEN: soft, non-distended, non-tender     INCISION: clean, dry, healing, minimal superficial separation in middle of the suprapubic incision w/ mild fibrinous exudate, no purulence, no bleeding, no surrounding erythema; RLQ hematoma greatly reduced in size, much softer       ASSESSMENT:   Diagnosis Orders   1. Postop check     · Continues to improve nicely since surgery  · Suprapubic wound is located in the fold of her pannus so not surprised there has been mild superficial dehiscence.   This will gradually re-heal w/ standard wound care      PLAN:    Continue with routine wound care as discussed  Gradually increase activities as tolerated  Follow-up as needed; please call with questions or concerns  ·

## 2022-05-18 ENCOUNTER — CARE COORDINATION (OUTPATIENT)
Dept: CASE MANAGEMENT | Age: 67
End: 2022-05-18

## 2022-05-18 NOTE — CARE COORDINATION
[facility] Transitions Follow Up Call    2022    Patient: Christophe Moon  Patient : 1955   MRN: [id number]  Reason for Admission: s/p colectomy  Discharge Date: 22 RARS: Readmission Risk Score: 14.6 ( )         Spoke with: Christophe Moon and , [facility] Transitions Follow Up Call    Needs to be reviewed by the provider   Additional needs identified to be addressed with provider: No  none             Method of communication with provider : none      Care Transition Nurse contacted the patient by telephone to follow up after admission. Verified name and  with patient as identifiers. Addressed changes since last contact: none  Discussed follow-up appointments. If no appointment was previously scheduled, appointment scheduling offered: Yes. Is follow up appointment scheduled within 7 days of discharge? Yes. Advance Care Planning:   Does patient have an Advance Directive: reviewed and current, reviewed and needs to be updated, not on file; education provided, not on file, patient declined education, decision maker updated and referral to internal ACP facilitator. CTN reviewed discharge instructions, medical action plan and red flags with patient and discussed any barriers to care and/or understanding of plan of care after discharge. Discussed appropriate site of care based on symptoms and resources available to patient including: Specialist. The family agrees to contact the PCP office for questions related to their healthcare. Patients top risk factors for readmission: medical condition-s/p colectomy  Interventions to address risk factors: Obtained and reviewed discharge summary and/or continuity of care documents    Patient answered call and verified . Patient pleasant and agreeable to transition call. Patient feeling well and recovering from surgery. Patient was seen by surgeon yesterday and no new orders noted.  Staples were removed and patient stated that she was having some \"slight drainage\" but not needing dressing. Patient managing pain with OTC medication with good relief. Denied any acute needs at present time. Agreeable to f/u calls. Educated on the use of urgent care or physicians 24 hr access line if assistance is needed after hours. CTN provided contact information for future needs. Plan for follow-up call in 7-10 days based on severity of symptoms and risk factors. Care Transitions Subsequent and Final Call    Subsequent and Final Calls  Do you have any ongoing symptoms?: No  Have your medications changed?: No  Do you have any questions related to your medications?: No  Do you currently have any active services?: No  Do you have any needs or concerns that I can assist you with?: No  Identified Barriers: None  Care Transitions Interventions  Other Interventions:            Follow Up  Future Appointments   Date Time Provider Annmarie Mlcean   7/6/2022  1:00 PM VERTIO Fowler - CNP [facility] - [place]   7/11/2022  1:00 PM [facility] [place]   1/9/2023  3:30 PM Gee Jin MD [facility] - [place] Marsh RN

## 2022-05-20 ENCOUNTER — HOSPITAL ENCOUNTER (EMERGENCY)
Age: 67
Discharge: HOME | End: 2022-05-20
Attending: STUDENT IN AN ORGANIZED HEALTH CARE EDUCATION/TRAINING PROGRAM
Payer: MEDICARE

## 2022-05-20 ENCOUNTER — APPOINTMENT (OUTPATIENT)
Dept: CT IMAGING | Age: 67
End: 2022-05-20
Payer: MEDICARE

## 2022-05-20 ENCOUNTER — TELEPHONE (OUTPATIENT)
Dept: SURGERY | Age: 67
End: 2022-05-20

## 2022-05-20 ENCOUNTER — OFFICE VISIT (OUTPATIENT)
Dept: SURGERY | Age: 67
End: 2022-05-20

## 2022-05-20 VITALS
HEIGHT: 63 IN | WEIGHT: 161 LBS | HEART RATE: 82 BPM | BODY MASS INDEX: 28.53 KG/M2 | SYSTOLIC BLOOD PRESSURE: 151 MMHG | DIASTOLIC BLOOD PRESSURE: 93 MMHG

## 2022-05-20 VITALS
HEIGHT: 63 IN | TEMPERATURE: 98.1 F | BODY MASS INDEX: 28.53 KG/M2 | DIASTOLIC BLOOD PRESSURE: 94 MMHG | SYSTOLIC BLOOD PRESSURE: 123 MMHG | OXYGEN SATURATION: 95 % | RESPIRATION RATE: 18 BRPM | WEIGHT: 161 LBS | HEART RATE: 81 BPM

## 2022-05-20 DIAGNOSIS — Z09 POSTOP CHECK: Primary | ICD-10-CM

## 2022-05-20 DIAGNOSIS — K62.5 RECTAL BLEEDING: Primary | ICD-10-CM

## 2022-05-20 DIAGNOSIS — N39.0 URINARY TRACT INFECTION IN FEMALE: ICD-10-CM

## 2022-05-20 DIAGNOSIS — K62.5 RECTAL BLEEDING: ICD-10-CM

## 2022-05-20 LAB
A/G RATIO: 0.9 (ref 1.1–2.2)
ABO/RH: NORMAL
ALBUMIN SERPL-MCNC: 3.2 G/DL (ref 3.4–5)
ALP BLD-CCNC: 111 U/L (ref 40–129)
ALT SERPL-CCNC: 7 U/L (ref 10–40)
ANION GAP SERPL CALCULATED.3IONS-SCNC: 10 MMOL/L (ref 3–16)
ANTIBODY SCREEN: NORMAL
AST SERPL-CCNC: 14 U/L (ref 15–37)
BASOPHILS ABSOLUTE: 0.2 K/UL (ref 0–0.2)
BASOPHILS ABSOLUTE: 0.2 K/UL (ref 0–0.2)
BASOPHILS RELATIVE PERCENT: 1.4 %
BASOPHILS RELATIVE PERCENT: 1.4 %
BILIRUB SERPL-MCNC: <0.2 MG/DL (ref 0–1)
BILIRUBIN URINE: NEGATIVE
BLOOD, URINE: NEGATIVE
BUN BLDV-MCNC: 11 MG/DL (ref 7–20)
CALCIUM SERPL-MCNC: 9 MG/DL (ref 8.3–10.6)
CHLORIDE BLD-SCNC: 103 MMOL/L (ref 99–110)
CLARITY: CLEAR
CO2: 22 MMOL/L (ref 21–32)
COLOR: ABNORMAL
CREAT SERPL-MCNC: 0.7 MG/DL (ref 0.6–1.2)
EOSINOPHILS ABSOLUTE: 0.2 K/UL (ref 0–0.6)
EOSINOPHILS ABSOLUTE: 0.2 K/UL (ref 0–0.6)
EOSINOPHILS RELATIVE PERCENT: 1.7 %
EOSINOPHILS RELATIVE PERCENT: 1.8 %
EPITHELIAL CELLS, UA: NORMAL /HPF (ref 0–5)
GFR AFRICAN AMERICAN: >60
GFR NON-AFRICAN AMERICAN: >60
GLUCOSE BLD-MCNC: 110 MG/DL (ref 70–99)
GLUCOSE URINE: NEGATIVE MG/DL
HCT VFR BLD CALC: 32.1 % (ref 36–48)
HCT VFR BLD CALC: 32.9 % (ref 36–48)
HEMOGLOBIN: 10.5 G/DL (ref 12–16)
HEMOGLOBIN: 10.7 G/DL (ref 12–16)
INR BLD: 1.05 (ref 0.88–1.12)
KETONES, URINE: NEGATIVE MG/DL
LEUKOCYTE ESTERASE, URINE: ABNORMAL
LYMPHOCYTES ABSOLUTE: 3.7 K/UL (ref 1–5.1)
LYMPHOCYTES ABSOLUTE: 4 K/UL (ref 1–5.1)
LYMPHOCYTES RELATIVE PERCENT: 26.6 %
LYMPHOCYTES RELATIVE PERCENT: 28.4 %
MCH RBC QN AUTO: 30.4 PG (ref 26–34)
MCH RBC QN AUTO: 30.9 PG (ref 26–34)
MCHC RBC AUTO-ENTMCNC: 32.4 G/DL (ref 31–36)
MCHC RBC AUTO-ENTMCNC: 32.7 G/DL (ref 31–36)
MCV RBC AUTO: 93.9 FL (ref 80–100)
MCV RBC AUTO: 94.3 FL (ref 80–100)
MICROSCOPIC EXAMINATION: YES
MONOCYTES ABSOLUTE: 0.8 K/UL (ref 0–1.3)
MONOCYTES ABSOLUTE: 1 K/UL (ref 0–1.3)
MONOCYTES RELATIVE PERCENT: 5.5 %
MONOCYTES RELATIVE PERCENT: 7.1 %
NEUTROPHILS ABSOLUTE: 8.7 K/UL (ref 1.7–7.7)
NEUTROPHILS ABSOLUTE: 8.9 K/UL (ref 1.7–7.7)
NEUTROPHILS RELATIVE PERCENT: 63 %
NEUTROPHILS RELATIVE PERCENT: 63.1 %
NITRITE, URINE: NEGATIVE
PDW BLD-RTO: 17.2 % (ref 12.4–15.4)
PDW BLD-RTO: 17.2 % (ref 12.4–15.4)
PH UA: 7 (ref 5–8)
PLATELET # BLD: 743 K/UL (ref 135–450)
PLATELET # BLD: 751 K/UL (ref 135–450)
PMV BLD AUTO: 6.8 FL (ref 5–10.5)
PMV BLD AUTO: 6.9 FL (ref 5–10.5)
POTASSIUM REFLEX MAGNESIUM: 3.7 MMOL/L (ref 3.5–5.1)
PROTEIN UA: NEGATIVE MG/DL
PROTHROMBIN TIME: 11.9 SEC (ref 9.9–12.7)
RBC # BLD: 3.41 M/UL (ref 4–5.2)
RBC # BLD: 3.51 M/UL (ref 4–5.2)
RBC UA: NORMAL /HPF (ref 0–4)
REASON FOR REJECTION: NORMAL
REJECTED TEST: NORMAL
SARS-COV-2, NAAT: NOT DETECTED
SODIUM BLD-SCNC: 135 MMOL/L (ref 136–145)
SPECIFIC GRAVITY UA: 1.01 (ref 1–1.03)
TOTAL PROTEIN: 6.8 G/DL (ref 6.4–8.2)
URINE TYPE: ABNORMAL
UROBILINOGEN, URINE: 0.2 E.U./DL
WBC # BLD: 13.8 K/UL (ref 4–11)
WBC # BLD: 14.2 K/UL (ref 4–11)
WBC UA: NORMAL /HPF (ref 0–5)

## 2022-05-20 PROCEDURE — 6360000004 HC RX CONTRAST MEDICATION: Performed by: STUDENT IN AN ORGANIZED HEALTH CARE EDUCATION/TRAINING PROGRAM

## 2022-05-20 PROCEDURE — 6370000000 HC RX 637 (ALT 250 FOR IP): Performed by: STUDENT IN AN ORGANIZED HEALTH CARE EDUCATION/TRAINING PROGRAM

## 2022-05-20 PROCEDURE — 99285 EMERGENCY DEPT VISIT HI MDM: CPT

## 2022-05-20 PROCEDURE — 80053 COMPREHEN METABOLIC PANEL: CPT

## 2022-05-20 PROCEDURE — 74177 CT ABD & PELVIS W/CONTRAST: CPT

## 2022-05-20 PROCEDURE — 99024 POSTOP FOLLOW-UP VISIT: CPT | Performed by: SURGERY

## 2022-05-20 PROCEDURE — 85025 COMPLETE CBC W/AUTO DIFF WBC: CPT

## 2022-05-20 PROCEDURE — 81001 URINALYSIS AUTO W/SCOPE: CPT

## 2022-05-20 PROCEDURE — 87635 SARS-COV-2 COVID-19 AMP PRB: CPT

## 2022-05-20 PROCEDURE — 86900 BLOOD TYPING SEROLOGIC ABO: CPT

## 2022-05-20 PROCEDURE — 85610 PROTHROMBIN TIME: CPT

## 2022-05-20 PROCEDURE — 86850 RBC ANTIBODY SCREEN: CPT

## 2022-05-20 PROCEDURE — 87086 URINE CULTURE/COLONY COUNT: CPT

## 2022-05-20 PROCEDURE — 86901 BLOOD TYPING SEROLOGIC RH(D): CPT

## 2022-05-20 RX ORDER — CEPHALEXIN 250 MG/1
500 CAPSULE ORAL ONCE
Status: COMPLETED | OUTPATIENT
Start: 2022-05-20 | End: 2022-05-20

## 2022-05-20 RX ORDER — CEPHALEXIN 500 MG/1
500 CAPSULE ORAL 2 TIMES DAILY
Qty: 13 CAPSULE | Refills: 0 | Status: SHIPPED | OUTPATIENT
Start: 2022-05-20 | End: 2022-05-27

## 2022-05-20 RX ADMIN — CEPHALEXIN 500 MG: 250 CAPSULE ORAL at 18:02

## 2022-05-20 RX ADMIN — IOPAMIDOL 75 ML: 755 INJECTION, SOLUTION INTRAVENOUS at 15:44

## 2022-05-20 ASSESSMENT — PAIN - FUNCTIONAL ASSESSMENT
PAIN_FUNCTIONAL_ASSESSMENT: NONE - DENIES PAIN
PAIN_FUNCTIONAL_ASSESSMENT: 0-10

## 2022-05-20 ASSESSMENT — PAIN SCALES - GENERAL: PAINLEVEL_OUTOF10: 4

## 2022-05-20 NOTE — ED NOTES
5140 - called general surgery per consult  Re: GI bleeding s/p recent surgery  1640 - Dr Juan Rogers on the line to speak with  Foot  05/20/22 5665

## 2022-05-20 NOTE — ED PROVIDER NOTES
[facility]  ED      CHIEF COMPLAINT  Rectal Bleeding     HISTORY OF PRESENT ILLNESS  Rosa Lobo is a 77 y.o. female with past medical history of hypertension, diverticulosis, cecal volvulus, hyperlipidemia, and colonic stricture who presents to the ED complaining of bright red blood per rectum. .     Had sigmoidectomy for bowel obstruction on 4/27. Starting Last night had a blood clot and since then has had BRBPR. Having pain in RUQ  Onset of pain: last night  Radiation: denies  Quality: aching  Severity: 4/10  Timing: constant  Palliative Factors: heating pas  Provocative Factors: denies    Nausea/Vomiting: nausea, no vomiting  Last BM: around 11a  Vaginal Discharge/Bleeding: denies  Dysuria/urinary frequency: dysuria  Fever: denies    Old records reviewed: Patient is status post laparoscopic assisted sigmoidectomy for colonic stricture. This was converted to an open procedure given concern for possible damage to the bladder or kidneys. No other complaints, modifying factors or associated symptoms. I have reviewed the following from the nursing documentation. Past Medical History:   Diagnosis Date    Asthma     CAD (coronary artery disease)     Cancer ([facility] [address]     RIGHT BREAST    Chronic diarrhea     Dr. Nathaniel Villareal Chronic kidney disease     kidney stones    Clostridium difficile diarrhea 10/23/13    positive by PCR    Fibromyalgia     Hemorrhoid     Hyperlipidemia     Hypertension     Kidney stone     Migraines     Osteoarthritis     fibromyalgia    Sarcoidosis 2003    sees Dr. Oscar Santiago     Past Surgical History:   Procedure Laterality Date    ABDOMINAL EXPLORATION SURGERY  8/19/12    REDUCTION OF VULVUS; RIGHT COLECTOMY; SMALL BOWEL RESECTION; INSERTION OF ON Q PAIN BUSTER    BREAST BIOPSY      CARDIAC CATHETERIZATION  2010    CLEAN    COLONOSCOPY  2010    normal    COLONOSCOPY N/A 6/16/2020    COLON W/ANES.  performed by Gustavo Castro MD at [address] [facility]  CYSTOSCOPY  2011    CYSTOSCOPY N/A 2022    CYSTOSCOPY, BILATERAL RETROGRADE PYELOGRAM performed by Johanne Burt MD at [address] [facility]  2020    ESOPHAGEAL DILATION Jacquelyn Distance performed by Светлана Jara MD at [address] [facility]  2009    cataract, right    HYSTERECTOMY  2000    LITHOTRIPSY  2012    LITHOTRIPSY      2012    MASTECTOMY Bilateral 2021    BILATERAL SIMPLE MASTECTOMY, RIGHT SENTINEL LYMPH NODE BIOPSY performed by Alverto Painting MD at [address] [facility]    right    PARTIAL HYSTERECTOMY      SIGMOID COLECTOMY Left 2022    ROBOTIC, SIGMOIDCOLECTOMY WITH REVISION OF COLORECTAL ANASTOMOSIS performed by Johanne Burt MD at [place] [address] ENDOSCOPY  13    UPPER GASTROINTESTINAL ENDOSCOPY  2017    gastritis    UPPER GASTROINTESTINAL ENDOSCOPY N/A 2020    EGD W/ANES.  (11:00) performed by Светлана Jara MD at [place] [address]  2011    US BREAST NEEDLE BIOPSY RIGHT Right 4/15/2021    US BREAST NEEDLE BIOPSY RIGHT 4/15/2021 Ranjit Velazquez MD [facility]     Family History   Problem Relation Age of Onset    Heart Disease Father     Cancer Father         skin cancer- unknown    Cancer Brother         skin cancer- forehead and nose- unknown     Social History     Socioeconomic History    Marital status:      Spouse name: Sumanth Zamarripa Number of children: 3    Years of education: Not on file    Highest education level: Not on file   Occupational History    Occupation: disabilty    Tobacco Use    Smoking status: Former Smoker     Packs/day: 0.50     Years: 20.00     Pack years: 10.00     Types: Cigarettes     Start date: 3/1/2019     Quit date: 2020     Years since quittin.7    Smokeless tobacco: Never Used   Vaping Use    Vaping Use: Never used   Substance and Sexual Activity    Alcohol use: No     Alcohol/week: 0.0 standard drinks    Drug use: No    Sexual activity: Not on file   Other Topics Concern    Not on file   Social History Narrative    Not on file     Social Determinants of Health     Financial Resource Strain: Low Risk     Difficulty of Paying Living Expenses: Not hard at all   Food Insecurity: No Food Insecurity    Worried About Running Out of Food in the Last Year: Never true    Juan Jose of Food in the Last Year: Never true   Transportation Needs: No Transportation Needs    Lack of Transportation (Medical): No    Lack of Transportation (Non-Medical): No   Physical Activity:     Days of Exercise per Week: Not on file    Minutes of Exercise per Session: Not on file   Stress:     Feeling of Stress : Not on file   Social Connections:     Frequency of Communication with Friends and Family: Not on file    Frequency of Social Gatherings with Friends and Family: Not on file    Attends [employer] Services: Not on file    Active Member of [address] [place] [address] or Organizations: Not on file    Attends Club or Organization Meetings: Not on file    Marital Status: Not on file   Intimate Partner Violence:     Fear of Current or Ex-Partner: Not on file    Emotionally Abused: Not on file    Physically Abused: Not on file    Sexually Abused: Not on file   Housing Stability: [address] Unable to Pay for Housing in the Last Year: No    Number of [place] in the Last Year: 1    Unstable Housing in the Last Year: No     No current facility-administered medications for this encounter.      Current Outpatient Medications   Medication Sig Dispense Refill    cephALEXin (KEFLEX) 500 MG capsule Take 1 capsule by mouth 2 times daily for 7 days 13 capsule 0    propranolol (INDERAL LA) 60 MG extended release capsule Take 1 capsule by mouth once daily 90 capsule 0    ALPRAZolam (XANAX) 1 MG tablet TAKE 1 TABLET BY MOUTH TWICE DAILY AS NEEDED FOR SLEEP OR  ANXIETY 60 tablet 0    Folic Acid 0.8 MG CAPS Take 1 capsule by mouth daily      traZODone (DESYREL) 100 MG tablet Take 100 mg by mouth nightly      traMADol (ULTRAM) 50 MG tablet Take 50 mg by mouth 2 times daily as needed for Pain.       vitamin D (ERGOCALCIFEROL) 1.25 MG (83053 UT) CAPS capsule Take 1 capsule by mouth once a week 12 capsule 1    mirtazapine (REMERON) 15 MG tablet TAKE 1 TABLET BY MOUTH ONCE DAILY AT NIGHT 90 tablet 1    montelukast (SINGULAIR) 10 MG tablet TAKE 1 TABLET BY MOUTH ONCE DAILY NIGHTLY 90 tablet 0    pantoprazole (PROTONIX) 40 MG tablet TAKE 1 TABLET BY MOUTH TWICE DAILY BEFORE MEAL(S) 180 tablet 0    alendronate (FOSAMAX) 70 MG tablet Take 1 tablet by mouth once a week (Patient taking differently: Take 70 mg by mouth every 7 days Take 1 tablet by mouth once a week) 12 tablet 0    spironolactone (ALDACTONE) 50 MG tablet Take 1 tablet by mouth once daily (Patient taking differently: Take 50 mg by mouth daily Take 1 tablet by mouth once daily) 90 tablet 0    sertraline (ZOLOFT) 100 MG tablet TAKE 1 & 1/2 (ONE & ONE-HALF) TABLETS BY MOUTH ONCE DAILY (Patient taking differently: Take 50 mg by mouth at bedtime TAKE 1 & 1/2 (ONE & ONE-HALF) TABLETS BY MOUTH ONCE DAILY) 135 tablet 0    valACYclovir (VALTREX) 500 MG tablet Take 1 tablet by mouth once daily (Patient taking differently: Take 500 mg by mouth daily Take 1 tablet by mouth once daily) 90 tablet 0    letrozole (FEMARA) 2.5 MG tablet Take 2.5 mg by mouth daily       atorvastatin (LIPITOR) 20 MG tablet Take 1 tablet by mouth nightly TAKE 1 TABLET BY MOUTH EVERY DAY 90 tablet 1    ketorolac (ACULAR) 0.5 % ophthalmic solution Place 1 drop into both eyes 2 times daily       fluticasone-salmeterol (ADVAIR) 250-50 MCG/DOSE AEPB Inhale into the lungs 2 times daily Wixela      albuterol sulfate  (90 Base) MCG/ACT inhaler 2 puffs q 4 prn      diclofenac sodium 1 % GEL Apply topically 4 times daily Indications: [facility]  prescribes       difluprednate (DUREZOL) 0.05 % EMUL Apply 2 drops to eye 2 times daily BOTH EYES      gabapentin (NEURONTIN) 400 MG capsule 400 mg 4 times daily. Indications: Prescribed by [facility]    3    cyclobenzaprine (FLEXERIL) 10 MG tablet Take 10 mg by mouth 3 times daily as needed for Muscle spasms      methotrexate (RHEUMATREX) 2.5 MG chemo tablet Take 7.5 mg by mouth every 7 days       fluocinonide (LIDEX) 0.05 % cream Apply topically 2 times daily Apply topically 2 times daily.  promethazine (PHENERGAN) 12.5 MG tablet Take 25 mg by mouth every 6 hours as needed for Nausea Indications: Dr. Dewitt Half        Allergies   Allergen Reactions    Infliximab      Severe drop in blood pressure    Ultrasound Gel Other (See Comments)     burn    Codeine Nausea And Vomiting    Penicillins Rash       REVIEW OF SYSTEMS  All systems reviewed, pertinent positives per HPI otherwise noted to be negative. PHYSICAL EXAM  BP (!) 161/98   Pulse 80   Temp 98.1 °F (36.7 °C) (Oral)   Resp 18   Ht 5' 3\" (1.6 m)   Wt 161 lb (73 kg)   SpO2 96%   BMI 28.52 kg/m²    GENERAL APPEARANCE: Awake and alert. Cooperative. no distress. HENT: Normocephalic. Atraumatic. Mucous membranes are moist  NECK: Supple. Full range of motion without pain or stiffness  EYES: PERRL. EOM's grossly intact. HEART/CHEST: RRR. No murmurs. LUNGS: Respirations unlabored. CTAB. Good air exchange. Speaking comfortably in full sentences. ABDOMEN: Tender diffusely. Soft. Non-distended. No masses. No organomegaly. No guarding or rebound. MUSCULOSKELETAL: No extremity edema. Compartments soft. No deformity. No tenderness in the extremities. All extremities neurovascularly intact. SKIN: Warm and dry. No acute rashes. Laparoscopic surgical sites well-healing, laparotomy wound with small area of dehiscence and drainage, patient reports seen by surgery with nothing to do  NEUROLOGICAL: Alert and oriented. Differential   Result Value Ref Range    WBC 14.2 (H) 4.0 - 11.0 K/uL    RBC 3.51 (L) 4.00 - 5.20 M/uL    Hemoglobin 10.7 (L) 12.0 - 16.0 g/dL    Hematocrit 32.9 (L) 36.0 - 48.0 %    MCV 93.9 80.0 - 100.0 fL    MCH 30.4 26.0 - 34.0 pg    MCHC 32.4 31.0 - 36.0 g/dL    RDW 17.2 (H) 12.4 - 15.4 %    Platelets 329 (H) 659 - 450 K/uL    MPV 6.9 5.0 - 10.5 fL    Neutrophils % 63.0 %    Lymphocytes % 28.4 %    Monocytes % 5.5 %    Eosinophils % 1.7 %    Basophils % 1.4 %    Neutrophils Absolute 8.9 (H) 1.7 - 7.7 K/uL    Lymphocytes Absolute 4.0 1.0 - 5.1 K/uL    Monocytes Absolute 0.8 0.0 - 1.3 K/uL    Eosinophils Absolute 0.2 0.0 - 0.6 K/uL    Basophils Absolute 0.2 0.0 - 0.2 K/uL   Urinalysis   Result Value Ref Range    Color, UA Straw Straw/Yellow    Clarity, UA Clear Clear    Glucose, Ur Negative Negative mg/dL    Bilirubin Urine Negative Negative    Ketones, Urine Negative Negative mg/dL    Specific Gravity, UA 1.010 1.005 - 1.030    Blood, Urine Negative Negative    pH, UA 7.0 5.0 - 8.0    Protein, UA Negative Negative mg/dL    Urobilinogen, Urine 0.2 <2.0 E.U./dL    Nitrite, Urine Negative Negative    Leukocyte Esterase, Urine TRACE (A) Negative    Microscopic Examination YES     Urine Type NotGiven    Microscopic Urinalysis   Result Value Ref Range    WBC, UA 0-2 0 - 5 /HPF    RBC, UA None seen 0 - 4 /HPF    Epithelial Cells, UA 0-1 0 - 5 /HPF   TYPE AND SCREEN   Result Value Ref Range    ABO/Rh O POS     Antibody Screen NEG        RADIOLOGY  CT ABDOMEN PELVIS W IV CONTRAST Additional Contrast? None   Preliminary Result   No acute abnormality. ED COURSE / MDM  Patient seen and evaluated. Old records reviewed and pertinent information included in HPI. Labs and imaging reviewed and results discussed with patient. Overall, well appearing patient in no distress, presenting for bright red blood per rectum after recent abdominal surgery.   Physical exam remarkable for diffuse abdominal tenderness to palpation and small area of wound dehiscence. Differential diagnosis includes but is not limited to: Postoperative complication, diverticulosis, diverticulitis, UTI lower suspicion for appendicitis, bowel obstruction,  hernia, UTI, AAA, gastroenteritis, peptic ulcer disease, cholecystitis, pancreatitis, hepatitis or other liver disease    Laboratory studies and imaging obtained. During the patient's ED course, the patient was given:  Medications   iopamidol (ISOVUE-370) 76 % injection 75 mL (75 mLs IntraVENous Given 5/20/22 1544)   cephALEXin (KEFLEX) capsule 500 mg (500 mg Oral Given 5/20/22 1802)   Patient declined need for pain control in the emergency department    ED Course as of 05/21/22 0953   Fri May 20, 2022   1432 Downtrending leukocytosis to 13.8. Improved anemia to 10.5. No thrombocytopenia. [ER]   1432 Coagulation studies within normal limits [ER]   1457 COVID swab negative [ER]   1527 Mild hyponatremia 135, no other electrolyte abnormalities or evidence of kidney dysfunction. [ER]   1527 Liver function testing unremarkable. [ER]   2005 CT abd/pelvis: IMPRESSION:  No acute abnormality. [ER]   25 [id number] Dr. Matt Jose evaluated the CT scan without acute concern. He said that patient could be discharged home if she felt comfortable doing so. We will repeat the CBC to confirm no acute dropping of hemoglobin or significant increase in the white blood cell count. If patient is comfortable with discharge, Dr. Matt Jose is comfortable with her being discharged and recommended follow-up on Tuesday. [ER]   1743 Repeat hemoglobin stable. Repeat white blood cell mildly up trended, but but no significant change [ER]   1743 Urinalysis does show leukocyte esterase, patient is reporting dysuria so we will treat for possible UTI and culture urine. [ER]      ED Course User Index  [ER] Cassidy Yoo MD      Work-up overall reassuring. Hemoglobin improved from previous and stable in the emergency department.   Patient does have leukocytosis but downtrending from previous. No episodes of bloody bowel movements in the emergency department. CT scan reassuring. Discussed case with patient's surgeon who had no acute concerns and was comfortable with patient going home. Patient comfortable going home and this is her preference. She did report some dysuria and urinalysis showed evidence of possible infection. Will give Keflex. Work-up overall reassuring. At this time, feel the patient is appropriate for discharge to follow-up with a primary care doctor and general surgery. Patient feels comfortable with discharge at this time. Patient was provided with prescriptions for Keflex. Return precautions given. Encouraged PCP follow-up in 3 days in general surgery follow-up in 4 days. Patient discharged in stable condition. I estimate there is LOW risk for ACUTE APPENDICITIS, BOWEL OBSTRUCTION, ACUTE CHOLECYSTITIS, RUPTURED DIVERTICULITIS, INCARCERATED or STRANGULATED HERNIA, HEMMORHAGIC PANCREATITIS, PERFORATED BOWEL/ULCER, ECTOPIC PREGNANCY, OVARIAN TORSION or TUBO-OVARIAN ABSCESS thus I consider the discharge disposition reasonable. Linsey Duque and I have discussed the diagnosis and risks, and we agree with discharging home with close follow-up. We also discussed returning to the Emergency Department immediately if new or worsening symptoms occur. We have discussed the symptoms which are most concerning that necessitate immediate return. CLINICAL IMPRESSION  1. Rectal bleeding    2. Urinary tract infection in female        Blood pressure (!) 123/94, pulse 81, temperature 98.1 °F (36.7 °C), temperature source Oral, resp. rate 18, height 5' 3\" (1.6 m), weight 161 lb (73 kg), SpO2 95 %, not currently breastfeeding. DISPOSITION  Linsey Duque was discharged to home in stable condition. Patient was given scripts for the following medications. I counseled patient how to take these medications.    Discharge Medication List as of 5/20/2022  6:00 PM      START taking these medications    Details   cephALEXin (KEFLEX) 500 MG capsule Take 1 capsule by mouth 2 times daily for 7 days, Disp-13 capsule, R-0Normal             Follow-up with:  Kaitlynn Wyatt MD  [address] [place] [address]  Dario: [facility]  [phone]    Schedule an appointment as soon as possible for a visit on 5/24/2022      [facility]  ED  [address]  Go to   As needed, If symptoms worsen    Bridgett Multani MD  [address] [place] [id number]  [phone]    Call on 5/23/2022        DISCLAIMER: This chart was created using Dragon dictation software. Efforts were made by me to ensure accuracy, however some errors may be present due to limitations of this technology and occasionally words are not transcribed correctly.        Gautam Diane MD  05/21/22 7935

## 2022-05-20 NOTE — PROGRESS NOTES
Surgery Post-op Progress Note    HPI:  Notes reviewed, and agree with documentation in pt's chart. Postoperative Follow-up: Patient presents for 3 week follow-up status post difficult sigmoidectomy for chronic severe colonic stricture, with intraop revision of colorectal anastomosis due to iatrogenic trauma. Had been steadily doing well, and was actually seen here 3 days ago and discharged from any further immediate follow ups. Her bowels had been continuing to return to normal quantity, consistency. However, yesterday she went to have bowel movement, and noted multiple clots of blood in the toilet; in addition she noted lower and right-sided abdominal pain (\"similar pain to when I had surgery on my right colon years ago\"). No fever, chills, nausea, vomiting. Today still notes lower pain, but only having occasional small smears of blood per rectum. ROS:    · 10 point review of systems performed; please refer to HPI with pertinent positives, all other ROS are negative    A review of the patient's record including allergies, medication list, tobacco history, family history, problem list, medical history and social history has been completed and updates made to the patient's EMR where indicated. PE:   CONSTITUTIONAL:  awake and alert    ABDOMEN: soft, non-distended, tenderness noted in the suprapubic region and in the right lower quadrant     INCISION: clean, dry, healing      ASSESSMENT:   Diagnosis Orders   1. Postop check     2. Rectal bleeding     ·   Although she is over 3 weeks out from her surgery and seemed to have no issues with her colonic anastomosis, at this point with a new acute finding of pain and rectal bleeding I think this requires further evaluation    PLAN:    · Will refer over to ED for urgent evaluation with labs and CT.   · Further recommendations, plans will follow

## 2022-05-20 NOTE — ED NOTES
Discharge instructions reviewed without questions. No further needs voiced at this time. Ambulatory from department in stable condition.        Chelita Jones RN  05/20/22 5693

## 2022-05-20 NOTE — TELEPHONE ENCOUNTER
Called and spoke w/ pts  - asked how she was feeling this morning -  said she is just now getting up but that they will be here this afternoon for her OV

## 2022-05-21 LAB — URINE CULTURE, ROUTINE: NORMAL

## 2022-05-24 RX ORDER — VALACYCLOVIR HYDROCHLORIDE 500 MG/1
TABLET, FILM COATED ORAL
Qty: 90 TABLET | Refills: 0 | Status: SHIPPED | OUTPATIENT
Start: 2022-05-24 | End: 2022-09-09

## 2022-05-24 RX ORDER — ALENDRONATE SODIUM 70 MG/1
TABLET ORAL
Qty: 12 TABLET | Refills: 3 | Status: SHIPPED | OUTPATIENT
Start: 2022-05-24

## 2022-05-24 NOTE — TELEPHONE ENCOUNTER
Refill Request     CONFIRM preferrred pharmacy with the patient. If Mail Order Rx - Pend for 90 day refill.       Last Seen: Last Seen Department: 4/4/2022  Last Seen by PCP: Visit date not found    Last Written: 02/25/2022 12 Tablet 0 Refills    Next Appointment:   Future Appointments   Date Time Provider Annmarie Mclean   5/27/2022 11:30 AM Yoly Davis MD [facility]   7/6/2022  1:00 PM Orvel Cogan Pardekooper, APRN - CNP [facility] - [facility]   7/11/2022  1:00 PM Beth Nicolas MD [facility] - [facility]   1/9/2023  3:30 PM Beth Nicolas MD [facility]       Future appointment scheduled      Requested Prescriptions     Pending Prescriptions Disp Refills    alendronate (FOSAMAX) 70 MG tablet [Pharmacy Med Name: Alendronate Sodium 70 MG Oral Tablet] 12 tablet 0     Sig: Take 1 tablet by mouth once a week

## 2022-05-24 NOTE — TELEPHONE ENCOUNTER
Refill Request     CONFIRM preferrred pharmacy with the patient. If Mail Order Rx - Pend for 90 day refill.       Last Seen: Last Seen Department: 4/4/2022  Last Seen by PCP: Visit date not found    Last Written: 1/28/2022    Next Appointment:   Future Appointments   Date Time Provider Annmarie Mclean   5/27/2022 11:30 AM Gamaliel Martin MD [facility]   7/6/2022  1:00 PM VERITO Nova - CNP [facility] [place] [facility]   7/11/2022  1:00 PM Lemuel Alvarez MD [facility]   1/9/2023  3:30 PM Lemuel Alvarez MD [facility] - [facility]       Future appointment scheduled      Requested Prescriptions     Pending Prescriptions Disp Refills    valACYclovir (VALTREX) 500 MG tablet [Pharmacy Med Name: valACYclovir HCl 500 MG Oral Tablet] 90 tablet 0     Sig: Take 1 tablet by mouth once daily

## 2022-05-25 ENCOUNTER — CARE COORDINATION (OUTPATIENT)
Dept: CASE MANAGEMENT | Age: 67
End: 2022-05-25

## 2022-05-25 NOTE — CARE COORDINATION
Jemal [facility] Transitions Follow Up Call    2022    Patient: Tariq Mckeon  Patient : 1955   MRN: [id number]  Reason for Admission:  s/p colectomy  Discharge Date: 22 RARS: Readmission Risk Score: 14.6 ( )         Spoke with: , Mckinley kiser     Care Transitions Follow Up Call    Needs to be reviewed by the provider   Additional needs identified to be addressed with provider: No  none             Method of communication with provider : none      Care Transition Nurse contacted the patient by telephone to follow up after admission. Verified name and  with family as identifiers. Addressed changes since last contact: none  Discussed follow-up appointments. If no appointment was previously scheduled, appointment scheduling offered: Yes. Is follow up appointment scheduled within 7 days of discharge? Yes. Advance Care Planning:   Does patient have an Advance Directive: reviewed and current, reviewed and needs to be updated, not on file; education provided, not on file, patient declined education, decision maker updated and referral to internal ACP facilitator. CTN reviewed discharge instructions, medical action plan and red flags with family and discussed any barriers to care and/or understanding of plan of care after discharge. Discussed appropriate site of care based on symptoms and resources available to patient including: Specialist. The family agrees to contact the PCP office for questions related to their healthcare. Patient answered call and verified . Patient pleasant and agreeable to transition call. Doing well, but seen at ER last weekend after blood noted in stool. Patient was not admitted, but was prescribed antibiotic. Patient is taking as directed. Continues to have some \"leakage from incision site\" and scheduled to see surgeon Friday of this week. Denied any discoloration, but concerned that leakage continues this far from surgery.  Denied any fever, chills, redness, or swelling. Denied any acute needs at present time. Agreeable to f/u calls. Educated on the use of urgent care or physicians 24 hr access line if assistance is needed after hours. CTN provided contact information for future needs. Plan for follow-up call in 7-10 days based on severity of symptoms and risk factors. Plan for next call: follow up appointment-scheduled to see surgeon on Tyler Lam 76 Transitions Subsequent and Final Call    Subsequent and Final Calls  Do you have any ongoing symptoms?: No  Have your medications changed?: No  Do you have any questions related to your medications?: No  Do you currently have any active services?: No  Do you have any needs or concerns that I can assist you with?: No  Identified Barriers: None  Care Transitions Interventions  Other Interventions:            Follow Up  Future Appointments   Date Time Provider Annmarie Mclean   5/27/2022 11:30  [facility]   7/6/2022  1:00 PM VERITO Costa - CNP [facility] [place] [facility]   7/11/2022  1:00 PM [facility]  [place] [facility]   1/9/2023  3:30 PM Roberta Chu MD [facility] [place] [facility]       Brittany Vieira RN

## 2022-05-27 ENCOUNTER — OFFICE VISIT (OUTPATIENT)
Dept: SURGERY | Age: 67
End: 2022-05-27

## 2022-05-27 VITALS
HEIGHT: 63 IN | TEMPERATURE: 89 F | DIASTOLIC BLOOD PRESSURE: 82 MMHG | BODY MASS INDEX: 27.93 KG/M2 | HEART RATE: 97 BPM | SYSTOLIC BLOOD PRESSURE: 115 MMHG | WEIGHT: 157.6 LBS

## 2022-05-27 DIAGNOSIS — Z09 POSTOP CHECK: Primary | ICD-10-CM

## 2022-05-27 PROCEDURE — 99024 POSTOP FOLLOW-UP VISIT: CPT | Performed by: SURGERY

## 2022-05-27 NOTE — PROGRESS NOTES
Surgery Post-op Progress Note    HPI:  Notes reviewed, and agree with documentation in pt's chart. Postoperative Follow-up: Patient presents for 4 week follow-up status post difficult sigmoidectomy; postop with hematoma around RLQ trocar site, and mild superficial wound separation from her Pfannenstiel specimen extraction site . Seen in office last week after she noted passing blood clots per rectum and right-sided abdominal pain. Sent to ED for further workup, but no obvious complications from her surgery were noted at that time and she was sent home. Pt has noted gradual improvement overall. Minimal residual pain, and no further hematochezia. Still notes mild but decreasing drainage from her lower wound, and the hardness around the RLQ trocar site is steadily decreasing    ROS:    · 10 point review of systems performed; please refer to HPI with pertinent positives, all other ROS are negative    A review of the patient's record including allergies, medication list, tobacco history, family history, problem list, medical history and social history has been completed and updates made to the patient's EMR where indicated. PE:   CONSTITUTIONAL:  awake and alert    ABDOMEN: soft, non-distended, non-tender     INCISION: clean, dry, healing, decreased palpable hematoma at RLQ - soft, fading ecchymosis. Pfannenstiel incision - small mid-point skin opening w/ visible granulation, no erythema      ASSESSMENT:   Diagnosis Orders   1.  Postop check     ·       PLAN:    · Continue local wound care   · Increase diet as tolerated  · Follow up in 2 weeks or as needed

## 2022-05-31 DIAGNOSIS — F33.1 MAJOR DEPRESSIVE DISORDER, RECURRENT EPISODE, MODERATE (HCC): ICD-10-CM

## 2022-05-31 RX ORDER — SERTRALINE HYDROCHLORIDE 100 MG/1
TABLET, FILM COATED ORAL
Qty: 135 TABLET | Refills: 0 | Status: SHIPPED | OUTPATIENT
Start: 2022-05-31 | End: 2022-09-01

## 2022-05-31 NOTE — TELEPHONE ENCOUNTER
Refill Request     CONFIRM preferrred pharmacy with the patient. If Mail Order Rx - Pend for 90 day refill.       Last Seen: Last Seen Department: 4/4/2022  Last Seen by PCP: Visit date not found    Last Written: 02/24/2022 135 Tablet 0 refills    Next Appointment:   Future Appointments   Date Time Provider Annmarie Mclean   6/14/2022  2:00 PM José Miguel [facility] [address], MD AND GEN & [facility] MMA   7/6/2022  1:00 PM VERITO Hidalgo - CNP [facility] DYD   7/11/2022  1:00 PM John Pérez MD [facility] DYRODOLFO   1/9/2023  3:30 PM John Pérez MD [facility]       Future appointment scheduled      Requested Prescriptions     Pending Prescriptions Disp Refills    sertraline (ZOLOFT) 100 MG tablet [Pharmacy Med Name: Sertraline HCl 100 MG Oral Tablet] 135 tablet 0     Sig: TAKE 1 & 1/2 (ONE & ONE-HALF) TABLETS BY MOUTH ONCE DAILY

## 2022-06-01 ENCOUNTER — CARE COORDINATION (OUTPATIENT)
Dept: CASE MANAGEMENT | Age: 67
End: 2022-06-01

## 2022-06-01 NOTE — CARE COORDINATION
[facility] Transitions Follow Up Call    2022    Patient: Golria Krishnan  Patient : 1955   MRN: [id number]  Reason for Admission: s/p colectomy  Discharge Date: 22 RARS: Readmission Risk Score: 14.6 ( )         Spoke with: [address] [place] [address] [facility]    Needs to be reviewed by the provider   Additional needs identified to be addressed with provider: No  none             Method of communication with provider : none      Care Transition Nurse contacted the patient by telephone to follow up after admission. Verified name and  with patient as identifiers. Addressed changes since last contact: none  Discussed follow-up appointments. If no appointment was previously scheduled, appointment scheduling offered: Yes. Is follow up appointment scheduled within 7 days of discharge? No.    Advance Care Planning:   Does patient have an Advance Directive: reviewed and current, reviewed and needs to be updated, not on file; education provided, not on file, patient declined education, decision maker updated and referral to internal ACP facilitator. CTN reviewed discharge instructions, medical action plan and red flags with patient and discussed any barriers to care and/or understanding of plan of care after discharge. Discussed appropriate site of care based on symptoms and resources available to patient including: PCP  Specialist. The patient agrees to contact the PCP office for questions related to their healthcare. Patient answered call and verified . Patient pleasant and agreeable to final transition call. Patient seen by Dr Mariela Dumont yesterday and visit went well. Patient stated MD removed some extra fluid around incision site and wound is no longer leaking. Noted scab area. Instructed to leave scab in place, keep clean and dry. Stated understanding. Scheduled for follow up with surgeon later this month, but was instructed to cancel if not needed.  Taking medications as directed. Denied any acute needs at present time. Educated on the use of urgent care or physicians 24 hr access line if assistance is needed after hours. CTN provided contact information for future needs. No further follow-up call indicated based on severity of symptoms and risk factors. Care Transitions Subsequent and Final Call    Subsequent and Final Calls  Do you have any ongoing symptoms?: No  Have your medications changed?: No  Do you have any questions related to your medications?: No  Do you currently have any active services?: No  Do you have any needs or concerns that I can assist you with?: No  Identified Barriers: None  Care Transitions Interventions  Other Interventions:            Follow Up  Future Appointments   Date Time Provider Annmarie Mclean   6/14/2022  2:00 PM José Miguel Jean MD AND GEN & LA STEVE   7/6/2022  1:00 PM VERITO Parker - CNP [facility] - DYD   7/11/2022  1:00 [facility]   1/9/2023  3:30 PM Maeve Salcido MD [facility] DY       David Dickson RN

## 2022-06-14 DIAGNOSIS — G47.00 INSOMNIA, UNSPECIFIED TYPE: ICD-10-CM

## 2022-06-14 RX ORDER — ALPRAZOLAM 1 MG/1
TABLET ORAL
Qty: 60 TABLET | Refills: 1 | Status: SHIPPED | OUTPATIENT
Start: 2022-06-14 | End: 2022-07-14

## 2022-06-14 NOTE — TELEPHONE ENCOUNTER
Refill Request - Controlled Substance    CONFIRM preferrred pharmacy with the patient.      Last Seen Department: 4/4/2022  Last Seen by PCP: 1/3/2022    Last Written: 05/10/2022 60 Tablet 0 Refills    Last UDS: 12/19/2017    Med Agreement Signed On: 01/04/2017    Next Appointment: 7/6/2022    Future appointment scheduled    Requested Prescriptions     Pending Prescriptions Disp Refills    ALPRAZolam (XANAX) 1 MG tablet [Pharmacy Med Name: ALPRAZolam 1 MG Oral Tablet] 60 tablet 0     Sig: TAKE 1 TABLET BY MOUTH TWICE DAILY AS NEEDED FOR SLEEP OR  ANXIETY

## 2022-06-28 DIAGNOSIS — R10.13 EPIGASTRIC PAIN: ICD-10-CM

## 2022-06-28 RX ORDER — PANTOPRAZOLE SODIUM 40 MG/1
TABLET, DELAYED RELEASE ORAL
Qty: 180 TABLET | Refills: 1 | Status: SHIPPED | OUTPATIENT
Start: 2022-06-28

## 2022-06-28 NOTE — TELEPHONE ENCOUNTER
.  Refill Request     CONFIRM preferrred pharmacy with the patient. If Mail Order Rx - Pend for 90 day refill.       Last Seen: Last Seen Department: 4/4/2022  Last Seen by PCP: Visit date not found    Last Written: 3/31/22 180 with 0     Next Appointment:   Future Appointments   Date Time Provider Annmarie Mclean   6/29/2022  2:00 PM [address] [facility]   7/6/2022  1:00 PM VERITO Rosa - CNP [facility] - DYD   7/11/2022  1:00 PM [facility]   1/9/2023  3:30 PM Brittany Knowles MD [facility] DY           Requested Prescriptions     Pending Prescriptions Disp Refills    pantoprazole (PROTONIX) 40 MG tablet [Pharmacy Med Name: Pantoprazole Sodium 40 MG Oral Tablet Delayed Release] 180 tablet 1     Sig: TAKE 1 TABLET BY MOUTH TWICE DAILY BEFORE MEAL(S)

## 2022-06-29 ENCOUNTER — HOSPITAL ENCOUNTER (OUTPATIENT)
Dept: WOMENS IMAGING | Age: 67
Discharge: HOME | End: 2022-06-29
Payer: MEDICARE

## 2022-06-29 DIAGNOSIS — N63.25 UNSPECIFIED LUMP IN THE LEFT BREAST, OVERLAPPING QUADRANTS: ICD-10-CM

## 2022-06-29 PROCEDURE — 76642 ULTRASOUND BREAST LIMITED: CPT

## 2022-07-05 ENCOUNTER — TELEPHONE (OUTPATIENT)
Dept: FAMILY MEDICINE CLINIC | Age: 67
End: 2022-07-05

## 2022-07-05 RX ORDER — SPIRONOLACTONE 50 MG/1
TABLET, FILM COATED ORAL
Qty: 90 TABLET | Refills: 0 | Status: SHIPPED | OUTPATIENT
Start: 2022-07-05 | End: 2022-10-11 | Stop reason: SDUPTHER

## 2022-07-05 NOTE — TELEPHONE ENCOUNTER
Refill Request     CONFIRM preferrred pharmacy with the patient. If Mail Order Rx - Pend for 90 day refill.       Last Seen: Last Seen Department: 4/4/2022  Last Seen by PCP: 4/4/2022    Last Written: 02/24/2022 90 Tablet 0 Refills    Next Appointment:   Future Appointments   Date Time Provider Annmarie Mclean   7/6/2022  1:00 PM VERITO Farmer CNP [facility] - DY   7/11/2022  1:00 PM [facility]   1/9/2023  3:30 PM Misty Roger MD [facility]       Future appointment scheduled      Requested Prescriptions     Pending Prescriptions Disp Refills    spironolactone (ALDACTONE) 50 MG tablet [Pharmacy Med Name: Spironolactone 50 MG Oral Tablet] 90 tablet 0     Sig: Take 1 tablet by mouth once daily

## 2022-07-11 ENCOUNTER — OFFICE VISIT (OUTPATIENT)
Dept: FAMILY MEDICINE CLINIC | Age: 67
End: 2022-07-11
Payer: MEDICARE

## 2022-07-11 VITALS
BODY MASS INDEX: 28.28 KG/M2 | HEART RATE: 109 BPM | HEIGHT: 63 IN | DIASTOLIC BLOOD PRESSURE: 80 MMHG | WEIGHT: 159.6 LBS | SYSTOLIC BLOOD PRESSURE: 104 MMHG | OXYGEN SATURATION: 92 %

## 2022-07-11 DIAGNOSIS — E78.00 HYPERCHOLESTEROLEMIA: ICD-10-CM

## 2022-07-11 DIAGNOSIS — K52.9 CHRONIC DIARRHEA: ICD-10-CM

## 2022-07-11 DIAGNOSIS — D86.9 SARCOIDOSIS: ICD-10-CM

## 2022-07-11 DIAGNOSIS — R73.01 IMPAIRED FASTING GLUCOSE: Primary | ICD-10-CM

## 2022-07-11 LAB — HBA1C MFR BLD: 6 %

## 2022-07-11 PROCEDURE — 1036F TOBACCO NON-USER: CPT | Performed by: INTERNAL MEDICINE

## 2022-07-11 PROCEDURE — G8417 CALC BMI ABV UP PARAM F/U: HCPCS | Performed by: INTERNAL MEDICINE

## 2022-07-11 PROCEDURE — 1090F PRES/ABSN URINE INCON ASSESS: CPT | Performed by: INTERNAL MEDICINE

## 2022-07-11 PROCEDURE — 90471 IMMUNIZATION ADMIN: CPT | Performed by: INTERNAL MEDICINE

## 2022-07-11 PROCEDURE — 90734 MENACWYD/MENACWYCRM VACC IM: CPT | Performed by: INTERNAL MEDICINE

## 2022-07-11 PROCEDURE — 83036 HEMOGLOBIN GLYCOSYLATED A1C: CPT | Performed by: INTERNAL MEDICINE

## 2022-07-11 PROCEDURE — 3017F COLORECTAL CA SCREEN DOC REV: CPT | Performed by: INTERNAL MEDICINE

## 2022-07-11 PROCEDURE — 99214 OFFICE O/P EST MOD 30 MIN: CPT | Performed by: INTERNAL MEDICINE

## 2022-07-11 PROCEDURE — G8427 DOCREV CUR MEDS BY ELIG CLIN: HCPCS | Performed by: INTERNAL MEDICINE

## 2022-07-11 PROCEDURE — G8399 PT W/DXA RESULTS DOCUMENT: HCPCS | Performed by: INTERNAL MEDICINE

## 2022-07-11 PROCEDURE — 1123F ACP DISCUSS/DSCN MKR DOCD: CPT | Performed by: INTERNAL MEDICINE

## 2022-07-11 ASSESSMENT — ENCOUNTER SYMPTOMS
COUGH: 0
SHORTNESS OF BREATH: 0

## 2022-07-11 ASSESSMENT — PATIENT HEALTH QUESTIONNAIRE - PHQ9
6. FEELING BAD ABOUT YOURSELF - OR THAT YOU ARE A FAILURE OR HAVE LET YOURSELF OR YOUR FAMILY DOWN: 0
1. LITTLE INTEREST OR PLEASURE IN DOING THINGS: 0
SUM OF ALL RESPONSES TO PHQ QUESTIONS 1-9: 3
SUM OF ALL RESPONSES TO PHQ QUESTIONS 1-9: 3
5. POOR APPETITE OR OVEREATING: 0
10. IF YOU CHECKED OFF ANY PROBLEMS, HOW DIFFICULT HAVE THESE PROBLEMS MADE IT FOR YOU TO DO YOUR WORK, TAKE CARE OF THINGS AT HOME, OR GET ALONG WITH OTHER PEOPLE: 0
4. FEELING TIRED OR HAVING LITTLE ENERGY: 2
8. MOVING OR SPEAKING SO SLOWLY THAT OTHER PEOPLE COULD HAVE NOTICED. OR THE OPPOSITE, BEING SO FIGETY OR RESTLESS THAT YOU HAVE BEEN MOVING AROUND A LOT MORE THAN USUAL: 0
SUM OF ALL RESPONSES TO PHQ9 QUESTIONS 1 & 2: 0
3. TROUBLE FALLING OR STAYING ASLEEP: 1
2. FEELING DOWN, DEPRESSED OR HOPELESS: 0
SUM OF ALL RESPONSES TO PHQ QUESTIONS 1-9: 3
9. THOUGHTS THAT YOU WOULD BE BETTER OFF DEAD, OR OF HURTING YOURSELF: 0
7. TROUBLE CONCENTRATING ON THINGS, SUCH AS READING THE NEWSPAPER OR WATCHING TELEVISION: 0
SUM OF ALL RESPONSES TO PHQ QUESTIONS 1-9: 3

## 2022-07-11 NOTE — PROGRESS NOTES
2022     Chana Power (:  1955) is a 79 y.o. female, here for evaluation of the following medical concerns:     Diagnosis Orders   1. Impaired fasting glucose  POCT glycosylated hemoglobin (Hb A1C)   2. Hypercholesterolemia     3. Chronic diarrhea     4. Sarcoidosis  Anand Khan MD, Pulmonary, [facility]     Insomnia better on remeron. Depression is well controlled. Migraine improving with current treatment. Still having diarrhea most days, is less, still bile at times. Needs referral to pulm for sarcoidosis. Patient's medications, allergies, past medical, surgical, social and family histories were reviewed and updated as appropriate. Review of Systems   Constitutional: Negative for fatigue. Respiratory: Negative for cough and shortness of breath. Cardiovascular: Negative for chest pain and leg swelling. Neurological: Positive for headaches. Negative for dizziness. Prior to Visit Medications    Medication Sig Taking?  Authorizing Provider   spironolactone (ALDACTONE) 50 MG tablet Take 1 tablet by mouth once daily Yes Bhavik Cartagena DO   pantoprazole (PROTONIX) 40 MG tablet TAKE 1 TABLET BY MOUTH TWICE DAILY BEFORE MEAL(S) Yes Bhavik Cartagena DO   ALPRAZolam (XANAX) 1 MG tablet TAKE 1 TABLET BY MOUTH TWICE DAILY AS NEEDED FOR SLEEP OR  ANXIETY Yes Misty Roger MD   sertraline (ZOLOFT) 100 MG tablet TAKE 1 & 1/2 (ONE & ONE-HALF) TABLETS BY MOUTH ONCE DAILY Yes Misty Roger MD   alendronate (FOSAMAX) 70 MG tablet Take 1 tablet by mouth once a week Yes Misty Roger MD   valACYclovir (VALTREX) 500 MG tablet Take 1 tablet by mouth once daily Yes Misty Roger MD   propranolol (INDERAL LA) 60 MG extended release capsule Take 1 capsule by mouth once daily Yes Misty Roger MD   Folic Acid 0.8 MG CAPS Take 1 capsule by mouth daily Yes Historical Provider, MD   traZODone (DESYREL) 100 MG tablet Take 100 mg by mouth nightly Yes Historical Provider, MD   traMADol (ULTRAM) 50 MG tablet Take 50 mg by mouth 2 times daily as needed for Pain. Yes Historical Provider, MD   vitamin D (ERGOCALCIFEROL) 1.25 MG (34856 UT) CAPS capsule Take 1 capsule by mouth once a week Yes Sydna Phlegm, PA   mirtazapine (REMERON) 15 MG tablet TAKE 1 TABLET BY MOUTH ONCE DAILY AT NIGHT Yes Sydna Phlegm, PA   montelukast (SINGULAIR) 10 MG tablet TAKE 1 TABLET BY MOUTH ONCE DAILY NIGHTLY Yes VERITO Kennedy - CNP   letrozole [facility]) 2.5 MG tablet Take 2.5 mg by mouth daily  Yes Historical Provider, MD   atorvastatin (LIPITOR) 20 MG tablet Take 1 tablet by mouth nightly TAKE 1 TABLET BY MOUTH EVERY DAY Yes Twyla Harrison MD   ketorolac (ACULAR) 0.5 % ophthalmic solution Place 1 drop into both eyes 2 times daily  Yes Historical Provider, MD   fluticasone-salmeterol (ADVAIR) 250-50 MCG/DOSE AEPB Inhale into the lungs 2 times daily [place] Yes Historical Provider, MD   albuterol sulfate  (90 Base) MCG/ACT inhaler 2 puffs q 4 prn Yes Historical Provider, MD   diclofenac sodium 1 % GEL Apply topically 4 times daily Indications: [facility]  prescribes  Yes Terrence Zuniga MD   difluprednate (DUREZOL) 0.05 % EMUL Apply 2 drops to eye 2 times daily BOTH EYES Yes Historical Provider, MD   gabapentin (NEURONTIN) 400 MG capsule 400 mg 4 times daily. Indications: Prescribed by [facility] Dr. Alejandro Mota MD   cyclobenzaprine (FLEXERIL) 10 MG tablet Take 10 mg by mouth 3 times daily as needed for Muscle spasms Yes Historical Provider, MD   methotrexate (RHEUMATREX) 2.5 MG chemo tablet Take 7.5 mg by mouth every 7 days  Yes Terrence Zuniga MD   fluocinonide (LIDEX) 0.05 % cream Apply topically 2 times daily Apply topically 2 times daily.  Yes Historical Provider, MD   promethazine (PHENERGAN) 12.5 MG tablet Take 25 mg by mouth every 6 hours as needed for Nausea Indications: Dr. Carline Marina  Yes Historical Provider, MD        Social History     Tobacco Use    Smoking status: Former Smoker IM    The current medical regimen is effective;  continue present plan and medications. --Florecita Hernandez MD on 7/11/2022 at 1:23 PM    On the basis of positive falls risk screening, assessment and plan is as follows: in-office gait and balance testing performed using The Timed Up and Go Test was negative for increased falls risk- no further intervention is currently indicated.

## 2022-07-12 LAB
CHOLESTEROL, TOTAL: 270 MG/DL (ref 0–199)
HDLC SERPL-MCNC: 29 MG/DL (ref 40–60)
LDL CHOLESTEROL CALCULATED: 197 MG/DL
TRIGL SERPL-MCNC: 220 MG/DL (ref 0–150)
VLDLC SERPL CALC-MCNC: 44 MG/DL

## 2022-07-12 RX ORDER — ATORVASTATIN CALCIUM 40 MG/1
40 TABLET, FILM COATED ORAL NIGHTLY
Qty: 90 TABLET | Refills: 1 | Status: SHIPPED | OUTPATIENT
Start: 2022-07-12 | End: 2022-10-11 | Stop reason: SDUPTHER

## 2022-07-21 RX ORDER — PROPRANOLOL HCL 60 MG
CAPSULE, EXTENDED RELEASE 24HR ORAL
Qty: 90 CAPSULE | Refills: 1 | Status: SHIPPED | OUTPATIENT
Start: 2022-07-21

## 2022-08-02 NOTE — PROGRESS NOTES
Pt unable to maintain o2, 93% on room air. Pt sleepy and will drop to high 80s, 2 liters oxygen and maintains 94 %. Female

## 2022-08-22 ENCOUNTER — OFFICE VISIT (OUTPATIENT)
Dept: ORTHOPEDIC SURGERY | Age: 67
End: 2022-08-22
Payer: MEDICARE

## 2022-08-22 VITALS — BODY MASS INDEX: 28.17 KG/M2 | WEIGHT: 159 LBS | HEIGHT: 63 IN

## 2022-08-22 DIAGNOSIS — M25.531 RIGHT WRIST PAIN: Primary | ICD-10-CM

## 2022-08-22 PROCEDURE — 1036F TOBACCO NON-USER: CPT | Performed by: PHYSICIAN ASSISTANT

## 2022-08-22 PROCEDURE — 1090F PRES/ABSN URINE INCON ASSESS: CPT | Performed by: PHYSICIAN ASSISTANT

## 2022-08-22 PROCEDURE — G8427 DOCREV CUR MEDS BY ELIG CLIN: HCPCS | Performed by: PHYSICIAN ASSISTANT

## 2022-08-22 PROCEDURE — 99213 OFFICE O/P EST LOW 20 MIN: CPT | Performed by: PHYSICIAN ASSISTANT

## 2022-08-22 PROCEDURE — G8399 PT W/DXA RESULTS DOCUMENT: HCPCS | Performed by: PHYSICIAN ASSISTANT

## 2022-08-22 PROCEDURE — G8417 CALC BMI ABV UP PARAM F/U: HCPCS | Performed by: PHYSICIAN ASSISTANT

## 2022-08-22 PROCEDURE — 3017F COLORECTAL CA SCREEN DOC REV: CPT | Performed by: PHYSICIAN ASSISTANT

## 2022-08-22 PROCEDURE — 1123F ACP DISCUSS/DSCN MKR DOCD: CPT | Performed by: PHYSICIAN ASSISTANT

## 2022-08-22 NOTE — PROGRESS NOTES
Chief Complaint    No chief complaint on file. History of Present Illness:  Isrrael Rivero is a 79 y.o. female presents today for evaluation of right thumb pain. Patient states that she has been experiencing right thumb pain for least 2 months. She denies any precipitating event or trauma. Patient states that the pain has been most apparent with thumb extension, grasping, and ulnar deviation of her wrist.  Patient is right-hand dominant. She has a history of rheumatoid arthritis and sarcoidosis and has seen her rheumatologist who placed her in a thumb spica wrist orthosis and did offer an injection at the time she was evaluated 2 months ago. Pain Assessment  Location of Pain: Wrist  Location Modifiers: Right  Severity of Pain: 9  Quality of Pain: Other (Comment)  Duration of Pain: Other (Comment)  Frequency of Pain: Other (Comment)    Medical History:  Patient's medications, allergies, past medical, surgical, social and family histories were reviewed and updated as appropriate. Review of Systems:  Pertinent items are noted in HPI  Review of systems reviewed from Patient History Form dated on 8/22/2022 and available in the patient's chart under the Media tab. Vital Signs:  Ht 5' 2.99\" (1.6 m)   Wt 159 lb (72.1 kg)   BMI 28.17 kg/m²     General Exam:   Constitutional: Patient is adequately groomed with no evidence of malnutrition  Mental Status: The patient is oriented to time, place and person. The patient's mood and affect are appropriate. Neurological: The patient has good coordination. There is no weakness or sensory deficit. Right hand and thumb examination:    Inspection: Today's inspection of right hand and thumb reveals the skin to be intact with no obvious deformity. Mild swelling but no erythema or ecchymosis. Palpation: Patient is exquisitely tender to palpation over the extensor tendon of her right thumb and into the [facility] joint.     Range of Motion: Decreased to extension secondary to pain and decreased with full grasps secondary to pain    Strength: Decreased with flexion extension secondary to pain    Special Tests: Positive Pedro Irma    Skin: There are no rashes, ulcerations or lesions. Capillary refills within 2 seconds    Radiology:     X-rays obtained and reviewed in office:  Views 3 views of the right wrist to include AP, lateral, oblique were obtained today in the office and independently reviewed with the patient  Location right wrist and hand  Impression there are degenerative changes noted within the [facility] joint of the right thumb but there are no acute or subacute fractures noted. 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 Right wrist pain Yes       Office Procedures:  Orders Placed This Encounter   Procedures    XR WRIST RIGHT (MIN 3 VIEWS)     Standing Status:   Future     Number of Occurrences:   1     Standing Expiration Date:   8/22/2023       Treatment Plan: Today we discussed the diagnosis and treatment plan and the patient is going to remain in her thumb spica wrist orthosis and return to her rheumatologist for potential injection and his continued evaluation care.     Dalton Betancourt PA-C  Board certified by the [address] [facility]

## 2022-09-01 DIAGNOSIS — F33.1 MAJOR DEPRESSIVE DISORDER, RECURRENT EPISODE, MODERATE (HCC): ICD-10-CM

## 2022-09-01 RX ORDER — SERTRALINE HYDROCHLORIDE 100 MG/1
TABLET, FILM COATED ORAL
Qty: 135 TABLET | Refills: 0 | Status: SHIPPED | OUTPATIENT
Start: 2022-09-01

## 2022-09-01 NOTE — TELEPHONE ENCOUNTER
Refill Request     CONFIRM preferrred pharmacy with the patient. If Mail Order Rx - Pend for 90 day refill.       Last Seen: Last Seen Department: 7/11/2022  Last Seen by PCP: 7/11/2022    Last Written: 05/31/2022 135 tablet 0 refills     Next Appointment:   Future Appointments   Date Time Provider Annmarie Mclean   9/29/2022 10:20 AM MD KRISH Rashid PULM MMA   10/6/2022  2:00 PM [address] [facility]  [place] [facility]   10/11/2022  1:30 PM MD PREMA Amin [facility] - D[place]ODOLFO   1/9/2023  3:30 PM MD PREMA Amin  [place] - D[place]OD[place]FO       Future appointment scheduled      Requested Prescriptions     Pending Prescriptions Disp Refills    sertraline (ZOLOFT) 100 MG tablet [Pharmacy Med Name: Sertraline HCl 100 MG Oral Tablet] 135 tablet 0     Sig: TAKE 1 & 1/2 (ONE & ONE-HALF) TABLETS BY MOUTH ONCE DAILY

## 2022-09-09 RX ORDER — VALACYCLOVIR HYDROCHLORIDE 500 MG/1
TABLET, FILM COATED ORAL
Qty: 90 TABLET | Refills: 0 | Status: SHIPPED | OUTPATIENT
Start: 2022-09-09

## 2022-09-09 NOTE — TELEPHONE ENCOUNTER
.Refill Request     CONFIRM preferrred pharmacy with the patient. If Mail Order Rx - Pend for 90 day refill.       Last Seen: Last Seen Department: 7/11/2022  Last Seen by PCP: 7/11/2022    Last Written: 5-24-22 90 with 0     Next Appointment:   Future Appointments   Date Time Provider Annmarie Mclean   9/29/2022 10:20 AM MD KRISH Velez PULM MMA   10/6/2022  2:00 PM [address] [facility]  [place] [facility]   10/11/2022  1:30 PM MD PREMA Mcpherson [facility] - D[place]OD[place]FO   1/9/2023  3:30 PM MD PREMA Mcpherson  [place] - [place]       Future appointment scheduled      Requested Prescriptions     Pending Prescriptions Disp Refills    valACYclovir (VALTREX) 500 MG tablet [Pharmacy Med Name: valACYclovir HCl 500 MG Oral Tablet] 90 tablet 0     Sig: Take 1 tablet by mouth once daily

## 2022-09-29 ENCOUNTER — OFFICE VISIT (OUTPATIENT)
Dept: PULMONOLOGY | Age: 67
End: 2022-09-29
Payer: MEDICARE

## 2022-09-29 VITALS
OXYGEN SATURATION: 97 % | SYSTOLIC BLOOD PRESSURE: 92 MMHG | HEART RATE: 79 BPM | DIASTOLIC BLOOD PRESSURE: 66 MMHG | BODY MASS INDEX: 28.88 KG/M2 | HEIGHT: 63 IN | WEIGHT: 163 LBS

## 2022-09-29 DIAGNOSIS — D86.9 SARCOID: Primary | ICD-10-CM

## 2022-09-29 PROCEDURE — 1090F PRES/ABSN URINE INCON ASSESS: CPT | Performed by: INTERNAL MEDICINE

## 2022-09-29 PROCEDURE — 3017F COLORECTAL CA SCREEN DOC REV: CPT | Performed by: INTERNAL MEDICINE

## 2022-09-29 PROCEDURE — G8417 CALC BMI ABV UP PARAM F/U: HCPCS | Performed by: INTERNAL MEDICINE

## 2022-09-29 PROCEDURE — 99214 OFFICE O/P EST MOD 30 MIN: CPT | Performed by: INTERNAL MEDICINE

## 2022-09-29 PROCEDURE — 4004F PT TOBACCO SCREEN RCVD TLK: CPT | Performed by: INTERNAL MEDICINE

## 2022-09-29 PROCEDURE — 1123F ACP DISCUSS/DSCN MKR DOCD: CPT | Performed by: INTERNAL MEDICINE

## 2022-09-29 PROCEDURE — G8399 PT W/DXA RESULTS DOCUMENT: HCPCS | Performed by: INTERNAL MEDICINE

## 2022-09-29 PROCEDURE — G8427 DOCREV CUR MEDS BY ELIG CLIN: HCPCS | Performed by: INTERNAL MEDICINE

## 2022-09-29 RX ORDER — POLYETHYLENE GLYCOL 3350 17 G
2 POWDER IN PACKET (EA) ORAL PRN
Qty: 100 EACH | Refills: 3 | Status: SHIPPED | OUTPATIENT
Start: 2022-09-29 | End: 2022-10-11

## 2022-09-29 RX ORDER — DEXTROMETHORPHAN HYDROBROMIDE AND PROMETHAZINE HYDROCHLORIDE 15; 6.25 MG/5ML; MG/5ML
5 SYRUP ORAL EVERY 4 HOURS PRN
COMMUNITY
Start: 2022-09-01

## 2022-09-29 RX ORDER — PREDNISONE 1 MG/1
5 TABLET ORAL EVERY OTHER DAY
COMMUNITY

## 2022-09-29 RX ORDER — MODAFINIL 100 MG/1
100 TABLET ORAL PRN
COMMUNITY
End: 2022-09-29 | Stop reason: SDUPTHER

## 2022-09-29 RX ORDER — FLUTICASONE PROPIONATE AND SALMETEROL 250; 50 UG/1; UG/1
1 POWDER RESPIRATORY (INHALATION) EVERY 12 HOURS
Qty: 60 EACH | Refills: 3 | Status: SHIPPED | OUTPATIENT
Start: 2022-09-29

## 2022-09-29 RX ORDER — MODAFINIL 100 MG/1
100 TABLET ORAL DAILY
Qty: 30 TABLET | Refills: 5 | Status: SHIPPED | OUTPATIENT
Start: 2022-09-29 | End: 2023-03-30

## 2022-09-29 RX ORDER — ALBUTEROL SULFATE 90 UG/1
AEROSOL, METERED RESPIRATORY (INHALATION)
Qty: 18 G | Refills: 5 | Status: SHIPPED | OUTPATIENT
Start: 2022-09-29

## 2022-09-29 RX ORDER — PREDNISONE 1 MG/1
5 TABLET ORAL
Qty: 15 TABLET | Refills: 2 | Status: SHIPPED | OUTPATIENT
Start: 2022-09-29 | End: 2022-10-09

## 2022-09-29 RX ORDER — HYDROCODONE BITARTRATE AND ACETAMINOPHEN 5; 325 MG/1; MG/1
TABLET ORAL
COMMUNITY
Start: 2022-09-14

## 2022-09-29 RX ORDER — MODAFINIL 100 MG/1
100 TABLET ORAL DAILY
Qty: 30 TABLET | Refills: 5 | Status: SHIPPED | OUTPATIENT
Start: 2022-09-29 | End: 2022-10-29

## 2022-09-29 RX ORDER — ALPRAZOLAM 1 MG/1
1 TABLET ORAL NIGHTLY
COMMUNITY
Start: 2022-08-18 | End: 2022-10-11 | Stop reason: SDUPTHER

## 2022-09-29 RX ORDER — DEXTROMETHORPHAN HYDROBROMIDE AND PROMETHAZINE HYDROCHLORIDE 15; 6.25 MG/5ML; MG/5ML
5 SYRUP ORAL 4 TIMES DAILY PRN
Qty: 60 ML | Refills: 3 | Status: SHIPPED | OUTPATIENT
Start: 2022-09-29 | End: 2022-10-29

## 2022-09-29 NOTE — PROGRESS NOTES
P  [facility]                                               Pulmonary Clinic Consult     I had the pleasure of seeing  Noy Fajardo     Chief Complaint   Patient presents with    New Patient     Sarcoidosis (Former Dr. Garcia Screws pt @ [address] [place] [address] who retired)        HISTORY OF PRESENT ILLNESS:    Noy Fajardo is a 79y.o. year old  Who smoke initially for 6 years then she quit and then back now and smoke 7 cigarettes     She was diagnosed ,with sarcoid 2005 AND she had splenectomy 1993        The Patient comes in with SOB that has been going on the last few years and  Associated with cough and she states mostly at night     He/She  states that it get worse with exercise or walking long distance and he can walk . 1 mile  And go 1 flight of stairs before get short winded    She was treat with MTX 7.5 and she got had prednisone tapering dose multiple times   She has optic neuronitis     Per her pulmonologist She is off her anti-TNF (remicaide)therapy because of her breast cancer.  At this time, we do not see a true failure with this, but she may eventually have to think about an alternative treatment such as rituximab or Acthar   She use albuterol and advair         ALLERGIES:    Allergies   Allergen Reactions    Infliximab      Severe drop in blood pressure    Ultrasound Gel Other (See Comments)     burn    Codeine Nausea And Vomiting    Penicillins Rash       PAST MEDICAL HISTORY:       Diagnosis Date    Asthma     CAD (coronary artery disease)     Cancer ([facility] [address] [address]     RIGHT BREAST    Chronic diarrhea     Dr. Philippe Choudhary    Chronic kidney disease     kidney stones    Clostridium difficile diarrhea 10/23/13    positive by PCR    Fibromyalgia     Hemorrhoid     Hyperlipidemia     Hypertension     Kidney stone     Migraines     Osteoarthritis     fibromyalgia    Sarcoidosis 2003    sees Dr. Darline Townsend:   Current Outpatient Medications   Medication Sig Dispense Refill predniSONE (DELTASONE) 5 MG tablet Take 5 mg by mouth every other day      HYDROcodone-acetaminophen (NORCO) 5-325 MG per tablet TAKE 1 TABLET BY MOUTH EVERY 6 HOURS AS NEEDED FOR PAIN      ALPRAZolam (XANAX) 1 MG tablet Take 1 tablet by mouth nightly. modafinil (PROVIGIL) 100 MG tablet Take 100 mg by mouth as needed.       promethazine-dextromethorphan (PROMETHAZINE-DM) 6.25-15 MG/5ML syrup Take 5 mLs by mouth every 4 hours as needed      valACYclovir (VALTREX) 500 MG tablet Take 1 tablet by mouth once daily 90 tablet 0    sertraline (ZOLOFT) 100 MG tablet TAKE 1 & 1/2 (ONE & ONE-HALF) TABLETS BY MOUTH ONCE DAILY 135 tablet 0    propranolol (INDERAL LA) 60 MG extended release capsule Take 1 capsule by mouth once daily 90 capsule 1    atorvastatin (LIPITOR) 40 MG tablet Take 1 tablet by mouth nightly TAKE 1 TABLET BY MOUTH EVERY DAY 90 tablet 1    spironolactone (ALDACTONE) 50 MG tablet Take 1 tablet by mouth once daily 90 tablet 0    pantoprazole (PROTONIX) 40 MG tablet TAKE 1 TABLET BY MOUTH TWICE DAILY BEFORE MEAL(S) 180 tablet 1    alendronate (FOSAMAX) 70 MG tablet Take 1 tablet by mouth once a week 12 tablet 3    Folic Acid 0.8 MG CAPS Take 1 capsule by mouth daily      traZODone (DESYREL) 100 MG tablet Take 100 mg by mouth nightly      traMADol (ULTRAM) 50 MG tablet Take 50 mg by mouth 2 times daily as needed for Pain.      mirtazapine (REMERON) 15 MG tablet TAKE 1 TABLET BY MOUTH ONCE DAILY AT NIGHT 90 tablet 1    montelukast (SINGULAIR) 10 MG tablet TAKE 1 TABLET BY MOUTH ONCE DAILY NIGHTLY 90 tablet 0    letrozole (FEMARA) 2.5 MG tablet Take 2.5 mg by mouth daily       ketorolac (ACULAR) 0.5 % ophthalmic solution Place 1 drop into both eyes 2 times daily       fluticasone-salmeterol (ADVAIR) 250-50 MCG/DOSE AEPB Inhale into the lungs 2 times daily [place]ela      albuterol sulfate  (90 Base) MCG/ACT inhaler 2 puffs q 4 prn      diclofenac sodium 1 % GEL Apply topically 4 times daily Indications: Carmen Meza prescribes       difluprednate (DUREZOL) 0.05 % EMUL Apply 2 drops to eye 2 times daily BOTH EYES      gabapentin (NEURONTIN) 400 MG capsule 400 mg 4 times daily. Indications: Prescribed by [facility] Dr. Yuan    cyclobenzaprine (FLEXERIL) 10 MG tablet Take 10 mg by mouth 3 times daily as needed for Muscle spasms      methotrexate (RHEUMATREX) 2.5 MG chemo tablet Take 7.5 mg by mouth every 7 days       fluocinonide (LIDEX) 0.05 % cream Apply topically 2 times daily Apply topically 2 times daily. promethazine (PHENERGAN) 12.5 MG tablet Take 25 mg by mouth every 6 hours as needed for Nausea Indications: Dr. Saima Nieto       vitamin D (ERGOCALCIFEROL) 1.25 MG (76595 UT) CAPS capsule Take 1 capsule by mouth once a week (Patient not taking: Reported on 9/29/2022) 12 capsule 1     No current facility-administered medications for this visit. SOCIAL AND OCCUPATIONAL HEALTH:  Social History     Tobacco Use   Smoking Status Every Day    Packs/day: 0.25    Years: 20.00    Pack years: 5.00    Types: Cigarettes    Start date: 5/1/2022    Passive exposure: Never   Smokeless Tobacco Never   Tobacco Comments    Started smoking again in May (had quit smoking for approx 2 yrs)     TB :no   Pets no   Industrial exposure:no   Birds :no     SURGICAL HISTORY:   Past Surgical History:   Procedure Laterality Date    ABDOMINAL EXPLORATION SURGERY  8/19/12    REDUCTION OF VULVUS; RIGHT COLECTOMY; SMALL BOWEL RESECTION; INSERTION OF ON Q PAIN BUSTER    BREAST BIOPSY      CARDIAC CATHETERIZATION  2010    CLEAN    COLONOSCOPY  2010    normal    COLONOSCOPY N/A 6/16/2020    COLON W/ANES.  performed by Shasha Stark MD at [facility]  12/2011    CYSTOSCOPY N/A 4/27/2022    CYSTOSCOPY, BILATERAL RETROGRADE PYELOGRAM performed by Brock Morrison MD at [facility]  6/16/2020    ESOPHAGEAL DILATION Reinier Mckeon performed by Shasha Stark MD at [facility] 5/2009    cataract, right    HYSTERECTOMY (CERVIX STATUS UNKNOWN)  2000    LITHOTRIPSY  1/19/2012    LITHOTRIPSY      04/2012    MASTECTOMY Bilateral 5/18/2021    BILATERAL SIMPLE MASTECTOMY, RIGHT SENTINEL LYMPH NODE BIOPSY performed by Jamila Solomon MD at [address]    right    PARTIAL HYSTERECTOMY (CERVIX NOT REMOVED)      SIGMOID COLECTOMY Left 4/27/2022    ROBOTIC, SIGMOIDCOLECTOMY WITH REVISION OF COLORECTAL ANASTOMOSIS performed by Ruth Argueta MD at [place] [address] ENDOSCOPY  2/27/13    UPPER GASTROINTESTINAL ENDOSCOPY  11/14/2017    gastritis    UPPER GASTROINTESTINAL ENDOSCOPY N/A 6/16/2020    EGD W/ANES. (11:00) performed by Anushka Erickson MD at [address]  12/2011    US BREAST NEEDLE BIOPSY RIGHT Right 4/15/2021    US BREAST NEEDLE BIOPSY RIGHT 4/15/2021 Balta Ryan MD [address]       FAMILY HISTORY:   Lung cancer:  DVT or PE     REVIEW OF SYSTEMS:  Constitutional: General health is good . There has been no weight changes. No fevers, fatigue or weakness. Head: Patient denies any history of trauma, convulsive disorder or syncope. Skin:  Patient denies history of changes in pigmentation, eruptions or pruritus. No easy bruising or bleeding. EENT: no nasal congestion   Cardiovascular ,No chest pain ,No edema ,  Respiration:SOB:  +,GREEN :+  Gastrointestinal:No GI bleed ,no melena  ,no hematemesis    Musculoskeletal: no joint pain ,no swelling  Neurological:no , syncope. Denies twitching, convulsions, loss of consciousness or memory. Endocrine:  . No history of goiter, exophthalmos or dryness of skin. The patient has no history of diabetes. Hematopoietic:  No history of bleeding disorders or easy bruising.   Rheumatic:  No connective tissue disease history or polyarthritis/inflammatory joint disease. PHYSICAL EXAMINATION:  Vitals:    09/29/22 1017   BP: 92/66   Pulse: 79   SpO2: 97%     Constitutional: This is a well developed, well nourished 79y.o. year old female who is alert, oriented, cooperative and in no apparent distress. Head was normocephalic and atraumatic. EENT: Mallampati class :  Extraocular muscles intact. External canals are patent and no discharge was appreciated. Septum was midline,   mucosa was without erythema, exudates or cobblestoning. No thrush was noted. Neck: Supple without thyromegaly. No elevated JVP. Trachea was midline. No carotid bruits were auscultated. Respiratory: CTA     Cardiovascular: Regular without murmur, clicks, gallops or rubs. There is no left or right ventricular heave. Pulses:  Carotid, radial and femoral pulses were equally bilaterally. Abdomen: Slightly rounded and soft without organomegaly. No rebound, rigidity or guarding was appreciated. Lymphatic: No lymphadenopathy. Musculoskeletal: no joint deformity . Extremities: no edema   Skin:  Warm and dry. Good color, turgor and pigmentation. No lesions or scars. Neurological/Psychiatric: The patient's general behavior, level of consciousness, thought content and emotional status is normal.  Cranial nerves II-XII are intact. DATA:  Will get PFT   CT    IMPRESSION:    1-systemic sarcoid   2-breast cancer   3-optic neuronitis                PLAN:      -will get PFT   -HRCT scan   -will continue MTX at this time ,if other medicine needed then will discuss with Rheumatologist and her eye doctor  - I spent 4-6 minutes counseling patient regarding smoking and the risk of Lung cancer and COPD and respiratory failure   -nicotine lozyn    Flu and Pneumovax as per primary     Thank you for allowing me to participate in [facility].  I will keep following with you ,should you have any concerns ,please contact us at [facility] pulmonary office     Sincerely,        Hannah Dawson MD  Pulmonary & Critical Care Medicine     NOTE: This report was transcribed using voice recognition software. Every effort was made to ensure accuracy; however, inadvertent computerized transcription errors may be present.

## 2022-09-30 RX ORDER — MIRTAZAPINE 15 MG/1
TABLET, FILM COATED ORAL
Qty: 90 TABLET | Refills: 1 | Status: SHIPPED | OUTPATIENT
Start: 2022-09-30

## 2022-09-30 NOTE — TELEPHONE ENCOUNTER
Refill Request     CONFIRM preferrred pharmacy with the patient. If Mail Order Rx - Pend for 90 day refill. Last Seen: Last Seen Department: 7/11/2022  Last Seen by PCP: 4/4/2022    Last Written: 04/04/2022 90 tablet 1 refills     If no future appointment scheduled, route STAFF MESSAGE with patient name to the [facility] for scheduling. Next Appointment:   Future Appointments   Date Time Provider Annmarie Mclean   10/6/2022  2:00 PM [facility]  [place] [facility]   10/11/2022  1:30 PM MD PREMA Pimentel  [place] - DYRODOLFO   10/19/2022  1:45 PM Adiel Schneider MD AND ORTHO MMA   11/15/2022  2:00 PM [facility] [place] [place] PFT [facility]   11/15/2022  3:00 PM [facility] [place] Rad   1/9/2023  3:30 PM MD PREMA Pimentel  [place] - DYD   2/14/2023  1:45 PM MD Beth Concepcion [place] PULM MMA       Message sent to [address] [place] [address] to schedule appt with patient?   NO      Requested Prescriptions     Pending Prescriptions Disp Refills    mirtazapine (REMERON) 15 MG tablet [Pharmacy Med Name: Mirtazapine 15 MG Oral Tablet] 90 tablet 0     Sig: TAKE 1 TABLET BY MOUTH ONCE DAILY AT NIGHT

## 2022-10-06 ENCOUNTER — HOSPITAL ENCOUNTER (OUTPATIENT)
Dept: WOMENS IMAGING | Age: 67
Discharge: HOME | End: 2022-10-06
Payer: MEDICARE

## 2022-10-06 DIAGNOSIS — N63.25 UNSPECIFIED LUMP IN THE LEFT BREAST, OVERLAPPING QUADRANTS: ICD-10-CM

## 2022-10-06 PROCEDURE — 76642 ULTRASOUND BREAST LIMITED: CPT

## 2022-10-07 ENCOUNTER — TELEPHONE (OUTPATIENT)
Dept: PULMONOLOGY | Age: 67
End: 2022-10-07

## 2022-10-11 ENCOUNTER — OFFICE VISIT (OUTPATIENT)
Dept: FAMILY MEDICINE CLINIC | Age: 67
End: 2022-10-11
Payer: MEDICARE

## 2022-10-11 VITALS
BODY MASS INDEX: 28.26 KG/M2 | DIASTOLIC BLOOD PRESSURE: 80 MMHG | WEIGHT: 159 LBS | OXYGEN SATURATION: 95 % | TEMPERATURE: 98.2 F | SYSTOLIC BLOOD PRESSURE: 108 MMHG | HEART RATE: 109 BPM

## 2022-10-11 DIAGNOSIS — R73.01 IMPAIRED FASTING GLUCOSE: Primary | ICD-10-CM

## 2022-10-11 DIAGNOSIS — E78.00 PURE HYPERCHOLESTEROLEMIA: ICD-10-CM

## 2022-10-11 DIAGNOSIS — F51.01 PRIMARY INSOMNIA: ICD-10-CM

## 2022-10-11 LAB — HBA1C MFR BLD: 5.7 %

## 2022-10-11 PROCEDURE — 4004F PT TOBACCO SCREEN RCVD TLK: CPT | Performed by: INTERNAL MEDICINE

## 2022-10-11 PROCEDURE — 1090F PRES/ABSN URINE INCON ASSESS: CPT | Performed by: INTERNAL MEDICINE

## 2022-10-11 PROCEDURE — G8417 CALC BMI ABV UP PARAM F/U: HCPCS | Performed by: INTERNAL MEDICINE

## 2022-10-11 PROCEDURE — G8484 FLU IMMUNIZE NO ADMIN: HCPCS | Performed by: INTERNAL MEDICINE

## 2022-10-11 PROCEDURE — 1123F ACP DISCUSS/DSCN MKR DOCD: CPT | Performed by: INTERNAL MEDICINE

## 2022-10-11 PROCEDURE — G8399 PT W/DXA RESULTS DOCUMENT: HCPCS | Performed by: INTERNAL MEDICINE

## 2022-10-11 PROCEDURE — 3017F COLORECTAL CA SCREEN DOC REV: CPT | Performed by: INTERNAL MEDICINE

## 2022-10-11 PROCEDURE — 83036 HEMOGLOBIN GLYCOSYLATED A1C: CPT | Performed by: INTERNAL MEDICINE

## 2022-10-11 PROCEDURE — 99214 OFFICE O/P EST MOD 30 MIN: CPT | Performed by: INTERNAL MEDICINE

## 2022-10-11 PROCEDURE — G8427 DOCREV CUR MEDS BY ELIG CLIN: HCPCS | Performed by: INTERNAL MEDICINE

## 2022-10-11 RX ORDER — ALPRAZOLAM 1 MG/1
1 TABLET ORAL 2 TIMES DAILY PRN
Qty: 60 TABLET | Refills: 2 | Status: SHIPPED | OUTPATIENT
Start: 2022-10-11 | End: 2022-11-10

## 2022-10-11 RX ORDER — ATORVASTATIN CALCIUM 40 MG/1
40 TABLET, FILM COATED ORAL NIGHTLY
Qty: 90 TABLET | Refills: 1 | Status: SHIPPED | OUTPATIENT
Start: 2022-10-11

## 2022-10-11 RX ORDER — SPIRONOLACTONE 50 MG/1
TABLET, FILM COATED ORAL
Qty: 90 TABLET | Refills: 1 | Status: SHIPPED | OUTPATIENT
Start: 2022-10-11

## 2022-10-11 NOTE — PROGRESS NOTES
Judy Campos (:  1955) is a 79 y.o. female,Established patient, here for evaluation of the following chief complaint(s):  Hypertension and Diabetes         ASSESSMENT/PLAN:  1. Impaired fasting glucose  -     POCT glycosylated hemoglobin (Hb A1C)  2. Pure hypercholesterolemia  -     LIPID PANEL; Future  -     Hepatic Function Panel; Future  3. Primary insomnia  -     ALPRAZolam (XANAX) 1 MG tablet; Take 1 tablet by mouth 2 times daily as needed for Sleep for up to 30 days. , Disp-60 tablet, R-2Normal  Glucose stable, lipid back on meds, insomnia stable on current    No follow-ups on file. Subjective   SUBJECTIVE/OBJECTIVE:  HPI  Routine follow up    Diagnosis Orders   1. Impaired fasting glucose  POCT glycosylated hemoglobin (Hb A1C)      2. Pure hypercholesterolemia  LIPID PANEL    Hepatic Function Panel      3. Primary insomnia  ALPRAZolam (XANAX) 1 MG tablet        Stable per patient  Pending CTS    Review of Systems       Objective   Physical Exam  Vitals reviewed. Eyes:      General: No scleral icterus. Conjunctiva/sclera: Conjunctivae normal.   Neck:      Thyroid: No thyromegaly. Vascular: No JVD. Cardiovascular:      Rate and Rhythm: Normal rate and regular rhythm. Heart sounds: Normal heart sounds. No murmur heard. No friction rub. No gallop. Pulmonary:      Effort: Pulmonary effort is normal.      Breath sounds: Normal breath sounds. No wheezing or rales. Abdominal:      General: Bowel sounds are normal. There is no distension. Palpations: Abdomen is soft. There is no mass. Tenderness: There is no abdominal tenderness. Hernia: No hernia is present. Lymphadenopathy:      Cervical: No cervical adenopathy. Skin:     Findings: No rash. Neurological:      Mental Status: She is alert and oriented to person, place, and time. An electronic signature was used to authenticate this note.     --Hank Espino MD

## 2022-10-12 LAB
ALBUMIN SERPL-MCNC: 4.8 G/DL (ref 3.4–5)
ALP BLD-CCNC: 115 U/L (ref 40–129)
ALT SERPL-CCNC: 12 U/L (ref 10–40)
AST SERPL-CCNC: 14 U/L (ref 15–37)
BILIRUB SERPL-MCNC: 0.4 MG/DL (ref 0–1)
BILIRUBIN DIRECT: <0.2 MG/DL (ref 0–0.3)
BILIRUBIN, INDIRECT: ABNORMAL MG/DL (ref 0–1)
CHOLESTEROL, TOTAL: 146 MG/DL (ref 0–199)
HDLC SERPL-MCNC: 41 MG/DL (ref 40–60)
LDL CHOLESTEROL CALCULATED: 76 MG/DL
TOTAL PROTEIN: 7.4 G/DL (ref 6.4–8.2)
TRIGL SERPL-MCNC: 144 MG/DL (ref 0–150)
VLDLC SERPL CALC-MCNC: 29 MG/DL

## 2022-10-19 ENCOUNTER — OFFICE VISIT (OUTPATIENT)
Dept: ORTHOPEDIC SURGERY | Age: 67
End: 2022-10-19
Payer: MEDICARE

## 2022-10-19 VITALS — WEIGHT: 159 LBS | RESPIRATION RATE: 16 BRPM | HEIGHT: 63 IN | BODY MASS INDEX: 28.17 KG/M2

## 2022-10-19 DIAGNOSIS — G56.00 CARPAL TUNNEL SYNDROME, UNSPECIFIED LATERALITY: Primary | ICD-10-CM

## 2022-10-19 PROCEDURE — 99204 OFFICE O/P NEW MOD 45 MIN: CPT | Performed by: ORTHOPAEDIC SURGERY

## 2022-10-19 PROCEDURE — 1090F PRES/ABSN URINE INCON ASSESS: CPT | Performed by: ORTHOPAEDIC SURGERY

## 2022-10-19 PROCEDURE — G8484 FLU IMMUNIZE NO ADMIN: HCPCS | Performed by: ORTHOPAEDIC SURGERY

## 2022-10-19 PROCEDURE — G8417 CALC BMI ABV UP PARAM F/U: HCPCS | Performed by: ORTHOPAEDIC SURGERY

## 2022-10-19 PROCEDURE — G8427 DOCREV CUR MEDS BY ELIG CLIN: HCPCS | Performed by: ORTHOPAEDIC SURGERY

## 2022-10-19 NOTE — LETTER
CONSENT TO OPERATION  AND/OR OTHER PROCEDURE(S)          PATIENT : Moraima Lara OF BIRTH:  1955      DATE : 10/19/22          1. I request and consent that Dr. Yamilet Sanchez,  and/or his associates or assistants perform an operation and/or procedures on the above patient at  [place] [address], to treat the condition(s) which appear indicated by the diagnostic studies already performed. I have been told that in general terms the nature, purpose and reasonable expectations of the operation and/or procedure(s) are:     Left Carpal Tunnel Release       2. It has been explained to me by the informing physician that during the course of the operation and/or procedure(s) unforeseen conditions may be revealed that necessitate an extension of the original operation and/or procedure(s) or different operation and/or procedures than those set forth in Paragraph 1. I therefore authorize and request that my physician and/or his associates or assistants perform such operations and/or procedures as are necessary and desirable in the exercise of professional judgment. The authority granted under this Paragraph 2 shall extend to all conditions that require treatment and are known to my physician at the time the operation is commenced. 3. I have been made aware by the informing physician of certain risks and consequences that are associated with the operation and/or procedure(s) described in Paragraph 1, the reasonable alternative methods or treatment, the possible consequences, the possibility that the operation and/or procedure(s) may be unsuccessful and the possibility of complications.   I understand the reasonably known risks to be:      - Bleeding  - Infection  - Poor Healing  - Possible Damage to Nerve, Vessel, Tendon/Muscle or Bone  - Need for further Treatment/Surgery  - Stiffness  - Pain  - Residual or Recurrent Symptoms  - Anesthetic and/or Medical Risks  - We have discussed the specific limitations and risks of hospital and/or office based treatment at this time due to the COVID-19 pandemic                I have been counseled about the risks of eloisa Covid-19 in the awa-operative and post-operative periods related to this procedure. I have been made aware that eloisa Covid-19 around the time of a surgical procedure may worsen my prognosis for recovering from the virus and lend to a higher morbidity and or mortality risk. With this knowledge, I have requested to proceed with the procedure as scheduled. 4. I have also been informed by the informing physician that there are other risks from both known and unknown causes that are attendant to the performance of any surgical procedure. I am aware that the practice of medicine and surgery is not an exact science, and that no guarantees have been made to me concerning the results of the operation and/or procedure(s). 5. I   CONSENT / REFUSE CONSENT  (strike the phrase that does not apply) to the taking of photographs before, during and/or after the operation or procedure for scientific/educational purposes. 6. I consent to the administration of anesthesia and to the use of such anesthetics as may be deemed advisable by the anesthesiologist who has been engaged by me or my physician.     7. I certify that I have read and understand the above consent to operation and/or other procedure(s); that the explanations therein referred to were made to me by the informing physician in advance of my signing this consent; that all blanks or statements requiring insertion or completion were filled in and inapplicable paragraphs, if any, were stricken before I signed; and that all questions asked by me about the operation and/or procedure(s) which I have consented to have been fully answered in a satisfactory manner.                                 _______________________           10/19/22

## 2022-10-19 NOTE — Clinical Note
Dear  Gagan Nichols MD,  Thank you very much for your referral or Ms. Danita Guardado to me for evaluation and treatment of her Hand & Wrist condition. I appreciate your confidence in me and thank you for allowing me the opportunity to care for your patients. If I can be of any further assistance to you on this or any other patient, please do not hesitate to contact me. Sincerely,  Saritha Ndiaye.  Deja Marc MD

## 2022-10-19 NOTE — LETTER
[facility] SURGERY  Surgery Scheduling Form:    DEMOGRAPHICS:                                                                                                              .    Patient Name:  Judy Campos  Patient :  1955   Patient SS#:      Patient Phone:  [phone] (home)  Alt. Patient Phone:    Patient Address:  [address] [facility] [address]    PCP:  Hank Espino MD  Insurance:    Payer/Plan Subscr  Sex Relation Sub. Ins. ID Effective Group Num   1. MEDICARE - Caridad Diallo A 1955 Female Self [id number] 1/1/15 [id number]                                   [address]    [address] A 1955 Female Self [id number] 1/1/15 [id number]                                   [address] [address]     DIAGNOSIS & PROCEDURE:                                                                                            .    Diagnosis:   left Carpal Tunnel Syndrome (354.0/G56.02)  Operation:  left Carpal Tunnel Release  [CPT: 22584]  Location:  [address]  Surgeon:  Ban Baltazar    SCHEDULING INFORMATION:                                                                                         .    Surgeon's Scheduling Instruction:  elective    RN Post-op Appt:  [x] Yes   [] No  Preferred Thursday:   [] Yes   [] No    Requested Date:  22 OR Time:  8:30am Patient Arrival Time:    OR Time Required:  15  Minutes  Anesthesia:  MAC/TIVA  Equipment:  None  Mini C-Arm:  No   Standard C-Arm:  No  Status:  outpatient  PAT Required:  Yes  Comments:                      Nimo Cardenas MD  10/19/22 2:19 PM    BILLING INFORMATION:                                                                                                    .    Procedure:       CPT Code Modifier  left Carpal Tunnel Release       .                Pre Operative Physician Prophylaxis Orders - SCIP Protocols      Pre-Operative Antibiotic Order:    Allergies   Allergen Reactions    Infliximab      Severe drop in blood pressure    Ultrasound Gel Other (See Comments)     burn    Codeine Nausea And Vomiting    Penicillins Rash          [x]  ----  No Antibiotic Ordered       []  ----  Give the following Antibiotic within 1 hour prior to start time:         Ancef 1 gram IV if patient is less than 200 pounds    or       Ancef 2 grams IV if patient is greater than 200 pounds    or      Vancomycin 1 gram IV (over 1 hour) if patient is allergic to           PENICILLINS or CEFALOSPORINS       Procedure: left Carpal Tunnel Release      Patient: Jenny Degroot  :    1955    Physician Signature:     Date: 10/19/22  Time: 2:19 PM

## 2022-10-19 NOTE — PROGRESS NOTES
Ms. Chely Duncan is a 79 y.o. right handed woman  who is seen today in Hand Surgical Consultation at the request of Winnifred Simmonds, MD.    She presents today regarding Left greater than Right symptoms which have been present for approximately 6 months. A history of antecedent trauma or injury is Absent. She reports symptoms to include severe generalized pain in the hand and forearm which \"wakes me up crying\" as well a severe  painful  numbness & tingling in the Whole Hand. Hand symptoms do  awaken her from sleep. She reports marked pain located in the diffuse both wrists, both forearms, and both sides radiating above the elbow. Symptoms are worsening over time. She does report a history of a Neuropathy treated elsewhere. Previous treatment has included no prior treatments used. She does not claim relation of her symptoms to her required work activities. She has undergone electrodiagnostic testing. I have today reviewed with Chely Duncan the clinically relevant, past medical history, medications, allergies,  family history, social history, and Review Of Systems & I have documented any details relevant to today's presenting complaints in my history above. Ms. Kacy Ireland self-reported past medical history, medications, allergies,  family history, social history, and Review Of Systems have been scanned into the chart under the \"Media\" tab. Physical Exam:  Ms. Kacy Ireland most recent vitals:  Vitals  Resp: 16  Height: 5' 2.9\" (159.8 cm)  Weight: 159 lb (72.1 kg)    She is well nourished, oriented to person, place & time. She demonstrates appropriate mood and affect as well as normal gait and station. Skin: Normal in appearance, Normal Color, and Free of Lesions Bilaterally   Digital range of motion is limited by pain on the Left, greater than Right  Wrist range of motion is without significant limitation bilaterally  There is no evidence of gross joint instability bilaterally.   Sensation is subjectively tingling and painful in the Whole Hand on the Left, greater than Right and objectively present in the same distribution bilaterally. Vascular examination reveals normal and good capillary refill bilaterally  Swelling is mild in the distal volar forearm on the Left, greater than Right  Muscular strength is clinically appropriate bilaterally. Examination for Carpal Tunnel Syndrome shows Carpal Tunnel Compression Test to be Equivocal on the right & Equivocal on the left. The patient displays moderate baseline symptoms to potentially confound the exam.  The thenar musculature is mildly atrophied & weakened. Review of Electrodiagnostic Testing:  Electrodiagnostic Studies performed by another Physician outside of my practice were Personally Reviewed & Interpreted by myself today. Test performed on: 9/8/22    NERVE CONDUCTION STUDY:  RIGHT   Median Nerve: Sensory Latency:    Motor Latency:    Ulnar Nerve:  Conduction Velocity:       LEFT  Median Nerve: Sensory Latency: NR  Motor Latency: 7.7  Ulnar Nerve:  Conduction Velocity:  67    EMG:  RIGHT  Not Performed    LEFT  Abnormal  APB    My Interpretation: This study is consistent with: Severe LEFT Median Nerve Entrapment at the Carpal Tunnel    Impression:  Ms. Solange Lo has developed SEVERE Carpal Tunnel Syndrome , with a constellation of other symptoms which are likely unrelated,    which is currently exacerbated, and presents requesting further treatment. Plan:    I have had a thorough discussion with Ms. Solange Lo regarding the treatment options available for her initially presenting carpal tunnel syndrome, which is causing her significant  limitations. I have outlined for Ms. Solange Lo the benefits and consequences of the various treatment modalities, including the fact that surgical treatment is the only modality which is reasonably expected to provide long lasting or permanent resolution of her symptoms.   Based upon our current discussion and a reasonable understating of the options available to her, Ms. Sharonda Melton has selected to proceed with surgical Carpal Tunnel Release. I have discussed the details of the surgical procedure, the pre, awa and postoperative concerns and the appropriate expectations after surgery with Ms. Sharonda Melton today. She was given the opportunity to ask questions, voiced an understanding of the procedure, and she did wish to proceed with Left Carpal Tunnel Release. I had an extensive discussion with Ms. Sharonda Melton (and any family members present with her today) regarding the natural history, etiology, and long term consequences of this problem. We have mutually selected a surgical treatment plan  and, in my opinion, surgical intervention is indicated at this time. I have discussed with her the potential complications, limitations, expectations, alternatives, and risks of Carpal Tunnel Release. She has had full opportunity to ask her questions. I have answered them all to her satisfaction. I feel that Ms. Sharonda Melton (and any family members present with her today) do understand our discussion today and she has provided informed consent for Left Carpal Tunnel Release. I have also discussed with Ms. Sharonda Melton the other treatment options available to her for this condition. We have today selected to proceed with Surgical treatment. She has voiced and  understanding that there are other less aggressive treatment options which are available in this situation, albeit possibly less efficacious or durable, and she is comfortable with the plan that she has chosen. I have explained to Ms. Sharonda Melton that despite successful treatment (surgical or otherwise) of her current presenting condition, that due to her coexistent conditions (both diagnosed and undiagnosed), that she is likely to have some permanent residual symptoms related to these conditions that do not improve long term.   I have also explained that maximal recovery of function & symptom improvement may take a full year or longer to realize. She voiced a clear understanding of this. I have explained to Ms. Niraj Yuan that despite successful treatment (surgical decompression or otherwise) of her nerve entrapment, that due to her severe nerve damage, that she is likely to have some permanent residual symptoms that do not improve long term. I have also explained that maximal recovery of nerve function may take a full year or longer to realize. She voiced a clear understanding of this. I have explained to Ms. Niraj Yuan that, as she in under pain management, that I will respectfully request, for her safety, that she receive her pain medications from her pain management specialist during her treatment and during the perioperative period if applicable. She understood that I would not be prescribing narcotic medications for her and that she would be asked to make arrangements for her pain medication needs preoperatively if she would be scheduling surgical treatment with me. She acknowledged my position on this. Ms. Niraj Yuan has been given a full verbal list of instructions and precautions related to her present condition. I have asked her to followup with me in the office at the prescribed time. She is also specifically requested to call or return to the office sooner if her symptoms change or worsen prior to the next scheduled appointment.

## 2022-11-07 ENCOUNTER — TELEPHONE (OUTPATIENT)
Dept: ORTHOPEDIC SURGERY | Age: 67
End: 2022-11-07

## 2022-11-07 NOTE — TELEPHONE ENCOUNTER
CPT: [id number]  BODY PART: left wrist  STATUS: outpatient  LOCATION: [facility], located within [facility]  AUTHORIZATION: NPR    Medicare is primary, NPR

## 2022-11-15 ENCOUNTER — HOSPITAL ENCOUNTER (OUTPATIENT)
Dept: CT IMAGING | Age: 67
Discharge: HOME | End: 2022-11-15
Payer: MEDICARE

## 2022-11-15 ENCOUNTER — HOSPITAL ENCOUNTER (OUTPATIENT)
Dept: PULMONOLOGY | Age: 67
Discharge: HOME | End: 2022-11-15
Payer: MEDICARE

## 2022-11-15 DIAGNOSIS — D86.9 SARCOID: ICD-10-CM

## 2022-11-15 PROCEDURE — 94060 EVALUATION OF WHEEZING: CPT

## 2022-11-15 PROCEDURE — 94640 AIRWAY INHALATION TREATMENT: CPT

## 2022-11-15 PROCEDURE — 94618 PULMONARY STRESS TESTING: CPT

## 2022-11-15 PROCEDURE — 6370000000 HC RX 637 (ALT 250 FOR IP): Performed by: INTERNAL MEDICINE

## 2022-11-15 PROCEDURE — 94726 PLETHYSMOGRAPHY LUNG VOLUMES: CPT

## 2022-11-15 PROCEDURE — 94729 DIFFUSING CAPACITY: CPT

## 2022-11-15 PROCEDURE — 71250 CT THORAX DX C-: CPT

## 2022-11-15 RX ORDER — ALBUTEROL SULFATE 90 UG/1
4 AEROSOL, METERED RESPIRATORY (INHALATION) ONCE
Status: COMPLETED | OUTPATIENT
Start: 2022-11-15 | End: 2022-11-15

## 2022-11-15 RX ADMIN — Medication 4 PUFF: at 14:10

## 2022-11-17 NOTE — PROCEDURES
[address]                 [address] [address]                               PULMONARY FUNCTION    PATIENT NAME: Grant Lang                         :        1955  MED REC NO:   [id number]                          ROOM:  ACCOUNT NO:   [de-identified]                           ADMIT DATE: 11/15/2022  PROVIDER:     Samuel Caceres MD    DATE OF PROCEDURE:  11/15/2022    INDICATION:  Sarcoid. FINDINGS:  1. Spirometry: The FEV1 is 1.43 liters, which is 63% of predicted. The FEV1/FVC ratio is normal.  Inhaled bronchodilators are given. There  is no significant improvement. 2.  Lung volumes: Total lung capacity is 3.72 liters or 75% of  predicted. 3.  Diffusion capacity:  DLCO is 15.79 mL/min/mmHg, which is 72% of  predicted. 4.  Flow volume loop does not show an obvious obstructive pattern. 5.  Six-minute walk test per Meg Sheller protocol. Baseline oxygen  saturation 95%. Lowest oxygen saturation was 92%. The patient  ambulated 630 feet. IMPRESSION:  1. No obstructive lung defect. 2.  Moderate restrictive lung defect. 3.  Mild reduction diffusion capacity. 4.  Mild oxyhemoglobin desaturation on six-minute walk testing, but  supplemental oxygen not required.         Tyler Lockhart MD    D: 2022 13:01:07       T: 2022 20:42:32     [id number]  Job#: [id number]     Doc#: [id number]    CC:

## 2022-11-18 NOTE — PROGRESS NOTES

## 2022-11-18 NOTE — PROGRESS NOTES
[place] Claire    Age 79 y.o.    female    1955    N [id number]    11/29/2022  Arrival Time_____________  OR Time____________[address]     Procedure(s):  LEFT CARPAL TUNNEL RELEASE                      [facility]    Surgeon(s):  Sandhya Hopson, MD       Phone [phone] ([place])     [address]  Cell         Work  _____________________________________________________________________  _____________________________________________________________________  _____________________________________________________________________  _____________________________________________________________________  _____________________________________________________________________    PCP _____________________________ Phone_________________     H&P__________________Bringing      Chart            Epic   DOS      Called________  EKG__________________Bringing      Chart            Epic   DOS      Called________  LAB__________________ Bringing      Chart            Epic   DOS      Called________  Cardiac Clearance_______Bringing      Chart            Epic      DOS      Called________    Cardiologist________________________ Phone___________________________    ? Mu-ism concerns / Waiver on Chart            PAT Communications________________  ? Pre-op Instructions Given [place]          _________________________________  ? Directions to Surgery Center                          _________________________________  ? Transportation Home_______________      __________________________________  ?  Crutches/Walker__________________        __________________________________    ________Pre-op Orders   _______Transcribed    _______Wt.  ________Pharmacy          _______SCD  ______VTE     ______TED [place] [facility]ater  _______  Surgery Consent    _______  Anesthesia Consent         COVID DATE______________LOCATION________________ RESULT__________

## 2022-11-21 ENCOUNTER — OFFICE VISIT (OUTPATIENT)
Dept: FAMILY MEDICINE CLINIC | Age: 67
End: 2022-11-21
Payer: MEDICARE

## 2022-11-21 VITALS
WEIGHT: 167 LBS | OXYGEN SATURATION: 94 % | HEART RATE: 93 BPM | DIASTOLIC BLOOD PRESSURE: 80 MMHG | BODY MASS INDEX: 28.67 KG/M2 | SYSTOLIC BLOOD PRESSURE: 128 MMHG

## 2022-11-21 DIAGNOSIS — Z01.818 PRE-OP EXAMINATION: ICD-10-CM

## 2022-11-21 DIAGNOSIS — Z01.811 PRE-OP CHEST EXAM: Primary | ICD-10-CM

## 2022-11-21 PROCEDURE — G8484 FLU IMMUNIZE NO ADMIN: HCPCS | Performed by: NURSE PRACTITIONER

## 2022-11-21 PROCEDURE — 1090F PRES/ABSN URINE INCON ASSESS: CPT | Performed by: NURSE PRACTITIONER

## 2022-11-21 PROCEDURE — 3078F DIAST BP <80 MM HG: CPT | Performed by: NURSE PRACTITIONER

## 2022-11-21 PROCEDURE — G8399 PT W/DXA RESULTS DOCUMENT: HCPCS | Performed by: NURSE PRACTITIONER

## 2022-11-21 PROCEDURE — 1123F ACP DISCUSS/DSCN MKR DOCD: CPT | Performed by: NURSE PRACTITIONER

## 2022-11-21 PROCEDURE — G8417 CALC BMI ABV UP PARAM F/U: HCPCS | Performed by: NURSE PRACTITIONER

## 2022-11-21 PROCEDURE — G8427 DOCREV CUR MEDS BY ELIG CLIN: HCPCS | Performed by: NURSE PRACTITIONER

## 2022-11-21 PROCEDURE — 3017F COLORECTAL CA SCREEN DOC REV: CPT | Performed by: NURSE PRACTITIONER

## 2022-11-21 PROCEDURE — 3074F SYST BP LT 130 MM HG: CPT | Performed by: NURSE PRACTITIONER

## 2022-11-21 PROCEDURE — 99214 OFFICE O/P EST MOD 30 MIN: CPT | Performed by: NURSE PRACTITIONER

## 2022-11-21 PROCEDURE — 93000 ELECTROCARDIOGRAM COMPLETE: CPT | Performed by: NURSE PRACTITIONER

## 2022-11-21 PROCEDURE — 4004F PT TOBACCO SCREEN RCVD TLK: CPT | Performed by: NURSE PRACTITIONER

## 2022-11-21 NOTE — PROGRESS NOTES
Preoperative Consultation      Eric Guadalupe  YOB: 1955    Date of Service:  11/21/2022    Vitals:    11/21/22 1548   BP: 128/80   Site: Left Upper Arm   Position: Sitting   Cuff Size: Medium Adult   Pulse: 93   SpO2: 94%   Weight: 167 lb (75.8 kg)      Wt Readings from Last 2 Encounters:   11/21/22 167 lb (75.8 kg)   10/19/22 159 lb (72.1 kg)     BP Readings from Last 3 Encounters:   11/21/22 128/80   10/11/22 108/80   09/29/22 92/66        Chief Complaint   Patient presents with    Pre-op Exam     11/29 [facility] Dr.Craig Celest[facility] LEFT CARPAL TUNNEL RELEASE with [facility] fax to [phone]     Allergies   Allergen Reactions    Infliximab      Severe drop in blood pressure    Ultrasound Gel Other (See Comments)     burn    Codeine Nausea And Vomiting    Penicillins Rash     Outpatient Medications Marked as Taking for the 11/21/22 encounter (Office Visit) with VERITO Soria CNP   Medication Sig Dispense Refill    atorvastatin (LIPITOR) 40 MG tablet Take 1 tablet by mouth nightly TAKE 1 TABLET BY MOUTH EVERY DAY 90 tablet 1    spironolactone (ALDACTONE) 50 MG tablet Take 1 tablet by mouth once daily 90 tablet 1    mirtazapine (REMERON) 15 MG tablet TAKE 1 TABLET BY MOUTH ONCE DAILY AT NIGHT 90 tablet 1    predniSONE (DELTASONE) 5 MG tablet Take 5 mg by mouth every other day      HYDROcodone-acetaminophen (NORCO) 5-325 MG per tablet TAKE 1 TABLET BY MOUTH EVERY 6 HOURS AS NEEDED FOR PAIN      promethazine-dextromethorphan (PROMETHAZINE-DM) 6.25-15 MG/5ML syrup Take 5 mLs by mouth every 4 hours as needed      albuterol sulfate HFA (PROVENTIL;VENTOLIN;PROAIR) 108 (90 Base) MCG/ACT inhaler 2 puffs q 4 prn 18 g 5    fluticasone-salmeterol (ADVAIR DISKUS) 250-50 MCG/ACT AEPB diskus inhaler Inhale 1 puff into the lungs in the morning and 1 puff in the evening. 60 each 3    modafinil (PROVIGIL) 100 MG tablet Take 1 tablet by mouth daily for 182 doses.  30 tablet 5 valACYclovir (VALTREX) 500 MG tablet Take 1 tablet by mouth once daily (Patient taking differently: Take 500 mg by mouth daily Take 1 tablet by mouth once daily) 90 tablet 0    sertraline (ZOLOFT) 100 MG tablet TAKE 1 & 1/2 (ONE & ONE-HALF) TABLETS BY MOUTH ONCE DAILY 135 tablet 0    propranolol (INDERAL LA) 60 MG extended release capsule Take 1 capsule by mouth once daily 90 capsule 1    pantoprazole (PROTONIX) 40 MG tablet TAKE 1 TABLET BY MOUTH TWICE DAILY BEFORE MEAL(S) 180 tablet 1    alendronate (FOSAMAX) 70 MG tablet Take 1 tablet by mouth once a week 12 tablet 3    Folic Acid 0.8 MG CAPS Take 1 capsule by mouth daily      traZODone (DESYREL) 100 MG tablet Take 100 mg by mouth nightly      traMADol (ULTRAM) 50 MG tablet Take 50 mg by mouth 2 times daily as needed for Pain.      montelukast (SINGULAIR) 10 MG tablet TAKE 1 TABLET BY MOUTH ONCE DAILY NIGHTLY 90 tablet 0    letrozole (FEMARA) 2.5 MG tablet Take 2.5 mg by mouth daily       ketorolac (ACULAR) 0.5 % ophthalmic solution Place 1 drop into both eyes 2 times daily       diclofenac sodium 1 % GEL Apply topically 4 times daily as needed Indications: [facility]  prescribes      difluprednate (DUREZOL) 0.05 % EMUL Apply 2 drops to eye 2 times daily BOTH EYES      gabapentin (NEURONTIN) 400 MG capsule 400 mg 4 times daily. Indications: Prescribed by [facility]    1    cyclobenzaprine (FLEXERIL) 10 MG tablet Take 10 mg by mouth 3 times daily as needed for Muscle spasms      methotrexate (RHEUMATREX) 2.5 MG chemo tablet Take 7.5 mg by mouth every 7 days       fluocinonide (LIDEX) 0.05 % cream Apply topically 2 times daily Apply topically 2 times daily.       promethazine (PHENERGAN) 12.5 MG tablet Take 25 mg by mouth every 6 hours as needed for Nausea Indications: Dr. Frankey Blinks          This patient presents to the office today for a preoperative consultation at the request of surgeon, Dr. Isaias Andrea, who plans on performing Left Carpal Tunnel Release on November 29 at [facility].  The current problem began 6 months ago, and symptoms have been worsening with time. Conservative therapy: Yes: medications, ice/heat, which has been not very effective. .    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, both eyes    Essential hypertension    Diverticulosis    Nausea & vomiting    S/P splenectomy    Osteopenia    Sarcoidosis    Vitamin D deficiency    Impaired fasting glucose    Cecal volvulus (HCC)    Insomnia    Hypercholesterolemia    Disturbance of skin sensation    Acute, but ill-defined, cerebrovascular disease    Sarcoid    Cerebral vasculitis    Lesion of right ulnar nerve    Major depressive disorder, recurrent episode, moderate (HCC)    Pain medication agreement signed    Trochanteric bursitis of left hip    Ankle instability    GI bleed    Colitis, acute    Anxiety    Congenital urethral stenosis    Urinary frequency    Right upper quadrant pain    Epigastric pain    Esophageal dysphagia    Colon, diverticulosis    Giant cell arteritis ([address]    Lower abdominal pain    Right upper quadrant abdominal pain    Colonic stricture (HCC)    Right sided abdominal pain    Irritable bowel syndrome with constipation    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 breast of female, estrogen receptor positive ([address]    S/P laparoscopic-assisted sigmoidectomy    Postoperative pain       Past Medical History:   Diagnosis Date    Asthma     CAD (coronary artery disease)     Cancer (HCC)     RIGHT BREAST    Chronic diarrhea     Dr. Candy Grigsby    Chronic kidney disease     kidney stones    Clostridium difficile diarrhea 10/23/13    positive by PCR    Fibromyalgia     Hemorrhoid     Hyperlipidemia     Hypertension Kidney stone     Migraines     Osteoarthritis     fibromyalgia    Sarcoidosis 2003    sees Dr. Lindsay Cooney     Past Surgical History:   Procedure Laterality Date    ABDOMINAL EXPLORATION SURGERY  08/19/2012    REDUCTION OF VULVUS; RIGHT COLECTOMY; SMALL BOWEL RESECTION; INSERTION OF ON Q PAIN BUSTER    BREAST BIOPSY      CARDIAC CATHETERIZATION  2010    CLEAN    COLONOSCOPY  2010    normal    COLONOSCOPY N/A 06/16/2020    COLON W/ANES. performed by Guille Valle MD at [facility]  12/2011    CYSTOSCOPY N/A 04/27/2022    CYSTOSCOPY, BILATERAL RETROGRADE PYELOGRAM performed by Sade Munoz MD at [address]  06/16/2020    ESOPHAGEAL DILATION James Rubi performed by Guille Valle MD at [address] Bilateral 05/2009    cataract    HYSTERECTOMY ([address])  2000    LITHOTRIPSY  01/19/2012    LITHOTRIPSY      04/2012    MASTECTOMY Bilateral 05/18/2021    BILATERAL SIMPLE MASTECTOMY, RIGHT SENTINEL LYMPH NODE BIOPSY performed by Leatha Merritt MD at [address]    right    PARTIAL HYSTERECTOMY (CERVIX NOT REMOVED)      SIGMOID COLECTOMY Left 04/27/2022    ROBOTIC, SIGMOIDCOLECTOMY WITH REVISION OF COLORECTAL ANASTOMOSIS performed by Sade Munoz MD at [address] [facility]  02/27/2013    UPPER GASTROINTESTINAL ENDOSCOPY  11/14/2017    gastritis    UPPER GASTROINTESTINAL ENDOSCOPY N/A 06/16/2020    EGD W/ANES.  (11:00) performed by Guille Valle MD at [address] [facility]  12/2011    US BREAST NEEDLE BIOPSY RIGHT Right 04/15/2021    US BREAST NEEDLE BIOPSY RIGHT 4/15/2021 Reji Lam MD [facility]     Family History   Problem Relation Age of Onset    Heart Disease Father     Cancer Father         skin cancer- unknown    Cancer Brother         skin cancer- forehead and nose- unknown     Social History     Socioeconomic History    Marital status:      Spouse name: Clemencia Hernandez     Number of children: 4    Years of education: Not on file    Highest education level: Not on file   Occupational History    Occupation: disabilty    Tobacco Use    Smoking status: Every Day     Packs/day: 0.25     Years: 20.00     Pack years: 5.00     Types: Cigarettes     Start date: 5/1/2022     Passive exposure: Never    Smokeless tobacco: Never    Tobacco comments:     Started smoking again in May (had quit smoking for approx 2 yrs)   Vaping Use    Vaping Use: Never used   Substance and Sexual Activity    Alcohol use: No     Alcohol/week: 0.0 standard drinks    Drug use: No    Sexual activity: Not on file   Other Topics Concern    Not on file   Social History Narrative    Not on file     Social Determinants of Health     Financial Resource Strain: Low Risk     Difficulty of Paying Living Expenses: Not hard at all   Food Insecurity: No Food Insecurity    Worried About [address] in the Last Year: Never true    [address] in the Last Year: Never true   Transportation Needs: No Transportation Needs    Lack of Transportation (Medical): No    Lack of Transportation (Non-Medical): No   Physical Activity: Not on file   Stress: Not on file   Social Connections: Not on file   Intimate Partner Violence: Not on file   Housing Stability: Low Risk     Unable to Pay for Housing in the Last Year: No    Number of Places Lived in the Last Year: 1    Unstable Housing in the Last Year: No       Review of Systems  A comprehensive review of systems was negative except for what was noted in the HPI. Physical Exam   Constitutional: She is oriented to person, place, and time. She appears well-developed and well-nourished. No distress. HENT:   Head: Normocephalic and atraumatic.    Mouth/Throat: Uvula is midline, oropharynx is clear and moist and mucous membranes are normal.   Eyes: Conjunctivae and EOM are normal. Pupils are equal, round, and reactive to light. Neck: Trachea normal and normal range of motion. Neck supple. No JVD present. Carotid bruit is not present. No mass and no thyromegaly present. Cardiovascular: Normal rate, regular rhythm, normal heart sounds and intact distal pulses. Exam reveals no gallop and no friction rub. No murmur heard. Pulmonary/Chest: Effort normal and breath sounds normal. No respiratory distress. She has no wheezes. She has no rales. Abdominal: Soft. bowel sounds are normal. She exhibits no distension and no mass. There is no hepatosplenomegaly. No tenderness. Musculoskeletal: She exhibits no edema and no tenderness. Neurological: She is alert and oriented to person, place, and time. She has normal strength. No cranial nerve deficit or sensory deficit. Coordination and gait normal.   Skin: Skin is warm and dry. No rash noted. No erythema. Psychiatric: She has a normal mood and affect. Her behavior is normal.     EKG Interpretation:  normal EKG, normal sinus rhythm, unchanged from previous tracings.     Lab Review   Lab Results   Component Value Date/Time     11/22/2022 01:44 PM    K 4.5 11/22/2022 01:44 PM    K 3.7 05/20/2022 02:49 PM     11/22/2022 01:44 PM    CO2 24 11/22/2022 01:44 PM    BUN 10 11/22/2022 01:44 PM    CREATININE 0.9 11/22/2022 01:44 PM    GLUCOSE 124 11/22/2022 01:44 PM    GLUCOSE 116 01/07/2021 12:00 AM    CALCIUM 9.8 11/22/2022 01:44 PM     Lab Results   Component Value Date/Time    TROPONINI <0.01 11/13/2017 09:47 AM     Lab Results   Component Value Date/Time    WBC 9.2 11/22/2022 01:44 PM    HGB 12.4 11/22/2022 01:44 PM    HCT 38.1 11/22/2022 01:44 PM    MCV 92.8 11/22/2022 01:44 PM     11/22/2022 01:44 PM     Lab Results   Component Value Date/Time    CHOL 146 10/11/2022 02:03 PM    TRIG 144 10/11/2022 02:03 PM    HDL 41 10/11/2022 02:03 PM    HDL 48 04/15/2011 12:25 PM           Assessment:       79 y.o. patient with planned surgery as above. Known risk factors for perioperative complications: Hypertension, Increased infection risk- hx of sarcoid, on prednisone/methotrexate, splenectomy. Current medications which may produce withdrawal symptoms if withheld perioperatively: Zoloft     Plan:     1. Preoperative workup as follows: ECG, hemoglobin, hematocrit, electrolytes, creatinine, glucose  2. Change in medication regimen before surgery: Discontinue ASA 7 days before surgery, Discontinue NSAIDs, MV, Fish Oil, Vit E 7 days before surgery  3. Prophylaxis for cardiac events with perioperative beta-blockers: Currently taking  propranolol  ACC/AHA indications for pre-operative beta-blocker use:    Vascular surgery with history of postitive stress test  Intermediate or high risk surgery with history of CAD   Intermediate or high risk surgery with multiple clinical predictors of CAD- 2 of the following: history of compensated or prior heart failure, history of cerebrovascular disease, DM, or renal insufficiency    Routine administration of higher-dose, long-acting metoprolol in beta-blocker-naïve patients on the day of surgery, and in the absence of dose titration is associated with an overall increase in mortality. Beta-blockers should be started days to weeks prior to surgery and titrated to pulse < 70.  4. Deep vein thrombosis prophylaxis: regimen to be chosen by surgical team  5.  No contraindications to planned surgery

## 2022-11-22 ENCOUNTER — HOSPITAL ENCOUNTER (OUTPATIENT)
Age: 67
Discharge: HOME | End: 2022-11-22
Payer: MEDICARE

## 2022-11-22 DIAGNOSIS — Z01.818 PRE-OP EXAMINATION: ICD-10-CM

## 2022-11-22 LAB
ANION GAP SERPL CALCULATED.3IONS-SCNC: 10 MMOL/L (ref 3–16)
BUN BLDV-MCNC: 10 MG/DL (ref 7–20)
CALCIUM SERPL-MCNC: 9.8 MG/DL (ref 8.3–10.6)
CHLORIDE BLD-SCNC: 102 MMOL/L (ref 99–110)
CO2: 24 MMOL/L (ref 21–32)
CREAT SERPL-MCNC: 0.9 MG/DL (ref 0.6–1.2)
GFR SERPL CREATININE-BSD FRML MDRD: >60 ML/MIN/{1.73_M2}
GLUCOSE BLD-MCNC: 124 MG/DL (ref 70–99)
HCT VFR BLD CALC: 38.1 % (ref 36–48)
HEMOGLOBIN: 12.4 G/DL (ref 12–16)
MCH RBC QN AUTO: 30.1 PG (ref 26–34)
MCHC RBC AUTO-ENTMCNC: 32.5 G/DL (ref 31–36)
MCV RBC AUTO: 92.8 FL (ref 80–100)
PDW BLD-RTO: 21.6 % (ref 12.4–15.4)
PLATELET # BLD: 526 K/UL (ref 135–450)
PMV BLD AUTO: 6.8 FL (ref 5–10.5)
POTASSIUM SERPL-SCNC: 4.5 MMOL/L (ref 3.5–5.1)
RBC # BLD: 4.1 M/UL (ref 4–5.2)
SODIUM BLD-SCNC: 136 MMOL/L (ref 136–145)
WBC # BLD: 9.2 K/UL (ref 4–11)

## 2022-11-22 PROCEDURE — 85027 COMPLETE CBC AUTOMATED: CPT

## 2022-11-22 PROCEDURE — 36415 COLL VENOUS BLD VENIPUNCTURE: CPT

## 2022-11-22 PROCEDURE — 80048 BASIC METABOLIC PNL TOTAL CA: CPT

## 2022-11-23 DIAGNOSIS — D75.839 THROMBOCYTOSIS: Primary | ICD-10-CM

## 2022-11-29 ENCOUNTER — ANESTHESIA (OUTPATIENT)
Dept: OPERATING ROOM | Age: 67
End: 2022-11-29
Payer: MEDICARE

## 2022-11-29 ENCOUNTER — ANESTHESIA EVENT (OUTPATIENT)
Dept: OPERATING ROOM | Age: 67
End: 2022-11-29
Payer: MEDICARE

## 2022-11-29 ENCOUNTER — HOSPITAL ENCOUNTER (OUTPATIENT)
Age: 67
Setting detail: OUTPATIENT SURGERY
Discharge: HOME | End: 2022-11-29
Attending: ORTHOPAEDIC SURGERY | Admitting: ORTHOPAEDIC SURGERY
Payer: MEDICARE

## 2022-11-29 VITALS
BODY MASS INDEX: 27.31 KG/M2 | TEMPERATURE: 98 F | OXYGEN SATURATION: 99 % | SYSTOLIC BLOOD PRESSURE: 125 MMHG | WEIGHT: 160 LBS | HEIGHT: 64 IN | HEART RATE: 65 BPM | DIASTOLIC BLOOD PRESSURE: 78 MMHG | RESPIRATION RATE: 20 BRPM

## 2022-11-29 PROCEDURE — 2580000003 HC RX 258: Performed by: ORTHOPAEDIC SURGERY

## 2022-11-29 PROCEDURE — 6360000002 HC RX W HCPCS: Performed by: NURSE ANESTHETIST, CERTIFIED REGISTERED

## 2022-11-29 PROCEDURE — 2500000003 HC RX 250 WO HCPCS: Performed by: NURSE ANESTHETIST, CERTIFIED REGISTERED

## 2022-11-29 PROCEDURE — 2580000003 HC RX 258: Performed by: ANESTHESIOLOGY

## 2022-11-29 PROCEDURE — 3600000013 HC SURGERY LEVEL 3 ADDTL 15MIN: Performed by: ORTHOPAEDIC SURGERY

## 2022-11-29 PROCEDURE — 3700000001 HC ADD 15 MINUTES (ANESTHESIA): Performed by: ORTHOPAEDIC SURGERY

## 2022-11-29 PROCEDURE — 3700000000 HC ANESTHESIA ATTENDED CARE: Performed by: ORTHOPAEDIC SURGERY

## 2022-11-29 PROCEDURE — 7100000011 HC PHASE II RECOVERY - ADDTL 15 MIN: Performed by: ORTHOPAEDIC SURGERY

## 2022-11-29 PROCEDURE — 2709999900 HC NON-CHARGEABLE SUPPLY: Performed by: ORTHOPAEDIC SURGERY

## 2022-11-29 PROCEDURE — A4217 STERILE WATER/SALINE, 500 ML: HCPCS | Performed by: ORTHOPAEDIC SURGERY

## 2022-11-29 PROCEDURE — 7100000010 HC PHASE II RECOVERY - FIRST 15 MIN: Performed by: ORTHOPAEDIC SURGERY

## 2022-11-29 PROCEDURE — 3600000003 HC SURGERY LEVEL 3 BASE: Performed by: ORTHOPAEDIC SURGERY

## 2022-11-29 PROCEDURE — 2500000003 HC RX 250 WO HCPCS: Performed by: ORTHOPAEDIC SURGERY

## 2022-11-29 RX ORDER — LIDOCAINE HYDROCHLORIDE 20 MG/ML
INJECTION, SOLUTION EPIDURAL; INFILTRATION; INTRACAUDAL; PERINEURAL PRN
Status: DISCONTINUED | OUTPATIENT
Start: 2022-11-29 | End: 2022-11-29 | Stop reason: SDUPTHER

## 2022-11-29 RX ORDER — DROPERIDOL 2.5 MG/ML
0.62 INJECTION, SOLUTION INTRAMUSCULAR; INTRAVENOUS
Status: CANCELLED | OUTPATIENT
Start: 2022-11-29 | End: 2022-11-30

## 2022-11-29 RX ORDER — SODIUM CHLORIDE 0.9 % (FLUSH) 0.9 %
5-40 SYRINGE (ML) INJECTION PRN
Status: DISCONTINUED | OUTPATIENT
Start: 2022-11-29 | End: 2022-11-29 | Stop reason: HOSPADM

## 2022-11-29 RX ORDER — SODIUM CHLORIDE 9 MG/ML
INJECTION, SOLUTION INTRAVENOUS PRN
Status: CANCELLED | OUTPATIENT
Start: 2022-11-29

## 2022-11-29 RX ORDER — PROPOFOL 10 MG/ML
INJECTION, EMULSION INTRAVENOUS PRN
Status: DISCONTINUED | OUTPATIENT
Start: 2022-11-29 | End: 2022-11-29 | Stop reason: SDUPTHER

## 2022-11-29 RX ORDER — IBUPROFEN 200 MG
400 TABLET ORAL ONCE
Status: CANCELLED | OUTPATIENT
Start: 2022-11-29 | End: 2022-11-29

## 2022-11-29 RX ORDER — ONDANSETRON 2 MG/ML
4 INJECTION INTRAMUSCULAR; INTRAVENOUS
Status: CANCELLED | OUTPATIENT
Start: 2022-11-29 | End: 2022-11-30

## 2022-11-29 RX ORDER — MAGNESIUM HYDROXIDE 1200 MG/15ML
LIQUID ORAL CONTINUOUS PRN
Status: COMPLETED | OUTPATIENT
Start: 2022-11-29 | End: 2022-11-29

## 2022-11-29 RX ORDER — SODIUM CHLORIDE 0.9 % (FLUSH) 0.9 %
5-40 SYRINGE (ML) INJECTION EVERY 12 HOURS SCHEDULED
Status: CANCELLED | OUTPATIENT
Start: 2022-11-29

## 2022-11-29 RX ORDER — SODIUM CHLORIDE 0.9 % (FLUSH) 0.9 %
5-40 SYRINGE (ML) INJECTION EVERY 12 HOURS SCHEDULED
Status: DISCONTINUED | OUTPATIENT
Start: 2022-11-29 | End: 2022-11-29 | Stop reason: HOSPADM

## 2022-11-29 RX ORDER — ACETAMINOPHEN 500 MG
1000 TABLET ORAL
Status: CANCELLED | OUTPATIENT
Start: 2022-11-29 | End: 2022-11-30

## 2022-11-29 RX ORDER — SODIUM CHLORIDE 0.9 % (FLUSH) 0.9 %
5-40 SYRINGE (ML) INJECTION PRN
Status: CANCELLED | OUTPATIENT
Start: 2022-11-29

## 2022-11-29 RX ORDER — SODIUM CHLORIDE 9 MG/ML
INJECTION, SOLUTION INTRAVENOUS PRN
Status: DISCONTINUED | OUTPATIENT
Start: 2022-11-29 | End: 2022-11-29 | Stop reason: HOSPADM

## 2022-11-29 RX ORDER — SODIUM CHLORIDE, SODIUM LACTATE, POTASSIUM CHLORIDE, CALCIUM CHLORIDE 600; 310; 30; 20 MG/100ML; MG/100ML; MG/100ML; MG/100ML
INJECTION, SOLUTION INTRAVENOUS CONTINUOUS
Status: DISCONTINUED | OUTPATIENT
Start: 2022-11-29 | End: 2022-11-29 | Stop reason: HOSPADM

## 2022-11-29 RX ADMIN — PROPOFOL 20 MG: 10 INJECTION, EMULSION INTRAVENOUS at 08:45

## 2022-11-29 RX ADMIN — PROPOFOL 80 MG: 10 INJECTION, EMULSION INTRAVENOUS at 08:41

## 2022-11-29 RX ADMIN — PROPOFOL 20 MG: 10 INJECTION, EMULSION INTRAVENOUS at 08:47

## 2022-11-29 RX ADMIN — LIDOCAINE HYDROCHLORIDE 3 ML: 20 INJECTION, SOLUTION EPIDURAL; INFILTRATION; INTRACAUDAL; PERINEURAL at 08:41

## 2022-11-29 RX ADMIN — SODIUM CHLORIDE, SODIUM LACTATE, POTASSIUM CHLORIDE, AND CALCIUM CHLORIDE: .6; .31; .03; .02 INJECTION, SOLUTION INTRAVENOUS at 07:13

## 2022-11-29 RX ADMIN — PROPOFOL 20 MG: 10 INJECTION, EMULSION INTRAVENOUS at 08:43

## 2022-11-29 NOTE — OP NOTE
OPERATIVE REPORT          Patient:  Sury Bernabe    YOB: 1955  Date of Service:  11/29/2022   [facility] [address] [place] [address]    Preoperative Diagnosis:    Left carpal tunnel syndrome    Postoperative Diagnosis:    Same    Procedure:    Left carpal tunnel release      Surgeon:    Filipe Patient. Will Siegel MD    Surgical Assistant:    POOL Suero Assistant    Anesthesia:   Local with Sedation    Blood Loss:   Minimal    Complications:  None    Tourniquet Time: 2 minutes     Indications:  Ms. Sury Bernabe  is a 79y.o. year-old female with Left carpal tunnel syndrome. I have discussed preoperatively with her  the complications, limitations, expectations, alternatives and risks of surgical care, which she has understood. All of her questions have been fully answered, and she has provided written informed consent to proceed. Procedure:   After written consent was obtained and the proper operative site was identified and marked, Ms. Sury Bernabe was brought to the operating room, placed in the supine position on the operating room table with the Left arm extended upon a hand table. Under an appropriate level of sedation, local anesthetic (1% Lidocaine and 1/2% Marcaine both without Epinephrine) was instilled in the planned surgical field. Her Left upper extremity was prepped and draped in the usual sterile fashion. After Esmarch exsanguination, the pneumotourniquet was inflated to 250 mm of mercury. A 2 cm longitudinal incision was fashioned at the base of the palm, paralleling the longitudinal thenar crease. Dissection was carried carefully through the subcutaneous tissue identifying and protecting the neurovascular structures. The palmar fascia was incised longitudinally, exposing the transverse carpal ligament. The transverse carpal ligament was incised from its proximal to distal most extent, under direct visualization.  The terminal 2 cm of antebrachial fascia was similarly incised under direct visualization. The contents of the carpal tunnel were inspected and found to be free of mass, lesion or other abnormality. Digital palpation revealed no further constriction about the median nerve. The wound was irrigated copiously with sterile saline for irrigation and the pneumotourniquet was deflated after a period of 2 minutes elevation. The fingers were immediately pink & well perfused. Hemostasis was easily obtained with direct pressure and electrocautery and the wound was closed with interrupted sutures. The wound was dressed with adaptic, dry sterile dressings and a bulky soft hand & wrist dressing was applied. Ms. Nyle Severs  was awakened from light sedation, having tolerated the procedure without apparent complication. She  was returned to the recovery room in stable condition. At the conclusion of the procedure all needle, instrument, and sponge counts were correct. Adra Shear Jinger Cockayne, MD   11/29/2022, 8:37 AM

## 2022-11-29 NOTE — H&P
Pre-operative Update of H&P:    I  have seen & examined Ms. Ro Terri related solely to her hand and upper extremity conditions, prior to the scheduled procedure on the date of her surgery. The indications for the planned surgical procedure & and her upper-extremity condition are unchanged.

## 2022-11-29 NOTE — ANESTHESIA POSTPROCEDURE EVALUATION
Department of Anesthesiology  Postprocedure Note    Patient: Scotty Lopez  MRN: [id number]  YOB: 1955  Date of evaluation: 11/29/2022      Procedure Summary     Date: 11/29/22 Room / Location: Italo Schwartz 01 / [facility]    Anesthesia Start: 2153 Anesthesia Stop: Tiffany Sarmiento    Procedure: LEFT CARPAL TUNNEL RELEASE (Left: Wrist) Diagnosis:       Carpal tunnel syndrome, left      (Carpal tunnel syndrome, left [G56.02])    Surgeons: Donna Beckett MD Responsible Provider: Samuel Hwang MD    Anesthesia Type: MAC ASA Status: 3          Anesthesia Type: No value filed.     Nav Phase I: Nav Score: 10    Nav Phase II: Nav Score: 8      Anesthesia Post Evaluation    Patient location during evaluation: PACU  Patient participation: complete - patient participated  Level of consciousness: awake and alert  Airway patency: patent  Nausea & Vomiting: no nausea and no vomiting  Complications: no  Cardiovascular status: hemodynamically stable  Respiratory status: acceptable, room air and spontaneous ventilation  Hydration status: stable  Comments: -------------------------              11/29/22                    0915        -------------------------   BP:         125/78        Pulse:        65          Resp:         20          Temp:   98 °F (36.7 °C)   SpO2:         99%        -------------------------

## 2022-11-29 NOTE — ANESTHESIA PRE PROCEDURE
Department of Anesthesiology  Preprocedure Note       Name:  Humphrey Huffman   Age:  79 y.o.  :  1955                                          MRN:  [id number]         Date:  2022      Surgeon: Renetta Kimbrough):  Dejon Shi MD    Procedure: Procedure(s):  LEFT CARPAL TUNNEL RELEASE    Medications prior to admission:   Prior to Admission medications    Medication Sig Start Date End Date Taking? Authorizing Provider   atorvastatin (LIPITOR) 40 MG tablet Take 1 tablet by mouth nightly TAKE 1 TABLET BY MOUTH EVERY DAY 10/11/22   Emile Ahumada, MD   spironolactone (ALDACTONE) 50 MG tablet Take 1 tablet by mouth once daily 10/11/22   Emile Ahumada, MD   mirtazapine (REMERON) 15 MG tablet TAKE 1 TABLET BY MOUTH ONCE DAILY AT NIGHT 22   Mili Robles MD   predniSONE (DELTASONE) 5 MG tablet Take 5 mg by mouth every other day    Historical Provider, MD   HYDROcodone-acetaminophen (NORCO) 5-325 MG per tablet TAKE 1 TABLET BY MOUTH EVERY 6 HOURS AS NEEDED FOR PAIN 22   Historical Provider, MD   promethazine-dextromethorphan (PROMETHAZINE-DM) 6.25-15 MG/5ML syrup Take 5 mLs by mouth every 4 hours as needed 22   Historical Provider, MD   albuterol sulfate HFA (PROVENTIL;VENTOLIN;PROAIR) 108 (90 Base) MCG/ACT inhaler 2 puffs q 4 prn 22   Lynn Fulton MD   fluticasone-salmeterol (ADVAIR DISKUS) 250-50 MCG/ACT AEPB diskus inhaler Inhale 1 puff into the lungs in the morning and 1 puff in the evening. 22   Milo Perry MD   modafinil (PROVIGIL) 100 MG tablet Take 1 tablet by mouth daily for 182 doses.  9/29/22 3/30/23  Lynn Fulton MD   valACYclovir (VALTREX) 500 MG tablet Take 1 tablet by mouth once daily  Patient taking differently: Take 500 mg by mouth daily Take 1 tablet by mouth once daily 22   Emile Ahumada, MD   sertraline (ZOLOFT) 100 MG tablet TAKE 1 & 1/2 (ONE & ONE-HALF) TABLETS BY MOUTH ONCE DAILY 22   Court Morrissey, APRN - CNP   propranolol (INDERAL LA) 60 MG Provider Last Rate Last Admin    lactated ringers infusion   IntraVENous Continuous Valentina Jacobsen  mL/hr at 11/29/22 0713 New Bag at 11/29/22 0713    sodium chloride flush 0.9 % injection 5-40 mL  5-40 mL IntraVENous 2 times per day Valentina Jacobsen MD        sodium chloride flush 0.9 % injection 5-40 mL  5-40 mL IntraVENous PRN Valentina Jacobsen MD        0.9 % sodium chloride infusion   IntraVENous PRN Valentina Jacobsen MD           Allergies:     Allergies   Allergen Reactions    Infliximab      Severe drop in blood pressure    Ultrasound Gel Other (See Comments)     burn    Codeine Nausea And Vomiting    Penicillins Rash       Problem List:    Patient Active Problem List   Diagnosis Code    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, 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    Essential hypertension I10    Diverticulosis K57.90    Nausea & vomiting R11.2    S/P splenectomy Z90.81    Osteopenia M85.80    Sarcoidosis D86.9    Vitamin D deficiency E55.9    Impaired fasting glucose R73.01    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    Insomnia G47.00    Hypercholesterolemia E78.00    Disturbance of skin sensation R20.9    Acute, but ill-defined, cerebrovascular disease I67.89    Sarcoid D86.9    Cerebral vasculitis I67.7    Lesion of right ulnar nerve G56.21    Major depressive disorder, recurrent episode, moderate (HCC) F33.1    Pain medication agreement signed Z02.89    Trochanteric bursitis of left hip M70.62    Ankle instability M25.373    GI bleed K92.2    Colitis, acute K52.9    Anxiety F41.9    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    Urinary frequency R35.0    Right upper quadrant pain R10.11    Epigastric pain R10.13    Esophageal dysphagia R13.19    Colon, diverticulosis K57.30    Giant cell arteritis (HCC) M31.6    Lower abdominal pain R10.30    Right upper quadrant abdominal pain R10.11    Colonic stricture (HCC) K56.699    Right sided abdominal pain R10.9    Irritable bowel syndrome with constipation K58.1    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 breast of female, estrogen receptor positive ([facility] [address] C50.311, Z17.0    S/P laparoscopic-assisted sigmoidectomy Z90.49    Postoperative pain G89.18       Past Medical History:        Diagnosis Date    Asthma     CAD (coronary artery disease)     Cancer (HCC)     RIGHT BREAST    Chronic diarrhea     Dr. Ferdie Halsted Chronic kidney disease     kidney stones    Clostridium difficile diarrhea 10/23/13    positive by PCR    Fibromyalgia     Hemorrhoid     Hyperlipidemia     Hypertension     Kidney stone     Migraines     Osteoarthritis     fibromyalgia    Sarcoidosis 2003    sees Dr. Qureshi Service       Past Surgical History:        Procedure Laterality Date    ABDOMINAL EXPLORATION SURGERY  08/19/2012    REDUCTION OF VULVUS; RIGHT COLECTOMY; SMALL BOWEL RESECTION; INSERTION OF ON Q PAIN BUSTER    BREAST BIOPSY      CARDIAC CATHETERIZATION  2010    CLEAN    COLONOSCOPY  2010    normal    COLONOSCOPY N/A 06/16/2020    COLON W/ANES.  performed by Sundar Cutler MD at [address] [facility]  12/2011    CYSTOSCOPY N/A 04/27/2022    CYSTOSCOPY, BILATERAL RETROGRADE PYELOGRAM performed by Kirt Hwang MD at [address] [facility]  06/16/2020    ESOPHAGEAL DILATION [place] Angles performed by Sundar Cutler MD at [address] [facility] Bilateral 05/2009    cataract    HYSTERECTOMY (CERVIX STATUS UNKNOWN)  2000    LITHOTRIPSY  01/19/2012    LITHOTRIPSY      04/2012    MASTECTOMY Bilateral 05/18/2021    BILATERAL SIMPLE MASTECTOMY, RIGHT SENTINEL LYMPH NODE BIOPSY performed by Mckenzie Silvestre MD at [address] [place] [address]    right    PARTIAL HYSTERECTOMY (CERVIX NOT REMOVED)      SIGMOID COLECTOMY Left 04/27/2022    ROBOTIC, SIGMOIDCOLECTOMY WITH REVISION OF COLORECTAL ANASTOMOSIS performed by Kirt Hwang MD at [address] UPPER GASTROINTESTINAL ENDOSCOPY  02/27/2013    UPPER GASTROINTESTINAL ENDOSCOPY  11/14/2017    gastritis    UPPER GASTROINTESTINAL ENDOSCOPY N/A 06/16/2020    EGD W/ANES. (11:00) performed by Maico Xavier MD at [place] [address]  12/2011    US BREAST NEEDLE BIOPSY RIGHT Right 04/15/2021     BREAST NEEDLE BIOPSY RIGHT 4/15/2021 Stephan Weeks MD [facility]       Social History:    Social History     Tobacco Use    Smoking status: Every Day     Packs/day: 0.25     Years: 20.00     Pack years: 5.00     Types: Cigarettes     Start date: 5/1/2022     Passive exposure: Never    Smokeless tobacco: Never    Tobacco comments:     Started smoking again in May (had quit smoking for approx 2 yrs)   Substance Use Topics    Alcohol use: No     Alcohol/week: 0.0 standard drinks                                Ready to quit: Not Answered  Counseling given: Not Answered  Tobacco comments: Started smoking again in May (had quit smoking for approx 2 yrs)      Vital Signs (Current):   Vitals:    11/18/22 1500 11/29/22 0658   BP:  99/72   Pulse:  68   Resp:  16   Temp:  96.8 °F (36 °C)   SpO2:  93%   Weight: 160 lb (72.6 kg)    Height: 5' 4\" (1.626 m)                                               BP Readings from Last 3 Encounters:   11/29/22 99/72   11/21/22 128/80   10/11/22 108/80       NPO Status: Time of last liquid consumption: 2355                        Time of last solid consumption: 2000                        Date of last liquid consumption: 11/28/22                        Date of last solid food consumption: 11/28/22    BMI:   Wt Readings from Last 3 Encounters:   11/18/22 160 lb (72.6 kg)   11/21/22 167 lb (75.8 kg)   10/19/22 159 lb (72.1 kg)     Body mass index is 27.46 kg/m².     CBC:   Lab Results   Component Value Date/Time    WBC 9.2 11/22/2022 01:44 PM    RBC 4.10 11/22/2022 01:44 PM    HGB 12.4 11/22/2022 01:44 PM    HCT 38.1 11/22/2022 01:44 PM    MCV 92.8 11/22/2022 01:44 PM    RDW 21.6 11/22/2022 01:44 PM     11/22/2022 01:44 PM       CMP:   Lab Results   Component Value Date/Time     11/22/2022 01:44 PM    K 4.5 11/22/2022 01:44 PM    K 3.7 05/20/2022 02:49 PM     11/22/2022 01:44 PM    CO2 24 11/22/2022 01:44 PM    BUN 10 11/22/2022 01:44 PM    CREATININE 0.9 11/22/2022 01:44 PM    GFRAA >60 05/20/2022 02:49 PM    GFRAA >60 02/12/2013 09:54 AM    AGRATIO 0.9 05/20/2022 02:49 PM    LABGLOM >60 11/22/2022 01:44 PM    GLUCOSE 124 11/22/2022 01:44 PM    GLUCOSE 116 01/07/2021 12:00 AM    PROT 7.4 10/11/2022 02:03 PM    PROT 7.6 01/07/2021 12:00 AM    CALCIUM 9.8 11/22/2022 01:44 PM    BILITOT 0.4 10/11/2022 02:03 PM    ALKPHOS 115 10/11/2022 02:03 PM    AST 14 10/11/2022 02:03 PM    ALT 12 10/11/2022 02:03 PM       POC Tests: No results for input(s): POCGLU, POCNA, POCK, POCCL, POCBUN, POCHEMO, POCHCT in the last 72 hours.     Coags:   Lab Results   Component Value Date/Time    PROTIME 11.9 05/20/2022 02:07 PM    INR 1.05 05/20/2022 02:07 PM    APTT 30.0 11/14/2017 05:14 AM       HCG (If Applicable): No results found for: PREGTESTUR, PREGSERUM, HCG, HCGQUANT     ABGs: No results found for: PHART, PO2ART, AXY2ZOD, QRP9EZQ, BEART, C3OMVEQQ     Type & Screen (If Applicable):  No results found for: LABABO, LABRH    Drug/Infectious Status (If Applicable):  No results found for: HIV, HEPCAB    COVID-19 Screening (If Applicable):   Lab Results   Component Value Date/Time    COVID19 Not Detected 05/20/2022 02:10 PM    COVID19 Not Detected 05/13/2021 11:43 AM           Anesthesia Evaluation  Patient summary reviewed no history of anesthetic complications:   Airway: Mallampati: II  TM distance: >3 FB   Neck ROM: full  Mouth opening: > = 3 FB   Dental: normal exam         Pulmonary:normal exam  breath sounds clear to auscultation  (+) asthma:     (-) recent URI                           Cardiovascular:    (+) hypertension:, CAD:,         Rhythm: regular  Rate: normal                    Neuro/Psych:   (+) headaches:, depression/anxiety             GI/Hepatic/Renal:   (+) renal disease: CRI,           Endo/Other:    (+) : arthritis:., .                 Abdominal:             Vascular: Other Findings:           Anesthesia Plan      MAC     ASA 3     (Mac vs general as needed)  Induction: intravenous. Anesthetic plan and risks discussed with patient. Plan discussed with CRNA.     Attending anesthesiologist reviewed and agrees with Preprocedure content                Yeni Pratt MD   11/29/2022

## 2022-11-29 NOTE — DISCHARGE INSTRUCTIONS
Post-Operative Instructions    Carpal Tunnel Release:    Keep hand elevated with fingers above eye-level to control swelling. Keep hand and bandage clean and dry. Do not change or unwrap bandage. Please leave bandage in place until your follow-up appointment. Maintain finger motion by fully straightening and fully bending fingers and thumb at least once an hour (while awake). This may cause some discomfort, but will not damage surgery. You may use your operated hand for lightweight tasks (e.g. writing, eating, dressing, etc.). NO LIFTING, CARRYING OR HEAVY USE. Most Patients do not have \"Serious Pain\" after this procedure and thus most patients do not require prescription pain medication. You may take over the counter medication (Tylenol, Advil, Aleve, etc.) as needed. If you are unable to tolerate the discomfort after your surgery and the OTC medications do not provide some relief, you may contact our office to discuss other options. .    Please call the office at [phone]  in 24 - 48 hours to schedule a follow up appointment for approximately 7 - 10 days after surgery. Please call the office at [phone]  if you have any questions or problems. Charlotte Velasco MD    ANESTHESIA DISCHARGE INSTRUCTIONS    You are under the influence of drugs- do not drink alcohol, drive a car, operate machinery(such as power tools, kitchen appliances, etc), sign legal documents, or make any important decisions for 24 hours (or while on pain medications). Children should not ride bikes or [place] or play on gym sets  for 24 hours after surgery. A responsible adult should be with you for 24 hours. Rest at home today- increase activity as tolerated. Progress slowly to a regular diet unless your physician has instructed you otherwise. Drink plenty of water. CALL YOUR DOCTOR IF YOU:  Have moderate to severe nausea or vomiting AND are unable to hold down fluids or prescribed medications.   Have bright red bloody drainage from your dressing that won't stop oozing. Do not get relief with your pain medication    NORMAL (POSSIBLE) SIDE EFFECTS FROM ANESTHESIA:     Confusion, temporary memory loss, delayed reaction times in the first 24 hours  Lightheadedness, dizziness, difficulty focusing, blurred vision  Nausea/vomiting can happen  Shivering, feeling cold, sore throat, cough and muscle aches should stop within 24-48 hours  Trouble urinating - call your surgeon if it has been more than 8 hrs  Bruising or soreness at the IV site - call if it remains red, firm or there is drainage             FEMALES OF CHILDBEARING AGE WHO ARE TAKING BIRTH CONTROL PILLS:  You may have received a medication during your procedure that interferes with the   actions of birth control pills (Bridion or Emend). Use some other kind of birth control in addition to your pills, like a condom, for 1 month after your procedure to prevent unwanted pregnancy. The following instructions are to be followed if you have a known history or diagnosis of sleep apnea: For all sleep apnea patients:  ? Sleep on your side or sitting up in a chair whenever possible, especially the first 24 hours after surgery. ? Use only medicines prescribed by your doctor. ? Do not drink alcohol. ? If you have a dental device to assist you while at rest, use it at all times for the first 24 hours. For patients using CPAP machines:  ? Use your CPAP machine during all periods of sleep as usual.  ? Use your CPAP machine during all periods of daytime rest while on pain medicines. ** Follow up with your primary care doctor for continued care. IF YOU DO NOT TAKE ALL OF YOUR NARCOTIC PAIN MEDICATION, please dispose of them responsibly. There are drop off boxes in the Emergency Departments 24/7 at both [facility] and [facility]. If these locations are not convenient, other options for discarding them can be found at:  [email or web address]    Hospital or office staff may NOT accept any medications to drop off in the cabinet for you. What is a Surgical Site Infection or  (SSI)? A surgical site infection (SSI) is an infection that occurs after surgery in the part of the body where the surgery took place. Most patients who have surgery do not develop an infection. However, infections can develop in about 1-3 cases for every 100 patients who have had surgery. Our goal is for you to NOT experience any complications and be completely satisfied with your care! However, some signs or symptoms to look for and report immediately to your doctor are:   1. Fever above 101 degrees    2. Redness and increasing pain around the area  where you had surgery   3. Drainage of cloudy fluid or pus coming from the surgical area    Some of the things we/ you can do to prevent SSI's are:   1. Clean hands with soap and water or an alcohol-based hand rub before and after caring for the operative area. This occurs the day of surgery and for the next 2 weeks. 2.Sometimes you receive an appropriate antibiotic within 60 minutes before your surgery or take one for several days after surgery depending on your surgeon's instructions and/or the type of surgery you are having. 3. Family and/or friends who visit you should NOT touch the surgical wound or dressings until advised by your surgeon. 4. Be sure to elevate and decrease the swelling after your surgery to help prevent infection. 5. If you are a diabetic, you need to closely monitor your blood sugar levels and report any significant increases or changes to your surgeon to help promote the healing process.

## 2022-12-01 DIAGNOSIS — F33.1 MAJOR DEPRESSIVE DISORDER, RECURRENT EPISODE, MODERATE (HCC): ICD-10-CM

## 2022-12-01 RX ORDER — SERTRALINE HYDROCHLORIDE 100 MG/1
TABLET, FILM COATED ORAL
Qty: 135 TABLET | Refills: 0 | Status: SHIPPED | OUTPATIENT
Start: 2022-12-01

## 2022-12-01 NOTE — TELEPHONE ENCOUNTER
Refill Request     CONFIRM preferrred pharmacy with the patient. If Mail Order Rx - Pend for 90 day refill. Last Seen: Last Seen Department: 11/21/2022  Last Seen by PCP: Visit date not found    Last Written: 09/01/2022 135 tablet 0 refills     If no future appointment scheduled, route STAFF MESSAGE with patient name to the [facility] for scheduling. Next Appointment:   Future Appointments   Date Time Provider Annmarie Mclean   12/7/2022  1:00 PM Andrew Upton RN AND [facility]THO [facility]   1/9/2023  3:30 PM Hunter Kyle MD [facility] [place] - [facility]   2/14/2023  1:45 PM Mickey Curling, MD [facility]       Message sent to [address] [place] [address] to schedule appt with patient?   NO      Requested Prescriptions     Pending Prescriptions Disp Refills    sertraline (ZOLOFT) 100 MG tablet [Pharmacy Med Name: Sertraline HCl 100 MG Oral Tablet] 135 tablet 0     Sig: TAKE 1 & 1/2 (ONE & ONE-HALF) TABLETS BY MOUTH ONCE DAILY

## 2022-12-05 PROBLEM — G56.00 CARPAL TUNNEL SYNDROME: Status: ACTIVE | Noted: 2022-12-05

## 2022-12-07 ENCOUNTER — OFFICE VISIT (OUTPATIENT)
Dept: ORTHOPEDIC SURGERY | Age: 67
End: 2022-12-07

## 2022-12-07 VITALS — WEIGHT: 167 LBS | BODY MASS INDEX: 28.51 KG/M2 | RESPIRATION RATE: 16 BRPM | HEIGHT: 64 IN

## 2022-12-07 DIAGNOSIS — G56.00 CARPAL TUNNEL SYNDROME, UNSPECIFIED LATERALITY: Primary | ICD-10-CM

## 2022-12-07 PROCEDURE — 99024 POSTOP FOLLOW-UP VISIT: CPT | Performed by: ORTHOPAEDIC SURGERY

## 2022-12-07 NOTE — PATIENT INSTRUCTIONS
Postoperative Instructions After Carpal Tunnel Release    Dr. Oly Celestin        After bandages are removed one week from surgery, you may chose to wear a small bandage over the incision if you wish, though you do not need to. Keep incision dry until sutures have fully dissolved  or it has been 12 - 14 days since your surgery. Thereafter, you may wash with mild soap and water and shower normally. Work hard on motion of the fingers and wrist, straightening each finger fully and bending each finger fully, bending wrist forward and bending wrist backwards. Do not be concerned if you experience discomfort. This will not damage the surgery. You may begin using the hand as it feels comfortable beginning 12 - 14 days from the day of surgery. You may not feel entirely comfortable gripping or lifting heavy objects for several weeks. You may expect to see some skin peel off around the incision. You may be left with a small area of pink baby skin. This is quite normal.  6. Once your stiches have fully disappeared & skin appears normal, you should begin gently massaging the incision with Vitamin E (may use Vitamin E lotion or contents of Vitamin E capsule). Thank you for choosing [facility]) Physicians for your Hand and Upper Extremity needs. If we can be of any further assistance to you, please do not hesitate to contact us.     Office Phone Number:  [phone]  or  [phone]

## 2022-12-07 NOTE — PROGRESS NOTES
Ms. Marrie Osgood returns today in follow-up of her recent left Carpal Tunnel Release done approximately 1 week ago. She has done well noting mild discomfort and no other reported complications. She notes pre-operative symptoms to be significantly improved at this time. Physical Exam:  Bandage intact and well cared for  Skin incision is healing well, without erythema, drainage or sign of infection. Digital range of motion is without significant limitation. Wrist range of motion is without significant limitation. Sensation is improved from preoperatvely  Vascular examination reveals normal, good capillary refill, and good color. Swelling is minimal.  Sensory and Motor Median Nerve function is intact. Impression:  Ms. Marrie Osgood is doing well after recent left Carpal Tunnel Release. Plan:  Ms. Marrie Osgood is instructed in work on Active & Passive range of motion of the digits, wrist, & elbow. These modalities were specifically demonstrated to her today. We discussed the appropriateness of gradual resumption of use of the operated hand and the return to normal use as comfort allows. She is given instructions regarding management of the fresh surgical incision and progressive use of desensitization and tissue massage techniques. We discussed the appropriate expectations and timeline for symptom improvement. She is provided a written patient instruction sheet titled: Postoperative Instructions After Carpal Tunnel Release. I have asked Ms. Marrie Osgood to follow-up with me or contact me by telephone over the next 2-4 weeks if her symptoms have not fully resolved or if she has not regained full & painless return of function. She is also specifically instructed to return to the office or call for an appointment sooner if her symptoms are changing or worsening prior to that time.

## 2022-12-21 ENCOUNTER — TELEPHONE (OUTPATIENT)
Dept: PULMONOLOGY | Age: 67
End: 2022-12-21

## 2022-12-21 NOTE — TELEPHONE ENCOUNTER
Patient cancelled appointment on 2/14/23 with Dr. Shannon Preciado for 6 month sarcoid. Reason: patients  has to have surgery that day    Patient did reschedule appointment. Appointment rescheduled for 3/14/23.      Last OV 9/29/22  IMPRESSION:    1-systemic sarcoid   2-breast cancer   3-optic neuronitis                 PLAN:      -will get PFT   -HRCT scan   -will continue MTX at this time ,if other medicine needed then will discuss with Rheumatologist and her eye doctor  - I spent 4-6 minutes counseling patient regarding smoking and the risk of Lung cancer and COPD and respiratory failure   -nicotine lozyn     Flu and Pneumovax as per primary

## 2022-12-29 DIAGNOSIS — R10.13 EPIGASTRIC PAIN: ICD-10-CM

## 2022-12-29 RX ORDER — VALACYCLOVIR HYDROCHLORIDE 500 MG/1
TABLET, FILM COATED ORAL
Qty: 90 TABLET | Refills: 0 | Status: SHIPPED | OUTPATIENT
Start: 2022-12-29

## 2022-12-29 RX ORDER — PANTOPRAZOLE SODIUM 40 MG/1
TABLET, DELAYED RELEASE ORAL
Qty: 180 TABLET | Refills: 0 | Status: SHIPPED | OUTPATIENT
Start: 2022-12-29

## 2022-12-29 NOTE — TELEPHONE ENCOUNTER
Refill Request     CONFIRM preferrred pharmacy with the patient. If Mail Order Rx - Pend for 90 day refill. Last Seen: Last Seen Department: 11/21/2022  Last Seen by PCP: Visit date not found    Last Written: 06/28/2022 180 tablet 1 refills     If no future appointment scheduled, route STAFF MESSAGE with patient name to the [facility] for scheduling. Next Appointment:   Future Appointments   Date Time Provider Annmarie Mclean   1/9/2023  3:30 PM [facility] [place] [facility]   3/14/2023  2:00 PM MD Jah Tucker [facility]       Message sent to [address] [place] [address] to schedule appt with patient?   NO      Requested Prescriptions     Pending Prescriptions Disp Refills    pantoprazole (PROTONIX) 40 MG tablet [Pharmacy Med Name: Pantoprazole Sodium 40 MG Oral Tablet Delayed Release] 180 tablet 0     Sig: TAKE 1 TABLET BY MOUTH TWICE DAILY BEFORE MEAL(S)

## 2022-12-29 NOTE — TELEPHONE ENCOUNTER
Refill Request     CONFIRM preferrred pharmacy with the patient. If Mail Order Rx - Pend for 90 day refill. Last Seen: Last Seen Department: 11/21/2022  Last Seen by PCP: 10/11/2022    Last Written: 09/09/2022 90 tablet 0 refills     If no future appointment scheduled, route STAFF MESSAGE with patient name to the [facility] for scheduling. Next Appointment:   Future Appointments   Date Time Provider Annmarie Mclean   1/9/2023  3:30 PM Jaciel Garcia MD [facility] [place] [facility]   3/14/2023  2:00 PM Collette Heath, MD [facility]       Message sent to [address] [place] [address] to schedule appt with patient?   NO      Requested Prescriptions     Pending Prescriptions Disp Refills    valACYclovir (VALTREX) 500 MG tablet [Pharmacy Med Name: valACYclovir HCl 500 MG Oral Tablet] 90 tablet 0     Sig: Take 1 tablet by mouth once daily

## 2022-12-30 RX ORDER — MONTELUKAST SODIUM 10 MG/1
TABLET ORAL
Qty: 90 TABLET | Refills: 0 | Status: SHIPPED | OUTPATIENT
Start: 2022-12-30

## 2022-12-30 NOTE — TELEPHONE ENCOUNTER
Refill Request     CONFIRM preferrred pharmacy with the patient. If Mail Order Rx - Pend for 90 day refill. Last Seen: Last Seen Department: 11/21/2022  Last Seen by PCP: Visit date not found    Last Written: 3/31/2022, #90, 0 refills    If no future appointment scheduled, route STAFF MESSAGE with patient name to the [facility] for scheduling. Next Appointment:   Future Appointments   Date Time Provider Annmarie Mclean   1/9/2023  3:30 PM Fabi Rodriguez MD [facility] [place] [facility]   3/14/2023  2:00 PM MD Melissa Wetzel [facility]       Message sent to [address] [place] [address] to schedule appt with patient?   NO      Requested Prescriptions     Pending Prescriptions Disp Refills    montelukast (SINGULAIR) 10 MG tablet [Pharmacy Med Name: Montelukast Sodium 10 MG Oral Tablet] 90 tablet 0     Sig: TAKE 1 TABLET BY MOUTH ONCE DAILY NIGHTLY

## 2023-01-09 ENCOUNTER — OFFICE VISIT (OUTPATIENT)
Dept: FAMILY MEDICINE CLINIC | Age: 68
End: 2023-01-09
Payer: MEDICARE

## 2023-01-09 VITALS
SYSTOLIC BLOOD PRESSURE: 118 MMHG | OXYGEN SATURATION: 93 % | WEIGHT: 166.2 LBS | DIASTOLIC BLOOD PRESSURE: 72 MMHG | HEART RATE: 86 BPM | BODY MASS INDEX: 28.38 KG/M2 | HEIGHT: 64 IN | TEMPERATURE: 97.5 F

## 2023-01-09 DIAGNOSIS — Z23 NEED FOR INFLUENZA VACCINATION: ICD-10-CM

## 2023-01-09 DIAGNOSIS — Z00.00 MEDICARE ANNUAL WELLNESS VISIT, SUBSEQUENT: Primary | ICD-10-CM

## 2023-01-09 PROCEDURE — 3078F DIAST BP <80 MM HG: CPT | Performed by: INTERNAL MEDICINE

## 2023-01-09 PROCEDURE — G0008 ADMIN INFLUENZA VIRUS VAC: HCPCS | Performed by: INTERNAL MEDICINE

## 2023-01-09 PROCEDURE — G8484 FLU IMMUNIZE NO ADMIN: HCPCS | Performed by: INTERNAL MEDICINE

## 2023-01-09 PROCEDURE — 3074F SYST BP LT 130 MM HG: CPT | Performed by: INTERNAL MEDICINE

## 2023-01-09 PROCEDURE — 1123F ACP DISCUSS/DSCN MKR DOCD: CPT | Performed by: INTERNAL MEDICINE

## 2023-01-09 PROCEDURE — G0439 PPPS, SUBSEQ VISIT: HCPCS | Performed by: INTERNAL MEDICINE

## 2023-01-09 PROCEDURE — 3017F COLORECTAL CA SCREEN DOC REV: CPT | Performed by: INTERNAL MEDICINE

## 2023-01-09 PROCEDURE — 90694 VACC AIIV4 NO PRSRV 0.5ML IM: CPT | Performed by: INTERNAL MEDICINE

## 2023-01-09 ASSESSMENT — PATIENT HEALTH QUESTIONNAIRE - PHQ9
9. THOUGHTS THAT YOU WOULD BE BETTER OFF DEAD, OR OF HURTING YOURSELF: 0
SUM OF ALL RESPONSES TO PHQ QUESTIONS 1-9: 0
7. TROUBLE CONCENTRATING ON THINGS, SUCH AS READING THE NEWSPAPER OR WATCHING TELEVISION: 0
10. IF YOU CHECKED OFF ANY PROBLEMS, HOW DIFFICULT HAVE THESE PROBLEMS MADE IT FOR YOU TO DO YOUR WORK, TAKE CARE OF THINGS AT HOME, OR GET ALONG WITH OTHER PEOPLE: 0
5. POOR APPETITE OR OVEREATING: 0
1. LITTLE INTEREST OR PLEASURE IN DOING THINGS: 0
SUM OF ALL RESPONSES TO PHQ9 QUESTIONS 1 & 2: 0
SUM OF ALL RESPONSES TO PHQ QUESTIONS 1-9: 0
2. FEELING DOWN, DEPRESSED OR HOPELESS: 0
SUM OF ALL RESPONSES TO PHQ QUESTIONS 1-9: 0
4. FEELING TIRED OR HAVING LITTLE ENERGY: 0
8. MOVING OR SPEAKING SO SLOWLY THAT OTHER PEOPLE COULD HAVE NOTICED. OR THE OPPOSITE, BEING SO FIGETY OR RESTLESS THAT YOU HAVE BEEN MOVING AROUND A LOT MORE THAN USUAL: 0
SUM OF ALL RESPONSES TO PHQ QUESTIONS 1-9: 0
3. TROUBLE FALLING OR STAYING ASLEEP: 0
6. FEELING BAD ABOUT YOURSELF - OR THAT YOU ARE A FAILURE OR HAVE LET YOURSELF OR YOUR FAMILY DOWN: 0

## 2023-01-09 ASSESSMENT — LIFESTYLE VARIABLES
HOW OFTEN DO YOU HAVE A DRINK CONTAINING ALCOHOL: MONTHLY OR LESS
HOW MANY STANDARD DRINKS CONTAINING ALCOHOL DO YOU HAVE ON A TYPICAL DAY: 1 OR 2

## 2023-01-09 NOTE — PROGRESS NOTES
Medicare Annual Wellness Visit    Lopez Salas is here for Medicare AWV    Assessment & Plan   Need for influenza vaccination  Medicare annual wellness visit, subsequent      Recommendations for Preventive Services Due: see orders and patient instructions/AVS.  Recommended screening schedule for the next 5-10 years is provided to the patient in written form: see Patient Instructions/AVS.     No follow-ups on file. Subjective       Patient's complete Health Risk Assessment and screening values have been reviewed and are found in Flowsheets. The following problems were reviewed today and where indicated follow up appointments were made and/or referrals ordered. Positive Risk Factor Screenings with Interventions:    Fall Risk:  Do you feel unsteady or are you worried about falling? : (!) yes  2 or more falls in past year?: (!) yes  Fall with injury in past year?: (!) yes     Interventions:    Patient declines any further evaluation or treatment             Opioid Risk: (Low risk score <55) Opioid risk score: 41    Patient is low risk for opioid use disorder or overdose. Last PDMP Aneta Pierre as Reviewed:  Review User Review Instant Review Result   Los MUNROE 5/10/2022  2:17 PM     Reviewed PDMP [1]     Last Controlled Substance Monitoring Documentation      Flowsheet Row Refill from 4/1/2021 in [address]   Periodic Controlled Substance Monitoring No signs of potential drug abuse or diversion identified. filed at 04/01/2021 1041   Chronic Pain > 80 MEDD Obtained or confirmed \"Medication Contract\" on file.  filed at 04/01/2021 1041              General HRA Questions:  Select all that apply: (!) New or Increased Pain    Pain Interventions:  Patient declined any further interventions or treatment          Vision Screen:  Do you have difficulty driving, watching TV, or doing any of your daily activities because of your eyesight?: (!) Yes  Have you had an eye exam within the past year?: Yes  No results found. Interventions:   Patient encouraged to make appointment with their eye specialist    Safety:  Do you have either shower bars, grab bars, non-slip mats or non-slip surfaces in your shower or bathtub?: (!) No  Interventions:  Grab bars to be installed     Advanced Directives:  Do you have a Living Will?: (!) No    Intervention:  has NO advanced directive - information provided    Advance Care Planning   Advanced Care Planning: Discussed the patients choices for care and treatment in case of a health event that adversely affects decision-making abilities. Also discussed the patients long-term treatment options. Reviewed with the patient the appropriate state-specific advance directive documents. Reviewed the process of designating a competent adult as an Agent (or -in-fact) that could take make health care decisions for the patient if incompetent. Patient was asked to complete the declaration forms, if they have not already, either acknowledge the forms by a public notary or an eligible witness and provide a signed copy to the practice office. Time spent (minutes): 10       Tobacco Use:  Tobacco Use: High Risk    Smoking Tobacco Use: Every Day    Smokeless Tobacco Use: Never    Passive Exposure: Never     E-cigarette/Vaping       Questions Responses    E-cigarette/Vaping Use Never User    Start Date     Passive Exposure     Quit Date     Counseling Given     Comments           Interventions:  Patient declined any further intervention or treatment                        Objective   Vitals:    01/09/23 1517   BP: 118/72   Site: Left Upper Arm   Position: Sitting   Cuff Size: Medium Adult   Pulse: 86   Temp: 97.5 °F (36.4 °C)   TempSrc: Temporal   SpO2: 93%   Weight: 166 lb 3.2 oz (75.4 kg)   Height: 5' 4\" (1.626 m)      Body mass index is 28.53 kg/m².               Allergies   Allergen Reactions    Infliximab      Severe drop in blood pressure    Ultrasound Gel Other (See Comments) burn    Codeine Nausea And Vomiting    Penicillins Rash     Prior to Visit Medications    Medication Sig Taking? Authorizing Provider   montelukast (SINGULAIR) 10 MG tablet TAKE 1 TABLET BY MOUTH ONCE DAILY NIGHTLY Yes Houston Castro DO   pantoprazole (PROTONIX) 40 MG tablet TAKE 1 TABLET BY MOUTH TWICE DAILY BEFORE MEAL(S) Yes Houston Castro DO   valACYclovir (VALTREX) 500 MG tablet Take 1 tablet by mouth once daily Yes Houston Castro DO   sertraline (ZOLOFT) 100 MG tablet TAKE 1 & 1/2 (ONE & ONE-HALF) TABLETS BY MOUTH ONCE DAILY Yes VERITO Michel CNP   atorvastatin (LIPITOR) 40 MG tablet Take 1 tablet by mouth nightly TAKE 1 TABLET BY MOUTH EVERY DAY Yes Jim Banks MD   spironolactone (ALDACTONE) 50 MG tablet Take 1 tablet by mouth once daily Yes Jim Banks MD   mirtazapine (REMERON) 15 MG tablet TAKE 1 TABLET BY MOUTH ONCE DAILY AT NIGHT Yes Jim Banks MD   predniSONE (DELTASONE) 5 MG tablet Take 5 mg by mouth every other day Yes Historical Provider, MD   HYDROcodone-acetaminophen (NORCO) 5-325 MG per tablet TAKE 1 TABLET BY MOUTH EVERY 6 HOURS AS NEEDED FOR PAIN Yes Historical Provider, MD   promethazine-dextromethorphan (PROMETHAZINE-DM) 6.25-15 MG/5ML syrup Take 5 mLs by mouth every 4 hours as needed Yes Historical Provider, MD   albuterol sulfate HFA (PROVENTIL;VENTOLIN;PROAIR) 108 (90 Base) MCG/ACT inhaler 2 puffs q 4 prn Yes Lynn Tabor MD   fluticasone-salmeterol (ADVAIR DISKUS) 250-50 MCG/ACT AEPB diskus inhaler Inhale 1 puff into the lungs in the morning and 1 puff in the evening. Yes Denzel Root MD   modafinil (PROVIGIL) 100 MG tablet Take 1 tablet by mouth daily for 182 doses.  Yes Denzel Root MD   propranolol (INDERAL LA) 60 MG extended release capsule Take 1 capsule by mouth once daily Yes VERITO Michel CNP   alendronate (FOSAMAX) 70 MG tablet Take 1 tablet by mouth once a week Yes Jim Banks MD   Folic Acid 0.8 MG CAPS Take 1 capsule by mouth daily Yes Historical Provider, MD   traZODone (DESYREL) 100 MG tablet Take 100 mg by mouth nightly Yes Historical Provider, MD   traMADol (ULTRAM) 50 MG tablet Take 50 mg by mouth 2 times daily as needed for Pain. Yes Historical Provider, MD   letrozole (FEMARA) 2.5 MG tablet Take 2.5 mg by mouth daily  Yes Historical Provider, MD   ketorolac (ACULAR) 0.5 % ophthalmic solution Place 1 drop into both eyes 2 times daily  Yes Historical Provider, MD   diclofenac sodium 1 % GEL Apply topically 4 times daily as needed Indications: [place]  prescribes Yes Denice Nagel MD   difluprednate (DUREZOL) 0.05 % EMUL Apply 2 drops to eye 2 times daily BOTH EYES Yes Historical Provider, MD   gabapentin (NEURONTIN) 400 MG capsule 400 mg 4 times daily. Indications: Prescribed by [facility] Dr. Ishmael Langley MD   cyclobenzaprine (FLEXERIL) 10 MG tablet Take 10 mg by mouth 3 times daily as needed for Muscle spasms Yes Historical Provider, MD   methotrexate (RHEUMATREX) 2.5 MG chemo tablet Take 7.5 mg by mouth every 7 days  Yes Denice Nagel MD   fluocinonide (LIDEX) 0.05 % cream Apply topically 2 times daily Apply topically 2 times daily.  Yes Historical Provider, MD   promethazine (PHENERGAN) 12.5 MG tablet Take 25 mg by mouth every 6 hours as needed for Nausea Indications: Dr. Mabel Cordon  Yes Historical Provider, MD [place] (Including outside providers/suppliers regularly involved in providing care):   Patient Care Team:  Vicente Stack MD as PCP - Chas Gonzalez MD as PCP [facility]  Estefania Estrada MD as PCP - [facility] (General Surgery)  Tavia Anderson MD as Consulting Physician (Orthopedic Surgery)  Suzi Ca MD as Consulting Physician (Ophthalmology)  Zeynep Abbasi MD (Ophthalmology)  Gogo Barillas MD as Consulting Physician (Colon and Rectal Surgery)  Fiona Zheng as Consulting Physician (Urology)  Denice Nagel MD as Consulting Physician (Rheumatology)  Loretta Tucker MD as Consulting Physician (Gastroenterology)  Ivy Calix MD as Consulting Physician (Pulmonology)     Reviewed and updated this visit:  Allergies  Meds  Problems

## 2023-01-09 NOTE — PATIENT INSTRUCTIONS
Preventing Falls: Care Instructions  Overview     Getting around your home safely can be a challenge if you have injuries or health problems that make it easy for you to fall. Loose rugs and furniture in walkways are among the dangers for many older people who have problems walking or who have poor eyesight. People who have conditions such as arthritis, osteoporosis, or dementia also have to be careful not to fall. You can make your home safer with a few simple measures. Follow-up care is a key part of your treatment and safety. Be sure to make and go to all appointments, and call your doctor if you are having problems. It's also a good idea to know your test results and keep a list of the medicines you take. How can you care for yourself at home? Taking care of yourself  Exercise regularly to improve your strength, muscle tone, and balance. Walk if you can. Swimming may be a good choice if you cannot walk easily. Have your vision and hearing checked each year or any time you notice a change. If you have trouble seeing and hearing, you might not be able to avoid objects and could lose your balance. Know the side effects of the medicines you take. Ask your doctor or pharmacist whether the medicines you take can affect your balance. Sleeping pills or sedatives can affect your balance. Limit the amount of alcohol you drink. Alcohol can impair your balance and other senses. Ask your doctor whether calluses or corns on your feet need to be removed. If you wear loose-fitting shoes because of calluses or corns, you can lose your balance and fall. Talk to your doctor if you have numbness in your feet. You may get dizzy if you do not drink enough water. To prevent dehydration, drink plenty of fluids. Choose water and other clear liquids. If you have kidney, heart, or liver disease and have to limit fluids, talk with your doctor before you increase the amount of fluids you drink.   Preventing falls at home  Remove raised doorway thresholds, throw rugs, and clutter. Repair loose carpet or raised areas in the floor. Move furniture and electrical cords to keep them out of walking paths. Use nonskid floor wax, and wipe up spills right away, especially on ceramic tile floors. If you use a walker or cane, put rubber tips on it. If you use crutches, clean the bottoms of them regularly with an abrasive pad, such as steel wool. Keep your house well lit, especially stairways, porches, and outside walkways. Use night-lights in areas such as hallways and bathrooms. Add extra light switches or use remote switches (such as switches that go on or off when you clap your hands) to make it easier to turn lights on if you have to get up during the night. Install sturdy handrails on stairways. Move items in your cabinets so that the things you use a lot are on the lower shelves (about waist level). Keep a cordless phone and a flashlight with new batteries by your bed. If possible, put a phone in each of the main rooms of your house, or carry a cell phone in case you fall and cannot reach a phone. Or, you can wear a device around your neck or wrist. You push a button that sends a signal for help. Wear low-heeled shoes that fit well and give your feet good support. Use footwear with nonskid soles. Check the heels and soles of your shoes for wear. Repair or replace worn heels or soles. Do not wear socks without shoes on smooth floors, such as wood. Walk on the grass when the sidewalks are slippery. If you live in an area that gets snow and ice in the winter, sprinkle salt on slippery steps and sidewalks. Or ask a family member or friend to do this for you. Preventing falls in the bath  Install grab bars and nonskid mats inside and outside your shower or tub and near the toilet and sinks. Use shower chairs and bath benches. Use a hand-held shower head that will allow you to sit while showering.   Get into a tub or shower by putting the weaker leg in first. Get out of a tub or shower with your strong side first.  Repair loose toilet seats and consider installing a raised toilet seat to make getting on and off the toilet easier. Keep your bathroom door unlocked while you are in the shower. Where can you learn more? Go to http://www.dyson.com/ and enter G117 to learn more about \"Preventing Falls: Care Instructions. \"  Current as of: May 4, 2022               Content Version: 13.5  © 9859-8735 Healthwise, Incorporated. Care instructions adapted under license by [facility] ([facility]). If you have questions about a medical condition or this instruction, always ask your healthcare professional. [address] any warranty or liability for your use of this information. Hearing Loss: Care Instructions  Overview     Hearing loss is a sudden or slow decrease in how well you hear. It can range from mild to severe. Permanent hearing loss can occur with aging. It also can happen when you are exposed long-term to loud noise. Examples include listening to loud music, riding motorcycles, or being around other loud machines. Hearing loss can affect your work and home life. It can make you feel lonely or depressed. You may feel that you have lost your independence. But hearing aids and other devices can help you hear better and feel connected to others. Follow-up care is a key part of your treatment and safety. Be sure to make and go to all appointments, and call your doctor if you are having problems. It's also a good idea to know your test results and keep a list of the medicines you take. How can you care for yourself at home? Avoid loud noises whenever possible. This helps keep your hearing from getting worse. Always wear hearing protection around loud noises. Wear a hearing aid as directed. See a professional who can help you pick a hearing aid that fits you. Have hearing tests as your doctor suggests. They can show whether your hearing has changed. Your hearing aid may need to be adjusted. Use other devices as needed. These may include:  Telephone amplifiers and hearing aids that can connect to a television, stereo, radio, or microphone. Devices that use lights or vibrations. These alert you to the doorbell, a ringing telephone, or a baby monitor. Television closed-captioning. This shows the words at the bottom of the screen. Most new TVs can do this. TTY (text telephone). This lets you type messages back and forth on the telephone instead of talking or listening. These devices are also called TDD. When messages are typed on the keyboard, they are sent over the phone line to a receiving TTY. The message is shown on a monitor. Use text messaging, social media, and email if it is hard for you to communicate by telephone. Try to learn a listening technique called speechreading. It is not lipreading. You pay attention to people's gestures, expressions, posture, and tone of voice. These clues can help you understand what a person is saying. Face the person you are talking to, and have them face you. Make sure the lighting is good. You need to see the other person's face clearly. Think about counseling if you need help to adjust to your hearing loss. When should you call for help? Watch closely for changes in your health, and be sure to contact your doctor if:    You think your hearing is getting worse.     You have new symptoms, such as dizziness or nausea. Where can you learn more? Go to http://www.Collabspot.com/ and enter R798 to learn more about \"Hearing Loss: Care Instructions. \"  Current as of: May 4, 2022               Content Version: 13.5  © 2826-7511 Healthwise, Incorporated. Care instructions adapted under license by [facility] ([facility]).  If you have questions about a medical condition or this instruction, always ask your healthcare professional. Darwin Oliav any warranty or liability for your use of this information. Learning About Vision Tests  What are vision tests? The four most common vision tests are visual acuity tests, refraction, visual field tests, and color vision tests. Visual acuity (sharpness) tests  These tests are used: To see if you need glasses or contact lenses. To monitor an eye problem. To check an eye injury. Visual acuity tests are done as part of routine exams. You may also have this test when you get your 's license or apply for some types of jobs. Visual field tests  These tests are used: To check for vision loss in any area of your range of vision. To screen for certain eye diseases. To look for nerve damage after a stroke, head injury, or other problem that could reduce blood flow to the brain. Refraction and color tests  A refraction test is done to find the right prescription for glasses and contact lenses. A color vision test is done to check for color blindness. Color vision is often tested as part of a routine exam. You may also have this test when you apply for a job where recognizing different colors is important, such as , electronics, or the [employer]. How are vision tests done? Visual acuity test   You cover one eye at a time. You read aloud from a wall chart across the room. You read aloud from a small card that you hold in your hand. Refraction   You look into a special device. The device puts lenses of different strengths in front of each eye to see how strong your glasses or contact lenses need to be. Visual field tests   Your doctor may have you look through special machines. Or your doctor may simply have you stare straight ahead while they move a finger into and out of your field of vision. Color vision test   You look at pieces of printed test patterns in various colors. You say what number or symbol you see.   Your doctor may have you trace the number or symbol using a pointer. How do these tests feel? There is very little chance of having a problem from this test. If dilating drops are used for a vision test, they may make the eyes sting and cause a medicine taste in the mouth. Follow-up care is a key part of your treatment and safety. Be sure to make and go to all appointments, and call your doctor if you are having problems. It's also a good idea to know your test results and keep a list of the medicines you take. Where can you learn more? Go to http://www.dyson.com/ and enter G551 to learn more about \"Learning About Vision Tests. \"  Current as of: October 12, 2022               Content Version: 13.5  © 2006-2022 [employer]. Care instructions adapted under license by [facility] ([facility]). If you have questions about a medical condition or this instruction, always ask your healthcare professional. [place] [address] any warranty or liability for your use of this information. Advance Directives: Care Instructions  Overview  An advance directive is a legal way to state your wishes at the end of your life. It tells your family and your doctor what to do if you can't say what you want. There are two main types of advance directives. You can change them any time your wishes change. Living will. This form tells your family and your doctor your wishes about life support and other treatment. The form is also called a declaration. Medical power of . This form lets you name a person to make treatment decisions for you when you can't speak for yourself. This person is called a health care agent (health care proxy, health care surrogate). The form is also called a durable power of  for health care. If you do not have an advance directive, decisions about your medical care may be made by a family member, or by a doctor or a  who doesn't know you.   It may help to think of an advance directive as a gift to the people who care for you. If you have one, they won't have to make tough decisions by themselves. For more information, including forms for your state, see the [address] website (www.caringinfo.org/planning/advance-directives/). Follow-up care is a key part of your treatment and safety. Be sure to make and go to all appointments, and call your doctor if you are having problems. It's also a good idea to know your test results and keep a list of the medicines you take. What should you include in an advance directive? Many states have a unique advance directive form. (It may ask you to address specific issues.) Or you might use a universal form that's approved by many states. If your form doesn't tell you what to address, it may be hard to know what to include in your advance directive. Use the questions below to help you get started. Who do you want to make decisions about your medical care if you are not able to? What life-support measures do you want if you have a serious illness that gets worse over time or can't be cured? What are you most afraid of that might happen? (Maybe you're afraid of having pain, losing your independence, or being kept alive by machines.)  Where would you prefer to die? (Your home? A hospital? A nursing home?)  Do you want to donate your organs when you die? Do you want certain Confucianist practices performed before you die? When should you call for help? Be sure to contact your doctor if you have any questions. Where can you learn more? Go to http://www.[employer].com/ and enter R264 to learn more about \"Advance Directives: Care Instructions. \"  Current as of: June 16, 2022               Content Version: 13.5  © 3150-6809 Healthwise, Incorporated. Care instructions adapted under license by [facility] ([facility]).  If you have questions about a medical condition or this instruction, always ask your healthcare professional. Philly Fontaine disclaims any warranty or liability for your use of this information. Personalized Preventive Plan for Noy Fajardo - 1/9/2023  Medicare offers a range of preventive health benefits. Some of the tests and screenings are paid in full while other may be subject to a deductible, co-insurance, and/or copay. Some of these benefits include a comprehensive review of your medical history including lifestyle, illnesses that may run in your family, and various assessments and screenings as appropriate. After reviewing your medical record and screening and assessments performed today your provider may have ordered immunizations, labs, imaging, and/or referrals for you. A list of these orders (if applicable) as well as your Preventive Care list are included within your After Visit Summary for your review. Other Preventive Recommendations:    A preventive eye exam performed by an eye specialist is recommended every 1-2 years to screen for glaucoma; cataracts, macular degeneration, and other eye disorders. A preventive dental visit is recommended every 6 months. Try to get at least 150 minutes of exercise per week or 10,000 steps per day on a pedometer . Order or download the FREE \"Exercise & Physical Activity: Your Everyday Guide\" from [employer]. Call 3-990.956.6629 or search [employer] online. You need 2913-3697 mg of calcium and 2048-0689 IU of vitamin D per day. It is possible to meet your calcium requirement with diet alone, but a vitamin D supplement is usually necessary to meet this goal.  When exposed to the sun, use a sunscreen that protects against both UVA and UVB radiation with an SPF of 30 or greater. Reapply every 2 to 3 hours or after sweating, drying off with a towel, or swimming. Always wear a seat belt when traveling in a car. Always wear a helmet when riding a bicycle or motorcycle.

## 2023-01-19 DIAGNOSIS — F51.01 PRIMARY INSOMNIA: ICD-10-CM

## 2023-01-19 RX ORDER — ALPRAZOLAM 1 MG/1
TABLET ORAL
Qty: 60 TABLET | Refills: 0 | Status: SHIPPED | OUTPATIENT
Start: 2023-01-19 | End: 2023-02-18

## 2023-01-19 NOTE — TELEPHONE ENCOUNTER
.Refill Request - Controlled Substance    CONFIRM preferrred pharmacy with the patient. If Mail Order Rx - Pend for 90 day refill. Last Seen Department: 1/9/2023  Last Seen by PCP: 1/9/2023    Last Written: 10/11/22 60 with 2     Last UDS: 6/28/18    Med Agreement Signed On: no med contract    If no future appointment scheduled, route STAFF MESSAGE with patient name to the [facility] for scheduling. CONFIRM preferrred pharmacy with the patient. Next Appointment:   Future Appointments   Date Time Provider Annmarie Mclean   3/14/2023  2:00 PM MD KRISH Edwards   5/9/2023 10:30 AM Angelic Joseph MD [facility] [employer] [facility]       Message sent to [address] [place] [address] to schedule appt with patient?   N/A      Requested Prescriptions     Pending Prescriptions Disp Refills    ALPRAZolam (XANAX) 1 MG tablet [Pharmacy Med Name: ALPRAZolam 1 MG Oral Tablet] 60 tablet 0     Sig: TAKE 1 TABLET BY MOUTH TWICE DAILY AS NEEDED FOR SLEEP FOR  UP  TO  30  DAYS

## 2023-02-02 ENCOUNTER — OFFICE VISIT (OUTPATIENT)
Dept: ORTHOPEDIC SURGERY | Age: 68
End: 2023-02-02

## 2023-02-02 VITALS — WEIGHT: 166 LBS | BODY MASS INDEX: 28.34 KG/M2 | HEIGHT: 64 IN

## 2023-02-02 DIAGNOSIS — S80.01XA CONTUSION OF RIGHT KNEE, INITIAL ENCOUNTER: Primary | ICD-10-CM

## 2023-02-02 DIAGNOSIS — M17.11 PRIMARY OSTEOARTHRITIS OF RIGHT KNEE: ICD-10-CM

## 2023-02-02 DIAGNOSIS — M25.561 RIGHT KNEE PAIN, UNSPECIFIED CHRONICITY: ICD-10-CM

## 2023-02-02 NOTE — PROGRESS NOTES
ORTHOPAEDIC SURGERY H&P / CONSULTATION NOTE    Chief complaint:   Chief Complaint   Patient presents with    Knee Pain     R KNEE PN      History of present illness: The patient is a 79 y.o. female with subjective symptoms of right knee pain. The chief complaint is located at anterior aspect right knee. Duration of symptoms has been for 2 to 3 weeks. The severity of symptoms is rated at 1/10 pain but can be as high as 6/10 pain on intake form. Patient states that she missed the last step and all her weight went onto the anterior aspect of her right knee. She states anterior knee pain. She denies medial/lateral knee pain or meniscal twisting knee pain. She denies gross instability. She states pain with prolonged sit to stand and prolonged standing and walking. She is not done any anti-inflammatories on a consistent basis as she is unable to take oral anti-inflammatories given she is on prednisone for sarcoidosis. She is not done any therapy. She has not done any injections. She states very minimal swelling if any    The patient has tried the below listed items prior to today's consultation for above listed chief complaint.     -   Over-the-counter anti-inflammatories/prescription medication anti-inflammatory.      -   Physical therapy / guided home exercise program -     -   Previous corticosteroid injections    Past medical history:    Past Medical History:   Diagnosis Date    Asthma     CAD (coronary artery disease)     Cancer ([facility] [address]     RIGHT BREAST    Chronic diarrhea     Dr. Steven Delgado    Chronic kidney disease     kidney stones    Clostridium difficile diarrhea 10/23/13    positive by PCR    Fibromyalgia     Hemorrhoid     Hyperlipidemia     Hypertension     Kidney stone     Migraines     Osteoarthritis     fibromyalgia    Sarcoidosis 2003    sees Dr. Miya Loja        Past surgical history:    Past Surgical History:   Procedure Laterality Date    ABDOMINAL EXPLORATION SURGERY  08/19/2012    REDUCTION OF VULVUS; RIGHT COLECTOMY; SMALL BOWEL RESECTION; INSERTION OF ON Q PAIN BUSTER    BREAST BIOPSY      CARDIAC CATHETERIZATION  2010    CLEAN    CARPAL TUNNEL RELEASE Left 11/29/2022    LEFT CARPAL TUNNEL RELEASE performed by Marilee Duque MD at [facility]  2010    normal    COLONOSCOPY N/A 06/16/2020    COLON W/ANES. performed by Irene Dial MD at [address] [facility]  12/2011    CYSTOSCOPY N/A 04/27/2022    CYSTOSCOPY, BILATERAL RETROGRADE PYELOGRAM performed by Gabriel Carvajal MD at [address]  06/16/2020    ESOPHAGEAL DILATION Alice Mano performed by Irene Dial MD at [address] Bilateral 05/2009    cataract    HYSTERECTOMY ([address] [facility])  2000    LITHOTRIPSY  01/19/2012    LITHOTRIPSY      04/2012    MASTECTOMY Bilateral 05/18/2021    BILATERAL SIMPLE MASTECTOMY, RIGHT SENTINEL LYMPH NODE BIOPSY performed by Natacha Yusuf MD at [address] [facility]    right    PARTIAL HYSTERECTOMY (CERVIX NOT REMOVED)      SIGMOID COLECTOMY Left 04/27/2022    ROBOTIC, SIGMOIDCOLECTOMY WITH REVISION OF COLORECTAL ANASTOMOSIS performed by Gabriel Carvajal MD at [address] [facility]  02/27/2013    UPPER GASTROINTESTINAL ENDOSCOPY  11/14/2017    gastritis    UPPER GASTROINTESTINAL ENDOSCOPY N/A 06/16/2020    EGD W/ANES. (11:00) performed by Irene Dial MD at [address] [facility]  12/2011    US BREAST BIOPSY W LOC DEVICE 1ST LESION RIGHT Right 04/15/2021    US BREAST NEEDLE BIOPSY RIGHT 4/15/2021 Celeste Dominguez MD [facility]        Allergies:     Allergies   Allergen Reactions    Infliximab      Severe drop in blood pressure    Ultrasound Gel Other (See Comments)     burn    Codeine Nausea And Vomiting    Penicillins Rash         Medications:   Current Outpatient Medications: ALPRAZolam (XANAX) 1 MG tablet, TAKE 1 TABLET BY MOUTH TWICE DAILY AS NEEDED FOR SLEEP FOR  UP  TO  30  DAYS, Disp: 60 tablet, Rfl: 0    montelukast (SINGULAIR) 10 MG tablet, TAKE 1 TABLET BY MOUTH ONCE DAILY NIGHTLY, Disp: 90 tablet, Rfl: 0    pantoprazole (PROTONIX) 40 MG tablet, TAKE 1 TABLET BY MOUTH TWICE DAILY BEFORE MEAL(S), Disp: 180 tablet, Rfl: 0    valACYclovir (VALTREX) 500 MG tablet, Take 1 tablet by mouth once daily, Disp: 90 tablet, Rfl: 0    sertraline (ZOLOFT) 100 MG tablet, TAKE 1 & 1/2 (ONE & ONE-HALF) TABLETS BY MOUTH ONCE DAILY, Disp: 135 tablet, Rfl: 0    atorvastatin (LIPITOR) 40 MG tablet, Take 1 tablet by mouth nightly TAKE 1 TABLET BY MOUTH EVERY DAY, Disp: 90 tablet, Rfl: 1    spironolactone (ALDACTONE) 50 MG tablet, Take 1 tablet by mouth once daily, Disp: 90 tablet, Rfl: 1    mirtazapine (REMERON) 15 MG tablet, TAKE 1 TABLET BY MOUTH ONCE DAILY AT NIGHT, Disp: 90 tablet, Rfl: 1    predniSONE (DELTASONE) 5 MG tablet, Take 5 mg by mouth every other day, Disp: , Rfl:     HYDROcodone-acetaminophen (NORCO) 5-325 MG per tablet, TAKE 1 TABLET BY MOUTH EVERY 6 HOURS AS NEEDED FOR PAIN, Disp: , Rfl:     promethazine-dextromethorphan (PROMETHAZINE-DM) 6.25-15 MG/5ML syrup, Take 5 mLs by mouth every 4 hours as needed, Disp: , Rfl:     albuterol sulfate HFA (PROVENTIL;VENTOLIN;PROAIR) 108 (90 Base) MCG/ACT inhaler, 2 puffs q 4 prn, Disp: 18 g, Rfl: 5    fluticasone-salmeterol (ADVAIR DISKUS) 250-50 MCG/ACT AEPB diskus inhaler, Inhale 1 puff into the lungs in the morning and 1 puff in the evening., Disp: 60 each, Rfl: 3    modafinil (PROVIGIL) 100 MG tablet, Take 1 tablet by mouth daily for 182 doses. , Disp: 30 tablet, Rfl: 5    propranolol (INDERAL LA) 60 MG extended release capsule, Take 1 capsule by mouth once daily, Disp: 90 capsule, Rfl: 1    alendronate (FOSAMAX) 70 MG tablet, Take 1 tablet by mouth once a week, Disp: 12 tablet, Rfl: 3    Folic Acid 0.8 MG CAPS, Take 1 capsule by mouth daily, Disp: , Rfl:     traZODone (DESYREL) 100 MG tablet, Take 100 mg by mouth nightly, Disp: , Rfl:     traMADol (ULTRAM) 50 MG tablet, Take 50 mg by mouth 2 times daily as needed for Pain., Disp: , Rfl:     letrozole (FEMARA) 2.5 MG tablet, Take 2.5 mg by mouth daily , Disp: , Rfl:     ketorolac (ACULAR) 0.5 % ophthalmic solution, Place 1 drop into both eyes 2 times daily , Disp: , Rfl:     diclofenac sodium 1 % GEL, Apply topically 4 times daily as needed Indications: [facility]  prescribes, Disp: , Rfl:     difluprednate (DUREZOL) 0.05 % EMUL, Apply 2 drops to eye 2 times daily BOTH EYES, Disp: , Rfl:     gabapentin (NEURONTIN) 400 MG capsule, 400 mg 4 times daily. Indications: Prescribed by [facility]  , Disp: , Rfl: 1    cyclobenzaprine (FLEXERIL) 10 MG tablet, Take 10 mg by mouth 3 times daily as needed for Muscle spasms, Disp: , Rfl:     methotrexate (RHEUMATREX) 2.5 MG chemo tablet, Take 7.5 mg by mouth every 7 days , Disp: , Rfl:     fluocinonide (LIDEX) 0.05 % cream, Apply topically 2 times daily Apply topically 2 times daily. , Disp: , Rfl:     promethazine (PHENERGAN) 12.5 MG tablet, Take 25 mg by mouth every 6 hours as needed for Nausea Indications: Dr. Jossue Solorio , Disp: , Rfl:      Social history: Denies IV drug use.     Social History     Socioeconomic History    Marital status:      Spouse name: Jose Dyson     Number of children: 4    Years of education: Not on file    Highest education level: Not on file   Occupational History    Occupation: disabilty    Tobacco Use    Smoking status: Former     Packs/day: 0.25     Years: 20.00     Pack years: 5.00     Types: Cigarettes     Start date: 5/1/2022     Passive exposure: Never    Smokeless tobacco: Never    Tobacco comments:     Started smoking again in May (had quit smoking for approx 2 yrs)   Vaping Use    Vaping Use: Never used   Substance and Sexual Activity    Alcohol use: No     Alcohol/week: 0.0 standard drinks    Drug use: No    Sexual activity: Not on file   Other Topics Concern    Not on file   Social History Narrative    Not on file     Social Determinants of Health     Financial Resource Strain: Low Risk     Difficulty of Paying Living Expenses: Not hard at all   Food Insecurity: No Food Insecurity    Worried About [address] in the Last Year: Never true    [address] in the Last Year: Never true   Transportation Needs: No Transportation Needs    Lack of Transportation (Medical): No    Lack of Transportation (Non-Medical): No   Physical Activity: Insufficiently Active    Days of Exercise per Week: 1 day    Minutes of Exercise per Session: 80 min   Stress: Not on file   Social Connections: Not on file   Intimate Partner Violence: Not on file   Housing Stability: Low Risk     Unable to Pay for Housing in the Last Year: No    Number of Places Lived in the Last Year: 1    Unstable Housing in the Last Year: No     Tobacco use. Social History     Tobacco Use   Smoking Status Former    Packs/day: 0.25    Years: 20.00    Pack years: 5.00    Types: Cigarettes    Start date: 5/1/2022    Passive exposure: Never   Smokeless Tobacco Never   Tobacco Comments    Started smoking again in May (had quit smoking for approx 2 yrs)     Employment: Noncontributory    Workers compensation claim: Noncontributory    Review of systems: Patient denies any fevers chills chest pain shortness of breath nausea vomiting significant weight loss any change in voiding or bowel movements. Patient denies any significant numbness or tingling at baseline as it relates to this presenting symptom/chief complaint. The patient denies any significant problems with skin or any significant allergies. Physical examination:  Body mass index is 28.49 kg/m².   AAOx3, NCAT  EOMI  MMM  RR  Unlabored breathing, no wheezing  Skin intact BUE and BLE, warm and moist  Bilateral lower extremity examination specific to subjective symptoms  Exam Right Lower Extremity  Negative effusion, 0/118/0 active ROM (E/F/Lag), same P assive ROM (E/F/Lag), negative anterior Drawer, negative posterior Drawer,   Stable varus/valgus at 0 and 30?,    none TTP Joint Line, negative Aggie, positive tenderness palpation anterior patella. Positive Jeanmarie's, positive medial/lateral patellar facet tenderness. Very slight tenderness palpation medial femoral condyle with the knee at 90 degrees  Skin intact throughout  5/5 IP Q H TA G EHL  SILT DP SP LP MP S S  +2 DP pulse    Diagnostic imaging:  MY READ:  4 view right knee 2/2/2023: Negative fracture. Positive mild arthrosis with joint space narrowing medially greater than patellofemoral.  Patella aligned    Pertinent lab work: None     Diagnosis Orders   1. Contusion of right knee, initial encounter  Ambulatory referral to Physical Therapy      2. Primary osteoarthritis of right knee  Ambulatory referral to Physical Therapy      3. Right knee pain, unspecified chronicity  XR KNEE RIGHT (MIN 4 VIEWS)          Assessment and plan: 79 y.o. female with current subjective symptoms and physical exam findings with diagnostic imaging correlating to contusion right knee, primarily of the patella, and knee osteoarthritis. -Time of 23 minutes was spent coordinating and discussing the clinical findings, reviewing diagnostic imaging as indicated, coordinating care with prior notes review and current clinical encounter documentation as it pertains to the patient's presenting subjective symptoms and diagnoses. -I reviewed with the patient the imaging findings as well as clinical exam and  how it correlates to subjective symptoms.  -I had a pleasant discussion with the patient today. I reviewed with her that currently her clinical examination correlates to a knee contusion. This is primarily affecting the patella and I suspect also a chondral contusion. This is in the baseline setting of knee osteoarthritis.   -I reviewed with her consideration for conservative care treatment options. She is unable to take oral anti-inflammatories given prednisone use and also previous medical diagnoses. I recommended Voltaren topical gel which she states that she has and she will try to use some. This is also Voltaren topical gel OTC per bottle as needed discomfort and OTC Tylenol per bottle as needed discomfort.  -The knee is ligamentously stable so I did not advocate any bracing at this time.  -She does have baseline knee osteoarthritis and so I recommended formal physical therapy. Referral was placed. This will be for LOCO knee hip core reconditioning and strengthening  -Activity modification to include low impact at this time of elliptical stationary bike swimming and walking.  -All questions answered to the patient's satisfaction and the patient expressed understanding and agreement with the above listed treatment plan  -Follow up in 4 to 6 weeks for consideration of injection therapy. We will provide a repeat examination at that time and see how she is been doing with therapy and correlate her symptoms to clinical exam at that time.  -I did review with her natural history evolution of chondral contusions this can take several months. She expressed understanding  -Thank you for the clinical consultation and allowing me to participate in the patient's care. Electronically signed by Melissa Ferrera MD on 2/2/23 at 12:01 PM MAYITO Ferrera MD       Orthopaedic Surgery-Sports Medicine        Disclaimer: This note was dictated with voice recognition software. Though review and correction are routinely performed, please contact the office/medical records for any errors requiring correction.

## 2023-02-03 DIAGNOSIS — D86.9 SARCOID: Primary | ICD-10-CM

## 2023-02-06 RX ORDER — DEXTROMETHORPHAN HYDROBROMIDE AND PROMETHAZINE HYDROCHLORIDE 15; 6.25 MG/5ML; MG/5ML
SYRUP ORAL
Qty: 118 ML | Refills: 0 | Status: SHIPPED | OUTPATIENT
Start: 2023-02-06

## 2023-02-06 NOTE — TELEPHONE ENCOUNTER
Pt uses this PRN for cough caused by sarcoid. Was filled by MD on 9/29/22 but pt is requesting a larger size bottle (like Dr. Franky Reno used to prescribe for her). Increased bottle size from 60 mL to 118 mL.

## 2023-03-02 DIAGNOSIS — F51.01 PRIMARY INSOMNIA: ICD-10-CM

## 2023-03-02 DIAGNOSIS — F33.1 MAJOR DEPRESSIVE DISORDER, RECURRENT EPISODE, MODERATE (HCC): ICD-10-CM

## 2023-03-02 NOTE — TELEPHONE ENCOUNTER
Refill Request - Controlled Substance    CONFIRM preferrred pharmacy with the patient. If Mail Order Rx - Pend for 90 day refill. Last Seen Department: 1/9/2023    Last Seen by PCP: 1/9/23    Last Written: 1/19/23 60 tablet 0 refills    Last UDS: 6/28/18     Med Agreement Signed On: 1/4/17    If no future appointment scheduled, route STAFF MESSAGE with patient name to the [facility] for scheduling. CONFIRM preferrred pharmacy with the patient. Next Appointment: 5/9/23  Future Appointments   Date Time Provider Annmarie Mclean   3/14/2023  2:00 PM MD KRISH Guajardo   5/9/2023 10:30 AM Donna Higginbotham MD [facility]       Message sent to [address] [place] [address] to schedule appt with patient?   NO      Requested Prescriptions     Pending Prescriptions Disp Refills    ALPRAZolam (XANAX) 1 MG tablet [Pharmacy Med Name: ALPRAZolam 1 MG Oral Tablet] 60 tablet 0     Sig: TAKE 1 TABLET BY MOUTH TWICE DAILY AS NEEDED FOR SLEEP FOR  UP  TO  30  DAYS

## 2023-03-03 RX ORDER — SERTRALINE HYDROCHLORIDE 100 MG/1
TABLET, FILM COATED ORAL
Qty: 135 TABLET | Refills: 1 | Status: SHIPPED | OUTPATIENT
Start: 2023-03-03

## 2023-03-03 RX ORDER — ALPRAZOLAM 1 MG/1
TABLET ORAL
Qty: 60 TABLET | Refills: 0 | Status: SHIPPED | OUTPATIENT
Start: 2023-03-03 | End: 2023-04-02

## 2023-03-13 RX ORDER — PROPRANOLOL HCL 60 MG
CAPSULE, EXTENDED RELEASE 24HR ORAL
Qty: 90 CAPSULE | Refills: 0 | Status: SHIPPED | OUTPATIENT
Start: 2023-03-13

## 2023-03-16 ENCOUNTER — OFFICE VISIT (OUTPATIENT)
Dept: PULMONOLOGY | Age: 68
End: 2023-03-16
Payer: MEDICARE

## 2023-03-16 ENCOUNTER — TELEPHONE (OUTPATIENT)
Dept: PULMONOLOGY | Age: 68
End: 2023-03-16

## 2023-03-16 VITALS
DIASTOLIC BLOOD PRESSURE: 80 MMHG | SYSTOLIC BLOOD PRESSURE: 128 MMHG | OXYGEN SATURATION: 92 % | HEIGHT: 64 IN | HEART RATE: 93 BPM | BODY MASS INDEX: 29.47 KG/M2 | WEIGHT: 172.6 LBS

## 2023-03-16 DIAGNOSIS — D86.9 SARCOID: ICD-10-CM

## 2023-03-16 DIAGNOSIS — F13.20 SEDATIVE, HYPNOTIC OR ANXIOLYTIC DEPENDENCE, UNCOMPLICATED (HCC): ICD-10-CM

## 2023-03-16 DIAGNOSIS — F13.29 SEDATIVE, HYPNOTIC OR ANXIOLYTIC DEPENDENCE WITH UNSPECIFIED SEDATIVE, HYPNOTIC OR ANXIOLYTIC-INDUCED DISORDER (HCC): ICD-10-CM

## 2023-03-16 DIAGNOSIS — F13.99 SEDATIVE, HYPNOTIC OR ANXIOLYTIC USE, UNSPECIFIED WITH UNSPECIFIED SEDATIVE, HYPNOTIC OR ANXIOLYTIC-INDUCED DISORDER (HCC): Primary | ICD-10-CM

## 2023-03-16 PROCEDURE — 3017F COLORECTAL CA SCREEN DOC REV: CPT | Performed by: INTERNAL MEDICINE

## 2023-03-16 PROCEDURE — G8427 DOCREV CUR MEDS BY ELIG CLIN: HCPCS | Performed by: INTERNAL MEDICINE

## 2023-03-16 PROCEDURE — 1036F TOBACCO NON-USER: CPT | Performed by: INTERNAL MEDICINE

## 2023-03-16 PROCEDURE — 3074F SYST BP LT 130 MM HG: CPT | Performed by: INTERNAL MEDICINE

## 2023-03-16 PROCEDURE — G8484 FLU IMMUNIZE NO ADMIN: HCPCS | Performed by: INTERNAL MEDICINE

## 2023-03-16 PROCEDURE — G8399 PT W/DXA RESULTS DOCUMENT: HCPCS | Performed by: INTERNAL MEDICINE

## 2023-03-16 PROCEDURE — 1123F ACP DISCUSS/DSCN MKR DOCD: CPT | Performed by: INTERNAL MEDICINE

## 2023-03-16 PROCEDURE — 3078F DIAST BP <80 MM HG: CPT | Performed by: INTERNAL MEDICINE

## 2023-03-16 PROCEDURE — 99214 OFFICE O/P EST MOD 30 MIN: CPT | Performed by: INTERNAL MEDICINE

## 2023-03-16 PROCEDURE — 1090F PRES/ABSN URINE INCON ASSESS: CPT | Performed by: INTERNAL MEDICINE

## 2023-03-16 PROCEDURE — G8417 CALC BMI ABV UP PARAM F/U: HCPCS | Performed by: INTERNAL MEDICINE

## 2023-03-16 RX ORDER — PREDNISONE 10 MG/1
TABLET ORAL
Qty: 30 TABLET | Refills: 0 | Status: SHIPPED | OUTPATIENT
Start: 2023-03-16

## 2023-03-16 RX ORDER — NYSTATIN 100000 U/G
100 CREAM TOPICAL 2 TIMES DAILY
COMMUNITY

## 2023-03-16 RX ORDER — DEXTROMETHORPHAN HYDROBROMIDE AND PROMETHAZINE HYDROCHLORIDE 15; 6.25 MG/5ML; MG/5ML
5 SYRUP ORAL 4 TIMES DAILY PRN
Qty: 118 ML | Refills: 0 | Status: SHIPPED | OUTPATIENT
Start: 2023-03-16

## 2023-03-16 RX ORDER — BUDESONIDE AND FORMOTEROL FUMARATE DIHYDRATE 160; 4.5 UG/1; UG/1
2 AEROSOL RESPIRATORY (INHALATION) 2 TIMES DAILY
Qty: 1 EACH | Refills: 2 | Status: SHIPPED | OUTPATIENT
Start: 2023-03-16

## 2023-03-16 NOTE — PROGRESS NOTES
P  [facility]                                               Pulmonary Clinic Consult     I had the pleasure of seeing  Scotty Lopez     Chief Complaint   Patient presents with    6 Month Follow-Up     sarcoid       HISTORY OF PRESENT ILLNESS:    Scotty Lopez is a 79y.o. year old  Who smoke initially for 6 years then she quit and then back now and smoke 7 cigarettes     She was diagnosed ,with sarcoid 2005 AND she had splenectomy 1993        The Patient comes in with SOB that has been going on the last few years and  Associated with cough and she states mostly at night     He/She  states that it get worse with exercise or walking long distance and he can walk . 1 mile  And go 1 flight of stairs before get short winded    She was treat with MTX 7.5 and she got had prednisone tapering dose multiple times   She has optic neuronitis     Per her pulmonologist She is off her anti-TNF (remicaide)therapy because of her breast cancer.  At this time, we do not see a true failure with this, but she may eventually have to think about an alternative treatment such as rituximab or Acthar   She use albuterol and advair     Today visit   She has some SOB and GREEN  Has some cough   She has some wheezing and start feeling swelling in foot and feel flare up   Some GREEN and cough     ALLERGIES:    Allergies   Allergen Reactions    Infliximab      Severe drop in blood pressure    Ultrasound Gel Other (See Comments)     burn    Codeine Nausea And Vomiting    Penicillins Rash       PAST MEDICAL HISTORY:       Diagnosis Date    Asthma     CAD (coronary artery disease)     Cancer ([facility] [address]     RIGHT BREAST    Chronic diarrhea     Dr. Alpa Otero    Chronic kidney disease     kidney stones    Clostridium difficile diarrhea 10/23/13    positive by PCR    Fibromyalgia     Hemorrhoid     Hyperlipidemia     Hypertension     Kidney stone     Migraines     Osteoarthritis     fibromyalgia    Sarcoidosis 2003    sees Dr. Garcia Fernandes:   Current Outpatient Medications   Medication Sig Dispense Refill    nystatin (MYCOSTATIN) 917511 UNIT/GM cream Apply 100 mg topically 2 times daily Apply topically 2 times daily. propranolol (INDERAL LA) 60 MG extended release capsule Take 1 capsule by mouth once daily 90 capsule 0    ALPRAZolam (XANAX) 1 MG tablet TAKE 1 TABLET BY MOUTH TWICE DAILY AS NEEDED FOR SLEEP FOR  UP  TO  30  DAYS 60 tablet 0    sertraline (ZOLOFT) 100 MG tablet TAKE 1 & 1/2 (ONE & ONE-HALF) TABLETS BY MOUTH ONCE DAILY 135 tablet 1    promethazine-dextromethorphan (PROMETHAZINE-DM) 6.25-15 MG/5ML syrup TAKE 5 ML BY MOUTH  4 TIMES DAILY AS NEEDED FOR COUGH 118 mL 0    montelukast (SINGULAIR) 10 MG tablet TAKE 1 TABLET BY MOUTH ONCE DAILY NIGHTLY 90 tablet 0    pantoprazole (PROTONIX) 40 MG tablet TAKE 1 TABLET BY MOUTH TWICE DAILY BEFORE MEAL(S) 180 tablet 0    valACYclovir (VALTREX) 500 MG tablet Take 1 tablet by mouth once daily 90 tablet 0    atorvastatin (LIPITOR) 40 MG tablet Take 1 tablet by mouth nightly TAKE 1 TABLET BY MOUTH EVERY DAY 90 tablet 1    spironolactone (ALDACTONE) 50 MG tablet Take 1 tablet by mouth once daily 90 tablet 1    mirtazapine (REMERON) 15 MG tablet TAKE 1 TABLET BY MOUTH ONCE DAILY AT NIGHT 90 tablet 1    predniSONE (DELTASONE) 5 MG tablet Take 5 mg by mouth every other day      HYDROcodone-acetaminophen (NORCO) 5-325 MG per tablet TAKE 1 TABLET BY MOUTH EVERY 6 HOURS AS NEEDED FOR PAIN      albuterol sulfate HFA (PROVENTIL;VENTOLIN;PROAIR) 108 (90 Base) MCG/ACT inhaler 2 puffs q 4 prn 18 g 5    fluticasone-salmeterol (ADVAIR DISKUS) 250-50 MCG/ACT AEPB diskus inhaler Inhale 1 puff into the lungs in the morning and 1 puff in the evening. 60 each 3    modafinil (PROVIGIL) 100 MG tablet Take 1 tablet by mouth daily for 182 doses.  30 tablet 5    alendronate (FOSAMAX) 70 MG tablet Take 1 tablet by mouth once a week 12 tablet 3    Folic Acid 0.8 MG CAPS Take 1 capsule by mouth daily      traZODone (DESYREL) 100 MG tablet Take 100 mg by mouth nightly      traMADol (ULTRAM) 50 MG tablet Take 50 mg by mouth 2 times daily as needed for Pain. letrozole (FEMARA) 2.5 MG tablet Take 2.5 mg by mouth daily       ketorolac (ACULAR) 0.5 % ophthalmic solution Place 1 drop into both eyes 2 times daily       difluprednate (DUREZOL) 0.05 % EMUL Apply 2 drops to eye 2 times daily BOTH EYES      gabapentin (NEURONTIN) 400 MG capsule 400 mg 4 times daily. Indications: Prescribed by [facility]   1    cyclobenzaprine (FLEXERIL) 10 MG tablet Take 10 mg by mouth 3 times daily as needed for Muscle spasms      methotrexate (RHEUMATREX) 2.5 MG chemo tablet Take 7.5 mg by mouth every 7 days       promethazine (PHENERGAN) 12.5 MG tablet Take 25 mg by mouth every 6 hours as needed for Nausea Indications: Dr. Faye Choi       diclofenac sodium 1 % GEL Apply topically 4 times daily as needed Indications: [facility]  prescribes (Patient not taking: Reported on 3/16/2023)      fluocinonide (LIDEX) 0.05 % cream Apply topically 2 times daily Apply topically 2 times daily. (Patient not taking: Reported on 3/16/2023)       No current facility-administered medications for this visit.        SOCIAL AND OCCUPATIONAL HEALTH:  Social History     Tobacco Use   Smoking Status Former    Packs/day: 0.25    Years: 20.00    Pack years: 5.00    Types: Cigarettes    Start date: 5/1/2022    Passive exposure: Never   Smokeless Tobacco Never   Tobacco Comments    Started smoking again in May (had quit smoking for approx 2 yrs)     TB :no   Pets no   Industrial exposure:no   Birds :no     SURGICAL HISTORY:   Past Surgical History:   Procedure Laterality Date    ABDOMINAL EXPLORATION SURGERY  08/19/2012    REDUCTION OF VULVUS; RIGHT COLECTOMY; SMALL BOWEL RESECTION; INSERTION OF ON Q PAIN BUSTER    BREAST BIOPSY      CARDIAC CATHETERIZATION  2010    CLEAN    CARPAL TUNNEL RELEASE Left 11/29/2022    LEFT CARPAL TUNNEL RELEASE performed by Jeremie Woodruff MD at [address]  2010    normal    COLONOSCOPY N/A 06/16/2020    COLON W/ANES. performed by Silvia Junior MD at [address]  12/2011    CYSTOSCOPY N/A 04/27/2022    CYSTOSCOPY, BILATERAL RETROGRADE PYELOGRAM performed by Marie Villanueva MD at [address]  06/16/2020    ESOPHAGEAL DILATION Deneise Bloodgood performed by Silvia Junior MD at [address] Bilateral 05/2009    cataract    HYSTERECTOMY (Amanda Fernando)  2000    LITHOTRIPSY  01/19/2012    LITHOTRIPSY      04/2012    MASTECTOMY Bilateral 05/18/2021    BILATERAL SIMPLE MASTECTOMY, RIGHT SENTINEL LYMPH NODE BIOPSY performed by Bela Holman MD at [address]    right    PARTIAL HYSTERECTOMY (CERVIX NOT REMOVED)      SIGMOID COLECTOMY Left 04/27/2022    ROBOTIC, SIGMOIDCOLECTOMY WITH REVISION OF COLORECTAL ANASTOMOSIS performed by Marie Villanueva MD at [address] [facility]  02/27/2013    UPPER GASTROINTESTINAL ENDOSCOPY  11/14/2017    gastritis    UPPER GASTROINTESTINAL ENDOSCOPY N/A 06/16/2020    EGD W/ANES. (11:00) performed by Silvia Junior MD at [address] [facility]  12/2011    US BREAST BIOPSY W LOC DEVICE 1ST LESION RIGHT Right 04/15/2021    US BREAST NEEDLE BIOPSY RIGHT 4/15/2021 Jorge Luis Winslow MD [facility]       FAMILY HISTORY:   Lung cancer:  DVT or PE     REVIEW OF SYSTEMS:  Constitutional: General health is good . There has been no weight changes. No fevers, fatigue or weakness. Head: Patient denies any history of trauma, convulsive disorder or syncope. Skin:  Patient denies history of changes in pigmentation, eruptions or pruritus. No easy bruising or bleeding.   EENT: no nasal congestion   Cardiovascular ,No chest pain ,No edema ,  Respiration:SOB:  +,GREEN :+  Gastrointestinal:No GI bleed ,no melena  ,no hematemesis    Musculoskeletal: no joint pain ,no swelling  Neurological:no , syncope. Denies twitching, convulsions, loss of consciousness or memory. Endocrine:  . No history of goiter, exophthalmos or dryness of skin. The patient has no history of diabetes. Hematopoietic:  No history of bleeding disorders or easy bruising. Rheumatic:  No connective tissue disease history or polyarthritis/inflammatory joint disease. PHYSICAL EXAMINATION:  Vitals:    03/16/23 1338   BP: 128/80   Pulse: 93   SpO2: 92%     Constitutional: This is a well developed, well nourished 79y.o. year old female who is alert, oriented, cooperative and in no apparent distress. Head was normocephalic and atraumatic. EENT: Mallampati class :  Extraocular muscles intact. External canals are patent and no discharge was appreciated. Septum was midline,   mucosa was without erythema, exudates or cobblestoning. No thrush was noted. Neck: Supple without thyromegaly. No elevated JVP. Trachea was midline. No carotid bruits were auscultated. Respiratory: CTA     Cardiovascular: Regular without murmur, clicks, gallops or rubs. There is no left or right ventricular heave. Pulses:  Carotid, radial and femoral pulses were equally bilaterally. Abdomen: Slightly rounded and soft without organomegaly. No rebound, rigidity or guarding was appreciated. Lymphatic: No lymphadenopathy. Musculoskeletal: no joint deformity . Extremities: no edema   Skin:  Warm and dry. Good color, turgor and pigmentation. No lesions or scars. Neurological/Psychiatric: The patient's general behavior, level of consciousness, thought content and emotional status is normal.  Cranial nerves II-XII are intact. DATA:  Will get PFT   CT1. Spirometry: The FEV1 is 1.43 liters, which is 63% of predicted. The FEV1/FVC ratio is normal.  Inhaled bronchodilators are given.   There  is no significant improvement. 2.  Lung volumes: Total lung capacity is 3.72 liters or 75% of  predicted. 3.  Diffusion capacity:  DLCO is 15.79 mL/min/mmHg, which is 72% of  predicted. 4.  Flow volume loop does not show an obvious obstructive pattern. 5.  Six-minute walk test per [facility] protocol. Baseline oxygen  saturation 95%. Lowest oxygen saturation was 92%. The patient  ambulated 630 feet. IMPRESSION:  1. No obstructive lung defect. 2.  Moderate restrictive lung defect. 3.  Mild reduction diffusion capacity. 4.  Mild oxyhemoglobin desaturation on six-minute walk testing, but  supplemental oxygen not required. IMPRESSION:    1-systemic sarcoid   2-breast cancer   3-optic neuronitis                PLAN:      -PFT seems restrictive from weight more than sarcoid ,hwoevere with posisble falir will use Prednisone 20 mg PO X 7 days then 10 mg PO X 7days then to go back 5 mg PO daily and then ever other day when more stable  Symbicort  HRCT looks stable   -HRCT scan   -will continue MTX at this time ,if other medicine needed then will discuss with Rheumatologist and her eye doctor  On MTX 7.5  Smoke 3 cigarettes  Will give promethazine X 1 months until she see PCP   Symbicort   - I spent 4-6 minutes counseling patient regarding smoking and the risk of Lung cancer and COPD and respiratory failure   -nicotine lozyn    Flu and Pneumovax as per primary     Thank you for allowing me to participate in [facility]. I will keep following with you ,should you have any concerns ,please contact us at [facility] pulmonary office     Sincerely,        Marielle Sosa MD  Pulmonary & Critical Care Medicine     NOTE: This report was transcribed using voice recognition software. Every effort was made to ensure accuracy; however, inadvertent computerized transcription errors may be present.

## 2023-03-16 NOTE — TELEPHONE ENCOUNTER
[address] [facility] Promethazine DM is not available there is a shortage need different rx please advise.     3/16/23    IMPRESSION:    1-systemic sarcoid   2-breast cancer   3-optic neuronitis                 PLAN:      -PFT seems restrictive from weight more than sarcoid ,hwoevere with posisble falir will use Prednisone 20 mg PO X 7 days then 10 mg PO X 7days then to go back 5 mg PO daily and then ever other day when more stable  Symbicort  HRCT looks stable   -HRCT scan   -will continue MTX at this time ,if other medicine needed then will discuss with Rheumatologist and her eye doctor  On MTX 7.5  Smoke 3 cigarettes  Will give promethazine X 1 months until she see PCP   Symbicort   - I spent 4-6 minutes counseling patient regarding smoking and the risk of Lung cancer and COPD and respiratory failure   -nicotine lozyn

## 2023-03-17 NOTE — TELEPHONE ENCOUNTER
Dr. Darcy Kauffman's response: I am not sure what alternative   She can check with her family doctor

## 2023-03-20 NOTE — TELEPHONE ENCOUNTER
Called pt states she was able to get cough syrup with no problem however insurance does not cover Symbicort, medication may need a PA will have to check with pharmacy

## 2023-03-23 NOTE — TELEPHONE ENCOUNTER
Refill Request     CONFIRM preferred pharmacy with the patient. If Mail Order Rx - Pend for 90 day refill. Last Seen: Last Seen Department: 1/9/2023  Last Seen by PCP: 1/9/2023    Last Written: 09/30/2022 90 tablet 1 refills     If no future appointment scheduled, route STAFF MESSAGE with patient name to the [facility] for scheduling. Next Appointment:   Future Appointments   Date Time Provider Annmarie Mclean   5/9/2023 10:30 AM Kevin Dueñas MD [facility] [place] - [place]   9/21/2023  1:00 PM Jolanta Harrison MD [facility]       Message sent to [address] [place] [address] to schedule appt with patient?   NO      Requested Prescriptions     Pending Prescriptions Disp Refills    mirtazapine (REMERON) 15 MG tablet [Pharmacy Med Name: Mirtazapine 15 MG Oral Tablet] 90 tablet 0     Sig: TAKE 1 TABLET BY MOUTH ONCE DAILY AT NIGHT

## 2023-03-23 NOTE — TELEPHONE ENCOUNTER
Refill Request     CONFIRM preferred pharmacy with the patient. If Mail Order Rx - Pend for 90 day refill. Last Seen: Last Seen Department: 1/9/2023  Last Seen by PCP: Visit date not found    Last Written: 12/29/2022 90 tablet 0 refills     If no future appointment scheduled, route STAFF MESSAGE with patient name to the [facility] for scheduling. Next Appointment:   Future Appointments   Date Time Provider Annmarie Mclean   5/9/2023 10:30 AM MD PREMA [place]  [place] - [place]   9/21/2023  1:00 PM MD Kati Novoa [facility]       Message sent to [employer] to schedule appt with patient?   NO      Requested Prescriptions     Pending Prescriptions Disp Refills    valACYclovir (VALTREX) 500 MG tablet [Pharmacy Med Name: valACYclovir HCl 500 MG Oral Tablet] 90 tablet 0     Sig: Take 1 tablet by mouth once daily

## 2023-03-24 RX ORDER — VALACYCLOVIR HYDROCHLORIDE 500 MG/1
TABLET, FILM COATED ORAL
Qty: 90 TABLET | Refills: 0 | Status: SHIPPED | OUTPATIENT
Start: 2023-03-24

## 2023-03-24 RX ORDER — MIRTAZAPINE 15 MG/1
TABLET, FILM COATED ORAL
Qty: 90 TABLET | Refills: 1 | Status: SHIPPED | OUTPATIENT
Start: 2023-03-24

## 2023-03-30 NOTE — TELEPHONE ENCOUNTER
Refill Request     CONFIRM preferred pharmacy with the patient. If Mail Order Rx - Pend for 90 day refill. Last Seen: Last Seen Department: 1/9/2023  Last Seen by PCP: Visit date not found    Last Written: 12/30/2022    If no future appointment scheduled:  Review the last OV with PCP and review information for follow-up visit,  Route STAFF MESSAGE with patient name to the LT[facility], located within [facility] for scheduling with the following information:            -  Timing of next visit           -  Visit type ie Physical, OV, etc           -  Diagnoses/Reason ie. COPD, HTN - Do not use MEDICATION, Follow-up or CHECK UP - Give reason for visit      Next Appointment:   Future Appointments   Date Time Provider Annmarie Mclean   5/9/2023 10:30 AM Ericka May MD [facility] [place] - [place]   9/21/2023  1:00 PM MD Lloyd Rodríguez [facility]       Message sent to [address] [place] [address] to schedule appt with patient?   NO      Requested Prescriptions     Pending Prescriptions Disp Refills    montelukast (SINGULAIR) 10 MG tablet [Pharmacy Med Name: Montelukast Sodium 10 MG Oral Tablet] 90 tablet 0     Sig: TAKE 1 TABLET BY MOUTH ONCE DAILY NIGHTLY

## 2023-03-30 NOTE — TELEPHONE ENCOUNTER
Negro SINGH Case ID: [id number] - Rx #: [id number]    There is an existing case within the [facility] environment that has the same patient, prescriber, and drug. This case must be finalized before proceeding with similar requests.

## 2023-03-31 RX ORDER — MONTELUKAST SODIUM 10 MG/1
TABLET ORAL
Qty: 90 TABLET | Refills: 0 | Status: SHIPPED | OUTPATIENT
Start: 2023-03-31

## 2023-04-04 ENCOUNTER — TELEPHONE (OUTPATIENT)
Dept: PULMONOLOGY | Age: 68
End: 2023-04-04

## 2023-04-18 ENCOUNTER — HOSPITAL ENCOUNTER (OUTPATIENT)
Dept: MRI IMAGING | Age: 68
Discharge: HOME | End: 2023-04-18
Payer: MEDICARE

## 2023-04-18 ENCOUNTER — HOSPITAL ENCOUNTER (OUTPATIENT)
Age: 68
Discharge: HOME | End: 2023-04-18
Payer: MEDICARE

## 2023-04-18 DIAGNOSIS — C50.411 MALIGNANT NEOPLASM OF UPPER-OUTER QUADRANT OF RIGHT FEMALE BREAST, UNSPECIFIED ESTROGEN RECEPTOR STATUS (HCC): ICD-10-CM

## 2023-04-18 DIAGNOSIS — D75.839 THROMBOCYTOSIS: ICD-10-CM

## 2023-04-18 DIAGNOSIS — G44.52 NEW DAILY PERSISTENT HEADACHE: ICD-10-CM

## 2023-04-18 LAB
BASOPHILS # BLD: 0.1 K/UL (ref 0–0.2)
BASOPHILS NFR BLD: 0.6 %
DEPRECATED RDW RBC AUTO: 20.7 % (ref 12.4–15.4)
EOSINOPHIL # BLD: 0.5 K/UL (ref 0–0.6)
EOSINOPHIL NFR BLD: 3 %
HCT VFR BLD AUTO: 44.5 % (ref 36–48)
HGB BLD-MCNC: 14.3 G/DL (ref 12–16)
LYMPHOCYTES # BLD: 5.3 K/UL (ref 1–5.1)
LYMPHOCYTES NFR BLD: 31.9 %
MCH RBC QN AUTO: 32.3 PG (ref 26–34)
MCHC RBC AUTO-ENTMCNC: 32.1 G/DL (ref 31–36)
MCV RBC AUTO: 100.6 FL (ref 80–100)
MONOCYTES # BLD: 1.2 K/UL (ref 0–1.3)
MONOCYTES NFR BLD: 7.4 %
NEUTROPHILS # BLD: 9.5 K/UL (ref 1.7–7.7)
NEUTROPHILS NFR BLD: 57.1 %
PATH INTERP BLD-IMP: NORMAL
PATH INTERP BLD-IMP: YES
PLATELET # BLD AUTO: 553 K/UL (ref 135–450)
PLATELET BLD QL SMEAR: ABNORMAL
PMV BLD AUTO: 7.8 FL (ref 5–10.5)
RBC # BLD AUTO: 4.42 M/UL (ref 4–5.2)
SLIDE REVIEW: ABNORMAL
WBC # BLD AUTO: 16.6 K/UL (ref 4–11)

## 2023-04-18 PROCEDURE — 85025 COMPLETE CBC W/AUTO DIFF WBC: CPT

## 2023-04-18 PROCEDURE — 36415 COLL VENOUS BLD VENIPUNCTURE: CPT

## 2023-04-18 PROCEDURE — 6360000004 HC RX CONTRAST MEDICATION: Performed by: NURSE PRACTITIONER

## 2023-04-18 PROCEDURE — A9579 GAD-BASE MR CONTRAST NOS,1ML: HCPCS | Performed by: NURSE PRACTITIONER

## 2023-04-18 PROCEDURE — 70553 MRI BRAIN STEM W/O & W/DYE: CPT

## 2023-04-18 RX ADMIN — GADOTERIDOL 7.8 MMOL: 279.3 INJECTION, SOLUTION INTRAVENOUS at 14:55

## 2023-04-19 LAB — PATH INTERP BLD-IMP: NORMAL

## 2023-04-25 NOTE — TELEPHONE ENCOUNTER
[facility] generic Symb[facility]ort was denied because SOB and sarcoidosis are off-label uses for Symbicort & thus, not medicall accepted. [facility] has decided there is no proof that this drug works for her condition. Will route to Dr. Juancarlos Abebe to send alternative. Pls call Kathrine Mcallister to notify him of which alternative inhaler is sent [phone].

## 2023-04-26 DIAGNOSIS — F51.01 PRIMARY INSOMNIA: ICD-10-CM

## 2023-04-26 RX ORDER — FLUTICASONE PROPIONATE AND SALMETEROL 250; 50 UG/1; UG/1
POWDER RESPIRATORY (INHALATION)
Qty: 60 EACH | Refills: 0 | OUTPATIENT
Start: 2023-04-26

## 2023-04-26 NOTE — TELEPHONE ENCOUNTER
ELISSA Reardon stating name and directions of alternative inhaler sent to replace Symbicort. Advised to CB with questions.

## 2023-04-26 NOTE — TELEPHONE ENCOUNTER
Refill Request - Controlled Substance    CONFIRM preferred pharmacy with the patient. If Mail Order Rx - Pend for 90 day refill. Last Seen Department: 1/9/2023  Last Seen by PCP: 1/9/2023    Last Written: 03/03/2023 60 tablet 0 refills    Last UDS: 12/19/2017    Med Agreement Signed On: 01/04/2017    If no future appointment scheduled:  Review the last OV with PCP and review information for follow-up visit,  Route STAFF MESSAGE with patient name to the [facility] for scheduling with the following information:            -  Timing of next visit           -  Visit type ie Physical, OV, etc           -  Diagnoses/Reason ie. COPD, HTN - Do not use MEDICATION, Follow-up or CHECK UP - Give reason for visit        Next Appointment:   Future Appointments   Date Time Provider Annmarie Mclean   5/10/2023 11:30 AM VERITO Camejo - CNP [facility]  [place] - [place]   9/21/2023  1:00 PM MD Ramiro Partida [facility]       Message sent to [address] [place] [address] to schedule appt with patient?   N/A      Requested Prescriptions     Pending Prescriptions Disp Refills    ALPRAZolam (XANAX) 1 MG tablet [Pharmacy Med Name: ALPRAZolam 1 MG Oral Tablet] 60 tablet 0     Sig: TAKE 1 TABLET BY MOUTH TWICE DAILY AS NEEDED FOR SLEEP

## 2023-04-27 RX ORDER — ALPRAZOLAM 1 MG/1
TABLET ORAL
Qty: 60 TABLET | Refills: 0 | Status: SHIPPED | OUTPATIENT
Start: 2023-04-27 | End: 2023-05-27

## 2023-04-27 RX ORDER — ALENDRONATE SODIUM 70 MG/1
TABLET ORAL
Qty: 12 TABLET | Refills: 0 | Status: SHIPPED | OUTPATIENT
Start: 2023-04-27

## 2023-04-27 NOTE — TELEPHONE ENCOUNTER
Refill Request     CONFIRM preferred pharmacy with the patient. If Mail Order Rx - Pend for 90 day refill. Last Seen: Last Seen Department: 1/9/2023  Last Seen by PCP: 1/9/2023    Last Written: 5/24/22 12 with 3 refills     If no future appointment scheduled:  Review the last OV with PCP and review information for follow-up visit,  Route STAFF MESSAGE with patient name to the [facility] for scheduling with the following information:            -  Timing of next visit           -  Visit type ie Physical, OV, etc           -  Diagnoses/Reason ie. COPD, HTN - Do not use MEDICATION, Follow-up or CHECK UP - Give reason for visit      Next Appointment:   Future Appointments   Date Time Provider Annmarie Mclean   5/10/2023 11:30 AM VERITO Watkins - CNP [facility] [place] - [place]   9/21/2023  1:00 PM MD Tyesha Jason [facility]       Message sent to [email or web address] to schedule appt with patient?   NO      Requested Prescriptions     Pending Prescriptions Disp Refills    alendronate (FOSAMAX) 70 MG tablet [Pharmacy Med Name: Alendronate Sodium 70 MG Oral Tablet] 12 tablet 0     Sig: Take 1 tablet by mouth once a week

## 2023-05-10 DIAGNOSIS — E78.00 HYPERCHOLESTEROLEMIA: Primary | ICD-10-CM

## 2023-05-10 NOTE — TELEPHONE ENCOUNTER
Refill Request     CONFIRM preferred pharmacy with the patient. If Mail Order Rx - Pend for 90 day refill. Last Seen: Last Seen Department: 1/9/2023  Last Seen by PCP: 1/9/2023    Last Written: 10/11/2022    If no future appointment scheduled:  Review the last OV with PCP and review information for follow-up visit,  Route STAFF MESSAGE with patient name to the [facility] for scheduling with the following information:            -  Timing of next visit           -  Visit type ie Physical, OV, etc           -  Diagnoses/Reason ie. COPD, HTN - Do not use MEDICATION, Follow-up or CHECK UP - Give reason for visit      Next Appointment:   Future Appointments   Date Time Provider Annmarie Mclean   5/18/2023  1:30 PM VERITO Voss - CNP [facility] [place] - [facility]   9/21/2023  1:00 PM MD Anabel Nichols       Message sent to [address] [place] [address] to schedule appt with patient?   NO      Requested Prescriptions     Pending Prescriptions Disp Refills    spironolactone (ALDACTONE) 50 MG tablet [Pharmacy Med Name: Spironolactone 50 MG Oral Tablet] 90 tablet 1     Sig: Take 1 tablet by mouth once daily

## 2023-05-12 RX ORDER — SPIRONOLACTONE 50 MG/1
TABLET, FILM COATED ORAL
Qty: 90 TABLET | Refills: 1 | Status: SHIPPED | OUTPATIENT
Start: 2023-05-12

## 2023-05-18 ENCOUNTER — OFFICE VISIT (OUTPATIENT)
Dept: FAMILY MEDICINE CLINIC | Age: 68
End: 2023-05-18

## 2023-05-18 VITALS
WEIGHT: 170.2 LBS | DIASTOLIC BLOOD PRESSURE: 74 MMHG | OXYGEN SATURATION: 97 % | HEIGHT: 64 IN | SYSTOLIC BLOOD PRESSURE: 116 MMHG | TEMPERATURE: 98.4 F | HEART RATE: 101 BPM | BODY MASS INDEX: 29.06 KG/M2

## 2023-05-18 DIAGNOSIS — R07.9 CHEST PAIN, UNSPECIFIED TYPE: ICD-10-CM

## 2023-05-18 DIAGNOSIS — F33.1 MAJOR DEPRESSIVE DISORDER, RECURRENT EPISODE, MODERATE (HCC): ICD-10-CM

## 2023-05-18 DIAGNOSIS — R73.01 IMPAIRED FASTING GLUCOSE: ICD-10-CM

## 2023-05-18 DIAGNOSIS — R05.1 ACUTE COUGH: ICD-10-CM

## 2023-05-18 DIAGNOSIS — I10 ESSENTIAL HYPERTENSION: ICD-10-CM

## 2023-05-18 DIAGNOSIS — R15.9 INCONTINENCE OF FECES, UNSPECIFIED FECAL INCONTINENCE TYPE: Primary | ICD-10-CM

## 2023-05-18 DIAGNOSIS — C50.311 MALIGNANT NEOPLASM OF LOWER-INNER QUADRANT OF RIGHT BREAST OF FEMALE, ESTROGEN RECEPTOR POSITIVE (HCC): ICD-10-CM

## 2023-05-18 DIAGNOSIS — R06.02 SOB (SHORTNESS OF BREATH) ON EXERTION: ICD-10-CM

## 2023-05-18 DIAGNOSIS — D86.9 SARCOIDOSIS: ICD-10-CM

## 2023-05-18 DIAGNOSIS — Z17.0 MALIGNANT NEOPLASM OF LOWER-INNER QUADRANT OF RIGHT BREAST OF FEMALE, ESTROGEN RECEPTOR POSITIVE (HCC): ICD-10-CM

## 2023-05-18 DIAGNOSIS — E66.09 CLASS 1 OBESITY DUE TO EXCESS CALORIES WITHOUT SERIOUS COMORBIDITY WITH BODY MASS INDEX (BMI) OF 30.0 TO 30.9 IN ADULT: ICD-10-CM

## 2023-05-18 DIAGNOSIS — K52.9 CHRONIC DIARRHEA: ICD-10-CM

## 2023-05-18 RX ORDER — DOXYCYCLINE HYCLATE 100 MG
100 TABLET ORAL 2 TIMES DAILY
Qty: 14 TABLET | Refills: 0 | Status: SHIPPED | OUTPATIENT
Start: 2023-05-18 | End: 2023-05-25

## 2023-05-18 RX ORDER — BENZONATATE 100 MG/1
100 CAPSULE ORAL 3 TIMES DAILY PRN
Qty: 30 CAPSULE | Refills: 0 | Status: SHIPPED | OUTPATIENT
Start: 2023-05-18 | End: 2023-05-28

## 2023-05-18 SDOH — ECONOMIC STABILITY: FOOD INSECURITY: WITHIN THE PAST 12 MONTHS, THE FOOD YOU BOUGHT JUST DIDN'T LAST AND YOU DIDN'T HAVE MONEY TO GET MORE.: NEVER TRUE

## 2023-05-18 SDOH — ECONOMIC STABILITY: FOOD INSECURITY: WITHIN THE PAST 12 MONTHS, YOU WORRIED THAT YOUR FOOD WOULD RUN OUT BEFORE YOU GOT MONEY TO BUY MORE.: NEVER TRUE

## 2023-05-18 SDOH — ECONOMIC STABILITY: INCOME INSECURITY: HOW HARD IS IT FOR YOU TO PAY FOR THE VERY BASICS LIKE FOOD, HOUSING, MEDICAL CARE, AND HEATING?: NOT HARD AT ALL

## 2023-05-18 ASSESSMENT — PATIENT HEALTH QUESTIONNAIRE - PHQ9
SUM OF ALL RESPONSES TO PHQ QUESTIONS 1-9: 2
5. POOR APPETITE OR OVEREATING: 0
6. FEELING BAD ABOUT YOURSELF - OR THAT YOU ARE A FAILURE OR HAVE LET YOURSELF OR YOUR FAMILY DOWN: 0
SUM OF ALL RESPONSES TO PHQ9 QUESTIONS 1 & 2: 0
1. LITTLE INTEREST OR PLEASURE IN DOING THINGS: 0
9. THOUGHTS THAT YOU WOULD BE BETTER OFF DEAD, OR OF HURTING YOURSELF: 0
4. FEELING TIRED OR HAVING LITTLE ENERGY: 1
SUM OF ALL RESPONSES TO PHQ QUESTIONS 1-9: 2
2. FEELING DOWN, DEPRESSED OR HOPELESS: 0
8. MOVING OR SPEAKING SO SLOWLY THAT OTHER PEOPLE COULD HAVE NOTICED. OR THE OPPOSITE, BEING SO FIGETY OR RESTLESS THAT YOU HAVE BEEN MOVING AROUND A LOT MORE THAN USUAL: 0
3. TROUBLE FALLING OR STAYING ASLEEP: 1
7. TROUBLE CONCENTRATING ON THINGS, SUCH AS READING THE NEWSPAPER OR WATCHING TELEVISION: 0
SUM OF ALL RESPONSES TO PHQ QUESTIONS 1-9: 2
10. IF YOU CHECKED OFF ANY PROBLEMS, HOW DIFFICULT HAVE THESE PROBLEMS MADE IT FOR YOU TO DO YOUR WORK, TAKE CARE OF THINGS AT HOME, OR GET ALONG WITH OTHER PEOPLE: 0
SUM OF ALL RESPONSES TO PHQ QUESTIONS 1-9: 2

## 2023-05-18 ASSESSMENT — ENCOUNTER SYMPTOMS
COUGH: 1
DIARRHEA: 0
TROUBLE SWALLOWING: 0
WHEEZING: 1
CONSTIPATION: 0
VOMITING: 0
CHEST TIGHTNESS: 1
SORE THROAT: 0
SHORTNESS OF BREATH: 1
NAUSEA: 0
SINUS PRESSURE: 1

## 2023-05-18 NOTE — PROGRESS NOTES
encounter: 170 lb 3.2 oz (77.2 kg). Physical Exam  Vitals reviewed. Constitutional:       General: She is not in acute distress. Appearance: Normal appearance. She is not ill-appearing, toxic-appearing or diaphoretic. HENT:      Head: Normocephalic and atraumatic. Right Ear: Tympanic membrane normal.      Left Ear: Tympanic membrane normal.      Nose: Nose normal.      Mouth/Throat:      Pharynx: No oropharyngeal exudate or posterior oropharyngeal erythema. Eyes:      General:         Right eye: No discharge. Left eye: No discharge. Conjunctiva/sclera: Conjunctivae normal.   Neck:      Vascular: No carotid bruit. Cardiovascular:      Rate and Rhythm: Normal rate and regular rhythm. Pulses: Normal pulses. Heart sounds: Normal heart sounds. Pulmonary:      Effort: Pulmonary effort is normal. No respiratory distress. Breath sounds: Wheezing present. No rhonchi. Chest:      Chest wall: No tenderness. Abdominal:      General: Abdomen is flat. Bowel sounds are normal.      Palpations: Abdomen is soft. Tenderness: There is no abdominal tenderness. There is no guarding. Musculoskeletal:         General: Normal range of motion. Cervical back: Normal range of motion and neck supple. No rigidity or tenderness. Right lower leg: No edema. Left lower leg: No edema. Lymphadenopathy:      Cervical: No cervical adenopathy. Skin:     General: Skin is warm and dry. Capillary Refill: Capillary refill takes less than 2 seconds. Neurological:      General: No focal deficit present. Mental Status: She is alert and oriented to person, place, and time. Mental status is at baseline. Psychiatric:         Mood and Affect: Mood normal.         Behavior: Behavior normal.         Thought Content:  Thought content normal.         Judgment: Judgment normal.

## 2023-05-23 ENCOUNTER — HOSPITAL ENCOUNTER (OUTPATIENT)
Dept: GENERAL RADIOLOGY | Age: 68
Discharge: HOME | End: 2023-05-23
Payer: MEDICARE

## 2023-05-23 DIAGNOSIS — R07.9 CHEST PAIN, UNSPECIFIED TYPE: ICD-10-CM

## 2023-05-23 DIAGNOSIS — R05.1 ACUTE COUGH: ICD-10-CM

## 2023-05-23 PROCEDURE — 71046 X-RAY EXAM CHEST 2 VIEWS: CPT

## 2023-05-30 DIAGNOSIS — R10.13 EPIGASTRIC PAIN: ICD-10-CM

## 2023-05-30 NOTE — TELEPHONE ENCOUNTER
.Refill Request     CONFIRM preferred pharmacy with the patient. If Mail Order Rx - Pend for 90 day refill. Last Seen: Last Seen Department: 5/18/2023  Last Seen by PCP: Visit date not found    Last Written: 12/29/22 170 with 0     If no future appointment scheduled:  Review the last OV with PCP and review information for follow-up visit,  Route STAFF MESSAGE with patient name to the [facility] for scheduling with the following information:            -  Timing of next visit           -  Visit type ie Physical, OV, etc           -  Diagnoses/Reason ie. COPD, HTN - Do not use MEDICATION, Follow-up or CHECK UP - Give reason for visit      Next Appointment:   Future Appointments   Date Time Provider Annmarie Mclean   9/13/2023  1:00 PM SCHEDULE, [facility]   9/18/2023  1:00 PM Carol Lanes Pardekooper, APRN - CNP [facility]  [place] - [place]   9/20/2023  1:00 PM David Elder MD [facility]   9/21/2023  1:00 PM [facility]       Message sent to [address] [place] [address] to schedule appt with patient?   N/A      Requested Prescriptions     Pending Prescriptions Disp Refills    pantoprazole (PROTONIX) 40 MG tablet [Pharmacy Med Name: Pantoprazole Sodium 40 MG Oral Tablet Delayed Release] 180 tablet 1     Sig: TAKE 1 TABLET BY MOUTH TWICE DAILY BEFORE MEAL(S)

## 2023-06-01 RX ORDER — PANTOPRAZOLE SODIUM 40 MG/1
TABLET, DELAYED RELEASE ORAL
Qty: 180 TABLET | Refills: 1 | Status: SHIPPED | OUTPATIENT
Start: 2023-06-01

## 2023-06-09 ENCOUNTER — OFFICE VISIT (OUTPATIENT)
Dept: FAMILY MEDICINE CLINIC | Age: 68
End: 2023-06-09

## 2023-06-09 VITALS
OXYGEN SATURATION: 93 % | HEART RATE: 90 BPM | SYSTOLIC BLOOD PRESSURE: 118 MMHG | DIASTOLIC BLOOD PRESSURE: 70 MMHG | TEMPERATURE: 97 F

## 2023-06-09 DIAGNOSIS — F51.01 PRIMARY INSOMNIA: ICD-10-CM

## 2023-06-09 DIAGNOSIS — L08.9 SKIN INFECTION: Primary | ICD-10-CM

## 2023-06-09 RX ORDER — SULFAMETHOXAZOLE AND TRIMETHOPRIM 800; 160 MG/1; MG/1
1 TABLET ORAL 2 TIMES DAILY
Qty: 14 TABLET | Refills: 0 | Status: CANCELLED | OUTPATIENT
Start: 2023-06-09 | End: 2023-06-16

## 2023-06-09 RX ORDER — CLINDAMYCIN HYDROCHLORIDE 300 MG/1
300 CAPSULE ORAL 3 TIMES DAILY
Qty: 21 CAPSULE | Refills: 0 | Status: SHIPPED | OUTPATIENT
Start: 2023-06-09 | End: 2023-06-16

## 2023-06-09 RX ORDER — UREA 10 %
1 LOTION (ML) TOPICAL DAILY
Qty: 7 TABLET | Refills: 0 | Status: SHIPPED | OUTPATIENT
Start: 2023-06-09

## 2023-06-09 ASSESSMENT — ENCOUNTER SYMPTOMS
DIARRHEA: 0
CONSTIPATION: 0
COUGH: 0
SHORTNESS OF BREATH: 0
WHEEZING: 0
NAUSEA: 0
VOMITING: 0
CHEST TIGHTNESS: 0

## 2023-06-09 NOTE — TELEPHONE ENCOUNTER
Refill Request - Controlled Substance    CONFIRM preferred pharmacy with the patient. If Mail Order Rx - Pend for 90 day refill. Last Seen Department: 5/18/2023  Last Seen by PCP: 1/9/2023    Last Written: 4/27/23 60 with no refills     Last UDS: 12/19/17    Med Agreement Signed On: 1/4/2017    If no future appointment scheduled:  Review the last OV with PCP and review information for follow-up visit,  Route STAFF MESSAGE with patient name to the [facility] for scheduling with the following information:            -  Timing of next visit           -  Visit type ie Physical, OV, etc           -  Diagnoses/Reason ie. COPD, HTN - Do not use MEDICATION, Follow-up or CHECK UP - Give reason for visit        Next Appointment:   Future Appointments   Date Time Provider Annmarie Mclean   6/9/2023  3:00 PM VERITO Lofton CNP  [place] [facility] [place]   9/13/2023  1:00 PM SCHEDULE, [facility]   9/18/2023  1:00 PM VERITO Lofton CNP  [place] - DYD   9/20/2023  1:00 PM Wandy Michelle MD [facility] WT MMA   9/21/2023  1:00 PM Ethan Morrison MD [facility]       Message sent to [address] [place] [address] to schedule appt with patient?   NO      Requested Prescriptions     Pending Prescriptions Disp Refills    ALPRAZolam (XANAX) 1 MG tablet [Pharmacy Med Name: ALPRAZolam 1 MG Oral Tablet] 60 tablet 0     Sig: TAKE 1 TABLET BY MOUTH TWICE DAILY AS NEEDED FOR SLEEP

## 2023-06-09 NOTE — PROGRESS NOTES
Palpations: Abdomen is soft. Tenderness: There is no abdominal tenderness. There is no guarding. Musculoskeletal:         General: Normal range of motion. Cervical back: Normal range of motion and neck supple. Right lower leg: No edema. Left lower leg: No edema. Comments: Negative la's sign bilaterally. Skin:     General: Skin is warm and dry. Capillary Refill: Capillary refill takes less than 2 seconds. Findings: Erythema present. Comments: Localized swelling with red streaks. No open areas. Neurological:      General: No focal deficit present. Mental Status: She is alert and oriented to person, place, and time. Mental status is at baseline. Cranial Nerves: No cranial nerve deficit. Motor: No weakness. Gait: Gait normal.   Psychiatric:         Mood and Affect: Mood normal.         Behavior: Behavior normal.         Thought Content:  Thought content normal.         Judgment: Judgment normal.

## 2023-06-11 RX ORDER — ALPRAZOLAM 1 MG/1
TABLET ORAL
Qty: 60 TABLET | Refills: 0 | Status: SHIPPED | OUTPATIENT
Start: 2023-06-11 | End: 2023-08-10

## 2023-06-16 RX ORDER — PROPRANOLOL HCL 60 MG
CAPSULE, EXTENDED RELEASE 24HR ORAL
Qty: 90 CAPSULE | Refills: 0 | OUTPATIENT
Start: 2023-06-16

## 2023-06-19 RX ORDER — VALACYCLOVIR HYDROCHLORIDE 500 MG/1
TABLET, FILM COATED ORAL
Qty: 90 TABLET | Refills: 0 | Status: SHIPPED | OUTPATIENT
Start: 2023-06-19

## 2023-06-19 NOTE — TELEPHONE ENCOUNTER
Refill Request     CONFIRM preferred pharmacy with the patient. If Mail Order Rx - Pend for 90 day refill. Last Seen: Last Seen Department: 6/9/2023  Last Seen by PCP: 1/9/2023    Last Written: 3/24/2023, #90, 0 refills    If no future appointment scheduled:  Review the last OV with PCP and review information for follow-up visit,  Route STAFF MESSAGE with patient name to the [facility] for scheduling with the following information:            -  Timing of next visit           -  Visit type ie Physical, OV, etc           -  Diagnoses/Reason ie. COPD, HTN - Do not use MEDICATION, Follow-up or CHECK UP - Give reason for visit      Next Appointment:   Future Appointments   Date Time Provider Annmarie Mclean   6/26/2023  8:00 AM [address] [facility]   6/26/2023  8:30 AM MHC[facility] STRESS AMANDA [facility] STRESS Amanda HOD   9/13/2023  1:00 PM SCHEDULE, HEALTH[facility] WE[facility] HEALTHY WT [facility]   9/18/2023  1:00 PM VERITO Collier - CNP [facility] [place] - D[facility]D   9/20/2023  1:00 PM Beth Mena MD HEALTHY WT [facility]   9/21/2023  1:00 PM MD KRISH Guerra [facility]       Message sent to [address] [place] [address] to schedule appt with patient?   N/A      Requested Prescriptions     Pending Prescriptions Disp Refills    valACYclovir (VALTREX) 500 MG tablet [Pharmacy Med Name: valACYclovir HCl 500 MG Oral Tablet] 90 tablet 0     Sig: Take 1 tablet by mouth once daily

## 2023-06-26 ENCOUNTER — HOSPITAL ENCOUNTER (OUTPATIENT)
Dept: NON INVASIVE DIAGNOSTICS | Age: 68
Discharge: HOME | End: 2023-06-26
Payer: MEDICARE

## 2023-06-26 DIAGNOSIS — R07.9 CHEST PAIN, UNSPECIFIED TYPE: ICD-10-CM

## 2023-06-26 DIAGNOSIS — R06.02 SOB (SHORTNESS OF BREATH) ON EXERTION: ICD-10-CM

## 2023-06-26 PROCEDURE — 93017 CV STRESS TEST TRACING ONLY: CPT

## 2023-06-26 RX ORDER — MONTELUKAST SODIUM 10 MG/1
TABLET ORAL
Qty: 90 TABLET | Refills: 0 | Status: SHIPPED | OUTPATIENT
Start: 2023-06-26

## 2023-07-06 RX ORDER — ATORVASTATIN CALCIUM 40 MG/1
TABLET, FILM COATED ORAL
Qty: 90 TABLET | Refills: 1 | Status: SHIPPED | OUTPATIENT
Start: 2023-07-06

## 2023-07-06 NOTE — TELEPHONE ENCOUNTER
.Refill Request     CONFIRM preferred pharmacy with the patient. If Mail Order Rx - Pend for 90 day refill. Last Seen: Last Seen Department: 2023  Last Seen by PCP: 2023    Last Written: 10-11-22 90 with 1    If no future appointment scheduled:  Review the last OV with PCP and review information for follow-up visit,  Route STAFF MESSAGE with patient name to the [facility] for scheduling with the following information:            -  Timing of next visit           -  Visit type ie Physical, OV, etc           -  Diagnoses/Reason ie. COPD, HTN - Do not use MEDICATION, Follow-up or CHECK UP - Give reason for visit      Next Appointment:   Future Appointments   Date Time Provider [address]   2023  1:00 PM SCHEDULE, HE[facility] WE[facility] HEALTH[facility]   2023  1:00 PM VERITO Fairchild - CNP [facility]  [place] - DYD   2023  1:00 PM Hunter Whaley MD HEALTH[facility]   2023  1:00 PM Jacobo Apgar, MD [facility]       Message sent to [address] [place] [address] to schedule appt with patient?   N/A      Requested Prescriptions     Pending Prescriptions Disp Refills    atorvastatin (LIPITOR) 40 MG tablet [Pharmacy Med Name: Atorvastatin Calcium 40 MG Oral Tablet] 90 tablet 1     Sig: TAKE 1 TABLET BY MOUTH ONCE DAILY NIGHTLY

## 2023-07-27 RX ORDER — ALENDRONATE SODIUM 70 MG/1
TABLET ORAL
Qty: 12 TABLET | Refills: 0 | Status: SHIPPED | OUTPATIENT
Start: 2023-07-27

## 2023-07-27 NOTE — TELEPHONE ENCOUNTER
Refill Request     CONFIRM preferred pharmacy with the patient. If Mail Order Rx - Pend for 90 day refill. Last Seen: Last Seen Department: 6/9/2023  Last Seen by PCP: 1/9/2023    Last Written: 04/27/2023 12 tablet 0 refills     If no future appointment scheduled:  Review the last OV with PCP and review information for follow-up visit,  Route STAFF MESSAGE with patient name to the [facility] for scheduling with the following information:            -  Timing of next visit           -  Visit type ie Physical, OV, etc           -  Diagnoses/Reason ie. COPD, HTN - Do not use MEDICATION, Follow-up or CHECK UP - Give reason for visit      Next Appointment:   Future Appointments   Date Time Provider [facility] [address]   9/13/2023  1:00 PM SCHEDULE, HEALTHY WEIGHT HEALTHY WT [facility]   9/18/2023  1:00 PM [facility]  [place] [facility]   9/20/2023  1:00 PM Ivonne Bolanos MD [facility]   9/21/2023  1:00 PM Gallo Nina MD [facility]       Message sent to [address] [place] [address] to schedule appt with patient?   NO      Requested Prescriptions     Pending Prescriptions Disp Refills    alendronate (FOSAMAX) 70 MG tablet [Pharmacy Med Name: Alendronate Sodium 70 MG Oral Tablet] 12 tablet 0     Sig: Take 1 tablet by mouth once a week

## 2023-08-10 DIAGNOSIS — F51.01 PRIMARY INSOMNIA: ICD-10-CM

## 2023-08-10 NOTE — TELEPHONE ENCOUNTER
.Refill Request - Controlled Substance    CONFIRM preferred pharmacy with the patient. If Mail Order Rx - Pend for 90 day refill. Last Seen Department: 6/9/2023  Last Seen by PCP: Visit date not found    Last Written: 6-11-23 60 with 0     Last UDS: 12-19-17    Med Agreement Signed On: 1-4-17    If no future appointment scheduled:  Review the last OV with PCP and review information for follow-up visit,  Route STAFF MESSAGE with patient name to the [facility] for scheduling with the following information:            -  Timing of next visit           -  Visit type ie Physical, OV, etc           -  Diagnoses/Reason ie. COPD, HTN - Do not use MEDICATION, Follow-up or CHECK UP - Give reason for visit        Next Appointment:   Future Appointments   Date Time Provider [address]   8/15/2023  1:00  [place] [facility] WC DEXA [facility] [place] [facility]   9/13/2023  1:00 PM SCHEDULE, HEALTHY WEIGHT HEALTHY WT MMA   9/18/2023  1:00 PM [facility]  [place] [facility]   9/20/2023  1:00 PM Heidi Lanes, MD [facility]   9/21/2023  1:00 PM Jere Head MD [facility]       Message sent to [address] [place] [address] to schedule appt with patient?   N/A      Requested Prescriptions     Pending Prescriptions Disp Refills    ALPRAZolam (XANAX) 1 MG tablet [Pharmacy Med Name: ALPRAZolam 1 MG Oral Tablet] 60 tablet 0     Sig: TAKE 1 TABLET BY MOUTH TWICE DAILY AS NEEDED FOR SLEEP

## 2023-08-11 RX ORDER — ALPRAZOLAM 1 MG/1
TABLET ORAL
Qty: 60 TABLET | Refills: 0 | Status: SHIPPED | OUTPATIENT
Start: 2023-08-11 | End: 2023-10-10

## 2023-08-15 ENCOUNTER — HOSPITAL ENCOUNTER (OUTPATIENT)
Dept: WOMENS IMAGING | Age: 68
Discharge: HOME | End: 2023-08-15
Payer: MEDICARE

## 2023-08-15 DIAGNOSIS — N95.8 POSTARTIFICIAL MENOPAUSAL SYNDROME: ICD-10-CM

## 2023-08-15 PROCEDURE — 77080 DXA BONE DENSITY AXIAL: CPT

## 2023-08-31 DIAGNOSIS — F33.1 MAJOR DEPRESSIVE DISORDER, RECURRENT EPISODE, MODERATE (HCC): ICD-10-CM

## 2023-08-31 RX ORDER — BUDESONIDE AND FORMOTEROL FUMARATE DIHYDRATE 160; 4.5 UG/1; UG/1
AEROSOL RESPIRATORY (INHALATION)
Qty: 11 G | Refills: 5 | Status: SHIPPED | OUTPATIENT
Start: 2023-08-31

## 2023-08-31 NOTE — TELEPHONE ENCOUNTER
.Refill Request     CONFIRM preferred pharmacy with the patient. If Mail Order Rx - Pend for 90 day refill. Last Seen: Last Seen Department: 6/9/2023  Last Seen by PCP: 1/9/2023    Last Written: 3-3-23 135 with 1     If no future appointment scheduled:  Review the last OV with PCP and review information for follow-up visit,  Route STAFF MESSAGE with patient name to the [facility] for scheduling with the following information:            -  Timing of next visit           -  Visit type ie Physical, OV, etc           -  Diagnoses/Reason ie. COPD, HTN - Do not use MEDICATION, Follow-up or CHECK UP - Give reason for visit      Next Appointment:   Future Appointments   Date Time Provider [address]   9/13/2023  1:00 PM SCHEDULE, HEALTHY WEIGHT HEALTHY WT [facility]   9/18/2023  1:00 PM VERITO Barrios - CNP [facility] [place] - [facility]   9/20/2023  1:00 PM Antwon Ureña MD [facility]       Message sent to [address] [place] [address] to schedule appt with patient?   N/A      Requested Prescriptions     Pending Prescriptions Disp Refills    sertraline (ZOLOFT) 100 MG tablet [Pharmacy Med Name: Sertraline HCl 100 MG Oral Tablet] 135 tablet 0     Sig: TAKE 1 & 1/2 (ONE & ONE-HALF) TABLETS BY MOUTH ONCE DAILY

## 2023-09-02 RX ORDER — SERTRALINE HYDROCHLORIDE 100 MG/1
TABLET, FILM COATED ORAL
Qty: 135 TABLET | Refills: 0 | Status: SHIPPED | OUTPATIENT
Start: 2023-09-02

## 2023-09-07 NOTE — TELEPHONE ENCOUNTER
.Refill Request     CONFIRM preferred pharmacy with the patient. If Mail Order Rx - Pend for 90 day refill. Last Seen: Last Seen Department: 6/9/2023  Last Seen by PCP: 1/9/2023    Last Written: 3-24-23 90 with 1     If no future appointment scheduled:  Review the last OV with PCP and review information for follow-up visit,  Route STAFF MESSAGE with patient name to the [facility] for scheduling with the following information:            -  Timing of next visit           -  Visit type ie Physical, OV, etc           -  Diagnoses/Reason ie. COPD, HTN - Do not use MEDICATION, Follow-up or CHECK UP - Give reason for visit      Next Appointment:   Future Appointments   Date Time Provider [address]   9/13/2023  1:00 PM SCHEDULE, HEALTHY WEIGHT HEALTHY WT [facility]   9/18/2023  1:00 PM VERITO Montano - CNP [facility]  [place] - [place]   9/20/2023  1:00 PM Joel Pierson MD [facility]       Message sent to [address] [place] [address] to schedule appt with patient?   N/A      Requested Prescriptions     Pending Prescriptions Disp Refills    mirtazapine (REMERON) 15 MG tablet [Pharmacy Med Name: Mirtazapine 15 MG Oral Tablet] 90 tablet 0     Sig: TAKE 1 TABLET BY MOUTH ONCE DAILY AT NIGHT

## 2023-09-11 RX ORDER — MIRTAZAPINE 15 MG/1
TABLET, FILM COATED ORAL
Qty: 90 TABLET | Refills: 1 | Status: SHIPPED | OUTPATIENT
Start: 2023-09-11 | End: 2023-09-27

## 2023-09-12 ENCOUNTER — TELEPHONE (OUTPATIENT)
Dept: BARIATRICS/WEIGHT MGMT | Age: 68
End: 2023-09-12

## 2023-09-12 DIAGNOSIS — E78.00 HYPERCHOLESTEROLEMIA: Primary | ICD-10-CM

## 2023-09-12 NOTE — TELEPHONE ENCOUNTER
Refill Request     CONFIRM preferred pharmacy with the patient. If Mail Order Rx - Pend for 90 day refill. Last Seen: Last Seen Department: 6/9/2023  Last Seen by PCP: Visit date not found    Last Written: 3/13/2023    If no future appointment scheduled:  Review the last OV with PCP and review information for follow-up visit,  Route STAFF MESSAGE with patient name to the [facility] for scheduling with the following information:            -  Timing of next visit           -  Visit type ie Physical, OV, etc           -  Diagnoses/Reason ie. COPD, HTN - Do not use MEDICATION, Follow-up or CHECK UP - Give reason for visit      Next Appointment:   Future Appointments   Date Time Provider [address]   9/13/2023  1:00 PM SCHEDULE, HEALTHY WEIGHT HEALTHY WT [facility]   9/18/2023  1:00 PM VERITO Banks - CNP [facility] [place] [facility]   9/20/2023  1:00 PM Latisha Gomez MD [facility]       Message sent to [address] [place] [address] to schedule appt with patient?   NO      Requested Prescriptions     Pending Prescriptions Disp Refills    propranolol (INDERAL LA) 60 MG extended release capsule [Pharmacy Med Name: Propranolol HCl ER 60 MG Oral Capsule Extended Release 24 Hour] 90 capsule 0     Sig: Take 1 capsule by mouth once daily

## 2023-09-13 ENCOUNTER — OFFICE VISIT (OUTPATIENT)
Dept: BARIATRICS/WEIGHT MGMT | Age: 68
End: 2023-09-13

## 2023-09-13 VITALS
SYSTOLIC BLOOD PRESSURE: 126 MMHG | BODY MASS INDEX: 29.95 KG/M2 | DIASTOLIC BLOOD PRESSURE: 87 MMHG | HEIGHT: 63 IN | OXYGEN SATURATION: 96 % | WEIGHT: 169 LBS | HEART RATE: 80 BPM | RESPIRATION RATE: 16 BRPM

## 2023-09-13 DIAGNOSIS — E66.01 SEVERE OBESITY (HCC): ICD-10-CM

## 2023-09-13 PROCEDURE — NBSRV NON-BILLABLE SERVICE: Performed by: FAMILY MEDICINE

## 2023-09-13 RX ORDER — PROPRANOLOL HCL 60 MG
CAPSULE, EXTENDED RELEASE 24HR ORAL
Qty: 90 CAPSULE | Refills: 0 | Status: SHIPPED | OUTPATIENT
Start: 2023-09-13

## 2023-09-18 ENCOUNTER — OFFICE VISIT (OUTPATIENT)
Dept: FAMILY MEDICINE CLINIC | Age: 68
End: 2023-09-18
Payer: MEDICARE

## 2023-09-18 VITALS
OXYGEN SATURATION: 96 % | SYSTOLIC BLOOD PRESSURE: 122 MMHG | WEIGHT: 169 LBS | DIASTOLIC BLOOD PRESSURE: 78 MMHG | BODY MASS INDEX: 29.94 KG/M2 | HEART RATE: 77 BPM

## 2023-09-18 DIAGNOSIS — D86.9 SARCOID: ICD-10-CM

## 2023-09-18 DIAGNOSIS — F33.1 MAJOR DEPRESSIVE DISORDER, RECURRENT EPISODE, MODERATE (HCC): ICD-10-CM

## 2023-09-18 DIAGNOSIS — R25.2 MUSCLE CRAMPS: ICD-10-CM

## 2023-09-18 DIAGNOSIS — M85.89 OSTEOPENIA OF MULTIPLE SITES: ICD-10-CM

## 2023-09-18 DIAGNOSIS — R73.03 PREDIABETES: ICD-10-CM

## 2023-09-18 DIAGNOSIS — M31.6 GIANT CELL ARTERITIS (HCC): ICD-10-CM

## 2023-09-18 DIAGNOSIS — E55.9 VITAMIN D DEFICIENCY: ICD-10-CM

## 2023-09-18 DIAGNOSIS — D64.9 ANEMIA, UNSPECIFIED TYPE: ICD-10-CM

## 2023-09-18 DIAGNOSIS — I10 ESSENTIAL HYPERTENSION: Primary | Chronic | ICD-10-CM

## 2023-09-18 DIAGNOSIS — R13.19 ESOPHAGEAL DYSPHAGIA: ICD-10-CM

## 2023-09-18 PROCEDURE — 99214 OFFICE O/P EST MOD 30 MIN: CPT | Performed by: NURSE PRACTITIONER

## 2023-09-18 PROCEDURE — 1123F ACP DISCUSS/DSCN MKR DOCD: CPT | Performed by: NURSE PRACTITIONER

## 2023-09-18 PROCEDURE — 3074F SYST BP LT 130 MM HG: CPT | Performed by: NURSE PRACTITIONER

## 2023-09-18 PROCEDURE — G8399 PT W/DXA RESULTS DOCUMENT: HCPCS | Performed by: NURSE PRACTITIONER

## 2023-09-18 PROCEDURE — 3017F COLORECTAL CA SCREEN DOC REV: CPT | Performed by: NURSE PRACTITIONER

## 2023-09-18 PROCEDURE — G8417 CALC BMI ABV UP PARAM F/U: HCPCS | Performed by: NURSE PRACTITIONER

## 2023-09-18 PROCEDURE — 4004F PT TOBACCO SCREEN RCVD TLK: CPT | Performed by: NURSE PRACTITIONER

## 2023-09-18 PROCEDURE — G8427 DOCREV CUR MEDS BY ELIG CLIN: HCPCS | Performed by: NURSE PRACTITIONER

## 2023-09-18 PROCEDURE — 3078F DIAST BP <80 MM HG: CPT | Performed by: NURSE PRACTITIONER

## 2023-09-18 PROCEDURE — 1090F PRES/ABSN URINE INCON ASSESS: CPT | Performed by: NURSE PRACTITIONER

## 2023-09-18 ASSESSMENT — ANXIETY QUESTIONNAIRES
5. BEING SO RESTLESS THAT IT IS HARD TO SIT STILL: 0
2. NOT BEING ABLE TO STOP OR CONTROL WORRYING: 0
IF YOU CHECKED OFF ANY PROBLEMS ON THIS QUESTIONNAIRE, HOW DIFFICULT HAVE THESE PROBLEMS MADE IT FOR YOU TO DO YOUR WORK, TAKE CARE OF THINGS AT HOME, OR GET ALONG WITH OTHER PEOPLE: NOT DIFFICULT AT ALL
7. FEELING AFRAID AS IF SOMETHING AWFUL MIGHT HAPPEN: 0
3. WORRYING TOO MUCH ABOUT DIFFERENT THINGS: 0
4. TROUBLE RELAXING: 0
6. BECOMING EASILY ANNOYED OR IRRITABLE: 1
1. FEELING NERVOUS, ANXIOUS, OR ON EDGE: 1
GAD7 TOTAL SCORE: 2

## 2023-09-18 ASSESSMENT — ENCOUNTER SYMPTOMS
CHEST TIGHTNESS: 0
WHEEZING: 0
VOMITING: 0
DIARRHEA: 0
COUGH: 1
CONSTIPATION: 0
NAUSEA: 0
SHORTNESS OF BREATH: 0

## 2023-09-18 ASSESSMENT — PATIENT HEALTH QUESTIONNAIRE - PHQ9
4. FEELING TIRED OR HAVING LITTLE ENERGY: 1
6. FEELING BAD ABOUT YOURSELF - OR THAT YOU ARE A FAILURE OR HAVE LET YOURSELF OR YOUR FAMILY DOWN: 0
5. POOR APPETITE OR OVEREATING: 0
SUM OF ALL RESPONSES TO PHQ9 QUESTIONS 1 & 2: 0
3. TROUBLE FALLING OR STAYING ASLEEP: 2
2. FEELING DOWN, DEPRESSED OR HOPELESS: 0
9. THOUGHTS THAT YOU WOULD BE BETTER OFF DEAD, OR OF HURTING YOURSELF: 0
SUM OF ALL RESPONSES TO PHQ QUESTIONS 1-9: 3
7. TROUBLE CONCENTRATING ON THINGS, SUCH AS READING THE NEWSPAPER OR WATCHING TELEVISION: 0
1. LITTLE INTEREST OR PLEASURE IN DOING THINGS: 0
8. MOVING OR SPEAKING SO SLOWLY THAT OTHER PEOPLE COULD HAVE NOTICED. OR THE OPPOSITE, BEING SO FIGETY OR RESTLESS THAT YOU HAVE BEEN MOVING AROUND A LOT MORE THAN USUAL: 0
SUM OF ALL RESPONSES TO PHQ QUESTIONS 1-9: 3
SUM OF ALL RESPONSES TO PHQ QUESTIONS 1-9: 3
10. IF YOU CHECKED OFF ANY PROBLEMS, HOW DIFFICULT HAVE THESE PROBLEMS MADE IT FOR YOU TO DO YOUR WORK, TAKE CARE OF THINGS AT HOME, OR GET ALONG WITH OTHER PEOPLE: 0
SUM OF ALL RESPONSES TO PHQ QUESTIONS 1-9: 3

## 2023-09-26 ENCOUNTER — TELEPHONE (OUTPATIENT)
Dept: BARIATRICS/WEIGHT MGMT | Age: 68
End: 2023-09-26

## 2023-09-26 ENCOUNTER — TELEMEDICINE (OUTPATIENT)
Dept: BARIATRICS/WEIGHT MGMT | Age: 68
End: 2023-09-26
Payer: MEDICARE

## 2023-09-26 DIAGNOSIS — G47.00 INSOMNIA, UNSPECIFIED TYPE: ICD-10-CM

## 2023-09-26 DIAGNOSIS — E66.9 CLASS 1 OBESITY: Primary | ICD-10-CM

## 2023-09-26 DIAGNOSIS — Z71.3 DIETARY COUNSELING AND SURVEILLANCE: ICD-10-CM

## 2023-09-26 PROCEDURE — 1123F ACP DISCUSS/DSCN MKR DOCD: CPT | Performed by: FAMILY MEDICINE

## 2023-09-26 PROCEDURE — G8427 DOCREV CUR MEDS BY ELIG CLIN: HCPCS | Performed by: FAMILY MEDICINE

## 2023-09-26 PROCEDURE — 99204 OFFICE O/P NEW MOD 45 MIN: CPT | Performed by: FAMILY MEDICINE

## 2023-09-26 PROCEDURE — 1090F PRES/ABSN URINE INCON ASSESS: CPT | Performed by: FAMILY MEDICINE

## 2023-09-26 PROCEDURE — 3017F COLORECTAL CA SCREEN DOC REV: CPT | Performed by: FAMILY MEDICINE

## 2023-09-26 PROCEDURE — G8399 PT W/DXA RESULTS DOCUMENT: HCPCS | Performed by: FAMILY MEDICINE

## 2023-09-26 ASSESSMENT — ENCOUNTER SYMPTOMS
NAUSEA: 0
DIARRHEA: 0
COUGH: 0
SHORTNESS OF BREATH: 0
ABDOMINAL DISTENTION: 0
CHEST TIGHTNESS: 0
CHOKING: 0
PHOTOPHOBIA: 0
APNEA: 0
CONSTIPATION: 0
ABDOMINAL PAIN: 0
VOMITING: 0
BLOOD IN STOOL: 0
WHEEZING: 0
EYE PAIN: 0

## 2023-09-27 ENCOUNTER — OFFICE VISIT (OUTPATIENT)
Dept: SLEEP MEDICINE | Age: 68
End: 2023-09-27
Payer: MEDICARE

## 2023-09-27 VITALS
RESPIRATION RATE: 18 BRPM | WEIGHT: 167.8 LBS | HEIGHT: 64 IN | DIASTOLIC BLOOD PRESSURE: 70 MMHG | SYSTOLIC BLOOD PRESSURE: 124 MMHG | TEMPERATURE: 97.8 F | BODY MASS INDEX: 28.65 KG/M2 | OXYGEN SATURATION: 95 % | HEART RATE: 107 BPM

## 2023-09-27 DIAGNOSIS — F99 INSOMNIA DUE TO OTHER MENTAL DISORDER: ICD-10-CM

## 2023-09-27 DIAGNOSIS — I10 ESSENTIAL HYPERTENSION: Chronic | ICD-10-CM

## 2023-09-27 DIAGNOSIS — F51.8 SHORT SLEEPER: ICD-10-CM

## 2023-09-27 DIAGNOSIS — G47.01 INSOMNIA SECONDARY TO CHRONIC PAIN: ICD-10-CM

## 2023-09-27 DIAGNOSIS — F51.05 INSOMNIA DUE TO OTHER MENTAL DISORDER: ICD-10-CM

## 2023-09-27 DIAGNOSIS — G89.29 INSOMNIA SECONDARY TO CHRONIC PAIN: ICD-10-CM

## 2023-09-27 DIAGNOSIS — G47.33 OSA (OBSTRUCTIVE SLEEP APNEA): Primary | ICD-10-CM

## 2023-09-27 PROCEDURE — 3017F COLORECTAL CA SCREEN DOC REV: CPT | Performed by: PSYCHIATRY & NEUROLOGY

## 2023-09-27 PROCEDURE — 99204 OFFICE O/P NEW MOD 45 MIN: CPT | Performed by: PSYCHIATRY & NEUROLOGY

## 2023-09-27 PROCEDURE — G8417 CALC BMI ABV UP PARAM F/U: HCPCS | Performed by: PSYCHIATRY & NEUROLOGY

## 2023-09-27 PROCEDURE — 4004F PT TOBACCO SCREEN RCVD TLK: CPT | Performed by: PSYCHIATRY & NEUROLOGY

## 2023-09-27 PROCEDURE — 3078F DIAST BP <80 MM HG: CPT | Performed by: PSYCHIATRY & NEUROLOGY

## 2023-09-27 PROCEDURE — 1090F PRES/ABSN URINE INCON ASSESS: CPT | Performed by: PSYCHIATRY & NEUROLOGY

## 2023-09-27 PROCEDURE — G8427 DOCREV CUR MEDS BY ELIG CLIN: HCPCS | Performed by: PSYCHIATRY & NEUROLOGY

## 2023-09-27 PROCEDURE — G8399 PT W/DXA RESULTS DOCUMENT: HCPCS | Performed by: PSYCHIATRY & NEUROLOGY

## 2023-09-27 PROCEDURE — 3074F SYST BP LT 130 MM HG: CPT | Performed by: PSYCHIATRY & NEUROLOGY

## 2023-09-27 PROCEDURE — 1123F ACP DISCUSS/DSCN MKR DOCD: CPT | Performed by: PSYCHIATRY & NEUROLOGY

## 2023-09-27 RX ORDER — DOXEPIN HYDROCHLORIDE 10 MG/1
10 CAPSULE ORAL NIGHTLY
Qty: 30 CAPSULE | Refills: 3 | Status: SHIPPED | OUTPATIENT
Start: 2023-09-27

## 2023-09-27 ASSESSMENT — SLEEP AND FATIGUE QUESTIONNAIRES
NECK CIRCUMFERENCE (INCHES): 15
HOW LIKELY ARE YOU TO NOD OFF OR FALL ASLEEP WHILE SITTING INACTIVE IN A PUBLIC PLACE: 0
HOW LIKELY ARE YOU TO NOD OFF OR FALL ASLEEP IN A CAR, WHILE STOPPED FOR A FEW MINUTES IN TRAFFIC: 0
ESS TOTAL SCORE: 0
HOW LIKELY ARE YOU TO NOD OFF OR FALL ASLEEP WHILE LYING DOWN TO REST IN THE AFTERNOON WHEN CIRCUMSTANCES PERMIT: 0
HOW LIKELY ARE YOU TO NOD OFF OR FALL ASLEEP WHILE SITTING AND READING: 0
HOW LIKELY ARE YOU TO NOD OFF OR FALL ASLEEP WHEN YOU ARE A PASSENGER IN A CAR FOR AN HOUR WITHOUT A BREAK: 0
HOW LIKELY ARE YOU TO NOD OFF OR FALL ASLEEP WHILE SITTING QUIETLY AFTER LUNCH WITHOUT ALCOHOL: 0
HOW LIKELY ARE YOU TO NOD OFF OR FALL ASLEEP WHILE WATCHING TV: 0
HOW LIKELY ARE YOU TO NOD OFF OR FALL ASLEEP WHILE SITTING AND TALKING TO SOMEONE: 0

## 2023-09-27 ASSESSMENT — ENCOUNTER SYMPTOMS
EYES NEGATIVE: 1
GASTROINTESTINAL NEGATIVE: 1
BACK PAIN: 1
WHEEZING: 1

## 2023-09-27 NOTE — PROGRESS NOTES
MD ALYSSA Deleon Board Certified in Sleep Medicine  Certified [facility] Sleep Medicine  Board Certified in Neurology [facility]  [address] [facility]  [address] [place] [address] Rd, [facility] [address]           [address] [address] Seng Looney [address]  [phone]    Subjective:     Patient ID: Alec Murphy is a 76 y.o. female. Chief Complaint   Patient presents with    New Patient    Snoring    Fatigue    Daytime Sleepiness       HPI:        Alec Murphy is a 76 y.o. female referred by Dr. Ish Junior for a sleep evaluation. She complains of snoring, tossing and turning, excessive daytime sleepiness, feels sleepy during the day, teeth grinding but she denies snorting, choking, periods of not breathing, knees buckling with laughing, completely or partially paralyzed while falling asleep or waking up, noisy environment, uncomfortable room temperature, uncomfortable bedding. Symptoms began several years ago, unchanged since that time. The patient's bed-partner confirmed the snoring without the stopped breathing at night. SLEEP SCHEDULE: Goes to bed around 10 PM-12 AM in the weekdays and 10 PM-12 AM in the weekends. It usually takes the patient 45 minutes to fall asleep (with drugs below). The patient gets up 1 per night to go to the bathroom. The Patient finally gets up at 3-9 AM during the weekdays and 3-9 AM in the weekends. The patient has restless sleep with frequent arousals in addition to the Patient has significant daytime sleepiness. The Patient scored [place] Sleepiness Score: 0 on [place] Sleepiness Scale ( more than 10 is indicative of daytime sleepiness)and 46 in fatigue scale ( more than 36 is indicative of daytime fatigue).  The patient takes daily nap for 120 minutes and usually is not

## 2023-09-27 NOTE — PATIENT INSTRUCTIONS
Sleep compression between 11 PM and 6 AM, no naps. Takes Gabapentin 800 mg QHS and will check with her PCP to increase the dose at night to control her pain.

## 2023-10-02 RX ORDER — PREDNISONE 5 MG/1
TABLET ORAL
Qty: 15 TABLET | Refills: 0 | Status: SHIPPED | OUTPATIENT
Start: 2023-10-02

## 2023-10-02 NOTE — TELEPHONE ENCOUNTER
Refill Request     CONFIRM preferred pharmacy with the patient. If Mail Order Rx - Pend for 90 day refill. Last Seen: Last Seen Department: 9/18/2023  Last Seen by PCP: Visit date not found    Last Written: 6/19/2023 for #90 tablet with no refills    If no future appointment scheduled:  Review the last OV with PCP and review information for follow-up visit,  Route STAFF MESSAGE with patient name to the [facility] for scheduling with the following information:            -  Timing of next visit           -  Visit type ie Physical, OV, etc           -  Diagnoses/Reason ie. COPD, HTN - Do not use MEDICATION, Follow-up or CHECK UP - Give reason for visit      Next Appointment:   Future Appointments   Date Time Provider [facility] [address]   10/18/2023  8:30 PM SCHEDULE, [facility] SLEEP ROOM 1 [address] [facility]   11/15/2023 10:15 AM SCHEDULE, HEALTHY WEIGHT HEALTHY WT MMA   3/18/2024  1:00 PM Carolina Baumgarten Pardekooper, APRN - CNP [facility]  [place] - [place]       Message sent to [employer] to schedule appt with patient?   NO      Requested Prescriptions     Pending Prescriptions Disp Refills    valACYclovir (VALTREX) 500 MG tablet [Pharmacy Med Name: valACYclovir HCl 500 MG Oral Tablet] 90 tablet 0     Sig: Take 1 tablet by mouth once daily

## 2023-10-05 RX ORDER — VALACYCLOVIR HYDROCHLORIDE 500 MG/1
TABLET, FILM COATED ORAL
Qty: 90 TABLET | Refills: 0 | Status: SHIPPED | OUTPATIENT
Start: 2023-10-05

## 2023-10-06 ENCOUNTER — OFFICE VISIT (OUTPATIENT)
Dept: ORTHOPEDIC SURGERY | Age: 68
End: 2023-10-06

## 2023-10-06 VITALS — HEIGHT: 64 IN | BODY MASS INDEX: 28.51 KG/M2 | WEIGHT: 167 LBS

## 2023-10-06 DIAGNOSIS — M79.605 LEG PAIN, ANTERIOR, LEFT: Primary | ICD-10-CM

## 2023-10-06 DIAGNOSIS — M79.10 MYALGIA: ICD-10-CM

## 2023-10-06 DIAGNOSIS — M79.605 PAIN OF LEFT LOWER EXTREMITY: ICD-10-CM

## 2023-10-06 SDOH — HEALTH STABILITY: PHYSICAL HEALTH: ON AVERAGE, HOW MANY DAYS PER WEEK DO YOU ENGAGE IN MODERATE TO STRENUOUS EXERCISE (LIKE A BRISK WALK)?: 0 DAYS

## 2023-10-06 ASSESSMENT — SOCIAL DETERMINANTS OF HEALTH (SDOH)
WITHIN THE LAST YEAR, HAVE YOU BEEN HUMILIATED OR EMOTIONALLY ABUSED IN OTHER WAYS BY YOUR PARTNER OR EX-PARTNER?: NO
WITHIN THE LAST YEAR, HAVE YOU BEEN AFRAID OF YOUR PARTNER OR EX-PARTNER?: NO
WITHIN THE LAST YEAR, HAVE TO BEEN RAPED OR FORCED TO HAVE ANY KIND OF SEXUAL ACTIVITY BY YOUR PARTNER OR EX-PARTNER?: NO
WITHIN THE LAST YEAR, HAVE YOU BEEN KICKED, HIT, SLAPPED, OR OTHERWISE PHYSICALLY HURT BY YOUR PARTNER OR EX-PARTNER?: NO

## 2023-10-06 NOTE — PROGRESS NOTES
and agreement with the above listed treatment plan  -Follow up in 4 to 6 weeks time as needed  -Thank you for the clinical consultation and allowing me to participate in the patient's care. Electronically signed by Maria Dolores Okeefe MD on 10/6/23 at 12:49 PM EDT         Maria Dolores Okeefe MD       Orthopaedic Surgery-Sports Medicine    Disclaimer: This note was dictated with voice recognition software. Though review and correction are routinely performed, please contact the office/medical records for any errors requiring correction.

## 2023-10-11 DIAGNOSIS — F51.01 PRIMARY INSOMNIA: ICD-10-CM

## 2023-10-11 RX ORDER — MONTELUKAST SODIUM 10 MG/1
TABLET ORAL
Qty: 90 TABLET | Refills: 1 | Status: SHIPPED | OUTPATIENT
Start: 2023-10-11

## 2023-10-11 NOTE — TELEPHONE ENCOUNTER
.Refill Request - Controlled Substance    CONFIRM preferred pharmacy with the patient. If Mail Order Rx - Pend for 90 day refill. Last Seen Department: 9/18/2023  Last Seen by PCP: Visit date not found    Last Written: 8-11-23 69 with 0     Last UDS: 12-19-17    Med Agreement Signed On: 1-4-17    If no future appointment scheduled:  Review the last OV with PCP and review information for follow-up visit,  Route STAFF MESSAGE with patient name to the [facility] for scheduling with the following information:            -  Timing of next visit           -  Visit type ie Physical, OV, etc           -  Diagnoses/Reason ie. COPD, HTN - Do not use MEDICATION, Follow-up or CHECK UP - Give reason for visit        Next Appointment:   Future Appointments   Date Time Provider [facility] [address]   10/18/2023  8:30 PM SCHEDULE, [facility] SLEEP ROOM 1 [address]   11/15/2023 10:15 AM SCHEDULE, HEALTHY WEIGHT HEALTHY WT MMA   3/18/2024  1:00 PM Serjio Cuellar, APRN - CNP [facility] [place] [facility]       Message sent to  to schedule appt with patient?   YES      Requested Prescriptions     Pending Prescriptions Disp Refills    ALPRAZolam (XANAX) 1 MG tablet [Pharmacy Med Name: ALPRAZolam 1 MG Oral Tablet] 60 tablet 0     Sig: TAKE 1 TABLET BY MOUTH TWICE DAILY AS NEEDED FOR SLEEP

## 2023-10-11 NOTE — TELEPHONE ENCOUNTER
.Refill Request     CONFIRM preferred pharmacy with the patient. If Mail Order Rx - Pend for 90 day refill. Last Seen: Last Seen Department: 9/18/2023  Last Seen by PCP: Visit date not found    Last Written: 6-26-23 90 with 0     If no future appointment scheduled:  Review the last OV with PCP and review information for follow-up visit,  Route STAFF MESSAGE with patient name to the [facility] for scheduling with the following information:            -  Timing of next visit           -  Visit type ie Physical, OV, etc           -  Diagnoses/Reason ie. COPD, HTN - Do not use MEDICATION, Follow-up or CHECK UP - Give reason for visit      Next Appointment:   Future Appointments   Date Time Provider [address]   10/18/2023  8:30 PM SCHEDULE, [facility] SLEEP ROOM 1 [address]   11/15/2023 10:15 AM SCHEDULE, HEALTHY WEIGHT HEALTHY WT MMA   3/18/2024  1:00 PM Add[facility]  [place] [facility]       Message sent to  to schedule appt with patient?   N/A      Requested Prescriptions     Pending Prescriptions Disp Refills    montelukast (SINGULAIR) 10 MG tablet [Pharmacy Med Name: Montelukast Sodium 10 MG Oral Tablet] 90 tablet 1     Sig: TAKE 1 TABLET BY MOUTH ONCE DAILY NIGHTLY

## 2023-10-12 RX ORDER — ALPRAZOLAM 1 MG/1
TABLET ORAL
Qty: 60 TABLET | Refills: 0 | Status: SHIPPED | OUTPATIENT
Start: 2023-10-12 | End: 2023-12-11

## 2023-10-18 ENCOUNTER — HOSPITAL ENCOUNTER (OUTPATIENT)
Dept: SLEEP CENTER | Age: 68
Discharge: HOME | End: 2023-10-20
Payer: MEDICARE

## 2023-10-18 DIAGNOSIS — G47.33 OSA (OBSTRUCTIVE SLEEP APNEA): ICD-10-CM

## 2023-10-18 PROCEDURE — 95810 POLYSOM 6/> YRS 4/> PARAM: CPT

## 2023-10-19 PROBLEM — G47.10 HYPERSOMNIA: Status: ACTIVE | Noted: 2023-10-19

## 2023-10-19 PROBLEM — R09.02 HYPOXEMIA: Status: ACTIVE | Noted: 2023-10-19

## 2023-10-21 ENCOUNTER — HOSPITAL ENCOUNTER (EMERGENCY)
Age: 68
Discharge: HOME | End: 2023-10-21
Payer: MEDICARE

## 2023-10-21 ENCOUNTER — APPOINTMENT (OUTPATIENT)
Dept: GENERAL RADIOLOGY | Age: 68
End: 2023-10-21
Payer: MEDICARE

## 2023-10-21 VITALS
SYSTOLIC BLOOD PRESSURE: 152 MMHG | OXYGEN SATURATION: 95 % | DIASTOLIC BLOOD PRESSURE: 74 MMHG | TEMPERATURE: 98 F | HEART RATE: 80 BPM | RESPIRATION RATE: 18 BRPM
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
## 2023-10-21 DIAGNOSIS — M25.522 LEFT ELBOW PAIN: ICD-10-CM

## 2023-10-21 DIAGNOSIS — M25.512 ACUTE PAIN OF LEFT SHOULDER: ICD-10-CM

## 2023-10-21 PROCEDURE — 99283 EMERGENCY DEPT VISIT LOW MDM: CPT

## 2023-10-21 PROCEDURE — 73080 X-RAY EXAM OF ELBOW: CPT

## 2023-10-21 PROCEDURE — 6370000000 HC RX 637 (ALT 250 FOR IP): Performed by: NURSE PRACTITIONER

## 2023-10-21 PROCEDURE — 73030 X-RAY EXAM OF SHOULDER: CPT

## 2023-10-21 RX ORDER — LIDOCAINE 50 MG/G
1 PATCH TOPICAL DAILY
Qty: 10 PATCH | Refills: 0 | Status: SHIPPED | OUTPATIENT
Start: 2023-10-21 | End: 2023-10-31

## 2023-10-21 RX ORDER — LIDOCAINE 4 G/G
1 PATCH TOPICAL ONCE
Status: DISCONTINUED | OUTPATIENT
Start: 2023-10-21 | End: 2023-10-21 | Stop reason: HOSPADM

## 2023-10-21 RX ORDER — NAPROXEN 250 MG/1
250 TABLET ORAL 2 TIMES DAILY WITH MEALS
Qty: 20 TABLET | Refills: 1 | Status: SHIPPED | OUTPATIENT
Start: 2023-10-21

## 2023-10-21 RX ORDER — HYDROCODONE BITARTRATE AND ACETAMINOPHEN 5; 325 MG/1; MG/1
1 TABLET ORAL ONCE
Status: COMPLETED | OUTPATIENT
Start: 2023-10-21 | End: 2023-10-21

## 2023-10-21 RX ADMIN — HYDROCODONE BITARTRATE AND ACETAMINOPHEN 1 TABLET: 5; 325 TABLET ORAL at 16:03

## 2023-10-21 ASSESSMENT — ENCOUNTER SYMPTOMS
ABDOMINAL PAIN: 0
FACIAL SWELLING: 0
RHINORRHEA: 0
SHORTNESS OF BREATH: 0
SORE THROAT: 0
COLOR CHANGE: 0

## 2023-10-21 ASSESSMENT — PAIN SCALES - GENERAL: PAINLEVEL_OUTOF10: 9

## 2023-10-21 ASSESSMENT — PAIN - FUNCTIONAL ASSESSMENT: PAIN_FUNCTIONAL_ASSESSMENT: 0-10

## 2023-10-21 NOTE — ED PROVIDER NOTES
[address] [address] ED  EMERGENCY DEPARTMENT ENCOUNTER      I am the Primary Clinician of Record    Note started: 3:58 PM EDT 10/21/23    PAULY. I have evaluated this patient. Pt Name: Herb Silva  MRN: [id number]  [address] [facility] 1955  Dateof evaluation: 10/21/2023  Provider: Deepali Mi APRN - CNP  PCP: Bernie French MD  ED Attending: No att. providers found      [address]       Chief Complaint   Patient presents with    Fall     Pt reports she fell to ground, landed on knees, L elbow, reports pain in L elbow and shoulder. Mechanical fall. Hit head, denies LOC. HISTORY OF PRESENTILLNESS   (Location/Symptom, Timing/Onset, Context/Setting, Quality, Duration, Modifying Factors, Severity)  Note limiting factors. Herb Silva is a 76 y.o. female for left elbow and left shoulder pain. Onset was today. Context includes pt states she \"tripped on air\". Pt denies any dizziness or lightheadedness. Pt does not use any blood thinners. Alleviating factors include nothing. Aggravating factors include nothing. Nothing has been used for pain today. Pt is right handed. Nursing Notes were all reviewed and agreed with or any disagreements were addressed  in the HPI. REVIEW OF SYSTEMS       Review of Systems   Constitutional:  Negative for activity change, appetite change and fever. HENT:  Negative for congestion, facial swelling, rhinorrhea and sore throat. Eyes:  Negative for visual disturbance. Respiratory:  Negative for shortness of breath. Cardiovascular:  Negative for chest pain. Gastrointestinal:  Negative for abdominal pain. Genitourinary:  Negative for difficulty urinating. Musculoskeletal:  Negative for arthralgias and myalgias. Left elbow and left shoulder pain after mechanical fall   Skin:  Negative for color change and rash. Neurological:  Negative for dizziness and light-headedness. Psychiatric/Behavioral:  Negative for agitation.     All bodies. I did discuss the x-ray results with Dr. Siria Hutchins from Ortho and felt that this was not a fracture. He felt sling and outpatient follow-up was appropriate. Patient was provided with a sling and given prescriptions for Naprosyn and lidocaine patches orthopedic and discal therapy referrals. She was encouraged to follow-up with her primary care doctor the next 2 days and return to the ED for any worsening symptoms. Patient is ultimately discharged with all questions answered. Patient was given the following medications:  Medications   lidocaine 4 % external patch 1 patch (1 patch TransDERmal Patch Applied 10/21/23 7541)   HYDROcodone-acetaminophen (NORCO) 5-325 MG per tablet 1 tablet (1 tablet Oral Given 10/21/23 1603)            CONSULTS:  None   Via perfect serve      Discussion with other professionals - ortho    Social determinants - none    Records Reviewed - none    History from - patient    Limitations to history- none    Chronic conditions: has a past medical history of Asthma, CAD (coronary artery disease), Cancer ([address]), Chronic diarrhea, Chronic kidney disease, Clostridium difficile diarrhea (10/23/13), Fibromyalgia, Hemorrhoid, Hyperlipidemia, Hypertension, Kidney stone, Migraines, Osteoarthritis, and Sarcoidosis (2003). Is this patient to be included in the SEP-1 Core Measure due to severe sepsis or septic shock? No   Exclusion criteria - the patient is NOT to be included for SEP-1 Core Measure due to: Infection is not suspected         The patient tolerated their visit well. I have evaluated this patient. My supervising physician was available for consultation. The patient and / or the family were informed of the results of any tests, a time was given to answer questions, a plan was proposed and they agreed with plan. FINAL IMPRESSION      1. Fall, initial encounter    2. Left elbow pain    3.  Acute pain of left shoulder          DISPOSITION/PLAN   DISPOSITION Discharge -

## 2023-10-23 ENCOUNTER — TELEPHONE (OUTPATIENT)
Dept: PULMONOLOGY | Age: 68
End: 2023-10-23

## 2023-10-23 DIAGNOSIS — F51.04 CHRONIC INSOMNIA: Primary | ICD-10-CM

## 2023-10-23 RX ORDER — DOXEPIN HYDROCHLORIDE 25 MG/1
25 CAPSULE ORAL NIGHTLY
Qty: 30 CAPSULE | Refills: 3 | Status: SHIPPED | OUTPATIENT
Start: 2023-10-23

## 2023-10-23 NOTE — TELEPHONE ENCOUNTER
Progress Notes  Elgin Mercado MD (Physician)  [facility]  I increased the Doxepin to 25 mg.

## 2023-10-23 NOTE — TELEPHONE ENCOUNTER
Hypoxemia with low starting 02 sat of 89% and low mean o2 sat at 87%. No significant sleep disordered breathing. Periodic limb movements were observed     At this point CPAP, does not appear to be indicated. However, should symptomatology progress a repeat evaluation may be appropriate. Follow up in sleep clinic to discuss       The patient has been notified of this information and all questions answered. Patient states she feels like the doxepin is not working. She wants to know if it can be increased. Offered appt today in one of the cancel spots but her spouse has appt for MRI and he said appts are hard due to distance and their age.

## 2023-10-26 NOTE — TELEPHONE ENCOUNTER
.Refill Request     CONFIRM preferred pharmacy with the patient. If Mail Order Rx - Pend for 90 day refill. Last Seen: Last Seen Department: 9/18/2023  Last Seen by PCP: Visit date not found    Last Written: 7-27-23 12 with 0     If no future appointment scheduled:  Review the last OV with PCP and review information for follow-up visit,  Route STAFF MESSAGE with patient name to the [facility] for scheduling with the following information:            -  Timing of next visit           -  Visit type ie Physical, OV, etc           -  Diagnoses/Reason ie. COPD, HTN - Do not use MEDICATION, Follow-up or CHECK UP - Give reason for visit      Next Appointment:   Future Appointments   Date Time Provider [address]   11/15/2023 10:15 AM SCHEDULE, HEALTHY WEIGHT HEALTHY [facility]   3/18/2024  1:00 PM Frankie Cuellar, APRN - CNP [place]  [place] - [place]       Message sent to [address] [place] [address] to schedule appt with patient?   N/A      Requested Prescriptions     Pending Prescriptions Disp Refills    alendronate (FOSAMAX) 70 MG tablet [Pharmacy Med Name: Alendronate Sodium 70 MG Oral Tablet] 12 tablet 0     Sig: Take 1 tablet by mouth once a week

## 2023-10-27 RX ORDER — ALENDRONATE SODIUM 70 MG/1
TABLET ORAL
Qty: 12 TABLET | Refills: 0 | Status: SHIPPED | OUTPATIENT
Start: 2023-10-27

## 2023-11-13 ENCOUNTER — HOSPITAL ENCOUNTER (OUTPATIENT)
Age: 68
Discharge: HOME | End: 2023-11-13
Payer: MEDICARE

## 2023-11-13 ENCOUNTER — HOSPITAL ENCOUNTER (OUTPATIENT)
Dept: GENERAL RADIOLOGY | Age: 68
Discharge: HOME | End: 2023-11-13
Payer: MEDICARE

## 2023-11-13 DIAGNOSIS — K59.00 CONSTIPATION, UNSPECIFIED CONSTIPATION TYPE: ICD-10-CM

## 2023-11-13 PROCEDURE — 74018 RADEX ABDOMEN 1 VIEW: CPT

## 2023-11-15 ENCOUNTER — OFFICE VISIT (OUTPATIENT)
Dept: BARIATRICS/WEIGHT MGMT | Age: 68
End: 2023-11-15

## 2023-11-15 VITALS — WEIGHT: 157.4 LBS | BODY MASS INDEX: 27.02 KG/M2

## 2023-11-15 DIAGNOSIS — E66.3 OVERWEIGHT WITH BODY MASS INDEX (BMI) OF 27 TO 27.9 IN ADULT: ICD-10-CM

## 2023-11-15 PROCEDURE — NBSRV NON-BILLABLE SERVICE: Performed by: FAMILY MEDICINE

## 2023-11-15 NOTE — PROGRESS NOTES
Dannie Weems lost 11.6 lbs over 2mo. Pt has been tracking her GI symptoms (bloating & diarrhea) - has been compiling a list of trigger foods. Triggers include red meat, tomatoes, spinach, lettuce, eggs, white bread, rice. Avoids pork most of time. Treatment plan details: 1200kcal LCMP  Is patient adhering to treatment plan:  Most of time    Is pt eating 4-5 times each day? Yes  B - Premier protein shake  L - roasted chix breast + apple or orange  S - celery + PB  D - fish / chix / shrimp + veg (steamed carrots or green beans or bell peppers)    Is pt including lean protein with all meals and snacks? Yes    Is pt avoiding added sugars and starchy foods? Avoiding - especially potatoes    Consuming at least 64oz of calorie free fluids? Yes - Mineral water    Participating in intentional exercise?   Walking, recent elbow & shoulder injury , multiple falls    Plan/Goals:   - Continue to eat 4-5x day focusing on protein + produce  - Movement as tolerated    Handouts: none    Delano Garcia RD, LD

## 2023-11-16 ENCOUNTER — OFFICE VISIT (OUTPATIENT)
Dept: ORTHOPEDIC SURGERY | Age: 68
End: 2023-11-16

## 2023-11-16 VITALS — HEIGHT: 64 IN | BODY MASS INDEX: 26.8 KG/M2 | WEIGHT: 157 LBS

## 2023-11-16 DIAGNOSIS — M25.512 LEFT SHOULDER PAIN, UNSPECIFIED CHRONICITY: ICD-10-CM

## 2023-11-16 DIAGNOSIS — M54.50 LUMBAR PAIN: ICD-10-CM

## 2023-11-16 DIAGNOSIS — S42.295A OTHER CLOSED NONDISPLACED FRACTURE OF PROXIMAL END OF LEFT HUMERUS, INITIAL ENCOUNTER: Primary | ICD-10-CM

## 2023-11-16 NOTE — PROGRESS NOTES
FOLLOW UP ORTHOPAEDIC NOTE    The patient follows up today for evaluation of left shoulder pain. The patient states 3 to 4 weeks ago she had fallen and landed directly on her left elbow that jammed her elbow up towards her shoulder. She was seen in the emergency room on 10/21/2023. Please see that note for details. She states that she has been in the sling and nonweightbearing. She does state that she is been moving the elbow back-and-forth when she comes out of the sling. She states shoulder pain at 4/10 pain better than where it had been 2 to 3 weeks ago and she feels that it is slowly recovering. She states that she has been coming out of the sling to sleep. She is accompanied by her     PE:  AAOx3  RR  Unlabored breathing  Skin warm and moist  Focused physical examination of the left shoulder  Deferred range of motion given known fracture. Positive tenderness to palpation left proximal humerus. Nontender to palpation left elbow  Skin intact throughout  5/5 D B T G IO EPL  SILT Ax, R, U, M  +2 radial pulse    Pertinent radiographs/imagin views of shoulder 2023 and 3 view left shoulder 10/21/2023: Slight comminution of nondisplaced proximal humerus surgical neck fracture with extension into the greater tuberosity without gross interval displacement albeit slight settling compared from films on 10/21/2023 compared to today 2023. There is subtle callus present along the distal aspect of the greater tuberosity  3 view left elbow 10/21/2023: Negative fracture left elbow. Reduced radiocapitellar and ulnohumeral joint     Diagnosis Orders   1. Other closed nondisplaced fracture of proximal end of left humerus, initial encounter  Ambulatory referral to Physical Therapy      2. Left shoulder pain, unspecified chronicity  XR SHOULDER LEFT (MIN 2 VIEWS)    Breg DLX Shoulder Immobilizer      3.  Lumbar pain  [address], Margaret Bobo,  Orthopedic Surgery (Spine), Tessa Holguin

## 2023-11-19 DIAGNOSIS — F51.01 PRIMARY INSOMNIA: ICD-10-CM

## 2023-11-19 NOTE — TELEPHONE ENCOUNTER
Refill Request - Controlled Substance    CONFIRM preferred pharmacy with the patient. If Mail Order Rx - Pend for 90 day refill. Last Seen Department: 9/18/2023  Last Seen by PCP: Visit date not found    Last Written: 10/12/2023 60 tab 0 refills    Last UDS: 12/19/2017    Med Agreement Signed On: 1/4/2017    If no future appointment scheduled:  Review the last OV with PCP and review information for follow-up visit,  Route STAFF MESSAGE with patient name to the [facility] for scheduling with the following information:            -  Timing of next visit           -  Visit type ie Physical, OV, etc           -  Diagnoses/Reason ie. COPD, HTN - Do not use MEDICATION, Follow-up or CHECK UP - Give reason for visit        Next Appointment:   Future Appointments   Date Time Provider [facility] [address]   11/27/2023  1:15 PM Leticia Gee PA-C [facility]   12/4/2023  1:15 PM Lio Chaidez MD [facility]   3/18/2024  1:00 PM [facility] [place] [facility]  [place]       Message sent to  to schedule appt with patient?   NO      Requested Prescriptions     Pending Prescriptions Disp Refills    ALPRAZolam (XANAX) 1 MG tablet [Pharmacy Med Name: ALPRAZolam 1 MG Oral Tablet] 60 tablet 0     Sig: TAKE 1 TABLET BY MOUTH TWICE DAILY AS NEEDED FOR SLEEP

## 2023-11-21 RX ORDER — ALPRAZOLAM 1 MG/1
TABLET ORAL
Qty: 60 TABLET | Refills: 0 | Status: SHIPPED | OUTPATIENT
Start: 2023-11-21 | End: 2024-01-20

## 2023-11-24 DIAGNOSIS — E78.00 HYPERCHOLESTEROLEMIA: ICD-10-CM

## 2023-11-24 NOTE — TELEPHONE ENCOUNTER
Refill Request     CONFIRM preferred pharmacy with the patient. If Mail Order Rx - Pend for 90 day refill. Last Seen: Last Seen Department: 9/18/2023  Last Seen by PCP: 9/18/2023    Last Written: 5/12/2023    If no future appointment scheduled:  Review the last OV with PCP and review information for follow-up visit,  Route STAFF MESSAGE with patient name to the [facility] for scheduling with the following information:            -  Timing of next visit           -  Visit type ie Physical, OV, etc           -  Diagnoses/Reason ie. COPD, HTN - Do not use MEDICATION, Follow-up or CHECK UP - Give reason for visit      Next Appointment:   Future Appointments   Date Time Provider [address]   11/27/2023  1:15 PM Miladis Abdullahi PA-C [facility]   12/4/2023  1:15 PM Augustine Gomez MD [facility]   3/18/2024  1:00 PM Antwon Cuellar, APRN - CNP [facility] [place] - [place]YD       Message sent to  to schedule appt with patient?   NO      Requested Prescriptions     Pending Prescriptions Disp Refills    spironolactone (ALDACTONE) 50 MG tablet [Pharmacy Med Name: Spironolactone 50 MG Oral Tablet] 90 tablet 1     Sig: Take 1 tablet by mouth once daily

## 2023-11-26 RX ORDER — SPIRONOLACTONE 50 MG/1
TABLET, FILM COATED ORAL
Qty: 90 TABLET | Refills: 1 | Status: SHIPPED | OUTPATIENT
Start: 2023-11-26

## 2023-11-27 ENCOUNTER — OFFICE VISIT (OUTPATIENT)
Dept: ORTHOPEDIC SURGERY | Age: 68
End: 2023-11-27
Payer: COMMERCIAL

## 2023-11-27 VITALS — WEIGHT: 157 LBS | HEIGHT: 64 IN | BODY MASS INDEX: 26.8 KG/M2

## 2023-11-27 DIAGNOSIS — M54.50 LUMBAR PAIN: Primary | ICD-10-CM

## 2023-11-27 PROCEDURE — 99243 OFF/OP CNSLTJ NEW/EST LOW 30: CPT | Performed by: PHYSICIAN ASSISTANT

## 2023-11-27 SDOH — HEALTH STABILITY: PHYSICAL HEALTH: ON AVERAGE, HOW MANY DAYS PER WEEK DO YOU ENGAGE IN MODERATE TO STRENUOUS EXERCISE (LIKE A BRISK WALK)?: 0 DAYS

## 2023-11-27 SDOH — HEALTH STABILITY: PHYSICAL HEALTH: ON AVERAGE, HOW MANY MINUTES DO YOU ENGAGE IN EXERCISE AT THIS LEVEL?: 0 MIN

## 2023-11-27 ASSESSMENT — SOCIAL DETERMINANTS OF HEALTH (SDOH)

## 2023-11-27 NOTE — PROGRESS NOTES
New Patient: LUMBAR SPINE    Referring Provider:  Khloe Spivey MD    CHIEF COMPLAINT:    Chief Complaint   Patient presents with    Back Pain     lumbar       HISTORY OF PRESENT ILLNESS:       Ms. Gwendolyn Grijalva  is a pleasant 76 y.o. female, referred by Femi Hamilton MD, here for evaluation regarding her LBP and left knee pain. She states her pain began insidiously over 20 years ago. She states that she has always had low back pain but she is more concerned with the giving way sensation that she has been experiencing within the left knee. She states that she has recently had a left humerus fracture due to her left knee giving out falling onto her left side. Patient states that the pain in her back at the present time is a 3/10 with 5/10 pain within the left knee. She states that any prolonged standing or walking creates pain in the sense of numbness within the left knee and sitting and lying down improves her symptoms. She states that the symptoms are very intermittent within the left knee but she finds that the low back pain is dull and constant. She finds that any walking greater than 20 minutes will create the giving way sensation within the left knee with the sense of numbness. .  She denies any numbness or tingling in her left thigh or calf. Patient does have a history of neuropathy and does take gabapentin. She denies any bowel or bladder dysfunction or saddle anesthesia. Pain Assessment  Location of Pain: Back  Location Modifiers: Other (Comment)  Severity of Pain: 3  Quality of Pain: Other (Comment)  Duration of Pain: Other (Comment)  Frequency of Pain: Other (Comment)  Aggravating Factors: Other (Comment)  Relieving Factors:  Other (Comment)]    Current/Past Treatment:   Physical Therapy: None  Chiropractic: None  Injection: None  Medications: Gabapentin 1600 mg a day    Past Medical History:   Past Medical History:   Diagnosis Date    Asthma     CAD (coronary artery disease)     Cancer ([address])

## 2023-11-30 DIAGNOSIS — F33.1 MAJOR DEPRESSIVE DISORDER, RECURRENT EPISODE, MODERATE (HCC): ICD-10-CM

## 2023-12-01 RX ORDER — SERTRALINE HYDROCHLORIDE 100 MG/1
TABLET, FILM COATED ORAL
Qty: 135 TABLET | Refills: 0 | Status: SHIPPED | OUTPATIENT
Start: 2023-12-01

## 2023-12-01 NOTE — TELEPHONE ENCOUNTER
Refill Request     CONFIRM preferred pharmacy with the patient. If Mail Order Rx - Pend for 90 day refill. Last Seen: Last Seen Department: 9/18/2023  Last Seen by PCP: 1/9/2023    Last Written: 09/02/2023 135 tab 0 refills     If no future appointment scheduled:  Review the last OV with PCP and review information for follow-up visit,  Route STAFF MESSAGE with patient name to the [facility] for scheduling with the following information:            -  Timing of next visit           -  Visit type ie Physical, OV, etc           -  Diagnoses/Reason ie. COPD, HTN - Do not use MEDICATION, Follow-up or CHECK UP - Give reason for visit      Next Appointment:   Future Appointments   Date Time Provider [address] [place] [address]   12/4/2023  1:15 PM Anabel Narayan MD [facility]   3/18/2024  1:00 PM Daisy Cuellar, APRN - CNP [facility] [place] - [place]       Message sent to [address] [place] [address] to schedule appt with patient?   NO      Requested Prescriptions     Pending Prescriptions Disp Refills    sertraline (ZOLOFT) 100 MG tablet [Pharmacy Med Name: Sertraline HCl 100 MG Oral Tablet] 135 tablet 0     Sig: TAKE 1 & 1/2 (ONE & ONE-HALF) TABLETS BY MOUTH ONCE DAILY

## 2023-12-04 ENCOUNTER — OFFICE VISIT (OUTPATIENT)
Dept: ORTHOPEDIC SURGERY | Age: 68
End: 2023-12-04
Payer: COMMERCIAL

## 2023-12-04 VITALS — BODY MASS INDEX: 26.8 KG/M2 | HEIGHT: 64 IN | WEIGHT: 157 LBS

## 2023-12-04 DIAGNOSIS — M25.512 LEFT SHOULDER PAIN, UNSPECIFIED CHRONICITY: ICD-10-CM

## 2023-12-04 DIAGNOSIS — S42.295D OTHER CLOSED NONDISPLACED FRACTURE OF PROXIMAL END OF LEFT HUMERUS WITH ROUTINE HEALING, SUBSEQUENT ENCOUNTER: Primary | ICD-10-CM

## 2023-12-04 PROCEDURE — 99213 OFFICE O/P EST LOW 20 MIN: CPT | Performed by: ORTHOPAEDIC SURGERY

## 2023-12-04 PROCEDURE — 1123F ACP DISCUSS/DSCN MKR DOCD: CPT | Performed by: ORTHOPAEDIC SURGERY

## 2023-12-04 NOTE — PROGRESS NOTES
FOLLOW UP ORTHOPAEDIC NOTE    The patient follows up today for reevaluation of left shoulder. The patient states 3/10 pain on intake form. She has been nonweightbearing in the sling. She is accompanied by her     PE:  AAOx3  RR  Unlabored breathing  Skin warm and moist  Focused physical examination of the left shoulder  Minimal tenderness palpation left proximal humerus  Neurovascular exam unchanged    Pertinent radiographs/imagin view left shoulder 2023: In comparison to previous radiographs in October and 2023 maintained fracture alignment satisfactory and nondisplaced with regard to proximal humerus fracture. There is associated callus appreciated     Diagnosis Orders   1. Other closed nondisplaced fracture of proximal end of left humerus with routine healing, subsequent encounter  Ambulatory referral to Physical Therapy      2. Left shoulder pain, unspecified chronicity  XR SHOULDER LEFT (MIN 2 VIEWS)        Assessment and plan: 76 female with continued subjective symptoms of left shoulder pain with known, correlating diagnosis of left shoulder proximal humerus fracture-nondisplaced.  -Time of 16 minutes was spent coordinating and discussing the clinical findings and diagnostic imaging results as they pertain to the patient's presenting subjective symptoms.  -I reviewed with the patient at this time she is roughly 6 weeks post injury. She may remove the sling and be 2 pound weight limit over the next 2 weeks for functional range of motion and ADLs.   I also encouraged her to sleep in the sling for an additional 2 weeks for a total of 2 months worth of immobilization during sleep to limit reinjury  -Formal physical therapy has been prescribed to work on [employer] reconditioning and strengthening to include pain modalities  -OTC Tylenol per bottle as needed discomfort  -All questions answered to the patient's satisfaction and the patient expressed understanding and agreement with

## 2023-12-12 ENCOUNTER — HOSPITAL ENCOUNTER (OUTPATIENT)
Dept: PHYSICAL THERAPY | Age: 68
Setting detail: THERAPIES SERIES
Discharge: HOME | End: 2023-12-12
Payer: MEDICARE

## 2023-12-12 DIAGNOSIS — S42.295D TRAUMATIC CLOSED NONDISPLACED FRACTURE OF ANATOMICAL NECK OF HUMERUS, LEFT, WITH ROUTINE HEALING, SUBSEQUENT ENCOUNTER: Primary | ICD-10-CM

## 2023-12-12 DIAGNOSIS — M25.512 LEFT SHOULDER PAIN, UNSPECIFIED CHRONICITY: ICD-10-CM

## 2023-12-12 PROCEDURE — 97112 NEUROMUSCULAR REEDUCATION: CPT

## 2023-12-12 PROCEDURE — 97161 PT EVAL LOW COMPLEX 20 MIN: CPT

## 2023-12-12 PROCEDURE — 97110 THERAPEUTIC EXERCISES: CPT

## 2023-12-12 NOTE — PLAN OF CARE
notified via Plan of Care  [] Emergency services notified     Preferred Language for Healthcare:   [x] English       [] other:    SUBJECTIVE EXAMINATION     Patient stated complaint: 10/21/23 Pt fell, went to ED due to left shoulder pain. XR + fracture. Pt has been in sling since. Pt RHD. Relevant Medical History: neuropathy, DM II, osteoporosis, OA, anxiety/depression, asthma    Pain:  Patient Scale: VAS: 7-10/10  Pain location: shoulder and   Patient describes pain to be aching  Pain decreases with: Resting and Heat  Pain increases with: Activity and Movement, Reaching, and Pushing    Red Flags:  None    Functional Outcome Measure:    Date Assessed 12/12/2023   Measure Used Quick DASH   Disability Score (rawscore/%) 82%      Occupation/School:  Work/School Status: Retired  Job Duties/Demands: NA    Sport/recreational activities: quilting    OBJECTIVE EXAMINATION     ROM/Strength: (Blank cells denote NT)    Mvmt (norm) ROM L ROM R Notes MMT L MMT R Notes     CERVICAL Flex (60)        Ext (70)        SB(45)          Rotation (80)                 SHOULDER Flexion (180) 70 P         Abduction (180)          ER -0          ER -90 (90)          IR -0          IR -90 (70)               ELBOW Flex/biceps (140)          Ext/triceps (0)          Pronation (80)          Supination (80)                 Palpation:   Patient reported tenderness with palpation  Location:bicep    Posture:   WFL    Bandages/Dressings/Incisions:  Not Applicable    Dermatomes: Not tested     Myotomes: Not tested     Reflexes: Not tested     Specific Joint Mobility Testing/Accessory Motions:          Gait:    Pattern: Not Observed  Assistive Device Used: no AD    Special Tests:  [] None Assessed   [] Following tests noted:    NT    Balance:  [x] WNL      [] NT       [] Dysfunction noted  Comment:     Falls Risk Assessment (30 days): Falls Risk assessed and no intervention required.   Time Up and Go (TUG):   Not Assessed     Review Of Systems

## 2023-12-12 NOTE — FLOWSHEET NOTE
[address] [place] [address] and [facility], [address], [address] office: [phone] fax: [phone]    Physical Therapy: TREATMENT/PROGRESS NOTE   Patient: Jnaet Lopez (63 y.o. female)   Treatment Date: 2023   :  1955 MRN: [id number]   Visit #: 1   Insurance Allowable Auth Needed   BMN []Yes    []No    Insurance: Payor: Ciarra Gavin / Plan: MEDICARE PART A AND B / Product Type: *No Product type* /   Insurance ID: [id number] - (Medicare)  Secondary Insurance (if applicable): HUMANA   Treatment Diagnosis: Left shoulder pain M25.512    ICD-10-CM    1. Traumatic closed nondisplaced fracture of anatomical neck of humerus, left, with routine healing, subsequent encounter  S42.295D       2.  Left shoulder pain, unspecified chronicity  M25.512          Medical Diagnosis:    Other closed nondisplaced fracture of proximal end of left humerus with routine healing, subsequent encounter [S42.295D]   Referring Physician: Eliezer Gilbert MD  PCP: Clement Rasmussen MD                             Plan of care signed (Y/N):     Date of Patient follow up with Physician:      Progress Report/POC: EVAL today  Progress note due:  2024 (OR 10 visits /OR [address], whichever is less)  POC update due:  3/11/2024     Latex Allergy:  [x]NO      []YES    Preferred Language for Healthcare:   [x]English       []other:    SUBJECTIVE EXAMINATION     Patient Report/Comments: see eval    OBJECTIVE EXAMINATION     Observation:     Test measurements: see eval     Test used Initial score  2023     Pain Summary VAS 7-10    Functional questionnaire Quick DASH 82%    ROM             MMT              RESTRICTIONS/PRECAUTIONS: 2 lb lifting limit    Exercises/Interventions:     Therapeutic Ex (54523)/NMR re-education (88801)  Sets/reps/time resistance Notes/Cues   Supine/seated cane ER 10     Elbow ROM 10     Seated scap sqz 10     Table slides flex 10

## 2023-12-13 DIAGNOSIS — E78.00 HYPERCHOLESTEROLEMIA: ICD-10-CM

## 2023-12-13 RX ORDER — PROPRANOLOL HCL 60 MG
CAPSULE, EXTENDED RELEASE 24HR ORAL
Qty: 90 CAPSULE | Refills: 1 | Status: SHIPPED | OUTPATIENT
Start: 2023-12-13

## 2023-12-13 NOTE — TELEPHONE ENCOUNTER
Refill Request     CONFIRM preferred pharmacy with the patient. If Mail Order Rx - Pend for 90 day refill. Last Seen: Last Seen Department: 9/18/2023  Last Seen by PCP: Visit date not found    Last Written: 9/13/2023, #90, 0 refills    If no future appointment scheduled:  Review the last OV with PCP and review information for follow-up visit,  Route STAFF MESSAGE with patient name to the [facility] for scheduling with the following information:            -  Timing of next visit           -  Visit type ie Physical, OV, etc           -  Diagnoses/Reason ie. COPD, HTN - Do not use MEDICATION, Follow-up or CHECK UP - Give reason for visit      Next Appointment:   Future Appointments   Date Time Provider [address]   12/19/2023 11:00 AM Frank Euceda, PT [facility]   12/28/2023 11:00 AM Frank Euceda, PT [facility] EG PT [facility]   1/15/2024  1:15 PM Willie Boland MD [facility]   3/18/2024  1:00 PM Lilian Cuellar, APRN - CNP [facility] [place] - [place]       Message sent to  to schedule appt with patient?   N/A      Requested Prescriptions     Pending Prescriptions Disp Refills    propranolol (INDERAL LA) 60 MG extended release capsule [Pharmacy Med Name: Propranolol HCl ER 60 MG Oral Capsule Extended Release 24 Hour] 90 capsule 0     Sig: Take 1 capsule by mouth once daily

## 2023-12-28 ENCOUNTER — APPOINTMENT (OUTPATIENT)
Dept: PHYSICAL THERAPY | Age: 68
End: 2023-12-28
Payer: MEDICARE

## 2023-12-31 DIAGNOSIS — R10.13 EPIGASTRIC PAIN: ICD-10-CM

## 2024-01-01 NOTE — TELEPHONE ENCOUNTER
Refill Request     CONFIRM preferred pharmacy with the patient.    If Mail Order Rx - Pend for 90 day refill.      Last Seen: Last Seen Department: 9/18/2023  Last Seen by PCP: 9/18/2023    Last Written: 6/1/2023 180 tab 1 refills    If no future appointment scheduled:  Review the last OV with PCP and review information for follow-up visit,  Route STAFF MESSAGE with patient name to the  Pool for scheduling with the following information:            -  Timing of next visit           -  Visit type ie Physical, OV, etc           -  Diagnoses/Reason ie. COPD, HTN - Do not use MEDICATION, Follow-up or CHECK UP - Give reason for visit      Next Appointment:   Future Appointments   Date Time Provider Department Center   1/15/2024  1:15 PM Williams Cooper MD [facility]   3/18/2024  1:00 PM Harriet Cuellar, APRN - CNP [facility] [place] - [place]       Message sent to  to schedule appt with patient?  NO      Requested Prescriptions     Pending Prescriptions Disp Refills    pantoprazole (PROTONIX) 40 MG tablet [Pharmacy Med Name: Pantoprazole Sodium 40 MG Oral Tablet Delayed Release] 180 tablet 0     Sig: TAKE 1 TABLET BY MOUTH TWICE DAILY BEFORE MEAL(S)

## 2024-01-02 RX ORDER — PANTOPRAZOLE SODIUM 40 MG/1
TABLET, DELAYED RELEASE ORAL
Qty: 180 TABLET | Refills: 0 | Status: SHIPPED | OUTPATIENT
Start: 2024-01-02

## 2024-01-05 DIAGNOSIS — E78.00 HYPERCHOLESTEROLEMIA: Primary | ICD-10-CM

## 2024-01-05 RX ORDER — ATORVASTATIN CALCIUM 40 MG/1
TABLET, FILM COATED ORAL
Qty: 90 TABLET | Refills: 1 | Status: SHIPPED | OUTPATIENT
Start: 2024-01-05

## 2024-01-05 NOTE — TELEPHONE ENCOUNTER
Refill Request     CONFIRM preferred pharmacy with the patient.    If Mail Order Rx - Pend for 90 day refill.      Last Seen: Last Seen Department: 9/18/2023  Last Seen by PCP: Visit date not found    Last Written: 7/6/2023    If no future appointment scheduled:  Review the last OV with PCP and review information for follow-up visit,  Route STAFF MESSAGE with patient name to the  Pool for scheduling with the following information:            -  Timing of next visit           -  Visit type ie Physical, OV, etc           -  Diagnoses/Reason ie. COPD, HTN - Do not use MEDICATION, Follow-up or CHECK UP - Give reason for visit      Next Appointment:   Future Appointments   Date Time Provider Department Center   1/15/2024  1:15 PM Williams Cooper MD [facility]   3/18/2024  1:00 PM Harriet Cuellar, APRN - CNP [facility] [place] - [place]       Message sent to  to schedule appt with patient?  NO      Requested Prescriptions     Pending Prescriptions Disp Refills    atorvastatin (LIPITOR) 40 MG tablet [Pharmacy Med Name: Atorvastatin Calcium 40 MG Oral Tablet] 90 tablet 1     Sig: TAKE 1 TABLET BY MOUTH ONCE DAILY NIGHTLY

## 2024-01-08 DIAGNOSIS — E78.00 HYPERCHOLESTEROLEMIA: ICD-10-CM

## 2024-01-08 NOTE — TELEPHONE ENCOUNTER
Refill Request     CONFIRM preferred pharmacy with the patient.    If Mail Order Rx - Pend for 90 day refill.      Last Seen: Last Seen Department: 9/18/2023  Last Seen by PCP: 9/18/2023    Last Written: 11/26/2023, #90, 1 refill    If no future appointment scheduled:  Review the last OV with PCP and review information for follow-up visit,  Route STAFF MESSAGE with patient name to the  Pool for scheduling with the following information:            -  Timing of next visit           -  Visit type ie Physical, OV, etc           -  Diagnoses/Reason ie. COPD, HTN - Do not use MEDICATION, Follow-up or CHECK UP - Give reason for visit      Next Appointment:   Future Appointments   Date Time Provider Department Center   1/15/2024  1:15 PM Williams Cooper MD [facility]   3/18/2024  1:00 PM Harriet Cuellar, APRN - CNP [facility] [place] - [place]       Message sent to  to schedule appt with patient?  N/A      Requested Prescriptions     Pending Prescriptions Disp Refills    spironolactone (ALDACTONE) 50 MG tablet [Pharmacy Med Name: Spironolactone 50 MG Oral Tablet] 90 tablet 0     Sig: Take 1 tablet by mouth once daily

## 2024-01-10 RX ORDER — SPIRONOLACTONE 50 MG/1
TABLET, FILM COATED ORAL
Qty: 90 TABLET | Refills: 0 | Status: SHIPPED | OUTPATIENT
Start: 2024-01-10

## 2024-01-11 RX ORDER — VALACYCLOVIR HYDROCHLORIDE 500 MG/1
TABLET, FILM COATED ORAL
Qty: 90 TABLET | Refills: 0 | Status: SHIPPED | OUTPATIENT
Start: 2024-01-11

## 2024-01-11 NOTE — TELEPHONE ENCOUNTER
Refill Request     CONFIRM preferred pharmacy with the patient.    If Mail Order Rx - Pend for 90 day refill.      Last Seen: Last Seen Department: 9/18/2023  Last Seen by PCP: 9/18/2023    Last Written: 10/05/2023, #90, 0 refills    If no future appointment scheduled:  Review the last OV with PCP and review information for follow-up visit,  Route STAFF MESSAGE with patient name to the  Pool for scheduling with the following information:            -  Timing of next visit           -  Visit type ie Physical, OV, etc           -  Diagnoses/Reason ie. COPD, HTN - Do not use MEDICATION, Follow-up or CHECK UP - Give reason for visit      Next Appointment:   Future Appointments   Date Time Provider Department Center   1/15/2024  1:15 PM Williams Cooper MD [facility]   3/18/2024  1:00 PM Harriet Cuellar, APRN - CNP [facility] [place] - [place]       Message sent to  to schedule appt with patient?  N/A      Requested Prescriptions     Pending Prescriptions Disp Refills    valACYclovir (VALTREX) 500 MG tablet [Pharmacy Med Name: valACYclovir HCl 500 MG Oral Tablet] 90 tablet 0     Sig: Take 1 tablet by mouth once daily

## 2024-01-15 ENCOUNTER — OFFICE VISIT (OUTPATIENT)
Dept: ORTHOPEDIC SURGERY | Age: 69
End: 2024-01-15
Payer: MEDICARE

## 2024-01-15 VITALS — HEIGHT: 64 IN | WEIGHT: 157 LBS | BODY MASS INDEX: 26.8 KG/M2

## 2024-01-15 DIAGNOSIS — S42.295D OTHER CLOSED NONDISPLACED FRACTURE OF PROXIMAL END OF LEFT HUMERUS WITH ROUTINE HEALING, SUBSEQUENT ENCOUNTER: Primary | ICD-10-CM

## 2024-01-15 PROCEDURE — 4004F PT TOBACCO SCREEN RCVD TLK: CPT | Performed by: ORTHOPAEDIC SURGERY

## 2024-01-15 PROCEDURE — G8399 PT W/DXA RESULTS DOCUMENT: HCPCS | Performed by: ORTHOPAEDIC SURGERY

## 2024-01-15 PROCEDURE — G8484 FLU IMMUNIZE NO ADMIN: HCPCS | Performed by: ORTHOPAEDIC SURGERY

## 2024-01-15 PROCEDURE — G8417 CALC BMI ABV UP PARAM F/U: HCPCS | Performed by: ORTHOPAEDIC SURGERY

## 2024-01-15 PROCEDURE — G8428 CUR MEDS NOT DOCUMENT: HCPCS | Performed by: ORTHOPAEDIC SURGERY

## 2024-01-15 PROCEDURE — 99213 OFFICE O/P EST LOW 20 MIN: CPT | Performed by: ORTHOPAEDIC SURGERY

## 2024-01-15 PROCEDURE — 1090F PRES/ABSN URINE INCON ASSESS: CPT | Performed by: ORTHOPAEDIC SURGERY

## 2024-01-15 PROCEDURE — 1123F ACP DISCUSS/DSCN MKR DOCD: CPT | Performed by: ORTHOPAEDIC SURGERY

## 2024-01-15 PROCEDURE — 3017F COLORECTAL CA SCREEN DOC REV: CPT | Performed by: ORTHOPAEDIC SURGERY

## 2024-01-15 NOTE — PROGRESS NOTES
FOLLOW UP ORTHOPAEDIC NOTE    The patient follows up today for reevaluation of left shoulder. The patient states she is quite pleased with the overall functional return having gone to 2-3 formal physical therapy visits thus far.  She states 0-1/10 pain  PE:  AAOx3  RR  Unlabored breathing  Skin warm and moist  Focused physical examination of the left shoulder  150 degrees forward flexion with slight scapular accommodation, external rotation 30 degrees  Skin intact throughout  5/5 D B T G IO EPL  SILT Ax, R, U, M  +2 radial pulse     Diagnosis Orders   1. Other closed nondisplaced fracture of proximal end of left humerus with routine healing, subsequent encounter          Assessment and plan: 68 female with continued subjective symptoms of left shoulder pain with known, correlating diagnosis of nondisplaced proximal humerus fracture with subsequent healing.  -Time of 12 minutes was spent coordinating and discussing the clinical findings and diagnostic imaging results as they pertain to the patient's presenting subjective symptoms.  -I had a pleasant discussion with the patient and her  who accompanies her.  I reviewed with them both that currently her clinical examination continues to improve.  I want her to go to continue formal physical therapy as well as transition to home exercise program for her to work on full functional range of motion progressions  -OTC Tylenol per bottle as needed discomfort  -Activity modification to include low impact  -I did review with her stretching exercises.  I also reviewed with her natural history evolution of overall symptomatology and discussed with her continued symptom improvement  -All questions answered to the patient's satisfaction and the patient expressed understanding and agreement with the above listed treatment plan  -Follow up in as needed  -Thank you for the clinical consultation and allowing me to participate in the patient's care.      Electronically signed by

## 2024-01-17 RX ORDER — ALENDRONATE SODIUM 70 MG/1
TABLET ORAL
Qty: 12 TABLET | Refills: 1 | Status: SHIPPED | OUTPATIENT
Start: 2024-01-17

## 2024-01-17 NOTE — TELEPHONE ENCOUNTER
Refill Request     CONFIRM preferred pharmacy with the patient.    If Mail Order Rx - Pend for 90 day refill.      Last Seen: Last Seen Department: 9/18/2023  Last Seen by PCP: 9/18/2023    Last Written: 10/27/23 12 with 0 refills    If no future appointment scheduled:  Review the last OV with PCP and review information for follow-up visit,  Route STAFF MESSAGE with patient name to the  Pool for scheduling with the following information:            -  Timing of next visit           -  Visit type ie Physical, OV, etc           -  Diagnoses/Reason ie. COPD, HTN - Do not use MEDICATION, Follow-up or CHECK UP - Give reason for visit      Next Appointment:   Future Appointments   Date Time Provider Department Center   3/18/2024  1:00 PM [facility]  [place] [facility]       Message sent to  to schedule appt with patient?  NO      Requested Prescriptions     Pending Prescriptions Disp Refills    alendronate (FOSAMAX) 70 MG tablet [Pharmacy Med Name: Alendronate Sodium 70 MG Oral Tablet] 12 tablet 0     Sig: Take 1 tablet by mouth once a week

## 2024-02-15 DIAGNOSIS — F51.04 CHRONIC INSOMNIA: ICD-10-CM

## 2024-02-16 RX ORDER — DOXEPIN HYDROCHLORIDE 25 MG/1
25 CAPSULE ORAL NIGHTLY
Qty: 30 CAPSULE | Refills: 5 | Status: SHIPPED | OUTPATIENT
Start: 2024-02-16

## 2024-02-21 DIAGNOSIS — F51.01 PRIMARY INSOMNIA: ICD-10-CM

## 2024-02-21 NOTE — TELEPHONE ENCOUNTER
Refill Request - Controlled Substance    CONFIRM preferred pharmacy with the patient.    If Mail Order Rx - Pend for 90 day refill.        Last Seen Department: 9/18/2023  Last Seen by PCP: 9/18/2023    Last Written: 12/22/2023 60 tab 0 refills     Last UDS: 12/19/2017    Med Agreement Signed On: 01/04/2017    If no future appointment scheduled:  Review the last OV with PCP and review information for follow-up visit,  Route STAFF MESSAGE with patient name to the  Pool for scheduling with the following information:            -  Timing of next visit           -  Visit type ie Physical, OV, etc           -  Diagnoses/Reason ie. COPD, HTN - Do not use MEDICATION, Follow-up or CHECK UP - Give reason for visit        Next Appointment:   Future Appointments   Date Time Provider Department Center   3/18/2024  1:00 PM [facility]  [place] [facility]       Message sent to  to schedule appt with patient?  NO      Requested Prescriptions     Pending Prescriptions Disp Refills    ALPRAZolam (XANAX) 1 MG tablet [Pharmacy Med Name: ALPRAZolam 1 MG Oral Tablet] 60 tablet 0     Sig: TAKE 1 TABLET BY MOUTH TWICE DAILY AS NEEDED FOR SLEEP

## 2024-02-22 RX ORDER — ALPRAZOLAM 1 MG/1
TABLET ORAL
Qty: 60 TABLET | Refills: 0 | Status: SHIPPED | OUTPATIENT
Start: 2024-02-22 | End: 2024-04-22

## 2024-02-29 DIAGNOSIS — F33.1 MAJOR DEPRESSIVE DISORDER, RECURRENT EPISODE, MODERATE (HCC): ICD-10-CM

## 2024-02-29 RX ORDER — SERTRALINE HYDROCHLORIDE 100 MG/1
TABLET, FILM COATED ORAL
Qty: 135 TABLET | Refills: 1 | Status: SHIPPED | OUTPATIENT
Start: 2024-02-29

## 2024-02-29 NOTE — TELEPHONE ENCOUNTER
Refill Request     CONFIRM preferred pharmacy with the patient.    If Mail Order Rx - Pend for 90 day refill.      Last Seen: Last Seen Department: 9/18/2023  Last Seen by PCP: Visit date not found    Last Written: sertraline (ZOLOFT) 100 MG tablet   12/01/2023 #135 refills 0    If no future appointment scheduled:  Review the last OV with PCP and review information for follow-up visit,  Route STAFF MESSAGE with patient name to the  Pool for scheduling with the following information:            -  Timing of next visit           -  Visit type ie Physical, OV, etc           -  Diagnoses/Reason ie. COPD, HTN - Do not use MEDICATION, Follow-up or CHECK UP - Give reason for visit      Next Appointment:   Future Appointments   Date Time Provider Department Center   3/18/2024  1:00 PM Harriet Cuellar APRN - CNP [facility]  [place] [facility]       Message sent to  to schedule appt with patient?  NO      Requested Prescriptions     Pending Prescriptions Disp Refills    sertraline (ZOLOFT) 100 MG tablet [Pharmacy Med Name: Sertraline HCl 100 MG Oral Tablet] 135 tablet 0     Sig: TAKE 1 & 1/2 (ONE & ONE-HALF) TABLETS BY MOUTH ONCE DAILY

## 2024-03-18 ENCOUNTER — OFFICE VISIT (OUTPATIENT)
Dept: FAMILY MEDICINE CLINIC | Age: 69
End: 2024-03-18
Payer: MEDICARE

## 2024-03-18 VITALS
SYSTOLIC BLOOD PRESSURE: 122 MMHG | OXYGEN SATURATION: 96 % | DIASTOLIC BLOOD PRESSURE: 70 MMHG | HEIGHT: 64 IN | HEART RATE: 66 BPM | WEIGHT: 160 LBS | BODY MASS INDEX: 27.31 KG/M2 | TEMPERATURE: 97.1 F

## 2024-03-18 DIAGNOSIS — I10 ESSENTIAL HYPERTENSION: ICD-10-CM

## 2024-03-18 DIAGNOSIS — R15.9 INCONTINENCE OF FECES, UNSPECIFIED FECAL INCONTINENCE TYPE: ICD-10-CM

## 2024-03-18 DIAGNOSIS — E78.00 HYPERCHOLESTEROLEMIA: ICD-10-CM

## 2024-03-18 DIAGNOSIS — R73.03 PRE-DIABETES: ICD-10-CM

## 2024-03-18 DIAGNOSIS — Z17.0 MALIGNANT NEOPLASM OF LOWER-INNER QUADRANT OF RIGHT BREAST OF FEMALE, ESTROGEN RECEPTOR POSITIVE (HCC): ICD-10-CM

## 2024-03-18 DIAGNOSIS — Z72.0 TOBACCO USE: ICD-10-CM

## 2024-03-18 DIAGNOSIS — Z02.89 PAIN MEDICATION AGREEMENT SIGNED: ICD-10-CM

## 2024-03-18 DIAGNOSIS — C50.311 MALIGNANT NEOPLASM OF LOWER-INNER QUADRANT OF RIGHT BREAST OF FEMALE, ESTROGEN RECEPTOR POSITIVE (HCC): ICD-10-CM

## 2024-03-18 DIAGNOSIS — F33.1 MAJOR DEPRESSIVE DISORDER, RECURRENT EPISODE, MODERATE (HCC): Primary | ICD-10-CM

## 2024-03-18 DIAGNOSIS — R25.2 MUSCLE CRAMPS: ICD-10-CM

## 2024-03-18 DIAGNOSIS — F51.01 PRIMARY INSOMNIA: ICD-10-CM

## 2024-03-18 DIAGNOSIS — E55.9 VITAMIN D DEFICIENCY: ICD-10-CM

## 2024-03-18 PROCEDURE — 1123F ACP DISCUSS/DSCN MKR DOCD: CPT | Performed by: NURSE PRACTITIONER

## 2024-03-18 PROCEDURE — 4004F PT TOBACCO SCREEN RCVD TLK: CPT | Performed by: NURSE PRACTITIONER

## 2024-03-18 PROCEDURE — 3017F COLORECTAL CA SCREEN DOC REV: CPT | Performed by: NURSE PRACTITIONER

## 2024-03-18 PROCEDURE — G8427 DOCREV CUR MEDS BY ELIG CLIN: HCPCS | Performed by: NURSE PRACTITIONER

## 2024-03-18 PROCEDURE — 3074F SYST BP LT 130 MM HG: CPT | Performed by: NURSE PRACTITIONER

## 2024-03-18 PROCEDURE — G8484 FLU IMMUNIZE NO ADMIN: HCPCS | Performed by: NURSE PRACTITIONER

## 2024-03-18 PROCEDURE — 99214 OFFICE O/P EST MOD 30 MIN: CPT | Performed by: NURSE PRACTITIONER

## 2024-03-18 PROCEDURE — 3078F DIAST BP <80 MM HG: CPT | Performed by: NURSE PRACTITIONER

## 2024-03-18 PROCEDURE — G8417 CALC BMI ABV UP PARAM F/U: HCPCS | Performed by: NURSE PRACTITIONER

## 2024-03-18 PROCEDURE — 1090F PRES/ABSN URINE INCON ASSESS: CPT | Performed by: NURSE PRACTITIONER

## 2024-03-18 PROCEDURE — G8399 PT W/DXA RESULTS DOCUMENT: HCPCS | Performed by: NURSE PRACTITIONER

## 2024-03-18 RX ORDER — NICOTINE 21 MG/24HR
1 PATCH, TRANSDERMAL 24 HOURS TRANSDERMAL DAILY
Qty: 14 PATCH | Refills: 0 | Status: SHIPPED | OUTPATIENT
Start: 2024-03-18 | End: 2024-04-01

## 2024-03-18 ASSESSMENT — ENCOUNTER SYMPTOMS
VOMITING: 0
NAUSEA: 0
SHORTNESS OF BREATH: 0
WHEEZING: 0
CONSTIPATION: 0
CHEST TIGHTNESS: 0
DIARRHEA: 0
COUGH: 0

## 2024-03-18 NOTE — PROGRESS NOTES
normal.      Palpations: Abdomen is soft.      Tenderness: There is no abdominal tenderness. There is no guarding.   Musculoskeletal:         General: Normal range of motion.      Cervical back: Normal range of motion and neck supple.   Skin:     General: Skin is warm and dry.      Capillary Refill: Capillary refill takes less than 2 seconds.   Neurological:      General: No focal deficit present.      Mental Status: She is alert and oriented to person, place, and time. Mental status is at baseline.   Psychiatric:         Mood and Affect: Mood normal.         Behavior: Behavior normal.         Thought Content: Thought content normal.         Judgment: Judgment normal.

## 2024-03-19 LAB
ANION GAP SERPL CALCULATED.3IONS-SCNC: 12 MMOL/L (ref 3–16)
BUN SERPL-MCNC: 9 MG/DL (ref 7–20)
CALCIUM SERPL-MCNC: 10.2 MG/DL (ref 8.3–10.6)
CHLORIDE SERPL-SCNC: 104 MMOL/L (ref 99–110)
CHOLEST SERPL-MCNC: 157 MG/DL (ref 0–199)
CO2 SERPL-SCNC: 23 MMOL/L (ref 21–32)
CREAT SERPL-MCNC: 0.9 MG/DL (ref 0.6–1.2)
DEPRECATED RDW RBC AUTO: 18.8 % (ref 12.4–15.4)
EST. AVERAGE GLUCOSE BLD GHB EST-MCNC: 134.1 MG/DL
GFR SERPLBLD CREATININE-BSD FMLA CKD-EPI: >60 ML/MIN/{1.73_M2}
GLUCOSE SERPL-MCNC: 108 MG/DL (ref 70–99)
HBA1C MFR BLD: 6.3 %
HCT VFR BLD AUTO: 42.2 % (ref 36–48)
HDLC SERPL-MCNC: 45 MG/DL (ref 40–60)
HGB BLD-MCNC: 14.2 G/DL (ref 12–16)
LDLC SERPL CALC-MCNC: 81 MG/DL
MAGNESIUM SERPL-MCNC: 2 MG/DL (ref 1.8–2.4)
MCH RBC QN AUTO: 31.6 PG (ref 26–34)
MCHC RBC AUTO-ENTMCNC: 33.6 G/DL (ref 31–36)
MCV RBC AUTO: 94.2 FL (ref 80–100)
PLATELET # BLD AUTO: 523 K/UL (ref 135–450)
PLATELET BLD QL SMEAR: ABNORMAL
PMV BLD AUTO: 8.1 FL (ref 5–10.5)
POTASSIUM SERPL-SCNC: 5.2 MMOL/L (ref 3.5–5.1)
RBC # BLD AUTO: 4.48 M/UL (ref 4–5.2)
SLIDE REVIEW: ABNORMAL
SODIUM SERPL-SCNC: 139 MMOL/L (ref 136–145)
TRIGL SERPL-MCNC: 154 MG/DL (ref 0–150)
VLDLC SERPL CALC-MCNC: 31 MG/DL
WBC # BLD AUTO: 12.5 K/UL (ref 4–11)

## 2024-03-21 LAB
6MAM UR QL: NOT DETECTED
7AMINOCLONAZEPAM UR QL: NOT DETECTED
A-OH ALPRAZ UR QL: PRESENT
ALPHA-OH-MIDAZOLAM, URINE: NOT DETECTED
ALPRAZ UR QL: PRESENT
AMPHET UR QL SCN: NOT DETECTED
ANNOTATION COMMENT IMP: NORMAL
ANNOTATION COMMENT IMP: NORMAL
BARBITURATES UR QL: NEGATIVE
BUPRENORPHINE UR QL: NOT DETECTED
BZE UR QL: NEGATIVE
CARBOXYTHC UR QL: NEGATIVE
CARISOPRODOL UR QL: NEGATIVE
CLONAZEPAM UR QL: NOT DETECTED
CODEINE UR QL: NOT DETECTED
CREAT UR-MCNC: 31.3 MG/DL (ref 20–400)
DIAZEPAM UR QL: NOT DETECTED
ETHYL GLUCURONIDE UR QL: NEGATIVE
FENTANYL UR QL: NOT DETECTED
GABAPENTIN: PRESENT
HYDROCODONE UR QL: NOT DETECTED
HYDROMORPHONE UR QL: NOT DETECTED
LORAZEPAM UR QL: NOT DETECTED
MDA UR QL: NOT DETECTED
MDEA UR QL: NOT DETECTED
MDMA UR QL: NOT DETECTED
MEPERIDINE UR QL: NOT DETECTED
METHADONE UR QL: NEGATIVE
METHAMPHET UR QL: NOT DETECTED
MIDAZOLAM UR QL SCN: NOT DETECTED
MORPHINE UR QL: NOT DETECTED
NALOXONE: NOT DETECTED
NORBUPRENORPHINE UR QL CFM: NOT DETECTED
NORDIAZEPAM UR QL: NOT DETECTED
NORFENTANYL UR QL: NOT DETECTED
NORHYDROCODONE UR QL CFM: NOT DETECTED
NOROXYCODONE UR QL CFM: NOT DETECTED
NOROXYMORPHONE, URINE: NOT DETECTED
OXAZEPAM UR QL: NOT DETECTED
OXYCODONE UR QL: NOT DETECTED
OXYMORPHONE UR QL: NOT DETECTED
PATHOLOGY STUDY: NORMAL
PCP UR QL: NEGATIVE
PHENTERMINE UR QL: NOT DETECTED
PPAA UR QL: NOT DETECTED
PREGABALIN: NOT DETECTED
SERVICE CMNT-IMP: NORMAL
TAPENTADOL UR QL SCN: NOT DETECTED
TAPENTADOL-O-SULFATE, URINE: NOT DETECTED
TEMAZEPAM UR QL: NOT DETECTED
TRAMADOL UR QL: NEGATIVE
ZOLPIDEM UR QL: NOT DETECTED

## 2024-03-25 DIAGNOSIS — E87.5 HYPERKALEMIA: Primary | ICD-10-CM

## 2024-03-27 DIAGNOSIS — R10.13 EPIGASTRIC PAIN: ICD-10-CM

## 2024-03-27 RX ORDER — PANTOPRAZOLE SODIUM 40 MG/1
TABLET, DELAYED RELEASE ORAL
Qty: 180 TABLET | Refills: 1 | Status: SHIPPED | OUTPATIENT
Start: 2024-03-27

## 2024-03-27 NOTE — TELEPHONE ENCOUNTER
Refill Request     CONFIRM preferred pharmacy with the patient.    If Mail Order Rx - Pend for 90 day refill.      Last Seen: Last Seen Department: 3/18/2024  Last Seen by PCP: Visit date not found    Last Written: 1/2/24 180 with no refills     If no future appointment scheduled:  Review the last OV with PCP and review information for follow-up visit,  Route STAFF MESSAGE with patient name to the  Pool for scheduling with the following information:            -  Timing of next visit           -  Visit type ie Physical, OV, etc           -  Diagnoses/Reason ie. COPD, HTN - Do not use MEDICATION, Follow-up or CHECK UP - Give reason for visit      Next Appointment:   Future Appointments   Date Time Provider Department Center   9/18/2024  1:00 PM Harriet Cuellar, APRN - CNP [facility]  [place] - [facility]       Message sent to  to schedule appt with patient?  NO      Requested Prescriptions     Pending Prescriptions Disp Refills    pantoprazole (PROTONIX) 40 MG tablet [Pharmacy Med Name: Pantoprazole Sodium 40 MG Oral Tablet Delayed Release] 180 tablet 0     Sig: TAKE 1 TABLET BY MOUTH TWICE DAILY BEFORE MEAL(S)

## 2024-03-28 DIAGNOSIS — F51.01 PRIMARY INSOMNIA: ICD-10-CM

## 2024-03-28 RX ORDER — ALPRAZOLAM 1 MG/1
TABLET ORAL
Qty: 60 TABLET | Refills: 0 | Status: SHIPPED | OUTPATIENT
Start: 2024-03-28 | End: 2024-05-27

## 2024-03-28 NOTE — TELEPHONE ENCOUNTER
Refill Request - Controlled Substance    CONFIRM preferred pharmacy with the patient.    If Mail Order Rx - Pend for 90 day refill.        Last Seen Department: 3/18/2024  Last Seen by PCP: Visit date not found    Last Written: 2/22/2024, #60, 0 refills    Last UDS: 318/2024    Med Agreement Signed On: 1/04/2017    If no future appointment scheduled:  Review the last OV with PCP and review information for follow-up visit,  Route STAFF MESSAGE with patient name to the  Pool for scheduling with the following information:            -  Timing of next visit           -  Visit type ie Physical, OV, etc           -  Diagnoses/Reason ie. COPD, HTN - Do not use MEDICATION, Follow-up or CHECK UP - Give reason for visit        Next Appointment:   Future Appointments   Date Time Provider Department Center   9/18/2024  1:00 PM Harriet Cuellar, APRN - CNP [facility] [place] [facility]       Message sent to  to schedule appt with patient?  YES      Requested Prescriptions     Pending Prescriptions Disp Refills    ALPRAZolam (XANAX) 1 MG tablet [Pharmacy Med Name: ALPRAZolam 1 MG Oral Tablet] 60 tablet 0     Sig: TAKE 1 TABLET BY MOUTH TWICE DAILY AS NEEDED FOR SLEEP

## 2024-04-02 ENCOUNTER — TELEMEDICINE (OUTPATIENT)
Dept: FAMILY MEDICINE CLINIC | Age: 69
End: 2024-04-02
Payer: MEDICARE

## 2024-04-02 ENCOUNTER — PATIENT MESSAGE (OUTPATIENT)
Dept: FAMILY MEDICINE CLINIC | Age: 69
End: 2024-04-02

## 2024-04-02 DIAGNOSIS — Z87.442 HISTORY OF KIDNEY STONES: ICD-10-CM

## 2024-04-02 DIAGNOSIS — R39.89 SUSPECTED UTI: ICD-10-CM

## 2024-04-02 DIAGNOSIS — R10.9 FLANK PAIN: Primary | ICD-10-CM

## 2024-04-02 PROCEDURE — 4004F PT TOBACCO SCREEN RCVD TLK: CPT | Performed by: NURSE PRACTITIONER

## 2024-04-02 PROCEDURE — 3017F COLORECTAL CA SCREEN DOC REV: CPT | Performed by: NURSE PRACTITIONER

## 2024-04-02 PROCEDURE — 81002 URINALYSIS NONAUTO W/O SCOPE: CPT | Performed by: NURSE PRACTITIONER

## 2024-04-02 PROCEDURE — 99214 OFFICE O/P EST MOD 30 MIN: CPT | Performed by: NURSE PRACTITIONER

## 2024-04-02 PROCEDURE — 1123F ACP DISCUSS/DSCN MKR DOCD: CPT | Performed by: NURSE PRACTITIONER

## 2024-04-02 PROCEDURE — G8399 PT W/DXA RESULTS DOCUMENT: HCPCS | Performed by: NURSE PRACTITIONER

## 2024-04-02 PROCEDURE — G8417 CALC BMI ABV UP PARAM F/U: HCPCS | Performed by: NURSE PRACTITIONER

## 2024-04-02 PROCEDURE — G8427 DOCREV CUR MEDS BY ELIG CLIN: HCPCS | Performed by: NURSE PRACTITIONER

## 2024-04-02 PROCEDURE — 1090F PRES/ABSN URINE INCON ASSESS: CPT | Performed by: NURSE PRACTITIONER

## 2024-04-02 RX ORDER — TAMSULOSIN HYDROCHLORIDE 0.4 MG/1
0.4 CAPSULE ORAL DAILY
Qty: 30 CAPSULE | Refills: 0 | Status: SHIPPED | OUTPATIENT
Start: 2024-04-02

## 2024-04-02 ASSESSMENT — ENCOUNTER SYMPTOMS
VOMITING: 1
SHORTNESS OF BREATH: 0
COUGH: 0
BLOOD IN STOOL: 0
DIARRHEA: 1
NAUSEA: 1

## 2024-04-02 NOTE — PATIENT INSTRUCTIONS
Drink plenty of water   Wipe front to back after urinating  Urinate after sexual intercourse   Avoid bath bombs, bubble baths etc    Change pads/tampons frequently   6. Go to Er or notify office if develop high fever with back pain, significant worsening of symptoms, chest pain or shortness of breath   8. Will call with results of urine culture and base further treatement on results  9. Follow up if symptoms worsen or fail to improve

## 2024-04-02 NOTE — PROGRESS NOTES
2024    TELEHEALTH EVALUATION -- Audio/Visual    HPI: Reports symptoms for ten days. Reports nausea/vomiting, diarrhea, low grade fever, \"right kidney absolutely killing me\"  \"feeling crappy\" \"feels like my previous kidney stones have\"   Patient reports history of kidney stones, used to see urology through [facility].  Does need urethral dilation per previous urologist who retired.     Thea ASHLEY Adriana (:  1955) has requested an audio/video evaluation for the following concern(s):    Urinary symptoms     Review of Systems   Constitutional:  Positive for fever. Negative for chills.   Respiratory:  Negative for cough and shortness of breath.    Cardiovascular:  Negative for chest pain and leg swelling.   Gastrointestinal:  Positive for diarrhea, nausea and vomiting. Negative for blood in stool.   Genitourinary:  Positive for flank pain. Negative for decreased urine volume, dysuria, frequency, hematuria, urgency and vaginal discharge.       Prior to Visit Medications    Medication Sig Taking? Authorizing Provider   tamsulosin (FLOMAX) 0.4 MG capsule Take 1 capsule by mouth daily Yes Eleanor Anglin APRN - CNP   ALPRAZolam (XANAX) 1 MG tablet TAKE 1 TABLET BY MOUTH TWICE DAILY AS NEEDED FOR SLEEP Yes Harriet Cuellar APRN - CNP   pantoprazole (PROTONIX) 40 MG tablet TAKE 1 TABLET BY MOUTH TWICE DAILY BEFORE MEAL(S) Yes Harriet Cuellar APRN - CNP   nicotine (NICODERM CQ) 7 MG/24HR Place 1 patch onto the skin daily for 14 days Yes Harriet Cuellar APRN - CNP   sertraline (ZOLOFT) 100 MG tablet TAKE 1 & 1/2 (ONE & ONE-HALF) TABLETS BY MOUTH ONCE DAILY Yes Harriet Cuellar APRN - CNP   doxepin (SINEQUAN) 25 MG capsule TAKE 1 CAPSULE BY MOUTH NIGHTLY Yes Rangel Morel MD   alendronate (FOSAMAX) 70 MG tablet Take 1 tablet by mouth once a week Yes Harriet Cuellar APRN - CNP   valACYclovir (VALTREX) 500 MG tablet Take 1 tablet by mouth once daily Yes Eleanor Anglin APRN - CNP

## 2024-04-02 NOTE — TELEPHONE ENCOUNTER
Spoke with patient with  on the line as well.  Patient stated her Urologist at [facility] retired and she has a history of kidney stones.  Patient c/o low grade fever, vomiting, right flank and back pain.  Patient feels like she is not emptying bladder fully.  Patient denied hematuria and dysuria.  Advised patient she will need to be seen in the office.   requested to just have lab order placed for urine as they are going to get labs drawn today to recheck potassium level.  Advised patient will need to be evaluated.  Offered appointment for today with Eleanor.  Appointment scheduled.

## 2024-04-02 NOTE — TELEPHONE ENCOUNTER
From: Thae Wright  To: Harriet Cuellar  Sent: 4/2/2024 11:07 AM EDT  Subject: Kidney Specialist    Can you recommend a Kidney specialist for me?    Thanks

## 2024-04-03 ENCOUNTER — HOSPITAL ENCOUNTER (OUTPATIENT)
Age: 69
Discharge: HOME | End: 2024-04-03
Payer: MEDICARE

## 2024-04-03 ENCOUNTER — HOSPITAL ENCOUNTER (OUTPATIENT)
Dept: GENERAL RADIOLOGY | Age: 69
Discharge: HOME | End: 2024-04-03
Payer: MEDICARE

## 2024-04-03 ENCOUNTER — NURSE ONLY (OUTPATIENT)
Dept: FAMILY MEDICINE CLINIC | Age: 69
End: 2024-04-03

## 2024-04-03 DIAGNOSIS — E87.5 HYPERKALEMIA: ICD-10-CM

## 2024-04-03 DIAGNOSIS — R10.9 FLANK PAIN: ICD-10-CM

## 2024-04-03 DIAGNOSIS — Z87.442 HISTORY OF KIDNEY STONES: ICD-10-CM

## 2024-04-03 DIAGNOSIS — R10.9 FLANK PAIN: Primary | ICD-10-CM

## 2024-04-03 LAB
BILIRUBIN, POC: NEGATIVE
BLOOD URINE, POC: NEGATIVE
CLARITY, POC: NORMAL
COLOR, POC: YELLOW
GLUCOSE URINE, POC: NEGATIVE
KETONES, POC: NEGATIVE
LEUKOCYTE EST, POC: NORMAL
NITRITE, POC: NEGATIVE
PH, POC: 5.5
POTASSIUM SERPL-SCNC: 4.3 MMOL/L (ref 3.5–5.1)
PROTEIN, POC: NEGATIVE
SPECIFIC GRAVITY, POC: <=1.005
UROBILINOGEN, POC: NORMAL

## 2024-04-03 PROCEDURE — 36415 COLL VENOUS BLD VENIPUNCTURE: CPT

## 2024-04-03 PROCEDURE — 74018 RADEX ABDOMEN 1 VIEW: CPT

## 2024-04-03 PROCEDURE — 84132 ASSAY OF SERUM POTASSIUM: CPT

## 2024-04-05 DIAGNOSIS — E78.00 HYPERCHOLESTEROLEMIA: ICD-10-CM

## 2024-04-05 LAB
BACTERIA UR CULT: ABNORMAL
ORGANISM: ABNORMAL

## 2024-04-05 RX ORDER — MONTELUKAST SODIUM 10 MG/1
TABLET ORAL
Qty: 90 TABLET | Refills: 1 | Status: SHIPPED | OUTPATIENT
Start: 2024-04-05

## 2024-04-05 RX ORDER — SPIRONOLACTONE 50 MG/1
TABLET, FILM COATED ORAL
Qty: 90 TABLET | Refills: 1 | Status: SHIPPED | OUTPATIENT
Start: 2024-04-05

## 2024-04-05 NOTE — TELEPHONE ENCOUNTER
Refill Request     CONFIRM preferred pharmacy with the patient.    If Mail Order Rx - Pend for 90 day refill.      Last Seen: Last Seen Department: 4/2/2024  Last Seen by PCP: 3/18/2024    Last Written:   Spironolactone 1/10/24  Montelukast 10/11/23     If no future appointment scheduled:  Review the last OV with PCP and review information for follow-up visit,  Route STAFF MESSAGE with patient name to the  Pool for scheduling with the following information:            -  Timing of next visit           -  Visit type ie Physical, OV, etc           -  Diagnoses/Reason ie. COPD, HTN - Do not use MEDICATION, Follow-up or CHECK UP - Give reason for visit      Next Appointment:   Future Appointments   Date Time Provider Department Center   9/18/2024  1:00 PM Harriet Cuellar APRN - CNP [facility]  [place] - [place]       Message sent to  to schedule appt with patient?  NO      Requested Prescriptions     Pending Prescriptions Disp Refills    spironolactone (ALDACTONE) 50 MG tablet [Pharmacy Med Name: Spironolactone 50 MG Oral Tablet] 90 tablet 0     Sig: Take 1 tablet by mouth once daily    montelukast (SINGULAIR) 10 MG tablet [Pharmacy Med Name: Montelukast Sodium 10 MG Oral Tablet] 90 tablet 0     Sig: TAKE 1 TABLET BY MOUTH ONCE DAILY NIGHTLY

## 2024-04-11 RX ORDER — VALACYCLOVIR HYDROCHLORIDE 500 MG/1
TABLET, FILM COATED ORAL
Qty: 90 TABLET | Refills: 0 | Status: SHIPPED | OUTPATIENT
Start: 2024-04-11

## 2024-04-11 NOTE — TELEPHONE ENCOUNTER
Refill Request     CONFIRM preferred pharmacy with the patient.    If Mail Order Rx - Pend for 90 day refill.      Last Seen: Last Seen Department: 4/2/2024  Last Seen by PCP: 4/2/2024    Last Written: 1/11/24 90 with no refills     If no future appointment scheduled:  Review the last OV with PCP and review information for follow-up visit,  Route STAFF MESSAGE with patient name to the  Pool for scheduling with the following information:            -  Timing of next visit           -  Visit type ie Physical, OV, etc           -  Diagnoses/Reason ie. COPD, HTN - Do not use MEDICATION, Follow-up or CHECK UP - Give reason for visit      Next Appointment:   Future Appointments   Date Time Provider Department Center   9/18/2024  1:00 PM Harriet Cuellar, APRN - CNP [facility]  [place] - [place]       Message sent to  to schedule appt with patient?  NO      Requested Prescriptions     Pending Prescriptions Disp Refills    valACYclovir (VALTREX) 500 MG tablet [Pharmacy Med Name: valACYclovir HCl 500 MG Oral Tablet] 90 tablet 0     Sig: Take 1 tablet by mouth once daily

## 2024-04-16 ENCOUNTER — HOSPITAL ENCOUNTER (OUTPATIENT)
Dept: CT IMAGING | Age: 69
Discharge: HOME | End: 2024-04-16
Payer: MEDICARE

## 2024-04-16 DIAGNOSIS — R10.11 RIGHT UPPER QUADRANT PAIN: ICD-10-CM

## 2024-04-16 DIAGNOSIS — N20.0 CALCULUS OF KIDNEY: ICD-10-CM

## 2024-04-16 PROCEDURE — 74176 CT ABD & PELVIS W/O CONTRAST: CPT

## 2024-05-09 DIAGNOSIS — Z87.442 HISTORY OF KIDNEY STONES: ICD-10-CM

## 2024-05-09 DIAGNOSIS — R10.9 FLANK PAIN: ICD-10-CM

## 2024-05-09 NOTE — TELEPHONE ENCOUNTER
Refill Request     CONFIRM preferred pharmacy with the patient.    If Mail Order Rx - Pend for 90 day refill.      Last Seen: Last Seen Department: 4/2/2024  Last Seen by PCP: 4/2/2024    Last Written: 4/2/24 30 with no refills     If no future appointment scheduled:  Review the last OV with PCP and review information for follow-up visit,  Route STAFF MESSAGE with patient name to the  Pool for scheduling with the following information:            -  Timing of next visit           -  Visit type ie Physical, OV, etc           -  Diagnoses/Reason ie. COPD, HTN - Do not use MEDICATION, Follow-up or CHECK UP - Give reason for visit      Next Appointment:   Future Appointments   Date Time Provider Department Center   9/18/2024  1:00 PM [facility]  [place] [facility]       Message sent to  to schedule appt with patient?  NO      Requested Prescriptions     Pending Prescriptions Disp Refills    tamsulosin (FLOMAX) 0.4 MG capsule [Pharmacy Med Name: Tamsulosin HCl 0.4 MG Oral Capsule] 30 capsule 0     Sig: Take 1 capsule by mouth once daily

## 2024-05-10 RX ORDER — TAMSULOSIN HYDROCHLORIDE 0.4 MG/1
0.4 CAPSULE ORAL DAILY
Qty: 30 CAPSULE | Refills: 0 | Status: SHIPPED | OUTPATIENT
Start: 2024-05-10

## 2024-05-10 NOTE — TELEPHONE ENCOUNTER
Please let pt know I sent refill for flomax, only needs to take if having urinary symptoms still. Can stop if symptoms resolved.

## 2024-05-23 DIAGNOSIS — F51.01 PRIMARY INSOMNIA: ICD-10-CM

## 2024-05-23 RX ORDER — ALPRAZOLAM 1 MG/1
TABLET ORAL
Qty: 60 TABLET | Refills: 0 | Status: SHIPPED | OUTPATIENT
Start: 2024-05-23 | End: 2024-07-22

## 2024-05-23 NOTE — TELEPHONE ENCOUNTER
Refill Request - Controlled Substance    CONFIRM preferred pharmacy with the patient.    If Mail Order Rx - Pend for 90 day refill.        Last Seen Department: 4/2/2024  Last Seen by PCP: 3/18/2024    Last Written: 03/28/2024 60 tab 0 refills     Last UDS: 03/18/2024    Med Agreement Signed On: 01/04/2017    If no future appointment scheduled:  Review the last OV with PCP and review information for follow-up visit,  Route STAFF MESSAGE with patient name to the  Pool for scheduling with the following information:            -  Timing of next visit           -  Visit type ie Physical, OV, etc           -  Diagnoses/Reason ie. COPD, HTN - Do not use MEDICATION, Follow-up or CHECK UP - Give reason for visit        Next Appointment:   Future Appointments   Date Time Provider Department Center   9/18/2024  1:00 PM Harriet Cuellar[facility] [place] [facility]       Message sent to  to schedule appt with patient?  NO      Requested Prescriptions     Pending Prescriptions Disp Refills    ALPRAZolam (XANAX) 1 MG tablet [Pharmacy Med Name: ALPRAZolam 1 MG Oral Tablet] 60 tablet 0     Sig: TAKE 1 TABLET BY MOUTH TWICE DAILY AS NEEDED FOR SLEEP

## 2024-06-06 DIAGNOSIS — R10.9 FLANK PAIN: ICD-10-CM

## 2024-06-06 DIAGNOSIS — Z87.442 HISTORY OF KIDNEY STONES: ICD-10-CM

## 2024-06-06 NOTE — TELEPHONE ENCOUNTER
Refill Request     CONFIRM preferred pharmacy with the patient.    If Mail Order Rx - Pend for 90 day refill.      Last Seen: Last Seen Department: 4/2/2024  Last Seen by PCP: 3/18/2024    Last Written: 5/10/24 30 with no refills     If no future appointment scheduled:  Review the last OV with PCP and review information for follow-up visit,  Route STAFF MESSAGE with patient name to the  Pool for scheduling with the following information:            -  Timing of next visit           -  Visit type ie Physical, OV, etc           -  Diagnoses/Reason ie. COPD, HTN - Do not use MEDICATION, Follow-up or CHECK UP - Give reason for visit      Next Appointment:   Future Appointments   Date Time Provider Department Center   9/18/2024  1:00 PM [facility]  [place] [facility]       Message sent to  to schedule appt with patient?  NO      Requested Prescriptions     Pending Prescriptions Disp Refills    tamsulosin (FLOMAX) 0.4 MG capsule [Pharmacy Med Name: Tamsulosin HCl 0.4 MG Oral Capsule] 30 capsule 0     Sig: Take 1 capsule by mouth once daily

## 2024-06-12 RX ORDER — TAMSULOSIN HYDROCHLORIDE 0.4 MG/1
0.4 CAPSULE ORAL DAILY
Qty: 30 CAPSULE | Refills: 0 | Status: SHIPPED | OUTPATIENT
Start: 2024-06-12

## 2024-06-26 DIAGNOSIS — E78.00 HYPERCHOLESTEROLEMIA: ICD-10-CM

## 2024-06-26 RX ORDER — PROPRANOLOL HCL 60 MG
CAPSULE, EXTENDED RELEASE 24HR ORAL
Qty: 90 CAPSULE | Refills: 1 | Status: SHIPPED | OUTPATIENT
Start: 2024-06-26

## 2024-06-26 NOTE — TELEPHONE ENCOUNTER
Refill Request     CONFIRM preferred pharmacy with the patient.    If Mail Order Rx - Pend for 90 day refill.      Last Seen: Last Seen Department: 4/2/2024  Last Seen by PCP: Visit date not found    Last Written: 12/13/2023    If no future appointment scheduled:  Review the last OV with PCP and review information for follow-up visit,  Route STAFF MESSAGE with patient name to the  Pool for scheduling with the following information:            -  Timing of next visit           -  Visit type ie Physical, OV, etc           -  Diagnoses/Reason ie. COPD, HTN - Do not use MEDICATION, Follow-up or CHECK UP - Give reason for visit      Next Appointment:   Future Appointments   Date Time Provider Department Center   9/18/2024  1:00 PM Harriet Cuellar, APRN - CNP [facility]  [place] - [place]       Message sent to  to schedule appt with patient?  NO      Requested Prescriptions     Pending Prescriptions Disp Refills    propranolol (INDERAL LA) 60 MG extended release capsule [Pharmacy Med Name: Propranolol HCl ER 60 MG Oral Capsule Extended Release 24 Hour] 90 capsule 1     Sig: Take 1 capsule by mouth once daily

## 2024-07-02 DIAGNOSIS — F51.01 PRIMARY INSOMNIA: ICD-10-CM

## 2024-07-02 NOTE — TELEPHONE ENCOUNTER
Refill Request - Controlled Substance    CONFIRM preferred pharmacy with the patient.    If Mail Order Rx - Pend for 90 day refill.        Last Seen Department: 4/2/2024  Last Seen by PCP: 3/18/2024    Last Written: 05/23/24 60 with 0 refills    Last UDS: 03/18/24    Med Agreement Signed On: 01/04/2017      If no future appointment scheduled:  Review the last OV with PCP and review information for follow-up visit,  Route STAFF MESSAGE with patient name to the  Pool for scheduling with the following information:            -  Timing of next visit           -  Visit type ie Physical, OV, etc           -  Diagnoses/Reason ie. COPD, HTN - Do not use MEDICATION, Follow-up or CHECK UP - Give reason for visit        Next Appointment:   Future Appointments   Date Time Provider Department Center   9/18/2024  1:00 PM Harriet Cuellar, APRN - CNP [facility] [place] - [place]       Message sent to  to schedule appt with patient?  NO      Requested Prescriptions     Pending Prescriptions Disp Refills    ALPRAZolam (XANAX) 1 MG tablet [Pharmacy Med Name: ALPRAZolam 1 MG Oral Tablet] 60 tablet 0     Sig: TAKE 1 TABLET BY MOUTH TWICE DAILY AS NEEDED FOR SLEEP

## 2024-07-03 RX ORDER — ALPRAZOLAM 1 MG/1
TABLET ORAL
Qty: 60 TABLET | Refills: 0 | Status: SHIPPED | OUTPATIENT
Start: 2024-07-03 | End: 2024-09-01

## 2024-07-04 DIAGNOSIS — E78.00 HYPERCHOLESTEROLEMIA: ICD-10-CM

## 2024-07-04 NOTE — TELEPHONE ENCOUNTER
Refill Request     CONFIRM preferred pharmacy with the patient.    If Mail Order Rx - Pend for 90 day refill.      Last Seen: Last Seen Department: 4/2/2024  Last Seen by PCP: 3/18/2024    Last Written: Atorvastatin 1/5/24 90 tabs 1 refill    If no future appointment scheduled:  Review the last OV with PCP and review information for follow-up visit,  Route STAFF MESSAGE with patient name to the  Pool for scheduling with the following information:            -  Timing of next visit           -  Visit type ie Physical, OV, etc           -  Diagnoses/Reason ie. COPD, HTN - Do not use MEDICATION, Follow-up or CHECK UP - Give reason for visit      Next Appointment:   Future Appointments   Date Time Provider Department Center   9/18/2024  1:00 PM Harriet Cuellar, APRN - CNP [facility]  [place] - [place]       Message sent to  to schedule appt with patient?  NO      Requested Prescriptions     Pending Prescriptions Disp Refills    atorvastatin (LIPITOR) 40 MG tablet [Pharmacy Med Name: Atorvastatin Calcium 40 MG Oral Tablet] 90 tablet 0     Sig: TAKE 1 TABLET BY MOUTH ONCE DAILY NIGHTLY

## 2024-07-05 RX ORDER — ATORVASTATIN CALCIUM 40 MG/1
TABLET, FILM COATED ORAL
Qty: 90 TABLET | Refills: 0 | Status: SHIPPED | OUTPATIENT
Start: 2024-07-05

## 2024-07-10 NOTE — TELEPHONE ENCOUNTER
Refill Request     CONFIRM preferred pharmacy with the patient.    If Mail Order Rx - Pend for 90 day refill.      Last Seen: Last Seen Department: 4/2/2024  Last Seen by PCP: 3/18/2024    Last Written: 04/11/2024 90 tab 0 refills     If no future appointment scheduled:  Review the last OV with PCP and review information for follow-up visit,  Route STAFF MESSAGE with patient name to the  Pool for scheduling with the following information:            -  Timing of next visit           -  Visit type ie Physical, OV, etc           -  Diagnoses/Reason ie. COPD, HTN - Do not use MEDICATION, Follow-up or CHECK UP - Give reason for visit      Next Appointment:   Future Appointments   Date Time Provider Department Center   9/18/2024  1:00 PM Harriet Cuellar, APRN - CNP [facility]  [place] - [place]       Message sent to  to schedule appt with patient?  NO      Requested Prescriptions     Pending Prescriptions Disp Refills    valACYclovir (VALTREX) 500 MG tablet [Pharmacy Med Name: valACYclovir HCl 500 MG Oral Tablet] 90 tablet 0     Sig: Take 1 tablet by mouth once daily

## 2024-07-11 RX ORDER — VALACYCLOVIR HYDROCHLORIDE 500 MG/1
TABLET, FILM COATED ORAL
Qty: 90 TABLET | Refills: 0 | Status: SHIPPED | OUTPATIENT
Start: 2024-07-11

## 2024-07-18 DIAGNOSIS — R10.9 FLANK PAIN: ICD-10-CM

## 2024-07-18 DIAGNOSIS — Z87.442 HISTORY OF KIDNEY STONES: ICD-10-CM

## 2024-07-18 RX ORDER — TAMSULOSIN HYDROCHLORIDE 0.4 MG/1
0.4 CAPSULE ORAL DAILY
Qty: 30 CAPSULE | Refills: 0 | Status: SHIPPED | OUTPATIENT
Start: 2024-07-18

## 2024-07-18 NOTE — TELEPHONE ENCOUNTER
Refill Request     CONFIRM preferred pharmacy with the patient.    If Mail Order Rx - Pend for 90 day refill.      Last Seen: Last Seen Department: 4/2/2024  Last Seen by PCP: 3/18/2024    Last Written: 06/12/2024 30 cap 0 refills     If no future appointment scheduled:  Review the last OV with PCP and review information for follow-up visit,  Route STAFF MESSAGE with patient name to the  Pool for scheduling with the following information:            -  Timing of next visit           -  Visit type ie Physical, OV, etc           -  Diagnoses/Reason ie. COPD, HTN - Do not use MEDICATION, Follow-up or CHECK UP - Give reason for visit      Next Appointment:   Future Appointments   Date Time Provider Department Center   9/18/2024  1:00 PM Harriet Cuellar, APRN - CNP [facility]  [place] - [place]       Message sent to  to schedule appt with patient?  NO      Requested Prescriptions     Pending Prescriptions Disp Refills    tamsulosin (FLOMAX) 0.4 MG capsule [Pharmacy Med Name: Tamsulosin HCl 0.4 MG Oral Capsule] 30 capsule 0     Sig: Take 1 capsule by mouth once daily

## 2024-08-01 RX ORDER — ALENDRONATE SODIUM 70 MG/1
TABLET ORAL
Qty: 12 TABLET | Refills: 0 | Status: SHIPPED | OUTPATIENT
Start: 2024-08-01

## 2024-08-01 NOTE — TELEPHONE ENCOUNTER
Refill Request     CONFIRM preferred pharmacy with the patient.    If Mail Order Rx - Pend for 90 day refill.      Last Seen: Last Seen Department: 4/2/2024  Last Seen by PCP: 3/18/2024    Last Written: 1/17/24 12 with 1 refill     If no future appointment scheduled:  Review the last OV with PCP and review information for follow-up visit,  Route STAFF MESSAGE with patient name to the  Pool for scheduling with the following information:            -  Timing of next visit           -  Visit type ie Physical, OV, etc           -  Diagnoses/Reason ie. COPD, HTN - Do not use MEDICATION, Follow-up or CHECK UP - Give reason for visit      Next Appointment:   Future Appointments   Date Time Provider Department Center   9/18/2024  1:00 PM [facility]       Message sent to  to schedule appt with patient?  NO      Requested Prescriptions     Pending Prescriptions Disp Refills    alendronate (FOSAMAX) 70 MG tablet [Pharmacy Med Name: Alendronate Sodium 70 MG Oral Tablet] 12 tablet 0     Sig: Take 1 tablet by mouth once a week

## 2024-08-14 DIAGNOSIS — F51.04 CHRONIC INSOMNIA: ICD-10-CM

## 2024-08-15 RX ORDER — DOXEPIN HYDROCHLORIDE 25 MG/1
25 CAPSULE ORAL NIGHTLY
Qty: 30 CAPSULE | Refills: 0 | OUTPATIENT
Start: 2024-08-15

## 2024-08-15 NOTE — TELEPHONE ENCOUNTER
Last appt: 9/27/2023    Next appt: Visit date not found    Medication matches medication on Epic list      Patient needs follow up appt

## 2024-08-16 DIAGNOSIS — F51.04 CHRONIC INSOMNIA: ICD-10-CM

## 2024-08-16 RX ORDER — DOXEPIN HYDROCHLORIDE 25 MG/1
25 CAPSULE ORAL NIGHTLY
Qty: 30 CAPSULE | Refills: 0 | OUTPATIENT
Start: 2024-08-16

## 2024-08-19 ENCOUNTER — HOSPITAL ENCOUNTER (OUTPATIENT)
Age: 69
Discharge: HOME | End: 2024-08-19
Payer: MEDICARE

## 2024-08-19 ENCOUNTER — HOSPITAL ENCOUNTER (OUTPATIENT)
Dept: CT IMAGING | Age: 69
Discharge: HOME | End: 2024-08-19
Payer: MEDICARE

## 2024-08-19 DIAGNOSIS — C50.411 MALIGNANT NEOPLASM OF UPPER-OUTER QUADRANT OF RIGHT FEMALE BREAST, UNSPECIFIED ESTROGEN RECEPTOR STATUS (HCC): ICD-10-CM

## 2024-08-19 DIAGNOSIS — F51.04 CHRONIC INSOMNIA: ICD-10-CM

## 2024-08-19 DIAGNOSIS — F33.1 MAJOR DEPRESSIVE DISORDER, RECURRENT EPISODE, MODERATE (HCC): ICD-10-CM

## 2024-08-19 DIAGNOSIS — D64.9 ANEMIA, UNSPECIFIED TYPE: ICD-10-CM

## 2024-08-19 DIAGNOSIS — M54.50 LUMBAR PAIN: ICD-10-CM

## 2024-08-19 LAB
PERFORMED ON: NORMAL
POC CREATININE: 0.8 MG/DL (ref 0.6–1.2)
POC SAMPLE TYPE: NORMAL

## 2024-08-19 PROCEDURE — 82565 ASSAY OF CREATININE: CPT

## 2024-08-19 PROCEDURE — 36415 COLL VENOUS BLD VENIPUNCTURE: CPT

## 2024-08-19 PROCEDURE — 71260 CT THORAX DX C+: CPT

## 2024-08-19 PROCEDURE — 82746 ASSAY OF FOLIC ACID SERUM: CPT

## 2024-08-19 PROCEDURE — 82607 VITAMIN B-12: CPT

## 2024-08-19 PROCEDURE — 6360000004 HC RX CONTRAST MEDICATION: Performed by: INTERNAL MEDICINE

## 2024-08-19 RX ORDER — DOXEPIN HYDROCHLORIDE 25 MG/1
25 CAPSULE ORAL NIGHTLY
Qty: 30 CAPSULE | Refills: 5 | OUTPATIENT
Start: 2024-08-19

## 2024-08-19 RX ADMIN — IOPAMIDOL 75 ML: 755 INJECTION, SOLUTION INTRAVENOUS at 16:59

## 2024-08-19 NOTE — TELEPHONE ENCOUNTER
Thea called in asking why we wouldn't refill her medication. Advised her she needed to be seen. She says [facility] is closer so she wants to be seen there. Transferred her to schedule.

## 2024-08-20 LAB
FOLATE SERPL-MCNC: 13.5 NG/ML (ref 4.78–24.2)
VIT B12 SERPL-MCNC: 258 PG/ML (ref 211–911)

## 2024-08-22 DIAGNOSIS — R10.9 FLANK PAIN: ICD-10-CM

## 2024-08-22 DIAGNOSIS — F33.1 MAJOR DEPRESSIVE DISORDER, RECURRENT EPISODE, MODERATE (HCC): ICD-10-CM

## 2024-08-22 DIAGNOSIS — Z87.442 HISTORY OF KIDNEY STONES: ICD-10-CM

## 2024-08-22 RX ORDER — SERTRALINE HYDROCHLORIDE 100 MG/1
TABLET, FILM COATED ORAL
Qty: 135 TABLET | Refills: 0 | Status: SHIPPED | OUTPATIENT
Start: 2024-08-22

## 2024-08-22 RX ORDER — TAMSULOSIN HYDROCHLORIDE 0.4 MG/1
0.4 CAPSULE ORAL DAILY
Qty: 30 CAPSULE | Refills: 0 | Status: SHIPPED | OUTPATIENT
Start: 2024-08-22

## 2024-08-22 NOTE — TELEPHONE ENCOUNTER
Refill Request     CONFIRM preferred pharmacy with the patient.    If Mail Order Rx - Pend for 90 day refill.      Last Seen: Last Seen Department: 4/2/2024  Last Seen by PCP: 3/18/2024    Last Written: Sertraline 02/29/2024 135 with 1 refill    Tamsulosin 07/18/2024 30 with no refill    If no future appointment scheduled:  Review the last OV with PCP and review information for follow-up visit,  Route STAFF MESSAGE with patient name to the  Pool for scheduling with the following information:            -  Timing of next visit           -  Visit type ie Physical, OV, etc           -  Diagnoses/Reason ie. COPD, HTN - Do not use MEDICATION, Follow-up or CHECK UP - Give reason for visit      Next Appointment:   Future Appointments   Date Time Provider Department Center   9/18/2024 12:00 PM Tracey Parker PA [facility] DE[facility]       Message sent to  to schedule appt with patient?  NO      Requested Prescriptions     Pending Prescriptions Disp Refills    tamsulosin (FLOMAX) 0.4 MG capsule [Pharmacy Med Name: Tamsulosin HCl 0.4 MG Oral Capsule] 30 capsule 0     Sig: Take 1 capsule by mouth once daily    sertraline (ZOLOFT) 100 MG tablet [Pharmacy Med Name: Sertraline HCl 100 MG Oral Tablet] 135 tablet 0     Sig: TAKE 1 & 1/2 (ONE & ONE-HALF) TABLETS BY MOUTH ONCE DAILY

## 2024-09-04 DIAGNOSIS — F51.01 PRIMARY INSOMNIA: ICD-10-CM

## 2024-09-04 RX ORDER — ALPRAZOLAM 1 MG
TABLET ORAL
Qty: 60 TABLET | Refills: 0 | Status: SHIPPED | OUTPATIENT
Start: 2024-09-04 | End: 2024-11-03

## 2024-09-04 NOTE — TELEPHONE ENCOUNTER
Refill Request - Controlled Substance    CONFIRM preferred pharmacy with the patient.    If Mail Order Rx - Pend for 90 day refill.        Last Seen Department: 4/2/2024  Last Seen by PCP: 3/18/2024    Last Written: 7/3/2024    Last UDS: 3/18/2024    Med Agreement Signed On: 1/4/2017    If no future appointment scheduled:  Review the last OV with PCP and review information for follow-up visit,  Route STAFF MESSAGE with patient name to the  Pool for scheduling with the following information:            -  Timing of next visit           -  Visit type ie Physical, OV, etc           -  Diagnoses/Reason ie. COPD, HTN - Do not use MEDICATION, Follow-up or CHECK UP - Give reason for visit        Next Appointment:   Future Appointments   Date Time Provider Department Center   9/18/2024 12:00 PM Tracey Parker PA [facility] DE[facility]       Message sent to  to schedule appt with patient?  NO      Requested Prescriptions     Pending Prescriptions Disp Refills    ALPRAZolam (XANAX) 1 MG tablet [Pharmacy Med Name: ALPRAZolam 1 MG Oral Tablet] 60 tablet 0     Sig: TAKE 1 TABLET BY MOUTH TWICE DAILY AS NEEDED FOR SLEEP

## 2024-09-18 ENCOUNTER — OFFICE VISIT (OUTPATIENT)
Dept: FAMILY MEDICINE CLINIC | Age: 69
End: 2024-09-18

## 2024-09-18 VITALS
BODY MASS INDEX: 25.81 KG/M2 | TEMPERATURE: 96.9 F | SYSTOLIC BLOOD PRESSURE: 95 MMHG | HEIGHT: 64 IN | DIASTOLIC BLOOD PRESSURE: 60 MMHG | HEART RATE: 61 BPM | WEIGHT: 151.2 LBS | OXYGEN SATURATION: 96 %

## 2024-09-18 DIAGNOSIS — I10 ESSENTIAL HYPERTENSION: ICD-10-CM

## 2024-09-18 DIAGNOSIS — R26.89 IMBALANCE: ICD-10-CM

## 2024-09-18 DIAGNOSIS — R29.6 FREQUENT FALLS: ICD-10-CM

## 2024-09-18 DIAGNOSIS — F13.20 SEDATIVE, HYPNOTIC OR ANXIOLYTIC DEPENDENCE, UNCOMPLICATED (HCC): ICD-10-CM

## 2024-09-18 DIAGNOSIS — Z00.00 MEDICARE ANNUAL WELLNESS VISIT, SUBSEQUENT: Primary | ICD-10-CM

## 2024-09-18 PROBLEM — M31.6 GIANT CELL ARTERITIS (HCC): Status: RESOLVED | Noted: 2021-01-18 | Resolved: 2024-09-18

## 2024-09-18 RX ORDER — SULFAMETHOXAZOLE/TRIMETHOPRIM 800-160 MG
1 TABLET ORAL 2 TIMES DAILY
Qty: 10 TABLET | Refills: 0 | Status: SHIPPED | OUTPATIENT
Start: 2024-09-18 | End: 2024-09-23

## 2024-09-18 RX ORDER — PROPRANOLOL HYDROCHLORIDE 40 MG/1
40 TABLET ORAL DAILY
Qty: 90 TABLET | Refills: 1 | Status: SHIPPED | OUTPATIENT
Start: 2024-09-18

## 2024-09-18 SDOH — ECONOMIC STABILITY: FOOD INSECURITY: WITHIN THE PAST 12 MONTHS, YOU WORRIED THAT YOUR FOOD WOULD RUN OUT BEFORE YOU GOT MONEY TO BUY MORE.: NEVER TRUE

## 2024-09-18 SDOH — ECONOMIC STABILITY: FOOD INSECURITY: WITHIN THE PAST 12 MONTHS, THE FOOD YOU BOUGHT JUST DIDN'T LAST AND YOU DIDN'T HAVE MONEY TO GET MORE.: NEVER TRUE

## 2024-09-18 SDOH — ECONOMIC STABILITY: INCOME INSECURITY: HOW HARD IS IT FOR YOU TO PAY FOR THE VERY BASICS LIKE FOOD, HOUSING, MEDICAL CARE, AND HEATING?: NOT HARD AT ALL

## 2024-09-18 ASSESSMENT — PATIENT HEALTH QUESTIONNAIRE - PHQ9
SUM OF ALL RESPONSES TO PHQ QUESTIONS 1-9: 2
SUM OF ALL RESPONSES TO PHQ9 QUESTIONS 1 & 2: 0
5. POOR APPETITE OR OVEREATING: NOT AT ALL
3. TROUBLE FALLING OR STAYING ASLEEP: SEVERAL DAYS
2. FEELING DOWN, DEPRESSED OR HOPELESS: NOT AT ALL
SUM OF ALL RESPONSES TO PHQ QUESTIONS 1-9: 2
4. FEELING TIRED OR HAVING LITTLE ENERGY: SEVERAL DAYS
8. MOVING OR SPEAKING SO SLOWLY THAT OTHER PEOPLE COULD HAVE NOTICED. OR THE OPPOSITE, BEING SO FIGETY OR RESTLESS THAT YOU HAVE BEEN MOVING AROUND A LOT MORE THAN USUAL: NOT AT ALL
7. TROUBLE CONCENTRATING ON THINGS, SUCH AS READING THE NEWSPAPER OR WATCHING TELEVISION: NOT AT ALL
10. IF YOU CHECKED OFF ANY PROBLEMS, HOW DIFFICULT HAVE THESE PROBLEMS MADE IT FOR YOU TO DO YOUR WORK, TAKE CARE OF THINGS AT HOME, OR GET ALONG WITH OTHER PEOPLE: NOT DIFFICULT AT ALL
9. THOUGHTS THAT YOU WOULD BE BETTER OFF DEAD, OR OF HURTING YOURSELF: NOT AT ALL
1. LITTLE INTEREST OR PLEASURE IN DOING THINGS: NOT AT ALL
6. FEELING BAD ABOUT YOURSELF - OR THAT YOU ARE A FAILURE OR HAVE LET YOURSELF OR YOUR FAMILY DOWN: NOT AT ALL
SUM OF ALL RESPONSES TO PHQ QUESTIONS 1-9: 2
SUM OF ALL RESPONSES TO PHQ QUESTIONS 1-9: 2

## 2024-09-18 ASSESSMENT — LIFESTYLE VARIABLES
HOW MANY STANDARD DRINKS CONTAINING ALCOHOL DO YOU HAVE ON A TYPICAL DAY: PATIENT DOES NOT DRINK
HOW OFTEN DO YOU HAVE A DRINK CONTAINING ALCOHOL: NEVER

## 2024-09-23 DIAGNOSIS — R10.13 EPIGASTRIC PAIN: ICD-10-CM

## 2024-09-23 RX ORDER — PANTOPRAZOLE SODIUM 40 MG/1
TABLET, DELAYED RELEASE ORAL
Qty: 180 TABLET | Refills: 1 | Status: SHIPPED | OUTPATIENT
Start: 2024-09-23

## 2024-10-04 DIAGNOSIS — Z87.442 HISTORY OF KIDNEY STONES: ICD-10-CM

## 2024-10-04 DIAGNOSIS — R10.9 FLANK PAIN: ICD-10-CM

## 2024-10-04 RX ORDER — TAMSULOSIN HYDROCHLORIDE 0.4 MG/1
0.4 CAPSULE ORAL DAILY
Qty: 30 CAPSULE | Refills: 0 | Status: SHIPPED | OUTPATIENT
Start: 2024-10-04

## 2024-10-04 NOTE — TELEPHONE ENCOUNTER
Refill Request     CONFIRM preferred pharmacy with the patient.    If Mail Order Rx - Pend for 90 day refill.      Last Seen: Last Seen Department: 9/18/2024  Last Seen by PCP: 4/2/2024    Last Written: 8/22/24 30 with no refills     If no future appointment scheduled:  Review the last OV with PCP and review information for follow-up visit,  Route STAFF MESSAGE with patient name to the  Pool for scheduling with the following information:            -  Timing of next visit           -  Visit type ie Physical, OV, etc           -  Diagnoses/Reason ie. COPD, HTN - Do not use MEDICATION, Follow-up or CHECK UP - Give reason for visit      Next Appointment:   Future Appointments   Date Time Provider Department Center   1/20/2025 11:30 AM Harriet Cuellar APRN - CNP [facility] DEP       Message sent to  to schedule appt with patient?  NO      Requested Prescriptions     Pending Prescriptions Disp Refills    tamsulosin (FLOMAX) 0.4 MG capsule [Pharmacy Med Name: Tamsulosin HCl 0.4 MG Oral Capsule] 30 capsule 0     Sig: Take 1 capsule by mouth once daily

## 2024-10-07 DIAGNOSIS — F51.01 PRIMARY INSOMNIA: ICD-10-CM

## 2024-10-07 RX ORDER — ALPRAZOLAM 1 MG
TABLET ORAL
Qty: 60 TABLET | Refills: 0 | Status: SHIPPED | OUTPATIENT
Start: 2024-10-07 | End: 2024-12-06

## 2024-10-07 NOTE — TELEPHONE ENCOUNTER
Refill Request - Controlled Substance    CONFIRM preferred pharmacy with the patient.    If Mail Order Rx - Pend for 90 day refill.        Last Seen Department: 9/18/2024  Last Seen by PCP: 4/2/2024    Last Written: 9/4/24 #60 - 0 refills     Last UDS: 3/18/24    Med Agreement Signed On: 1/4/17    If no future appointment scheduled:  Review the last OV with PCP and review information for follow-up visit,  Route STAFF MESSAGE with patient name to the  Pool for scheduling with the following information:            -  Timing of next visit           -  Visit type ie Physical, OV, etc           -  Diagnoses/Reason ie. COPD, HTN - Do not use MEDICATION, Follow-up or CHECK UP - Give reason for visit        Next Appointment:   Future Appointments   Date Time Provider Department Center   1/20/2025 11:30 AM Harriet Cuellar, APRN - CNP [facility] DEP       Message sent to  to schedule appt with patient?  NO      Requested Prescriptions     Pending Prescriptions Disp Refills    ALPRAZolam (XANAX) 1 MG tablet [Pharmacy Med Name: ALPRAZolam 1 MG Oral Tablet] 60 tablet 0     Sig: TAKE 1 TABLET BY MOUTH TWICE DAILY AS NEEDED FOR SLEEP

## 2024-10-10 DIAGNOSIS — E78.00 HYPERCHOLESTEROLEMIA: ICD-10-CM

## 2024-10-10 RX ORDER — ATORVASTATIN CALCIUM 40 MG/1
TABLET, FILM COATED ORAL
Qty: 90 TABLET | Refills: 1 | Status: SHIPPED | OUTPATIENT
Start: 2024-10-10

## 2024-10-10 NOTE — TELEPHONE ENCOUNTER
.Refill Request     CONFIRM preferred pharmacy with the patient.    If Mail Order Rx - Pend for 90 day refill.      Last Seen: Last Seen Department: 9/18/2024  Last Seen by PCP: 9/18/2024    Last Written: 7-5-24 90 with 0     If no future appointment scheduled:  Review the last OV with PCP and review information for follow-up visit,  Route STAFF MESSAGE with patient name to the  Pool for scheduling with the following information:            -  Timing of next visit           -  Visit type ie Physical, OV, etc           -  Diagnoses/Reason ie. COPD, HTN - Do not use MEDICATION, Follow-up or CHECK UP - Give reason for visit      Next Appointment:   Future Appointments   Date Time Provider Department Center   1/20/2025 11:30 AM Harriet Cuellar, APRN - CNP [facility] DEP       Message sent to  to schedule appt with patient?  NO      Requested Prescriptions     Pending Prescriptions Disp Refills    atorvastatin (LIPITOR) 40 MG tablet [Pharmacy Med Name: Atorvastatin Calcium 40 MG Oral Tablet] 90 tablet 1     Sig: TAKE 1 TABLET BY MOUTH ONCE DAILY NIGHTLY

## 2024-10-17 DIAGNOSIS — J30.2 SEASONAL ALLERGIES: Primary | ICD-10-CM

## 2024-10-17 RX ORDER — VALACYCLOVIR HYDROCHLORIDE 500 MG/1
TABLET, FILM COATED ORAL
Qty: 90 TABLET | Refills: 0 | Status: SHIPPED | OUTPATIENT
Start: 2024-10-17

## 2024-10-17 RX ORDER — MONTELUKAST SODIUM 10 MG/1
TABLET ORAL
Qty: 90 TABLET | Refills: 0 | Status: SHIPPED | OUTPATIENT
Start: 2024-10-17

## 2024-10-17 NOTE — TELEPHONE ENCOUNTER
Refill Request     CONFIRM preferred pharmacy with the patient.    If Mail Order Rx - Pend for 90 day refill.      Last Seen: Last Seen Department: 9/18/2024  Last Seen by PCP: 3/18/2024    Last Written: 07/11/24 90 with 0 refills    If no future appointment scheduled:  Review the last OV with PCP and review information for follow-up visit,  Route STAFF MESSAGE with patient name to the  Pool for scheduling with the following information:            -  Timing of next visit           -  Visit type ie Physical, OV, etc           -  Diagnoses/Reason ie. COPD, HTN - Do not use MEDICATION, Follow-up or CHECK UP - Give reason for visit      Next Appointment:   Future Appointments   Date Time Provider Department Center   10/18/2024  1:30 PM Kyleigh Arndt MD [facility] DEP   1/20/2025 11:30 AM Harriet Cuellar, VERITO - CNP [facility] DEP       Message sent to  to schedule appt with patient?  NO      Requested Prescriptions     Pending Prescriptions Disp Refills    montelukast (SINGULAIR) 10 MG tablet [Pharmacy Med Name: Montelukast Sodium 10 MG Oral Tablet] 90 tablet 0     Sig: TAKE 1 TABLET BY MOUTH ONCE DAILY NIGHTLY    valACYclovir (VALTREX) 500 MG tablet [Pharmacy Med Name: valACYclovir HCl 500 MG Oral Tablet] 90 tablet 0     Sig: Take 1 tablet by mouth once daily

## 2024-10-18 ENCOUNTER — OFFICE VISIT (OUTPATIENT)
Dept: FAMILY MEDICINE CLINIC | Age: 69
End: 2024-10-18

## 2024-10-18 ENCOUNTER — HOSPITAL ENCOUNTER (OUTPATIENT)
Dept: GENERAL RADIOLOGY | Age: 69
Discharge: HOME | End: 2024-10-18
Payer: MEDICARE

## 2024-10-18 VITALS
WEIGHT: 155.6 LBS | OXYGEN SATURATION: 95 % | DIASTOLIC BLOOD PRESSURE: 80 MMHG | HEART RATE: 92 BPM | TEMPERATURE: 98 F | BODY MASS INDEX: 26.7 KG/M2 | SYSTOLIC BLOOD PRESSURE: 122 MMHG

## 2024-10-18 DIAGNOSIS — R05.3 CHRONIC COUGH: Primary | ICD-10-CM

## 2024-10-18 DIAGNOSIS — R05.3 CHRONIC COUGH: ICD-10-CM

## 2024-10-18 PROCEDURE — 71046 X-RAY EXAM CHEST 2 VIEWS: CPT

## 2024-10-18 RX ORDER — GUAIFENESIN 600 MG/1
600 TABLET, EXTENDED RELEASE ORAL 2 TIMES DAILY
Qty: 30 TABLET | Refills: 0 | Status: SHIPPED | OUTPATIENT
Start: 2024-10-18 | End: 2024-11-02

## 2024-10-18 RX ORDER — CETIRIZINE HYDROCHLORIDE 10 MG/1
10 TABLET ORAL DAILY
Qty: 90 TABLET | Refills: 0 | Status: SHIPPED | OUTPATIENT
Start: 2024-10-18

## 2024-10-18 RX ORDER — FLUTICASONE PROPIONATE 50 MCG
2 SPRAY, SUSPENSION (ML) NASAL DAILY
Qty: 48 G | Refills: 1 | Status: SHIPPED | OUTPATIENT
Start: 2024-10-18

## 2024-10-18 ASSESSMENT — ENCOUNTER SYMPTOMS
ABDOMINAL PAIN: 0
WHEEZING: 0
COUGH: 1
NAUSEA: 0
SINUS PRESSURE: 1
VOMITING: 0
SHORTNESS OF BREATH: 1
RHINORRHEA: 1

## 2024-10-18 NOTE — PROGRESS NOTES
122/80   Site: Left Upper Arm   Position: Sitting   Cuff Size: Medium Adult   Pulse: 92   Temp: 98 °F (36.7 °C)   SpO2: 95%   Weight: 70.6 kg (155 lb 9.6 oz)     Estimated body mass index is 26.7 kg/m² as calculated from the following:    Height as of 9/18/24: 1.626 m (5' 4.02\").    Weight as of this encounter: 70.6 kg (155 lb 9.6 oz).    Physical Exam  Vitals reviewed.   Constitutional:       General: She is not in acute distress.     Appearance: Normal appearance.   HENT:      Head: Normocephalic and atraumatic.   Eyes:      General:         Right eye: No discharge.         Left eye: No discharge.      Extraocular Movements: Extraocular movements intact.      Conjunctiva/sclera: Conjunctivae normal.      Comments: Dermatochalasis bilaterally without erythema or warmth. Sinus tenderness to palpation.    Cardiovascular:      Rate and Rhythm: Normal rate and regular rhythm.   Pulmonary:      Effort: Pulmonary effort is normal. No respiratory distress.      Breath sounds: Normal breath sounds. No wheezing, rhonchi or rales.   Abdominal:      Palpations: Abdomen is soft.      Tenderness: There is no abdominal tenderness.   Musculoskeletal:         General: No swelling. Normal range of motion.      Cervical back: No tenderness.   Lymphadenopathy:      Cervical: No cervical adenopathy.   Skin:     General: Skin is warm and dry.   Neurological:      Mental Status: She is alert.   Psychiatric:         Mood and Affect: Mood normal.         Kyleigh Arndt MD    This dictation was generated by voice recognition computer software.  Although all attempts are made to edit the dictation for accuracy, there may be errors in the transcription that are not intended.

## 2024-10-24 DIAGNOSIS — J44.9 CHRONIC OBSTRUCTIVE PULMONARY DISEASE, UNSPECIFIED COPD TYPE (HCC): Primary | ICD-10-CM

## 2024-10-24 DIAGNOSIS — E78.00 HYPERCHOLESTEROLEMIA: ICD-10-CM

## 2024-10-24 RX ORDER — BUDESONIDE AND FORMOTEROL FUMARATE 160; 4.5 UG/1; UG/1
AEROSOL, METERED RESPIRATORY (INHALATION)
Qty: 11 G | Refills: 5 | Status: SHIPPED | OUTPATIENT
Start: 2024-10-24

## 2024-10-24 RX ORDER — SPIRONOLACTONE 50 MG/1
TABLET, FILM COATED ORAL
Qty: 90 TABLET | Refills: 1 | Status: SHIPPED | OUTPATIENT
Start: 2024-10-24

## 2024-10-24 NOTE — TELEPHONE ENCOUNTER
Refill Request     CONFIRM preferred pharmacy with the patient.    If Mail Order Rx - Pend for 90 day refill.      Last Seen: Last Seen Department: 10/18/2024  Last Seen by PCP: 3/18/2024    Last Written: 04/05/2024 90 tab 1 refills     If no future appointment scheduled:  Review the last OV with PCP and review information for follow-up visit,  Route STAFF MESSAGE with patient name to the  Pool for scheduling with the following information:            -  Timing of next visit           -  Visit type ie Physical, OV, etc           -  Diagnoses/Reason ie. COPD, HTN - Do not use MEDICATION, Follow-up or CHECK UP - Give reason for visit      Next Appointment:   Future Appointments   Date Time Provider Department Center   1/20/2025 11:30 AM Harriet Cuellar APRN - CNP [facility] DEP       Message sent to  to schedule appt with patient?  NO      Requested Prescriptions     Pending Prescriptions Disp Refills    spironolactone (ALDACTONE) 50 MG tablet [Pharmacy Med Name: Spironolactone 50 MG Oral Tablet] 90 tablet 0     Sig: Take 1 tablet by mouth once daily

## 2024-11-07 DIAGNOSIS — Z87.442 HISTORY OF KIDNEY STONES: ICD-10-CM

## 2024-11-07 DIAGNOSIS — F51.01 PRIMARY INSOMNIA: ICD-10-CM

## 2024-11-07 DIAGNOSIS — R10.9 FLANK PAIN: ICD-10-CM

## 2024-11-08 RX ORDER — ALPRAZOLAM 1 MG/1
TABLET ORAL
Qty: 60 TABLET | Refills: 0 | Status: SHIPPED | OUTPATIENT
Start: 2024-11-08 | End: 2024-12-09

## 2024-11-08 RX ORDER — TAMSULOSIN HYDROCHLORIDE 0.4 MG/1
0.4 CAPSULE ORAL DAILY
Qty: 30 CAPSULE | Refills: 0 | Status: SHIPPED | OUTPATIENT
Start: 2024-11-08

## 2024-11-08 NOTE — TELEPHONE ENCOUNTER
.Refill Request - Controlled Substance    CONFIRM preferred pharmacy with the patient.    If Mail Order Rx - Pend for 90 day refill.        Last Seen Department: 10/18/2024  Last Seen by PCP: Visit date not found    Last Written: 10-7-24 60 with 0     Last UDS: 3-18-24    Med Agreement Signed On: 3-18-24    If no future appointment scheduled:  Review the last OV with PCP and review information for follow-up visit,  Route STAFF MESSAGE with patient name to the  Pool for scheduling with the following information:            -  Timing of next visit           -  Visit type ie Physical, OV, etc           -  Diagnoses/Reason ie. COPD, HTN - Do not use MEDICATION, Follow-up or CHECK UP - Give reason for visit        Next Appointment:   Future Appointments   Date Time Provider Department Center   1/20/2025 11:30 AM Harriet Cuellar, APRN - CNP [facility] DEP       Message sent to  to schedule appt with patient?  NO      Requested Prescriptions     Pending Prescriptions Disp Refills    tamsulosin (FLOMAX) 0.4 MG capsule 30 capsule 0     Sig: Take 1 capsule by mouth daily    ALPRAZolam (XANAX) 1 MG tablet 60 tablet 0

## 2024-11-11 RX ORDER — ALENDRONATE SODIUM 70 MG/1
TABLET ORAL
Qty: 12 TABLET | Refills: 0 | Status: SHIPPED | OUTPATIENT
Start: 2024-11-11

## 2024-11-11 NOTE — TELEPHONE ENCOUNTER
Refill Request     CONFIRM preferred pharmacy with the patient.    If Mail Order Rx - Pend for 90 day refill.      Last Seen: Last Seen Department: 10/18/2024  Last Seen by PCP: 3/18/2024    Last Written: 08/01/2024 12 tab 0 refills     If no future appointment scheduled:  Review the last OV with PCP and review information for follow-up visit,  Route STAFF MESSAGE with patient name to the  Pool for scheduling with the following information:            -  Timing of next visit           -  Visit type ie Physical, OV, etc           -  Diagnoses/Reason ie. COPD, HTN - Do not use MEDICATION, Follow-up or CHECK UP - Give reason for visit      Next Appointment:   Future Appointments   Date Time Provider Department Center   1/20/2025 11:30 AM Harriet Cuellar APRN - CNP [facility] DEP       Message sent to  to schedule appt with patient?  NO      Requested Prescriptions     Pending Prescriptions Disp Refills    alendronate (FOSAMAX) 70 MG tablet 12 tablet 0     Sig: Take 1 tablet by mouth once a week

## 2024-12-02 DIAGNOSIS — F33.1 MAJOR DEPRESSIVE DISORDER, RECURRENT EPISODE, MODERATE (HCC): ICD-10-CM

## 2024-12-02 RX ORDER — SERTRALINE HYDROCHLORIDE 100 MG/1
TABLET, FILM COATED ORAL
Qty: 135 TABLET | Refills: 0 | Status: SHIPPED | OUTPATIENT
Start: 2024-12-02

## 2024-12-02 NOTE — TELEPHONE ENCOUNTER
Refill Request     CONFIRM preferred pharmacy with the patient.    If Mail Order Rx - Pend for 90 day refill.      Last Seen: Last Seen Department: 10/18/2024  Last Seen by PCP: 3/18/2024    Last Written: 08.22.24    If no future appointment scheduled:  Review the last OV with PCP and review information for follow-up visit,  Route STAFF MESSAGE with patient name to the  Pool for scheduling with the following information:            -  Timing of next visit           -  Visit type ie Physical, OV, etc           -  Diagnoses/Reason ie. COPD, HTN - Do not use MEDICATION, Follow-up or CHECK UP - Give reason for visit      Next Appointment:   Future Appointments   Date Time Provider Department Center   1/20/2025 11:30 AM Harriet Cuellar APRN - CNP [facility] DEP       Message sent to  to schedule appt with patient?  NO      Requested Prescriptions      No prescriptions requested or ordered in this encounter

## 2024-12-10 DIAGNOSIS — F51.01 PRIMARY INSOMNIA: ICD-10-CM

## 2024-12-10 NOTE — TELEPHONE ENCOUNTER
Refill Request - Controlled Substance    CONFIRM preferred pharmacy with the patient.    If Mail Order Rx - Pend for 90 day refill.        Last Seen Department: 10/18/2024  Last Seen by PCP: 9/18/2024    Last Written: 11/8/24 #60 - 0 refills     Last UDS: 3/18/24    Med Agreement Signed On: 1/4/17    If no future appointment scheduled:  Review the last OV with PCP and review information for follow-up visit,  Route STAFF MESSAGE with patient name to the  Pool for scheduling with the following information:            -  Timing of next visit           -  Visit type ie Physical, OV, etc           -  Diagnoses/Reason ie. COPD, HTN - Do not use MEDICATION, Follow-up or CHECK UP - Give reason for visit        Next Appointment:   Future Appointments   Date Time Provider Department Center   1/20/2025 11:30 AM Harriet Cuellar, APRN - CNP [facility] DEP       Message sent to  to schedule appt with patient?  NO      Requested Prescriptions     Pending Prescriptions Disp Refills    ALPRAZolam (XANAX) 1 MG tablet [Pharmacy Med Name: ALPRAZolam 1 MG Oral Tablet] 60 tablet 0     Sig: TAKE 1 TABLET BY MOUTH TWICE DAILY AS NEEDED FOR SLEEP

## 2024-12-11 DIAGNOSIS — Z87.442 HISTORY OF KIDNEY STONES: ICD-10-CM

## 2024-12-11 DIAGNOSIS — F51.01 PRIMARY INSOMNIA: ICD-10-CM

## 2024-12-11 DIAGNOSIS — R10.9 FLANK PAIN: ICD-10-CM

## 2024-12-11 NOTE — TELEPHONE ENCOUNTER
Refill Request - Controlled Substance    CONFIRM preferred pharmacy with the patient.    If Mail Order Rx - Pend for 90 day refill.        Last Seen Department: 10/18/2024  Last Seen by PCP: 9/18/2024    Last Written:   Xanax-11/08/2024 60 tab 0 refills   Tamsulosin-11/08/2024 30 cap 0 refills     Last UDS: 03/18/2024    Med Agreement Signed On: 01/04/2017    If no future appointment scheduled:  Review the last OV with PCP and review information for follow-up visit,  Route STAFF MESSAGE with patient name to the  Pool for scheduling with the following information:            -  Timing of next visit           -  Visit type ie Physical, OV, etc           -  Diagnoses/Reason ie. COPD, HTN - Do not use MEDICATION, Follow-up or CHECK UP - Give reason for visit        Next Appointment:   Future Appointments   Date Time Provider Department Center   1/20/2025 11:30 AM Harriet Cuellar APRN - CNP [facility] DEP       Message sent to  to schedule appt with patient?  NO      Requested Prescriptions     Pending Prescriptions Disp Refills    tamsulosin (FLOMAX) 0.4 MG capsule [Pharmacy Med Name: Tamsulosin HCl 0.4 MG Oral Capsule] 30 capsule 0     Sig: Take 1 capsule by mouth once daily    ALPRAZolam (XANAX) 1 MG tablet 60 tablet 0     Sig: TAKE 1 TAB BY MOUTH TWICE DAILY AS NEEDED FOR SLEEP

## 2024-12-12 RX ORDER — ALPRAZOLAM 1 MG/1
TABLET ORAL
Qty: 60 TABLET | Refills: 0 | Status: SHIPPED | OUTPATIENT
Start: 2024-12-12 | End: 2025-01-11

## 2024-12-18 RX ORDER — TAMSULOSIN HYDROCHLORIDE 0.4 MG/1
0.4 CAPSULE ORAL DAILY
Qty: 30 CAPSULE | Refills: 0 | OUTPATIENT
Start: 2024-12-18

## 2024-12-18 RX ORDER — ALPRAZOLAM 1 MG/1
TABLET ORAL
Qty: 60 TABLET | Refills: 0 | OUTPATIENT
Start: 2024-12-18 | End: 2025-01-11

## 2025-01-09 ENCOUNTER — OFFICE VISIT (OUTPATIENT)
Dept: ORTHOPEDIC SURGERY | Age: 70
End: 2025-01-09
Payer: MEDICARE

## 2025-01-09 ENCOUNTER — TELEPHONE (OUTPATIENT)
Dept: ORTHOPEDIC SURGERY | Age: 70
End: 2025-01-09

## 2025-01-09 VITALS — WEIGHT: 155.65 LBS | BODY MASS INDEX: 26.57 KG/M2 | HEIGHT: 64 IN

## 2025-01-09 DIAGNOSIS — M25.511 RIGHT SHOULDER PAIN, UNSPECIFIED CHRONICITY: ICD-10-CM

## 2025-01-09 DIAGNOSIS — S46.011A TRAUMATIC COMPLETE TEAR OF RIGHT ROTATOR CUFF, INITIAL ENCOUNTER: Primary | ICD-10-CM

## 2025-01-09 DIAGNOSIS — M25.511 TRIGGER POINT OF RIGHT SHOULDER REGION: ICD-10-CM

## 2025-01-09 DIAGNOSIS — M75.21 RIGHT BICIPITAL TENOSYNOVITIS: ICD-10-CM

## 2025-01-09 PROCEDURE — G8427 DOCREV CUR MEDS BY ELIG CLIN: HCPCS | Performed by: ORTHOPAEDIC SURGERY

## 2025-01-09 PROCEDURE — G8399 PT W/DXA RESULTS DOCUMENT: HCPCS | Performed by: ORTHOPAEDIC SURGERY

## 2025-01-09 PROCEDURE — 99214 OFFICE O/P EST MOD 30 MIN: CPT | Performed by: ORTHOPAEDIC SURGERY

## 2025-01-09 PROCEDURE — 4004F PT TOBACCO SCREEN RCVD TLK: CPT | Performed by: ORTHOPAEDIC SURGERY

## 2025-01-09 PROCEDURE — 1159F MED LIST DOCD IN RCRD: CPT | Performed by: ORTHOPAEDIC SURGERY

## 2025-01-09 PROCEDURE — 3017F COLORECTAL CA SCREEN DOC REV: CPT | Performed by: ORTHOPAEDIC SURGERY

## 2025-01-09 PROCEDURE — G8417 CALC BMI ABV UP PARAM F/U: HCPCS | Performed by: ORTHOPAEDIC SURGERY

## 2025-01-09 PROCEDURE — 1090F PRES/ABSN URINE INCON ASSESS: CPT | Performed by: ORTHOPAEDIC SURGERY

## 2025-01-09 PROCEDURE — 1123F ACP DISCUSS/DSCN MKR DOCD: CPT | Performed by: ORTHOPAEDIC SURGERY

## 2025-01-09 RX ORDER — MELOXICAM 15 MG/1
15 TABLET ORAL DAILY PRN
Qty: 30 TABLET | Refills: 0 | Status: SHIPPED | OUTPATIENT
Start: 2025-01-09

## 2025-01-09 NOTE — TELEPHONE ENCOUNTER
Left voicemail for patient that their MRI has been authorized and that they can call and schedule scan at their convenience. Also told them that they can call and schedule a f/u with Dr. Cooper once they have MRI scheduled, leaving at least 2-3 days for our office to receive their results.

## 2025-01-09 NOTE — PROGRESS NOTES
FOLLOW UP ORTHOPAEDIC NOTE    The patient follows up today for evaluation of right shoulder.  Please see previous notes for medical history details with regard to her left shoulder.  She is accompanied by her .  She states 2 weeks worth of pain when she fell down some stairs on her back and use her arm to try to catch herself.  She has had difficulty raising her arm.  She has pain with ADLs.  She has pain with sleep.  She has functional limitations.  She states 2/10 pain currently but it can be as high as 8/10 pain    PE:  AAOx3  RR  Unlabored breathing  Skin warm and moist  Focused physical examination of the right shoulder  Bilateral upper extremity examination specific to subjective symptoms  Exam Right Shoulder                                                Active Range of Motion (FF/Abd/ER/IR)         110/110/20/sacrum limited secondary to pain  Passive Range of Motion (FF/Abd/ER/IR)      150/150  Positive   Neer,    positive [place],      4/5   Empty Can,          4/5  ER arm at the side,       5-/5   Belly Press,      5-/5 Bear Hug,       equivocal O'Briens,      positive   TTP at Biceps Tendon Sheath,     positive Speed positive Yergason, non-   TTP AC Joint, negative cross arm adduction,      periscapular trigger points with recreation of symptoms towards the neck  Skin intact throughout  5/5 D B T G IO EPL  SILT Ax, R, U, M  +2 radial pulse        Pertinent radiographs/imagin view right shoulder 2024: Negative fracture.  Positive acromioclavicular joint arthrosis moderate to severe with associated anterior acromial spur/osteophyte.  Reduced glenohumeral joint.  Very early arthrosis with joint space narrowing glenohumeral joint     Diagnosis Orders   1. Traumatic complete tear of right rotator cuff, initial encounter  MRI SHOULDER RIGHT WO CONTRAST    meloxicam (MOBIC) 15 MG tablet      2. Right bicipital tenosynovitis  meloxicam (MOBIC) 15 MG tablet      3. Trigger point of right shoulder

## 2025-01-19 NOTE — PROGRESS NOTES
2025  Thea Wright (: 1955)  69 y.o.    ASSESSMENT and PLAN:  Thea was seen today for hypertension.    Diagnoses and all orders for this visit:    Primary insomnia  -     Drug Panel-PM-HI Res-UR Interp-A; Future  -update contract and urine today    Major depressive disorder, recurrent episode, moderate (HCC)  -The current medical regimen is effective;  continue present plan and medications.    Hypercholesterolemia  -     LIPID PANEL; Future  -update fasting las.   -continue statin    Essential hypertension  -     Basic Metabolic Panel; Future  -     CBC; Future  -well controlled, update labs.     Sedative, hypnotic or anxiolytic dependence, uncomplicated (HCC)  -Stable, continue current regimen.  -discussed avoiding multiple sedating medications at one time.     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 breast of female, estrogen receptor positive (HCC)  -follows with [facility]/dr. Lower.   -Stable, continue current regimen.    Esophageal dysphagia  -     AFL - Vivienne Herzog MD, Gastroenterology, [facility]  -still having sensation of food getting stuck, recommend egd.     Anxiety  -     Drug Panel-PM-HI Res-UR Interp-A; Future  -still taking xanax as prescribed.     Other orders  -     Influenza, FLUAD Trivalent, (age 65 y+), IM, Preservative Free, 0.5mL  -given    Return in about 3 months (around 2025), or if symptoms worsen or fail to improve, for anxiety.    HPI  Presenting for f/u on chronic conditions.      Intermittently gets sensation of food getting stuck. Hx of gerd. On ppi. Trialed off a few weeks ago.     Obesity-just saw bariatrics-Dr. Knox.      Sarcoid - following Dr. Humaira Catherine at . On prednisone every other day.      Following with Dr. Snow-on gabapentin. Prn narcotics. On flexeril management by Dr Snow.     Follows with eye doc optic neuronitis. Up to date with exams.      Hx of prediabetes.      HLD-on lipitor 40 mg.      Follows with Dr. Ceja/Deedee at [facility]

## 2025-01-20 ENCOUNTER — OFFICE VISIT (OUTPATIENT)
Dept: FAMILY MEDICINE CLINIC | Age: 70
End: 2025-01-20
Payer: MEDICARE

## 2025-01-20 VITALS
DIASTOLIC BLOOD PRESSURE: 68 MMHG | BODY MASS INDEX: 25.92 KG/M2 | SYSTOLIC BLOOD PRESSURE: 120 MMHG | HEART RATE: 78 BPM | WEIGHT: 151 LBS | OXYGEN SATURATION: 96 %

## 2025-01-20 DIAGNOSIS — F13.20 SEDATIVE, HYPNOTIC OR ANXIOLYTIC DEPENDENCE, UNCOMPLICATED (HCC): ICD-10-CM

## 2025-01-20 DIAGNOSIS — C50.311 MALIGNANT NEOPLASM OF LOWER-INNER QUADRANT OF RIGHT BREAST OF FEMALE, ESTROGEN RECEPTOR POSITIVE (HCC): ICD-10-CM

## 2025-01-20 DIAGNOSIS — I10 ESSENTIAL HYPERTENSION: ICD-10-CM

## 2025-01-20 DIAGNOSIS — R13.19 ESOPHAGEAL DYSPHAGIA: ICD-10-CM

## 2025-01-20 DIAGNOSIS — E78.00 HYPERCHOLESTEROLEMIA: ICD-10-CM

## 2025-01-20 DIAGNOSIS — F51.01 PRIMARY INSOMNIA: Primary | ICD-10-CM

## 2025-01-20 DIAGNOSIS — F33.1 MAJOR DEPRESSIVE DISORDER, RECURRENT EPISODE, MODERATE (HCC): ICD-10-CM

## 2025-01-20 DIAGNOSIS — Z17.0 MALIGNANT NEOPLASM OF LOWER-INNER QUADRANT OF RIGHT BREAST OF FEMALE, ESTROGEN RECEPTOR POSITIVE (HCC): ICD-10-CM

## 2025-01-20 DIAGNOSIS — F41.9 ANXIETY: ICD-10-CM

## 2025-01-20 LAB
ANION GAP SERPL CALCULATED.3IONS-SCNC: 11 MMOL/L (ref 3–16)
BUN SERPL-MCNC: 9 MG/DL (ref 7–20)
CALCIUM SERPL-MCNC: 10.2 MG/DL (ref 8.3–10.6)
CHLORIDE SERPL-SCNC: 109 MMOL/L (ref 99–110)
CHOLEST SERPL-MCNC: 154 MG/DL (ref 0–199)
CO2 SERPL-SCNC: 21 MMOL/L (ref 21–32)
CREAT SERPL-MCNC: 0.7 MG/DL (ref 0.6–1.2)
DEPRECATED RDW RBC AUTO: 20.8 % (ref 12.4–15.4)
GFR SERPLBLD CREATININE-BSD FMLA CKD-EPI: >90 ML/MIN/{1.73_M2}
GLUCOSE SERPL-MCNC: 115 MG/DL (ref 70–99)
HCT VFR BLD AUTO: 41.5 % (ref 36–48)
HDLC SERPL-MCNC: 38 MG/DL (ref 40–60)
HGB BLD-MCNC: 13.2 G/DL (ref 12–16)
LDLC SERPL CALC-MCNC: 87 MG/DL
MCH RBC QN AUTO: 30.2 PG (ref 26–34)
MCHC RBC AUTO-ENTMCNC: 31.8 G/DL (ref 31–36)
MCV RBC AUTO: 94.9 FL (ref 80–100)
PLATELET # BLD AUTO: 485 K/UL (ref 135–450)
PLATELET BLD QL SMEAR: ABNORMAL
PMV BLD AUTO: 8.7 FL (ref 5–10.5)
POTASSIUM SERPL-SCNC: 4.5 MMOL/L (ref 3.5–5.1)
RBC # BLD AUTO: 4.38 M/UL (ref 4–5.2)
SLIDE REVIEW: ABNORMAL
SODIUM SERPL-SCNC: 141 MMOL/L (ref 136–145)
TRIGL SERPL-MCNC: 144 MG/DL (ref 0–150)
VLDLC SERPL CALC-MCNC: 29 MG/DL
WBC # BLD AUTO: 12.4 K/UL (ref 4–11)

## 2025-01-20 PROCEDURE — 1090F PRES/ABSN URINE INCON ASSESS: CPT | Performed by: NURSE PRACTITIONER

## 2025-01-20 PROCEDURE — G8427 DOCREV CUR MEDS BY ELIG CLIN: HCPCS | Performed by: NURSE PRACTITIONER

## 2025-01-20 PROCEDURE — 1159F MED LIST DOCD IN RCRD: CPT | Performed by: NURSE PRACTITIONER

## 2025-01-20 PROCEDURE — G8417 CALC BMI ABV UP PARAM F/U: HCPCS | Performed by: NURSE PRACTITIONER

## 2025-01-20 PROCEDURE — 4004F PT TOBACCO SCREEN RCVD TLK: CPT | Performed by: NURSE PRACTITIONER

## 2025-01-20 PROCEDURE — 1160F RVW MEDS BY RX/DR IN RCRD: CPT | Performed by: NURSE PRACTITIONER

## 2025-01-20 PROCEDURE — 3074F SYST BP LT 130 MM HG: CPT | Performed by: NURSE PRACTITIONER

## 2025-01-20 PROCEDURE — 3078F DIAST BP <80 MM HG: CPT | Performed by: NURSE PRACTITIONER

## 2025-01-20 PROCEDURE — 99214 OFFICE O/P EST MOD 30 MIN: CPT | Performed by: NURSE PRACTITIONER

## 2025-01-20 PROCEDURE — G0008 ADMIN INFLUENZA VIRUS VAC: HCPCS | Performed by: NURSE PRACTITIONER

## 2025-01-20 PROCEDURE — 3017F COLORECTAL CA SCREEN DOC REV: CPT | Performed by: NURSE PRACTITIONER

## 2025-01-20 PROCEDURE — 1123F ACP DISCUSS/DSCN MKR DOCD: CPT | Performed by: NURSE PRACTITIONER

## 2025-01-20 PROCEDURE — 90653 IIV ADJUVANT VACCINE IM: CPT | Performed by: NURSE PRACTITIONER

## 2025-01-20 PROCEDURE — G8399 PT W/DXA RESULTS DOCUMENT: HCPCS | Performed by: NURSE PRACTITIONER

## 2025-01-20 ASSESSMENT — ENCOUNTER SYMPTOMS
VOMITING: 0
COUGH: 0
DIARRHEA: 0
CHEST TIGHTNESS: 0
WHEEZING: 0
NAUSEA: 0
SHORTNESS OF BREATH: 0
CONSTIPATION: 0

## 2025-01-22 DIAGNOSIS — R73.01 IFG (IMPAIRED FASTING GLUCOSE): Primary | ICD-10-CM

## 2025-01-22 RX ORDER — VALACYCLOVIR HYDROCHLORIDE 500 MG/1
TABLET, FILM COATED ORAL
Qty: 90 TABLET | Refills: 1 | Status: SHIPPED | OUTPATIENT
Start: 2025-01-22

## 2025-01-22 NOTE — TELEPHONE ENCOUNTER
.Refill Request     CONFIRM preferred pharmacy with the patient.    If Mail Order Rx - Pend for 90 day refill.      Last Seen: Last Seen Department: 1/20/2025  Last Seen by PCP: 9/18/2024    Last Written: 10-17-24 90 with 0     If no future appointment scheduled:  Review the last OV with PCP and review information for follow-up visit,  Route STAFF MESSAGE with patient name to the  Pool for scheduling with the following information:            -  Timing of next visit           -  Visit type ie Physical, OV, etc           -  Diagnoses/Reason ie. COPD, HTN - Do not use MEDICATION, Follow-up or CHECK UP - Give reason for visit      Next Appointment:   Future Appointments   Date Time Provider Department Center   5/21/2025  1:00 PM [facility]       Message sent to  to schedule appt with patient?  NO      Requested Prescriptions     Pending Prescriptions Disp Refills    valACYclovir (VALTREX) 500 MG tablet [Pharmacy Med Name: valACYclovir HCl 500 MG Oral Tablet] 90 tablet 0     Sig: Take 1 tablet by mouth once daily

## 2025-01-29 ENCOUNTER — TELEPHONE (OUTPATIENT)
Dept: ORTHOPEDIC SURGERY | Age: 70
End: 2025-01-29

## 2025-01-29 NOTE — TELEPHONE ENCOUNTER
Attempted to contact Pt. I called the first number on the Pt's chart and the Pt's spouse answered. The Pt's spouse Carlo is listed as an individual that I am able to speak with to schedule an appt. An appt was has been scheduled to review MRI TR. Pt's spouse was provided with time, date and location. Pt's spouse expressed understanding

## 2025-01-30 RX ORDER — ALENDRONATE SODIUM 70 MG/1
TABLET ORAL
Qty: 12 TABLET | Refills: 0 | Status: SHIPPED | OUTPATIENT
Start: 2025-01-30

## 2025-01-30 NOTE — TELEPHONE ENCOUNTER
Refill Request     CONFIRM preferred pharmacy with the patient.    If Mail Order Rx - Pend for 90 day refill.      Last Seen: Last Seen Department: 1/20/2025  Last Seen by PCP: 9/18/2024    Last Written: 11/11/2024 12 tab 0 refills     If no future appointment scheduled:  Review the last OV with PCP and review information for follow-up visit,  Route STAFF MESSAGE with patient name to the  Pool for scheduling with the following information:            -  Timing of next visit           -  Visit type ie Physical, OV, etc           -  Diagnoses/Reason ie. COPD, HTN - Do not use MEDICATION, Follow-up or CHECK UP - Give reason for visit      Next Appointment:   Future Appointments   Date Time Provider Department Center   1/31/2025 10:45 AM Williams Cooper MD AND ORTHO [facility]   5/21/2025  1:00 PM Harriet Cuellar, APRN - CNP [facility] DEP       Message sent to  to schedule appt with patient?  NO      Requested Prescriptions     Pending Prescriptions Disp Refills    alendronate (FOSAMAX) 70 MG tablet [Pharmacy Med Name: Alendronate Sodium 70 MG Oral Tablet] 12 tablet 0     Sig: Take 1 tablet by mouth once a week

## 2025-01-31 ENCOUNTER — TELEPHONE (OUTPATIENT)
Dept: ORTHOPEDIC SURGERY | Age: 70
End: 2025-01-31

## 2025-01-31 NOTE — TELEPHONE ENCOUNTER
Spoke with Pt's spouse Carlo as that is the only number listed on the Pt's chart. He is listed as a contact I am able to speak with. A new appt has been scheduled for Monday 2/3. Pt's spouse was provided with time,date and location. Pt's spouse expressed understanding

## 2025-02-03 ENCOUNTER — OFFICE VISIT (OUTPATIENT)
Dept: ORTHOPEDIC SURGERY | Age: 70
End: 2025-02-03
Payer: MEDICARE

## 2025-02-03 VITALS — HEIGHT: 64 IN | BODY MASS INDEX: 25.78 KG/M2 | WEIGHT: 151 LBS

## 2025-02-03 DIAGNOSIS — S46.011D TRAUMATIC COMPLETE TEAR OF RIGHT ROTATOR CUFF, SUBSEQUENT ENCOUNTER: Primary | ICD-10-CM

## 2025-02-03 PROCEDURE — G8427 DOCREV CUR MEDS BY ELIG CLIN: HCPCS | Performed by: ORTHOPAEDIC SURGERY

## 2025-02-03 PROCEDURE — 1159F MED LIST DOCD IN RCRD: CPT | Performed by: ORTHOPAEDIC SURGERY

## 2025-02-03 PROCEDURE — G8399 PT W/DXA RESULTS DOCUMENT: HCPCS | Performed by: ORTHOPAEDIC SURGERY

## 2025-02-03 PROCEDURE — 1090F PRES/ABSN URINE INCON ASSESS: CPT | Performed by: ORTHOPAEDIC SURGERY

## 2025-02-03 PROCEDURE — 3017F COLORECTAL CA SCREEN DOC REV: CPT | Performed by: ORTHOPAEDIC SURGERY

## 2025-02-03 PROCEDURE — G8417 CALC BMI ABV UP PARAM F/U: HCPCS | Performed by: ORTHOPAEDIC SURGERY

## 2025-02-03 PROCEDURE — 99214 OFFICE O/P EST MOD 30 MIN: CPT | Performed by: ORTHOPAEDIC SURGERY

## 2025-02-03 PROCEDURE — 1123F ACP DISCUSS/DSCN MKR DOCD: CPT | Performed by: ORTHOPAEDIC SURGERY

## 2025-02-03 PROCEDURE — L3670 SO ACRO/CLAV CAN WEB PRE OTS: HCPCS | Performed by: ORTHOPAEDIC SURGERY

## 2025-02-03 PROCEDURE — 4004F PT TOBACCO SCREEN RCVD TLK: CPT | Performed by: ORTHOPAEDIC SURGERY

## 2025-02-03 NOTE — PROGRESS NOTES
FOLLOW UP ORTHOPAEDIC NOTE    The patient follows up today for reevaluation of right shoulder.  She states continued functional limitations.  She states that she has difficulty lifting her arm above shoulder level.  She states it wakes her up at night.  She received her MRI.  She is here to discuss the results.  She states 5/10 pain and despite activity modifications physician directed physical therapy and previous Mobic prescription she still is having limitations.    PE:  AAOx3  RR  Unlabored breathing  Skin warm and moist  Focused physical examination of the right shoulder  Unchanged rotator cuff examination, positive Neer positive Vaca and positive tenderness palpation along the biceps tendon groove  90 degrees forward flexion active range of motion 160 degrees forward flexion passive range of motion.  5/5 deltoid firing with axillary nerve sensation intact  Range of motion and neurovascular exam unchanged    Pertinent radiographs/imaging:  MRI right shoulder 1/27/2025  CONCLUSION:   1. Massive rotator cuff tear of the supraspinatus and infraspinatus tendons, retracted up to the level of the glenoid.  Grade 4 fibrofatty infiltration of the muscle.   2. Medially perched long biceps tendon against a laminar concealed tear of the subscapularis.     MY READ: Full-thickness supraspinatus infraspinatus tears with grade 4 fatty atrophy and infiltration of the supraspinatus and with grade 3/4 changes in the infraspinatus.  Grade 1/2 changes subscapularis with high-grade tendinosis with upper subscapularis tear with associated fluid in the biceps tendon sheath and medial perched/subluxated biceps tendon.  Positive subacromial edema.  Mild acromioclavicular joint arthrosis with slight joint space narrowing     Diagnosis Orders   1. Traumatic complete tear of right rotator cuff, subsequent encounter  Breg Sling Shot Shoulder Sling        Assessment and plan: 69 female with continued subjective symptoms of right shoulder

## 2025-02-06 ENCOUNTER — PREP FOR PROCEDURE (OUTPATIENT)
Dept: ORTHOPEDIC SURGERY | Age: 70
End: 2025-02-06

## 2025-02-06 DIAGNOSIS — S46.011D TRAUMATIC COMPLETE TEAR OF RIGHT ROTATOR CUFF, SUBSEQUENT ENCOUNTER: Primary | ICD-10-CM

## 2025-02-06 DIAGNOSIS — M75.21 RIGHT BICIPITAL TENOSYNOVITIS: ICD-10-CM

## 2025-02-06 PROBLEM — S46.011A TRAUMATIC COMPLETE TEAR OF RIGHT ROTATOR CUFF: Status: ACTIVE | Noted: 2025-02-06

## 2025-02-06 RX ORDER — SODIUM CHLORIDE 0.9 % (FLUSH) 0.9 %
5-40 SYRINGE (ML) INJECTION PRN
Status: CANCELLED | OUTPATIENT
Start: 2025-02-06

## 2025-02-06 RX ORDER — SODIUM CHLORIDE 9 MG/ML
INJECTION, SOLUTION INTRAVENOUS PRN
Status: CANCELLED | OUTPATIENT
Start: 2025-02-06

## 2025-02-06 RX ORDER — SODIUM CHLORIDE 0.9 % (FLUSH) 0.9 %
5-40 SYRINGE (ML) INJECTION EVERY 12 HOURS SCHEDULED
Status: CANCELLED | OUTPATIENT
Start: 2025-02-06

## 2025-02-06 RX ORDER — MELOXICAM 7.5 MG/1
7.5 TABLET ORAL ONCE
Status: CANCELLED | OUTPATIENT
Start: 2025-02-06 | End: 2025-02-06

## 2025-02-06 RX ORDER — TRANEXAMIC ACID 100 MG/ML
1000 INJECTION, SOLUTION INTRAVENOUS ONCE
Status: CANCELLED | OUTPATIENT
Start: 2025-02-06 | End: 2025-02-06

## 2025-02-06 RX ORDER — ACETAMINOPHEN 325 MG/1
1000 TABLET ORAL ONCE
Status: CANCELLED | OUTPATIENT
Start: 2025-02-06 | End: 2025-02-06

## 2025-02-17 DIAGNOSIS — F51.01 PRIMARY INSOMNIA: ICD-10-CM

## 2025-02-17 NOTE — TELEPHONE ENCOUNTER
Refill Request - Controlled Substance    CONFIRM preferred pharmacy with the patient.    If Mail Order Rx - Pend for 90 day refill.        Last Seen Department: 1/20/2025  Last Seen by PCP: 1/20/2025    Last Written: 1/11/25 #60 - 0 refills     Last UDS: 3/18/24     Med Agreement Signed On: 1/4/17    If no future appointment scheduled:  Review the last OV with PCP and review information for follow-up visit,  Route STAFF MESSAGE with patient name to the  Pool for scheduling with the following information:            -  Timing of next visit           -  Visit type ie Physical, OV, etc           -  Diagnoses/Reason ie. COPD, HTN - Do not use MEDICATION, Follow-up or CHECK UP - Give reason for visit        Next Appointment:   Future Appointments   Date Time Provider Department Center   2/19/2025  1:30 PM Harriet Cuellar APRN - CNP [facility] DEP   3/14/2025 11:30 AM Williams Cooper MD AND ORTHO [facility]   5/21/2025  1:00 PM Harriet Cuellar APRN - CNP [facility] DEP       Message sent to  to schedule appt with patient?  NO      Requested Prescriptions     Pending Prescriptions Disp Refills    ALPRAZolam (XANAX) 1 MG tablet [Pharmacy Med Name: ALPRAZolam 1 MG Oral Tablet] 60 tablet 0     Sig: TAKE 1 TABLET BY MOUTH TWICE DAILY AS NEEDED FOR SLEEP

## 2025-02-19 ENCOUNTER — OFFICE VISIT (OUTPATIENT)
Dept: FAMILY MEDICINE CLINIC | Age: 70
End: 2025-02-19

## 2025-02-19 VITALS
BODY MASS INDEX: 25.88 KG/M2 | HEART RATE: 85 BPM | OXYGEN SATURATION: 98 % | TEMPERATURE: 98.9 F | WEIGHT: 151.6 LBS | SYSTOLIC BLOOD PRESSURE: 90 MMHG | HEIGHT: 64 IN | DIASTOLIC BLOOD PRESSURE: 60 MMHG

## 2025-02-19 DIAGNOSIS — R73.01 IMPAIRED FASTING GLUCOSE: ICD-10-CM

## 2025-02-19 DIAGNOSIS — M06.00 RHEUMATOID ARTHRITIS WITH NEGATIVE RHEUMATOID FACTOR, INVOLVING UNSPECIFIED SITE (HCC): ICD-10-CM

## 2025-02-19 DIAGNOSIS — F13.20 SEDATIVE, HYPNOTIC OR ANXIOLYTIC DEPENDENCE, UNCOMPLICATED (HCC): ICD-10-CM

## 2025-02-19 DIAGNOSIS — D86.9 SARCOIDOSIS: ICD-10-CM

## 2025-02-19 DIAGNOSIS — Z90.81 S/P SPLENECTOMY: ICD-10-CM

## 2025-02-19 DIAGNOSIS — I10 ESSENTIAL HYPERTENSION: Chronic | ICD-10-CM

## 2025-02-19 DIAGNOSIS — Z01.818 PRE-OP EXAM: Primary | ICD-10-CM

## 2025-02-19 DIAGNOSIS — S46.011D TRAUMATIC COMPLETE TEAR OF RIGHT ROTATOR CUFF, SUBSEQUENT ENCOUNTER: ICD-10-CM

## 2025-02-19 RX ORDER — ALPRAZOLAM 1 MG/1
TABLET ORAL
Qty: 60 TABLET | Refills: 0 | Status: SHIPPED | OUTPATIENT
Start: 2025-02-19 | End: 2025-03-19

## 2025-02-19 SDOH — ECONOMIC STABILITY: FOOD INSECURITY: WITHIN THE PAST 12 MONTHS, YOU WORRIED THAT YOUR FOOD WOULD RUN OUT BEFORE YOU GOT MONEY TO BUY MORE.: NEVER TRUE

## 2025-02-19 SDOH — ECONOMIC STABILITY: FOOD INSECURITY: WITHIN THE PAST 12 MONTHS, THE FOOD YOU BOUGHT JUST DIDN'T LAST AND YOU DIDN'T HAVE MONEY TO GET MORE.: NEVER TRUE

## 2025-02-19 ASSESSMENT — PATIENT HEALTH QUESTIONNAIRE - PHQ9: DEPRESSION UNABLE TO ASSESS: PT REFUSES

## 2025-02-19 NOTE — PATIENT INSTRUCTIONS
Talk with your rheumatologist about your methotrexate prior and after your shoulder replacement surgery.    Talk with surgery team:  - Xanax  - Gabapentin    HOLD morning of surgery:   -Spironolactone     Avoid 24 hours prior:  - Norco  - Tramadol    No NSAIDs (meloxicam, naprosyn, ibuprofen) 1 week prior to surgery  No vitamins or supplements other than folate 2 weeks prior to surgery

## 2025-02-19 NOTE — PROGRESS NOTES
Preoperative History and Physical      Patient: Thea Wright  YOB: 1955   MRN: [id number]     Date of Service:  2/19/2025    Chief Complaint   Patient presents with    Pre-op Exam     Procedure 3/4/25 Right Shoulder repair       Date of surgery: 3/4/2025  Diagnosis: Traumatic complete tear of right rotator cuff,   Procedure: RIGHT REVERSE TOTAL SHOULDER ARTHROPLASTY NOTE: INTERSCALENE SINGLE SHOT BLOCK   Surgeon: Williams Car MD   Location: [facility].     Planned anesthesia: Local and General   Known anesthesia problems: None   Functional capacity level: >4 METS  Bleeding risk: No recent or remote history of abnormal bleeding  Current or recent history of taking steroids: Currently on prednisone 5 qod  Personal or FH of DVT/PE: No    FH of malignant hyperthermia or pseudocholinesterase deficiency: No      Quit smoking 1 week ago for her surgery.      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, both eyes    Essential hypertension    Diverticulosis    Nausea & vomiting    S/P splenectomy    Osteopenia    Sarcoidosis    Vitamin D deficiency    Impaired fasting glucose    Cecal volvulus (HCC)    Insomnia    Hypercholesterolemia    Disturbance of skin sensation    Acute, but ill-defined, cerebrovascular disease    Sarcoid    Cerebral vasculitis    Lesion of right ulnar nerve    Major depressive disorder, recurrent episode, moderate (HCC)    Pain medication agreement signed    Trochanteric bursitis of left hip    Ankle instability    GI bleed    Colitis, acute    Anxiety    Congenital urethral stenosis    Urinary frequency    Right upper quadrant pain    Epigastric pain    Esophageal dysphagia    Colon, diverticulosis    Lower abdominal pain    Right upper quadrant abdominal pain    Colonic stricture (HCC)    Right sided abdominal pain    Irritable bowel syndrome with constipation    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

## 2025-02-20 NOTE — PROGRESS NOTES
Thea Wright    Age 69 y.o.    female    1955    MRN [id number]    3/4/2025  Arrival Time_____________  OR Time____________221 Min     Procedure(s):  RIGHT REVERSE TOTAL SHOULDER ARTHROPLASTY NOTE: INTERSCALENE SINGLE SHOT BLOCK                      General   Surgeon(s):  Williams Cooper, MD      DAY ADMIT ___  SDS/OP ___  OUTPT IN BED ___        Phone [phone] (home)                  PCP _____________________ Phone_________________ Epic ( ) Epic CE ( ) Appt ________    NOTES: _________________________________________ Consult/Cardio _______________    ____________________________________________________________________________    ____________________________________________________________________________  PAT APPT DATE:________ TIME: ________  FAXED QAD: _______  (__) H&P w/ Hospitalist    (__) PAT orders in EPIC    (__) Meet with PAT nurse  __________________________________________________________________________  Preop Nurse phone screen complete: _____________  (__) CBC     (__) W/ DIFF ___________  (__) CT CHEST  __________   (__) Hgb A1C    ___________  (__) CHEST X RAY   __________  (__) LIPID PROFILE  ___________  (__) EKG   __________  (__) PT-INR / APTT  ___________  (__) PFT's   __________  (__) BMP   ___________  (__) CAROTIDS  __________  (__) CMP   ___________  (__) VEIN MAPPING  __________  (__) U/A   ___________  ( X ) HISTORY & PHYSICAL __________  (__) URINE C & S  ___________  (__) CARDIAC CLEARANCE __________  (__) U/A W/ FLEX  ___________  (__) PULM. CLEARANCE __________  (__) SERUM PREGNANCY ___________  (__) Preop Orders in EPIC __________  (__) TYPE & SCREEN __________repeat ( ) (__)  __________________ __________  (__) Albumin   ___________  (__)  __________________ __________  (__) TRANSFERRIN  ___________  (__)  __________________ __________  (__) LIVER PROFILE  ___________  (__) URINE PREG DOS __________  (__) MRSA NASAL SWAB ___________  (__) BLOOD SUGAR DOS __________  (__)

## 2025-02-24 ENCOUNTER — TELEPHONE (OUTPATIENT)
Dept: ORTHOPEDIC SURGERY | Age: 70
End: 2025-02-24

## 2025-02-24 NOTE — TELEPHONE ENCOUNTER
ORTHOPAEDIC NURSE NAVIGATOR SUMMARY NOTE      Anticipated Date of Surgery: 3/4/25     Recieved Pre-Op Education: yes   In person class:no  Pt used educational link:yes   Pt completed pre and post test to measure learning:yes    If pt did not complete either, why not?  N/A  ERAS protocol explained to pt. Pt does not have the following medical conditions:  -Diabetes  -Gastroparesis  -CHF  -Fluid restricted diet  -Known difficult airway  Pt instructed to drink up to 40 oz of Gatorade type drink the evening prior to surgery.   Pt informed they can have up to 40 oz of water and that it must be completed 2 hours prior to scheduled surgery.  Pt verbalized understanding.      PCP: Harriet Cuellar APRN - CNP   Phone #: [phone]    Date of PCP Visit for H&P: 2/19/25    Any Noted Concerns from PCP prior to surgery:  No   If Yes, what concerns?:    IS THE PATIENT IN A PAIN MANAGEMENT PROGRAM?:   Not Applicable     Review of Past Medical History Reveals History of:      Critical Lab Values:   Hgb/Hct:   Hemoglobin (g/dL)   Date Value   01/20/2025 13.2   /  Hematocrit (%)   Date Value   01/20/2025 41.5      HgbA1C:    Lab Results   Component Value Date    LABA1C 6.3 03/18/2024    LABA1C 5.7 10/11/2022    LABA1C 6.0 07/11/2022      Albumin:  No results found for: \"LABALBU\"   BUN/Cr:   BUN (mg/dL)   Date Value   01/20/2025 9   /  Creatinine (mg/dL)   Date Value   01/20/2025 0.7      BMI:    BMI Readings from Last 1 Encounters:   02/19/25 26.02 kg/m²        Coronary Artery Disease/HTN/CHF History: Yes- HTN       -On any anticoagulation-NONE       Diabetes History: No       Pulmonary: COPD/Emphysema/ Use of home oxygen: none     Alcohol use:        DVT Risk Stratification:  Low         -Smoking history or use of estrogen-         BMI Greater than 40 at time of scheduling?: No       Additional Medical Concerns:         Discharge Disposition Information:     Attended Pre-Hab Program: no     Anticipated Discharge

## 2025-02-25 ENCOUNTER — TELEPHONE (OUTPATIENT)
Dept: ORTHOPEDIC SURGERY | Age: 70
End: 2025-02-25

## 2025-02-25 DIAGNOSIS — Z47.89 ORTHOPEDIC AFTERCARE: Primary | ICD-10-CM

## 2025-02-25 RX ORDER — MUPIROCIN 20 MG/G
OINTMENT TOPICAL
Qty: 3 G | Refills: 0 | Status: SHIPPED | OUTPATIENT
Start: 2025-02-25

## 2025-02-25 NOTE — TELEPHONE ENCOUNTER
Pt called and let me know that her doctor's office had told her most of the blood work we were requesting had already been done recently, and that they would call us discuss. She was checking in to see if they had called yet. Let her know that I had not received a call from them, but that if her blood work was done prior to 2/5/2025 she would still need to get it re-done. Told her that it had been done after 2/5/2025, then they would need to send us the results along with completing any of the tests that still needed done. She is understanding of this. Did let her know I had sent in Mupirocin to her pharmacy, and went over protocol for this. She expressed understanding. She will call if she has any questions or concerns.

## 2025-02-26 ENCOUNTER — HOSPITAL ENCOUNTER (OUTPATIENT)
Age: 70
Discharge: HOME | End: 2025-02-26
Payer: MEDICARE

## 2025-02-26 LAB
ALBUMIN SERPL-MCNC: 4.2 G/DL (ref 3.4–5)
ANION GAP SERPL CALCULATED.3IONS-SCNC: 11 MMOL/L (ref 3–16)
APTT BLD: 26.5 SEC (ref 22.1–36.4)
BASOPHILS # BLD: 0.3 K/UL (ref 0–0.2)
BASOPHILS NFR BLD: 2.1 %
BUN SERPL-MCNC: 7 MG/DL (ref 7–20)
CALCIUM SERPL-MCNC: 9.5 MG/DL (ref 8.3–10.6)
CHLORIDE SERPL-SCNC: 106 MMOL/L (ref 99–110)
CO2 SERPL-SCNC: 24 MMOL/L (ref 21–32)
CREAT SERPL-MCNC: 0.9 MG/DL (ref 0.6–1.2)
DEPRECATED RDW RBC AUTO: 19.7 % (ref 12.4–15.4)
EOSINOPHIL # BLD: 0.4 K/UL (ref 0–0.6)
EOSINOPHIL NFR BLD: 3.3 %
GFR SERPLBLD CREATININE-BSD FMLA CKD-EPI: 69 ML/MIN/{1.73_M2}
GLUCOSE SERPL-MCNC: 106 MG/DL (ref 70–99)
HCT VFR BLD AUTO: 41.1 % (ref 36–48)
HGB BLD-MCNC: 13.1 G/DL (ref 12–16)
INR PPP: 0.89 (ref 0.85–1.15)
LYMPHOCYTES # BLD: 4.4 K/UL (ref 1–5.1)
LYMPHOCYTES NFR BLD: 34.1 %
MCH RBC QN AUTO: 30.2 PG (ref 26–34)
MCHC RBC AUTO-ENTMCNC: 32 G/DL (ref 31–36)
MCV RBC AUTO: 94.3 FL (ref 80–100)
MONOCYTES # BLD: 1.1 K/UL (ref 0–1.3)
MONOCYTES NFR BLD: 8.5 %
MRSA DNA SPEC QL NAA+PROBE: NORMAL
NEUTROPHILS # BLD: 6.7 K/UL (ref 1.7–7.7)
NEUTROPHILS NFR BLD: 52 %
PLATELET # BLD AUTO: 451 K/UL (ref 135–450)
PMV BLD AUTO: 8 FL (ref 5–10.5)
POTASSIUM SERPL-SCNC: 4 MMOL/L (ref 3.5–5.1)
PROTHROMBIN TIME: 12.2 SEC (ref 11.9–14.9)
RBC # BLD AUTO: 4.35 M/UL (ref 4–5.2)
SODIUM SERPL-SCNC: 141 MMOL/L (ref 136–145)
WBC # BLD AUTO: 13 K/UL (ref 4–11)

## 2025-02-26 PROCEDURE — 85025 COMPLETE CBC W/AUTO DIFF WBC: CPT

## 2025-02-26 PROCEDURE — 80048 BASIC METABOLIC PNL TOTAL CA: CPT

## 2025-02-26 PROCEDURE — 87641 MR-STAPH DNA AMP PROBE: CPT

## 2025-02-26 PROCEDURE — 85730 THROMBOPLASTIN TIME PARTIAL: CPT

## 2025-02-26 PROCEDURE — 82040 ASSAY OF SERUM ALBUMIN: CPT

## 2025-02-26 PROCEDURE — 85610 PROTHROMBIN TIME: CPT

## 2025-02-26 NOTE — PROGRESS NOTES
Surgery Date and Time:  3/4/25 @ 07:30 am    Arrival Time:  05:30 am       The instructions given when a patient needs to stop oral intake prior to surgery varies. Follow the instructions you were     given by your Surgeon or RN during the Pre-op call.      __X__Do not eat anything after Midnight the night before the surgery. Follow drink instructions below.                NO gum, mints, candy or ice chips the day of surgery.        ___X___ Carbo-loading or ENHANCED RECOVERY instructions will be given to select patients.  Please do the following:    The evening before your surgery (after dinner before midnight) drink 40 ounces (2 - 20 ounce bottles) of Gatorade.      If you are diabetic use sugar free. The morning of surgery drink two (2) - 20 ounce bottle water. This needs to be finished      2 hours prior to your surgery start time.        Only take the following medications with a small sip of water the morning of surgery:  propranolol, gabapentin, pantoprazole (inhaler, if needed)        Aspirin, Ibuprofen, Advil, Naproxen, Vitamin E and other Anti-inflammatory products and supplements should be stopped       for 5-7days before surgery or as directed by your physician/surgeon.     - Do not smoke or vape, and do not drink any alcoholic beverages 24 hours prior to surgery, this includes NA Beer. Refrain from using     any recreational drugs, including non-prescribed prescription drugs.     -You may brush your teeth and gargle the morning of surgery.  DO NOT SWALLOW WATER.    -You MUST plan for a responsible adult to stay on site while you are here and take you home after your surgery. You will not be allowed                to leave alone or drive yourself home. It is requested someone stay with you the first 24 hrs. Your surgery will be cancelled if you do not                have a ride home with a responsible adult.    -A parent/legal guardian must accompany a child scheduled for surgery and plan to stay at  will be limited to two per room at any given time.              -Please bring your picture ID and insurance card for registration prior to arriving to second floor surgery department.              -If you use a CPAP/BiPAP please bring with you on the day of the surgery. If you use oxygen, please bring portable     tank with you.              -For your convenience [facility] has an outpatient pharmacy on site to fill your prescriptions prior to 5 pm.              -Bring a complete list of all your medications with name and dose including any supplements.              Visitor policy: May have 2-3 visitors in Surgery Waiting Room. Visiting hours are 8a-8p. Overnight visitors will be at the discretion of    the unit nurse.  No visitors under the age of 14 permitted.     *Please call [facility] Preadmission Testing Department for any further questions  [phone]      [facility] - MAIN ENTRANCE   [address] [place] [address]   [id number]          Email sent:  2/26/25

## 2025-02-26 NOTE — PROGRESS NOTES
Pt instructed to drink up to 40 oz of Gatorade type drink the evening prior to surgery.   Pt informed they can have up to 40 oz of water and that it must be completed 2 hours prior to scheduled surgery

## 2025-02-27 ENCOUNTER — TELEPHONE (OUTPATIENT)
Dept: ORTHOPEDIC SURGERY | Age: 70
End: 2025-02-27

## 2025-02-27 NOTE — TELEPHONE ENCOUNTER
Spoke with Pt and her  (after verbal consent from Pt) to let her know that Dr. Cooper is recommending follow up with her PCP as her labs show her WBC has been elevated for 11 months. 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 Apologized that we were unaware, and thanked her for updating us. Let her know I would relay to Dr. Cooper. She will call if she has any questions or concerns before her surgery.

## 2025-02-27 NOTE — TELEPHONE ENCOUNTER
----- Message from Dr. Williams Cooper MD sent at 2/27/2025  8:16 AM EST -----  Please contact patient  Elevated WBC count for 11 months  Follow back up with PCP re: elevated CBC today or tomorrow

## 2025-02-28 ENCOUNTER — ANESTHESIA EVENT (OUTPATIENT)
Dept: OPERATING ROOM | Age: 70
DRG: 940 | End: 2025-02-28
Payer: MEDICARE

## 2025-02-28 DIAGNOSIS — S46.011D TRAUMATIC COMPLETE TEAR OF RIGHT ROTATOR CUFF, SUBSEQUENT ENCOUNTER: ICD-10-CM

## 2025-02-28 LAB
EST. AVERAGE GLUCOSE BLD GHB EST-MCNC: 128.4 MG/DL
HBA1C MFR BLD: 6.1 %

## 2025-03-03 ASSESSMENT — LIFESTYLE VARIABLES: SMOKING_STATUS: 1

## 2025-03-04 ENCOUNTER — ANESTHESIA (OUTPATIENT)
Dept: OPERATING ROOM | Age: 70
DRG: 940 | End: 2025-03-04
Payer: MEDICARE

## 2025-03-04 ENCOUNTER — PREP FOR PROCEDURE (OUTPATIENT)
Dept: ORTHOPEDIC SURGERY | Age: 70
End: 2025-03-04

## 2025-03-04 ENCOUNTER — HOSPITAL ENCOUNTER (INPATIENT)
Age: 70
LOS: 1 days | Discharge: HOME | DRG: 940 | End: 2025-03-04
Attending: ORTHOPAEDIC SURGERY | Admitting: ORTHOPAEDIC SURGERY
Payer: MEDICARE

## 2025-03-04 VITALS
OXYGEN SATURATION: 97 % | HEART RATE: 57 BPM | WEIGHT: 152.25 LBS | SYSTOLIC BLOOD PRESSURE: 108 MMHG | HEIGHT: 63 IN | BODY MASS INDEX: 26.98 KG/M2 | RESPIRATION RATE: 16 BRPM | TEMPERATURE: 98.1 F | DIASTOLIC BLOOD PRESSURE: 71 MMHG

## 2025-03-04 DIAGNOSIS — S46.011D TRAUMATIC COMPLETE TEAR OF RIGHT ROTATOR CUFF, SUBSEQUENT ENCOUNTER: Primary | ICD-10-CM

## 2025-03-04 DIAGNOSIS — Z01.818 PREOP TESTING: Primary | ICD-10-CM

## 2025-03-04 LAB
ABO + RH BLD: NORMAL
ANTIBODY SCREEN: NORMAL
BLD GP AB SCN SERPL QL: NORMAL
GLUCOSE BLD-MCNC: 86 MG/DL (ref 70–99)
PERFORMED ON: NORMAL

## 2025-03-04 PROCEDURE — 6370000000 HC RX 637 (ALT 250 FOR IP): Performed by: STUDENT IN AN ORGANIZED HEALTH CARE EDUCATION/TRAINING PROGRAM

## 2025-03-04 PROCEDURE — 86900 BLOOD TYPING SEROLOGIC ABO: CPT

## 2025-03-04 PROCEDURE — 86901 BLOOD TYPING SEROLOGIC RH(D): CPT

## 2025-03-04 PROCEDURE — 6370000000 HC RX 637 (ALT 250 FOR IP): Performed by: ANESTHESIOLOGY

## 2025-03-04 PROCEDURE — 6370000000 HC RX 637 (ALT 250 FOR IP): Performed by: ORTHOPAEDIC SURGERY

## 2025-03-04 PROCEDURE — 86850 RBC ANTIBODY SCREEN: CPT

## 2025-03-04 PROCEDURE — 1200000000 HC SEMI PRIVATE

## 2025-03-04 PROCEDURE — 0RRJ00Z REPLACEMENT OF RIGHT SHOULDER JOINT WITH REVERSE BALL AND SOCKET SYNTHETIC SUBSTITUTE, OPEN APPROACH: ICD-10-PCS

## 2025-03-04 RX ORDER — SODIUM CHLORIDE 0.9 % (FLUSH) 0.9 %
5-40 SYRINGE (ML) INJECTION PRN
Status: CANCELLED | OUTPATIENT
Start: 2025-03-04

## 2025-03-04 RX ORDER — DROPERIDOL 2.5 MG/ML
0.62 INJECTION, SOLUTION INTRAMUSCULAR; INTRAVENOUS
Status: CANCELLED | OUTPATIENT
Start: 2025-03-04 | End: 2025-03-05

## 2025-03-04 RX ORDER — DIPHENHYDRAMINE HYDROCHLORIDE 50 MG/ML
12.5 INJECTION INTRAMUSCULAR; INTRAVENOUS
Status: CANCELLED | OUTPATIENT
Start: 2025-03-04 | End: 2025-03-05

## 2025-03-04 RX ORDER — OXYCODONE HYDROCHLORIDE 5 MG/1
5 TABLET ORAL PRN
Status: CANCELLED | OUTPATIENT
Start: 2025-03-04 | End: 2025-03-04

## 2025-03-04 RX ORDER — LABETALOL HYDROCHLORIDE 5 MG/ML
10 INJECTION, SOLUTION INTRAVENOUS
Status: CANCELLED | OUTPATIENT
Start: 2025-03-04

## 2025-03-04 RX ORDER — TRANEXAMIC ACID 100 MG/ML
1000 INJECTION, SOLUTION INTRAVENOUS ONCE
Status: DISCONTINUED | OUTPATIENT
Start: 2025-03-04 | End: 2025-03-04 | Stop reason: HOSPADM

## 2025-03-04 RX ORDER — ACETAMINOPHEN 500 MG
1000 TABLET ORAL ONCE
Status: COMPLETED | OUTPATIENT
Start: 2025-03-04 | End: 2025-03-04

## 2025-03-04 RX ORDER — SODIUM CHLORIDE 9 MG/ML
INJECTION, SOLUTION INTRAVENOUS PRN
Status: DISCONTINUED | OUTPATIENT
Start: 2025-03-04 | End: 2025-03-04 | Stop reason: HOSPADM

## 2025-03-04 RX ORDER — SODIUM CHLORIDE 0.9 % (FLUSH) 0.9 %
5-40 SYRINGE (ML) INJECTION EVERY 12 HOURS SCHEDULED
Status: DISCONTINUED | OUTPATIENT
Start: 2025-03-04 | End: 2025-03-04 | Stop reason: HOSPADM

## 2025-03-04 RX ORDER — SODIUM CHLORIDE 0.9 % (FLUSH) 0.9 %
5-40 SYRINGE (ML) INJECTION EVERY 12 HOURS SCHEDULED
Status: CANCELLED | OUTPATIENT
Start: 2025-03-04

## 2025-03-04 RX ORDER — TRANEXAMIC ACID 100 MG/ML
1000 INJECTION, SOLUTION INTRAVENOUS ONCE
Status: CANCELLED | OUTPATIENT
Start: 2025-03-04 | End: 2025-03-04

## 2025-03-04 RX ORDER — TRANEXAMIC ACID 10 MG/ML
1000 INJECTION, SOLUTION INTRAVENOUS
Status: DISCONTINUED | OUTPATIENT
Start: 2025-03-04 | End: 2025-03-04 | Stop reason: HOSPADM

## 2025-03-04 RX ORDER — NALOXONE HYDROCHLORIDE 0.4 MG/ML
INJECTION, SOLUTION INTRAMUSCULAR; INTRAVENOUS; SUBCUTANEOUS PRN
Status: CANCELLED | OUTPATIENT
Start: 2025-03-04

## 2025-03-04 RX ORDER — MELOXICAM 7.5 MG/1
7.5 TABLET ORAL ONCE
Status: CANCELLED | OUTPATIENT
Start: 2025-03-04 | End: 2025-03-04

## 2025-03-04 RX ORDER — SODIUM CHLORIDE 9 MG/ML
INJECTION, SOLUTION INTRAVENOUS PRN
Status: CANCELLED | OUTPATIENT
Start: 2025-03-04

## 2025-03-04 RX ORDER — SODIUM CHLORIDE, SODIUM LACTATE, POTASSIUM CHLORIDE, CALCIUM CHLORIDE 600; 310; 30; 20 MG/100ML; MG/100ML; MG/100ML; MG/100ML
INJECTION, SOLUTION INTRAVENOUS CONTINUOUS
Status: DISCONTINUED | OUTPATIENT
Start: 2025-03-04 | End: 2025-03-04 | Stop reason: HOSPADM

## 2025-03-04 RX ORDER — SODIUM CHLORIDE 0.9 % (FLUSH) 0.9 %
5-40 SYRINGE (ML) INJECTION PRN
Status: DISCONTINUED | OUTPATIENT
Start: 2025-03-04 | End: 2025-03-04 | Stop reason: HOSPADM

## 2025-03-04 RX ORDER — MELOXICAM 7.5 MG/1
7.5 TABLET ORAL ONCE
Status: COMPLETED | OUTPATIENT
Start: 2025-03-04 | End: 2025-03-04

## 2025-03-04 RX ORDER — OXYCODONE HYDROCHLORIDE 5 MG/1
10 TABLET ORAL PRN
Status: CANCELLED | OUTPATIENT
Start: 2025-03-04 | End: 2025-03-04

## 2025-03-04 RX ORDER — LIDOCAINE HYDROCHLORIDE 10 MG/ML
1 INJECTION, SOLUTION EPIDURAL; INFILTRATION; INTRACAUDAL; PERINEURAL
Status: DISCONTINUED | OUTPATIENT
Start: 2025-03-04 | End: 2025-03-04 | Stop reason: HOSPADM

## 2025-03-04 RX ORDER — ACETAMINOPHEN 325 MG/1
1000 TABLET ORAL ONCE
Status: CANCELLED | OUTPATIENT
Start: 2025-03-04 | End: 2025-03-04

## 2025-03-04 RX ORDER — IPRATROPIUM BROMIDE AND ALBUTEROL SULFATE 2.5; .5 MG/3ML; MG/3ML
1 SOLUTION RESPIRATORY (INHALATION) ONCE
Status: COMPLETED | OUTPATIENT
Start: 2025-03-04 | End: 2025-03-04

## 2025-03-04 RX ORDER — ACETAMINOPHEN 500 MG
1000 TABLET ORAL ONCE
Status: DISCONTINUED | OUTPATIENT
Start: 2025-03-04 | End: 2025-03-04 | Stop reason: SDUPTHER

## 2025-03-04 RX ADMIN — ACETAMINOPHEN 1000 MG: 500 TABLET ORAL at 06:16

## 2025-03-04 RX ADMIN — Medication 2 AMPULE: at 06:17

## 2025-03-04 RX ADMIN — MELOXICAM 7.5 MG: 7.5 TABLET ORAL at 06:16

## 2025-03-04 RX ADMIN — IPRATROPIUM BROMIDE AND ALBUTEROL SULFATE 1 DOSE: 2.5; .5 SOLUTION RESPIRATORY (INHALATION) at 06:17

## 2025-03-04 ASSESSMENT — PAIN DESCRIPTION - LOCATION: LOCATION: SHOULDER

## 2025-03-04 ASSESSMENT — PAIN DESCRIPTION - ORIENTATION: ORIENTATION: RIGHT

## 2025-03-04 ASSESSMENT — PAIN DESCRIPTION - DESCRIPTORS: DESCRIPTORS: ACHING

## 2025-03-04 ASSESSMENT — PAIN SCALES - GENERAL: PAINLEVEL_OUTOF10: 3

## 2025-03-04 NOTE — ANESTHESIA PRE PROCEDURE
Wheezing 2 puffs q 4 prn 9/29/22   Lynn Tabor MD   Folic Acid 0.8 MG CAPS Take 1 capsule by mouth daily    Steven Richard MD   traMADol (ULTRAM) 50 MG tablet Take 1 tablet by mouth 2 times daily as needed for Pain.    Steven Richard MD   letrozole (FEMARA) 2.5 MG tablet Take 1 tablet by mouth daily 8/3/21   Steven Richard MD   diclofenac sodium 1 % GEL Apply topically 4 times daily as needed for Pain Indications: [facility]  prescribes    Lesly Paiz MD   difluprednate (DUREZOL) 0.05 % EMUL Apply 2 drops to eye 2 times daily BOTH EYES 2/6/18   Steven Richard MD   gabapentin (NEURONTIN) 400 MG capsule Take 1 capsule by mouth 4 times daily. Indications: Prescribed by Arth[facility]  9/6/19   Lelsy Paiz MD   cyclobenzaprine (FLEXERIL) 10 MG tablet Take 1 tablet by mouth 3 times daily as needed for Muscle spasms    Steven Richard MD   methotrexate (RHEUMATREX) 2.5 MG chemo tablet Take 3 tablets by mouth every 7 days    Lesly Paiz MD   promethazine (PHENERGAN) 12.5 MG tablet Take 2 tablets by mouth every 6 hours as needed for Nausea Indications: Steven Gonsales MD       Current medications:    No current facility-administered medications for this encounter.     Current Outpatient Medications   Medication Sig Dispense Refill    mupirocin (BACTROBAN) 2 % ointment Apply to both nostrils, 3 times daily starting 3 days prior to procedure. 3 g 0    ALPRAZolam (XANAX) 1 MG tablet TAKE 1 TABLET BY MOUTH TWICE DAILY AS NEEDED FOR SLEEP (Patient taking differently: Take 1 tablet by mouth nightly as needed for Sleep. TAKE 1 TABLET BY MOUTH TWICE DAILY AS NEEDED FOR SLEEP) 60 tablet 0    alendronate (FOSAMAX) 70 MG tablet Take 1 tablet by mouth once a week (Patient taking differently: Take 1 tablet by mouth every 7 days Take 1 tablet by mouth once a weekThursday) 12 tablet 0    valACYclovir (VALTREX) 500 MG tablet Take 1 tablet by mouth once daily

## 2025-03-04 NOTE — PROGRESS NOTES
Arrived to [facility] consents verified NPO since 1800, belongings on cart for admission. ERAS protocol initiated. Rash noted to right shoulder.

## 2025-03-04 NOTE — PROGRESS NOTES
Thea Wright    Age 69 y.o.    female    1955    MRN [id number]    3/18/2025  Arrival Time_____________  OR Time____________207 Min     Procedure(s):  RIGHT REVERSE TOTAL SHOULDER ARTHROPLASTY             DALILA BIOMET NOTE:  INTERSCALENE SINGLE SHOT                      General   Surgeon(s):  Williams Cooper, MD      DAY ADMIT ___  SDS/OP ___  OUTPT IN BED ___        Phone [phone] (home)                  PCP _____________________ Phone_________________ Epic ( ) Epic CE ( ) Appt ________    NOTES: _________________________________________ Consult/Cardio _______________    ____________________________________________________________________________    ____________________________________________________________________________  PAT APPT DATE:________ TIME: ________  FAXED QAD: _______  (__) H&P w/ Hospitalist    (__) PAT orders in EPIC    (__) Meet with PAT nurse  __________________________________________________________________________  Preop Nurse phone screen complete: _____________  (__) CBC     (__) W/ DIFF ___________  (__) CT CHEST  __________   (__) Hgb A1C    ___________  (__) CHEST X RAY   __________  (__) LIPID PROFILE  ___________  (__) EKG   __________  (__) PT-INR / APTT  ___________  (__) PFT's   __________  (__) BMP   ___________  (__) CAROTIDS  __________  (__) CMP   ___________  (__) VEIN MAPPING  __________  (__) U/A   ___________  ( X ) HISTORY & PHYSICAL __________  (__) URINE C & S  ___________  (__) CARDIAC CLEARANCE __________  (__) U/A W/ FLEX  ___________  (__) PULM. CLEARANCE __________  (__) SERUM PREGNANCY ___________  (__) Preop Orders in EPIC __________  (__) TYPE & SCREEN __________repeat ( ) (__)  __________________ __________  (__) Albumin   ___________  (__)  __________________ __________  (__) TRANSFERRIN  ___________  (__)  __________________ __________  (__) LIVER PROFILE  ___________  (__) URINE PREG DOS __________  (__) MRSA NASAL SWAB ___________  (__) BLOOD SUGAR

## 2025-03-05 ENCOUNTER — PATIENT MESSAGE (OUTPATIENT)
Dept: ORTHOPEDIC SURGERY | Age: 70
End: 2025-03-05

## 2025-03-05 ENCOUNTER — CARE COORDINATION (OUTPATIENT)
Dept: CASE MANAGEMENT | Age: 70
End: 2025-03-05

## 2025-03-05 ENCOUNTER — TELEPHONE (OUTPATIENT)
Dept: FAMILY MEDICINE CLINIC | Age: 70
End: 2025-03-05

## 2025-03-05 DIAGNOSIS — R21 RASH IN ADULT: Primary | ICD-10-CM

## 2025-03-05 DIAGNOSIS — S46.011D TRAUMATIC COMPLETE TEAR OF RIGHT ROTATOR CUFF, SUBSEQUENT ENCOUNTER: Primary | ICD-10-CM

## 2025-03-05 PROCEDURE — 1111F DSCHRG MED/CURRENT MED MERGE: CPT | Performed by: NURSE PRACTITIONER

## 2025-03-05 NOTE — CARE COORDINATION
Care Transitions Note    Initial Call - Call within 2 business days of discharge: Yes    Patient Current Location:  Home: [address] [place] [id number]    Care Transition Nurse contacted the patient by telephone to perform post hospital discharge assessment, verified name and  as identifiers. Provided introduction to self, and explanation of the Care Transition Nurse role.     Patient: Thea Wright    Patient : 1955   MRN: [id number]    Reason for Admission: Scheduled ortho surgical procedure - right shoulder  Discharge Date: 3/4/25  RURS: Readmission Risk Score: 7      Last Discharge Facility       Date Complaint Diagnosis Description Type Department Provider    3/4/25  Preop testing Admission (Discharged) [facility] Williams Cooper MD            Was this an external facility discharge? No    Additional needs identified to be addressed with provider   No needs identified             Method of communication with provider: none.      Care Summary Note: Thea states she is doing \"good.\" Today. She states she was scheduled to have surgery on her shoulder - right - yesterday. This was cancelled due to her having a rash - she was asked to follow up with Dermatology. She has an appt with Derm on 2025. Her surgery has been rescheduled for 2025.    Care Transition Nurse reviewed medical action plan with patient. The patient was given an opportunity to ask questions; all questions answered at this time.. The patient verbalized understanding.   Were discharge instructions available to patient? No.   Reviewed appropriate site of care based on symptoms and resources available to patient including: PCP  Specialist  Urgent care clinics  When to call 911. The patient agrees to contact the primary care provider and/or specialist office for questions related to their healthcare.      Advance Care Planning:   Does patient have an Advance Directive: health care decision maker confirmed.  Advance Care

## 2025-03-05 NOTE — TELEPHONE ENCOUNTER
11:58am    I attempted to call Pt to relay information for dermatology appointment. Did not answer so LVM asking her to call me back at my direct line ([phone]) at her earliest convenience to discuss appointment. Let her know I would send [employer] message with information as well.

## 2025-03-05 NOTE — TELEPHONE ENCOUNTER
Care Transitions Initial Follow Up Call    Outreach made within 2 business days of discharge: Yes    Patient: Thea Wright Patient : 1955   MRN: [id number]  Reason for Admission: Traumatic complete tear of rotator cuff pre- op testing.  Discharge Date: 3/4/25       Spoke with: Patient admitted fro pre-op testing.  Patient following up with Ortho, and having surgery on 25.    Discharge department/facility: [facility] Interactive Patient Contact:  Was patient able to fill all prescriptions: Yes  Was patient instructed to bring all medications to the follow-up visit: Yes  Is patient taking all medications as directed in the discharge summary? Yes  Does patient understand their discharge instructions: Yes  Does patient have questions or concerns that need addressed prior to 7-14 day follow up office visit: no        Scheduled appointment with PCP within 7-14 days    Follow Up  Future Appointments   Date Time Provider Department Center   2025  1:00 PM Harriet Cuellar, APRN - CNP [facility] DEP       Jazz Appiah LPN

## 2025-03-07 ENCOUNTER — TELEPHONE (OUTPATIENT)
Dept: ORTHOPEDIC SURGERY | Age: 70
End: 2025-03-07

## 2025-03-07 NOTE — TELEPHONE ENCOUNTER
Spoke with Pt and confirmed that she is aware of date, time, location for her upcoming dermatology appointment. Offered to schedule first post op appointment, but she would prefer to wait until after she sees the dermatologist. Pt did ask about home physical therapy, let her know that Dr. Cooper's protocol is outpatient therapy. Pt requests I still discuss with Dr. Cooper. Let her know I would be able to do so on Monday. She is understanding of this. Will call back if she has any other questions or concerns.

## 2025-03-09 DIAGNOSIS — F33.1 MAJOR DEPRESSIVE DISORDER, RECURRENT EPISODE, MODERATE (HCC): ICD-10-CM

## 2025-03-10 RX ORDER — SERTRALINE HYDROCHLORIDE 100 MG/1
TABLET, FILM COATED ORAL
Qty: 135 TABLET | Refills: 0 | Status: SHIPPED | OUTPATIENT
Start: 2025-03-10

## 2025-03-10 NOTE — TELEPHONE ENCOUNTER
Refill Request     CONFIRM preferred pharmacy with the patient.    If Mail Order Rx - Pend for 90 day refill.      Last Seen: Last Seen Department: 2/19/2025  Last Seen by PCP: 1/20/2025    Last Written: 12/2/2024 135 tab 0 refills    If no future appointment scheduled:  Review the last OV with PCP and review information for follow-up visit,  Route STAFF MESSAGE with patient name to the  Pool for scheduling with the following information:            -  Timing of next visit           -  Visit type ie Physical, OV, etc           -  Diagnoses/Reason ie. COPD, HTN - Do not use MEDICATION, Follow-up or CHECK UP - Give reason for visit      Next Appointment:   Future Appointments   Date Time Provider Department Center   5/21/2025  1:00 PM Harriet Cuellar, APRN - CNP [facility] DE[facility]       Message sent to  to schedule appt with patient?  NO      Requested Prescriptions     Pending Prescriptions Disp Refills    sertraline (ZOLOFT) 100 MG tablet [Pharmacy Med Name: Sertraline HCl 100 MG Oral Tablet] 135 tablet 0     Sig: TAKE 1 & 1/2 (ONE & ONE-HALF) TABLETS BY MOUTH ONCE DAILY

## 2025-03-11 NOTE — PROGRESS NOTES
Surgery Date and Time:  3/18/25 @ 07:30 am    Arrival Time:  05:30 am       The instructions given when a patient needs to stop oral intake prior to surgery varies. Follow the instructions you were     given by your Surgeon or RN during the Pre-op call.      __X__Do not eat or drink anything after Midnight the night before the surgery. NO gum, mints, candy or ice chips the day of surgery.        ___X___ Carbo-loading or ENHANCED RECOVERY instructions will be given to select patients.  Please do the following:    The evening before your surgery (after dinner before midnight) drink 40 ounces (2 - 20 ounce bottles) of Gatorade.      If you are diabetic use sugar free. The morning of surgery drink two (2) - 20 ounce bottle water. This needs to be finished      2 hours prior to your surgery start time.        Only take the following medications with a small sip of water the morning of surgery:  propranolol, gabapentin, pantoprazole       Hold the following medications (per anesthesia or surgeon request):  methotrexate    Last Dose:  3/5/25        Aspirin, Ibuprofen, Advil, Naproxen, Vitamin E and other Anti-inflammatory products and supplements should be stopped       for 5-7days before surgery or as directed by your physician/surgeon.     - Do not smoke or vape, and do not drink any alcoholic beverages 24 hours prior to surgery, this includes NA Beer. Refrain from using     any recreational drugs, including non-prescribed prescription drugs.     -You may brush your teeth and gargle the morning of surgery.  DO NOT SWALLOW WATER.    -You MUST plan for a responsible adult to stay on site while you are here and take you home after your surgery. You will not be allowed                to leave alone or drive yourself home. It is requested someone stay with you the first 24 hrs. Your surgery will be cancelled if you do not                have a ride home with a responsible adult.    -A parent/legal guardian must accompany

## 2025-03-13 ENCOUNTER — CARE COORDINATION (OUTPATIENT)
Dept: CASE MANAGEMENT | Age: 70
End: 2025-03-13

## 2025-03-13 NOTE — CARE COORDINATION
Care Transitions Note    Follow Up Call     Patient Current Location:  Home: [address] [place] [address]    Care Transition Nurse contacted the patient by telephone. Verified name and  as identifiers.    Additional needs identified to be addressed with provider   No needs identified                 Method of communication with provider: none.    Care Summary Note: Thea states she is doing \"good.\"   Thea states she saw the dermatologist and her diagnosis is eczema.  She states her ortho surgery is rescheduled for next week.  Thea denies any needs at this time.        Advance Care Planning:   Does patient have an Advance Directive: reviewed during previous call, see note. .    Medication Review:  Full medication reconciliation completed during previous call.      Assessments:  Care Transitions Subsequent and Final Call    Subsequent and Final Calls  Do you have any ongoing symptoms?: No  Have your medications changed?: No  Do you have any questions related to your medications?: No  Do you currently have any active services?: No  Do you have any needs or concerns that I can assist you with?: No  Identified Barriers: None  Care Transitions Interventions  Other Interventions:              Follow Up Appointment:   Reviewed upcoming appointment(s).  Future Appointments         Provider Specialty Dept Phone    2025 1:00 PM Harriet Cuellar, APRN - CNP Family Medicine [phone]            Care Transition Nurse provided contact information.  Plan for follow-up call in 6-10 days based on severity of symptoms and risk factors.  Plan for next call:  post op recovery      Rebecca Cha RN BSN  Care Transition Nurse  [phone]

## 2025-03-14 ENCOUNTER — TELEPHONE (OUTPATIENT)
Dept: ORTHOPEDIC SURGERY | Age: 70
End: 2025-03-14

## 2025-03-14 NOTE — TELEPHONE ENCOUNTER
Attempted to call Pt to follow up on dermatology clearance, as we had not received anything yet. She was unavailable, but her  did answer and we are authorized to communicate with him. He let me know that the dermatologist had provided Pt with a cream to use for up to 2 weeks. Pt is to call dermatologist on Monday (3/17/2025) to check in. Clearance for surgery will depend on if Pt's rash has improved at that point or not. Will call both Pt and dermatologist on Monday

## 2025-03-16 ENCOUNTER — ANESTHESIA EVENT (OUTPATIENT)
Dept: OPERATING ROOM | Age: 70
DRG: 941 | End: 2025-03-16
Payer: MEDICARE

## 2025-03-17 ENCOUNTER — TELEPHONE (OUTPATIENT)
Dept: ORTHOPEDIC SURGERY | Age: 70
End: 2025-03-17

## 2025-03-17 ENCOUNTER — OFFICE VISIT (OUTPATIENT)
Dept: ORTHOPEDIC SURGERY | Age: 70
End: 2025-03-17
Payer: MEDICARE

## 2025-03-17 VITALS — WEIGHT: 151 LBS | BODY MASS INDEX: 26.75 KG/M2 | HEIGHT: 63 IN

## 2025-03-17 DIAGNOSIS — S46.011D TRAUMATIC COMPLETE TEAR OF RIGHT ROTATOR CUFF, SUBSEQUENT ENCOUNTER: Primary | ICD-10-CM

## 2025-03-17 PROCEDURE — 3017F COLORECTAL CA SCREEN DOC REV: CPT | Performed by: ORTHOPAEDIC SURGERY

## 2025-03-17 PROCEDURE — 1123F ACP DISCUSS/DSCN MKR DOCD: CPT | Performed by: ORTHOPAEDIC SURGERY

## 2025-03-17 PROCEDURE — 4004F PT TOBACCO SCREEN RCVD TLK: CPT | Performed by: ORTHOPAEDIC SURGERY

## 2025-03-17 PROCEDURE — 1159F MED LIST DOCD IN RCRD: CPT | Performed by: ORTHOPAEDIC SURGERY

## 2025-03-17 PROCEDURE — G8427 DOCREV CUR MEDS BY ELIG CLIN: HCPCS | Performed by: ORTHOPAEDIC SURGERY

## 2025-03-17 PROCEDURE — 1090F PRES/ABSN URINE INCON ASSESS: CPT | Performed by: ORTHOPAEDIC SURGERY

## 2025-03-17 PROCEDURE — 99213 OFFICE O/P EST LOW 20 MIN: CPT | Performed by: ORTHOPAEDIC SURGERY

## 2025-03-17 PROCEDURE — 1111F DSCHRG MED/CURRENT MED MERGE: CPT | Performed by: ORTHOPAEDIC SURGERY

## 2025-03-17 PROCEDURE — G8417 CALC BMI ABV UP PARAM F/U: HCPCS | Performed by: ORTHOPAEDIC SURGERY

## 2025-03-17 PROCEDURE — G8399 PT W/DXA RESULTS DOCUMENT: HCPCS | Performed by: ORTHOPAEDIC SURGERY

## 2025-03-17 NOTE — TELEPHONE ENCOUNTER
Attempted to contact Pt to schedule a follow up appt to evaluate rash prior to procedure tomorrow. I spoke with the Pt's , Carlo. He is listed as a contact I am able to speak with on the Pt's most recent Pt communication form. Appt has been scheduled. Pt's  has informed of time, date and location. Pt's  expressed understanding

## 2025-03-17 NOTE — PROGRESS NOTES
FOLLOW UP ORTHOPAEDIC NOTE    The patient follows up today for reevaluation of right shoulder.  She is accompanied by her .  She had seen dermatology and ultimately had been determined to be an eczema type rash which she has been using lysing clobetasol propionate and this has been helping.  She denies fevers chills.  She states the rash is dramatically improved.  She had additional questions with regard to perioperative course    PE:  AAOx3  RR  Unlabored breathing  Skin warm and moist  Focused physical examination of the right shoulder  Absence of rash that she had on the day of the initial planned surgery over a week ago.  This is dramatically improved to the point of being nonexistent.  There is no erythema.     Diagnosis Orders   1. Traumatic complete tear of right rotator cuff, subsequent encounter          Assessment and plan: 69 female with continued subjective symptoms of right shoulder pain with known, correlating diagnosis of full-thickness irreparable rotator cuff tears with chronic changes.  -Time of 12 minutes was spent coordinating and discussing the clinical findings and diagnostic imaging results as they pertain to the patient's presenting subjective symptoms.  -I had a pleasant discussion with the patient and her family member.  I reviewed with them both that currently her exam is dramatically improved.  I answered her questions with regard to perioperative course and what will entail.  -Anticipated date of surgery tomorrow morning for right shoulder reverse total shoulder replacement  -Patient will continue the current dermatologic treatment tonight and pause starting tomorrow morning.  She has been utilizing mupirocin.  -All questions answered to the patient's satisfaction and the patient expressed understanding and agreement with the above listed treatment plan  -Follow up in 10 to 12 days postop per routine  -Thank you for the clinical consultation and allowing me to participate in the

## 2025-03-18 ENCOUNTER — HOSPITAL ENCOUNTER (INPATIENT)
Age: 70
LOS: 1 days | Discharge: HOME | DRG: 483 | End: 2025-03-19
Attending: ORTHOPAEDIC SURGERY | Admitting: ORTHOPAEDIC SURGERY
Payer: MEDICARE

## 2025-03-18 ENCOUNTER — APPOINTMENT (OUTPATIENT)
Dept: GENERAL RADIOLOGY | Age: 70
DRG: 483 | End: 2025-03-18
Attending: ORTHOPAEDIC SURGERY
Payer: MEDICARE

## 2025-03-18 ENCOUNTER — ANESTHESIA (OUTPATIENT)
Dept: OPERATING ROOM | Age: 70
DRG: 941 | End: 2025-03-18
Payer: MEDICARE

## 2025-03-18 DIAGNOSIS — Z96.611 S/P REVERSE TOTAL SHOULDER ARTHROPLASTY, RIGHT: Primary | ICD-10-CM

## 2025-03-18 LAB
ABO/RH: NORMAL
ANTIBODY SCREEN: NORMAL
GLUCOSE BLD-MCNC: 114 MG/DL (ref 70–99)
GLUCOSE BLD-MCNC: 142 MG/DL (ref 70–99)
PERFORMED ON: ABNORMAL
PERFORMED ON: ABNORMAL

## 2025-03-18 PROCEDURE — 2500000003 HC RX 250 WO HCPCS: Performed by: ORTHOPAEDIC SURGERY

## 2025-03-18 PROCEDURE — C1776 JOINT DEVICE (IMPLANTABLE): HCPCS | Performed by: ORTHOPAEDIC SURGERY

## 2025-03-18 PROCEDURE — 86900 BLOOD TYPING SEROLOGIC ABO: CPT

## 2025-03-18 PROCEDURE — 6360000002 HC RX W HCPCS: Performed by: ORTHOPAEDIC SURGERY

## 2025-03-18 PROCEDURE — 6370000000 HC RX 637 (ALT 250 FOR IP): Performed by: ORTHOPAEDIC SURGERY

## 2025-03-18 PROCEDURE — 3700000000 HC ANESTHESIA ATTENDED CARE: Performed by: ORTHOPAEDIC SURGERY

## 2025-03-18 PROCEDURE — 0RRJ00Z REPLACEMENT OF RIGHT SHOULDER JOINT WITH REVERSE BALL AND SOCKET SYNTHETIC SUBSTITUTE, OPEN APPROACH: ICD-10-PCS | Performed by: ORTHOPAEDIC SURGERY

## 2025-03-18 PROCEDURE — 94761 N-INVAS EAR/PLS OXIMETRY MLT: CPT

## 2025-03-18 PROCEDURE — 86901 BLOOD TYPING SEROLOGIC RH(D): CPT

## 2025-03-18 PROCEDURE — 3700000001 HC ADD 15 MINUTES (ANESTHESIA): Performed by: ORTHOPAEDIC SURGERY

## 2025-03-18 PROCEDURE — 2500000003 HC RX 250 WO HCPCS: Performed by: NURSE ANESTHETIST, CERTIFIED REGISTERED

## 2025-03-18 PROCEDURE — 6370000000 HC RX 637 (ALT 250 FOR IP): Performed by: SPECIALIST/TECHNOLOGIST

## 2025-03-18 PROCEDURE — 2580000003 HC RX 258: Performed by: NURSE ANESTHETIST, CERTIFIED REGISTERED

## 2025-03-18 PROCEDURE — 6370000000 HC RX 637 (ALT 250 FOR IP): Performed by: ANESTHESIOLOGY

## 2025-03-18 PROCEDURE — 3600000004 HC SURGERY LEVEL 4 BASE: Performed by: ORTHOPAEDIC SURGERY

## 2025-03-18 PROCEDURE — 7100000001 HC PACU RECOVERY - ADDTL 15 MIN: Performed by: ORTHOPAEDIC SURGERY

## 2025-03-18 PROCEDURE — 2720000010 HC SURG SUPPLY STERILE: Performed by: ORTHOPAEDIC SURGERY

## 2025-03-18 PROCEDURE — G0378 HOSPITAL OBSERVATION PER HR: HCPCS

## 2025-03-18 PROCEDURE — 23472 RECONSTRUCT SHOULDER JOINT: CPT | Performed by: ORTHOPAEDIC SURGERY

## 2025-03-18 PROCEDURE — 86850 RBC ANTIBODY SCREEN: CPT

## 2025-03-18 PROCEDURE — 3E0T3BZ INTRODUCTION OF ANESTHETIC AGENT INTO PERIPHERAL NERVES AND PLEXI, PERCUTANEOUS APPROACH: ICD-10-PCS | Performed by: EMERGENCY MEDICINE

## 2025-03-18 PROCEDURE — 2580000003 HC RX 258: Performed by: ORTHOPAEDIC SURGERY

## 2025-03-18 PROCEDURE — 6360000002 HC RX W HCPCS: Performed by: NURSE ANESTHETIST, CERTIFIED REGISTERED

## 2025-03-18 PROCEDURE — L3650 SO 8 ABD RESTRAINT PRE OTS: HCPCS | Performed by: ORTHOPAEDIC SURGERY

## 2025-03-18 PROCEDURE — 64415 NJX AA&/STRD BRCH PLXS IMG: CPT | Performed by: STUDENT IN AN ORGANIZED HEALTH CARE EDUCATION/TRAINING PROGRAM

## 2025-03-18 PROCEDURE — 94640 AIRWAY INHALATION TREATMENT: CPT

## 2025-03-18 PROCEDURE — 3600000014 HC SURGERY LEVEL 4 ADDTL 15MIN: Performed by: ORTHOPAEDIC SURGERY

## 2025-03-18 PROCEDURE — 1200000000 HC SEMI PRIVATE

## 2025-03-18 PROCEDURE — 73030 X-RAY EXAM OF SHOULDER: CPT

## 2025-03-18 PROCEDURE — L3660 SO 8 AB RSTR CAN/WEB PRE OTS: HCPCS | Performed by: ORTHOPAEDIC SURGERY

## 2025-03-18 PROCEDURE — 2700000000 HC OXYGEN THERAPY PER DAY

## 2025-03-18 PROCEDURE — 2709999900 HC NON-CHARGEABLE SUPPLY: Performed by: ORTHOPAEDIC SURGERY

## 2025-03-18 PROCEDURE — 7100000000 HC PACU RECOVERY - FIRST 15 MIN: Performed by: ORTHOPAEDIC SURGERY

## 2025-03-18 PROCEDURE — C1713 ANCHOR/SCREW BN/BN,TIS/BN: HCPCS | Performed by: ORTHOPAEDIC SURGERY

## 2025-03-18 PROCEDURE — 6360000002 HC RX W HCPCS: Performed by: STUDENT IN AN ORGANIZED HEALTH CARE EDUCATION/TRAINING PROGRAM

## 2025-03-18 DEVICE — IMPLANTABLE DEVICE
Type: IMPLANTABLE DEVICE | Site: SHOULDER | Status: FUNCTIONAL
Brand: COMPREHENSIVE REVERSE SHOULDER

## 2025-03-18 DEVICE — STEM HUM 10MM MIC SHLDR CO CHROM COMPHSVE REV PRI CEM: Type: IMPLANTABLE DEVICE | Site: SHOULDER | Status: FUNCTIONAL

## 2025-03-18 DEVICE — IMPLANTABLE DEVICE
Type: IMPLANTABLE DEVICE | Site: SHOULDER | Status: FUNCTIONAL
Brand: COMPREHENSIVE® REVERSE SHOULDER

## 2025-03-18 DEVICE — IMPLANTABLE DEVICE
Type: IMPLANTABLE DEVICE | Site: SHOULDER | Status: FUNCTIONAL
Brand: COMPREHENSIVE®

## 2025-03-18 DEVICE — IMPLANTABLE DEVICE
Type: IMPLANTABLE DEVICE | Site: SHOULDER | Status: FUNCTIONAL
Brand: COMPREHENSIVE® VIVACIT-E®

## 2025-03-18 RX ORDER — PROPOFOL 10 MG/ML
INJECTION, EMULSION INTRAVENOUS
Status: DISCONTINUED | OUTPATIENT
Start: 2025-03-18 | End: 2025-03-18 | Stop reason: SDUPTHER

## 2025-03-18 RX ORDER — ACETAMINOPHEN 500 MG
1000 TABLET ORAL ONCE
Status: DISCONTINUED | OUTPATIENT
Start: 2025-03-18 | End: 2025-03-18 | Stop reason: SDUPTHER

## 2025-03-18 RX ORDER — SODIUM CHLORIDE, SODIUM LACTATE, POTASSIUM CHLORIDE, CALCIUM CHLORIDE 600; 310; 30; 20 MG/100ML; MG/100ML; MG/100ML; MG/100ML
INJECTION, SOLUTION INTRAVENOUS CONTINUOUS
Status: DISCONTINUED | OUTPATIENT
Start: 2025-03-18 | End: 2025-03-18 | Stop reason: HOSPADM

## 2025-03-18 RX ORDER — SODIUM CHLORIDE 9 MG/ML
INJECTION, SOLUTION INTRAVENOUS PRN
Status: DISCONTINUED | OUTPATIENT
Start: 2025-03-18 | End: 2025-03-18 | Stop reason: SDUPTHER

## 2025-03-18 RX ORDER — MORPHINE SULFATE 4 MG/ML
4 INJECTION, SOLUTION INTRAMUSCULAR; INTRAVENOUS
Status: ACTIVE | OUTPATIENT
Start: 2025-03-18 | End: 2025-03-19

## 2025-03-18 RX ORDER — SODIUM CHLORIDE, SODIUM LACTATE, POTASSIUM CHLORIDE, CALCIUM CHLORIDE 600; 310; 30; 20 MG/100ML; MG/100ML; MG/100ML; MG/100ML
INJECTION, SOLUTION INTRAVENOUS CONTINUOUS
Status: DISCONTINUED | OUTPATIENT
Start: 2025-03-18 | End: 2025-03-18 | Stop reason: SDUPTHER

## 2025-03-18 RX ORDER — ALBUTEROL SULFATE 90 UG/1
2 INHALANT RESPIRATORY (INHALATION) EVERY 4 HOURS PRN
Status: DISCONTINUED | OUTPATIENT
Start: 2025-03-18 | End: 2025-03-19 | Stop reason: HOSPADM

## 2025-03-18 RX ORDER — ACETAMINOPHEN 500 MG
1000 TABLET ORAL ONCE
Status: COMPLETED | OUTPATIENT
Start: 2025-03-18 | End: 2025-03-18

## 2025-03-18 RX ORDER — FOLIC ACID 1 MG/1
1 TABLET ORAL DAILY
Status: DISCONTINUED | OUTPATIENT
Start: 2025-03-18 | End: 2025-03-18

## 2025-03-18 RX ORDER — FLUTICASONE PROPIONATE 50 MCG
2 SPRAY, SUSPENSION (ML) NASAL DAILY PRN
Status: DISCONTINUED | OUTPATIENT
Start: 2025-03-18 | End: 2025-03-19 | Stop reason: HOSPADM

## 2025-03-18 RX ORDER — BUDESONIDE AND FORMOTEROL FUMARATE DIHYDRATE 160; 4.5 UG/1; UG/1
2 AEROSOL RESPIRATORY (INHALATION) 2 TIMES DAILY
Status: DISCONTINUED | OUTPATIENT
Start: 2025-03-18 | End: 2025-03-19 | Stop reason: HOSPADM

## 2025-03-18 RX ORDER — MIDAZOLAM HYDROCHLORIDE 1 MG/ML
2 INJECTION, SOLUTION INTRAMUSCULAR; INTRAVENOUS
Status: DISCONTINUED | OUTPATIENT
Start: 2025-03-18 | End: 2025-03-18 | Stop reason: HOSPADM

## 2025-03-18 RX ORDER — SODIUM CHLORIDE 0.9 % (FLUSH) 0.9 %
5-40 SYRINGE (ML) INJECTION EVERY 12 HOURS SCHEDULED
Status: DISCONTINUED | OUTPATIENT
Start: 2025-03-18 | End: 2025-03-18 | Stop reason: SDUPTHER

## 2025-03-18 RX ORDER — LIDOCAINE HYDROCHLORIDE 10 MG/ML
1 INJECTION, SOLUTION EPIDURAL; INFILTRATION; INTRACAUDAL; PERINEURAL
Status: DISCONTINUED | OUTPATIENT
Start: 2025-03-18 | End: 2025-03-18 | Stop reason: SDUPTHER

## 2025-03-18 RX ORDER — CYCLOBENZAPRINE HCL 10 MG
10 TABLET ORAL 3 TIMES DAILY PRN
Status: DISCONTINUED | OUTPATIENT
Start: 2025-03-18 | End: 2025-03-19 | Stop reason: HOSPADM

## 2025-03-18 RX ORDER — MELOXICAM 7.5 MG/1
7.5 TABLET ORAL ONCE
Status: COMPLETED | OUTPATIENT
Start: 2025-03-18 | End: 2025-03-18

## 2025-03-18 RX ORDER — SODIUM CHLORIDE 9 MG/ML
INJECTION, SOLUTION INTRAVENOUS PRN
Status: DISCONTINUED | OUTPATIENT
Start: 2025-03-18 | End: 2025-03-18 | Stop reason: HOSPADM

## 2025-03-18 RX ORDER — DEXTROMETHORPHAN HYDROBROMIDE AND PROMETHAZINE HYDROCHLORIDE 15; 6.25 MG/5ML; MG/5ML
5 SYRUP ORAL 4 TIMES DAILY PRN
Status: DISCONTINUED | OUTPATIENT
Start: 2025-03-18 | End: 2025-03-19 | Stop reason: HOSPADM

## 2025-03-18 RX ORDER — SPIRONOLACTONE 25 MG/1
50 TABLET ORAL DAILY
Status: DISCONTINUED | OUTPATIENT
Start: 2025-03-18 | End: 2025-03-18

## 2025-03-18 RX ORDER — SODIUM CHLORIDE 0.9 % (FLUSH) 0.9 %
5-40 SYRINGE (ML) INJECTION PRN
Status: DISCONTINUED | OUTPATIENT
Start: 2025-03-18 | End: 2025-03-18 | Stop reason: HOSPADM

## 2025-03-18 RX ORDER — ONDANSETRON 2 MG/ML
4 INJECTION INTRAMUSCULAR; INTRAVENOUS EVERY 6 HOURS PRN
Status: DISCONTINUED | OUTPATIENT
Start: 2025-03-18 | End: 2025-03-19

## 2025-03-18 RX ORDER — LABETALOL HYDROCHLORIDE 5 MG/ML
10 INJECTION, SOLUTION INTRAVENOUS
Status: DISCONTINUED | OUTPATIENT
Start: 2025-03-18 | End: 2025-03-18 | Stop reason: HOSPADM

## 2025-03-18 RX ORDER — BUPIVACAINE HYDROCHLORIDE 5 MG/ML
INJECTION, SOLUTION EPIDURAL; INTRACAUDAL; PERINEURAL
Status: COMPLETED | OUTPATIENT
Start: 2025-03-18 | End: 2025-03-18

## 2025-03-18 RX ORDER — DROPERIDOL 2.5 MG/ML
0.62 INJECTION, SOLUTION INTRAMUSCULAR; INTRAVENOUS
Status: DISCONTINUED | OUTPATIENT
Start: 2025-03-18 | End: 2025-03-18 | Stop reason: HOSPADM

## 2025-03-18 RX ORDER — SODIUM CHLORIDE 0.9 % (FLUSH) 0.9 %
5-40 SYRINGE (ML) INJECTION PRN
Status: DISCONTINUED | OUTPATIENT
Start: 2025-03-18 | End: 2025-03-19 | Stop reason: HOSPADM

## 2025-03-18 RX ORDER — DIPHENHYDRAMINE HYDROCHLORIDE 50 MG/ML
12.5 INJECTION, SOLUTION INTRAMUSCULAR; INTRAVENOUS
Status: DISCONTINUED | OUTPATIENT
Start: 2025-03-18 | End: 2025-03-18 | Stop reason: HOSPADM

## 2025-03-18 RX ORDER — ATORVASTATIN CALCIUM 40 MG/1
40 TABLET, FILM COATED ORAL NIGHTLY
Status: DISCONTINUED | OUTPATIENT
Start: 2025-03-18 | End: 2025-03-19 | Stop reason: HOSPADM

## 2025-03-18 RX ORDER — MIDAZOLAM HYDROCHLORIDE 1 MG/ML
INJECTION, SOLUTION INTRAMUSCULAR; INTRAVENOUS
Status: COMPLETED | OUTPATIENT
Start: 2025-03-18 | End: 2025-03-18

## 2025-03-18 RX ORDER — ONDANSETRON 4 MG/1
4 TABLET, ORALLY DISINTEGRATING ORAL EVERY 8 HOURS PRN
Status: DISCONTINUED | OUTPATIENT
Start: 2025-03-18 | End: 2025-03-19

## 2025-03-18 RX ORDER — DIFLUPREDNATE OPHTHALMIC 0.5 MG/ML
2 EMULSION OPHTHALMIC 2 TIMES DAILY
Status: DISCONTINUED | OUTPATIENT
Start: 2025-03-18 | End: 2025-03-19 | Stop reason: HOSPADM

## 2025-03-18 RX ORDER — OXYCODONE HYDROCHLORIDE 5 MG/1
10 TABLET ORAL PRN
Status: DISCONTINUED | OUTPATIENT
Start: 2025-03-18 | End: 2025-03-18 | Stop reason: HOSPADM

## 2025-03-18 RX ORDER — TRANEXAMIC ACID 100 MG/ML
1000 INJECTION, SOLUTION INTRAVENOUS ONCE
Status: DISCONTINUED | OUTPATIENT
Start: 2025-03-18 | End: 2025-03-18 | Stop reason: SDUPTHER

## 2025-03-18 RX ORDER — VALACYCLOVIR HYDROCHLORIDE 500 MG/1
500 TABLET, FILM COATED ORAL DAILY
Status: DISCONTINUED | OUTPATIENT
Start: 2025-03-18 | End: 2025-03-18

## 2025-03-18 RX ORDER — ACETAMINOPHEN 325 MG/1
650 TABLET ORAL EVERY 6 HOURS
Status: DISCONTINUED | OUTPATIENT
Start: 2025-03-18 | End: 2025-03-19 | Stop reason: HOSPADM

## 2025-03-18 RX ORDER — GABAPENTIN 400 MG/1
400 CAPSULE ORAL 4 TIMES DAILY
Status: DISCONTINUED | OUTPATIENT
Start: 2025-03-18 | End: 2025-03-19 | Stop reason: HOSPADM

## 2025-03-18 RX ORDER — POLYETHYLENE GLYCOL 3350 17 G/17G
17 POWDER, FOR SOLUTION ORAL DAILY
Status: DISCONTINUED | OUTPATIENT
Start: 2025-03-18 | End: 2025-03-19 | Stop reason: HOSPADM

## 2025-03-18 RX ORDER — FOLIC ACID 1 MG/1
1 TABLET ORAL DAILY
Status: DISCONTINUED | OUTPATIENT
Start: 2025-03-19 | End: 2025-03-19 | Stop reason: HOSPADM

## 2025-03-18 RX ORDER — MORPHINE SULFATE 2 MG/ML
2 INJECTION, SOLUTION INTRAMUSCULAR; INTRAVENOUS
Status: ACTIVE | OUTPATIENT
Start: 2025-03-18 | End: 2025-03-19

## 2025-03-18 RX ORDER — ROCURONIUM BROMIDE 10 MG/ML
INJECTION, SOLUTION INTRAVENOUS
Status: DISCONTINUED | OUTPATIENT
Start: 2025-03-18 | End: 2025-03-18 | Stop reason: SDUPTHER

## 2025-03-18 RX ORDER — PROPRANOLOL HYDROCHLORIDE 40 MG/1
40 TABLET ORAL DAILY
Status: DISCONTINUED | OUTPATIENT
Start: 2025-03-19 | End: 2025-03-19 | Stop reason: HOSPADM

## 2025-03-18 RX ORDER — SODIUM CHLORIDE 9 MG/ML
INJECTION, SOLUTION INTRAVENOUS PRN
Status: DISCONTINUED | OUTPATIENT
Start: 2025-03-18 | End: 2025-03-19 | Stop reason: HOSPADM

## 2025-03-18 RX ORDER — SODIUM CHLORIDE 9 MG/ML
INJECTION, SOLUTION INTRAVENOUS
Status: DISCONTINUED | OUTPATIENT
Start: 2025-03-18 | End: 2025-03-18 | Stop reason: SDUPTHER

## 2025-03-18 RX ORDER — OXYCODONE HYDROCHLORIDE 5 MG/1
5 TABLET ORAL EVERY 6 HOURS PRN
Status: DISCONTINUED | OUTPATIENT
Start: 2025-03-18 | End: 2025-03-19 | Stop reason: HOSPADM

## 2025-03-18 RX ORDER — FENTANYL CITRATE 50 UG/ML
INJECTION, SOLUTION INTRAMUSCULAR; INTRAVENOUS
Status: DISCONTINUED | OUTPATIENT
Start: 2025-03-18 | End: 2025-03-18 | Stop reason: SDUPTHER

## 2025-03-18 RX ORDER — MONTELUKAST SODIUM 10 MG/1
10 TABLET ORAL NIGHTLY
Status: DISCONTINUED | OUTPATIENT
Start: 2025-03-18 | End: 2025-03-19 | Stop reason: HOSPADM

## 2025-03-18 RX ORDER — LETROZOLE 2.5 MG/1
2.5 TABLET, FILM COATED ORAL DAILY
Status: DISCONTINUED | OUTPATIENT
Start: 2025-03-19 | End: 2025-03-19 | Stop reason: HOSPADM

## 2025-03-18 RX ORDER — ASPIRIN 325 MG
325 TABLET, DELAYED RELEASE (ENTERIC COATED) ORAL DAILY
Status: DISCONTINUED | OUTPATIENT
Start: 2025-03-19 | End: 2025-03-19 | Stop reason: HOSPADM

## 2025-03-18 RX ORDER — LIDOCAINE HYDROCHLORIDE 10 MG/ML
1 INJECTION, SOLUTION EPIDURAL; INFILTRATION; INTRACAUDAL; PERINEURAL
Status: DISCONTINUED | OUTPATIENT
Start: 2025-03-18 | End: 2025-03-18 | Stop reason: HOSPADM

## 2025-03-18 RX ORDER — ONDANSETRON 2 MG/ML
INJECTION INTRAMUSCULAR; INTRAVENOUS
Status: DISCONTINUED | OUTPATIENT
Start: 2025-03-18 | End: 2025-03-18 | Stop reason: SDUPTHER

## 2025-03-18 RX ORDER — PROPRANOLOL HYDROCHLORIDE 40 MG/1
40 TABLET ORAL DAILY
Status: DISCONTINUED | OUTPATIENT
Start: 2025-03-19 | End: 2025-03-18

## 2025-03-18 RX ORDER — SODIUM CHLORIDE 0.9 % (FLUSH) 0.9 %
5-40 SYRINGE (ML) INJECTION EVERY 12 HOURS SCHEDULED
Status: DISCONTINUED | OUTPATIENT
Start: 2025-03-18 | End: 2025-03-19 | Stop reason: HOSPADM

## 2025-03-18 RX ORDER — LIDOCAINE HYDROCHLORIDE 20 MG/ML
INJECTION, SOLUTION INFILTRATION; PERINEURAL
Status: DISCONTINUED | OUTPATIENT
Start: 2025-03-18 | End: 2025-03-18 | Stop reason: SDUPTHER

## 2025-03-18 RX ORDER — SPIRONOLACTONE 25 MG/1
50 TABLET ORAL DAILY
Status: DISCONTINUED | OUTPATIENT
Start: 2025-03-19 | End: 2025-03-19 | Stop reason: HOSPADM

## 2025-03-18 RX ORDER — OXYCODONE HYDROCHLORIDE 5 MG/1
5 TABLET ORAL PRN
Status: DISCONTINUED | OUTPATIENT
Start: 2025-03-18 | End: 2025-03-18 | Stop reason: HOSPADM

## 2025-03-18 RX ORDER — TRANEXAMIC ACID 10 MG/ML
1000 INJECTION, SOLUTION INTRAVENOUS
Status: DISCONTINUED | OUTPATIENT
Start: 2025-03-18 | End: 2025-03-18 | Stop reason: HOSPADM

## 2025-03-18 RX ORDER — SODIUM CHLORIDE 0.9 % (FLUSH) 0.9 %
5-40 SYRINGE (ML) INJECTION EVERY 12 HOURS SCHEDULED
Status: DISCONTINUED | OUTPATIENT
Start: 2025-03-18 | End: 2025-03-18 | Stop reason: HOSPADM

## 2025-03-18 RX ORDER — PANTOPRAZOLE SODIUM 40 MG/1
40 TABLET, DELAYED RELEASE ORAL 2 TIMES DAILY
Status: DISCONTINUED | OUTPATIENT
Start: 2025-03-18 | End: 2025-03-19 | Stop reason: HOSPADM

## 2025-03-18 RX ORDER — DEXAMETHASONE SODIUM PHOSPHATE 4 MG/ML
INJECTION, SOLUTION INTRA-ARTICULAR; INTRALESIONAL; INTRAMUSCULAR; INTRAVENOUS; SOFT TISSUE
Status: DISCONTINUED | OUTPATIENT
Start: 2025-03-18 | End: 2025-03-18 | Stop reason: SDUPTHER

## 2025-03-18 RX ORDER — MAGNESIUM HYDROXIDE 1200 MG/15ML
LIQUID ORAL CONTINUOUS PRN
Status: COMPLETED | OUTPATIENT
Start: 2025-03-18 | End: 2025-03-18

## 2025-03-18 RX ORDER — VALACYCLOVIR HYDROCHLORIDE 500 MG/1
500 TABLET, FILM COATED ORAL DAILY
Status: DISCONTINUED | OUTPATIENT
Start: 2025-03-19 | End: 2025-03-19 | Stop reason: HOSPADM

## 2025-03-18 RX ORDER — TRANEXAMIC ACID 100 MG/ML
INJECTION, SOLUTION INTRAVENOUS
Status: DISCONTINUED | OUTPATIENT
Start: 2025-03-18 | End: 2025-03-18 | Stop reason: SDUPTHER

## 2025-03-18 RX ORDER — PROMETHAZINE HYDROCHLORIDE 25 MG/1
25 TABLET ORAL EVERY 6 HOURS PRN
Status: DISCONTINUED | OUTPATIENT
Start: 2025-03-18 | End: 2025-03-19 | Stop reason: HOSPADM

## 2025-03-18 RX ORDER — NALOXONE HYDROCHLORIDE 0.4 MG/ML
INJECTION, SOLUTION INTRAMUSCULAR; INTRAVENOUS; SUBCUTANEOUS PRN
Status: DISCONTINUED | OUTPATIENT
Start: 2025-03-18 | End: 2025-03-18 | Stop reason: HOSPADM

## 2025-03-18 RX ORDER — TAMSULOSIN HYDROCHLORIDE 0.4 MG/1
0.4 CAPSULE ORAL DAILY
Status: DISCONTINUED | OUTPATIENT
Start: 2025-03-18 | End: 2025-03-18

## 2025-03-18 RX ORDER — ALPRAZOLAM 1 MG/1
1 TABLET ORAL NIGHTLY PRN
Status: DISCONTINUED | OUTPATIENT
Start: 2025-03-18 | End: 2025-03-19 | Stop reason: HOSPADM

## 2025-03-18 RX ORDER — TAMSULOSIN HYDROCHLORIDE 0.4 MG/1
0.4 CAPSULE ORAL DAILY
Status: DISCONTINUED | OUTPATIENT
Start: 2025-03-19 | End: 2025-03-19 | Stop reason: HOSPADM

## 2025-03-18 RX ORDER — DOXYCYCLINE HYCLATE 100 MG
100 TABLET ORAL EVERY 12 HOURS SCHEDULED
Status: DISCONTINUED | OUTPATIENT
Start: 2025-03-19 | End: 2025-03-19 | Stop reason: HOSPADM

## 2025-03-18 RX ORDER — OXYCODONE HYDROCHLORIDE 5 MG/1
10 TABLET ORAL EVERY 6 HOURS PRN
Status: DISCONTINUED | OUTPATIENT
Start: 2025-03-18 | End: 2025-03-19 | Stop reason: HOSPADM

## 2025-03-18 RX ADMIN — FENTANYL CITRATE 25 MCG: 50 INJECTION, SOLUTION INTRAMUSCULAR; INTRAVENOUS at 08:01

## 2025-03-18 RX ADMIN — SUGAMMADEX 100 MG: 100 INJECTION, SOLUTION INTRAVENOUS at 10:19

## 2025-03-18 RX ADMIN — CEFAZOLIN 2000 MG: 2 INJECTION, POWDER, FOR SOLUTION INTRAVENOUS at 10:19

## 2025-03-18 RX ADMIN — METHYLPREDNISOLONE SODIUM SUCCINATE 31.25 MG: 500 INJECTION, POWDER, FOR SOLUTION INTRAMUSCULAR; INTRAVENOUS at 07:41

## 2025-03-18 RX ADMIN — PROMETHAZINE HYDROCHLORIDE 25 MG: 25 TABLET ORAL at 17:29

## 2025-03-18 RX ADMIN — MELOXICAM 7.5 MG: 7.5 TABLET ORAL at 06:32

## 2025-03-18 RX ADMIN — FENTANYL CITRATE 25 MCG: 50 INJECTION, SOLUTION INTRAMUSCULAR; INTRAVENOUS at 09:06

## 2025-03-18 RX ADMIN — ROCURONIUM BROMIDE 20 MG: 50 INJECTION, SOLUTION INTRAVENOUS at 08:12

## 2025-03-18 RX ADMIN — TRANEXAMIC ACID 1000 MG: 100 INJECTION, SOLUTION INTRAVENOUS at 07:37

## 2025-03-18 RX ADMIN — Medication 10 MG: at 09:14

## 2025-03-18 RX ADMIN — FENTANYL CITRATE 25 MCG: 50 INJECTION, SOLUTION INTRAMUSCULAR; INTRAVENOUS at 07:26

## 2025-03-18 RX ADMIN — Medication 10 MG: at 08:46

## 2025-03-18 RX ADMIN — TRANEXAMIC ACID 1000 MG: 100 INJECTION, SOLUTION INTRAVENOUS at 09:52

## 2025-03-18 RX ADMIN — SODIUM CHLORIDE: 9 INJECTION, SOLUTION INTRAVENOUS at 06:50

## 2025-03-18 RX ADMIN — ALPRAZOLAM 1 MG: 1 TABLET ORAL at 23:26

## 2025-03-18 RX ADMIN — ROCURONIUM BROMIDE 10 MG: 50 INJECTION, SOLUTION INTRAVENOUS at 08:55

## 2025-03-18 RX ADMIN — BUPIVACAINE HYDROCHLORIDE 20 ML: 5 INJECTION, SOLUTION EPIDURAL; INTRACAUDAL; PERINEURAL at 06:50

## 2025-03-18 RX ADMIN — Medication 2 AMPULE: at 06:32

## 2025-03-18 RX ADMIN — ROCURONIUM BROMIDE 10 MG: 50 INJECTION, SOLUTION INTRAVENOUS at 09:42

## 2025-03-18 RX ADMIN — Medication 10 MG: at 07:55

## 2025-03-18 RX ADMIN — Medication 2 PUFF: at 19:10

## 2025-03-18 RX ADMIN — Medication 10 ML: at 19:51

## 2025-03-18 RX ADMIN — GABAPENTIN 400 MG: 400 CAPSULE ORAL at 19:48

## 2025-03-18 RX ADMIN — LIDOCAINE HYDROCHLORIDE 80 MG: 20 INJECTION, SOLUTION INFILTRATION; PERINEURAL at 07:27

## 2025-03-18 RX ADMIN — SUGAMMADEX 200 MG: 100 INJECTION, SOLUTION INTRAVENOUS at 10:12

## 2025-03-18 RX ADMIN — ATORVASTATIN CALCIUM 40 MG: 40 TABLET, FILM COATED ORAL at 19:48

## 2025-03-18 RX ADMIN — PANTOPRAZOLE SODIUM 40 MG: 40 TABLET, DELAYED RELEASE ORAL at 19:48

## 2025-03-18 RX ADMIN — FENTANYL CITRATE 25 MCG: 50 INJECTION, SOLUTION INTRAMUSCULAR; INTRAVENOUS at 08:46

## 2025-03-18 RX ADMIN — PROPOFOL 150 MG: 10 INJECTION, EMULSION INTRAVENOUS at 07:27

## 2025-03-18 RX ADMIN — SERTRALINE 100 MG: 50 TABLET, FILM COATED ORAL at 17:34

## 2025-03-18 RX ADMIN — ROCURONIUM BROMIDE 50 MG: 50 INJECTION, SOLUTION INTRAVENOUS at 07:27

## 2025-03-18 RX ADMIN — ROCURONIUM BROMIDE 10 MG: 50 INJECTION, SOLUTION INTRAVENOUS at 09:07

## 2025-03-18 RX ADMIN — CEFAZOLIN 2000 MG: 2 INJECTION, POWDER, FOR SOLUTION INTRAVENOUS at 07:37

## 2025-03-18 RX ADMIN — ACETAMINOPHEN 1000 MG: 500 TABLET ORAL at 06:32

## 2025-03-18 RX ADMIN — GABAPENTIN 400 MG: 400 CAPSULE ORAL at 17:27

## 2025-03-18 RX ADMIN — ACETAMINOPHEN 650 MG: 325 TABLET ORAL at 19:48

## 2025-03-18 RX ADMIN — DEXAMETHASONE SODIUM PHOSPHATE 4 MG: 4 INJECTION, SOLUTION INTRAMUSCULAR; INTRAVENOUS at 07:45

## 2025-03-18 RX ADMIN — SODIUM CHLORIDE: 9 INJECTION, SOLUTION INTRAVENOUS at 08:29

## 2025-03-18 RX ADMIN — MIDAZOLAM 2 MG: 1 INJECTION INTRAMUSCULAR; INTRAVENOUS at 06:50

## 2025-03-18 RX ADMIN — CEFAZOLIN 2000 MG: 2 INJECTION, POWDER, FOR SOLUTION INTRAVENOUS at 18:37

## 2025-03-18 RX ADMIN — MONTELUKAST 10 MG: 10 TABLET, FILM COATED ORAL at 19:48

## 2025-03-18 RX ADMIN — ACETAMINOPHEN 650 MG: 325 TABLET ORAL at 17:27

## 2025-03-18 RX ADMIN — ONDANSETRON 4 MG: 2 INJECTION INTRAMUSCULAR; INTRAVENOUS at 07:45

## 2025-03-18 ASSESSMENT — PAIN SCALES - GENERAL
PAINLEVEL_OUTOF10: 0
PAINLEVEL_OUTOF10: 3
PAINLEVEL_OUTOF10: 0

## 2025-03-18 ASSESSMENT — PAIN DESCRIPTION - ORIENTATION: ORIENTATION: RIGHT

## 2025-03-18 ASSESSMENT — LIFESTYLE VARIABLES: SMOKING_STATUS: 1

## 2025-03-18 ASSESSMENT — PAIN DESCRIPTION - DESCRIPTORS: DESCRIPTORS: ACHING

## 2025-03-18 ASSESSMENT — PAIN DESCRIPTION - LOCATION: LOCATION: SHOULDER

## 2025-03-18 NOTE — PROGRESS NOTES
4 Eyes Skin Assessment     NAME:  Thea Wright  YOB: 1955  MEDICAL RECORD NUMBER:  [id number]    The patient is being assessed for  Admission    R shoulder incision.    I agree that at least one RN has performed a thorough Head to Toe Skin Assessment on the patient. ALL assessment sites listed below have been assessed.      Areas assessed by both nurses:    Head, Face, Ears, Shoulders, Back, Chest, Arms, Elbows, Hands, Sacrum. Buttock, Coccyx, Ischium, Legs. Feet and Heels, and Under Medical Devices         Does the Patient have a Wound? No noted wound(s)       Femi Prevention initiated by RN: Yes  Wound Care Orders initiated by RN: No    Pressure Injury (Stage 3,4, Unstageable, DTI, NWPT, and Complex wounds) if present, place Wound referral order by RN under : No    New Ostomies, if present place, Ostomy referral order under : No     Nurse 1 eSignature: Electronically signed by Alisha Junior RN on 3/18/25 at 5:59 PM EDT    **SHARE this note so that the co-signing nurse can place an eSignature**    Nurse 2 eSignature: Electronically signed by Alejandro Dueñas RN on 3/18/25 at 6:40 PM EDT

## 2025-03-18 NOTE — PLAN OF CARE
Ortho Care Plan    Patient had a reverse total shoulder on 3/18.  .    Comorbidities Reviewed Yes   Review completed by Alisha Junior RN      Patient has a past medical history of Asthma, At high risk for falls, CAD (coronary artery disease), Cancer (HCC), Chronic diarrhea, Chronic kidney disease, Clostridium difficile diarrhea, Fibromyalgia, Hemorrhoid, Hyperlipidemia, Hypertension, Kidney stone, Migraines, Osteoarthritis, Sarcoidosis, Traumatic complete tear of right rotator cuff, subsequent encounter, and Traumatic complete tear of right rotator cuff, subsequent encounter.       Commodities addressed via home medications ordered, PRN medications ordered, and education provided      Patient and/or Family's stated Goal of Care this Admission: reduce pain, increase activity tolerance, improve mobility, Understand the effects of joint replacement on commodities, and understand use and effect of cough and deep breath/incentive spirometer prior to discharge.     >>For Ortho and Comorbidity documentation on Education, please see Education Tab.

## 2025-03-18 NOTE — H&P
ORTHOPAEDIC SURGERY INTERVAL H&P    Thea Wright was seen in the preoperative area, where a history and physical examination was reviewed and the patient was examined by me today. There have been no significant clinical changes since the completion of the previous recorded history and physical as well as recent progress notes dated yesterday 3/17/2025 with preoperative PCP clearance note dated 2/19/2025.  Patient had dermatology follow-up and with appropriate treatment of right shoulder/body related rash.  Please see progress note recent as of yesterday for documentation of resolution of rash.  The surgical site was confirmed by the patient and me and the surgical site was marked.     The risks, benefits, and alternatives of the proposed procedure(s) have been explained to the patient (or appropriately confirmed guardian) and understanding was verbalized. Please see outpatient notes for details.  All questions were answered and a signed documented consent has been placed in the patient's chart. The patient wishes to proceed.  On call to the OR.      Electronically signed by: Williams Cooper MD,3/18/2025,7:00 AM         Williams Cooper MD       Orthopaedic Surgery-Sports Medicine      Disclaimer:  This note was dictated with voice recognition software.  Though review and correction are routinely performed, please contact the office/medical records for any errors requiring correction.

## 2025-03-18 NOTE — RT PROTOCOL NOTE
RT Inhaler-Nebulizer Bronchodilator Protocol Note    There is a bronchodilator order in the chart from a provider indicating to follow the RT Bronchodilator Protocol and there is an “Initiate RT Inhaler-Nebulizer Bronchodilator Protocol” order as well (see protocol at bottom of note).    CXR Findings:  No results found.    The findings from the last RT Protocol Assessment were as follows:   History Pulmonary Disease: Chronic pulmonary disease  Respiratory Pattern: Regular pattern and RR 12-20 bpm  Breath Sounds: Clear breath sounds  Cough: Strong, spontaneous, non-productive  Indication for Bronchodilator Therapy: On home bronchodilators  Bronchodilator Assessment Score: 2    Aerosolized bronchodilator medication orders have been revised according to the RT Inhaler-Nebulizer Bronchodilator Protocol below.    Respiratory Therapist to perform RT Therapy Protocol Assessment initially then follow the protocol.  Repeat RT Therapy Protocol Assessment PRN for score 0-3 or on second treatment, BID, and PRN for scores above 3.    No Indications - adjust the frequency to every 6 hours PRN wheezing or bronchospasm, if no treatments needed after 48 hours then discontinue using Per Protocol order mode.     If indication present, adjust the RT bronchodilator orders based on the Bronchodilator Assessment Score as indicated below.  Use Inhaler orders unless patient has one or more of the following: on home nebulizer, not able to hold breath for 10 seconds, is not alert and oriented, cannot activate and use MDI correctly, or respiratory rate 25 breaths per minute or more, then use the equivalent nebulizer order(s) with same Frequency and PRN reasons based on the score.  If a patient is on this medication at home then do not decrease Frequency below that used at home.    0-3 - enter or revise RT bronchodilator order(s) to equivalent RT Bronchodilator order with Frequency of every 4 hours PRN for wheezing or increased work of  breathing using Per Protocol order mode.        4-6 - enter or revise RT Bronchodilator order(s) to two equivalent RT bronchodilator orders with one order with BID Frequency and one order with Frequency of every 4 hours PRN wheezing or increased work of breathing using Per Protocol order mode.        7-10 - enter or revise RT Bronchodilator order(s) to two equivalent RT bronchodilator orders with one order with TID Frequency and one order with Frequency of every 4 hours PRN wheezing or increased work of breathing using Per Protocol order mode.       11-13 - enter or revise RT Bronchodilator order(s) to one equivalent RT bronchodilator order with QID Frequency and an Albuterol order with Frequency of every 4 hours PRN wheezing or increased work of breathing using Per Protocol order mode.      Greater than 13 - enter or revise RT Bronchodilator order(s) to one equivalent RT bronchodilator order with every 4 hours Frequency and an Albuterol order with Frequency of every 2 hours PRN wheezing or increased work of breathing using Per Protocol order mode.     RT to enter RT Home Evaluation for COPD & MDI Assessment order using Per Protocol order mode.    Electronically signed by Yvonne Arroyo RCP on 3/18/2025 at 7:14 PM

## 2025-03-18 NOTE — DISCHARGE INSTRUCTIONS
*** Please contact Debbie Portillo Orthopaedic Nurse Navigator with any questions or concerns after your discharge.*** Mon- Fri 9am- 5pm [phone]. If you have any issues or concerns after 5pm or on the weekend please call your surgeon's office. I will be contacting you in a few days to follow up.    If you need a pain medication refill please contact your surgeon's office.          Total Shoulder Replacement  Discharge Instructions    Antibiotics   Doxycycline 100mg every twelve hours for 7 days post op. Take entire course of antibiotics, even if you feel well.   Blood Clot Prevention  Aspirin 325 mg orally twice daily for 21 days  Wear thigh high compression stockings daily until post op appointment, someone will need to help you put these on.  Surgical Site Care:  No showering for 7 days post op. Leave dressing intact for 7 days. OK to remove dressing on post op day 7, then shower with no dressing on, letting warm soapy water run over the incision. Pat dry and place new dressing over incision. Remove dressing every time you shower and place new dressing when skin is dry. No lotions or creams to be applied to incision area. Keep dressing covered until your follow up appointment  You should be given 3 extra bandages at discharge  Physical Therapy:  No weight bearing on surgical arm  Precautions  Per Physical Therapy handout  Sling to be applied at all times except for hygiene  Pain Medications  You were given ***  Wean off pain medications as you deem appropriate as long as pain is under control  Contact the [facility] Office if you notice any reactions to the medication or need a refill  Be sure to drink plenty of fluids (recommend water) while taking narcotic pain medications to prevent constipation  You may take an over the counter laxative or stool softener as needed to prevent/treat constipation as well, we recommend Senokot S OTC or miralax.    Cold packs/Ice packs/Machine  May be used as much as necessary to  control swelling/inflammation/soreness  Be sure to have a barrier (cloth, clothing, towel) between the site and the ice pack to prevent frostbite  Contact [facility] office if  Increased redness, swelling, drainage of any kind, and/or pain to surgery site.  As well as new onset fevers and or chills.  These could signify an infection.  Arm tenderness to touch as well as increased swelling or redness.  This could signify a clot formation.  Numbness or tingling to an area around the incision site or below the incision site (fingers).  Any rash appears, increased  or new onset nausea/vomiting occur.  This may indicate a reaction to a medication.  Phone # [phone].  [facility] Orthopaedics and Sports Medicine.  Follow up with Surgeon at scheduled appointment time.  Please remove Blue Exparel wrist band on Post Operative Day #4    Please continue to use your Incentive Spirometer at home every hour while awake

## 2025-03-18 NOTE — PROGRESS NOTES
Arrived to [facility] consents verified, NPO since 2330, belongings on cart for admission.

## 2025-03-18 NOTE — PROGRESS NOTES
Time out performed with Dr. Vigil for right interscalene nerve block. Patient placed on telemetry, continuous pulse oximetry, and 3L NC for the procedure. BP cycled every five minutes. Cardiac rhythm is SR. Patient tolerated well, VSS stable throughout. Will continue to monitor VS every 15 minutes post procedure. Side rails in place, call light within reach, once alert patient instructed to press call button if assistance is need, verbalized understanding.

## 2025-03-18 NOTE — ANESTHESIA PROCEDURE NOTES
Peripheral Block    Patient location during procedure: pre-op  Reason for block: post-op pain management and at surgeon's request  Start time: 3/18/2025 6:50 AM  End time: 3/18/2025 7:00 AM  Staffing  Performed: anesthesiologist   Anesthesiologist: Houston Vigil MD  Performed by: Houston Vigil MD  Authorized by: Houston Vigil MD    Preanesthetic Checklist  Completed: patient identified, IV checked, site marked, risks and benefits discussed, surgical/procedural consents, equipment checked, pre-op evaluation, timeout performed, anesthesia consent given, oxygen available, monitors applied/VS acknowledged, fire risk safety assessment completed and verbalized and blood product R/B/A discussed and consented  Peripheral Block   Patient position: supine  Prep: ChloraPrep  Provider prep: sterile gloves  Patient monitoring: cardiac monitor, continuous pulse ox, frequent blood pressure checks, IV access, oxygen and responsive to questions  Block type: Brachial plexus  Interscalene  Laterality: right  Injection technique: single-shot  Guidance: ultrasound guided  Local infiltration: lidocaine  Infiltration strength: 1 %  Local infiltration: lidocaine  Dose: 3 mL    Needle   Needle type: insulated echogenic nerve stimulator needle   Needle gauge: 21 G  Needle localization: ultrasound guidance  Needle length: 5 cm  Assessment   Injection assessment: negative aspiration for heme, no paresthesia on injection, local visualized surrounding nerve on ultrasound and no intravascular symptoms  Slow fractionated injection: yes  Hemodynamics: stable  Outcomes: uncomplicated and patient tolerated procedure well    Medications Administered  midazolam (VERSED) injection 2 mg/2mL - IntraVENous   2 mg - 3/18/2025 6:50:00 AM  BUPivacaine (MARCAINE) PF injection 0.5% - Perineural   20 mL - 3/18/2025 6:50:00 AM

## 2025-03-18 NOTE — ANESTHESIA POSTPROCEDURE EVALUATION
4.0                 02/26/2025 02:37 PM        K                        3.7                 05/20/2022 02:49 PM        CL                       106                 02/26/2025 02:37 PM        CO2                      24                  02/26/2025 02:37 PM        BUN                      7                   02/26/2025 02:37 PM        CREATININE               0.9                 02/26/2025 02:37 PM        GLUCOSE                  106 (H)             02/26/2025 02:37 PM        GLUCOSE                  116                 01/07/2021 12:00 AM   COAGS  Lab Results       Component                Value               Date/Time                  PROTIME                  12.2                02/26/2025 02:37 PM        INR                      0.89                02/26/2025 02:37 PM        APTT                     26.5                02/26/2025 02:37 PM     Intake & Output:  In: 1700 [I.V.:1700]  Out: 50     Nausea & Vomiting:  No    Level of Consciousness:  Awake    Pain Assessment:  Adequate analgesia    Anesthesia Complications:  No apparent anesthetic complications    SUMMARY      Vital signs stable  OK to discharge from Stage I post anesthesia care.  Care transferred from Anesthesiology department on discharge from perioperative area       No notable events documented.

## 2025-03-18 NOTE — PROGRESS NOTES
Occupational Therapy    OT orders received. Per ortho orders, \" Teach how to don and doff sling for hygiene only on POD 1.  No pendulums\". Will begin OT 3/19, POD 1. Thank you.    Ely Calvo, OTR/L

## 2025-03-18 NOTE — ANESTHESIA PRE PROCEDURE
Department of Anesthesiology  Preprocedure Note       Name:  Thea Wright   Age:  69 y.o.  :  1955                                          MRN:  [id number]         Date:  3/18/2025      Surgeon: Surgeon(s):  Williams Cooper MD    Procedure: Procedure(s):  RIGHT REVERSE TOTAL SHOULDER ARTHROPLASTY             DALILA BIOMET NOTE:  INTERSCALENE SINGLE SHOT    Medications prior to admission:   Prior to Admission medications    Medication Sig Start Date End Date Taking? Authorizing Provider   sertraline (ZOLOFT) 100 MG tablet TAKE 1 & 1/2 (ONE & ONE-HALF) TABLETS BY MOUTH ONCE DAILY 3/10/25  Yes Estefania Goff PA   mupirocin (BACTROBAN) 2 % ointment Apply to both nostrils, 3 times daily starting 3 days prior to procedure. 25  Yes Williams Cooper MD   ALPRAZolam (XANAX) 1 MG tablet TAKE 1 TABLET BY MOUTH TWICE DAILY AS NEEDED FOR SLEEP  Patient taking differently: Take 1 tablet by mouth nightly as needed for Sleep. TAKE 1 TABLET BY MOUTH TWICE DAILY AS NEEDED FOR SLEEP 2/19/25 3/19/25 Yes Rudi Caballero DO   valACYclovir (VALTREX) 500 MG tablet Take 1 tablet by mouth once daily  Patient taking differently: Take 1 tablet by mouth daily Take 1 tablet by mouth once daily 25  Yes Harriet Cuellar APRN - CNP   spironolactone (ALDACTONE) 50 MG tablet Take 1 tablet by mouth once daily  Patient taking differently: Take 1 tablet by mouth daily Take 1 tablet by mouth once daily 10/24/24  Yes Tracey Parker PA   BREYNA 160-4.5 MCG/ACT AERO INHALE 2 PUFFS BY MOUTH TWICE DAILY RINSE MOUTH AFTER USE  Patient taking differently: Inhale 2 puffs into the lungs 2 times daily 10/24/24  Yes Lynn Tabor MD   fluticasone (FLONASE) 50 MCG/ACT nasal spray 2 sprays by Each Nostril route daily 10/18/24  Yes Kyleigh Arndt MD   atorvastatin (LIPITOR) 40 MG tablet TAKE 1 TABLET BY MOUTH ONCE DAILY NIGHTLY 10/10/24  Yes Eleanor Anglin APRN - CNP   pantoprazole (PROTONIX) 40 MG tablet TAKE 1 TABLET BY

## 2025-03-18 NOTE — OP NOTE
Right 1 Implanted   STEM HUM 10MM ASHLEY SHLDR CO CHROM COMPHSVE REV GRADY HILDA - [id number]  STEM HUM 10MM ASHLEY SHLDR CO CHROM COMPHSVE REV GRADY HILDA  DALILA [employer]ET [employer]THOP[employer]ICS-WD [id number] Right 1 Implanted   TRAY HUM STD FOR COMPHSVE REV SHLDR SYS - [id number]  TRAY HUM STD FOR COMPHSVE REV SHLDR SYS  DALILA BIOMET ETEX FRANK-WD [id number] Right 1 Implanted   HMRL BEARING 36 MM +3 VLADIMIR - [id number]  HMRL BEARING 36 MM +3 VLADIMIR  DALILA [employer]OMET ORTHOPEDICS-WD [id number] Right 1 Implanted         Drains: * No LDAs found *    Findings:  Infection Present At Time Of Surgery (PATOS) (choose all levels that have infection present):  No infection present  Other Findings: Complete full-thickness supraspinatus infraspinatus tears with rotator cuff tear arthropathy and associated arthrosis superior aspect of the humeral head.  Complete full-thickness subscapularis tear with marked intra-articular biceps tendinosis and partial tearing    Detailed Description of Procedure:     OPERATIVE INDICATIONS:  Patient is a 69 y.o. female who presents at this time with recalcitrant pain and dysfunction secondary to rotator cuff tear arthropathy.  The patient has persistent pain and activity derived symptoms with deterioration in function and limitations in quality of life.  The patient has failed conservative options at this time and presents today for elective reverse total shoulder replacement.  Please see outpatient notes for medical history details and consent discussion of risks and benefits        OPERATIVE PROCEDURE:  This patient was taken to the operating room and placed in a supine position on the operating table.  The patient was correctly identified upon entry and the operative site had been marked by the operating surgeon, and this was verified.  Intravenous antibiotics were administered prior to skin incision.  At this time general endotracheal anesthesia was initiated by the attending anesthesiologist and after successful induction of  anesthetic, the patient was then positioned in the modified upright beach chair position.  The head was placed at neutral, all bony prominences were well padded, and the arm was supported in an articulated arm positioner.  After timeout was called the operative extremity was correctly identified by the operating surgeon and was subsequently sterilely prepped and draped in the usual surgical fashion and the procedure was then commenced.  Patient received Ancef 2 g IV x 1 and TXA 1 g IV x 1 at induction of anesthesia.    A delto-pectoral approach was carried out in the standard fashion.  After an oblique skin incision was made, dissection was carried down to the deep tissues.  Hemostasis was achieved.  The deltopectoral interval was identified and the cephalic vein was mobilized laterally and protected.  The deltoid and pectoralis major muscles were mobilized and a deep self-retaining retractor was placed.  Adhesions were released up into the subacromial space and the rotator cuff tissue was visualized and found to be deficient.   The biceps tendon was tenotomized in standard fashion and tenodesed to the upper border of the pectoralis major tendon.  The conjoint tendon was mobilized medially and protected throughout the case.  The axillary nerve was identified and also protected throughout the procedure. A subscapularis tenotomy/peel was performed to release the residual anterior subscap and anterior capsule with release around the inferior humeral neck with care being taken to avoid damage to surrounding structures.  There is very minimal attachment of the subscapularis at all.  Osteophytes were then removed circumferentially from the humeral head.  With great care taken throughout the case with retractors, the humerus was then positioned for preparation and retractors were placed circumferentially. The humeral canal was entered through the superior head and medullary reaming was carried out up to 10mm at which point

## 2025-03-19 VITALS
HEIGHT: 64 IN | BODY MASS INDEX: 25.86 KG/M2 | HEART RATE: 82 BPM | SYSTOLIC BLOOD PRESSURE: 132 MMHG | TEMPERATURE: 99.1 F | WEIGHT: 151.5 LBS | RESPIRATION RATE: 16 BRPM | OXYGEN SATURATION: 92 % | DIASTOLIC BLOOD PRESSURE: 87 MMHG

## 2025-03-19 LAB
ANION GAP SERPL CALCULATED.3IONS-SCNC: 7 MMOL/L (ref 3–16)
BUN SERPL-MCNC: 7 MG/DL (ref 7–20)
CALCIUM SERPL-MCNC: 8.5 MG/DL (ref 8.3–10.6)
CHLORIDE SERPL-SCNC: 112 MMOL/L (ref 99–110)
CO2 SERPL-SCNC: 25 MMOL/L (ref 21–32)
CREAT SERPL-MCNC: 0.8 MG/DL (ref 0.6–1.2)
DEPRECATED RDW RBC AUTO: 19.1 % (ref 12.4–15.4)
GFR SERPLBLD CREATININE-BSD FMLA CKD-EPI: 79 ML/MIN/{1.73_M2}
GLUCOSE SERPL-MCNC: 114 MG/DL (ref 70–99)
HCT VFR BLD AUTO: 31 % (ref 36–48)
HGB BLD-MCNC: 10.2 G/DL (ref 12–16)
MCH RBC QN AUTO: 31.4 PG (ref 26–34)
MCHC RBC AUTO-ENTMCNC: 32.8 G/DL (ref 31–36)
MCV RBC AUTO: 95.8 FL (ref 80–100)
PLATELET # BLD AUTO: 351 K/UL (ref 135–450)
PMV BLD AUTO: 8.2 FL (ref 5–10.5)
POTASSIUM SERPL-SCNC: 3.5 MMOL/L (ref 3.5–5.1)
RBC # BLD AUTO: 3.24 M/UL (ref 4–5.2)
SODIUM SERPL-SCNC: 144 MMOL/L (ref 136–145)
WBC # BLD AUTO: 20 K/UL (ref 4–11)

## 2025-03-19 PROCEDURE — 99024 POSTOP FOLLOW-UP VISIT: CPT | Performed by: SPECIALIST/TECHNOLOGIST

## 2025-03-19 PROCEDURE — 1200000000 HC SEMI PRIVATE

## 2025-03-19 PROCEDURE — 2580000003 HC RX 258: Performed by: ORTHOPAEDIC SURGERY

## 2025-03-19 PROCEDURE — 6370000000 HC RX 637 (ALT 250 FOR IP): Performed by: ORTHOPAEDIC SURGERY

## 2025-03-19 PROCEDURE — 36415 COLL VENOUS BLD VENIPUNCTURE: CPT

## 2025-03-19 PROCEDURE — 97110 THERAPEUTIC EXERCISES: CPT

## 2025-03-19 PROCEDURE — APPNB60 APP NON BILLABLE TIME 46-60 MINS: Performed by: SPECIALIST/TECHNOLOGIST

## 2025-03-19 PROCEDURE — 80048 BASIC METABOLIC PNL TOTAL CA: CPT

## 2025-03-19 PROCEDURE — 2500000003 HC RX 250 WO HCPCS: Performed by: ORTHOPAEDIC SURGERY

## 2025-03-19 PROCEDURE — 85027 COMPLETE CBC AUTOMATED: CPT

## 2025-03-19 PROCEDURE — 6360000002 HC RX W HCPCS: Performed by: ORTHOPAEDIC SURGERY

## 2025-03-19 PROCEDURE — 6370000000 HC RX 637 (ALT 250 FOR IP): Performed by: SPECIALIST/TECHNOLOGIST

## 2025-03-19 PROCEDURE — 94640 AIRWAY INHALATION TREATMENT: CPT

## 2025-03-19 PROCEDURE — 97535 SELF CARE MNGMENT TRAINING: CPT

## 2025-03-19 PROCEDURE — 97166 OT EVAL MOD COMPLEX 45 MIN: CPT

## 2025-03-19 RX ORDER — DOXYCYCLINE HYCLATE 100 MG
100 TABLET ORAL EVERY 12 HOURS SCHEDULED
Qty: 13 TABLET | Refills: 0 | Status: SHIPPED | OUTPATIENT
Start: 2025-03-19 | End: 2025-03-26

## 2025-03-19 RX ORDER — ACETAMINOPHEN 325 MG/1
650 TABLET ORAL EVERY 6 HOURS
COMMUNITY
Start: 2025-03-19

## 2025-03-19 RX ORDER — ASPIRIN 325 MG
325 TABLET, DELAYED RELEASE (ENTERIC COATED) ORAL DAILY
Qty: 21 TABLET | Refills: 0 | Status: SHIPPED | OUTPATIENT
Start: 2025-03-20 | End: 2025-04-10

## 2025-03-19 RX ORDER — POLYETHYLENE GLYCOL 3350 17 G/17G
17 POWDER, FOR SOLUTION ORAL DAILY
COMMUNITY
Start: 2025-03-20 | End: 2025-04-19

## 2025-03-19 RX ORDER — OXYCODONE HYDROCHLORIDE 5 MG/1
5 TABLET ORAL EVERY 6 HOURS PRN
Qty: 28 TABLET | Refills: 0 | Status: SHIPPED | OUTPATIENT
Start: 2025-03-19 | End: 2025-03-26

## 2025-03-19 RX ADMIN — DOXYCYCLINE HYCLATE 100 MG: 100 TABLET, COATED ORAL at 08:15

## 2025-03-19 RX ADMIN — PROPRANOLOL HYDROCHLORIDE 40 MG: 40 TABLET ORAL at 08:15

## 2025-03-19 RX ADMIN — VALACYCLOVIR HYDROCHLORIDE 500 MG: 500 TABLET, FILM COATED ORAL at 08:25

## 2025-03-19 RX ADMIN — Medication 10 ML: at 08:16

## 2025-03-19 RX ADMIN — ACETAMINOPHEN 650 MG: 325 TABLET ORAL at 08:15

## 2025-03-19 RX ADMIN — CEFAZOLIN 2000 MG: 2 INJECTION, POWDER, FOR SOLUTION INTRAVENOUS at 02:50

## 2025-03-19 RX ADMIN — Medication 2 PUFF: at 09:04

## 2025-03-19 RX ADMIN — GABAPENTIN 400 MG: 400 CAPSULE ORAL at 08:15

## 2025-03-19 RX ADMIN — OXYCODONE HYDROCHLORIDE 5 MG: 5 TABLET ORAL at 08:14

## 2025-03-19 RX ADMIN — ASPIRIN 325 MG: 325 TABLET, COATED ORAL at 08:15

## 2025-03-19 RX ADMIN — SPIRONOLACTONE 50 MG: 25 TABLET ORAL at 08:15

## 2025-03-19 RX ADMIN — TAMSULOSIN HYDROCHLORIDE 0.4 MG: 0.4 CAPSULE ORAL at 08:15

## 2025-03-19 RX ADMIN — PANTOPRAZOLE SODIUM 40 MG: 40 TABLET, DELAYED RELEASE ORAL at 08:15

## 2025-03-19 RX ADMIN — LETROZOLE 2.5 MG: 2.5 TABLET ORAL at 08:25

## 2025-03-19 RX ADMIN — FOLIC ACID 1 MG: 1 TABLET ORAL at 08:15

## 2025-03-19 RX ADMIN — ACETAMINOPHEN 650 MG: 325 TABLET ORAL at 03:32

## 2025-03-19 ASSESSMENT — PAIN SCALES - GENERAL
PAINLEVEL_OUTOF10: 2
PAINLEVEL_OUTOF10: 4
PAINLEVEL_OUTOF10: 2

## 2025-03-19 ASSESSMENT — PAIN DESCRIPTION - LOCATION: LOCATION: SHOULDER

## 2025-03-19 ASSESSMENT — PAIN DESCRIPTION - DESCRIPTORS: DESCRIPTORS: ACHING

## 2025-03-19 ASSESSMENT — PAIN DESCRIPTION - ORIENTATION: ORIENTATION: RIGHT

## 2025-03-19 ASSESSMENT — PAIN - FUNCTIONAL ASSESSMENT: PAIN_FUNCTIONAL_ASSESSMENT: PREVENTS OR INTERFERES SOME ACTIVE ACTIVITIES AND ADLS

## 2025-03-19 ASSESSMENT — PAIN DESCRIPTION - PAIN TYPE: TYPE: SURGICAL PAIN;ACUTE PAIN

## 2025-03-19 NOTE — PROGRESS NOTES
IV removed. All discharge instructions given and all questions answered. Prescriptions picked up from the pharmacy. Patient wheeled down to main entrance with family, no complications. All belongings sent with patient at time of discharge. Pt sent with GATO hose and extra bandages.

## 2025-03-19 NOTE — PLAN OF CARE
Problem: Discharge Planning  Goal: Discharge to home or other facility with appropriate resources  3/19/2025 0019 by Ronna Holly RN  Outcome: Progressing  3/18/2025 2133 by Raffi Hyde RN  Outcome: Progressing  3/18/2025 1807 by Alisha Junior RN  Outcome: Progressing     Problem: Pain  Goal: Verbalizes/displays adequate comfort level or baseline comfort level  3/19/2025 0019 by Ronna Holly RN  Ice applied to shoulder/ pillow placed for comfort  Outcome: Progressing  3/18/2025 2133 by Raffi Hyde RN  Outcome: Progressing  3/18/2025 1807 by Alisha Junior RN  Outcome: Progressing     Problem: Safety - Adult  Goal: Free from fall injury  3/19/2025 0019 by Ronna Holly RN  Pt asked to call for stand by assist to ambulate. RN discussed possible s/e of medications r/t to dizziness.   Outcome: Progressing  3/18/2025 2133 by Raffi Hyde RN  Outcome: Progressing  3/18/2025 1807 by Alisha Junior RN  Outcome: Progressing     Problem: ABCDS Injury Assessment  Goal: Absence of physical injury  3/19/2025 0019 by Ronna Holly RN  Outcome: Progressing  3/18/2025 2133 by Raffi Hyde RN  Outcome: Progressing  3/18/2025 1807 by Alisha Junior RN  Outcome: Progressing     Problem: Skin/Tissue Integrity  Goal: Skin integrity remains intact  Description: 1.  Monitor for areas of redness and/or skin breakdown  2.  Assess vascular access sites hourly  3.  Every 4-6 hours minimum:  Change oxygen saturation probe site  4.  Every 4-6 hours:  If on nasal continuous positive airway pressure, respiratory therapy assess nares and determine need for appliance change or resting period  3/19/2025 0019 by Ronan Holly RN  Outcome: Progressing  3/18/2025 2133 by Raffi Hyde RN  Outcome: Progressing  3/18/2025 1807 by Alisha Junior RN  Outcome: Progressing

## 2025-03-19 NOTE — DISCHARGE INSTR - COC
Continuity of Care Form    Patient Name: Thea Wright   :  1955  MRN:  [id number]    Admit date:  3/18/2025  Discharge date:  ***    Code Status Order: Full Code   Advance Directives:    Date/Time Healthcare Directive Type of Healthcare Directive Copy in Chart Healthcare Agent Appointed Healthcare Agent's Name Healthcare Agent's Phone Number    [phone] No, patient does not have an advance directive for healthcare treatment  --  --  --  --  --             Admitting Physician:  Williams Cooper MD  PCP: Harriet Cuellar, APRN - CNP    Discharging Nurse: ***  Discharging Hospital Unit/Room#: 0525/0525-01  Discharging Unit Phone Number: ***    Emergency Contact:   Extended Emergency Contact Information  Primary Emergency Contact: Carlo Wright  Address: [address] [place] [address]  Home Phone: [phone]  Mobile Phone: [phone]  Relation: Spouse  Secondary Emergency Contact: Clay Wright  Home Phone: [phone]  Mobile Phone: [phone]  Relation: Child    Past Surgical History:  Past Surgical History:   Procedure Laterality Date    ABDOMINAL EXPLORATION SURGERY  2012    REDUCTION OF VULVUS; RIGHT COLECTOMY; SMALL BOWEL RESECTION; INSERTION OF ON Q PAIN BUSTER    BREAST BIOPSY      CARDIAC CATHETERIZATION      CLEAN    CARPAL TUNNEL RELEASE Left 2022    LEFT CARPAL TUNNEL RELEASE performed by Anival Celestin MD at [facility] OR    COLONOSCOPY      normal    COLONOSCOPY N/A 2020    COLON W/ANES. performed by Will King MD at [facility] ENDOSCOPY    CYSTOSCOPY  2011    CYSTOSCOPY N/A 2022    CYSTOSCOPY, BILATERAL RETROGRADE PYELOGRAM performed by Carlo Cesar MD at [facility] OR    ESOPHAGEAL DILATATION  2020    ESOPHAGEAL DILATION PERDOMO performed by Will King MD at [facility] ENDOSCOPY    EYE SURGERY Bilateral 2009    cataract    HYSTERECTOMY (CERVIX STATUS UNKNOWN)  2000    LITHOTRIPSY  2012

## 2025-03-19 NOTE — PLAN OF CARE
Problem: Discharge Planning  Goal: Discharge to home or other facility with appropriate resources  3/18/2025 2133 by Raffi Hyde RN  Outcome: Progressing  3/18/2025 1807 by Alisha Junior RN  Outcome: Progressing     Problem: Pain  Goal: Verbalizes/displays adequate comfort level or baseline comfort level  3/18/2025 2133 by Raffi Hyde RN  Outcome: Progressing  3/18/2025 1807 by Alisha Junior RN  Outcome: Progressing     Problem: Safety - Adult  Goal: Free from fall injury  3/18/2025 2133 by Raffi Hyde RN  Outcome: Progressing  3/18/2025 1807 by Alisha Junior RN  Outcome: Progressing     Problem: ABCDS Injury Assessment  Goal: Absence of physical injury  3/18/2025 2133 by Raffi Hyde RN  Outcome: Progressing  3/18/2025 1807 by Alisha Junior RN  Outcome: Progressing     Problem: Skin/Tissue Integrity  Goal: Skin integrity remains intact  Description: 1.  Monitor for areas of redness and/or skin breakdown  2.  Assess vascular access sites hourly  3.  Every 4-6 hours minimum:  Change oxygen saturation probe site  4.  Every 4-6 hours:  If on nasal continuous positive airway pressure, respiratory therapy assess nares and determine need for appliance change or resting period  3/18/2025 2133 by Raffi Hyde RN  Outcome: Progressing  3/18/2025 1807 by Alisha Junior RN  Outcome: Progressing

## 2025-03-19 NOTE — CARE COORDINATION
Case Management Assessment  Initial Evaluation    Date/Time of Evaluation: 3/19/2025 11:45 AM  Assessment Completed by: STEPHEN ALDANA RN    If patient is discharged prior to next notation, then this note serves as note for discharge by case management.    Patient Name: Thea Wright                   YOB: 1955  Diagnosis: Traumatic complete tear of right rotator cuff, subsequent encounter [S46.011D]  Right bicipital tenosynovitis [M75.21]  S/P reverse total shoulder arthroplasty, right [Z96.611]                   Date / Time: 3/18/2025  5:37 AM    Patient Admission Status: Inpatient   Readmission Risk (Low < 19, Mod (19-27), High > 27): Readmission Risk Score: 11.8    Current PCP: Harriet Cuellar APRN - CNP  PCP verified by CM? Yes    Chart Reviewed: Yes      History Provided by: Patient  Patient Orientation: Alert and Oriented    Patient Cognition: Alert    Hospitalization in the last 30 days (Readmission):  No    If yes, Readmission Assessment in  Navigator will be completed.    Advance Directives:      Code Status: Full Code   Patient's Primary Decision Maker is: Legal Next of Kin    Primary Decision Maker: Carlo Wright - Spouse - [phone]    Primary Decision Maker: Octaviano Wright - Child - [phone]    Secondary Decision Maker: Clay Wright - Child - [phone]    Discharge Planning:    Patient lives with: Spouse/Significant Other Type of Home: House  Primary Care Giver: Self  Patient Support Systems include: Spouse/Significant Other, Children, Family Members   Current Financial resources: Medicare  Current community resources: None  Current services prior to admission: Durable Medical Equipment            Current DME: Cane, Shower Chair, Walker            Type of Home Care services:  None    ADLS  Prior functional level: Independent in ADLs/IADLs  Current functional level: Assistance with the following:, Cooking, Housework, Shopping    PT AM-PAC:   /24  OT AM-PAC: 20  to:     Patient Representative Name:       The Patient and/or Patient Representative Agree with the Discharge Plan?      STEPHEN ALDANA RN  Case Management Department

## 2025-03-19 NOTE — PROGRESS NOTES
Occupational Therapy  Facility/Department: [place] [facility] [id number] - MED SURG/ORTHO  Occupational Therapy Initial Assessment and Treatment Note 1x    Name: Thea Wright  : 1955  MRN: [id number]  Date of Service: 3/19/2025    Discharge Recommendations:  24 hour supervision or assist        Patient Diagnosis(es): There were no encounter diagnoses.  Past Medical History:  has a past medical history of Asthma, At high risk for falls, CAD (coronary artery disease), Cancer (HCC), Chronic diarrhea, Chronic kidney disease, Clostridium difficile diarrhea, Fibromyalgia, Hemorrhoid, Hyperlipidemia, Hypertension, Kidney stone, Migraines, Osteoarthritis, Sarcoidosis, Traumatic complete tear of right rotator cuff, subsequent encounter, and Traumatic complete tear of right rotator cuff, subsequent encounter.  Past Surgical History:  has a past surgical history that includes Splenectomy (); Tonsillectomy (); Hysterectomy (); eye surgery (Bilateral, 2009); Urethra dilation (2011); ovarian cyst removal (); Ovary removal (); Cystoscopy (2011); Lithotripsy (2012); Lithotripsy; Abdominal exploration surgery (2012); Upper gastrointestinal endoscopy (2013); Partial hysterectomy; Upper gastrointestinal endoscopy (2017); Colonoscopy (); Upper gastrointestinal endoscopy (N/A, 2020); Colonoscopy (N/A, 2020); Esophagus dilation (2020); Breast biopsy; US BREAST BIOPSY W LOC DEVICE 1ST LESION RIGHT (Right, 04/15/2021); Cardiac catheterization (); Mastectomy (Bilateral, 2021); Sigmoid Colectomy (Left, 2022); Cystoscopy (N/A, 2022); and Carpal tunnel release (Left, 2022).     Assessment  Assessment: Pt seen for OT eval and tx s/p R RTSR 3/18/25. Pt educated on R TSR precautions and shoulder immobilizer with written instructions.  Pt instructed on R elbow to hand PROM exercises and was able to return demo exercises appropriately. Instructed on  Activity Raw Score: 20  AM-PAC Inpatient ADL T-Scale Score : 42.03  ADL Inpatient CMS 0-100% Score: 38.32  ADL Inpatient CMS G-Code Modifier : CJ    Goals  Short Term Goals  Time Frame for Short Term Goals: 1x  Short Term Goal 1: Pt will don/doff sling with SBA after instruction--goal met  Short Term Goal 2: Perform UE dressing with min A--goal met  Short Term Goal 3: Perform R elbow to hand PROM exer per written instructions independently after instruction--goal met  Short Term Goal 4: Perform functional transfers with SBA--goal met  Patient Goals   Patient goals : \"Learn about this sling\"--Goal met 3/19/25    Therapy Time   Individual Concurrent Group Co-treatment   Time In 0815         Time Out 0905         Minutes 50         Timed Code Treatment Minutes: 40 Minutes (10 min eval)     Montserrat Wright OTR/L

## 2025-03-19 NOTE — PROGRESS NOTES
POST OPERATIVE ORTHOPAEDIC NOTE    DOS: 3/19/2025  PROCEDURES: Right reverse total shoulder replacement    The patient has been recovering. The patient states the pain is 2/10 pain.  The patient has started occupational therapy showed her how to don and doff sling    AVSS  Focused pertinent physical examination of the operative extremity:  Dressing C/D/I  5/5 G IO EPL  SILT Ax R U M  CRT,<2sec    Radiographs: 2 view right shoulder reviewed on 3/18/2025: Satisfactory implant placement.  Negative fracture.    Labs 3/19/2025: WBC 20, hematocrit 31.0, creatinine 0.8 (of note chronically elevated WBC at baseline for patient and associated stress response from surgery expected)      1. S/P reverse total shoulder arthroplasty, right      Assessment and plan: The patient is now postop day 1 status post the above listed procedure and is recovering    .    --Greater than 50% of the time (11/20 minutes) was spent coordinating care and discussing postoperative recovery course  -I had a pleasant discussion with the patient today.  I reviewed with the patient that currently her clinical examination is well-appearing.  -Patient has finished SCIP measure antibiotics per routine.  She will start routine doxycycline 100 mg p.o. twice daily x 1 week given shoulder replacement surgery  -Aspirin 325 mg p.o. daily x 3 weeks starts on postoperative day 1 and patient has already received this for DVT/VTE prophylaxis  -Nonweightbearing and sling use.  -Patient will continue steroid for her sarcoidosis as prescribed.  I recommended she hold off on methotrexate for an additional 2 weeks which will be 2 weeks ahead of time and 2 weeks postoperatively for overall wound healing.  -Continue oral pain medication as needed for overall symptomatic relief.  Continue ice.  -All questions answered to the patient's satisfaction and the patient expressed understanding and agreement with the above listed treatment plan  -Follow up in 10 to 12 days time  for routine postoperative visit.  -Thank you for the clinical consultation and allowing me to participate in the patient's care.      Electronically signed by Williams Cooper MD on 3/19/25 at 10:44 AM EDT         Williams Cooper MD       Orthopaedic Surgery-Sports Medicine      Disclaimer:  This note was dictated with voice recognition software.  Though review and correction are routinely performed, please contact the office/medical records for any errors requiring correction.

## 2025-03-19 NOTE — DISCHARGE SUMMARY
Department of Orthopedic Surgery  Physician Assistant   Discharge Summary    The Thea Wright is a 69 y.o. female underwent right reverse total shoulder replacement procedure without complication.  Thea Wright was admitted to the floor following Her recovery in the PACU.     Discharge Diagnosis  Right reverse total Shoulder Replacement    Current Inpatient Medications    Current Facility-Administered Medications: albuterol sulfate HFA (PROVENTIL;VENTOLIN;PROAIR) 108 (90 Base) MCG/ACT inhaler 2 puff, 2 puff, Inhalation, Q4H PRN  ALPRAZolam (XANAX) tablet 1 mg, 1 mg, Oral, Nightly PRN  atorvastatin (LIPITOR) tablet 40 mg, 40 mg, Oral, Nightly  cyclobenzaprine (FLEXERIL) tablet 10 mg, 10 mg, Oral, TID PRN  difluprednate (DUREZOL) 0.05 % opthalmic emulsion (Patient Supplied), 2 drop, Both Eyes, BID  fluticasone (FLONASE) 50 MCG/ACT nasal spray 2 spray, 2 spray, Each Nostril, Daily PRN  gabapentin (NEURONTIN) capsule 400 mg, 400 mg, Oral, 4x Daily  letrozole (FEMARA) tablet 2.5 mg, 2.5 mg, Oral, Daily  montelukast (SINGULAIR) tablet 10 mg, 10 mg, Oral, Nightly  pantoprazole (PROTONIX) tablet 40 mg, 40 mg, Oral, BID  promethazine (PHENERGAN) tablet 25 mg, 25 mg, Oral, Q6H PRN  promethazine-dextromethorphan (PROMETHAZINE-DM) 6.25-15 MG/5ML syrup 5 mL, 5 mL, Oral, 4x Daily PRN  sertraline (ZOLOFT) tablet 100 mg, 100 mg, Oral, Daily  sodium chloride flush 0.9 % injection 5-40 mL, 5-40 mL, IntraVENous, 2 times per day  sodium chloride flush 0.9 % injection 5-40 mL, 5-40 mL, IntraVENous, PRN  0.9 % sodium chloride infusion, , IntraVENous, PRN  acetaminophen (TYLENOL) tablet 650 mg, 650 mg, Oral, Q6H  oxyCODONE (ROXICODONE) immediate release tablet 5 mg, 5 mg, Oral, Q6H PRN **OR** oxyCODONE (ROXICODONE) immediate release tablet 10 mg, 10 mg, Oral, Q6H PRN  polyethylene glycol (GLYCOLAX) packet 17 g, 17 g, Oral, Daily  aspirin EC tablet 325 mg, 325 mg, Oral, Daily  budesonide-formoterol (SYMBICORT) 160-4.5 MCG/ACT inhaler 2 puff,  number to call should they have any questions or concerns following discharge.      For opioid prescriptions given at discharge the following statement is provided for compliance with [employer] rules.  Patient being given increased dosage/quantity of opoid pain medication in excess of [employer] guidelines which noted a 30 MED daily of opioids due to the fact that he/she has undergone major orthopaedic surgery as outlined in rule 4731-11-13.  Dosages and further duration of the pain medication will be re-evaluated at her post op visit in 2 weeks.  Patient was educated on dosing expectations and limits of prescribing as a result of the new [facility] Medical Board rules enacted August 31, 2017.  Please also note that this is not the initial opoid prescription issued to this patient but a continuation of medication utilized during the hospital admission as noted in the medical record. [place] report has also been utilized to screen for any abuse history or suspicious activity as outlined in [place] Law.  All efforts have been taken to prevent abuse potential and misuse of opioid medications including education, screening, and close clinical follow up.

## 2025-03-20 ENCOUNTER — TELEPHONE (OUTPATIENT)
Dept: FAMILY MEDICINE CLINIC | Age: 70
End: 2025-03-20

## 2025-03-20 ENCOUNTER — TELEPHONE (OUTPATIENT)
Dept: ORTHOPEDIC SURGERY | Age: 70
End: 2025-03-20

## 2025-03-20 NOTE — TELEPHONE ENCOUNTER
Spoke with pt and spouse, having pain management issues.  Reviewed all new medications with patients with instructions on how to take medications. Pt verbalized understanding.   Pt reports eating, drinking, and urinating well.     Incision status: No drainage or redness    Edema/Swelling/Teds: not bad, has been icing frequently.    Pain level and status: Pain is very intense    Use of pain medications: Using marcel every 6 hours and also using tylenol. Recommended pt take pain med every 4 hours for the next 2-3 doses and then go back to every 6 hours.   To call RN back if that does not help pain levels    Blood thinner: ASA 325mg daily with no issues.     Bowels: Using the stool softner-     Home Care Agency active: NA    Outpatient therapy: NA    Do you have all of your medications: Yes    Changes in medications: no    Instructed pt to call Nurse Navigator or surgeon's office with any questions or concerns.     Follow up appointments:    Future Appointments   Date Time Provider Department Center   5/21/2025  1:00 PM Harriet Cuellar, APRN - CNP [facility]       Debbie Portillo   Orthopedic Nurse Navigator   Phone number: [phone]

## 2025-03-20 NOTE — TELEPHONE ENCOUNTER
Care Transitions Initial Follow Up Call    Outreach made within 2 business days of discharge: Yes    Patient: Thea Wright Patient : 1955   MRN: [id number]  Reason for Admission: Right rotator cuff surgery, planned.  Discharge Date: 3/19/25       Spoke with: patient, had a planned surgery and will be following up with the specialist.      Discharge department/facility: [facility] Interactive Patient Contact:  Was patient able to fill all prescriptions: Yes  Was patient instructed to bring all medications to the follow-up visit: Yes  Is patient taking all medications as directed in the discharge summary? Yes  Does patient understand their discharge instructions: Yes  Does patient have questions or concerns that need addressed prior to 7-14 day follow up office visit: no        Scheduled appointment with PCP within 7-14 days    Follow Up  Future Appointments   Date Time Provider Department Center   2025  1:00 PM Harriet Cuellar, APRN - CNP [facility]       Jazz Appiah LPN

## 2025-03-24 ENCOUNTER — CARE COORDINATION (OUTPATIENT)
Dept: CASE MANAGEMENT | Age: 70
End: 2025-03-24

## 2025-03-24 ENCOUNTER — PATIENT MESSAGE (OUTPATIENT)
Dept: ORTHOPEDIC SURGERY | Age: 70
End: 2025-03-24

## 2025-03-24 NOTE — TELEPHONE ENCOUNTER
Attempted to contact Pt. I called the main number on the Pt's chart and her spouse, Carlo, answered. He is listed as a contact I am able to speak to on the Pt's most recent Pt communication form. The first PO appt has been scheduled. Carlo was informed of time, date and location. Carlo expressed understanding

## 2025-03-24 NOTE — CARE COORDINATION
Care Transitions Note    Follow Up Call     Patient Current Location:  Home: [address] [place] [address]    Care Transition Nurse contacted the patient, spouse/partner  by telephone. Verified name and  as identifiers.    Additional needs identified to be addressed with provider   No needs identified                 Method of communication with provider: none.    Care Summary Note: Spoke with patient and spouse.  States that dressing is dry and intact with no drainage. States sling in place.   States will shower tomorrow and remove the dressing. States pain is well controlled. States took tylenol this morning, but still taking pain meds as needed.  States no swelling present, and continues to ice.  States appetite is good. States no complaints with b/b and taking stool softeners.    Plan of care updates since last contact:  Review of patient management of conditions/medications:         Advance Care Planning:   Does patient have an Advance Directive: reviewed during previous call, see note. .    Medication Review:  No changes since last call.     Remote Patient Monitoring:  Offered patient enrollment in the Remote Patient Monitoring (RPM) program for in-home monitoring: Patient is not eligible for RPM program because: patient does not have qualifying diagnosis.    Assessments:  Care Transitions Subsequent and Final Call    Subsequent and Final Calls  Care Transitions Interventions  Other Interventions:              Follow Up Appointment:   Reviewed upcoming appointment(s).  Future Appointments         Provider Specialty Dept Phone    3/28/2025 11:30 AM Williams Cooper MD Orthopedic Surgery [phone]    2025 1:00 PM Harriet Cuellar, APRN - CNP Family Medicine [phone]            Care Transition Nurse provided contact information.  Plan for follow-up call in 6-10 days based on severity of symptoms and risk factors.  Plan for next call:  FU with ortho visit 3/28/25, incision

## 2025-03-26 ENCOUNTER — TELEPHONE (OUTPATIENT)
Dept: ORTHOPEDIC SURGERY | Age: 70
End: 2025-03-26

## 2025-03-26 DIAGNOSIS — Z47.89 ORTHOPEDIC AFTERCARE: Primary | ICD-10-CM

## 2025-03-26 RX ORDER — OXYCODONE HYDROCHLORIDE 5 MG/1
5 TABLET ORAL EVERY 6 HOURS PRN
Qty: 20 TABLET | Refills: 0 | Status: SHIPPED | OUTPATIENT
Start: 2025-03-26 | End: 2025-03-31

## 2025-03-26 NOTE — TELEPHONE ENCOUNTER
Spoke with pt, doing pretty good. Pain management has been much better. Is ready for refill.  Was able to have a shower yesterday and feels great.     Incision status: No drainage or redness    Edema/Swelling/Teds: Minimal swelling    Pain level and status: managing much     Use of pain medications: Using 1 every 10-12 hours    Blood thinner: Using asa 325mg daily     Bowels: Moving fine at this point    Home Care Agency active: NA    Outpatient therapy: NA    Do you have all of your medications: Yes- need refill of pain meds.     Changes in medications: no    Instructed pt to call Nurse Navigator or surgeon's office with any questions or concerns.     Signed off from pt.  Instructed pt to call RN or Surgeon's office with any issues or concerns.    Follow up appointments:    Future Appointments   Date Time Provider Department Center   3/28/2025 11:30 AM Williams Cooper MD AND ORTHO [facility]   5/21/2025  1:00 PM Harriet Cuellar, APRN - CNP [facility] DEP       Debbie Portillo   Orthopedic Nurse Navigator   Phone number: [phone]

## 2025-03-28 ENCOUNTER — OFFICE VISIT (OUTPATIENT)
Dept: ORTHOPEDIC SURGERY | Age: 70
End: 2025-03-28

## 2025-03-28 VITALS — HEIGHT: 64 IN | WEIGHT: 151 LBS | BODY MASS INDEX: 25.78 KG/M2

## 2025-03-28 DIAGNOSIS — Z47.89 ORTHOPEDIC AFTERCARE: Primary | ICD-10-CM

## 2025-03-28 PROCEDURE — 99024 POSTOP FOLLOW-UP VISIT: CPT | Performed by: ORTHOPAEDIC SURGERY

## 2025-03-28 NOTE — PROGRESS NOTES
POST OPERATIVE ORTHOPAEDIC NOTE    DOS: 3/18/2025  PROCEDURES: Right reverse total shoulder replacement    The patient has been recovering. The patient states the pain is 0-3/10 pain.  The patient has not started PT. patient has been nonweightbearing in the sling    Focused pertinent physical examination of the operative extremity:  Wound C/D/I, well healing surgical incision  No erythema or drainage  Skin intact throughout  5/5 D B T G IO EPL  SILT Ax, R, U, M  +2 radial pulse    Radiographs: 4 view right shoulder 3/28/2025: Reduced glenosphere.  Negative fracture.  Satisfactory overall implant alignment (although slight obliquity difference on Velpeau view)     Diagnosis Orders   1. Orthopedic aftercare  XR SHOULDER RIGHT (MIN 2 VIEWS)        Assessment and plan: The patient is now 10 days status post the above listed procedure and is recovering    .    --Greater than 50% of the time (11/20 minutes) was spent coordinating care and discussing postoperative recovery course  -I had a pleasant discussion with the patient today and reviewed with her intraoperative procedures performed.  -Patient was educated to scar tissue massage with Steri-Strips were placed  -Nonweightbearing and sling use x 6 weeks  -Continue aspirin 325 mg p.o. daily x 3 weeks in total for DVT/VTE prophylaxis  -Patient finished postoperative antibiotics per routine doxycycline prescription  -OTC Tylenol per bottle MO discomfort and patient will wean off narcotic with most recent last prescription placed previously  -Formal physical therapy starts next week to work on LOCO scapular range of motion with gentle progressions and following shoulder replacement protocol  -All questions answered to the patient's satisfaction and the patient expressed understanding and agreement with the above listed treatment plan  -Follow up in 4 weeks time with 4 view right shoulder 3/20/2025  -Thank you for the clinical consultation and allowing me to participate in

## 2025-03-30 DIAGNOSIS — F51.01 PRIMARY INSOMNIA: ICD-10-CM

## 2025-03-31 RX ORDER — ALPRAZOLAM 1 MG/1
TABLET ORAL
Qty: 60 TABLET | Refills: 0 | Status: SHIPPED | OUTPATIENT
Start: 2025-03-31 | End: 2025-04-30

## 2025-03-31 NOTE — TELEPHONE ENCOUNTER
Refill Request - Controlled Substance    CONFIRM preferred pharmacy with the patient.    If Mail Order Rx - Pend for 90 day refill.        Last Seen Department: 2/19/2025  Last Seen by PCP: 1/20/2025    Last Written: 12/12/2024 60 tab 0 refills    Last UDS: 3/18/2024    Med Agreement Signed On: 1/4/2017    If no future appointment scheduled:  Review the last OV with PCP and review information for follow-up visit,  Route STAFF MESSAGE with patient name to the  Pool for scheduling with the following information:            -  Timing of next visit           -  Visit type ie Physical, OV, etc           -  Diagnoses/Reason ie. COPD, HTN - Do not use MEDICATION, Follow-up or CHECK UP - Give reason for visit        Next Appointment:   Future Appointments   Date Time Provider Department Center   5/1/2025  1:45 PM Williams Cooper MD AND ORTHO [facility]   5/21/2025  1:00 PM Harriet Cuellar APRN - CNP [facility] DEP       Message sent to  to schedule appt with patient?  NO      Requested Prescriptions     Pending Prescriptions Disp Refills    ALPRAZolam (XANAX) 1 MG tablet [Pharmacy Med Name: ALPRAZolam 1 MG Oral Tablet] 60 tablet 0     Sig: TAKE 1 TABLET BY MOUTH TWICE DAILY AS NEEDED FOR SLEEP

## 2025-04-02 ENCOUNTER — HOSPITAL ENCOUNTER (OUTPATIENT)
Dept: PHYSICAL THERAPY | Age: 70
Setting detail: THERAPIES SERIES
Discharge: HOME | End: 2025-04-02
Payer: MEDICARE

## 2025-04-02 DIAGNOSIS — M25.511 ACUTE PAIN OF RIGHT SHOULDER: Primary | ICD-10-CM

## 2025-04-02 DIAGNOSIS — M25.611 SHOULDER STIFFNESS, RIGHT: ICD-10-CM

## 2025-04-02 DIAGNOSIS — R29.898 WEAKNESS OF RIGHT SHOULDER: ICD-10-CM

## 2025-04-02 PROBLEM — K56.699 COLONIC STRICTURE (HCC): Status: RESOLVED | Noted: 2021-02-26 | Resolved: 2025-04-02

## 2025-04-02 PROCEDURE — 97110 THERAPEUTIC EXERCISES: CPT

## 2025-04-02 PROCEDURE — 97140 MANUAL THERAPY 1/> REGIONS: CPT

## 2025-04-02 PROCEDURE — 97161 PT EVAL LOW COMPLEX 20 MIN: CPT

## 2025-04-02 NOTE — PLAN OF CARE
[facility]- Outpatient Rehabilitation and Therapy [address], [place] [address] office: [phone] fax: [phone]     Physical Therapy Initial Evaluation Certification      Dear Williams Cooper MD,    We had the pleasure of evaluating the following patient for physical therapy services at [facility].  A summary of our findings can be found in the initial assessment below.  This includes our plan of care.  If you have any questions or concerns regarding these findings, please do not hesitate to contact me at the office phone number listed above.  Thank you for the referral.     Physician Signature:_______________________________Date:__________________  By signing above (or electronic signature), therapist’s plan is approved by physician       Physical Therapy: TREATMENT/PROGRESS NOTE   Patient: Thea Wright (69 y.o. female)   Examination Date: 2025   :  1955 MRN: [id number]   Visit #: 1   Insurance Allowable Auth Needed   MC + supp []Yes    [x]No    Insurance: Payor: MEDICARE / Plan: MEDICARE PART A AND B / Product Type: *No Product type* /   Insurance ID: [id number] - (Medicare)  Secondary Insurance (if applicable): HUMANA   Treatment Diagnosis:     ICD-10-CM    1. Acute pain of right shoulder  M25.511       2. Shoulder stiffness, right  M25.611       3. Weakness of right shoulder  R29.898          Medical Diagnosis:  Right shoulder pain [M25.511]   Referring Physician: Williams Cooper MD  PCP: Harriet Cuellar, APRN - CNP       Plan of care signed (Y/N):     Date of Patient follow up with Physician:      Progress Report/POC: EVAL today  Progress note due: 2025 (OR 10 visits /OR AUTH LIMITS, whichever is less)  POC update due:  2025                    Medical History:  Comorbidities:  Cancer/Tumor  Osteoarthritis  Anxiety  Depression  Relevant Medical History: double masectomy

## 2025-04-04 ENCOUNTER — CARE COORDINATION (OUTPATIENT)
Dept: CASE MANAGEMENT | Age: 70
End: 2025-04-04

## 2025-04-04 NOTE — CARE COORDINATION
Care Transitions Note    Final Call     Patient Current Location:  Home: [address] [place] [address]    Care Transition Nurse contacted the spouse/partner  by telephone. Verified name and  as identifiers.    Patient graduated from the Care Transitions program on 2025.  Patient/family verbalizes confidence in the ability to self-manage at this time..      Advance Care Planning:   Does patient have an Advance Directive: reviewed during previous call, see note. .    Handoff:   Patient was not referred to the ACM team due to no additional needs identified.       Care Summary Note: Carlo states Thea's shoulder is doing well. They are seeing the ortho office on Monday for leg & hip pain.    Assessments:  Care Transitions Subsequent and Final Call    Subsequent and Final Calls  Care Transitions Interventions  Other Interventions:              Upcoming Appointments:    Future Appointments         Provider Specialty Dept Phone    2025 1:30 PM Williams Cooper MD Orthopedic Surgery [phone]    2025 1:40 PM June Villegas PT Physical Therapy [phone]    2025 1:00 PM Alfonso Villegas, PT Physical Therapy [phone]    4/15/2025 1:00 PM June Villegas PT Physical Therapy [phone]    2025 1:40 PM Alfonso Villegas PT Physical Therapy [phone]    2025 1:45 PM Williams Cooper MD Orthopedic Surgery [phone]    2025 1:00 PM Harriet Cuellar, APRN - [facility] Family Medicine [phone]            Patient has agreed to contact primary care provider and/or specialist for any further questions, concerns, or needs.    Rebecca Cha RN BSN  Care Transition Nurse  [phone]

## 2025-04-07 ENCOUNTER — OFFICE VISIT (OUTPATIENT)
Dept: ORTHOPEDIC SURGERY | Age: 70
End: 2025-04-07
Payer: MEDICARE

## 2025-04-07 DIAGNOSIS — M25.552 LEFT HIP PAIN: ICD-10-CM

## 2025-04-07 DIAGNOSIS — M16.12 PRIMARY OSTEOARTHRITIS OF LEFT HIP: ICD-10-CM

## 2025-04-07 DIAGNOSIS — M17.12 PRIMARY OSTEOARTHRITIS OF LEFT KNEE: Primary | ICD-10-CM

## 2025-04-07 DIAGNOSIS — M25.562 LEFT KNEE PAIN, UNSPECIFIED CHRONICITY: ICD-10-CM

## 2025-04-07 PROCEDURE — G8428 CUR MEDS NOT DOCUMENT: HCPCS | Performed by: ORTHOPAEDIC SURGERY

## 2025-04-07 PROCEDURE — 99214 OFFICE O/P EST MOD 30 MIN: CPT | Performed by: ORTHOPAEDIC SURGERY

## 2025-04-07 PROCEDURE — 1123F ACP DISCUSS/DSCN MKR DOCD: CPT | Performed by: ORTHOPAEDIC SURGERY

## 2025-04-07 PROCEDURE — 1090F PRES/ABSN URINE INCON ASSESS: CPT | Performed by: ORTHOPAEDIC SURGERY

## 2025-04-07 PROCEDURE — 1111F DSCHRG MED/CURRENT MED MERGE: CPT | Performed by: ORTHOPAEDIC SURGERY

## 2025-04-07 PROCEDURE — G8399 PT W/DXA RESULTS DOCUMENT: HCPCS | Performed by: ORTHOPAEDIC SURGERY

## 2025-04-07 PROCEDURE — 4004F PT TOBACCO SCREEN RCVD TLK: CPT | Performed by: ORTHOPAEDIC SURGERY

## 2025-04-07 PROCEDURE — 3017F COLORECTAL CA SCREEN DOC REV: CPT | Performed by: ORTHOPAEDIC SURGERY

## 2025-04-07 PROCEDURE — 1125F AMNT PAIN NOTED PAIN PRSNT: CPT | Performed by: ORTHOPAEDIC SURGERY

## 2025-04-07 PROCEDURE — 20610 DRAIN/INJ JOINT/BURSA W/O US: CPT | Performed by: ORTHOPAEDIC SURGERY

## 2025-04-07 PROCEDURE — G8417 CALC BMI ABV UP PARAM F/U: HCPCS | Performed by: ORTHOPAEDIC SURGERY

## 2025-04-07 RX ORDER — MELOXICAM 15 MG/1
15 TABLET ORAL PRN
Qty: 30 TABLET | Refills: 0 | Status: SHIPPED | OUTPATIENT
Start: 2025-04-07

## 2025-04-07 RX ORDER — ROPIVACAINE HYDROCHLORIDE 5 MG/ML
4 INJECTION, SOLUTION EPIDURAL; INFILTRATION; PERINEURAL ONCE
Status: COMPLETED | OUTPATIENT
Start: 2025-04-07 | End: 2025-04-07

## 2025-04-07 RX ORDER — TRIAMCINOLONE ACETONIDE 40 MG/ML
40 INJECTION, SUSPENSION INTRA-ARTICULAR; INTRAMUSCULAR ONCE
Status: COMPLETED | OUTPATIENT
Start: 2025-04-07 | End: 2025-04-07

## 2025-04-07 RX ADMIN — ROPIVACAINE HYDROCHLORIDE 4 ML: 5 INJECTION, SOLUTION EPIDURAL; INFILTRATION; PERINEURAL at 14:09

## 2025-04-07 RX ADMIN — TRIAMCINOLONE ACETONIDE 40 MG: 40 INJECTION, SUSPENSION INTRA-ARTICULAR; INTRAMUSCULAR at 14:10

## 2025-04-07 NOTE — PROGRESS NOTES
discussion was had detailing the potential increased risk of infection and potential increase in FSBG and to monitor FSBG and adjust medications as needed.  -After sterile prep of the left knee, a 5cc corticosteroid injection (4cc ropivicaine and 1cc kenolog 40) was administered into the left knee intra-articular space.  The patient tolerated the procedure well and started to have immediate improvement in pain/symptoms.  -Potential consideration for referral for left hip osteoarthritis related treatments as indicated.  Patient will see how she does over the next Darby weeks.  -All questions answered to the patient's satisfaction and the patient expressed understanding and agreement with the above listed treatment plan  -Follow up in as needed with regard to her left knee/hip related condition however will follow-up for her routine 6-week postop visit for her right shoulder she continues to go to physical therapy twice week following reverse total shoulder replacement protocol  -Thank you for the clinical consultation and allowing me to participate in the patient's care.      Electronically signed by Williams Cooper MD on 4/7/25 at 2:04 PM EDT         Williams Cooper MD       Orthopaedic Surgery-Sports Medicine    Disclaimer:  This note was dictated with voice recognition software.  Though review and correction are routinely performed, please contact the office/medical records for any errors requiring correction.

## 2025-04-08 ENCOUNTER — PATIENT MESSAGE (OUTPATIENT)
Dept: ORTHOPEDIC SURGERY | Age: 70
End: 2025-04-08

## 2025-04-08 ENCOUNTER — HOSPITAL ENCOUNTER (OUTPATIENT)
Dept: PHYSICAL THERAPY | Age: 70
Setting detail: THERAPIES SERIES
End: 2025-04-08
Payer: MEDICARE

## 2025-04-08 NOTE — TELEPHONE ENCOUNTER
Refill Request     CONFIRM preferred pharmacy with the patient.    If Mail Order Rx - Pend for 90 day refill.      Last Seen: Last Seen Department: 2/19/2025  Last Seen by PCP: 9/18/2024    Last Written: 9/18/24  90 with 1 refill    If no future appointment scheduled:  Review the last OV with PCP and review information for follow-up visit,  Route STAFF MESSAGE with patient name to the  Pool for scheduling with the following information:            -  Timing of next visit           -  Visit type ie Physical, OV, etc           -  Diagnoses/Reason ie. COPD, HTN - Do not use MEDICATION, Follow-up or CHECK UP - Give reason for visit      Next Appointment:   Future Appointments   Date Time Provider Department Center   4/8/2025  1:40 PM June Villegas PT [facility] EG PT [facility]   4/11/2025  1:00 PM Alfonso Villegas PT [facility] EG PT [facility]   4/15/2025  1:00 PM June Villegas PT [facility]   4/17/2025  1:40 PM Alfonso Villegas PT [facility] PT [facility]   5/1/2025  1:45 PM Williams Cooper MD AND ORTHO [facility]   5/21/2025  1:00 PM Harriet Cuellar APRN - CNP [facility] DEP       Message sent to  to schedule appt with patient?  NO      Requested Prescriptions     Pending Prescriptions Disp Refills    propranolol (INDERAL) 40 MG tablet [Pharmacy Med Name: Propranolol HCl 40 MG Oral Tablet] 90 tablet 0     Sig: Take 1 tablet by mouth once daily

## 2025-04-08 NOTE — TELEPHONE ENCOUNTER
Spoke with the Pt's , Carlo. He is listed as a contact that I am able to speak with on the Pt's most recent Pt communication form. Spoke with Dr. Cooper and recommended the use of ice/heat, Tylenol OTC, elevation, gentle compression. Pt's  asked is she may wear a brace. I informed him that she may.     I also informed the Pt's  that the steri-strips may be discontinued on 4/15/25 as that is a month post-operative. Pt's  expressed understanding

## 2025-04-09 RX ORDER — PROPRANOLOL HYDROCHLORIDE 40 MG/1
40 TABLET ORAL DAILY
Qty: 90 TABLET | Refills: 0 | Status: SHIPPED | OUTPATIENT
Start: 2025-04-09

## 2025-04-11 ENCOUNTER — HOSPITAL ENCOUNTER (OUTPATIENT)
Dept: PHYSICAL THERAPY | Age: 70
Setting detail: THERAPIES SERIES
Discharge: HOME | End: 2025-04-11
Payer: MEDICARE

## 2025-04-11 PROCEDURE — 97110 THERAPEUTIC EXERCISES: CPT

## 2025-04-11 PROCEDURE — 97140 MANUAL THERAPY 1/> REGIONS: CPT

## 2025-04-11 NOTE — PLAN OF CARE
[facility]- Outpatient Rehabilitation and Therapy [address], [place] [address] office: [phone] fax: [phone]     Physical Therapy: TREATMENT/PROGRESS NOTE   Patient: Thea Wright (69 y.o. female)   Examination Date: 2025   :  1955 MRN: [id number]   Visit #: 2   Insurance Allowable Auth Needed   MC + supp []Yes    [x]No    Insurance: Payor: MEDICARE / Plan: MEDICARE PART A AND B / Product Type: *No Product type* /   Insurance ID: [id number] - (Medicare)  Secondary Insurance (if applicable): HUMANA   Treatment Diagnosis:     ICD-10-CM    1. Acute pain of right shoulder  M25.511       2. Shoulder stiffness, right  M25.611       3. Weakness of right shoulder  R29.898          Medical Diagnosis:  Right shoulder pain [M25.511]   Referring Physician: Williams Cooper MD  PCP: Harriet Cuellar APRN - CNP       Plan of care signed (Y/N):     Date of Patient follow up with Physician:      Progress Report/POC: NO  Progress note due: 2025 (OR 10 visits /OR AUTH LIMITS, whichever is less)  POC update due:  2025                    Medical History:  Comorbidities:  Cancer/Tumor  Osteoarthritis  Anxiety  Depression  Relevant Medical History: double masectomy                                         Precautions/ Contra-indications:           Latex allergy:  NO  Pacemaker:    NO  Contraindications for Manipulation: None  Date of Surgery: 3/18/2025 reverse shoulder                        Preferred Language for Healthcare:   [x] English       [] other:    SUBJECTIVE EXAMINATION     Patient stated complaint: Pt reports shoulder doing fine, no complaints.      From eval: Pt reports doing well after reverse shoulder 3/18/2025. Adhering to sling guidelines and not using arm. Done with pain meds.    OBJECTIVE EXAMINATION      Test used Initial score  2025   Pain Summary VAS 0-7 0   Functional questionnaire Quick DASH 82%    ROM

## 2025-04-13 DIAGNOSIS — R10.13 EPIGASTRIC PAIN: ICD-10-CM

## 2025-04-13 NOTE — TELEPHONE ENCOUNTER
Refill Request     CONFIRM preferred pharmacy with the patient.    If Mail Order Rx - Pend for 90 day refill.      Last Seen: Last Seen Department: 2/19/2025  Last Seen by PCP: 10/18/2024    Last Written: 9/23/2024 180 tab 1 refills    If no future appointment scheduled:  Review the last OV with PCP and review information for follow-up visit,  Route STAFF MESSAGE with patient name to the  Pool for scheduling with the following information:            -  Timing of next visit           -  Visit type ie Physical, OV, etc           -  Diagnoses/Reason ie. COPD, HTN - Do not use MEDICATION, Follow-up or CHECK UP - Give reason for visit      Next Appointment:   Future Appointments   Date Time Provider Department Center   4/15/2025  1:00 PM [facility]   4/17/2025  1:40 PM [facility]   5/1/2025  1:45 PM Williams Cooper MD AND ORTHO [facility]   5/21/2025  1:00 PM Harriet Cuellar APRN - CNP [facility] DEP       Message sent to  to schedule appt with patient?  NO      Requested Prescriptions     Pending Prescriptions Disp Refills    pantoprazole (PROTONIX) 40 MG tablet [Pharmacy Med Name: Pantoprazole Sodium 40 MG Oral Tablet Delayed Release] 180 tablet 0     Sig: TAKE 1 TABLET BY MOUTH TWICE DAILY BEFORE MEAL(S)

## 2025-04-14 ENCOUNTER — HOSPITAL ENCOUNTER (OUTPATIENT)
Age: 70
Setting detail: OUTPATIENT SURGERY
Discharge: HOME | End: 2025-04-14
Attending: INTERNAL MEDICINE | Admitting: INTERNAL MEDICINE
Payer: MEDICARE

## 2025-04-14 ENCOUNTER — ANESTHESIA (OUTPATIENT)
Dept: ENDOSCOPY | Age: 70
End: 2025-04-14
Payer: MEDICARE

## 2025-04-14 ENCOUNTER — ANESTHESIA EVENT (OUTPATIENT)
Dept: ENDOSCOPY | Age: 70
End: 2025-04-14
Payer: MEDICARE

## 2025-04-14 VITALS
HEIGHT: 64 IN | TEMPERATURE: 98.1 F | HEART RATE: 63 BPM | DIASTOLIC BLOOD PRESSURE: 84 MMHG | OXYGEN SATURATION: 98 % | SYSTOLIC BLOOD PRESSURE: 142 MMHG | WEIGHT: 150 LBS | BODY MASS INDEX: 25.61 KG/M2 | RESPIRATION RATE: 16 BRPM

## 2025-04-14 DIAGNOSIS — R13.19 ESOPHAGEAL DYSPHAGIA: ICD-10-CM

## 2025-04-14 DIAGNOSIS — R13.12 OROPHARYNGEAL DYSPHAGIA: Primary | ICD-10-CM

## 2025-04-14 PROCEDURE — 2580000003 HC RX 258: Performed by: ANESTHESIOLOGY

## 2025-04-14 PROCEDURE — 3609015300 HC ESOPHAGEAL DILATION MALONEY: Performed by: INTERNAL MEDICINE

## 2025-04-14 PROCEDURE — 3700000000 HC ANESTHESIA ATTENDED CARE: Performed by: INTERNAL MEDICINE

## 2025-04-14 PROCEDURE — 7100000011 HC PHASE II RECOVERY - ADDTL 15 MIN: Performed by: INTERNAL MEDICINE

## 2025-04-14 PROCEDURE — 3609012400 HC EGD TRANSORAL BIOPSY SINGLE/MULTIPLE: Performed by: INTERNAL MEDICINE

## 2025-04-14 PROCEDURE — 2709999900 HC NON-CHARGEABLE SUPPLY: Performed by: INTERNAL MEDICINE

## 2025-04-14 PROCEDURE — 3700000001 HC ADD 15 MINUTES (ANESTHESIA): Performed by: INTERNAL MEDICINE

## 2025-04-14 PROCEDURE — 88305 TISSUE EXAM BY PATHOLOGIST: CPT

## 2025-04-14 PROCEDURE — 6360000002 HC RX W HCPCS: Performed by: NURSE ANESTHETIST, CERTIFIED REGISTERED

## 2025-04-14 PROCEDURE — 7100000010 HC PHASE II RECOVERY - FIRST 15 MIN: Performed by: INTERNAL MEDICINE

## 2025-04-14 RX ORDER — SODIUM CHLORIDE 0.9 % (FLUSH) 0.9 %
5-40 SYRINGE (ML) INJECTION PRN
Status: DISCONTINUED | OUTPATIENT
Start: 2025-04-14 | End: 2025-04-14 | Stop reason: HOSPADM

## 2025-04-14 RX ORDER — SODIUM CHLORIDE 0.9 % (FLUSH) 0.9 %
5-40 SYRINGE (ML) INJECTION EVERY 12 HOURS SCHEDULED
Status: DISCONTINUED | OUTPATIENT
Start: 2025-04-14 | End: 2025-04-14 | Stop reason: HOSPADM

## 2025-04-14 RX ORDER — SODIUM CHLORIDE 9 MG/ML
INJECTION, SOLUTION INTRAVENOUS PRN
Status: DISCONTINUED | OUTPATIENT
Start: 2025-04-14 | End: 2025-04-14 | Stop reason: HOSPADM

## 2025-04-14 RX ORDER — PANTOPRAZOLE SODIUM 40 MG/1
TABLET, DELAYED RELEASE ORAL
Qty: 180 TABLET | Refills: 1 | Status: SHIPPED | OUTPATIENT
Start: 2025-04-14

## 2025-04-14 RX ORDER — LIDOCAINE HYDROCHLORIDE 10 MG/ML
0.3 INJECTION, SOLUTION EPIDURAL; INFILTRATION; INTRACAUDAL; PERINEURAL
Status: DISCONTINUED | OUTPATIENT
Start: 2025-04-14 | End: 2025-04-14 | Stop reason: HOSPADM

## 2025-04-14 RX ORDER — SODIUM CHLORIDE, SODIUM LACTATE, POTASSIUM CHLORIDE, CALCIUM CHLORIDE 600; 310; 30; 20 MG/100ML; MG/100ML; MG/100ML; MG/100ML
INJECTION, SOLUTION INTRAVENOUS CONTINUOUS
Status: DISCONTINUED | OUTPATIENT
Start: 2025-04-14 | End: 2025-04-14 | Stop reason: HOSPADM

## 2025-04-14 RX ORDER — LIDOCAINE HYDROCHLORIDE 20 MG/ML
INJECTION, SOLUTION INFILTRATION; PERINEURAL
Status: DISCONTINUED | OUTPATIENT
Start: 2025-04-14 | End: 2025-04-14 | Stop reason: SDUPTHER

## 2025-04-14 RX ORDER — PROPOFOL 10 MG/ML
INJECTION, EMULSION INTRAVENOUS
Status: DISCONTINUED | OUTPATIENT
Start: 2025-04-14 | End: 2025-04-14 | Stop reason: SDUPTHER

## 2025-04-14 RX ADMIN — SODIUM CHLORIDE, POTASSIUM CHLORIDE, SODIUM LACTATE AND CALCIUM CHLORIDE: 600; 310; 30; 20 INJECTION, SOLUTION INTRAVENOUS at 10:57

## 2025-04-14 RX ADMIN — LIDOCAINE HYDROCHLORIDE 100 MG: 20 INJECTION, SOLUTION INFILTRATION; PERINEURAL at 10:57

## 2025-04-14 RX ADMIN — PROPOFOL 250 MG: 10 INJECTION, EMULSION INTRAVENOUS at 10:57

## 2025-04-14 ASSESSMENT — PAIN - FUNCTIONAL ASSESSMENT
PAIN_FUNCTIONAL_ASSESSMENT: PREVENTS OR INTERFERES SOME ACTIVE ACTIVITIES AND ADLS
PAIN_FUNCTIONAL_ASSESSMENT: 0-10

## 2025-04-14 ASSESSMENT — PAIN DESCRIPTION - DESCRIPTORS: DESCRIPTORS: BURNING

## 2025-04-14 NOTE — PROGRESS NOTES
.1.  Do not eat or drink anything after 12 midnight prior to surgery.  This includes no water, chewing gum,mints or chewing tobacco, except for bowel prep per MD.  2.  Take pills with a small sip of water on the morning of surgery.  3.  Aspirin, Ibuprofen, Advil, Naproxen, Vitamin E and other Anti-inflammatory products should be stopped as directed by your physician.  4.  Check with your doctor regarding stopping Plavix, Coumadin, Lovenox, or other blood thinners.  5.  Do not smoke and do not drink alcoholic beverages 24 hours prior to surgery. This includes NA Beer.  6.  You may brush your teeth and gargle the morning of surgery.  DO NOT SWALLOW WATER.  7.  You MUST make arrangements for a responsible adult to take you home after your procedure.  You will not be allowed to leave alone or drive yourself home.  It is strongly suggested someone   stay with you the first 24 hours.  Your procedure will be cancelled if you do not have a ride home.  8.  A parent/legal guardian must accompany a child scheduled for procedure and plan to stay at the hospital until the child is discharged.  Please do not bring other children with you.  9.  Please wear simple, loose fitting clothing to the hospital.  Do not bring valuables ( money, credit cards, checkbooks, etc.) 10.  Do not wear any jewelry or piercing on the day of surgery.  All body piercing jewelry must be removed.  11.  If you have dentures, they will be removed before going to the OR; we will provide you a container.  If you wear contact lenses or glasses, they will be removed; please bring a case for them.  12.  Notify your Physician if you develop any illness between now and surgery time; cough, cold, fever, sore throat, nausea, vomiting, etc.   13.  Please notify your physician if you experience dizziness, shortness of breath or blurred vision between now and the time of your surgery.        To provide excellent care, visitors will be limited to two in a room at any 
Pre-Operative:  1.  Patient/Caregiver identifies - states name and date of birth.  2.  The patient is free from signs and symptoms of injury.  3.  The patient receives appropriate medication(s), safely administered during the Perioperative period.  4.  The patients's fluid, electrolyte, and acid-base balances are established preoperatively.  5.  The patient's pulmonary function is established preoperatively.  6.  The patient's cardiovascular status is established preoperatively.  7.  The patient / caregiver demonstrates knowledge of nutritional management related to the operative or other invasive procedure.  8.  The patient/caregiver demonstrates knowledge of medication management.  9.  The patient/caregiver demonstrates knowledge of pain management.  10.  The patient/caregiver participates in decisions affection his or her Perioperative plan of care.  11.  The patient's care is consistent with the individualized Perioperative plan of care.  12.  The patient's right to privacy is maintained.  13.  The patient is the recipient of competent and ethical care within legal standards of practice.  14.  The patient's value system, lifestyle, ethnicity, and culture are considered, respected, and incorporated in the Perioperative plan of care and understands special services available.  15.  The patient demonstrates and/or reports adequate pain control throughout the the Perioperative period.  16.  The patient's neurological status is established preoperatively.  17.  The patient/caregiver demonstrates knowledge of the expected responses to the endoscopy procedure.  18.  Patient/Caregiver has reduced anxiety.  Interventions- Familiarize with environment and equipment.  19. Patient/Caregiver verbalizes understanding of Phase II and/or Phase I process.  20.  Patient pain level is established preoperatively using age appropriate pain scale.  21.  The patient will move to fall risk upon sedation- during and through the recovery 
baseline levels established preoperatively.  19.  The patient/caregiver demonstrates knowledge of the expected responses to the operative or invasive procedure.  20.  Patient/Caregiver has reduced anxiety.  Interventions- Familiarize with environment and equipment.  21. Other:  22. Other:

## 2025-04-14 NOTE — DISCHARGE INSTRUCTIONS
have received a medication during your procedure that interferes with the   actions of birth control pills (Bridion or Emend). Use some other kind of birth control in addition to your pills, like a condom, for 1 month after your procedure to prevent unwanted pregnancy.    The following instructions are to be followed if you have a known history or diagnosis of sleep apnea:  For all sleep apnea patients:  ? Sleep on your side or sitting up in a chair whenever possible, especially the first 24 hours after surgery.  ? Use only medicines prescribed by your doctor.    ? Do not drink alcohol.  ? If you have a dental device to assist you while at rest, use it at all times for the first 24 hours.  For patients using CPAP machines:  ? Use your CPAP machine during all periods of sleep as usual.  ? Use your CPAP machine during all periods of daytime rest while on pain medicines.  ** Follow up with your primary care doctor for continued care.    IF YOU DO NOT TAKE ALL OF YOUR NARCOTIC PAIN MEDICATION, please dispose of them responsibly. There are drop off boxes in the Emergency Departments 24/7 at both [facility] and [facility]. If these locations are not convenient, other options for discarding them can be found at:  [email or web address]    Hospital or office staff may NOT accept any medications to drop off in the cabinet for you.

## 2025-04-14 NOTE — ANESTHESIA PRE PROCEDURE
Department of Anesthesiology  Preprocedure Note       Name:  Thea Wright   Age:  69 y.o.  :  1955                                          MRN:  [id number]         Date:  2025      Surgeon: Surgeon(s):  Vivienne Herzog MD    Procedure: Procedure(s):  EGD W/ANES.    Medications prior to admission:   Prior to Admission medications    Medication Sig Start Date End Date Taking? Authorizing Provider   propranolol (INDERAL) 40 MG tablet Take 1 tablet by mouth once daily 25  Yes Harriet Cuellar APRN - CNP   sertraline (ZOLOFT) 100 MG tablet TAKE 1 & 1/2 (ONE & ONE-HALF) TABLETS BY MOUTH ONCE DAILY 3/10/25  Yes Estefania Goff PA   BREYNA 160-4.5 MCG/ACT AERO INHALE 2 PUFFS BY MOUTH TWICE DAILY RINSE MOUTH AFTER USE  Patient taking differently: Inhale 2 puffs into the lungs 2 times daily 10/24/24  Yes Lynn Tabor MD   gabapentin (NEURONTIN) 400 MG capsule Take 1 capsule by mouth 4 times daily. Indications: Prescribed by Arthritis  19  Yes Lesly Paiz MD   pantoprazole (PROTONIX) 40 MG tablet TAKE 1 TABLET BY MOUTH TWICE DAILY BEFORE MEAL(S) 25   Harriet Cuellar APRN - CNP   meloxicam (MOBIC) 15 MG tablet Take 1 tablet by mouth as needed for Pain 25   Williams Cooper MD   ALPRAZolam (XANAX) 1 MG tablet TAKE 1 TABLET BY MOUTH TWICE DAILY AS NEEDED FOR SLEEP 3/31/25 4/30/25  Harriet Cuellar APRN - CNP   aspirin 325 MG EC tablet Take 1 tablet by mouth daily for 21 days 3/20/25 4/10/25  Steffi Oreilly PA   acetaminophen (TYLENOL) 325 MG tablet Take 2 tablets by mouth every 6 hours 3/19/25   Steffi Oreilly PA   polyethylene glycol (GLYCOLAX) 17 g packet Take 1 packet by mouth daily 3/20/25 4/19/25  Steffi Oreilly PA   alendronate (FOSAMAX) 70 MG tablet Take 1 tablet by mouth once a week  Patient taking differently: Take 1 tablet by mouth every 7 days Take 1 tablet by mouth once a week25   Harriet Cuellar, APRN - CNP

## 2025-04-14 NOTE — ANESTHESIA POSTPROCEDURE EVALUATION
Department of Anesthesiology  Postprocedure Note    Patient: Thea Wright  MRN: [id number]  YOB: 1955  Date of evaluation: 4/14/2025    Procedure Summary       Date: 04/14/25 Room / Location: [address]    Anesthesia Start: 1055 Anesthesia Stop: 1111    Procedures:       ESOPHAGOGASTRODUODENOSCOPY BIOPSY      ESOPHAGOGASTRODUODENOSCOPY DILATATION Diagnosis:       Esophageal dysphagia      (Esophageal dysphagia [R13.19])    Surgeons: Vivienne Herzog MD Responsible Provider: Evan Calle MD    Anesthesia Type: general ASA Status: 2            Anesthesia Type: No value filed.    Nav Phase I: Nav Score: 8    Nav Phase II: Nav Score: 10    Anesthesia Post Evaluation    Comments: Postoperative Anesthesia Note    Name:    Thea Wright  MRN:      [id number]    Patient Vitals in the past 12 hrs:  04/14/25 1131, BP:(!) 142/84, Temp:98.1 °F (36.7 °C), Temp src:Oral, Pulse:63, Resp:16, SpO2:98 %  04/14/25 1117, BP:107/74, Temp:98.1 °F (36.7 °C), Temp src:Oral, Pulse:62, Resp:12, SpO2:94 %  04/14/25 0939, BP:(!) 172/99, Temp:96.9 °F (36.1 °C), Temp src:Infrared, Pulse:85, Resp:17, SpO2:94 %     LABS:    CBC  Lab Results       Component                Value               Date/Time                  WBC                      20.0 (H)            03/19/2025 05:00 AM        HGB                      10.2 (L)            03/19/2025 05:00 AM        HCT                      31.0 (L)            03/19/2025 05:00 AM        PLT                      351                 03/19/2025 05:00 AM   RENAL  Lab Results       Component                Value               Date/Time                  NA                       144                 03/19/2025 05:00 AM        K                        3.5                 03/19/2025 05:00 AM        K                        3.7                 05/20/2022 02:49 PM        CL                       112 (H)             03/19/2025 05:00 AM        CO2

## 2025-04-14 NOTE — H&P
Gastroenterology Note             Pre-operative History and Physical    Patient: Thea Wright  : 1955  CSN:     History Obtained From:  patient and/or guardian.     HISTORY OF PRESENT ILLNESS:    The patient is a 69 y.o. female  here for EGD    Past Medical History:    Past Medical History:   Diagnosis Date    Asthma     At high risk for falls     CAD (coronary artery disease)     pt denies    Cancer (HCC)     RIGHT BREAST    Chronic diarrhea     Dr. Barry    Chronic kidney disease     kidney stones    Clostridium difficile diarrhea 10/23/2013    positive by PCR    Fibromyalgia     Hemorrhoid     Hyperlipidemia     Hypertension     Kidney stone     Migraines     Osteoarthritis     fibromyalgia    Sarcoidosis     sees Dr. Chapa    Traumatic complete tear of right rotator cuff, subsequent encounter     Traumatic complete tear of right rotator cuff, subsequent encounter      Past Surgical History:    Past Surgical History:   Procedure Laterality Date    ABDOMINAL EXPLORATION SURGERY  2012    REDUCTION OF VULVUS; RIGHT COLECTOMY; SMALL BOWEL RESECTION; INSERTION OF ON Q PAIN BUSTER    BREAST BIOPSY      CARDIAC CATHETERIZATION      CLEAN    CARPAL TUNNEL RELEASE Left 2022    LEFT CARPAL TUNNEL RELEASE performed by Anival Celestin MD at [facility] OR    COLONOSCOPY      normal    COLONOSCOPY N/A 2020    COLON W/ANES. performed by Will King MD at [facility] ENDOSCOPY    CYSTOSCOPY  2011    CYSTOSCOPY N/A 2022    CYSTOSCOPY, BILATERAL RETROGRADE PYELOGRAM performed by Carlo Cesar MD at [facility] OR    ESOPHAGEAL DILATATION  2020    ESOPHAGEAL DILATION PERDOMO performed by Will King MD at [facility] ENDOSCOPY    EYE SURGERY Bilateral 2009    cataract    HYSTERECTOMY (CERVIX STATUS UNKNOWN)      LITHOTRIPSY  2012    LITHOTRIPSY      2012    MASTECTOMY Bilateral 2021    BILATERAL SIMPLE MASTECTOMY, RIGHT SENTINEL LYMPH NODE

## 2025-04-15 ENCOUNTER — HOSPITAL ENCOUNTER (OUTPATIENT)
Dept: PHYSICAL THERAPY | Age: 70
Setting detail: THERAPIES SERIES
Discharge: HOME | End: 2025-04-15
Payer: MEDICARE

## 2025-04-15 PROCEDURE — 97140 MANUAL THERAPY 1/> REGIONS: CPT

## 2025-04-15 PROCEDURE — 97110 THERAPEUTIC EXERCISES: CPT

## 2025-04-15 NOTE — FLOWSHEET NOTE
strength training, ROM, including postural re-education.   [x] NMR activation and proprioception, including postural re-education.    [x] Manual therapy as indicated to include: PROM, Gr I-IV mobilizations, and STM  [x] Modalities as needed that may include: Vasoneumatic Compression  [x] Patient education on joint protection, postural re-education, activity modification, progression of HEP.          Plan: POC initiated as per evaluation    Electronically Signed by June Villegas, CHANDA  Date: 04/15/2025       Note: Portions of this note have been templated and/or copied from initial evaluation, reassessments and prior notes for documentation efficiency.    Note: If patient does not return for scheduled/recommended follow up visits, this note will serve as a discharge from care along with the most recent update on progress.    Ortho Evaluation

## 2025-04-16 DIAGNOSIS — E78.00 HYPERCHOLESTEROLEMIA: ICD-10-CM

## 2025-04-16 RX ORDER — ATORVASTATIN CALCIUM 40 MG/1
40 TABLET, FILM COATED ORAL DAILY
Qty: 90 TABLET | Refills: 1 | Status: SHIPPED | OUTPATIENT
Start: 2025-04-16

## 2025-04-16 NOTE — TELEPHONE ENCOUNTER
Refill Request     CONFIRM preferred pharmacy with the patient.    If Mail Order Rx - Pend for 90 day refill.      Last Seen: Last Seen Department: 2/19/2025  Last Seen by PCP: 1/20/2025    Last Written: 10/10/2024 90 tab 1 refills     If no future appointment scheduled:  Review the last OV with PCP and review information for follow-up visit,  Route STAFF MESSAGE with patient name to the  Pool for scheduling with the following information:            -  Timing of next visit           -  Visit type ie Physical, OV, etc           -  Diagnoses/Reason ie. COPD, HTN - Do not use MEDICATION, Follow-up or CHECK UP - Give reason for visit      Next Appointment:   Future Appointments   Date Time Provider Department Center   4/17/2025  1:40 PM Alfonso Villegas, PT [facility]   5/1/2025  1:45 PM Williams Cooper MD AND OR[facility]O [facility]   5/21/2025  1:00 PM Harriet Almeida, APRN - CNP [facility] DEP       Message sent to  to schedule appt with patient?  NO      Requested Prescriptions     Pending Prescriptions Disp Refills    atorvastatin (LIPITOR) 40 MG tablet 90 tablet 1     Sig: Take 1 tablet by mouth daily

## 2025-04-17 ENCOUNTER — HOSPITAL ENCOUNTER (OUTPATIENT)
Dept: PHYSICAL THERAPY | Age: 70
Setting detail: THERAPIES SERIES
Discharge: HOME | End: 2025-04-17
Payer: MEDICARE

## 2025-04-17 PROCEDURE — 97110 THERAPEUTIC EXERCISES: CPT

## 2025-04-17 PROCEDURE — 97140 MANUAL THERAPY 1/> REGIONS: CPT

## 2025-04-17 NOTE — FLOWSHEET NOTE
[facility]- Outpatient Rehabilitation and Therapy [address], [place] [address] office: [phone] fax: [phone]     Physical Therapy: TREATMENT/PROGRESS NOTE   Patient: Thea Wright (69 y.o. female)   Examination Date: 2025   :  1955 MRN: [id number]   Visit #: 4   Insurance Allowable Auth Needed   MC + supp []Yes    [x]No    Insurance: Payor: MEDICARE / Plan: MEDICARE PART A AND B / Product Type: *No Product type* /   Insurance ID: [id number] - (Medicare)  Secondary Insurance (if applicable): HUMANA   Treatment Diagnosis:     ICD-10-CM    1. Acute pain of right shoulder  M25.511       2. Shoulder stiffness, right  M25.611       3. Weakness of right shoulder  R29.898          Medical Diagnosis:  Right shoulder pain [M25.511]   Referring Physician: Williams Cooper MD  PCP: Harriet Almeida APRN - CNP       Plan of care signed (Y/N):     Date of Patient follow up with Physician:      Progress Report/POC: NO  Progress note due: 2025 (OR 10 visits /OR AUTH LIMITS, whichever is less)  POC update due:  2025                    Medical History:  Comorbidities:  Cancer/Tumor  Osteoarthritis  Anxiety  Depression  Relevant Medical History: double masectomy                                         Precautions/ Contra-indications:           Latex allergy:  NO  Pacemaker:    NO  Contraindications for Manipulation: None  Date of Surgery: 3/18/2025 reverse shoulder                        Preferred Language for Healthcare:   [x] English       [] other:    SUBJECTIVE EXAMINATION     Patient stated complaint: Pt stated shoulder feels great.      From eval: Pt reports doing well after reverse shoulder 3/18/2025. Adhering to sling guidelines and not using arm. Done with pain meds.    OBJECTIVE EXAMINATION      Test used Initial score  2025   Pain Summary VAS 0-7 0   Functional questionnaire Quick DASH 82%    ROM   Flex  ER  IR PROM  120  25  25

## 2025-04-22 ENCOUNTER — TRANSCRIBE ORDERS (OUTPATIENT)
Dept: ADMINISTRATIVE | Age: 70
End: 2025-04-22

## 2025-04-22 DIAGNOSIS — D86.9 SARCOIDOSIS: Primary | ICD-10-CM

## 2025-04-23 ENCOUNTER — HOSPITAL ENCOUNTER (OUTPATIENT)
Dept: PHYSICAL THERAPY | Age: 70
Setting detail: THERAPIES SERIES
Discharge: HOME | End: 2025-04-23
Payer: MEDICARE

## 2025-04-23 PROCEDURE — 97110 THERAPEUTIC EXERCISES: CPT

## 2025-04-23 PROCEDURE — 97140 MANUAL THERAPY 1/> REGIONS: CPT

## 2025-04-23 NOTE — FLOWSHEET NOTE
Strength  NT            4 weeks post op: 4/15/2025     5 weeks post op: 4/22/2025     6 weeks post op: 4/29/2025     7 weeks post op: 5/6/2025     8 weeks post op: 5/13/2025     9 weeks post op: 5/20/2025     10 weeks post op: 5/27/2025     11 weeks post op: 6/3/2025     12 weeks post op: 6/10/2025       Exercises/Interventions       Therapeutic Ex (52518)/NMR re-education (29275)  Sets/reps Resistance Notes/Cues/Progressions   Supine cane flex 20 x     SBS  shrug 20 x  20 x     Passive elbow ROM 20 x     Shoulder ABD/ext iso 15 x 5\"     Theraweb flex/ext  Therabar twist 30 x ea  30 x ea                             Pt education - anatomy, PT progressions, precautions/restrictions 6 min     Manual Intervention (72744) Time     STM, PROM 10 min                               Modalities:    No modalities applied this session    Education/Home Exercise Program: Patient HEP program created electronically.  Refer to Vascular Closure access code: [email or web address]    ASSESSMENT   Today's Assessment: During therapy this date, patient required visual cueing, verbal cueing, tactile cueing, muscle facilitation, and manual interventions for exercise progression, improving proper muscle recruitment and activation/motor control patterns, increasing ROM, reduce/eliminate soft tissue swelling/inflammation/restriction, improving soft tissue extensibility, and allowing for proper ROM.Patient will continue to benefit from ongoing evaluation and advanced clinical decision from a Physical Therapist to address and improve pain control, ROM, muscle strength, neuromuscular control, ADL status, and proper body mechanics to safely return to PLOF without symptoms or restrictions. No issues with session today.    Medical Necessity Documentation:  I certify that this patient meets the below criteria necessary for medical necessity for care and/or justification of therapy services:  The patient has functional impairments and/or activity limitations and

## 2025-04-24 ENCOUNTER — RESULTS FOLLOW-UP (OUTPATIENT)
Dept: GASTROENTEROLOGY | Age: 70
End: 2025-04-24

## 2025-04-24 DIAGNOSIS — K22.70 BARRETT'S ESOPHAGUS WITH ESOPHAGITIS: Primary | ICD-10-CM

## 2025-04-24 DIAGNOSIS — K20.90 BARRETT'S ESOPHAGUS WITH ESOPHAGITIS: Primary | ICD-10-CM

## 2025-04-25 PROBLEM — K20.90 BARRETT'S ESOPHAGUS WITH ESOPHAGITIS: Status: ACTIVE | Noted: 2025-04-25

## 2025-04-25 PROBLEM — K22.70 BARRETT'S ESOPHAGUS WITH ESOPHAGITIS: Status: ACTIVE | Noted: 2025-04-25

## 2025-04-30 ENCOUNTER — HOSPITAL ENCOUNTER (OUTPATIENT)
Dept: PHYSICAL THERAPY | Age: 70
Setting detail: THERAPIES SERIES
Discharge: HOME | End: 2025-04-30
Payer: MEDICARE

## 2025-04-30 PROCEDURE — 97140 MANUAL THERAPY 1/> REGIONS: CPT

## 2025-04-30 PROCEDURE — 97110 THERAPEUTIC EXERCISES: CPT

## 2025-04-30 NOTE — FLOWSHEET NOTE
[facility]- Outpatient Rehabilitation and Therapy [address], [place] [address] office: [phone] fax: [phone]     Physical Therapy: TREATMENT/PROGRESS NOTE   Patient: Thea Wright (69 y.o. female)   Examination Date: 2025   :  1955 MRN: [id number]   Visit #: 6   Insurance Allowable Auth Needed   MC + supp []Yes    [x]No    Insurance: Payor: MEDICARE / Plan: MEDICARE PART A AND B / Product Type: *No Product type* /   Insurance ID: [id number] - (Medicare)  Secondary Insurance (if applicable): HUMANA   Treatment Diagnosis:     ICD-10-CM    1. Acute pain of right shoulder  M25.511       2. Shoulder stiffness, right  M25.611       3. Weakness of right shoulder  R29.898          Medical Diagnosis:  Right shoulder pain [M25.511]   Referring Physician: Williams Cooper MD  PCP: Harriet Almeida APRN - CNP       Plan of care signed (Y/N):     Date of Patient follow up with Physician:      Progress Report/POC: NO  Progress note due: 2025 (OR 10 visits /OR AUTH LIMITS, whichever is less)  POC update due:  2025                    Medical History:  Comorbidities:  Cancer/Tumor  Osteoarthritis  Anxiety  Depression  Relevant Medical History: double masectomy                                         Precautions/ Contra-indications:           Latex allergy:  NO  Pacemaker:    NO  Contraindications for Manipulation: None  Date of Surgery: 3/18/2025 reverse shoulder                        Preferred Language for Healthcare:   [x] English       [] other:    SUBJECTIVE EXAMINATION     Patient stated complaint: Pt reports no new issues shoulder.      From eval: Pt reports doing well after reverse shoulder 3/18/2025. Adhering to sling guidelines and not using arm. Done with pain meds.    OBJECTIVE EXAMINATION      Test used Initial score  2025   Pain Summary VAS 0-7 0   Functional questionnaire Quick DASH 82%    ROM   Flex  ER  IR

## 2025-05-01 ENCOUNTER — OFFICE VISIT (OUTPATIENT)
Dept: ORTHOPEDIC SURGERY | Age: 70
End: 2025-05-01

## 2025-05-01 VITALS — WEIGHT: 150 LBS | HEIGHT: 64 IN | BODY MASS INDEX: 25.61 KG/M2

## 2025-05-01 DIAGNOSIS — Z47.89 ORTHOPEDIC AFTERCARE: Primary | ICD-10-CM

## 2025-05-01 PROCEDURE — 99024 POSTOP FOLLOW-UP VISIT: CPT | Performed by: ORTHOPAEDIC SURGERY

## 2025-05-01 NOTE — PROGRESS NOTES
POST OPERATIVE ORTHOPAEDIC NOTE    DOS: 3/18/2025  PROCEDURES: Right reverse total shoulder replacement    The patient has been recovering. The patient states the pain is 0-1/10 pain.  The patient has continued PT. she has been nonweightbearing in the sling.  She is accompanied by her     Focused pertinent physical examination of the operative extremity:  Wound C/D/I, well healed surgical incision  Slight scar tissue incision  160 degrees forward flexion, 30 degrees external rotation, internal rotation sacrum/right hip  Skin intact throughout  5/5 D B T G IO EPL  SILT Ax, R, U, M  +2 radial pulse    Radiographs: 4 view with [place] right shoulder 5/1/2025: Negative fracture.  Satisfactory implant alignment and reduced glenosphere.  Similar preoperative to postoperative acromial changes.     Diagnosis Orders   1. Orthopedic aftercare  XR SHOULDER RIGHT (MIN 2 VIEWS)        Assessment and plan: The patient is now 6 weeks status post the above listed procedure and is recovering      --Greater than 50% of the time (11/20 minutes) was spent coordinating care and discussing postoperative recovery course  - I had a pleasant discussion with the patient today as well as her .  I reviewed with them both that currently her clinical examination is well-appearing.  She does need to work on some scar tissue massage and we reviewed that once again  - She may discontinue the sling for daytime use at this time however still has a 2 pound weight limit for 2 weeks and she will still sleep in the sling for 2 weeks  - She will continue formal physical therapy for full range of motion progressions as tolerated.  I did not advocate for any heavy lifting greater than 5 pounds for 3 to 4 months.  I also reviewed with her not to be pushing herself out of the chair to help limit instability risk/dissociation of the implant  - OTC Tylenol/Aleve per bottle as needed discomfort  -All questions answered to the patient's satisfaction

## 2025-05-08 ENCOUNTER — HOSPITAL ENCOUNTER (OUTPATIENT)
Age: 70
Discharge: HOME | End: 2025-05-08
Attending: INTERNAL MEDICINE
Payer: MEDICARE

## 2025-05-08 ENCOUNTER — HOSPITAL ENCOUNTER (OUTPATIENT)
Dept: PULMONOLOGY | Age: 70
Discharge: HOME | End: 2025-05-08
Attending: INTERNAL MEDICINE
Payer: MEDICARE

## 2025-05-08 ENCOUNTER — HOSPITAL ENCOUNTER (OUTPATIENT)
Dept: PHYSICAL THERAPY | Age: 70
Setting detail: THERAPIES SERIES
Discharge: HOME | End: 2025-05-08
Attending: INTERNAL MEDICINE
Payer: MEDICARE

## 2025-05-08 ENCOUNTER — HOSPITAL ENCOUNTER (OUTPATIENT)
Dept: GENERAL RADIOLOGY | Age: 70
Discharge: HOME | End: 2025-05-08
Attending: INTERNAL MEDICINE
Payer: MEDICARE

## 2025-05-08 ENCOUNTER — HOSPITAL ENCOUNTER (OUTPATIENT)
Dept: SPEECH THERAPY | Age: 70
Setting detail: THERAPIES SERIES
Discharge: HOME | End: 2025-05-08
Attending: INTERNAL MEDICINE
Payer: MEDICARE

## 2025-05-08 VITALS — OXYGEN SATURATION: 98 % | HEART RATE: 91 BPM

## 2025-05-08 DIAGNOSIS — D86.9 SARCOIDOSIS: ICD-10-CM

## 2025-05-08 DIAGNOSIS — R13.12 OROPHARYNGEAL DYSPHAGIA: ICD-10-CM

## 2025-05-08 LAB
ALBUMIN SERPL-MCNC: 4.5 G/DL (ref 3.4–5)
ALBUMIN/GLOB SERPL: 1.5 {RATIO} (ref 1.1–2.2)
ALP SERPL-CCNC: 110 U/L (ref 40–129)
ALT SERPL-CCNC: 16 U/L (ref 10–40)
ANION GAP SERPL CALCULATED.3IONS-SCNC: 12 MMOL/L (ref 3–16)
AST SERPL-CCNC: 21 U/L (ref 15–37)
BASOPHILS # BLD: 0.2 K/UL (ref 0–0.2)
BASOPHILS NFR BLD: 1.4 %
BILIRUB SERPL-MCNC: <0.2 MG/DL (ref 0–1)
BUN SERPL-MCNC: 11 MG/DL (ref 7–20)
CALCIUM SERPL-MCNC: 9.9 MG/DL (ref 8.3–10.6)
CHLORIDE SERPL-SCNC: 105 MMOL/L (ref 99–110)
CO2 SERPL-SCNC: 23 MMOL/L (ref 21–32)
CREAT SERPL-MCNC: 0.8 MG/DL (ref 0.6–1.2)
DEPRECATED RDW RBC AUTO: 16.9 % (ref 12.4–15.4)
DLCO %PRED: 73 %
DLCO PRED: NORMAL
DLCO/VA %PRED: NORMAL
DLCO/VA PRED: NORMAL
DLCO/VA: NORMAL
DLCO: NORMAL
EOSINOPHIL # BLD: 0.4 K/UL (ref 0–0.6)
EOSINOPHIL NFR BLD: 3.3 %
EXPIRATORY TIME-POST: NORMAL
EXPIRATORY TIME: NORMAL
FEF 25-75 %CHNG: NORMAL
FEF 25-75 POST %PRED: NORMAL
FEF 25-75% %PRED-PRE: NORMAL
FEF 25-75% PRED: NORMAL
FEF 25-75-POST: NORMAL
FEF 25-75-PRE: NORMAL
FEV1 %PRED-POST: 72 %
FEV1 %PRED-PRE: 72 %
FEV1 PRED: NORMAL
FEV1-POST: NORMAL
FEV1-PRE: NORMAL
FEV1/FVC %PRED-POST: NORMAL
FEV1/FVC %PRED-PRE: NORMAL
FEV1/FVC PRED: NORMAL
FEV1/FVC-POST: 89 %
FEV1/FVC-PRE: 90 %
FOLATE SERPL-MCNC: >40 NG/ML (ref 4.78–24.2)
FVC %PRED-POST: NORMAL
FVC %PRED-PRE: NORMAL
FVC PRED: NORMAL
FVC-POST: NORMAL
FVC-PRE: NORMAL
GAW %PRED: NORMAL
GAW PRED: NORMAL
GAW: NORMAL
GFR SERPLBLD CREATININE-BSD FMLA CKD-EPI: 79 ML/MIN/{1.73_M2}
GLUCOSE SERPL-MCNC: 158 MG/DL (ref 70–99)
HCT VFR BLD AUTO: 38.4 % (ref 36–48)
HGB BLD-MCNC: 12.5 G/DL (ref 12–16)
IC PRE %PRED: NORMAL
IC PRED: NORMAL
IC: NORMAL
LYMPHOCYTES # BLD: 3.4 K/UL (ref 1–5.1)
LYMPHOCYTES NFR BLD: 29.6 %
MAGNESIUM SERPL-MCNC: 2.11 MG/DL (ref 1.8–2.4)
MCH RBC QN AUTO: 29.6 PG (ref 26–34)
MCHC RBC AUTO-ENTMCNC: 32.4 G/DL (ref 31–36)
MCV RBC AUTO: 91.1 FL (ref 80–100)
MEP: NORMAL
MIP: NORMAL
MONOCYTES # BLD: 0.8 K/UL (ref 0–1.3)
MONOCYTES NFR BLD: 6.9 %
MVV %PRED-PRE: NORMAL
MVV PRED: NORMAL
MVV-PRE: NORMAL
NEUTROPHILS # BLD: 6.7 K/UL (ref 1.7–7.7)
NEUTROPHILS NFR BLD: 58.8 %
PEF %PRED-POST: NORMAL
PEF %PRED-PRE: NORMAL
PEF PRED: NORMAL
PEF%CHNG: NORMAL
PEF-POST: NORMAL
PEF-PRE: NORMAL
PLATELET # BLD AUTO: 589 K/UL (ref 135–450)
PMV BLD AUTO: 7.5 FL (ref 5–10.5)
POTASSIUM SERPL-SCNC: 3.9 MMOL/L (ref 3.5–5.1)
PROT SERPL-MCNC: 7.6 G/DL (ref 6.4–8.2)
RAW %PRED: NORMAL
RAW PRED: NORMAL
RAW: NORMAL
RBC # BLD AUTO: 4.22 M/UL (ref 4–5.2)
RV PRE %PRED: NORMAL
RV PRED: NORMAL
RV: NORMAL
SODIUM SERPL-SCNC: 140 MMOL/L (ref 136–145)
SVC %PRED: NORMAL
SVC PRED: NORMAL
SVC: NORMAL
TLC PRE %PRED: 94 %
TLC PRED: NORMAL
TLC: NORMAL
VA %PRED: NORMAL
VA PRED: NORMAL
VA: NORMAL
VIT B12 SERPL-MCNC: >4000 PG/ML (ref 211–911)
VTG %PRED: NORMAL
VTG PRED: NORMAL
VTG: NORMAL
WBC # BLD AUTO: 11.4 K/UL (ref 4–11)

## 2025-05-08 PROCEDURE — 94729 DIFFUSING CAPACITY: CPT

## 2025-05-08 PROCEDURE — 80053 COMPREHEN METABOLIC PANEL: CPT

## 2025-05-08 PROCEDURE — 82607 VITAMIN B-12: CPT

## 2025-05-08 PROCEDURE — 97140 MANUAL THERAPY 1/> REGIONS: CPT

## 2025-05-08 PROCEDURE — 92611 MOTION FLUOROSCOPY/SWALLOW: CPT

## 2025-05-08 PROCEDURE — 82746 ASSAY OF FOLIC ACID SERUM: CPT

## 2025-05-08 PROCEDURE — 74230 X-RAY XM SWLNG FUNCJ C+: CPT

## 2025-05-08 PROCEDURE — 94726 PLETHYSMOGRAPHY LUNG VOLUMES: CPT

## 2025-05-08 PROCEDURE — 85025 COMPLETE CBC W/AUTO DIFF WBC: CPT

## 2025-05-08 PROCEDURE — 97110 THERAPEUTIC EXERCISES: CPT

## 2025-05-08 PROCEDURE — 94760 N-INVAS EAR/PLS OXIMETRY 1: CPT

## 2025-05-08 PROCEDURE — 6370000000 HC RX 637 (ALT 250 FOR IP): Performed by: INTERNAL MEDICINE

## 2025-05-08 PROCEDURE — 83735 ASSAY OF MAGNESIUM: CPT

## 2025-05-08 PROCEDURE — 94010 BREATHING CAPACITY TEST: CPT

## 2025-05-08 RX ORDER — ALBUTEROL SULFATE 90 UG/1
4 INHALANT RESPIRATORY (INHALATION) ONCE
Status: COMPLETED | OUTPATIENT
Start: 2025-05-08 | End: 2025-05-08

## 2025-05-08 RX ADMIN — ALBUTEROL SULFATE 4 PUFF: 90 AEROSOL, METERED RESPIRATORY (INHALATION) at 10:54

## 2025-05-08 ASSESSMENT — PULMONARY FUNCTION TESTS
FEV1_PERCENT_PREDICTED_PRE: 72
FEV1/FVC_POST: 89
FEV1/FVC_PRE: 90
FEV1_PERCENT_PREDICTED_POST: 72

## 2025-05-08 NOTE — PROCEDURES
SPEECH/LANGUAGE PATHOLOGY  Swallowing Disorders and Dysphagia  VIDEOFLUOROSCOPIC STUDY OF SWALLOWING (VFSS) / Modified Barium Swallow Study (MBSS)  [place] [facility] [place]    PATIENT NAME:  Thea Wright      :  1955    Room: Room/bed info not found   TODAY'S DATE:  2025    [x]The admitting diagnosis and active problem list, as listed below have been reviewed prior to initiation of this evaluation.     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, both eyes    Essential hypertension    Diverticulosis    Nausea & vomiting    S/P splenectomy    Osteopenia    Sarcoidosis    Vitamin D deficiency    Impaired fasting glucose    Cecal volvulus (HCC)    Insomnia    Hypercholesterolemia    Disturbance of skin sensation    Acute, but ill-defined, cerebrovascular disease    Sarcoid    Cerebral vasculitis    Lesion of right ulnar nerve    Major depressive disorder, recurrent episode, moderate (HCC)    Pain medication agreement signed    Trochanteric bursitis of left hip    Ankle instability    GI bleed    Colitis, acute    Anxiety    Congenital urethral stenosis    Urinary frequency    Right upper quadrant pain    Epigastric pain    Esophageal dysphagia    Colon, diverticulosis    Lower abdominal pain    Right upper quadrant abdominal pain    Right sided abdominal pain    Irritable bowel syndrome with constipation    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 breast of female, estrogen receptor positive (HCC)    S/P laparoscopic-assisted sigmoidectomy    Postoperative pain    Carpal tunnel syndrome    Sedative, hypnotic or anxiolytic use, unspecified with unspecified sedative, hypnotic or anxiolytic-induced disorder    Sedative, 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

## 2025-05-08 NOTE — PROGRESS NOTES
[facility]- Outpatient Rehabilitation and Therapy [address], [place] [address] office: [phone] fax: [phone]     Physical Therapy: TREATMENT/PROGRESS NOTE   Patient: Thea Wright (69 y.o. female)   Examination Date: 2025   :  1955 MRN: [id number]   Visit #: 7   Insurance Allowable Auth Needed   MC + supp []Yes    [x]No    Insurance: Payor: MEDICARE / Plan: MEDICARE PART A AND B / Product Type: *No Product type* /   Insurance ID: [id number] - (Medicare)  Secondary Insurance (if applicable): HUMANA   Treatment Diagnosis:     ICD-10-CM    1. Acute pain of right shoulder  M25.511       2. Shoulder stiffness, right  M25.611       3. Weakness of right shoulder  R29.898          Medical Diagnosis:  Right shoulder pain [M25.511]   Referring Physician: Williams Cooper MD  PCP: Harriet Almeida APRN - CNP       Plan of care signed (Y/N):     Date of Patient follow up with Physician:      Progress Report/POC: NO  Progress note due: 2025 (OR 10 visits /OR AUTH LIMITS, whichever is less)  POC update due:  2025                    Medical History:  Comorbidities:  Cancer/Tumor  Osteoarthritis  Anxiety  Depression  Relevant Medical History: double masectomy                                         Precautions/ Contra-indications:           Latex allergy:  NO  Pacemaker:    NO  Contraindications for Manipulation: None  Date of Surgery: 3/18/2025 reverse shoulder                        Preferred Language for Healthcare:   [x] English       [] other:    SUBJECTIVE EXAMINATION     Patient stated complaint: Pt reports shoulder doing great, cleared from sling except sleep next 2 weeks. 2 lb weight restriction 2 weeks, no more than 5 lbs till next visit      From eval: Pt reports doing well after reverse shoulder 3/18/2025. Adhering to sling guidelines and not using arm. Done with pain meds.    OBJECTIVE EXAMINATION      Test used Initial score  25

## 2025-05-08 NOTE — PROCEDURES
[address] [address]                           PULMONARY FUNCTION      PATIENT NAME: PARVEZ JEFFERSON                   : 1955  MED REC NO: [id number]                      ROOM:   ACCOUNT NO: [id number]                       ADMIT DATE: 2025  PROVIDER: Dereje Hahn MD      DATE OF PROCEDURE: 2025    SURGEON:  Dereje Hahn MD    REFERRING PHYSICIAN:  JUAN WYLIE    TEST PERFORMED:  Pulmonary function test.    OVERALL INTERPRETATION:    1. Spirometry revealed evidence of moderate obstructive defect.  FEV1 is 1.62 L, which is 72% predicted.  No significant response to bronchodilators.  FEV1/FVC ratio is 68%.  2. Lung volume revealed normal total lung capacity of 4.68 L, which is 94% predicted.  3. Diffusion capacity is mildly decreased at 16.81, which is 73% predicted.  4. Flow volume loop suggestive of obstructive defect.    CONCLUSION:  Moderate obstructive defect with mildly decreased diffusion capacity.          DEREJE HAHN MD      D:  2025 15:37:19     T:  2025 16:56:39     /LILIBETH  Job #:  [id number]     Doc#:  [id number]

## 2025-05-14 ENCOUNTER — HOSPITAL ENCOUNTER (OUTPATIENT)
Dept: PHYSICAL THERAPY | Age: 70
Setting detail: THERAPIES SERIES
End: 2025-05-14
Attending: INTERNAL MEDICINE
Payer: MEDICARE

## 2025-05-16 ENCOUNTER — HOSPITAL ENCOUNTER (OUTPATIENT)
Dept: PHYSICAL THERAPY | Age: 70
Setting detail: THERAPIES SERIES
Discharge: HOME | End: 2025-05-16
Attending: INTERNAL MEDICINE
Payer: MEDICARE

## 2025-05-16 PROCEDURE — 97140 MANUAL THERAPY 1/> REGIONS: CPT

## 2025-05-16 PROCEDURE — 97110 THERAPEUTIC EXERCISES: CPT

## 2025-05-16 NOTE — FLOWSHEET NOTE
[facility]- Outpatient Rehabilitation and Therapy [address], [place] [address] office: [phone] fax: [phone]     Physical Therapy: TREATMENT/PROGRESS NOTE   Patient: Thea Wright (69 y.o. female)   Examination Date: 2025   :  1955 MRN: [id number]   Visit #: 8   Insurance Allowable Auth Needed   MC + supp []Yes    [x]No    Insurance: Payor: MEDICARE / Plan: MEDICARE PART A AND B / Product Type: *No Product type* /   Insurance ID: [id number] - (Medicare)  Secondary Insurance (if applicable): HUMANA   Treatment Diagnosis:     ICD-10-CM    1. Acute pain of right shoulder  M25.511       2. Shoulder stiffness, right  M25.611       3. Weakness of right shoulder  R29.898          Medical Diagnosis:  Right shoulder pain [M25.511]   Referring Physician: Williams Cooper MD  PCP: Harriet Almeida APRN - CNP       Plan of care signed (Y/N):     Date of Patient follow up with Physician:      Progress Report/POC: NO  Progress note due: 2025 (OR 10 visits /OR AUTH LIMITS, whichever is less)  POC update due:  2025                    Medical History:  Comorbidities:  Cancer/Tumor  Osteoarthritis  Anxiety  Depression  Relevant Medical History: double masectomy                                         Precautions/ Contra-indications:           Latex allergy:  NO  Pacemaker:    NO  Contraindications for Manipulation: None  Date of Surgery: 3/18/2025 reverse shoulder                        Preferred Language for Healthcare:   [x] English       [] other:    SUBJECTIVE EXAMINATION     Patient stated complaint: Patient reported soreness after last session, but felt good today. Pt reported difficulty with reaching across her body.     No more than 5 lbs till next MD visit    From eval: Pt reports doing well after reverse shoulder 3/18/2025. Adhering to sling guidelines and not using arm. Done with pain meds.    OBJECTIVE EXAMINATION      Test used Initial  98

## 2025-05-20 ENCOUNTER — RESULTS FOLLOW-UP (OUTPATIENT)
Dept: GASTROENTEROLOGY | Age: 70
End: 2025-05-20

## 2025-05-21 ENCOUNTER — HOSPITAL ENCOUNTER (OUTPATIENT)
Dept: PHYSICAL THERAPY | Age: 70
Setting detail: THERAPIES SERIES
Discharge: HOME | End: 2025-05-21
Attending: INTERNAL MEDICINE
Payer: MEDICARE

## 2025-05-21 ENCOUNTER — OFFICE VISIT (OUTPATIENT)
Dept: FAMILY MEDICINE CLINIC | Age: 70
End: 2025-05-21
Payer: MEDICARE

## 2025-05-21 VITALS
DIASTOLIC BLOOD PRESSURE: 78 MMHG | WEIGHT: 145 LBS | SYSTOLIC BLOOD PRESSURE: 130 MMHG | OXYGEN SATURATION: 98 % | HEART RATE: 106 BPM | BODY MASS INDEX: 25.28 KG/M2

## 2025-05-21 DIAGNOSIS — F33.1 MAJOR DEPRESSIVE DISORDER, RECURRENT EPISODE, MODERATE (HCC): ICD-10-CM

## 2025-05-21 DIAGNOSIS — C50.311 MALIGNANT NEOPLASM OF LOWER-INNER QUADRANT OF RIGHT BREAST OF FEMALE, ESTROGEN RECEPTOR POSITIVE (HCC): ICD-10-CM

## 2025-05-21 DIAGNOSIS — E78.00 HYPERCHOLESTEROLEMIA: ICD-10-CM

## 2025-05-21 DIAGNOSIS — Z17.0 MALIGNANT NEOPLASM OF LOWER-INNER QUADRANT OF RIGHT BREAST OF FEMALE, ESTROGEN RECEPTOR POSITIVE (HCC): ICD-10-CM

## 2025-05-21 DIAGNOSIS — R73.03 PREDIABETES: Primary | ICD-10-CM

## 2025-05-21 DIAGNOSIS — I10 ESSENTIAL HYPERTENSION: ICD-10-CM

## 2025-05-21 DIAGNOSIS — K20.90 BARRETT'S ESOPHAGUS WITH ESOPHAGITIS: ICD-10-CM

## 2025-05-21 DIAGNOSIS — D86.9 SARCOID: ICD-10-CM

## 2025-05-21 DIAGNOSIS — K22.70 BARRETT'S ESOPHAGUS WITH ESOPHAGITIS: ICD-10-CM

## 2025-05-21 DIAGNOSIS — R79.89 ELEVATED VITAMIN B12 LEVEL: ICD-10-CM

## 2025-05-21 DIAGNOSIS — E55.9 VITAMIN D DEFICIENCY: ICD-10-CM

## 2025-05-21 DIAGNOSIS — F51.05 INSOMNIA DUE TO OTHER MENTAL DISORDER: ICD-10-CM

## 2025-05-21 DIAGNOSIS — F99 INSOMNIA DUE TO OTHER MENTAL DISORDER: ICD-10-CM

## 2025-05-21 DIAGNOSIS — Z72.0 TOBACCO USE: ICD-10-CM

## 2025-05-21 DIAGNOSIS — E53.8 LOW SERUM VITAMIN B12: ICD-10-CM

## 2025-05-21 PROCEDURE — 3017F COLORECTAL CA SCREEN DOC REV: CPT | Performed by: NURSE PRACTITIONER

## 2025-05-21 PROCEDURE — G8417 CALC BMI ABV UP PARAM F/U: HCPCS | Performed by: NURSE PRACTITIONER

## 2025-05-21 PROCEDURE — G8399 PT W/DXA RESULTS DOCUMENT: HCPCS | Performed by: NURSE PRACTITIONER

## 2025-05-21 PROCEDURE — 4004F PT TOBACCO SCREEN RCVD TLK: CPT | Performed by: NURSE PRACTITIONER

## 2025-05-21 PROCEDURE — 1123F ACP DISCUSS/DSCN MKR DOCD: CPT | Performed by: NURSE PRACTITIONER

## 2025-05-21 PROCEDURE — 97140 MANUAL THERAPY 1/> REGIONS: CPT

## 2025-05-21 PROCEDURE — G8427 DOCREV CUR MEDS BY ELIG CLIN: HCPCS | Performed by: NURSE PRACTITIONER

## 2025-05-21 PROCEDURE — 3078F DIAST BP <80 MM HG: CPT | Performed by: NURSE PRACTITIONER

## 2025-05-21 PROCEDURE — 99213 OFFICE O/P EST LOW 20 MIN: CPT | Performed by: NURSE PRACTITIONER

## 2025-05-21 PROCEDURE — 1090F PRES/ABSN URINE INCON ASSESS: CPT | Performed by: NURSE PRACTITIONER

## 2025-05-21 PROCEDURE — 1159F MED LIST DOCD IN RCRD: CPT | Performed by: NURSE PRACTITIONER

## 2025-05-21 PROCEDURE — 3075F SYST BP GE 130 - 139MM HG: CPT | Performed by: NURSE PRACTITIONER

## 2025-05-21 PROCEDURE — 97110 THERAPEUTIC EXERCISES: CPT

## 2025-05-21 PROCEDURE — 1160F RVW MEDS BY RX/DR IN RCRD: CPT | Performed by: NURSE PRACTITIONER

## 2025-05-21 ASSESSMENT — ENCOUNTER SYMPTOMS
VOMITING: 0
CONSTIPATION: 0
DIARRHEA: 0
WHEEZING: 0
CHEST TIGHTNESS: 0
COUGH: 0
NAUSEA: 0
SHORTNESS OF BREATH: 0

## 2025-05-21 NOTE — FLOWSHEET NOTE
[facility]- Outpatient Rehabilitation and Therapy [address], [place] [address] office: [phone] fax: [phone]     Physical Therapy: TREATMENT/PROGRESS NOTE   Patient: Thea Wright (69 y.o. female)   Examination Date: 2025   :  1955 MRN: [id number]   Visit #: 9   Insurance Allowable Auth Needed   MC + supp []Yes    [x]No    Insurance: Payor: MEDICARE / Plan: MEDICARE PART A AND B / Product Type: *No Product type* /   Insurance ID: [id number] - (Medicare)  Secondary Insurance (if applicable): HUMANA   Treatment Diagnosis:     ICD-10-CM    1. Acute pain of right shoulder  M25.511       2. Shoulder stiffness, right  M25.611       3. Weakness of right shoulder  R29.898          Medical Diagnosis:  Right shoulder pain [M25.511]   Referring Physician: Williams Cooper MD  PCP: Harriet Almeida APRN - CNP       Plan of care signed (Y/N):     Date of Patient follow up with Physician:      Progress Report/POC: NO  Progress note due: 2025 (OR 10 visits /OR AUTH LIMITS, whichever is less)  POC update due:  2025                    Medical History:  Comorbidities:  Cancer/Tumor  Osteoarthritis  Anxiety  Depression  Relevant Medical History: double masectomy                                         Precautions/ Contra-indications:           Latex allergy:  NO  Pacemaker:    NO  Contraindications for Manipulation: None  Date of Surgery: 3/18/2025 reverse shoulder                        Preferred Language for Healthcare:   [x] English       [] other:    SUBJECTIVE EXAMINATION     Patient stated complaint: Pt reported feeling achy after last session, but is feeling good today.     No more than 5 lbs till next MD visit    From [facility]: Pt reports doing well after reverse shoulder 3/18/2025. Adhering to sling guidelines and not using arm. Done with pain meds.    OBJECTIVE EXAMINATION      Test used Initial score  2025   Pain Summary VAS 0-7 0

## 2025-05-21 NOTE — PROGRESS NOTES
2025  Thea Wright (: 1955)  69 y.o.    ASSESSMENT and PLAN:  Thea was seen today for hypertension.    Diagnoses and all orders for this visit:    Prediabetes  -     Hemoglobin A1C; Future  -A1c goal is 5.6 or less. Lifestyle recommendations include weight loss, reducing carbohydrates (pasta, potatoes, bread, crackers/chips), and sugars (candy, sweets, regular soda, fruits) in diet, and increasing regular exercise to most days of the week.      Low serum vitamin B12  -hold b12 supplement day of labs.     Elevated vitamin B12 level  -     Vitamin B12 & Folate; Future  See above    Vitamin D deficiency  -     Vitamin D 25 Hydroxy; Future  -update    Insomnia due to other mental disorder  -     Drug Panel-PM-HI Res-UR Interp-A  -update urine drug screen, initial sample not enough, will try again before leaving.   -med contract updated.   -aware to avoid multiple sedating meds together.    Hypercholesterolemia  -The current medical regimen is effective;  continue present plan and medications.    Major depressive disorder, recurrent episode, moderate (HCC)  -The current medical regimen is effective;  continue present plan and medications.    Essential hypertension  -The current medical regimen is effective;  continue present plan and medications.    Tobacco use  -continue weaning off. Very close!     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 breast of female, estrogen receptor positive (HCC)  -continues to follow with breast team and [facility].     Cordon's esophagus with esophagitis  Saw Dr. Herzog 2025-repeat in 3 years.     Sarcoid  -following with UC    Return in about 3 months (around 2025), or if symptoms worsen or fail to improve, for anxiety.    Hypertension  Pertinent negatives include no chest pain, headaches, palpitations or shortness of breath.     Presenting for f/u on chronic conditions.      Intermittently gets sensation of food getting stuck. Hx of gerd. On ppi. Trialed off a few

## 2025-05-21 NOTE — PROGRESS NOTES
Urine Drug Screen sent to the lab for testing.   Collected 4 white top tubes, refrigerated.  Labeled and placed in bag with orders.   (ALL URINE CX TO BE PLACED IN THE FRIDGE)

## 2025-05-22 LAB — ANNOTATION COMMENT IMP: NORMAL

## 2025-05-23 ENCOUNTER — HOSPITAL ENCOUNTER (OUTPATIENT)
Dept: PHYSICAL THERAPY | Age: 70
Setting detail: THERAPIES SERIES
Discharge: HOME | End: 2025-05-23
Attending: INTERNAL MEDICINE
Payer: MEDICARE

## 2025-05-23 PROCEDURE — 97140 MANUAL THERAPY 1/> REGIONS: CPT

## 2025-05-23 PROCEDURE — 97110 THERAPEUTIC EXERCISES: CPT

## 2025-05-23 NOTE — TELEPHONE ENCOUNTER
Refill Request     CONFIRM preferred pharmacy with the patient.    If Mail Order Rx - Pend for 90 day refill.      Last Seen: Last Seen Department: 5/21/2025  Last Seen by PCP: 5/21/2025    Last Written: 1/30/25    If no future appointment scheduled:  Review the last OV with PCP and review information for follow-up visit,  Route STAFF MESSAGE with patient name to the  Pool for scheduling with the following information:            -  Timing of next visit           -  Visit type ie Physical, OV, etc           -  Diagnoses/Reason ie. COPD, HTN - Do not use MEDICATION, Follow-up or CHECK UP - Give reason for visit      Next Appointment:   Future Appointments   Date Time Provider Department Center   5/28/2025  1:40 PM Alfonso Villegas, PT [facility] EG PT [facility]   5/30/2025  1:40 PM June Villegas, [facility]   6/26/2025  1:45 PM Williams Cooper MD [facility]   8/21/2025 11:00 AM Harriet Almeida APRN - CNP [facility]       Message sent to  to schedule appt with patient?  NO      Requested Prescriptions     Pending Prescriptions Disp Refills    alendronate (FOSAMAX) 70 MG tablet 12 tablet 0     Sig: Take 1 tablet by mouth once a week

## 2025-05-23 NOTE — FLOWSHEET NOTE
[facility]- Outpatient Rehabilitation and Therapy [address], [place] [address] office: [phone] fax: [phone]     Physical Therapy: TREATMENT/PROGRESS NOTE   Patient: Thea Wright (69 y.o. female)   Examination Date: 2025   :  1955 MRN: [id number]   Visit #: 10   Insurance Allowable Auth Needed   MC + supp []Yes    [x]No    Insurance: Payor: MEDICARE / Plan: MEDICARE PART A AND B / Product Type: *No Product type* /   Insurance ID: [id number] - (Medicare)  Secondary Insurance (if applicable): HUMANA   Treatment Diagnosis:     ICD-10-CM    1. Acute pain of right shoulder  M25.511       2. Shoulder stiffness, right  M25.611       3. Weakness of right shoulder  R29.898          Medical Diagnosis:  Right shoulder pain [M25.511]   Referring Physician: Williams Cooper MD  PCP: Harriet Almeida APRN - CNP       Plan of care signed (Y/N):     Date of Patient follow up with Physician:      Progress Report/POC: NO  Progress note due: 2025 (OR 10 visits /OR AUTH LIMITS, whichever is less)  POC update due:  2025                    Medical History:  Comorbidities:  Cancer/Tumor  Osteoarthritis  Anxiety  Depression  Relevant Medical History: double masectomy                                         Precautions/ Contra-indications:           Latex allergy:  NO  Pacemaker:    NO  Contraindications for Manipulation: None  Date of Surgery: 3/18/2025 reverse shoulder                        Preferred Language for Healthcare:   [x] English       [] other:    SUBJECTIVE EXAMINATION     Patient stated complaint: Pt presented with no new symptoms.    No more than 5 lbs till next MD visit    From eval: Pt reports doing well after reverse shoulder 3/18/2025. Adhering to sling guidelines and not using arm. Done with pain meds.    OBJECTIVE EXAMINATION      Test used Initial score  2025   Pain Summary VAS 0-7 0   Functional questionnaire Quick DASH

## 2025-05-27 LAB
6MAM UR QL: NOT DETECTED
7AMINOCLONAZEPAM UR QL: NOT DETECTED
A-OH ALPRAZ UR QL: PRESENT
ALPHA-OH-MIDAZOLAM, URINE: NOT DETECTED
ALPRAZ UR QL: PRESENT
AMPHET UR QL SCN: NOT DETECTED
ANNOTATION COMMENT IMP: NORMAL
ANNOTATION COMMENT IMP: NORMAL
BARBITURATES UR QL: NEGATIVE
BUPRENORPHINE UR QL: NOT DETECTED
BZE UR QL: NEGATIVE
CARBOXYTHC UR QL: NORMAL
CARISOPRODOL UR QL: NEGATIVE
CLONAZEPAM UR QL: NOT DETECTED
CODEINE UR QL: NOT DETECTED
CREAT UR-MCNC: <20 MG/DL (ref 20–400)
DIAZEPAM UR QL: NOT DETECTED
ETHYL GLUCURONIDE UR QL: NEGATIVE
FENTANYL UR QL: NOT DETECTED
GABAPENTIN: PRESENT
HYDROCODONE UR QL: NOT DETECTED
HYDROMORPHONE UR QL: NOT DETECTED
LORAZEPAM UR QL: NOT DETECTED
MDA UR QL: NOT DETECTED
MDEA UR QL: NOT DETECTED
MDMA UR QL: NOT DETECTED
MEPERIDINE UR QL: NOT DETECTED
METHADONE UR QL: NEGATIVE
METHAMPHET UR QL: NOT DETECTED
MIDAZOLAM UR QL SCN: NOT DETECTED
MORPHINE UR QL: NOT DETECTED
NALOXONE: NOT DETECTED
NORBUPRENORPHINE UR QL CFM: NOT DETECTED
NORDIAZEPAM UR QL: NOT DETECTED
NORFENTANYL UR QL: NOT DETECTED
NORHYDROCODONE UR QL CFM: NOT DETECTED
NOROXYCODONE UR QL CFM: NOT DETECTED
NOROXYMORPHONE, URINE: NOT DETECTED
OXAZEPAM UR QL: NOT DETECTED
OXYCODONE UR QL: NOT DETECTED
OXYMORPHONE UR QL: NOT DETECTED
PATHOLOGY STUDY: NORMAL
PCP UR QL: NEGATIVE
PHENTERMINE UR QL: NOT DETECTED
PPAA UR QL: NOT DETECTED
PREGABALIN: NOT DETECTED
SERVICE CMNT-IMP: NORMAL
TAPENTADOL UR QL SCN: NOT DETECTED
TAPENTADOL-O-SULFATE, URINE: NOT DETECTED
TEMAZEPAM UR QL: NOT DETECTED
TRAMADOL UR QL: NEGATIVE
ZOLPIDEM UR QL: NOT DETECTED

## 2025-05-28 ENCOUNTER — HOSPITAL ENCOUNTER (OUTPATIENT)
Dept: PHYSICAL THERAPY | Age: 70
Setting detail: THERAPIES SERIES
Discharge: HOME | End: 2025-05-28
Attending: INTERNAL MEDICINE
Payer: MEDICARE

## 2025-05-28 PROCEDURE — 97110 THERAPEUTIC EXERCISES: CPT

## 2025-05-28 PROCEDURE — 97140 MANUAL THERAPY 1/> REGIONS: CPT

## 2025-05-28 NOTE — FLOWSHEET NOTE
Necessity Documentation:  I certify that this patient meets the below criteria necessary for medical necessity for care and/or justification of therapy services:  The patient has functional impairments and/or activity limitations and would benefit from continued outpatient therapy services to address the deficits outlined in the patients goals    Patient requires continued skilled intervention: [x] Yes  [] No      CHARGE CAPTURE     CPT Code (TIMED) minutes # CPT Code (UNTIMED) #     Therex (70228)  30 2  EVAL:LOW (75957 - Typically 20 minutes face-to-face)     Neuromusc. Re-ed (12648)    Re-Eval (34390)     Manual (26818) 10 1  VASO (83836)     Ther. Act (95936)    Estim Unattended (06525)     Gait (33360)    Dry Needle 1-2 muscle (42955)     Estim Attended (10870)    Dry Needle 3+ muscle (89900)     Iontophoresis (96678)    [facility] Traction (51699)     Ultrasound (36565)    Group Therapy (95800)     Other:    Other:    Total Timed Code Tx Minutes 40 3       Total Treatment Minutes 40     Charge Justification:  [x] (16225) THERAPEUTIC EXERCISE - Provided verbal/tactile cueing for activities related to strengthening, flexibility, endurance, ROM performed to prevent loss of range of motion, maintain or improve muscular strength or increase flexibility, following either an injury or surgery.   [x] (34244) NEUROMUSCULAR RE-EDUCATION - Therapeutic procedure, 1 or more areas, each 15 minutes; neuromuscular reeducation of movement, balance, coordination, kinesthetic sense, posture, and/or proprioception for sitting and/or standing activities  [x] (84303) MANUAL THERAPY -  Manual therapy techniques, 1 or more regions, each 15 minutes (Mobilization/manipulation, manual lymphatic drainage, manual traction) for the purpose of modulating pain, promoting relaxation,  increasing ROM, reducing/eliminating soft tissue swelling/inflammation/restriction, improving soft tissue extensibility and allowing for proper ROM for normal

## 2025-05-29 RX ORDER — ALENDRONATE SODIUM 70 MG/1
TABLET ORAL
Qty: 12 TABLET | Refills: 0 | Status: SHIPPED | OUTPATIENT
Start: 2025-05-29

## 2025-05-30 ENCOUNTER — HOSPITAL ENCOUNTER (OUTPATIENT)
Dept: PHYSICAL THERAPY | Age: 70
Setting detail: THERAPIES SERIES
End: 2025-05-30
Attending: INTERNAL MEDICINE
Payer: MEDICARE

## 2025-05-30 NOTE — FLOWSHEET NOTE
[facility]- Outpatient Rehabilitation and Therapy [address], [place] [address] office: [phone] fax: [phone]     Physical Therapy: TREATMENT/PROGRESS NOTE   Patient: Thea Wright (69 y.o. female)   Examination Date: 2025   :  1955 MRN: [id number]   Visit #: 12   Insurance Allowable Auth Needed   MC + supp []Yes    [x]No    Insurance: Payor: MEDICARE / Plan: MEDICARE PART A AND B / Product Type: *No Product type* /   Insurance ID: [id number] - (Medicare)  Secondary Insurance (if applicable): HUMANA   Treatment Diagnosis:     ICD-10-CM    1. Acute pain of right shoulder  M25.511       2. Shoulder stiffness, right  M25.611       3. Weakness of right shoulder  R29.898          Medical Diagnosis:  Right shoulder pain [M25.511]   Referring Physician: Williams Cooper MD  PCP: Harriet Almeida APRN - CNP       Plan of care signed (Y/N):     Date of Patient follow up with Physician:      Progress Report/POC: NO  Progress note due: 2025 (OR 10 visits /OR AUTH LIMITS, whichever is less)  POC update due:  2025                    Medical History:  Comorbidities:  Cancer/Tumor  Osteoarthritis  Anxiety  Depression  Relevant Medical History: double masectomy                                         Precautions/ Contra-indications:           Latex allergy:  NO  Pacemaker:    NO  Contraindications for Manipulation: None  Date of Surgery: 3/18/2025 reverse shoulder                        Preferred Language for Healthcare:   [x] English       [] other:    SUBJECTIVE EXAMINATION     Patient stated complaint: Pt reports shoulder doing good. Upper trap is bothering her.    No more than 5 lbs till next MD visit    From eval: Pt reports doing well after reverse shoulder 3/18/2025. Adhering to sling guidelines and not using arm. Done with pain meds.    OBJECTIVE EXAMINATION      Test used Initial score  2025   Pain Summary VAS 0-7 2   Functional

## 2025-06-06 DIAGNOSIS — F51.01 PRIMARY INSOMNIA: ICD-10-CM

## 2025-06-06 NOTE — TELEPHONE ENCOUNTER
Refill Request - Controlled Substance    CONFIRM preferred pharmacy with the patient.    If Mail Order Rx - Pend for 90 day refill.        Last Seen Department: 5/21/2025  Last Seen by PCP: 5/21/2025    Last Written: 3/31/25 60 with no refills     Last UDS: 5/21/25    Med Agreement Signed On: 5/21/25    If no future appointment scheduled:  Review the last OV with PCP and review information for follow-up visit,  Route STAFF MESSAGE with patient name to the  Pool for scheduling with the following information:            -  Timing of next visit           -  Visit type ie Physical, OV, etc           -  Diagnoses/Reason ie. COPD, HTN - Do not use MEDICATION, Follow-up or CHECK UP - Give reason for visit        Next Appointment:   Future Appointments   Date Time Provider Department Center   6/26/2025  1:45 PM Williams Cooper MD AND [facility]   8/21/2025 11:00 AM Harriet Almeida, APRN - CNP [facility] DEP       Message sent to  to schedule appt with patient?  NO      Requested Prescriptions     Pending Prescriptions Disp Refills    ALPRAZolam (XANAX) 1 MG tablet [Pharmacy Med Name: ALPRAZolam 1 MG Oral Tablet] 60 tablet 0     Sig: TAKE 1 TABLET BY MOUTH TWICE DAILY AS NEEDED FOR SLEEP

## 2025-06-08 DIAGNOSIS — F33.1 MAJOR DEPRESSIVE DISORDER, RECURRENT EPISODE, MODERATE (HCC): ICD-10-CM

## 2025-06-09 RX ORDER — ALPRAZOLAM 1 MG/1
TABLET ORAL
Qty: 60 TABLET | Refills: 0 | Status: SHIPPED | OUTPATIENT
Start: 2025-06-09 | End: 2025-07-09

## 2025-06-09 RX ORDER — SERTRALINE HYDROCHLORIDE 100 MG/1
TABLET, FILM COATED ORAL
Qty: 135 TABLET | Refills: 1 | Status: SHIPPED | OUTPATIENT
Start: 2025-06-09

## 2025-06-09 NOTE — TELEPHONE ENCOUNTER
.Refill Request     CONFIRM preferred pharmacy with the patient.    If Mail Order Rx - Pend for 90 day refill.      Last Seen: Last Seen Department: 5/21/2025  Last Seen by PCP: Visit date not found    Last Written: 3-10-25 135 with 0     If no future appointment scheduled:  Review the last OV with PCP and review information for follow-up visit,  Route STAFF MESSAGE with patient name to the  Pool for scheduling with the following information:            -  Timing of next visit           -  Visit type ie Physical, OV, etc           -  Diagnoses/Reason ie. COPD, HTN - Do not use MEDICATION, Follow-up or CHECK UP - Give reason for visit      Next Appointment:   Future Appointments   Date Time Provider Department Center   6/26/2025  1:45 PM Williams Cooper MD AND [facility]   8/21/2025 11:00 AM Harriet Almeida, APRN - CNP [facility] DE[facility]       Message sent to  to schedule appt with patient?  NO      Requested Prescriptions     Pending Prescriptions Disp Refills    sertraline (ZOLOFT) 100 MG tablet [Pharmacy Med Name: Sertraline HCl 100 MG Oral Tablet] 135 tablet 1     Sig: TAKE 1 & 1/2 (ONE & ONE-HALF) TABLETS BY MOUTH ONCE DAILY

## 2025-06-12 ENCOUNTER — HOSPITAL ENCOUNTER (OUTPATIENT)
Dept: PHYSICAL THERAPY | Age: 70
Setting detail: THERAPIES SERIES
Discharge: HOME | End: 2025-06-12
Attending: INTERNAL MEDICINE
Payer: MEDICARE

## 2025-06-12 PROCEDURE — 97140 MANUAL THERAPY 1/> REGIONS: CPT

## 2025-06-12 PROCEDURE — 97110 THERAPEUTIC EXERCISES: CPT

## 2025-06-12 NOTE — FLOWSHEET NOTE
progressing as expected and requires additional follow up with physician  [] Other:     TREATMENT PLAN     Frequency/Duration: 1-2x/week for  12  weeks for the following treatment interventions:    Interventions:  [x] Therapeutic exercise including: strength training, ROM, including postural re-education.   [x] NMR activation and proprioception, including postural re-education.    [x] Manual therapy as indicated to include: PROM, Gr I-IV mobilizations, and STM  [x] Modalities as needed that may include: Vasoneumatic Compression  [x] Patient education on joint protection, postural re-education, activity modification, progression of HEP.          Plan: Cont POC- Continue emphasis/focus on exercise progression, improving proper muscle recruitment and activation/motor control patterns, modulating pain, promoting relaxation, and increasing ROM. Next visit plan to progress weights, progress reps, and add new exercises     Electronically Signed by MANJU Membreno  Date: 06/12/2025       Note: Portions of this note have been templated and/or copied from initial evaluation, reassessments and prior notes for documentation efficiency.    Note: If patient does not return for scheduled/recommended follow up visits, this note will serve as a discharge from care along with the most recent update on progress.    Ortho Evaluation

## 2025-06-17 ENCOUNTER — HOSPITAL ENCOUNTER (OUTPATIENT)
Dept: PHYSICAL THERAPY | Age: 70
Setting detail: THERAPIES SERIES
Discharge: HOME | End: 2025-06-17
Attending: INTERNAL MEDICINE
Payer: MEDICARE

## 2025-06-17 PROCEDURE — 97110 THERAPEUTIC EXERCISES: CPT

## 2025-06-17 PROCEDURE — 97140 MANUAL THERAPY 1/> REGIONS: CPT

## 2025-06-17 NOTE — FLOWSHEET NOTE
[facility]- Outpatient Rehabilitation and Therapy [address], [place] [address] office: [phone] fax: [phone]     Physical Therapy: TREATMENT/PROGRESS NOTE   Patient: Thea Wright (70 y.o. female)   Examination Date: 2025   :  1955 MRN: [id number]   Visit #: 13   Insurance Allowable Auth Needed   MC + supp []Yes    [x]No    Insurance: Payor: MEDICARE / Plan: MEDICARE PART A AND B / Product Type: *No Product type* /   Insurance ID: [id number] - (Medicare)  Secondary Insurance (if applicable): HUMANA   Treatment Diagnosis:     ICD-10-CM    1. Acute pain of right shoulder  M25.511       2. Shoulder stiffness, right  M25.611       3. Weakness of right shoulder  R29.898          Medical Diagnosis:  Right shoulder pain [M25.511]   Referring Physician: Williams Cooper MD  PCP: Harriet Almeida APRN - CNP       Plan of care signed (Y/N):     Date of Patient follow up with Physician:      Progress Report/POC: YES, Date Range for this report: 2025 to 2025  Progress note due: 2025 (OR 10 visits /OR AUTH LIMITS, whichever is less)  POC update due:  2025                    Medical History:  Comorbidities:  Cancer/Tumor  Osteoarthritis  Anxiety  Depression  Relevant Medical History: double masectomy                                         Precautions/ Contra-indications:           Latex allergy:  NO  Pacemaker:    NO  Contraindications for Manipulation: None  Date of Surgery: 3/18/2025 reverse shoulder                        Preferred Language for Healthcare:   [x] English       [] other:    SUBJECTIVE EXAMINATION     Patient stated complaint: Pt reports shoulder feels good.    No more than 5 lbs till next MD visit    From eval: Pt reports doing well after reverse shoulder 3/18/2025. Adhering to sling guidelines and not using arm. Done with pain meds.    OBJECTIVE EXAMINATION      Test used Initial score  2025   Pain Summary

## 2025-06-26 ENCOUNTER — OFFICE VISIT (OUTPATIENT)
Dept: ORTHOPEDIC SURGERY | Age: 70
End: 2025-06-26
Payer: MEDICARE

## 2025-06-26 VITALS — WEIGHT: 145 LBS | HEIGHT: 64 IN | BODY MASS INDEX: 24.75 KG/M2

## 2025-06-26 DIAGNOSIS — Z96.611 STATUS POST SHOULDER REPLACEMENT, RIGHT: Primary | ICD-10-CM

## 2025-06-26 PROCEDURE — G8417 CALC BMI ABV UP PARAM F/U: HCPCS | Performed by: ORTHOPAEDIC SURGERY

## 2025-06-26 PROCEDURE — 1090F PRES/ABSN URINE INCON ASSESS: CPT | Performed by: ORTHOPAEDIC SURGERY

## 2025-06-26 PROCEDURE — 4004F PT TOBACCO SCREEN RCVD TLK: CPT | Performed by: ORTHOPAEDIC SURGERY

## 2025-06-26 PROCEDURE — G8427 DOCREV CUR MEDS BY ELIG CLIN: HCPCS | Performed by: ORTHOPAEDIC SURGERY

## 2025-06-26 PROCEDURE — 1159F MED LIST DOCD IN RCRD: CPT | Performed by: ORTHOPAEDIC SURGERY

## 2025-06-26 PROCEDURE — 3017F COLORECTAL CA SCREEN DOC REV: CPT | Performed by: ORTHOPAEDIC SURGERY

## 2025-06-26 PROCEDURE — 1123F ACP DISCUSS/DSCN MKR DOCD: CPT | Performed by: ORTHOPAEDIC SURGERY

## 2025-06-26 PROCEDURE — 99213 OFFICE O/P EST LOW 20 MIN: CPT | Performed by: ORTHOPAEDIC SURGERY

## 2025-06-26 PROCEDURE — G8399 PT W/DXA RESULTS DOCUMENT: HCPCS | Performed by: ORTHOPAEDIC SURGERY

## 2025-06-26 NOTE — PROGRESS NOTES
POST OPERATIVE ORTHOPAEDIC NOTE    DOS: 3/18/2025  PROCEDURES: Right reverse total shoulder replacement    The patient has been recovering. The patient states the pain is 0-1/10 pain.  The patient has continued PT. she is pleased with the results of the procedure thus far.  She denies significant pain or limitations as she had preoperatively    Focused pertinent physical examination of the operative extremity:  Wound C/D/I, well healed surgical incision  170 degrees forward flexion with slight scapular combination, 30 degrees external rotation, internal rotation sacrum/L5  Good cosmesis on biceps firing  Skin intact throughout  5/5 D B T G IO EPL  SILT Ax, R, U, M  +2 radial pulse    Radiographs: 4 view right shoulder 6/26/2025: Satisfactory implant placement with continued bony ingrowth/incorporation.  Reduced glenosphere     Diagnosis Orders   1. Status post shoulder replacement, right  XR SHOULDER RIGHT (MIN 2 VIEWS)        Assessment and plan: The patient is now > 3 months status post the above listed procedure and is recovering    .    --Greater than 50% of the time (11/20 minutes) was spent coordinating care and discussing postoperative recovery course  - I had a pleasant discussion with the patient today.  I did review with her that she is doing quite well with her overall functional return and range of motion and she is quite pleased with the results thus far as am I  - She states that she recently got her teeth cleaned/implants evaluated.  I did remind her to contact the office for dental prophylaxis.  She has a penicillin allergy and so we would utilize clindamycin 900 mg p.o. x 1 within 1 hour prior to dental procedure.  She states that she will do so  - OTC Tylenol per bottle as needed discomfort  - I did review with her overall implant inherent limitations to include significant internal rotation.  She will use her left upper extremity for hygiene with regard to bathroom privileges.  She will also limit

## 2025-07-11 ENCOUNTER — HOSPITAL ENCOUNTER (OUTPATIENT)
Dept: PHYSICAL THERAPY | Age: 70
Setting detail: THERAPIES SERIES
Discharge: HOME | End: 2025-07-11
Attending: INTERNAL MEDICINE
Payer: MEDICARE

## 2025-07-11 PROCEDURE — 97110 THERAPEUTIC EXERCISES: CPT

## 2025-07-11 NOTE — FLOWSHEET NOTE
[facility]- Outpatient Rehabilitation and Therapy [address], [place] [address] office: [phone] fax: [phone]     Physical Therapy: TREATMENT/PROGRESS NOTE   Patient: Thea rWight (70 y.o. female)   Examination Date: 2025   :  1955 MRN: [id number]   Visit #: 14   Insurance Allowable Auth Needed   MC + supp []Yes    [x]No    Insurance: Payor: MEDICARE / Plan: MEDICARE PART A AND B / Product Type: *No Product type* /   Insurance ID: [id number] - (Medicare)  Secondary Insurance (if applicable): HUMANA   Treatment Diagnosis:     ICD-10-CM    1. Acute pain of right shoulder  M25.511       2. Shoulder stiffness, right  M25.611       3. Weakness of right shoulder  R29.898          Medical Diagnosis:  Right shoulder pain [M25.511]   Referring Physician: Williams Cooper MD  PCP: Harriet Almeida APRN - CNP       Plan of care signed (Y/N):     Date of Patient follow up with Physician:      Progress Report/POC: YES, Date Range for this report: 2025 to 2025  Progress note due: 2025 (OR 10 visits /OR AUTH LIMITS, whichever is less)  POC update due:  2025                    Medical History:  Comorbidities:  Cancer/Tumor  Osteoarthritis  Anxiety  Depression  Relevant Medical History: double masectomy                                         Precautions/ Contra-indications:           Latex allergy:  NO  Pacemaker:    NO  Contraindications for Manipulation: None  Date of Surgery: 3/18/2025 reverse shoulder                        Preferred Language for Healthcare:   [x] English       [] other:    SUBJECTIVE EXAMINATION     Patient stated complaint: Pt reports shoulder feels great and MD appointment went well. She is ready to be done with PT and do HEP on her own.    No more than 5 lbs till next MD visit    From eval: Pt reports doing well after reverse shoulder 3/18/2025. Adhering to sling guidelines and not using arm. Done with pain

## 2025-07-18 DIAGNOSIS — E78.00 HYPERCHOLESTEROLEMIA: ICD-10-CM

## 2025-07-18 RX ORDER — SPIRONOLACTONE 50 MG/1
50 TABLET, FILM COATED ORAL DAILY
Qty: 90 TABLET | Refills: 1 | Status: SHIPPED | OUTPATIENT
Start: 2025-07-18

## 2025-07-18 RX ORDER — PROPRANOLOL HYDROCHLORIDE 40 MG/1
40 TABLET ORAL DAILY
Qty: 90 TABLET | Refills: 0 | Status: SHIPPED | OUTPATIENT
Start: 2025-07-18

## 2025-07-18 NOTE — TELEPHONE ENCOUNTER
Refill Request     CONFIRM preferred pharmacy with the patient.    If Mail Order Rx - Pend for 90 day refill.      Last Seen: Last Seen Department: 5/21/2025  Last Seen by PCP: 9/18/2024    Last Written: 10/24/2024    If no future appointment scheduled:  Review the last OV with PCP and review information for follow-up visit,  Route STAFF MESSAGE with patient name to the  Pool for scheduling with the following information:            -  Timing of next visit           -  Visit type ie Physical, OV, etc           -  Diagnoses/Reason ie. COPD, HTN - Do not use MEDICATION, Follow-up or CHECK UP - Give reason for visit      Next Appointment:   Future Appointments   Date Time Provider Department Center   8/21/2025 11:00 AM Harriet Almeida APRN - CNP [facility]   9/22/2025  1:15 PM Williams Cooper MD [facility]       Message sent to  to schedule appt with patient?  NO      Requested Prescriptions     Pending Prescriptions Disp Refills    spironolactone (ALDACTONE) 50 MG tablet [Pharmacy Med Name: Spironolactone 50 MG Oral Tablet] 90 tablet 1     Sig: Take 1 tablet by mouth once daily

## 2025-07-18 NOTE — TELEPHONE ENCOUNTER
Refill Request     CONFIRM preferred pharmacy with the patient.    If Mail Order Rx - Pend for 90 day refill.      Last Seen: Last Seen Department: 5/21/2025  Last Seen by PCP: 5/21/2025    Last Written: 4/9/2025    If no future appointment scheduled:  Review the last OV with PCP and review information for follow-up visit,  Route STAFF MESSAGE with patient name to the  Pool for scheduling with the following information:            -  Timing of next visit           -  Visit type ie Physical, OV, etc           -  Diagnoses/Reason ie. COPD, HTN - Do not use MEDICATION, Follow-up or CHECK UP - Give reason for visit      Next Appointment:   Future Appointments   Date Time Provider Department Center   8/21/2025 11:00 AM Harriet Almeida APRN - CNP [facility]   9/22/2025  1:15 PM Williams Cooper MD [facility]       Message sent to  to schedule appt with patient?  NO      Requested Prescriptions     Pending Prescriptions Disp Refills    propranolol (INDERAL) 40 MG immediate release tablet [Pharmacy Med Name: Propranolol HCl 40 MG Oral Tablet] 90 tablet 0     Sig: Take 1 tablet by mouth once daily

## 2025-07-25 DIAGNOSIS — F51.01 PRIMARY INSOMNIA: ICD-10-CM

## 2025-07-25 RX ORDER — ALPRAZOLAM 1 MG/1
TABLET ORAL
Qty: 60 TABLET | Refills: 0 | Status: SHIPPED | OUTPATIENT
Start: 2025-07-25 | End: 2025-08-24

## 2025-07-25 NOTE — TELEPHONE ENCOUNTER
Refill Request - Controlled Substance    CONFIRM preferred pharmacy with the patient.    If Mail Order Rx - Pend for 90 day refill.        Last Seen Department: 5/21/2025  Last Seen by PCP: 5/21/2025    Last Written: 6/9/25 60 with no refills     Last UDS: 5/21/25    Med Agreement Signed On: 5/21/25    If no future appointment scheduled:  Review the last OV with PCP and review information for follow-up visit,  Route STAFF MESSAGE with patient name to the  Pool for scheduling with the following information:            -  Timing of next visit           -  Visit type ie Physical, OV, etc           -  Diagnoses/Reason ie. COPD, HTN - Do not use MEDICATION, Follow-up or CHECK UP - Give reason for visit        Next Appointment:   Future Appointments   Date Time Provider Department Center   8/21/2025 11:00 AM Harriet Almeida APRN - CNP [facility]   9/22/2025  1:15 PM Williams Cooper MD [facility]       Message sent to  to schedule appt with patient?  NO      Requested Prescriptions     Pending Prescriptions Disp Refills    ALPRAZolam (XANAX) 1 MG tablet [Pharmacy Med Name: ALPRAZolam 1 MG Oral Tablet] 60 tablet 0     Sig: TAKE 1 TABLET BY MOUTH TWICE DAILY AS NEEDED FOR SLEEP

## 2025-08-15 RX ORDER — VALACYCLOVIR HYDROCHLORIDE 500 MG/1
500 TABLET, FILM COATED ORAL DAILY
Qty: 90 TABLET | Refills: 0 | Status: SHIPPED | OUTPATIENT
Start: 2025-08-15

## 2025-08-19 ENCOUNTER — HOSPITAL ENCOUNTER (OUTPATIENT)
Dept: LAB | Age: 70
Discharge: HOME | End: 2025-08-19
Payer: MEDICARE

## 2025-08-19 LAB
ALBUMIN SERPL-MCNC: 4.3 G/DL (ref 3.4–5)
ALBUMIN/GLOB SERPL: 1.3 {RATIO} (ref 1.1–2.2)
ALP SERPL-CCNC: 100 U/L (ref 40–129)
ALT SERPL-CCNC: 14 U/L (ref 10–40)
ANION GAP SERPL CALCULATED.3IONS-SCNC: 12 MMOL/L (ref 3–16)
AST SERPL-CCNC: 24 U/L (ref 15–37)
BASOPHILS # BLD: 0.1 K/UL (ref 0–0.2)
BASOPHILS NFR BLD: 0.6 %
BILIRUB SERPL-MCNC: 0.4 MG/DL (ref 0–1)
BUN SERPL-MCNC: 7 MG/DL (ref 7–20)
CALCIUM SERPL-MCNC: 9.9 MG/DL (ref 8.3–10.6)
CHLORIDE SERPL-SCNC: 104 MMOL/L (ref 99–110)
CO2 SERPL-SCNC: 23 MMOL/L (ref 21–32)
CREAT SERPL-MCNC: 0.8 MG/DL (ref 0.6–1.2)
CRP SERPL-MCNC: <3 MG/L (ref 0–5.1)
DEPRECATED RDW RBC AUTO: 21.5 % (ref 12.4–15.4)
EOSINOPHIL # BLD: 0.3 K/UL (ref 0–0.6)
EOSINOPHIL NFR BLD: 2.6 %
ERYTHROCYTE [SEDIMENTATION RATE] IN BLOOD BY WESTERGREN METHOD: 50 MM/HR (ref 0–30)
GFR SERPLBLD CREATININE-BSD FMLA CKD-EPI: 79 ML/MIN/{1.73_M2}
GLUCOSE SERPL-MCNC: 120 MG/DL (ref 70–99)
HCT VFR BLD AUTO: 37.7 % (ref 36–48)
HGB BLD-MCNC: 12 G/DL (ref 12–16)
IGA SERPL-MCNC: 253 MG/DL (ref 70–400)
LYMPHOCYTES # BLD: 3.9 K/UL (ref 1–5.1)
LYMPHOCYTES NFR BLD: 30.1 %
MCH RBC QN AUTO: 27.1 PG (ref 26–34)
MCHC RBC AUTO-ENTMCNC: 31.8 G/DL (ref 31–36)
MCV RBC AUTO: 85.2 FL (ref 80–100)
MONOCYTES # BLD: 0.9 K/UL (ref 0–1.3)
MONOCYTES NFR BLD: 7.3 %
NEUTROPHILS # BLD: 7.6 K/UL (ref 1.7–7.7)
NEUTROPHILS NFR BLD: 59.4 %
PLATELET # BLD AUTO: 607 K/UL (ref 135–450)
PMV BLD AUTO: 7.8 FL (ref 5–10.5)
POTASSIUM SERPL-SCNC: 4.3 MMOL/L (ref 3.5–5.1)
PROT SERPL-MCNC: 7.5 G/DL (ref 6.4–8.2)
RBC # BLD AUTO: 4.42 M/UL (ref 4–5.2)
SODIUM SERPL-SCNC: 139 MMOL/L (ref 136–145)
T4 FREE SERPL-MCNC: 1.2 NG/DL (ref 0.9–1.8)
TSH SERPL DL<=0.005 MIU/L-ACNC: 0.91 UIU/ML (ref 0.27–4.2)
WBC # BLD AUTO: 12.9 K/UL (ref 4–11)

## 2025-08-19 PROCEDURE — 85652 RBC SED RATE AUTOMATED: CPT

## 2025-08-19 PROCEDURE — 82784 ASSAY IGA/IGD/IGG/IGM EACH: CPT

## 2025-08-19 PROCEDURE — 86140 C-REACTIVE PROTEIN: CPT

## 2025-08-19 PROCEDURE — 36415 COLL VENOUS BLD VENIPUNCTURE: CPT

## 2025-08-19 PROCEDURE — 84443 ASSAY THYROID STIM HORMONE: CPT

## 2025-08-19 PROCEDURE — 80053 COMPREHEN METABOLIC PANEL: CPT

## 2025-08-19 PROCEDURE — 85025 COMPLETE CBC W/AUTO DIFF WBC: CPT

## 2025-08-19 PROCEDURE — 84439 ASSAY OF FREE THYROXINE: CPT

## 2025-08-19 PROCEDURE — 83516 IMMUNOASSAY NONANTIBODY: CPT

## 2025-08-20 LAB — TISSUE TRANSGLUTAMINASE IGA: <0.5 U/ML (ref 0–14)

## 2025-08-21 ENCOUNTER — OFFICE VISIT (OUTPATIENT)
Dept: FAMILY MEDICINE CLINIC | Age: 70
End: 2025-08-21

## 2025-08-21 VITALS
DIASTOLIC BLOOD PRESSURE: 80 MMHG | OXYGEN SATURATION: 97 % | BODY MASS INDEX: 24.97 KG/M2 | HEART RATE: 74 BPM | WEIGHT: 143.2 LBS | SYSTOLIC BLOOD PRESSURE: 128 MMHG

## 2025-08-21 DIAGNOSIS — R10.11 RIGHT UPPER QUADRANT ABDOMINAL PAIN: Primary | ICD-10-CM

## 2025-08-21 DIAGNOSIS — F51.05 INSOMNIA DUE TO OTHER MENTAL DISORDER: ICD-10-CM

## 2025-08-21 DIAGNOSIS — F99 INSOMNIA DUE TO OTHER MENTAL DISORDER: ICD-10-CM

## 2025-08-21 RX ORDER — ONDANSETRON 4 MG/1
4 TABLET, ORALLY DISINTEGRATING ORAL 3 TIMES DAILY PRN
Qty: 21 TABLET | Refills: 0 | Status: SHIPPED | OUTPATIENT
Start: 2025-08-21

## 2025-08-21 RX ORDER — MIRTAZAPINE 7.5 MG/1
7.5 TABLET, FILM COATED ORAL NIGHTLY
Qty: 30 TABLET | Refills: 1 | Status: SHIPPED | OUTPATIENT
Start: 2025-08-21

## 2025-08-21 ASSESSMENT — PATIENT HEALTH QUESTIONNAIRE - PHQ9
SUM OF ALL RESPONSES TO PHQ QUESTIONS 1-9: 6
SUM OF ALL RESPONSES TO PHQ QUESTIONS 1-9: 6
10. IF YOU CHECKED OFF ANY PROBLEMS, HOW DIFFICULT HAVE THESE PROBLEMS MADE IT FOR YOU TO DO YOUR WORK, TAKE CARE OF THINGS AT HOME, OR GET ALONG WITH OTHER PEOPLE: NOT DIFFICULT AT ALL
7. TROUBLE CONCENTRATING ON THINGS, SUCH AS READING THE NEWSPAPER OR WATCHING TELEVISION: NOT AT ALL
5. POOR APPETITE OR OVEREATING: NOT AT ALL
SUM OF ALL RESPONSES TO PHQ QUESTIONS 1-9: 6
3. TROUBLE FALLING OR STAYING ASLEEP: NEARLY EVERY DAY
6. FEELING BAD ABOUT YOURSELF - OR THAT YOU ARE A FAILURE OR HAVE LET YOURSELF OR YOUR FAMILY DOWN: NOT AT ALL
4. FEELING TIRED OR HAVING LITTLE ENERGY: NEARLY EVERY DAY
9. THOUGHTS THAT YOU WOULD BE BETTER OFF DEAD, OR OF HURTING YOURSELF: NOT AT ALL
2. FEELING DOWN, DEPRESSED OR HOPELESS: NOT AT ALL
1. LITTLE INTEREST OR PLEASURE IN DOING THINGS: NOT AT ALL
8. MOVING OR SPEAKING SO SLOWLY THAT OTHER PEOPLE COULD HAVE NOTICED. OR THE OPPOSITE, BEING SO FIGETY OR RESTLESS THAT YOU HAVE BEEN MOVING AROUND A LOT MORE THAN USUAL: NOT AT ALL
SUM OF ALL RESPONSES TO PHQ QUESTIONS 1-9: 6

## 2025-08-21 ASSESSMENT — ENCOUNTER SYMPTOMS
WHEEZING: 0
COUGH: 0
DIARRHEA: 0
SHORTNESS OF BREATH: 0
VOMITING: 0
NAUSEA: 1
CHEST TIGHTNESS: 0
CONSTIPATION: 0
ABDOMINAL PAIN: 1

## 2025-08-28 ENCOUNTER — HOSPITAL ENCOUNTER (OUTPATIENT)
Dept: ULTRASOUND IMAGING | Age: 70
Discharge: HOME | End: 2025-08-28
Payer: MEDICARE

## 2025-08-28 DIAGNOSIS — R10.11 RIGHT UPPER QUADRANT ABDOMINAL PAIN: ICD-10-CM

## 2025-08-28 PROCEDURE — 76705 ECHO EXAM OF ABDOMEN: CPT

## 2025-08-29 ENCOUNTER — HOSPITAL ENCOUNTER (OUTPATIENT)
Dept: LAB | Age: 70
Discharge: HOME | End: 2025-08-29
Payer: MEDICARE

## 2025-08-29 LAB — C DIFF TOX A+B STL QL IA: NORMAL

## 2025-08-29 PROCEDURE — 87324 CLOSTRIDIUM AG IA: CPT

## 2025-08-29 PROCEDURE — 87506 IADNA-DNA/RNA PROBE TQ 6-11: CPT

## 2025-08-29 PROCEDURE — 87336 ENTAMOEB HIST DISPR AG IA: CPT

## 2025-08-29 PROCEDURE — 82653 EL-1 FECAL QUANTITATIVE: CPT

## 2025-08-29 PROCEDURE — 87449 NOS EACH ORGANISM AG IA: CPT

## 2025-08-29 PROCEDURE — 87328 CRYPTOSPORIDIUM AG IA: CPT

## 2025-08-29 PROCEDURE — 83993 ASSAY FOR CALPROTECTIN FECAL: CPT

## 2025-08-30 LAB
CRYPTOSP AG STL QL IA: NORMAL
E HISTOLYT AG STL QL IA: NORMAL
G LAMBLIA AG STL QL IA: NORMAL
GI PATHOGENS PNL STL NAA+PROBE: NORMAL

## 2025-09-01 LAB
CALPROTECTIN STL-MCNT: 86 UG/G
ELASTASE PANC STL-MCNT: >800 UG/G

## (undated) DEVICE — SUTURE VCRL + SZ 3-0 L27IN ABSRB UD L26MM SH 1/2 CIR VCP416H

## (undated) DEVICE — BLADE ES L4IN INSUL EDGE

## (undated) DEVICE — UNDERGLOVE SURG SZ 8 FNGR THK0.21MIL GRN LTX BEAD CUF

## (undated) DEVICE — ELECTRODE NDL L2.8IN COAT LO PWR SET EDGE

## (undated) DEVICE — CLIP LIG M BLU TI HRT SHP WIRE HORZ 600 PER BX

## (undated) DEVICE — 96"PROBE COVER (US)10PK: Brand: SITE-RITE

## (undated) DEVICE — GLOVE ORANGE PI 7 1/2   MSG9075

## (undated) DEVICE — ELECTRODE ECG MONITR FOAM TEAR DROP ADLT RED

## (undated) DEVICE — Device

## (undated) DEVICE — SUTURE FIBERWIRE SZ 2 W/ TAPERED NEEDLE BLUE L38IN NONABSORB BLU L26.5MM 1/2 CIRCLE AR7200

## (undated) DEVICE — SCISSORS SURG DIA8MM MPLR CRV ENDOWRIST

## (undated) DEVICE — APPLICATOR MEDICATED 3 CC SOLUTION HI LT ORNG CHLORAPREP 930415

## (undated) DEVICE — GLOVE ORANGE PI 8   MSG9080

## (undated) DEVICE — SUTURE VICRYL SZ 2-0 L18IN ABSRB UD CT-1 L36MM 1/2 CIR J839D

## (undated) DEVICE — T-MAX DISPOSABLE FACE MASK 8 PER BOX

## (undated) DEVICE — YANKAUER,OPEN TIP,W/O VENT,STERILE: Brand: MEDLINE INDUSTRIES, INC.

## (undated) DEVICE — TUBING SUCT 10FR MAL ALUM SHFT FN CAP VENT UNIV CONN W/ OBT

## (undated) DEVICE — STAPLER INT DIA29MM CLS STPL H1.5-2.2MM OPN LEG L5.2MM 26

## (undated) DEVICE — DRAPE BEACH CHAIR SHOULDER W/ POUCH 172X100 IN

## (undated) DEVICE — ADHESIVE SKIN CLSR 0.7ML TOP DERMBND ADV

## (undated) DEVICE — GOWN,SIRUS,NON REINFRCD,LARGE,SET IN SL: Brand: MEDLINE

## (undated) DEVICE — CANNULA SEAL

## (undated) DEVICE — OCCLUSIVE GAUZE STRIP,3% BISMUTH TRIBROMOPHENATE IN PETROLATUM BLEND: Brand: XEROFORM

## (undated) DEVICE — STAPLER EXT SKIN 35 WIDE S STL STPL SQUEEZE HNDL VISISTAT

## (undated) DEVICE — SUTURE PERMAHAND SZ 2-0 L30IN NONABSORBABLE BLK SILK W/O A305H

## (undated) DEVICE — BANDAGE COMPR W4INXL12FT E DISP ESMARCH EVEN

## (undated) DEVICE — PENCIL ES CRD L10FT HND SWCHING ROCK SWCH W/ EDGE COAT BLDE

## (undated) DEVICE — DRESSING GERM 6-12FR DIA1IN CNTR H 4MM ANTIMIC PROTCT DISK

## (undated) DEVICE — FORCEP BX STD CAP 240CM RAD JAW 4

## (undated) DEVICE — ENDO CARRY-ON PROCEDURE KIT INCLUDES SUCTION TUBING, LUBRICANT, GAUZE, BIOHAZARD STICKER, TRANSPORT PAD AND INTERCEPT BEDSIDE KIT.: Brand: ENDO CARRY-ON PROCEDURE KIT

## (undated) DEVICE — SLING ARM L ABV 13IN DE-ROTATION STRP HOOKS PROVIDE IMMOB

## (undated) DEVICE — TIP COVER ACCESSORY

## (undated) DEVICE — OPTIFOAM GENTLE SA, POSTOP, 4X10: Brand: MEDLINE

## (undated) DEVICE — SUTURE MCRYL + SZ 4-0 L18IN ABSRB UD L19MM PS-2 3/8 CIR MCP496G

## (undated) DEVICE — SOLUTION IV IRRIG 500ML 0.9% SODIUM CHL 2F7123

## (undated) DEVICE — SWITCH DRAPE TENET 7633

## (undated) DEVICE — BLADE ES ELASTOMERIC COAT INSUL DURABLE BEND UPTO 90DEG

## (undated) DEVICE — DRILL BIT 2.0MM (5/64'') X 128.0MM

## (undated) DEVICE — SPONGE LAP W18XL18IN WHT COT 4 PLY FLD STRUNG RADPQ DISP ST

## (undated) DEVICE — GLOVE,SURG,SENSICARE SLT,LF,PF,7.5: Brand: MEDLINE

## (undated) DEVICE — YANKAUER,BULB TIP,W/O VENT,RIGID,STERILE: Brand: MEDLINE

## (undated) DEVICE — [HIGH FLOW INSUFFLATOR,  DO NOT USE IF PACKAGE IS DAMAGED,  KEEP DRY,  KEEP AWAY FROM SUNLIGHT,  PROTECT FROM HEAT AND RADIOACTIVE SOURCES.]: Brand: PNEUMOSURE

## (undated) DEVICE — GOWN SIRUS NONREIN LG W/TWL: Brand: MEDLINE INDUSTRIES, INC.

## (undated) DEVICE — SUTURE STRATAFIX SPRL SZ 2-0 L14IN ABSRB VLT MH L36MM 1/2 SXPD2B412

## (undated) DEVICE — HANDPIECE SET WITH HIGH FLOW TIP AND SUCTION TUBE: Brand: INTERPULSE

## (undated) DEVICE — GOWN,REINF,POLY,AURORA,XLNG/XXL,STRL: Brand: MEDLINE

## (undated) DEVICE — SUTURE MONOCRYL STRATAFIX SPRL SZ 3-0 L12IN ABSRB UD FS-1 L30X30CM SXMP2B410

## (undated) DEVICE — TOTAL SHOULDER: Brand: MEDLINE INDUSTRIES, INC.

## (undated) DEVICE — CONMED SCOPE SAVER BITE BLOCK, 20X27 MM: Brand: SCOPE SAVER

## (undated) DEVICE — SUTURE ETHLN SZ 2-0 L18IN NONABSORBABLE BLK L26MM FS 3/8 664G

## (undated) DEVICE — STAPLER EXT 65MM S STL AUTO DISP PURSTRING

## (undated) DEVICE — STAPLER 60: Brand: SUREFORM

## (undated) DEVICE — SYSTEM SMK EVAC LAP TBNG FILTER HSNG BENT STYL PNK SEE CLR

## (undated) DEVICE — 3M™ STERI-STRIP™ REINFORCED ADHESIVE SKIN CLOSURES, R1547, 1/2 IN X 4 IN (12 MM X 100 MM), 6 STRIPS/ENVELOPE: Brand: 3M™ STERI-STRIP™

## (undated) DEVICE — GAUZE,SPONGE,4"X4",8PLY,STRL,LF,10/TRAY: Brand: MEDLINE

## (undated) DEVICE — ACCESS PLATFORM FOR MINIMALLY INVASIVE SURGERY.: Brand: GELPORT® LAPAROSCOPIC  SYSTEM

## (undated) DEVICE — FENESTRATED BIPOLAR FORCEPS: Brand: ENDOWRIST;DAVINCI SI

## (undated) DEVICE — SUTURE PERMA HND 2-0 L18IN NONABSORBABLE BLK X-1 L22IN 1/2 737G

## (undated) DEVICE — BLADELESS OBTURATOR: Brand: WECK VISTA

## (undated) DEVICE — SYRINGE MED 10ML LUERLOCK TIP W/O SFTY DISP

## (undated) DEVICE — SUTURE PDS II SZ 0 L60IN ABSRB VLT L48MM CTX 1/2 CIR Z990G

## (undated) DEVICE — PAD,ABDOMINAL,8"X10",ST,LF: Brand: MEDLINE

## (undated) DEVICE — IMPLANTABLE DEVICE
Type: IMPLANTABLE DEVICE | Site: SHOULDER | Status: NON-FUNCTIONAL
Brand: COMPREHENSIVE® VIVACIT-E®

## (undated) DEVICE — ARM DRAPE

## (undated) DEVICE — SUTURE VCRL + SZ 0 L27IN ABSRB VLT L26MM UR-6 5/8 CIR VCP603H

## (undated) DEVICE — NEEDLE HYPO 25GA L1.5IN BLU POLYPR HUB S STL REG BVL STR

## (undated) DEVICE — CADIERE FORCEPS: Brand: ENDOWRIST

## (undated) DEVICE — SUTURE VICRYL + SZ 2-0 L18IN ABSRB UD CT1 L36MM 1/2 CIR VCP839D

## (undated) DEVICE — SUTURE CHROMIC GUT SZ 4-0 L18IN ABSRB BRN L19MM PS-2 3/8 1637G

## (undated) DEVICE — SUTURE FIBERWIRE SZ 5 L38IN NONABSORBABLE BLU L48MM 1/2 AR7211

## (undated) DEVICE — SOLUTION WND IRRIGATION 450 ML 0.5 PVP-I 0.9 NACL

## (undated) DEVICE — GAUZE,SPONGE,2"X2",8PLY,STERILE,LF,2'S: Brand: MEDLINE

## (undated) DEVICE — TUBING, SUCTION, 3/16" X 12', STRAIGHT: Brand: MEDLINE

## (undated) DEVICE — NEEDLE HYPO 18GA L1.5IN THN WALL PIVOTING SHLD BVL ORIENTED

## (undated) DEVICE — BRACE SHLDR L FA L15 16IN UNIV AIRMESH BRTH SLNG 15DEG ABD

## (undated) DEVICE — CAMERA COVER: Brand: UNBRANDED

## (undated) DEVICE — TOWEL,OR,DSP,ST,BLUE,STD,6/PK,12PK/CS: Brand: MEDLINE

## (undated) DEVICE — MEDI-VAC NON-CONDUCTIVE SUCTION TUBING: Brand: CARDINAL HEALTH

## (undated) DEVICE — CANNULA,OXY,ADULT,SUPERSOFT,W/7'TUB,SC: Brand: MEDLINE INDUSTRIES, INC.

## (undated) DEVICE — PIN HOLDING HDLSS 3.2X75 MM TROCAR ZUK

## (undated) DEVICE — SYRINGE MED 10ML TRNSLUC BRL PLUNG BLK MRK POLYPR CTRL

## (undated) DEVICE — GLOVE,SURG,SENSICARE SLT,LF,PF,6.5: Brand: MEDLINE

## (undated) DEVICE — GUIDEWIRE ENDOSCP L150CM DIA0.035IN TIP 3CM PTFE NIT

## (undated) DEVICE — SUTURE STRATAFIX SPRL MCRYL + SZ 3 0 L8IN ABSRB UD L26MM SH SXMP1B427

## (undated) DEVICE — CEMENT MIXING SYSTEM WITH FEMORAL BREAKWAY NOZZLE: Brand: REVOLUTION

## (undated) DEVICE — 3M™ TEGADERM™ TRANSPARENT FILM DRESSING FRAME STYLE, 1624W, 2-3/8 IN X 2-3/4 IN (6 CM X 7 CM), 100/CT 4CT/CASE: Brand: 3M™ TEGADERM™

## (undated) DEVICE — CORD ES L12FT BPLR FRCP

## (undated) DEVICE — GLOVE SURG SZ 8 L12IN FNGR THK79MIL GRN LTX FREE

## (undated) DEVICE — CODMAN® SURGICAL PATTIES 1/2" X 1/2" (1.27CM X 1.27CM): Brand: CODMAN®

## (undated) DEVICE — Z DISCONTINUED USE 2272117 DRAPE SURG 3 QTR N INVASIVE 2 LAYR DISP

## (undated) DEVICE — 3M™ TEGADERM™ TRANSPARENT FILM DRESSING FRAME STYLE WITH BORDER, 1616, 4 IN X 4-3/4 IN (10 CM X 12 CM), 50/CT 4CT/CASE: Brand: 3M™ TEGADERM™

## (undated) DEVICE — STANDARD HYPODERMIC NEEDLE,POLYPROPYLENE HUB: Brand: MONOJECT

## (undated) DEVICE — COLUMN DRAPE

## (undated) DEVICE — ZIMMER® STERILE DISPOSABLE TOURNIQUET CUFF WITH PLC, DUAL PORT, SINGLE BLADDER, 18 IN. (46 CM)

## (undated) DEVICE — SUTURE VCRL + SZ 3-0 L18IN ABSRB UD SH 1/2 CIR TAPERCUT NDL VCP864D

## (undated) DEVICE — OPTIFOAM GENTLE SA, POSTOP, 4X8: Brand: MEDLINE

## (undated) DEVICE — TOTAL TRAY, DB, 100% SILI FOLEY, 16FR 10: Brand: MEDLINE

## (undated) DEVICE — BANDAGE COMPR W2INXL5YD TAN BRTH SELF ADH WRP W/ HND TEAR

## (undated) DEVICE — STAPLER 60 RELOAD BLUE: Brand: SUREFORM

## (undated) DEVICE — VESSEL SEALER EXTEND: Brand: ENDOWRIST

## (undated) DEVICE — BIT DRL DIA2.7MM PERIPH SCR REUSE FOR COMPHSVE REV SHLDR

## (undated) DEVICE — OPEN-END FLEXI-TIP URETERAL CATHETER: Brand: FLEXI-TIP

## (undated) DEVICE — SEAL

## (undated) DEVICE — ENDOSCOPIC KIT 2 12 FT OP4 DE2 GWN SYR

## (undated) DEVICE — STRIP,CLOSURE,WOUND,MEDI-STRIP,1/2X4: Brand: MEDLINE

## (undated) DEVICE — GLOVE SURG SZ 65 THK91MIL LTX FREE SYN POLYISOPRENE

## (undated) DEVICE — SOLUTION IV IRRIG WATER 1000ML POUR BRL 2F7114